# Patient Record
Sex: FEMALE | Race: WHITE | NOT HISPANIC OR LATINO | Employment: OTHER | URBAN - METROPOLITAN AREA
[De-identification: names, ages, dates, MRNs, and addresses within clinical notes are randomized per-mention and may not be internally consistent; named-entity substitution may affect disease eponyms.]

---

## 2017-05-04 ENCOUNTER — HOSPITAL ENCOUNTER (OUTPATIENT)
Dept: RADIOLOGY | Facility: MEDICAL CENTER | Age: 82
End: 2017-05-04
Attending: FAMILY MEDICINE
Payer: MEDICARE

## 2017-05-04 DIAGNOSIS — R05.9 COUGH: ICD-10-CM

## 2017-05-04 PROCEDURE — 71020 DX-CHEST-2 VIEWS: CPT

## 2017-05-31 VITALS
RESPIRATION RATE: 16 BRPM | WEIGHT: 110 LBS | SYSTOLIC BLOOD PRESSURE: 104 MMHG | TEMPERATURE: 97.9 F | HEIGHT: 65 IN | BODY MASS INDEX: 18.33 KG/M2 | DIASTOLIC BLOOD PRESSURE: 62 MMHG | HEART RATE: 69 BPM

## 2017-06-01 ENCOUNTER — OFFICE VISIT (OUTPATIENT)
Dept: PULMONOLOGY | Facility: HOSPICE | Age: 82
End: 2017-06-01
Payer: MEDICARE

## 2017-06-01 VITALS
HEIGHT: 67 IN | RESPIRATION RATE: 16 BRPM | TEMPERATURE: 97.9 F | BODY MASS INDEX: 15.26 KG/M2 | WEIGHT: 97.2 LBS | HEART RATE: 94 BPM | SYSTOLIC BLOOD PRESSURE: 112 MMHG | DIASTOLIC BLOOD PRESSURE: 72 MMHG | OXYGEN SATURATION: 96 %

## 2017-06-01 DIAGNOSIS — M31.30 WEGENER'S GRANULOMATOSIS: ICD-10-CM

## 2017-06-01 DIAGNOSIS — R05.9 COUGH: ICD-10-CM

## 2017-06-01 DIAGNOSIS — R10.84 GENERALIZED ABDOMINAL PAIN: ICD-10-CM

## 2017-06-01 PROCEDURE — 1036F TOBACCO NON-USER: CPT | Performed by: INTERNAL MEDICINE

## 2017-06-01 PROCEDURE — 1101F PT FALLS ASSESS-DOCD LE1/YR: CPT | Mod: 8P | Performed by: INTERNAL MEDICINE

## 2017-06-01 PROCEDURE — G8432 DEP SCR NOT DOC, RNG: HCPCS | Performed by: INTERNAL MEDICINE

## 2017-06-01 PROCEDURE — G8598 ASA/ANTIPLAT THER USED: HCPCS | Performed by: INTERNAL MEDICINE

## 2017-06-01 PROCEDURE — 99214 OFFICE O/P EST MOD 30 MIN: CPT | Performed by: INTERNAL MEDICINE

## 2017-06-01 PROCEDURE — G8419 CALC BMI OUT NRM PARAM NOF/U: HCPCS | Performed by: INTERNAL MEDICINE

## 2017-06-01 PROCEDURE — 4040F PNEUMOC VAC/ADMIN/RCVD: CPT | Performed by: INTERNAL MEDICINE

## 2017-06-01 RX ORDER — METHYLPREDNISOLONE 4 MG/1
TABLET ORAL
Refills: 0 | COMMUNITY
Start: 2017-03-16 | End: 2017-07-03

## 2017-06-01 RX ORDER — MEGESTROL ACETATE 20 MG/1
TABLET ORAL
Refills: 0 | COMMUNITY
Start: 2017-03-16 | End: 2017-07-03

## 2017-06-01 NOTE — MR AVS SNAPSHOT
"Az Jolly   2017 1:00 PM   Office Visit   MRN: [id number]    Department:  [facility]   Dept Phone:  [phone]    Description:  Female : 1934   Provider:  Rayna Jernigan M.D.           Reason for Visit     Follow-Up Patient is here for a Cough.      Allergies as of 2017     Allergen Noted Reactions    Hydroxyzine 2009   Swelling    Keflex 2009   Swelling    Naprelan [Naproxen] 2009   Swelling    Oxaprozin 2009   Swelling    Ampicillin 2009   Rash    Erythromycin 2009   Diarrhea    Vi-Q-Tuss [Hydrocodone-Guaifenesin] 2009   Vomiting    Voltaren [Diclofenac Sodium] 2009   Rash    Baclofen 10/18/2013       drowsiness    Citalopram 10/18/2013       Promethazine 10/18/2013       Drowsiness       You were diagnosed with     Wegener's granulomatosis (CMS-HCC)   [446.4.ICD-9-CM]         Vital Signs     Blood Pressure Pulse Temperature Respirations Height Weight    112/72 mmHg 94 36.6 °C (97.9 °F) 16 1.702 m (5' 7.01\") 44.09 kg (97 lb 3.2 oz)    Body Mass Index Oxygen Saturation Smoking Status             15.22 kg/m2 96% Former Smoker         Basic Information     Date Of Birth Sex Race Ethnicity Preferred Language    1934 Female White Non- English      Your appointments     2017 12:00 PM   Pulmonary Function Test with PFT-RM3   [facility] (--)    [address] [facility] [address]  [facility] [id number] [phone]            2017  8:40 AM   Established Patient Pul with Rayna Jernigan M.D.   [facility] (--)    [address] [facility] [address]  [facility] [id number] [phone]              Problem List              ICD-10-CM Priority Class Noted - Resolved    Arterial ischemic stroke, MCA (middle cerebral artery), left, acute (CMS-[facility]) I63.512 High  8/3/2011 - Present    Wegener's granulomatosis (CMS-HCC) M31.30   8/3/2011 - Present    SVT (SUPRAVENTRICULAR TACHYCARDIA), h/o " paroxysmal I47.1   8/3/2011 - Present    Achalasia K22.0   8/3/2011 - Present    Glomerulonephritis, chronic in other disease N08   8/3/2011 - Present    Hypothyroid E03.9   8/3/2011 - Present    First degree atrioventricular block (Chronic) I44.0   9/21/2012 - Present    Palpitations (Chronic) R00.2   9/21/2012 - Present    Stroke (CMS-HCC) (Chronic) I63.9   9/21/2012 - Present    Allergic rhinitis (Chronic) J30.9   9/21/2012 - Present    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   9/21/2012 - Present    Rheumatic fever (Chronic) I00   9/21/2012 - Present      Health Maintenance        Date Due Completion Dates    IMM DTaP/Tdap/Td Vaccine (1 - Tdap) 4/25/1953 ---    PAP SMEAR 4/25/1955 ---    COLONOSCOPY 4/25/1984 ---    IMM ZOSTER VACCINE 4/25/1994 ---    IMM PNEUMOCOCCAL 65+ (ADULT) LOW/MEDIUM RISK SERIES (2 of 2 - PCV13) 10/26/2005 10/26/2004    MAMMOGRAM 6/5/2014 6/5/2013, 5/10/2012, 4/29/2011, 4/19/2010, 4/19/2010, 4/17/2009, 4/17/2009, 3/24/2008, 3/24/2008, 3/21/2007, 3/10/2006, 3/9/2005, 3/3/2004    BONE DENSITY 6/19/2018 6/19/2013            Current Immunizations     INFLUENZA VACCINE H1N1 12/29/2010    Influenza TIV (IM) 9/14/2013    Influenza Vaccine 9/7/2010    Influenza Vaccine Adult HD 9/26/2016  9:18 AM    Pneumococcal polysaccharide vaccine (PPSV-23) 10/26/2004      Below and/or attached are the medications your provider expects you to take. Review all of your home medications and newly ordered medications with your provider and/or pharmacist. Follow medication instructions as directed by your provider and/or pharmacist. Please keep your medication list with you and share with your provider. Update the information when medications are discontinued, doses are changed, or new medications (including over-the-counter products) are added; and carry medication information at all times in the event of emergency situations     Allergies:  HYDROXYZINE - Swelling     KEFLEX - Swelling     NAPRELAN -  Swelling     OXAPROZIN - Swelling     AMPICILLIN - Rash     ERYTHROMYCIN - Diarrhea     VI-Q-TUSS - Vomiting     VOLTAREN - Rash     BACLOFEN - (reactions not documented)     CITALOPRAM - (reactions not documented)     PROMETHAZINE - (reactions not documented)               Medications  Valid as of: June 01, 2017 -  1:36 PM    Generic Name Brand Name Tablet Size Instructions for use    Aspirin (Tab)  MG Take 325 mg by mouth every 6 hours as needed.        Calcium-Vitamin D   Take 1 Tab by mouth 2 Times a Day.        Folic Acid (Tab) FOLVITE 1 MG Take 1 mg by mouth every day.        Levothyroxine Sodium (Tab) SYNTHROID 75 MCG Take 75 mcg by mouth every morning before breakfast.        Megestrol Acetate (Tab) MEGACE 20 MG TAKE 1 TABLET BY MOUTH TWICE A DAY        MethylPREDNISolone (Tablet Therapy Pack) MEDROL DOSEPAK 4 MG TAKE 6 TABLETS ON DAY 1 AS DIRECTED ON PACKAGE AND DECREASE BY 1 TAB EACH DAY FOR A TOTAL OF 6 DAYS        Multiple Vitamins-Minerals   Take 2 Tabs by mouth every day.        .                 Medicines prescribed today were sent to:     [facility]/PHARMACY #[id number] - [place] - [address]    [address] [place] [address]    Phone: [phone] Fax: [phone]    Open 24 Hours?: No      Medication refill instructions:       If your prescription bottle indicates you have medication refills left, it is not necessary to call your provider’s office. Please contact your pharmacy and they will refill your medication.    If your prescription bottle indicates you do not have any refills left, you may request refills at any time through one of the following ways: The online [employer] system (except Urgent Care), by calling your provider’s office, or by asking your pharmacy to contact your provider’s office with a refill request. Medication refills are processed only during regular business hours and may not be available until the next business day. Your provider may request additional information  or to have a follow-up visit with you prior to refilling your medication.   *Please Note: Medication refills are assigned a new Rx number when refilled electronically. Your pharmacy may indicate that no refills were authorized even though a new prescription for the same medication is available at the pharmacy. Please request the medicine by name with the pharmacy before contacting your provider for a refill.        Your To Do List     Future Labs/Procedures Complete By Expires    AMB PULMONARY FUNCTION TEST/LAB  As directed 6/1/2018    ANTI-NEUTROPHIL CYTOPLASMIC AB  As directed 6/1/2018    CT-CHEST (THORAX) W/O  As directed 6/1/2018    URINALYSIS  As directed 6/1/2018    WESTERGREN SED RATE  As directed 6/1/2018         MyChart Status: Patient Declined

## 2017-06-01 NOTE — PROGRESS NOTES
Chief Complaint   Patient presents with   • Follow-Up     Patient is here for a Cough.       HPI: This patient is an 83 y.o. Female who returns after a prolonged hiatus for pulmonary follow-up. Her last visit was in June 2015. She has history of Wegener's granulomatosis requiring Cytoxan therapy which was discontinued in 2013. She follows with Dr. Estrada, nephrologist, and has been considered in remission since then. Over the past months she has developed abdominal pain of unclear etiology and allegedly undergone GI workup which was unremarkable. She has developed a mild cough, denies sputum production, wheezing, hemoptysis, epistaxis, or chest pain. She estimates a 10 pound weight loss over the past year. She feels poorly overall.  Prior pulmonary function testing in 2015 showed normal lung function. Recent chest x-ray shows:  Peribronchial thickening and patchy linear opacities likely related to inflammation/bronchitis             Past Medical History   Diagnosis Date   • Mitral valve prolapse    • Wegener's granulomatosis (CMS-HCC)    • A fib 9/22/06   • Hypothyroid 10/2002   • DVT 6/1996     left leg, vein ligation performed on saphenous vein   • Kidney failure      stage II   • Kidney disorder 1998     left kidney biopsy--glomerulonephritis and wegners dx   • H/O echocardiogram 9/21/2012   • First degree atrioventricular block 9/21/2012   • Palpitations 9/21/2012   • Stroke (CMS-HCC) 9/21/2012   • Allergic rhinitis 9/21/2012   • Aseptic necrosis of bone, 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     Social History Main Topics   • Smoking status: Former Smoker -- 1.00 packs/day     Types: Cigarettes     Quit date: 01/01/1960   • Smokeless tobacco: Never Used      Comment: very little  years and years ago   • Alcohol Use: 0.0 oz/week     0 Standard drinks or equivalent per week      Comment: occasional   • Drug Use: No   • Sexual Activity: Not on file     Other Topics Concern   • Not on file     Social History Narrative       Family History   Problem Relation Age of Onset   • Other Father      Aortic aneurysm   • Cancer Brother      Lung   • Non-contributory Other        Current Outpatient Prescriptions on File Prior to Visit   Medication Sig Dispense Refill   • aspirin (ASA) 325 MG Tab Take 325 mg by mouth every 6 hours as needed.     • folic acid (FOLVITE) 1 MG TABS Take 1 mg by mouth every day.     • levothyroxine (SYNTHROID) 75 MCG TABS Take 75 mcg by mouth every morning before breakfast.     • Multiple Vitamins-Minerals (MULTIVITAMIN PO) Take 2 Tabs by mouth every day.     • Calcium-Vitamin D (CALTRATE 600 PLUS-VIT D PO) Take 1 Tab by mouth 2 Times a Day.       No current facility-administered medications on file prior to visit.       Allergies: Hydroxyzine; Keflex; Naprelan; Oxaprozin; Ampicillin; Erythromycin; Vi-q-tuss; Voltaren; Baclofen; Citalopram; and Promethazine    ROS:   Constitutional: Denies fevers, chills, night sweats, +fatigue, +weight loss  Eyes: Denies vision loss, pain, drainage, double vision  Ears, Nose, Throat: Denies earache, difficulty hearing, tinnitus, nasal congestion, hoarseness  Cardiovascular: Denies chest pain, tightness, palpitations, orthopnea or edema  Respiratory: As in history of present illness  Sleep: Denies daytime sleepiness, snoring, apneas, insomnia, morning headaches  GI: Denies heartburn, dysphagia, nausea, +abdominal pain, denies diarrhea or constipation  : Denies frequent urination, hematuria, discharge or painful urination  Musculoskeletal: Denies back pain, painful joints, sore muscles  Neurological: Denies weakness or headaches  Skin: No rashes    Blood pressure 112/72, pulse 94, temperature 36.6 °C (97.9 °F), resp. rate 16, height 1.702 m (5'  "7.01\"), weight 44.09 kg (97 lb 3.2 oz), SpO2 96 %.    Physical Exam:  Appearance: Well-nourished, well-developed, in no acute distress  HEENT: Normocephalic, atraumatic, white sclera, PERRLA, oropharynx clear  Neck: No adenopathy or masses  Respiratory: no intercostal retractions or accessory muscle use  Lungs auscultation: Clear to auscultation bilaterally  Cardiovascular: Regular rate rhythm. No murmurs, rubs or gallops.  No LE edema  Abdomen: soft, nondistended  Gait: In wheelchair  Digits: No clubbing, cyanosis  Motor: No focal deficits  Orientation: Oriented to time, person and place    Diagnosis:  1. Wegener's granulomatosis (CMS-HCC)  ANTI-NEUTROPHIL CYTOPLASMIC AB    WESTERGREN SED RATE    C-REACTIVE PROTEIN CARDIAC    URINALYSIS    CT-CHEST (THORAX) W/O    AMB PULMONARY FUNCTION TEST/LAB   2. Cough     3. Generalized abdominal pain         Plan:  The patient has a history of Wegener's granulomatosis requiring treatment with Cytoxan over 5 years ago. She has principally gastric complaints as well as generalized malaise, and mild cough. The etiology is unclear.   We will arrange for chest CAT scan without contrast and pulmonary function testing with DLCO.  Verify ANCA, sedimentation rate, C-reactive protein and urine analysis for evaluation of Wegener's disease. She also follows simultaneously with Dr. Machado, who she has an appointment with this month.  Return for after CT scan, after PFT.        "

## 2017-06-06 ENCOUNTER — HOSPITAL ENCOUNTER (OUTPATIENT)
Dept: RADIOLOGY | Facility: MEDICAL CENTER | Age: 82
End: 2017-06-06
Attending: INTERNAL MEDICINE
Payer: MEDICARE

## 2017-06-06 DIAGNOSIS — M31.30 WEGENER'S GRANULOMATOSIS: ICD-10-CM

## 2017-06-06 PROCEDURE — 71250 CT THORAX DX C-: CPT

## 2017-06-07 ENCOUNTER — TELEPHONE (OUTPATIENT)
Dept: PULMONOLOGY | Facility: HOSPICE | Age: 82
End: 2017-06-07

## 2017-06-07 ENCOUNTER — HOME HEALTH ADMISSION (OUTPATIENT)
Dept: HOME HEALTH SERVICES | Facility: HOME HEALTHCARE | Age: 82
End: 2017-06-07
Payer: MEDICARE

## 2017-06-07 DIAGNOSIS — N39.0 URINARY TRACT INFECTION WITH HEMATURIA, SITE UNSPECIFIED: ICD-10-CM

## 2017-06-07 DIAGNOSIS — R31.9 URINARY TRACT INFECTION WITH HEMATURIA, SITE UNSPECIFIED: ICD-10-CM

## 2017-06-07 RX ORDER — SULFAMETHOXAZOLE AND TRIMETHOPRIM 800; 160 MG/1; MG/1
1 TABLET ORAL 2 TIMES DAILY
Qty: 28 TAB | Refills: 0 | Status: SHIPPED | OUTPATIENT
Start: 2017-06-07 | End: 2017-07-03

## 2017-06-07 NOTE — TELEPHONE ENCOUNTER
Please let patient know her U/A results are back and it appears she has a urinary tract infection. I recommend starting antibiotic for this now. RX sent. The rest of her labs are still pending. Keep f/u visit with Dr. Jernigan.

## 2017-06-07 NOTE — TELEPHONE ENCOUNTER
I called the patient and provided her the message below from CARLOS ALBERTO Foster. Patient stated that she understood.

## 2017-07-03 ENCOUNTER — OUTPATIENT INFUSION SERVICES (OUTPATIENT)
Dept: ONCOLOGY | Facility: MEDICAL CENTER | Age: 82
End: 2017-07-03
Attending: INTERNAL MEDICINE
Payer: MEDICARE

## 2017-07-03 VITALS
TEMPERATURE: 97.8 F | BODY MASS INDEX: 15.33 KG/M2 | RESPIRATION RATE: 18 BRPM | HEIGHT: 67 IN | SYSTOLIC BLOOD PRESSURE: 136 MMHG | DIASTOLIC BLOOD PRESSURE: 64 MMHG | HEART RATE: 110 BPM | OXYGEN SATURATION: 99 % | WEIGHT: 97.66 LBS

## 2017-07-03 DIAGNOSIS — D64.9 ANEMIA, UNSPECIFIED TYPE: ICD-10-CM

## 2017-07-03 LAB
ERYTHROCYTE [DISTWIDTH] IN BLOOD BY AUTOMATED COUNT: 51.8 FL (ref 35.9–50)
FERRITIN SERPL-MCNC: 101.2 NG/ML (ref 10–291)
HCT VFR BLD AUTO: 29.9 % (ref 37–47)
HGB BLD-MCNC: 9.2 G/DL (ref 12–16)
IRON SATN MFR SERPL: 6 % (ref 15–55)
IRON SERPL-MCNC: 23 UG/DL (ref 40–170)
MCH RBC QN AUTO: 27.5 PG (ref 27–33)
MCHC RBC AUTO-ENTMCNC: 30.8 G/DL (ref 33.6–35)
MCV RBC AUTO: 89.5 FL (ref 81.4–97.8)
PLATELET # BLD AUTO: 251 K/UL (ref 164–446)
PMV BLD AUTO: 9.4 FL (ref 9–12.9)
RBC # BLD AUTO: 3.34 M/UL (ref 4.2–5.4)
TIBC SERPL-MCNC: 371 UG/DL (ref 250–450)
WBC # BLD AUTO: 4.9 K/UL (ref 4.8–10.8)

## 2017-07-03 PROCEDURE — 700105 HCHG RX REV CODE 258: Performed by: INTERNAL MEDICINE

## 2017-07-03 PROCEDURE — 83540 ASSAY OF IRON: CPT

## 2017-07-03 PROCEDURE — 96365 THER/PROPH/DIAG IV INF INIT: CPT

## 2017-07-03 PROCEDURE — 82728 ASSAY OF FERRITIN: CPT

## 2017-07-03 PROCEDURE — 83550 IRON BINDING TEST: CPT

## 2017-07-03 PROCEDURE — 700111 HCHG RX REV CODE 636 W/ 250 OVERRIDE (IP): Performed by: INTERNAL MEDICINE

## 2017-07-03 PROCEDURE — 306780 HCHG STAT FOR TRANSFUSION PER CASE

## 2017-07-03 PROCEDURE — 85027 COMPLETE CBC AUTOMATED: CPT

## 2017-07-03 RX ADMIN — IRON SUCROSE 200 MG: 20 INJECTION, SOLUTION INTRAVENOUS at 15:41

## 2017-07-03 ASSESSMENT — PAIN SCALES - GENERAL: PAINLEVEL: NO PAIN

## 2017-07-04 NOTE — PROGRESS NOTES
Patient arrived to Infusion for Day 1 of 5 Venofer infusion;  accompanied. Pt reports fatigue, residual weakness from a previous stroke. Ambulates with walker, but tolerates well.  Reviewed plan of care; print out of remaining appts given to patient and .  PIV started, labs drawn per protocol. Reviewed results; Venofer infused per MAR.  Pt tolerated well; 30min post observation completed without incident. PIV flushed, removed and gauze pressure dressing applied.  Pt discharged home with  in good spirits under no apparent distress.

## 2017-07-07 ENCOUNTER — OUTPATIENT INFUSION SERVICES (OUTPATIENT)
Dept: ONCOLOGY | Facility: MEDICAL CENTER | Age: 82
End: 2017-07-07
Attending: INTERNAL MEDICINE
Payer: MEDICARE

## 2017-07-07 VITALS
HEIGHT: 66 IN | OXYGEN SATURATION: 99 % | TEMPERATURE: 98.4 F | BODY MASS INDEX: 15.52 KG/M2 | DIASTOLIC BLOOD PRESSURE: 80 MMHG | HEART RATE: 102 BPM | RESPIRATION RATE: 18 BRPM | SYSTOLIC BLOOD PRESSURE: 132 MMHG | WEIGHT: 96.56 LBS

## 2017-07-07 PROCEDURE — 96365 THER/PROPH/DIAG IV INF INIT: CPT

## 2017-07-07 PROCEDURE — 306780 HCHG STAT FOR TRANSFUSION PER CASE

## 2017-07-07 PROCEDURE — 700111 HCHG RX REV CODE 636 W/ 250 OVERRIDE (IP): Performed by: INTERNAL MEDICINE

## 2017-07-07 PROCEDURE — 700105 HCHG RX REV CODE 258: Performed by: INTERNAL MEDICINE

## 2017-07-07 RX ADMIN — IRON SUCROSE 200 MG: 20 INJECTION, SOLUTION INTRAVENOUS at 13:40

## 2017-07-07 ASSESSMENT — PAIN SCALES - GENERAL: PAINLEVEL: NO PAIN

## 2017-07-07 NOTE — PROGRESS NOTES
Here for Venofer IV infusion dose # 2 of 5. In good spirits. Remains tired / fatigue. Teaching and education reinforcement provided, also emotional support. Treatment well tolerated without complications. Patient observed by RN for 30 min after infusion finished. No sign or symptoms of adverse reaction observed or expressed. Discharge home with  to self care. Appointment confirmed for next treatment.

## 2017-07-10 ENCOUNTER — OUTPATIENT INFUSION SERVICES (OUTPATIENT)
Dept: ONCOLOGY | Facility: MEDICAL CENTER | Age: 82
End: 2017-07-10
Attending: INTERNAL MEDICINE
Payer: MEDICARE

## 2017-07-10 VITALS
BODY MASS INDEX: 15.45 KG/M2 | RESPIRATION RATE: 18 BRPM | OXYGEN SATURATION: 100 % | HEIGHT: 66 IN | HEART RATE: 77 BPM | SYSTOLIC BLOOD PRESSURE: 118 MMHG | TEMPERATURE: 97.9 F | DIASTOLIC BLOOD PRESSURE: 50 MMHG | WEIGHT: 96.12 LBS

## 2017-07-10 PROCEDURE — 96365 THER/PROPH/DIAG IV INF INIT: CPT

## 2017-07-10 PROCEDURE — 306780 HCHG STAT FOR TRANSFUSION PER CASE

## 2017-07-10 PROCEDURE — 700105 HCHG RX REV CODE 258: Performed by: INTERNAL MEDICINE

## 2017-07-10 PROCEDURE — 700111 HCHG RX REV CODE 636 W/ 250 OVERRIDE (IP): Performed by: INTERNAL MEDICINE

## 2017-07-10 RX ADMIN — IRON SUCROSE 200 MG: 20 INJECTION, SOLUTION INTRAVENOUS at 14:36

## 2017-07-10 ASSESSMENT — PAIN SCALES - GENERAL: PAINLEVEL: NO PAIN

## 2017-07-10 NOTE — PROGRESS NOTES
Pt to infusion services ambulatory per self with walker and accompanied by spouse.  Pt here for scheduled Venofer infusion, #3 of 5.  Plan of care reviewed.  Pt verbalizes understanding.  Pt tells me she has tolerated previous infusions well.  PIV established to LAC,  #24G.  IV flushes easily; + blood return verified.  Venofer administered per MD orders.  Venofer infusing at this time.  Pt resting in chair.  Spouse at chairside.  Call light at hand.

## 2017-07-11 NOTE — PROGRESS NOTES
Late entry for 1545:  Venofer infusion completed without incident.  Line flushed clear with normal saline.  Pt observed for 30 minutes post infusion.  No adverse effects observed or expressed.  VSS.  PIV d/c'd, pressure dressing applied, and patient released to self care and care of spouse in no apparent distress after completion of treatment, ambulatory.  Next appointment scheduled.

## 2017-07-14 ENCOUNTER — OUTPATIENT INFUSION SERVICES (OUTPATIENT)
Dept: ONCOLOGY | Facility: MEDICAL CENTER | Age: 82
End: 2017-07-14
Attending: INTERNAL MEDICINE
Payer: MEDICARE

## 2017-07-14 VITALS
SYSTOLIC BLOOD PRESSURE: 128 MMHG | OXYGEN SATURATION: 98 % | BODY MASS INDEX: 15.29 KG/M2 | HEART RATE: 102 BPM | TEMPERATURE: 98.1 F | DIASTOLIC BLOOD PRESSURE: 74 MMHG | RESPIRATION RATE: 18 BRPM | WEIGHT: 97.44 LBS | HEIGHT: 67 IN

## 2017-07-14 PROCEDURE — 306780 HCHG STAT FOR TRANSFUSION PER CASE

## 2017-07-14 PROCEDURE — 96365 THER/PROPH/DIAG IV INF INIT: CPT

## 2017-07-14 PROCEDURE — 700105 HCHG RX REV CODE 258: Performed by: INTERNAL MEDICINE

## 2017-07-14 PROCEDURE — 700111 HCHG RX REV CODE 636 W/ 250 OVERRIDE (IP): Performed by: INTERNAL MEDICINE

## 2017-07-14 RX ADMIN — IRON SUCROSE 200 MG: 20 INJECTION, SOLUTION INTRAVENOUS at 14:34

## 2017-07-14 ASSESSMENT — PAIN SCALES - GENERAL: PAINLEVEL: NO PAIN

## 2017-07-14 NOTE — PROGRESS NOTES
Patient presents ambulatory via walker for #4/5 venofer.  Patient reports feeling well and denies any s/s of infection at this time.  IV established, flushed, and brisk blood return noted.  Venofer infused over 30 minutes per MD order with no complications or adverse reactions.  Patient to return 7/17/17, confirmed with patient and spouse.  PIV discontinued, gauze, and coban applied to site.  Patient left ambulatory via walker in no distress.

## 2017-07-17 ENCOUNTER — OUTPATIENT INFUSION SERVICES (OUTPATIENT)
Dept: ONCOLOGY | Facility: MEDICAL CENTER | Age: 82
End: 2017-07-17
Attending: INTERNAL MEDICINE
Payer: MEDICARE

## 2017-07-17 VITALS
DIASTOLIC BLOOD PRESSURE: 46 MMHG | RESPIRATION RATE: 18 BRPM | OXYGEN SATURATION: 98 % | TEMPERATURE: 98.3 F | SYSTOLIC BLOOD PRESSURE: 116 MMHG | WEIGHT: 96.12 LBS | BODY MASS INDEX: 15.09 KG/M2 | HEART RATE: 100 BPM | HEIGHT: 67 IN

## 2017-07-17 PROCEDURE — 306780 HCHG STAT FOR TRANSFUSION PER CASE

## 2017-07-17 PROCEDURE — 700111 HCHG RX REV CODE 636 W/ 250 OVERRIDE (IP): Performed by: INTERNAL MEDICINE

## 2017-07-17 PROCEDURE — 96365 THER/PROPH/DIAG IV INF INIT: CPT

## 2017-07-17 PROCEDURE — 700105 HCHG RX REV CODE 258: Performed by: INTERNAL MEDICINE

## 2017-07-17 RX ADMIN — IRON SUCROSE 200 MG: 20 INJECTION, SOLUTION INTRAVENOUS at 13:52

## 2017-07-17 ASSESSMENT — PAIN SCALES - GENERAL: PAINLEVEL: NO PAIN

## 2017-07-17 NOTE — PROGRESS NOTES
Pt here for 5/5 venofer.  Pt states no changes from last visit, tolerating infusions without issue.  PIV started, venofer infused.  Pt completed 30 mintues post obs without issue.  PIV removed.  Pt left IC with , no future apts.

## 2017-07-26 ENCOUNTER — NON-PROVIDER VISIT (OUTPATIENT)
Dept: PULMONOLOGY | Facility: HOSPICE | Age: 82
End: 2017-07-26
Payer: MEDICARE

## 2017-07-26 VITALS — BODY MASS INDEX: 15.25 KG/M2 | WEIGHT: 96 LBS

## 2017-07-26 DIAGNOSIS — M31.30 WEGENER'S GRANULOMATOSIS: ICD-10-CM

## 2017-07-26 PROCEDURE — 94060 EVALUATION OF WHEEZING: CPT | Performed by: INTERNAL MEDICINE

## 2017-07-26 PROCEDURE — 94726 PLETHYSMOGRAPHY LUNG VOLUMES: CPT | Performed by: INTERNAL MEDICINE

## 2017-07-26 PROCEDURE — 94729 DIFFUSING CAPACITY: CPT | Performed by: INTERNAL MEDICINE

## 2017-07-26 ASSESSMENT — PULMONARY FUNCTION TESTS
FVC_PERCENT_PREDICTED: 86
FVC_PERCENT_PREDICTED: 88
FEV1_PREDICTED: 1.92
FVC: 2.23
FEV1/FVC: 83
FEV1: 1.95
FEV1_PERCENT_PREDICTED: 99
FEV1/FVC: 87.44
FEV1/FVC_PERCENT_CHANGE: -67
FVC: 2.3
FEV1_PERCENT_CHANGE: 2
FEV1/FVC_PERCENT_PREDICTED: 74
FEV1_PERCENT_PREDICTED: 101
FEV1/FVC_PERCENT_PREDICTED: 112
FEV1_PERCENT_CHANGE: -3
FEV1: 1.91
FEV1/FVC_PERCENT_PREDICTED: 117
FVC_PREDICTED: 2.59

## 2017-07-26 NOTE — MR AVS SNAPSHOT
Az Huadivya   2017 12:00 PM   Non-Provider Visit   MRN: [id number]    Department:  [facility]   Dept Phone:  [phone]    Description:  Female : 1934   Provider:  PFT-3           Reason for Visit     Shortness of Breath           Allergies as of 2017     Allergen Noted Reactions    Hydroxyzine 2009   Swelling    Keflex 2009   Swelling    Naprelan [Naproxen] 2009   Swelling    Oxaprozin 2009   Swelling    Ampicillin 2009   Rash    Erythromycin 2009   Diarrhea    Vi-Q-Tuss [Hydrocodone-Guaifenesin] 2009   Vomiting    Voltaren [Diclofenac Sodium] 2009   Rash    Baclofen 10/18/2013       drowsiness    Citalopram 10/18/2013       Promethazine 10/18/2013       Drowsiness       You were diagnosed with     Wegener's granulomatosis (CMS-HCC)   [446.4.ICD-9-CM]         Vital Signs     Weight Smoking Status                43.545 kg (96 lb) Former Smoker          Basic Information     Date Of Birth Sex Race Ethnicity Preferred Language    1934 Female White Non- English      Your appointments     2017  8:40 AM   Established Patient Pul with Rayna Jernigan M.D.   [facility] (--)    [address] [place] [address] [address]  [facility] [place] [id number] [phone]              Problem List              ICD-10-CM Priority Class Noted - Resolved    Arterial ischemic stroke, MCA (middle cerebral artery), left, acute (CMS-[facility]) I63.512 High  8/3/2011 - Present    Wegener's granulomatosis (CMS-HCC) M31.30   8/3/2011 - Present    SVT (SUPRAVENTRICULAR TACHYCARDIA), h/o paroxysmal I47.1   8/3/2011 - Present    Achalasia K22.0   8/3/2011 - Present    Glomerulonephritis, chronic in other disease N08   8/3/2011 - Present    Hypothyroid E03.9   8/3/2011 - Present    First degree atrioventricular block (Chronic) I44.0   2012 - Present    Palpitations (Chronic) R00.2   2012 - Present    Stroke (CMS-HCC) (Chronic) I63.9    9/21/2012 - Present    Allergic rhinitis (Chronic) J30.9   9/21/2012 - Present    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   9/21/2012 - Present    Rheumatic fever (Chronic) I00   9/21/2012 - Present      Health Maintenance        Date Due Completion Dates    IMM DTaP/Tdap/Td Vaccine (1 - Tdap) 4/25/1953 ---    PAP SMEAR 4/25/1955 ---    COLONOSCOPY 4/25/1984 ---    IMM ZOSTER VACCINE 4/25/1994 ---    IMM PNEUMOCOCCAL 65+ (ADULT) LOW/MEDIUM RISK SERIES (2 of 2 - PCV13) 10/26/2005 10/26/2004    MAMMOGRAM 6/5/2014 6/5/2013, 5/10/2012, 4/29/2011, 4/19/2010, 4/19/2010, 4/17/2009, 4/17/2009, 3/24/2008, 3/24/2008, 3/21/2007, 3/10/2006, 3/9/2005, 3/3/2004    IMM INFLUENZA (1) 9/1/2017 9/26/2016, 9/14/2013    BONE DENSITY 6/19/2018 6/19/2013            Current Immunizations     INFLUENZA VACCINE H1N1 12/29/2010    Influenza TIV (IM) 9/14/2013    Influenza Vaccine 9/7/2010    Influenza Vaccine Adult HD 9/26/2016  9:18 AM    Pneumococcal polysaccharide vaccine (PPSV-23) 10/26/2004      Below and/or attached are the medications your provider expects you to take. Review all of your home medications and newly ordered medications with your provider and/or pharmacist. Follow medication instructions as directed by your provider and/or pharmacist. Please keep your medication list with you and share with your provider. Update the information when medications are discontinued, doses are changed, or new medications (including over-the-counter products) are added; and carry medication information at all times in the event of emergency situations     Allergies:  HYDROXYZINE - Swelling     KEFLEX - Swelling     NAPRELAN - Swelling     OXAPROZIN - Swelling     AMPICILLIN - Rash     ERYTHROMYCIN - Diarrhea     VI-Q-TUSS - Vomiting     VOLTAREN - Rash     BACLOFEN - (reactions not documented)     CITALOPRAM - (reactions not documented)     PROMETHAZINE - (reactions not documented)               Medications  Valid as of: July 26,  2017 - 12:14 PM    Generic Name Brand Name Tablet Size Instructions for use    Aspirin (Tab)  MG Take 325 mg by mouth every 6 hours as needed.        Folic Acid (Tab) FOLVITE 1 MG Take 1 mg by mouth every day.        Levothyroxine Sodium (Tab) SYNTHROID 75 MCG Take 75 mcg by mouth every morning before breakfast.        Magnesium Hydroxide   Take  by mouth.        Multiple Vitamins-Minerals   Take 2 Tabs by mouth every day.        Psyllium (Pack) METAMUCIL 58.12 % Take 1 Packet by mouth every day.        .                 Medicines prescribed today were sent to:     [facility]/PHARMACY #[id number] - [place], [place] - [address]    [address] [place] [address]    Phone: [phone] Fax: [phone]    Open 24 Hours?: No      Medication refill instructions:       If your prescription bottle indicates you have medication refills left, it is not necessary to call your provider’s office. Please contact your pharmacy and they will refill your medication.    If your prescription bottle indicates you do not have any refills left, you may request refills at any time through one of the following ways: The online [employer] system (except Urgent Care), by calling your provider’s office, or by asking your pharmacy to contact your provider’s office with a refill request. Medication refills are processed only during regular business hours and may not be available until the next business day. Your provider may request additional information or to have a follow-up visit with you prior to refilling your medication.   *Please Note: Medication refills are assigned a new Rx number when refilled electronically. Your pharmacy may indicate that no refills were authorized even though a new prescription for the same medication is available at the pharmacy. Please request the medicine by name with the pharmacy before contacting your provider for a refill.           MyChart Status: Patient Declined

## 2017-07-26 NOTE — PROCEDURES
Technician: MONIQUE Taylor    Technician Comment:  Good patient effort & cooperation.  The results of this test meet the ATS/ERS standards for acceptability & reproducibility.  Test was performed on the [employer] Plethysmograph-Elite DX system.  Predicted values were NHanes-3 for spirometry, [facility] for DLCO, ITS for Lung Volumes.  The DLCO was uncorrected for Hgb.  A bronchodilator of Ventolin HFA -2puffs via spacer administered.    Interpretation:    Lung function testing completed July 26, 2017 showed normal spirometry. No change after bronchodilators. Mild hyperinflation of uncertain significance. Normal oxygen transfer. Normal flow volume loop and good effort noted.

## 2017-07-27 ENCOUNTER — OFFICE VISIT (OUTPATIENT)
Dept: PULMONOLOGY | Facility: HOSPICE | Age: 82
End: 2017-07-27
Payer: MEDICARE

## 2017-07-27 VITALS
HEIGHT: 67 IN | OXYGEN SATURATION: 98 % | SYSTOLIC BLOOD PRESSURE: 112 MMHG | WEIGHT: 97 LBS | BODY MASS INDEX: 15.22 KG/M2 | DIASTOLIC BLOOD PRESSURE: 72 MMHG | TEMPERATURE: 97.7 F | RESPIRATION RATE: 16 BRPM | HEART RATE: 107 BPM

## 2017-07-27 DIAGNOSIS — M31.30 WEGENER'S GRANULOMATOSIS: ICD-10-CM

## 2017-07-27 DIAGNOSIS — J47.1 BRONCHIECTASIS WITH ACUTE EXACERBATION (HCC): ICD-10-CM

## 2017-07-27 DIAGNOSIS — D50.9 IRON DEFICIENCY ANEMIA, UNSPECIFIED IRON DEFICIENCY ANEMIA TYPE: ICD-10-CM

## 2017-07-27 PROCEDURE — 99214 OFFICE O/P EST MOD 30 MIN: CPT | Performed by: INTERNAL MEDICINE

## 2017-07-27 RX ORDER — SULFAMETHOXAZOLE AND TRIMETHOPRIM 800; 160 MG/1; MG/1
TABLET ORAL
Refills: 0 | COMMUNITY
Start: 2017-06-07 | End: 2018-10-15

## 2017-07-27 RX ORDER — CIPROFLOXACIN 500 MG/1
500 TABLET, FILM COATED ORAL 2 TIMES DAILY
Qty: 28 TAB | Refills: 0 | Status: SHIPPED | OUTPATIENT
Start: 2017-07-27 | End: 2018-10-15

## 2017-07-27 NOTE — PROGRESS NOTES
Chief Complaint   Patient presents with   • Results     CT,PFT results.       HPI: This patient is an 83 y.o. Female who returns for follow-up. She has history of Wegener's granulomatosis requiring Cytoxan therapy which was discontinued in 2013. She follows with Dr. Estrada, nephrologist, and has been considered in remission since then. Over the past months she has developed abdominal pain of unclear etiology and allegedly undergone GI workup which was unremarkable. She has developed a mild cough, denies sputum production, wheezing, hemoptysis, epistaxis, or chest pain. She estimates a 10 pound weight loss over the past year. She feels poorly overall. She saw Dr. Estrada, was started on infusions for iron deficiency anemia, with subjective improvement in fatigue and abdominal discomfort. Medical records will be requested from Dr. Estrada, however she states her Wegener's is considered in remission. Her pulmonary function testing show normal lung function and DLCO. Recent chest x-ray shows:  Peribronchial thickening and patchy linear opacities likely related to inflammation/bronchitis. Subsequent chest CAT scan showed bibasilar bronchiectasis with consolidation and mucous plugging.                Past Medical History   Diagnosis Date   • Mitral valve prolapse    • Wegener's granulomatosis (CMS-[facility])    • A fib 9/22/06   • Hypothyroid 10/2002   • DVT 6/1996     left leg, vein ligation performed on saphenous vein   • Kidney failure      stage II   • Kidney disorder 1998     left kidney biopsy--glomerulonephritis and wegners dx   • H/O echocardiogram 9/21/2012   • First degree atrioventricular block 9/21/2012   • Palpitations 9/21/2012   • Stroke (CMS-[facility]) 9/21/2012   • Allergic rhinitis 9/21/2012   • Aseptic necrosis of bone, 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     Social History Main Topics   • Smoking status: Former Smoker -- 1.00 packs/day     Types: Cigarettes     Quit date: 01/01/1960   • Smokeless tobacco: Never Used      Comment: very little years and years ago   • Alcohol Use: 0.0 oz/week     0 Standard drinks or equivalent per week      Comment: occasional   • Drug Use: No   • Sexual Activity: Not on file     Other Topics Concern   • Not on file     Social History Narrative       Family History   Problem Relation Age of Onset   • Other Father      Aortic aneurysm   • Cancer Brother      Lung   • Non-contributory Other        Current Outpatient Prescriptions on File Prior to Visit   Medication Sig Dispense Refill   • aspirin (ASA) 325 MG Tab Take 325 mg by mouth every 6 hours as needed.     • folic acid (FOLVITE) 1 MG TABS Take 1 mg by mouth every day.     • levothyroxine (SYNTHROID) 75 MCG TABS Take 75 mcg by mouth every morning before breakfast.     • Multiple Vitamins-Minerals (MULTIVITAMIN PO) Take 2 Tabs by mouth every day.     • psyllium (METAMUCIL) 58.12 % Pack Take 1 Packet by mouth every day.     • Magnesium Hydroxide (MILK OF MAGNESIA PO) Take  by mouth.       No current facility-administered medications on file prior to visit.       Allergies: Hydroxyzine; Keflex; Naprelan; Oxaprozin; Ampicillin; Erythromycin; Vi-q-tuss; Voltaren; Baclofen; Citalopram; and Promethazine    ROS:   Constitutional: Denies fevers, chills, night sweats, +fatigue, denies weight loss  Eyes: Denies vision loss, pain, drainage, double vision  Ears, Nose, Throat: Denies earache, difficulty hearing, tinnitus, nasal congestion, hoarseness  Cardiovascular: Denies chest pain, tightness, palpitations, orthopnea or edema  Respiratory: As in history of present illness  Sleep: Denies daytime sleepiness, snoring, apneas, insomnia, morning headaches  GI: Denies heartburn, dysphagia, nausea, abdominal pain, diarrhea or  "constipation  : Denies frequent urination, hematuria, discharge or painful urination  Musculoskeletal: Denies back pain, painful joints, sore muscles  Neurological: Denies weakness or headaches  Skin: No rashes    Blood pressure 112/72, pulse 107, temperature 36.5 °C (97.7 °F), resp. rate 16, height 1.702 m (5' 7.01\"), weight 43.999 kg (97 lb), SpO2 98 %.    Physical Exam:  Appearance: Well-nourished, well-developed, in no acute distress  HEENT: Normocephalic, atraumatic, white sclera, PERRLA, oropharynx clear  Neck: No adenopathy or masses  Respiratory: no intercostal retractions or accessory muscle use  Lungs auscultation: Clear to auscultation bilaterally  Cardiovascular: Regular rate rhythm. No murmurs, rubs or gallops.  No LE edema  Abdomen: soft, nondistended  Gait: Wheelchair-bound  Digits: No clubbing, cyanosis  Motor: No focal deficits  Orientation: Oriented to time, person and place    Diagnosis:  1. Bronchiectasis with acute exacerbation (CMS-[facility])  DX-CHEST-2 VIEWS    ciprofloxacin (CIPRO) 500 MG Tab   2. Wegener's granulomatosis (CMS-[facility])     3. Iron deficiency anemia, unspecified iron deficiency anemia type         Plan:  The patient's chest CAT scan shows bronchiectasis with infiltrates and mucous plugging. Remarkably she has no significant sputum production however given her cough, recommend empiric treatment with ciprofloxacin 500 mg twice a day for 14 days. We will repeat chest x-ray on follow-up. If there is no improvement in fiberoptic bronchoscopy would be considered for lavage and culture.  We will request Dr. Barahona's records regarding her Wegener's granulomatosis, however this appears to be in remission per her report.  Continue Venofer infusions for iron deficiency anemia.  Return in about 8 weeks (around 9/21/2017) for with CXR.        "

## 2017-07-27 NOTE — MR AVS SNAPSHOT
"        Az Jolly   2017 8:40 AM   Office Visit   MRN: [id number]    Department:  [facility]   Dept Phone:  [phone]    Description:  Female : 1934   Provider:  Rayna Jernigan M.D.           Reason for Visit     Results CT,PFT results.      Allergies as of 2017     Allergen Noted Reactions    Hydroxyzine 2009   Swelling    Keflex 2009   Swelling    Naprelan [Naproxen] 2009   Swelling    Oxaprozin 2009   Swelling    Ampicillin 2009   Rash    Erythromycin 2009   Diarrhea    Vi-Q-Tuss [Hydrocodone-Guaifenesin] 2009   Vomiting    Voltaren [Diclofenac Sodium] 2009   Rash    Baclofen 10/18/2013       drowsiness    Citalopram 10/18/2013       Promethazine 10/18/2013       Drowsiness       You were diagnosed with     Bronchiectasis with acute exacerbation (CMS-[facility])   [494.1.ICD-9-CM]       Wegener's granulomatosis (CMS-[facility])   [446.4.ICD-9-CM]       Iron deficiency anemia, unspecified iron deficiency anemia type   [4285876]         Vital Signs     Blood Pressure Pulse Temperature Respirations Height Weight    112/72 mmHg 107 36.5 °C (97.7 °F) 16 1.702 m (5' 7.01\") 43.999 kg (97 lb)    Body Mass Index Oxygen Saturation Smoking Status             15.19 kg/m2 98% Former Smoker         Basic Information     Date Of Birth Sex Race Ethnicity Preferred Language    1934 Female White Non- English      Your appointments     Sep 19, 2017  1:00 PM   Established Patient Pul with TOM Doe   [facility] (--)    [address]  [facility] [address]  [place] [address] [phone]              Problem List              ICD-10-CM Priority Class Noted - Resolved    Arterial ischemic stroke, MCA (middle cerebral artery), left, acute (CMS-[facility]) I63.512 High  8/3/2011 - Present    Wegener's granulomatosis (CMS-[facility]) M31.30   8/3/2011 - Present    SVT (SUPRAVENTRICULAR TACHYCARDIA), h/o paroxysmal I47.1   8/3/2011 - " Present    Achalasia K22.0   8/3/2011 - Present    Glomerulonephritis, chronic in other disease N08   8/3/2011 - Present    Hypothyroid E03.9   8/3/2011 - Present    First degree atrioventricular block (Chronic) I44.0   9/21/2012 - Present    Palpitations (Chronic) R00.2   9/21/2012 - Present    Stroke (CMS-HCC) (Chronic) I63.9   9/21/2012 - Present    Allergic rhinitis (Chronic) J30.9   9/21/2012 - Present    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   9/21/2012 - Present    Rheumatic fever (Chronic) I00   9/21/2012 - Present      Health Maintenance        Date Due Completion Dates    IMM DTaP/Tdap/Td Vaccine (1 - Tdap) 4/25/1953 ---    PAP SMEAR 4/25/1955 ---    COLONOSCOPY 4/25/1984 ---    IMM ZOSTER VACCINE 4/25/1994 ---    IMM PNEUMOCOCCAL 65+ (ADULT) LOW/MEDIUM RISK SERIES (2 of 2 - PCV13) 10/26/2005 10/26/2004    MAMMOGRAM 6/5/2014 6/5/2013, 5/10/2012, 4/29/2011, 4/19/2010, 4/19/2010, 4/17/2009, 4/17/2009, 3/24/2008, 3/24/2008, 3/21/2007, 3/10/2006, 3/9/2005, 3/3/2004    IMM INFLUENZA (1) 9/1/2017 9/26/2016, 9/14/2013    BONE DENSITY 6/19/2018 6/19/2013            Current Immunizations     INFLUENZA VACCINE H1N1 12/29/2010    Influenza TIV (IM) 9/14/2013    Influenza Vaccine 9/7/2010    Influenza Vaccine Adult HD 9/26/2016  9:18 AM    Pneumococcal polysaccharide vaccine (PPSV-23) 10/26/2004      Below and/or attached are the medications your provider expects you to take. Review all of your home medications and newly ordered medications with your provider and/or pharmacist. Follow medication instructions as directed by your provider and/or pharmacist. Please keep your medication list with you and share with your provider. Update the information when medications are discontinued, doses are changed, or new medications (including over-the-counter products) are added; and carry medication information at all times in the event of emergency situations     Allergies:  HYDROXYZINE - Swelling     KEFLEX -  Swelling     NAPRELAN - Swelling     OXAPROZIN - Swelling     AMPICILLIN - Rash     ERYTHROMYCIN - Diarrhea     VI-Q-TUSS - Vomiting     VOLTAREN - Rash     BACLOFEN - (reactions not documented)     CITALOPRAM - (reactions not documented)     PROMETHAZINE - (reactions not documented)               Medications  Valid as of: July 27, 2017 -  9:02 AM    Generic Name Brand Name Tablet Size Instructions for use    Aspirin (Tab)  MG Take 325 mg by mouth every 6 hours as needed.        Ciprofloxacin HCl (Tab) CIPRO 500 MG Take 1 Tab by mouth 2 times a day.        Folic Acid (Tab) FOLVITE 1 MG Take 1 mg by mouth every day.        Levothyroxine Sodium (Tab) SYNTHROID 75 MCG Take 75 mcg by mouth every morning before breakfast.        Magnesium Hydroxide   Take  by mouth.        Multiple Vitamins-Minerals   Take 2 Tabs by mouth every day.        Psyllium (Pack) METAMUCIL 58.12 % Take 1 Packet by mouth every day.        Sulfamethoxazole-Trimethoprim (Tab) BACTRIM -160 MG TAKE 1 TAB BY MOUTH 2 TIMES A DAY. TAKE UNTIL GONE.        .                 Medicines prescribed today were sent to:     [id number] - [place] - [address]    [address] [place] [address]    Phone: [phone] Fax: [phone]    Open 24 Hours?: No      Medication refill instructions:       If your prescription bottle indicates you have medication refills left, it is not necessary to call your provider’s office. Please contact your pharmacy and they will refill your medication.    If your prescription bottle indicates you do not have any refills left, you may request refills at any time through one of the following ways: The online [employer] system (except Urgent Care), by calling your provider’s office, or by asking your pharmacy to contact your provider’s office with a refill request. Medication refills are processed only during regular business hours and may not be available until the next business day. Your provider may  request additional information or to have a follow-up visit with you prior to refilling your medication.   *Please Note: Medication refills are assigned a new Rx number when refilled electronically. Your pharmacy may indicate that no refills were authorized even though a new prescription for the same medication is available at the pharmacy. Please request the medicine by name with the pharmacy before contacting your provider for a refill.        Your To Do List     Future Labs/Procedures Complete By Expires    DX-CHEST-2 VIEWS  As directed 7/27/2018    Scheduling Instructions:    When: 2 months  Where: Pulm clinic         MyChart Status: Patient Declined

## 2017-09-11 ENCOUNTER — HOSPITAL ENCOUNTER (OUTPATIENT)
Dept: RADIOLOGY | Facility: MEDICAL CENTER | Age: 82
End: 2017-09-11
Attending: INTERNAL MEDICINE
Payer: MEDICARE

## 2017-09-11 DIAGNOSIS — J47.1 BRONCHIECTASIS WITH ACUTE EXACERBATION (HCC): ICD-10-CM

## 2017-09-11 PROCEDURE — 71020 DX-CHEST-2 VIEWS: CPT

## 2017-09-19 ENCOUNTER — OFFICE VISIT (OUTPATIENT)
Dept: PULMONOLOGY | Facility: HOSPICE | Age: 82
End: 2017-09-19
Payer: MEDICARE

## 2017-09-19 VITALS
WEIGHT: 97 LBS | HEART RATE: 103 BPM | HEIGHT: 67 IN | SYSTOLIC BLOOD PRESSURE: 122 MMHG | BODY MASS INDEX: 15.22 KG/M2 | RESPIRATION RATE: 15 BRPM | OXYGEN SATURATION: 98 % | DIASTOLIC BLOOD PRESSURE: 55 MMHG

## 2017-09-19 DIAGNOSIS — M31.30 WEGENER'S GRANULOMATOSIS: ICD-10-CM

## 2017-09-19 DIAGNOSIS — J47.9 BRONCHIECTASIS WITHOUT COMPLICATION (HCC): ICD-10-CM

## 2017-09-19 DIAGNOSIS — R53.82 CHRONIC FATIGUE: ICD-10-CM

## 2017-09-19 DIAGNOSIS — R93.89 ABNORMAL CHEST X-RAY: ICD-10-CM

## 2017-09-19 PROCEDURE — 99214 OFFICE O/P EST MOD 30 MIN: CPT | Performed by: NURSE PRACTITIONER

## 2017-09-19 NOTE — PROGRESS NOTES
"Chief Complaint   Patient presents with   • Follow-Up     8 W         HPI: This patient is a 83 y.o. Female For chest x-ray results.     To reiterate, She has history of Wegener's granulomatosis requiring Cytoxan therapy which was discontinued in 2013. She follows with Dr. Estrada, nephrologist, and has been considered in remission since then. Over the past months she has developed abdominal pain of unclear etiology and allegedly undergone GI workup which was unremarkable.  She saw Dr. Estrada, was started on infusions for iron deficiency anemia, with subjective improvement in fatigue and abdominal discomfort. Her pulmonary function testing show normal lung function and DLCO.  chest x-ray July 2017 shows:  Peribronchial thickening and patchy linear opacities likely related to inflammation/bronchitis. Subsequent chest CAT scan showed bibasilar bronchiectasis with consolidation and mucous plugging.    Patient complaining of mild cough. She was treated with Cipro. Chest x-ray September 11 showed bilateral airspace opacities improved from prior exam however they do persist. Patient is feeling better. She is a very minimal cough. Denies other respiratory complaints. Denies chest pain or pressure, hemoptysis, fever, chills, night sweats. Her only complaint is fatigue. Fatigue has been ongoing for quite some time. It is minimally improved with recent iron infusions. Her  notes \"heavy breathing\" at night. Patient reports frequent nocturnal awakenings. Denies resuscitative gasps, a.m. headache. No known history of apnea.      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)        Social History   Substance Use Topics   • Smoking status: Former Smoker     Packs/day: 1.00     Types: Cigarettes     Quit date: 1/1/1960   • Smokeless tobacco: Never Used      Comment: very little years and years ago   • Alcohol use 0.0 oz/week      Comment: occasional       Family History   Problem Relation Age of Onset   • Other Father      Aortic aneurysm   • Cancer Brother      Lung   • Non-contributory Other        Current medications as of today   Current Outpatient Prescriptions   Medication Sig Dispense Refill   • sulfamethoxazole-trimethoprim (BACTRIM DS) 800-160 MG tablet TAKE 1 TAB BY MOUTH 2 TIMES A DAY. TAKE UNTIL GONE.  0   • ciprofloxacin (CIPRO) 500 MG Tab Take 1 Tab by mouth 2 times a day. 28 Tab 0   • aspirin (ASA) 325 MG Tab Take 325 mg by mouth every 6 hours as needed.     • folic acid (FOLVITE) 1 MG TABS Take 1 mg by mouth every day.     • levothyroxine (SYNTHROID) 75 MCG TABS Take 75 mcg by mouth every morning before breakfast.     • Multiple Vitamins-Minerals (MULTIVITAMIN PO) Take 2 Tabs by mouth every day.     • psyllium (METAMUCIL) 58.12 % Pack Take 1 Packet by mouth every day.     • Magnesium Hydroxide (MILK OF MAGNESIA PO) Take  by mouth.       No current facility-administered medications for this visit.        Allergies: Hydroxyzine; Keflex; Naprelan [naproxen]; Oxaprozin; Ampicillin; Erythromycin; Vi-q-tuss [hydrocodone-guaifenesin]; Voltaren [diclofenac sodium]; Baclofen; Citalopram; and Promethazine    Blood pressure 122/55, pulse (!) 103, resp. rate 15, height 1.702 m (5' 7\"), weight 44 kg (97 lb), SpO2 98 %.      ROS:   Constitutional: Denies fevers, chills, night sweats, weight loss. Positive fatigue, generalized weakness  Eyes: Denies pain, " discharge/drainage  ENT: Denies tinnitus, hearing loss, sinusitis, hoarseness, epistaxis  Allergic: Denies Allergic rhinitis or hayfever  Respiratory: See HPI  Cardiovascular: Denies chest pain, tightness, palpitations, orthopnea or edema  Sleep: See history of present illness   GI/: Denies heartburn, nausea, vomiting, urinary incontinence, hematuria  Musculoskeletal: Denies back pain, painful joints, sore muscles  Neurological: Denies vertigo or headaches  Skin: Denies rashes, lesions  Psychiatric: Denies depression or anxiety      Physical exam:   Constitutional: Well-nourished, well-developed, in no acute distress  Eyes: PERRL  Mouth/Throat: Oropharynx is moist, clear, no lesions  Neck: supple, no masses  Respiratory: no intercostal retractions or accessory muscle use   Lungs auscultation: Faint rhonchi at bilateral bases otherwise clear  Cardiovascular: Regular rate rhythm no murmurs, rubs or gallops, distant heart tones   Musculoskeletal: Slow, unsteady gait, uses walker no clubbing or cyanosis  Skin: No rashes or lesions  Neuro: No focal deficit, cranial nerves grossly intact  Psychiatric: Oriented to time, person and place.     Diagnosis:  1. Wegener's granulomatosis (CMS-HCC)     2. Bronchiectasis without complication (CMS-HCC)     3. Abnormal chest x-ray  DX-CHEST-2 VIEWS   4. Chronic fatigue  OVERNIGHT OXIMETRY       Plan:  Repeat chest x-ray in case with Dr. Jernigan, patient's cough is improved, chest x-ray shows improving opacity. We'll hold off on bronchoscopy for the time being. Repeat chest x-ray in 8 weeks. If changes persist she will require a bronchoscopy at that time. Patient and her  are amendable to this. Her chest x-ray was reviewed in detail with them. Questions answered to her satisfaction.   Overnight oximetry and chronic fatigue   Update pneumonia vaccine at that time.

## 2017-09-19 NOTE — PATIENT INSTRUCTIONS
1. Overnight oximetry  2. Follow-up in 2 months with an x-ray, Dr Jernigan or myself  3. Follow with PCP and nephrology as scheduled

## 2017-10-23 ENCOUNTER — APPOINTMENT (OUTPATIENT)
Dept: SOCIAL WORK | Facility: CLINIC | Age: 82
End: 2017-10-23
Payer: MEDICARE

## 2017-10-23 PROCEDURE — G0008 ADMIN INFLUENZA VIRUS VAC: HCPCS | Performed by: REGISTERED NURSE

## 2017-10-23 PROCEDURE — 90662 IIV NO PRSV INCREASED AG IM: CPT | Performed by: REGISTERED NURSE

## 2017-11-17 ENCOUNTER — TELEPHONE (OUTPATIENT)
Dept: PULMONOLOGY | Facility: HOSPICE | Age: 82
End: 2017-11-17

## 2017-11-17 NOTE — TELEPHONE ENCOUNTER
I called and reminded patient to have her chest x-ray completed prior to being seen on Monday. She requested to have this done in office the day of her appointment.

## 2017-11-20 ENCOUNTER — APPOINTMENT (OUTPATIENT)
Dept: RADIOLOGY | Facility: IMAGING CENTER | Age: 82
End: 2017-11-20
Attending: NURSE PRACTITIONER
Payer: MEDICARE

## 2017-11-20 ENCOUNTER — OFFICE VISIT (OUTPATIENT)
Dept: PULMONOLOGY | Facility: HOSPICE | Age: 82
End: 2017-11-20
Payer: MEDICARE

## 2017-11-20 VITALS
HEART RATE: 105 BPM | SYSTOLIC BLOOD PRESSURE: 110 MMHG | OXYGEN SATURATION: 97 % | HEIGHT: 67 IN | DIASTOLIC BLOOD PRESSURE: 78 MMHG | WEIGHT: 97 LBS | RESPIRATION RATE: 16 BRPM | BODY MASS INDEX: 15.22 KG/M2

## 2017-11-20 DIAGNOSIS — R53.82 CHRONIC FATIGUE: ICD-10-CM

## 2017-11-20 DIAGNOSIS — J47.9 BRONCHIECTASIS WITHOUT COMPLICATION (HCC): ICD-10-CM

## 2017-11-20 DIAGNOSIS — R93.89 ABNORMAL CHEST X-RAY: ICD-10-CM

## 2017-11-20 DIAGNOSIS — M31.30 WEGENER'S GRANULOMATOSIS: ICD-10-CM

## 2017-11-20 PROCEDURE — 71020 DX-CHEST-2 VIEWS: CPT | Mod: TC | Performed by: NURSE PRACTITIONER

## 2017-11-20 PROCEDURE — 99214 OFFICE O/P EST MOD 30 MIN: CPT | Performed by: NURSE PRACTITIONER

## 2017-11-20 NOTE — PATIENT INSTRUCTIONS
Plan:    1) Chest xray today in the office is clear. Reviewed images with Dr. Sheffield who feels she does have evidence of mild bronchiectasis. Her cough has essentially resolved. Normal PFT's in the past. We discussed importance of prompt treatment of acute infectious symptoms. No need for inhalers.   2) She declines overnight oximetry. She feels her energy levels have improved.   3) She is up to date on Prevnar 13, Pneumovax 23 and Influenza vaccinations.   4) Recommended an annual follow up, she would prefer PRN follow up.

## 2017-11-20 NOTE — PROGRESS NOTES
Chief Complaint   Patient presents with   • Follow-Up       HPI:  Az Jolly is a 83 y.o. year old female here today for follow-up on her abnormal chest xray. She was last seen in the office 9/19/2017 with CARLOS ALBERTO Gilbert. To reiterate, She has history of Wegener's granulomatosis requiring Cytoxan therapy which was discontinued in 2013. She follows with Dr. Estrada, nephrologist, and has been considered in remission since then. She had developed abdominal pain of unclear etiology and allegedly undergone GI workup which was unremarkable. She saw Dr. Estrada, was started on infusions for iron deficiency anemia, with subjective improvement in fatigue and abdominal discomfort. Her pulmonary function testing show normal lung function and DLCO.  chest x-ray July 2017 shows:  Peribronchial thickening and patchy linear opacities likely related to inflammation/bronchitis. Subsequent chest CAT scan showed bibasilar bronchiectasis with consolidation and mucous plugging.    She did have a mild cough. She was treated with Cipro. Chest x-ray September 11 showed bilateral airspace opacities improved from prior exam however they do persist. CARLOS ALBERTO Gilbert reviewed the results of the chest way with Dr. Jernigan who recommended a repeat chest xray in 8 weeks. If changes persist then consider Bronchoscopy.   Chest xray was repeated today in the office and indicates;  1.  No acute cardiopulmonary abnormality identified.  2.  Chronic obstructive pulmonary disease    She states her cough has essentially resolved. She denies purulent mucous production. She denies hemoptysis. She denies any wheezing. She denies dyspnea. She denies recent respiratory infections. She denies fevers or chills. She feels her energy levels have improved with the iron infusions.     Kenyatta also ordered a CNOX on RA for further work up on her fatigue. Pt cancelled this test as she does not feel this is needed.         Past Medical History:    Diagnosis Date   • A fib 9/22/06   • Abdominal bruit 9/25/2012   • Allergic rhinitis 9/21/2012   • Aseptic necrosis of bone, 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   • DVT 6/1996    left leg, vein ligation performed on saphenous vein   • First degree atrioventricular block 9/21/2012   • H/O echocardiogram 9/21/2012   • Hypothyroid 10/2002   • Kidney disorder 1998    left kidney biopsy--glomerulonephritis and wegners dx   • Kidney failure     stage II   • Mitral valve prolapse    • Palpitations 9/21/2012   • Rheumatic fever 9/21/2012   • Stroke (CMS-HCC) 9/21/2012   • Wegener's granulomatosis (CMS-HCC)        Past Surgical History:   Procedure Laterality Date   • CATARACT EXTRACTION  2006    left eye   • CHOLECYSTECTOMY  1997   • TEMPORAL ARTERY BIOPSY  1996    normal   • LUNG NEEDLE BIOPSY  1996    right lung, nodules found   • ARTHROSCOPE  1989    left knee   • ARTHROSCOPY, KNEE  1986    right   • BUNIONECTOMY  1980    bilat   • HYSTERECTOMY RADICAL  1975   • VEIN LIGATION         Family History   Problem Relation Age of Onset   • Other Father      Aortic aneurysm   • Cancer Brother      Lung   • Non-contributory Other        Social History     Social History   • Marital status:      Spouse name: N/A   • Number of children: N/A   • Years of education: N/A     Occupational History   • Not on file.     Social History Main Topics   • Smoking status: Former Smoker     Packs/day: 1.00     Types: Cigarettes     Quit date: 1/1/1960   • Smokeless tobacco: Never Used      Comment: very little years and years ago   • Alcohol use 0.0 oz/week      Comment: occasional   • Drug use: No   • Sexual activity: Not on file     Other Topics Concern   • Not on file     Social History Narrative   • No narrative on file         ROS:  Constitutional: Denies fevers, chills, sweats, fatigue, weight loss  Eyes: Denies glasses, vision loss, pain, drainage, double vision  Ears/Nose/Mouth/Throat: Denies rhinitis, nasal congestion, ear ache,  "difficulty hearing, sore throat, persistent hoarseness, decayed teeth/toothache  Cardiovascular: Denies chest pain, tightness, palpitations, swelling in feet/legs, fainting, difficulty breathing when laying down  Respiratory: See HPI   GI: Denies heartburn, difficulty swallowing, nausea, vomiting, abdominal pain, diarrhea, constipation  : Denies frequent urination, painful urination  Integumentary: Denies rashes, lumps or color changes  MSK: Denies painful joints, sore muscles, and back pain.   Neurological: Denies frequent headaches, dizziness        Current Outpatient Prescriptions   Medication Sig Dispense Refill   • Polyethylene Glycol 3350 (MIRALAX PO) Take  by mouth.     • aspirin (ASA) 325 MG Tab Take 325 mg by mouth every 6 hours as needed.     • folic acid (FOLVITE) 1 MG TABS Take 1 mg by mouth every day.     • levothyroxine (SYNTHROID) 75 MCG TABS Take 75 mcg by mouth every morning before breakfast.     • Multiple Vitamins-Minerals (MULTIVITAMIN PO) Take 2 Tabs by mouth every day.     • sulfamethoxazole-trimethoprim (BACTRIM DS) 800-160 MG tablet TAKE 1 TAB BY MOUTH 2 TIMES A DAY. TAKE UNTIL GONE.  0   • ciprofloxacin (CIPRO) 500 MG Tab Take 1 Tab by mouth 2 times a day. 28 Tab 0   • psyllium (METAMUCIL) 58.12 % Pack Take 1 Packet by mouth every day.     • Magnesium Hydroxide (MILK OF MAGNESIA PO) Take  by mouth.       No current facility-administered medications for this visit.        Allergies   Allergen Reactions   • Hydroxyzine Swelling   • Keflex Swelling   • Naprelan [Naproxen] Swelling   • Oxaprozin Swelling   • Ampicillin Rash   • Erythromycin Diarrhea   • Vi-Q-Tuss [Hydrocodone-Guaifenesin] Vomiting   • Voltaren [Diclofenac Sodium] Rash   • Baclofen      drowsiness   • Citalopram    • Promethazine      Drowsiness        Blood pressure 110/78, pulse (!) 105, resp. rate 16, height 1.702 m (5' 7\"), weight 44 kg (97 lb), SpO2 97 %.    PE:   Appearance: Thin elderly female, no acute distress  Eyes: " PERRL, EOM intact, sclera white, conjunctiva moist  Ears: no lesions or deformities  Hearing: grossly intact  Nose: no lesions or deformities  Oropharynx: tongue normal, posterior pharynx without erythema or exudate  Neck: supple, trachea midline, no masses   Respiratory effort: no intercostal retractions or use of accessory muscles  Lung auscultation: no rales, rhonchi or wheezes, somewhat diminished  Heart auscultation: no murmur rub or gallop  Extremities: no cyanosis or edema  Abdomen: soft ,non tender, no masses  Gait and Station: Ambulates with walker  Digits and nails: no clubbing, cyanosis, petechiae or nodes.  Cranial nerves: grossly intact  Skin: no rashes, lesions or ulcers noted  Orientation: Oriented to time, person and place  Mood and affect: mood and affect appropriate, normal interaction with examiner  Judgement: Intact          Assessment:  1. Wegener's granulomatosis (CMS-HCC)     2. Bronchiectasis without complication (CMS-HCC)     3. Abnormal chest x-ray     4. Chronic fatigue           Plan:    1) Chest xray today in the office is clear. Reviewed images with Dr. Sheffield who feels she does have evidence of mild bronchiectasis. Her cough has essentially resolved. Normal PFT's in the past. We discussed importance of prompt treatment of acute infectious symptoms. No need for inhalers.   2) She declines overnight oximetry. She feels her energy levels have improved.   3) She is up to date on Prevnar 13, Pneumovax 23 and Influenza vaccinations.   4) Recommended an annual follow up, she would prefer PRN follow up.

## 2017-11-20 NOTE — LETTER
JEFF Oliva  [facility]   [address],   [facility] [place] [address]  Phone: [phone] - Fax: [phone]           Encounter Date: 11/20/2017  Provider: JEFF Oliva  Location of Care: [facility]      Patient:   Az Jolly   MR Number: [id number]   YOB: 1934     PROGRESS NOTE:  Chief Complaint   Patient presents with   • Follow-Up       HPI:  Az Jolly is a 83 y.o. year old female here today for follow-up on her abnormal chest xray. She was last seen in the office 9/19/2017 with CARLOS ALBERTO Gilbert. To reiterate, She has history of Wegener's granulomatosis requiring Cytoxan therapy which was discontinued in 2013. She follows with Dr. Estrada, nephrologist, and has been considered in remission since then. She had developed abdominal pain of unclear etiology and allegedly undergone GI workup which was unremarkable. She saw Dr. Estrada, was started on infusions for iron deficiency anemia, with subjective improvement in fatigue and abdominal discomfort. Her pulmonary function testing show normal lung function and DLCO.  chest x-ray July 2017 shows:  Peribronchial thickening and patchy linear opacities likely related to inflammation/bronchitis. Subsequent chest CAT scan showed bibasilar bronchiectasis with consolidation and mucous plugging.    She did have a mild cough. She was treated with Cipro. Chest x-ray September 11 showed bilateral airspace opacities improved from prior exam however they do persist. CARLOS ALBERTO Gilbert reviewed the results of the chest way with Dr. Jernigan who recommended a repeat chest xray in 8 weeks. If changes persist then consider Bronchoscopy.   Chest xray was repeated today in the office and indicates;  1.  No acute cardiopulmonary abnormality identified.  2.  Chronic obstructive pulmonary disease    She states her cough has essentially resolved. She denies  purulent mucous production. She denies hemoptysis. She denies any wheezing. She denies dyspnea. She denies recent respiratory infections. She denies fevers or chills. She feels her energy levels have improved with the iron infusions.     Kenyatta also ordered a CNOX on RA for further work up on her fatigue. Pt cancelled this test as she does not feel this is needed.         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   • DVT 6/1996    left leg, vein ligation performed on saphenous vein   • First degree atrioventricular block 9/21/2012   • H/O echocardiogram 9/21/2012   • Hypothyroid 10/2002   • Kidney disorder 1998    left kidney biopsy--glomerulonephritis and wegners dx   • Kidney failure     stage II   • Mitral valve prolapse    • Palpitations 9/21/2012   • Rheumatic fever 9/21/2012   • Stroke (CMS-HCC) 9/21/2012   • Wegener's granulomatosis (CMS-HCC)        Past Surgical History:   Procedure Laterality Date   • CATARACT EXTRACTION  2006    left eye   • CHOLECYSTECTOMY  1997   • TEMPORAL ARTERY BIOPSY  1996    normal   • LUNG NEEDLE BIOPSY  1996    right lung, nodules found   • ARTHROSCOPE  1989    left knee   • ARTHROSCOPY, KNEE  1986    right   • BUNIONECTOMY  1980    bilat   • HYSTERECTOMY RADICAL  1975   • VEIN LIGATION         Family History   Problem Relation Age of Onset   • Other Father      Aortic aneurysm   • Cancer Brother      Lung   • Non-contributory Other        Social History     Social History   • Marital status:      Spouse name: N/A   • Number of children: N/A   • Years of education: N/A     Occupational History   • Not on file.     Social History Main Topics   • Smoking status: Former Smoker     Packs/day: 1.00     Types: Cigarettes     Quit date: 1/1/1960   • Smokeless tobacco: Never Used      Comment: very little years and years ago   • Alcohol use 0.0  oz/week      Comment: occasional   • Drug use: No   • Sexual activity: Not on file     Other Topics Concern   • Not on file     Social History Narrative   • No narrative on file         ROS:  Constitutional: Denies fevers, chills, sweats, fatigue, weight loss  Eyes: Denies glasses, vision loss, pain, drainage, double vision  Ears/Nose/Mouth/Throat: Denies rhinitis, nasal congestion, ear ache, difficulty hearing, sore throat, persistent hoarseness, decayed teeth/toothache  Cardiovascular: Denies chest pain, tightness, palpitations, swelling in feet/legs, fainting, difficulty breathing when laying down  Respiratory: See HPI   GI: Denies heartburn, difficulty swallowing, nausea, vomiting, abdominal pain, diarrhea, constipation  : Denies frequent urination, painful urination  Integumentary: Denies rashes, lumps or color changes  MSK: Denies painful joints, sore muscles, and back pain.   Neurological: Denies frequent headaches, dizziness        Current Outpatient Prescriptions   Medication Sig Dispense Refill   • Polyethylene Glycol 3350 (MIRALAX PO) Take  by mouth.     • aspirin (ASA) 325 MG Tab Take 325 mg by mouth every 6 hours as needed.     • folic acid (FOLVITE) 1 MG TABS Take 1 mg by mouth every day.     • levothyroxine (SYNTHROID) 75 MCG TABS Take 75 mcg by mouth every morning before breakfast.     • Multiple Vitamins-Minerals (MULTIVITAMIN PO) Take 2 Tabs by mouth every day.     • sulfamethoxazole-trimethoprim (BACTRIM DS) 800-160 MG tablet TAKE 1 TAB BY MOUTH 2 TIMES A DAY. TAKE UNTIL GONE.  0   • ciprofloxacin (CIPRO) 500 MG Tab Take 1 Tab by mouth 2 times a day. 28 Tab 0   • psyllium (METAMUCIL) 58.12 % Pack Take 1 Packet by mouth every day.     • Magnesium Hydroxide (MILK OF MAGNESIA PO) Take  by mouth.       No current facility-administered medications for this visit.        Allergies   Allergen Reactions   • Hydroxyzine Swelling   • Keflex Swelling   • Naprelan [Naproxen] Swelling   • Oxaprozin Swelling   "  • Ampicillin Rash   • Erythromycin Diarrhea   • Vi-Q-Tuss [Hydrocodone-Guaifenesin] Vomiting   • Voltaren [Diclofenac Sodium] Rash   • Baclofen      drowsiness   • Citalopram    • Promethazine      Drowsiness        Blood pressure 110/78, pulse (!) 105, resp. rate 16, height 1.702 m (5' 7\"), weight 44 kg (97 lb), SpO2 97 %.    PE:   Appearance: Thin elderly female, no acute distress  Eyes: PERRL, EOM intact, sclera white, conjunctiva moist  Ears: no lesions or deformities  Hearing: grossly intact  Nose: no lesions or deformities  Oropharynx: tongue normal, posterior pharynx without erythema or exudate  Neck: supple, trachea midline, no masses   Respiratory effort: no intercostal retractions or use of accessory muscles  Lung auscultation: no rales, rhonchi or wheezes, somewhat diminished  Heart auscultation: no murmur rub or gallop  Extremities: no cyanosis or edema  Abdomen: soft ,non tender, no masses  Gait and Station: Ambulates with walker  Digits and nails: no clubbing, cyanosis, petechiae or nodes.  Cranial nerves: grossly intact  Skin: no rashes, lesions or ulcers noted  Orientation: Oriented to time, person and place  Mood and affect: mood and affect appropriate, normal interaction with examiner  Judgement: Intact          Assessment:  1. Wegener's granulomatosis (CMS-HCC)     2. Bronchiectasis without complication (CMS-HCC)     3. Abnormal chest x-ray     4. Chronic fatigue           Plan:    1) Chest xray today in the office is clear. Reviewed images with Dr. Sheffield who feels she does have evidence of mild bronchiectasis. Her cough has essentially resolved. Normal PFT's in the past. We discussed importance of prompt treatment of acute infectious symptoms. No need for inhalers.   2) She declines overnight oximetry. She feels her energy levels have improved.   3) She is up to date on Prevnar 13, Pneumovax 23 and Influenza vaccinations.   4) Recommended an annual follow up, she would prefer PRN follow up. "       Electronically signed by JEFF Oliva  on 11/20/17    Toribio Schmidt M.D.  [address] [address]  VIA Facsimile: [phone]

## 2017-11-29 ENCOUNTER — HOSPITAL ENCOUNTER (OUTPATIENT)
Dept: RADIOLOGY | Facility: MEDICAL CENTER | Age: 82
End: 2017-11-29
Attending: FAMILY MEDICINE
Payer: MEDICARE

## 2017-11-29 DIAGNOSIS — Z12.31 VISIT FOR SCREENING MAMMOGRAM: ICD-10-CM

## 2017-11-29 PROCEDURE — G0202 SCR MAMMO BI INCL CAD: HCPCS

## 2018-01-20 ENCOUNTER — HOSPITAL ENCOUNTER (INPATIENT)
Dept: HOSPITAL 8 - ED | Age: 83
LOS: 3 days | Discharge: HOME | DRG: 865 | End: 2018-01-23
Attending: HOSPITALIST | Admitting: HOSPITALIST
Payer: MEDICARE

## 2018-01-20 VITALS — SYSTOLIC BLOOD PRESSURE: 132 MMHG | DIASTOLIC BLOOD PRESSURE: 75 MMHG

## 2018-01-20 VITALS — HEIGHT: 67 IN | BODY MASS INDEX: 15.67 KG/M2 | WEIGHT: 99.87 LBS

## 2018-01-20 DIAGNOSIS — Z82.49: ICD-10-CM

## 2018-01-20 DIAGNOSIS — Z88.1: ICD-10-CM

## 2018-01-20 DIAGNOSIS — I27.20: ICD-10-CM

## 2018-01-20 DIAGNOSIS — E11.51: ICD-10-CM

## 2018-01-20 DIAGNOSIS — E86.0: ICD-10-CM

## 2018-01-20 DIAGNOSIS — Z79.1: ICD-10-CM

## 2018-01-20 DIAGNOSIS — E03.9: ICD-10-CM

## 2018-01-20 DIAGNOSIS — N18.2: ICD-10-CM

## 2018-01-20 DIAGNOSIS — E11.42: ICD-10-CM

## 2018-01-20 DIAGNOSIS — J43.9: ICD-10-CM

## 2018-01-20 DIAGNOSIS — G90.8: ICD-10-CM

## 2018-01-20 DIAGNOSIS — E11.22: ICD-10-CM

## 2018-01-20 DIAGNOSIS — Z79.2: ICD-10-CM

## 2018-01-20 DIAGNOSIS — Z88.8: ICD-10-CM

## 2018-01-20 DIAGNOSIS — I69.351: ICD-10-CM

## 2018-01-20 DIAGNOSIS — Z87.01: ICD-10-CM

## 2018-01-20 DIAGNOSIS — I34.1: ICD-10-CM

## 2018-01-20 DIAGNOSIS — N17.0: ICD-10-CM

## 2018-01-20 DIAGNOSIS — Z79.899: ICD-10-CM

## 2018-01-20 DIAGNOSIS — B34.9: Primary | ICD-10-CM

## 2018-01-20 LAB
ALBUMIN SERPL-MCNC: 3.1 G/DL (ref 3.4–5)
ALP SERPL-CCNC: 86 U/L (ref 45–117)
ALT SERPL-CCNC: 18 U/L (ref 12–78)
ANION GAP SERPL CALC-SCNC: 7 MMOL/L (ref 5–15)
BASOPHILS # BLD AUTO: 0.02 X10^3/UL (ref 0–0.1)
BASOPHILS NFR BLD AUTO: 0 % (ref 0–1)
BILIRUB SERPL-MCNC: 0.3 MG/DL (ref 0.2–1)
CALCIUM SERPL-MCNC: 8.3 MG/DL (ref 8.5–10.1)
CHLORIDE SERPL-SCNC: 97 MMOL/L (ref 98–107)
CREAT SERPL-MCNC: 1.5 MG/DL (ref 0.55–1.02)
CULTURE INDICATED?: NO
EOSINOPHIL # BLD AUTO: 0 X10^3/UL (ref 0–0.4)
EOSINOPHIL NFR BLD AUTO: 0 % (ref 1–7)
ERYTHROCYTE [DISTWIDTH] IN BLOOD BY AUTOMATED COUNT: 13.1 % (ref 9.6–15.2)
LYMPHOCYTES # BLD AUTO: 0.92 X10^3/UL (ref 1–3.4)
LYMPHOCYTES NFR BLD AUTO: 16 % (ref 22–44)
MCH RBC QN AUTO: 29.8 PG (ref 27–34.8)
MCHC RBC AUTO-ENTMCNC: 33.2 G/DL (ref 32.4–35.8)
MCV RBC AUTO: 89.8 FL (ref 80–100)
MD: NO
MICROSCOPIC: (no result)
MONOCYTES # BLD AUTO: 0.42 X10^3/UL (ref 0.2–0.8)
MONOCYTES NFR BLD AUTO: 7 % (ref 2–9)
NEUTROPHILS # BLD AUTO: 4.41 X10^3/UL (ref 1.8–6.8)
NEUTROPHILS NFR BLD AUTO: 76 % (ref 42–75)
PLATELET # BLD AUTO: 241 X10^3/UL (ref 130–400)
PMV BLD AUTO: 7.5 FL (ref 7.4–10.4)
PROT SERPL-MCNC: 8.3 G/DL (ref 6.4–8.2)
RBC # BLD AUTO: 4.07 X10^6/UL (ref 3.82–5.3)
T4 FREE SERPL-MCNC: 1.29 NG/DL (ref 0.76–1.46)
TROPONIN I SERPL-MCNC: < 0.015 NG/ML (ref 0–0.04)
TSH SERPL-ACNC: 1.39 MIU/L (ref 0.36–3.74)

## 2018-01-20 PROCEDURE — 71045 X-RAY EXAM CHEST 1 VIEW: CPT

## 2018-01-20 PROCEDURE — 93306 TTE W/DOPPLER COMPLETE: CPT

## 2018-01-20 PROCEDURE — 84484 ASSAY OF TROPONIN QUANT: CPT

## 2018-01-20 PROCEDURE — 36415 COLL VENOUS BLD VENIPUNCTURE: CPT

## 2018-01-20 PROCEDURE — 84100 ASSAY OF PHOSPHORUS: CPT

## 2018-01-20 PROCEDURE — 96360 HYDRATION IV INFUSION INIT: CPT

## 2018-01-20 PROCEDURE — 82040 ASSAY OF SERUM ALBUMIN: CPT

## 2018-01-20 PROCEDURE — 93880 EXTRACRANIAL BILAT STUDY: CPT

## 2018-01-20 PROCEDURE — 81001 URINALYSIS AUTO W/SCOPE: CPT

## 2018-01-20 PROCEDURE — 80048 BASIC METABOLIC PNL TOTAL CA: CPT

## 2018-01-20 PROCEDURE — 82746 ASSAY OF FOLIC ACID SERUM: CPT

## 2018-01-20 PROCEDURE — 84439 ASSAY OF FREE THYROXINE: CPT

## 2018-01-20 PROCEDURE — 93005 ELECTROCARDIOGRAM TRACING: CPT

## 2018-01-20 PROCEDURE — 87400 INFLUENZA A/B EACH AG IA: CPT

## 2018-01-20 PROCEDURE — 83735 ASSAY OF MAGNESIUM: CPT

## 2018-01-20 PROCEDURE — 85025 COMPLETE CBC W/AUTO DIFF WBC: CPT

## 2018-01-20 PROCEDURE — 84443 ASSAY THYROID STIM HORMONE: CPT

## 2018-01-20 PROCEDURE — 95819 EEG AWAKE AND ASLEEP: CPT

## 2018-01-20 PROCEDURE — 80053 COMPREHEN METABOLIC PANEL: CPT

## 2018-01-20 PROCEDURE — 82607 VITAMIN B-12: CPT

## 2018-01-20 RX ADMIN — SODIUM CHLORIDE SCH MLS/HR: 0.9 INJECTION, SOLUTION INTRAVENOUS at 20:16

## 2018-01-20 RX ADMIN — ENOXAPARIN SODIUM SCH MG: 30 INJECTION SUBCUTANEOUS at 20:16

## 2018-01-21 VITALS — DIASTOLIC BLOOD PRESSURE: 64 MMHG | SYSTOLIC BLOOD PRESSURE: 109 MMHG

## 2018-01-21 VITALS — DIASTOLIC BLOOD PRESSURE: 65 MMHG | SYSTOLIC BLOOD PRESSURE: 107 MMHG

## 2018-01-21 VITALS — SYSTOLIC BLOOD PRESSURE: 111 MMHG | DIASTOLIC BLOOD PRESSURE: 58 MMHG

## 2018-01-21 VITALS — SYSTOLIC BLOOD PRESSURE: 131 MMHG | DIASTOLIC BLOOD PRESSURE: 70 MMHG

## 2018-01-21 LAB
ALBUMIN SERPL-MCNC: 2.5 G/DL (ref 3.4–5)
ALP SERPL-CCNC: 68 U/L (ref 45–117)
ALT SERPL-CCNC: 12 U/L (ref 12–78)
ANION GAP SERPL CALC-SCNC: 4 MMOL/L (ref 5–15)
BASOPHILS # BLD AUTO: 0.02 X10^3/UL (ref 0–0.1)
BASOPHILS NFR BLD AUTO: 0 % (ref 0–1)
BILIRUB SERPL-MCNC: 0.3 MG/DL (ref 0.2–1)
CALCIUM SERPL-MCNC: 7.6 MG/DL (ref 8.5–10.1)
CHLORIDE SERPL-SCNC: 106 MMOL/L (ref 98–107)
CREAT SERPL-MCNC: 1.26 MG/DL (ref 0.55–1.02)
EOSINOPHIL # BLD AUTO: 0.04 X10^3/UL (ref 0–0.4)
EOSINOPHIL NFR BLD AUTO: 1 % (ref 1–7)
ERYTHROCYTE [DISTWIDTH] IN BLOOD BY AUTOMATED COUNT: 13.1 % (ref 9.6–15.2)
FOLATE SERPL-MCNC: > 20 NG/ML (ref 3.1–17.5)
LYMPHOCYTES # BLD AUTO: 1.47 X10^3/UL (ref 1–3.4)
LYMPHOCYTES NFR BLD AUTO: 30 % (ref 22–44)
MCH RBC QN AUTO: 30.5 PG (ref 27–34.8)
MCHC RBC AUTO-ENTMCNC: 33.6 G/DL (ref 32.4–35.8)
MCV RBC AUTO: 90.8 FL (ref 80–100)
MD: NO
MONOCYTES # BLD AUTO: 0.47 X10^3/UL (ref 0.2–0.8)
MONOCYTES NFR BLD AUTO: 10 % (ref 2–9)
NEUTROPHILS # BLD AUTO: 2.94 X10^3/UL (ref 1.8–6.8)
NEUTROPHILS NFR BLD AUTO: 60 % (ref 42–75)
PLATELET # BLD AUTO: 195 X10^3/UL (ref 130–400)
PMV BLD AUTO: 7.4 FL (ref 7.4–10.4)
PROT SERPL-MCNC: 6.8 G/DL (ref 6.4–8.2)
RBC # BLD AUTO: 3.43 X10^6/UL (ref 3.82–5.3)

## 2018-01-21 RX ADMIN — LEVOTHYROXINE SODIUM SCH MCG: 75 TABLET ORAL at 04:32

## 2018-01-21 RX ADMIN — SODIUM CHLORIDE SCH MLS/HR: 0.9 INJECTION, SOLUTION INTRAVENOUS at 04:33

## 2018-01-21 RX ADMIN — SODIUM CHLORIDE SCH MLS/HR: 0.9 INJECTION, SOLUTION INTRAVENOUS at 23:00

## 2018-01-21 RX ADMIN — ENOXAPARIN SODIUM SCH MG: 30 INJECTION SUBCUTANEOUS at 18:38

## 2018-01-21 RX ADMIN — ASPIRIN SCH MG: 325 TABLET, DELAYED RELEASE ORAL at 12:03

## 2018-01-21 RX ADMIN — SODIUM CHLORIDE SCH MLS/HR: 0.9 INJECTION, SOLUTION INTRAVENOUS at 14:52

## 2018-01-22 VITALS — DIASTOLIC BLOOD PRESSURE: 66 MMHG | SYSTOLIC BLOOD PRESSURE: 107 MMHG

## 2018-01-22 VITALS — DIASTOLIC BLOOD PRESSURE: 66 MMHG | SYSTOLIC BLOOD PRESSURE: 112 MMHG

## 2018-01-22 VITALS — SYSTOLIC BLOOD PRESSURE: 99 MMHG | DIASTOLIC BLOOD PRESSURE: 63 MMHG

## 2018-01-22 VITALS — DIASTOLIC BLOOD PRESSURE: 64 MMHG | SYSTOLIC BLOOD PRESSURE: 104 MMHG

## 2018-01-22 LAB
ALBUMIN SERPL-MCNC: 2.3 G/DL (ref 3.4–5)
ANION GAP SERPL CALC-SCNC: 4 MMOL/L (ref 5–15)
BASOPHILS # BLD AUTO: 0.01 X10^3/UL (ref 0–0.1)
BASOPHILS NFR BLD AUTO: 0 % (ref 0–1)
CALCIUM SERPL-MCNC: 7.8 MG/DL (ref 8.5–10.1)
CHLORIDE SERPL-SCNC: 108 MMOL/L (ref 98–107)
CREAT SERPL-MCNC: 1.19 MG/DL (ref 0.55–1.02)
EOSINOPHIL # BLD AUTO: 0.11 X10^3/UL (ref 0–0.4)
EOSINOPHIL NFR BLD AUTO: 2 % (ref 1–7)
ERYTHROCYTE [DISTWIDTH] IN BLOOD BY AUTOMATED COUNT: 12.9 % (ref 9.6–15.2)
LYMPHOCYTES # BLD AUTO: 1.38 X10^3/UL (ref 1–3.4)
LYMPHOCYTES NFR BLD AUTO: 20 % (ref 22–44)
MCH RBC QN AUTO: 30.2 PG (ref 27–34.8)
MCHC RBC AUTO-ENTMCNC: 33.4 G/DL (ref 32.4–35.8)
MCV RBC AUTO: 90.3 FL (ref 80–100)
MD: NO
MONOCYTES # BLD AUTO: 0.43 X10^3/UL (ref 0.2–0.8)
MONOCYTES NFR BLD AUTO: 6 % (ref 2–9)
NEUTROPHILS # BLD AUTO: 4.83 X10^3/UL (ref 1.8–6.8)
NEUTROPHILS NFR BLD AUTO: 71 % (ref 42–75)
PLATELET # BLD AUTO: 185 X10^3/UL (ref 130–400)
PMV BLD AUTO: 7.5 FL (ref 7.4–10.4)
RBC # BLD AUTO: 3.57 X10^6/UL (ref 3.82–5.3)
TROPONIN I SERPL-MCNC: < 0.015 NG/ML (ref 0–0.04)
TROPONIN I SERPL-MCNC: < 0.015 NG/ML (ref 0–0.04)

## 2018-01-22 RX ADMIN — SODIUM CHLORIDE SCH MLS/HR: 0.9 INJECTION, SOLUTION INTRAVENOUS at 08:58

## 2018-01-22 RX ADMIN — SODIUM CHLORIDE SCH MLS/HR: 0.9 INJECTION, SOLUTION INTRAVENOUS at 19:53

## 2018-01-22 RX ADMIN — ENOXAPARIN SODIUM SCH MG: 30 INJECTION SUBCUTANEOUS at 18:30

## 2018-01-22 RX ADMIN — ASPIRIN SCH MG: 325 TABLET, DELAYED RELEASE ORAL at 08:56

## 2018-01-22 RX ADMIN — LEVOTHYROXINE SODIUM SCH MCG: 75 TABLET ORAL at 06:01

## 2018-01-22 RX ADMIN — SODIUM CHLORIDE SCH MLS/HR: 0.9 INJECTION, SOLUTION INTRAVENOUS at 15:00

## 2018-01-23 VITALS — DIASTOLIC BLOOD PRESSURE: 74 MMHG | SYSTOLIC BLOOD PRESSURE: 118 MMHG

## 2018-01-23 VITALS — DIASTOLIC BLOOD PRESSURE: 67 MMHG | SYSTOLIC BLOOD PRESSURE: 119 MMHG

## 2018-01-23 VITALS — DIASTOLIC BLOOD PRESSURE: 63 MMHG | SYSTOLIC BLOOD PRESSURE: 114 MMHG

## 2018-01-23 LAB
ALBUMIN SERPL-MCNC: 2.3 G/DL (ref 3.4–5)
ALP SERPL-CCNC: 70 U/L (ref 45–117)
ALT SERPL-CCNC: 12 U/L (ref 12–78)
ANION GAP SERPL CALC-SCNC: 8 MMOL/L (ref 5–15)
BASOPHILS # BLD AUTO: 0.02 X10^3/UL (ref 0–0.1)
BASOPHILS NFR BLD AUTO: 0 % (ref 0–1)
BILIRUB SERPL-MCNC: 0.4 MG/DL (ref 0.2–1)
CALCIUM SERPL-MCNC: 7.9 MG/DL (ref 8.5–10.1)
CHLORIDE SERPL-SCNC: 107 MMOL/L (ref 98–107)
CREAT SERPL-MCNC: 1.27 MG/DL (ref 0.55–1.02)
EOSINOPHIL # BLD AUTO: 0.11 X10^3/UL (ref 0–0.4)
EOSINOPHIL NFR BLD AUTO: 2 % (ref 1–7)
ERYTHROCYTE [DISTWIDTH] IN BLOOD BY AUTOMATED COUNT: 13 % (ref 9.6–15.2)
LYMPHOCYTES # BLD AUTO: 1.25 X10^3/UL (ref 1–3.4)
LYMPHOCYTES NFR BLD AUTO: 21 % (ref 22–44)
MCH RBC QN AUTO: 30.2 PG (ref 27–34.8)
MCHC RBC AUTO-ENTMCNC: 33.7 G/DL (ref 32.4–35.8)
MCV RBC AUTO: 89.6 FL (ref 80–100)
MD: NO
MONOCYTES # BLD AUTO: 0.39 X10^3/UL (ref 0.2–0.8)
MONOCYTES NFR BLD AUTO: 7 % (ref 2–9)
NEUTROPHILS # BLD AUTO: 4.25 X10^3/UL (ref 1.8–6.8)
NEUTROPHILS NFR BLD AUTO: 71 % (ref 42–75)
PLATELET # BLD AUTO: 193 X10^3/UL (ref 130–400)
PMV BLD AUTO: 7.7 FL (ref 7.4–10.4)
PROT SERPL-MCNC: 6.8 G/DL (ref 6.4–8.2)
RBC # BLD AUTO: 3.69 X10^6/UL (ref 3.82–5.3)

## 2018-01-23 RX ADMIN — ASPIRIN SCH MG: 325 TABLET, DELAYED RELEASE ORAL at 10:15

## 2018-01-23 RX ADMIN — SODIUM CHLORIDE SCH MLS/HR: 0.9 INJECTION, SOLUTION INTRAVENOUS at 06:24

## 2018-01-23 RX ADMIN — LEVOTHYROXINE SODIUM SCH MCG: 75 TABLET ORAL at 06:00

## 2018-01-29 ENCOUNTER — TELEPHONE (OUTPATIENT)
Dept: CARDIOLOGY | Facility: MEDICAL CENTER | Age: 83
End: 2018-01-29

## 2018-01-29 NOTE — TELEPHONE ENCOUNTER
Records request faxed to [place] for medical records as she was hospitalized there and her DC date was 1/23/18.  Appt with TW 2/2 @ 11:15 am

## 2018-02-02 ENCOUNTER — OFFICE VISIT (OUTPATIENT)
Dept: CARDIOLOGY | Facility: MEDICAL CENTER | Age: 83
End: 2018-02-02
Payer: MEDICARE

## 2018-02-02 VITALS
DIASTOLIC BLOOD PRESSURE: 62 MMHG | SYSTOLIC BLOOD PRESSURE: 120 MMHG | BODY MASS INDEX: 15.7 KG/M2 | OXYGEN SATURATION: 98 % | HEART RATE: 60 BPM | WEIGHT: 100 LBS | HEIGHT: 67 IN

## 2018-02-02 DIAGNOSIS — I63.512 ARTERIAL ISCHEMIC STROKE, MCA (MIDDLE CEREBRAL ARTERY), LEFT, ACUTE (HCC): ICD-10-CM

## 2018-02-02 DIAGNOSIS — I47.10 SVT (SUPRAVENTRICULAR TACHYCARDIA): ICD-10-CM

## 2018-02-02 DIAGNOSIS — R00.2 PALPITATIONS: Chronic | ICD-10-CM

## 2018-02-02 DIAGNOSIS — M31.30 WEGENER'S GRANULOMATOSIS: ICD-10-CM

## 2018-02-02 PROCEDURE — 99214 OFFICE O/P EST MOD 30 MIN: CPT | Performed by: INTERNAL MEDICINE

## 2018-02-02 NOTE — PROGRESS NOTES
Subjective:   Az Jolly is a 83 y.o. female who presents today for initial visit and follow-up of stroke and PSVT. She was admitted to the hospital for flulike symptoms at [place]. Records are unavailable despite multiple attempts to retrieve them including today, which unfortunately is not unusual in the least and continues to hamper medical care in our area. She was initiated on a beta blocker however there are no records to indicate why. She said she had multiple cardiac studies done which are available. She cannot recall which they are. She feels fine today. She has a history of PSVT on aspirin. Atrial fibrillation is noted in her past medical history although she is not currently on anticoagulation. She is a former patient of Dr. Donaldson.    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   • DVT 6/1996    left leg, vein ligation performed on saphenous vein   • First degree atrioventricular block 9/21/2012   • H/O echocardiogram 9/21/2012   • Hypothyroid 10/2002   • Kidney disorder 1998    left kidney biopsy--glomerulonephritis and wegners dx   • Kidney failure     stage II   • Mitral valve prolapse    • Palpitations 9/21/2012   • Rheumatic fever 9/21/2012   • Stroke (CMS-HCC) 9/21/2012   • Wegener's granulomatosis (CMS-HCC)      Past Surgical History:   Procedure Laterality Date   • CATARACT EXTRACTION  2006    left eye   • CHOLECYSTECTOMY  1997   • TEMPORAL ARTERY BIOPSY  1996    normal   • LUNG NEEDLE BIOPSY  1996    right lung, nodules found   • ARTHROSCOPE  1989    left knee   • ARTHROSCOPY, KNEE  1986    right   • BUNIONECTOMY  1980    bilat   • HYSTERECTOMY RADICAL  1975   • VEIN LIGATION       Family History   Problem Relation Age of Onset   • Other Father      Aortic aneurysm   • Cancer Brother      Lung   • Non-contributory Other      History   Smoking Status   •  "Former Smoker   • Packs/day: 1.00   • Types: Cigarettes   • Quit date: 1/1/1960   Smokeless Tobacco   • Never Used     Comment: very little years and years ago     Allergies   Allergen Reactions   • Hydroxyzine Swelling   • Keflex Swelling   • Naprelan [Naproxen] Swelling   • Oxaprozin Swelling   • Ampicillin Rash   • Erythromycin Diarrhea   • Vi-Q-Tuss [Hydrocodone-Guaifenesin] Vomiting   • Voltaren [Diclofenac Sodium] Rash   • Baclofen      drowsiness   • Citalopram    • Promethazine      Drowsiness      Outpatient Encounter Prescriptions as of 2/2/2018   Medication Sig Dispense Refill   • metoprolol (LOPRESSOR) 25 MG Tab Take 12.5 mg by mouth 2 times a day.     • Polyethylene Glycol 3350 (MIRALAX PO) Take  by mouth.     • Magnesium Hydroxide (MILK OF MAGNESIA PO) Take  by mouth.     • aspirin (ASA) 325 MG Tab Take 325 mg by mouth every 6 hours as needed.     • folic acid (FOLVITE) 1 MG TABS Take 1 mg by mouth every day.     • levothyroxine (SYNTHROID) 75 MCG TABS Take 75 mcg by mouth every morning before breakfast.     • Multiple Vitamins-Minerals (MULTIVITAMIN PO) Take 2 Tabs by mouth every day.     • sulfamethoxazole-trimethoprim (BACTRIM DS) 800-160 MG tablet TAKE 1 TAB BY MOUTH 2 TIMES A DAY. TAKE UNTIL GONE.  0   • ciprofloxacin (CIPRO) 500 MG Tab Take 1 Tab by mouth 2 times a day. 28 Tab 0   • psyllium (METAMUCIL) 58.12 % Pack Take 1 Packet by mouth every day.       No facility-administered encounter medications on file as of 2/2/2018.      Review of Systems   All other systems reviewed and are negative.       Objective:   /62   Pulse 60   Ht 1.702 m (5' 7\")   Wt 45.4 kg (100 lb)   SpO2 98%   BMI 15.66 kg/m²     Physical Exam   Constitutional: She is oriented to person, place, and time. She appears well-developed and well-nourished. No distress.   HENT:   Head: Normocephalic and atraumatic.   Mouth/Throat: Oropharynx is clear and moist. No oropharyngeal exudate.   Eyes: Conjunctivae are normal. " Pupils are equal, round, and reactive to light. No scleral icterus.   Neck: Normal range of motion. Neck supple. No JVD present. No thyromegaly present.   Cardiovascular: Normal rate, regular rhythm, normal heart sounds and intact distal pulses.  Exam reveals no gallop and no friction rub.    No murmur heard.  Pulses:       Carotid pulses are 2+ on the right side, and 2+ on the left side.       Radial pulses are 2+ on the right side, and 2+ on the left side.        Popliteal pulses are 2+ on the right side, and 2+ on the left side.        Dorsalis pedis pulses are 2+ on the right side, and 2+ on the left side.        Posterior tibial pulses are 2+ on the right side, and 2+ on the left side.   Pulmonary/Chest: Effort normal and breath sounds normal. She has no wheezes. She has no rales.   Abdominal: Soft. Bowel sounds are normal. She exhibits no distension. There is no tenderness.   Musculoskeletal: She exhibits no edema or tenderness.   Neurological: She is alert and oriented to person, place, and time. No cranial nerve deficit (Cranial nerves II through XII grossly intact).   Right-sided weakness   Skin: Skin is warm and dry. No rash noted. She is not diaphoretic. No erythema.   Psychiatric: She has a normal mood and affect. Her behavior is normal.   Vitals reviewed.      Assessment:     1. Palpitations     2. Arterial ischemic stroke, MCA (middle cerebral artery), left, acute (CMS-HCC)     3. Wegener's granulomatosis (CMS-[facility])     4. SVT (SUPRAVENTRICULAR TACHYCARDIA), h/o paroxysmal         Medical Decision Making:  Today's Assessment / Status / Plan:     Doing well no complaints today. Records are unavailable. We'll continue our attempts to retrieve them. If these are unsuccessful then I have recommended that they present in person despite her advanced age and frailty, to [facility] lod a formal complaint and retrieve the records by hand to bring to me. Otherwise no medication changes and she should  continue on beta blocker therapy until such time as her records are available for review. She seems to be tolerating it well.

## 2018-02-07 ENCOUNTER — TELEPHONE (OUTPATIENT)
Dept: CARDIOLOGY | Facility: MEDICAL CENTER | Age: 83
End: 2018-02-07

## 2018-02-07 NOTE — TELEPHONE ENCOUNTER
"S/w pt. She reports that since starting Metoprolol 12.5mg BID she has been having a headache everyday and feels \"lousy\" every morning. Pt was placed on Metoprolol during hospital stay at [facility] on 1/21. She notes that she does not monitor her BP or HR. Pt would like to know if it would be OK to stop medication. Educated her that I will FU with Dr. Nation. Pt appreciative of call.     To Dr. Nation, please advise. Thank you.    ----- Message from Thelma Greene sent at 2/7/2018  1:01 PM PST -----  Regarding: thinks Metoprolol is making her sick  CLARA/Lucille    Patient thinks Metoprolol is making her sick. She wants to stop the medication. She can be reached at [phone].    "

## 2018-02-09 NOTE — TELEPHONE ENCOUNTER
Pt notified of Dr. Norwood's recommendation. She is very appreciative of call and will stop taking her Metoprolol.   Med/rec updated.

## 2018-03-07 ENCOUNTER — OFFICE VISIT (OUTPATIENT)
Dept: CARDIOLOGY | Facility: MEDICAL CENTER | Age: 83
End: 2018-03-07
Payer: MEDICARE

## 2018-03-07 VITALS
WEIGHT: 96 LBS | OXYGEN SATURATION: 98 % | HEART RATE: 90 BPM | HEIGHT: 67 IN | SYSTOLIC BLOOD PRESSURE: 124 MMHG | BODY MASS INDEX: 15.07 KG/M2 | DIASTOLIC BLOOD PRESSURE: 64 MMHG

## 2018-03-07 DIAGNOSIS — I47.10 SVT (SUPRAVENTRICULAR TACHYCARDIA): ICD-10-CM

## 2018-03-07 DIAGNOSIS — R00.2 PALPITATIONS: Chronic | ICD-10-CM

## 2018-03-07 DIAGNOSIS — I63.89 CEREBROVASCULAR ACCIDENT (CVA) DUE TO OTHER MECHANISM (HCC): Chronic | ICD-10-CM

## 2018-03-07 DIAGNOSIS — M31.30 WEGENER'S GRANULOMATOSIS: ICD-10-CM

## 2018-03-07 PROCEDURE — 99213 OFFICE O/P EST LOW 20 MIN: CPT | Performed by: INTERNAL MEDICINE

## 2018-03-07 NOTE — PROGRESS NOTES
"Subjective:   Az Jolly is a 83 y.o. female who presents today for follow-up of stroke and PSVT. She was admitted to the hospital for flulike symptoms at [facility]. Finally able to receive records from [facility]. She looks like she was admitted for viral illness and orthostatic intolerance in the context of dehydration. She is feeling well and has had no recurrent symptoms. They recommended a Holter monitor follow-up. No arrhythmias were noted during her hospital stay and her echocardiogram was unremarkable. She is feeling well aside from a head \"buzzing\" and abdominal pain at night.    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   • DVT 6/1996    left leg, vein ligation performed on saphenous vein   • First degree atrioventricular block 9/21/2012   • H/O echocardiogram 9/21/2012   • Hypothyroid 10/2002   • Kidney disorder 1998    left kidney biopsy--glomerulonephritis and wegners dx   • Kidney failure     stage II   • Mitral valve prolapse    • Palpitations 9/21/2012   • Rheumatic fever 9/21/2012   • Stroke (CMS-HCC) 9/21/2012   • Wegener's granulomatosis (CMS-[facility])      Past Surgical History:   Procedure Laterality Date   • CATARACT EXTRACTION  2006    left eye   • CHOLECYSTECTOMY  1997   • TEMPORAL ARTERY BIOPSY  1996    normal   • LUNG NEEDLE BIOPSY  1996    right lung, nodules found   • ARTHROSCOPE  1989    left knee   • ARTHROSCOPY, KNEE  1986    right   • BUNIONECTOMY  1980    bilat   • HYSTERECTOMY RADICAL  1975   • VEIN LIGATION       Family History   Problem Relation Age of Onset   • Other Father      Aortic aneurysm   • Cancer Brother      Lung   • Non-contributory Other      History   Smoking Status   • Former Smoker   • Packs/day: 1.00   • Types: Cigarettes   • Quit date: 1/1/1960   Smokeless Tobacco   • Never Used     Comment: very little years and years ago " "    Allergies   Allergen Reactions   • Hydroxyzine Swelling   • Keflex Swelling   • Naprelan [Naproxen] Swelling   • Oxaprozin Swelling   • Ampicillin Rash   • Erythromycin Diarrhea   • Vi-Q-Tuss [Hydrocodone-Guaifenesin] Vomiting   • Voltaren [Diclofenac Sodium] Rash   • Baclofen      drowsiness   • Citalopram    • Promethazine      Drowsiness      Outpatient Encounter Prescriptions as of 3/7/2018   Medication Sig Dispense Refill   • aspirin EC (ECOTRIN) 325 MG Tablet Delayed Response Take 325 mg by mouth every day.     • Polyethylene Glycol 3350 (MIRALAX PO) Take  by mouth.     • folic acid (FOLVITE) 1 MG TABS Take 1 mg by mouth every day.     • levothyroxine (SYNTHROID) 75 MCG TABS Take 75 mcg by mouth every morning before breakfast.     • Multiple Vitamins-Minerals (MULTIVITAMIN PO) Take 2 Tabs by mouth every day.     • sulfamethoxazole-trimethoprim (BACTRIM DS) 800-160 MG tablet TAKE 1 TAB BY MOUTH 2 TIMES A DAY. TAKE UNTIL GONE.  0   • ciprofloxacin (CIPRO) 500 MG Tab Take 1 Tab by mouth 2 times a day. 28 Tab 0   • psyllium (METAMUCIL) 58.12 % Pack Take 1 Packet by mouth every day.     • Magnesium Hydroxide (MILK OF MAGNESIA PO) Take  by mouth.     • [DISCONTINUED] aspirin (ASA) 325 MG Tab Take 325 mg by mouth every 6 hours as needed.       No facility-administered encounter medications on file as of 3/7/2018.      Review of Systems   All other systems reviewed and are negative.       Objective:   /64   Pulse 90   Ht 1.702 m (5' 7\")   Wt 43.5 kg (96 lb)   SpO2 98%   BMI 15.04 kg/m²     Physical Exam   Constitutional: She is oriented to person, place, and time. She appears well-developed and well-nourished. No distress.   HENT:   Head: Normocephalic and atraumatic.   Mouth/Throat: Oropharynx is clear and moist. No oropharyngeal exudate.   Eyes: Conjunctivae are normal. Pupils are equal, round, and reactive to light. No scleral icterus.   Neck: Normal range of motion. Neck supple. No JVD present. No " thyromegaly present.   Cardiovascular: Normal rate, regular rhythm, normal heart sounds and intact distal pulses.  Exam reveals no gallop and no friction rub.    No murmur heard.  Pulses:       Carotid pulses are 2+ on the right side, and 2+ on the left side.       Radial pulses are 2+ on the right side, and 2+ on the left side.        Popliteal pulses are 2+ on the right side, and 2+ on the left side.        Dorsalis pedis pulses are 2+ on the right side, and 2+ on the left side.        Posterior tibial pulses are 2+ on the right side, and 2+ on the left side.   Pulmonary/Chest: Effort normal and breath sounds normal. She has no wheezes. She has no rales.   Abdominal: Soft. Bowel sounds are normal. She exhibits no distension. There is no tenderness.   Musculoskeletal: She exhibits no edema or tenderness.   Neurological: She is alert and oriented to person, place, and time. No cranial nerve deficit (Cranial nerves II through XII grossly intact).   Right-sided weakness   Skin: Skin is warm and dry. No rash noted. She is not diaphoretic. No erythema.   Psychiatric: She has a normal mood and affect. Her behavior is normal.   Vitals reviewed.      Assessment:     1. Palpitations  HOLTER MONITOR STUDY   2. Cerebrovascular accident (CVA) due to other mechanism (CMS-HCC)     3. Wegener's granulomatosis (CMS-HCC)     4. SVT (SUPRAVENTRICULAR TACHYCARDIA), h/o paroxysmal  HOLTER MONITOR STUDY       Medical Decision Making:  Today's Assessment / Status / Plan:     Doing well no complaints today. Complains mostly of abdominal pain that she is following up with her primary physician about. No further palpitations. Holter monitor was recommended and we will arrange this. Continue current medical therapy. Recommended continuing aspirin instead of anticoagulation at this time. She would like to stay off the Toprol as it made her feel lightheaded. I do not disagree as her blood pressure is normal to low. We discussed her weight  loss and frailty and that she needs to supplement her nutritional intake which she plans to do.

## 2018-04-11 ENCOUNTER — NON-PROVIDER VISIT (OUTPATIENT)
Dept: CARDIOLOGY | Facility: MEDICAL CENTER | Age: 83
End: 2018-04-11
Payer: MEDICARE

## 2018-04-11 DIAGNOSIS — R00.2 PALPITATIONS: Chronic | ICD-10-CM

## 2018-04-11 DIAGNOSIS — I44.1 WENCKEBACH SECOND DEGREE AV BLOCK: ICD-10-CM

## 2018-04-16 DIAGNOSIS — R00.2 PALPITATIONS: Chronic | ICD-10-CM

## 2018-04-19 LAB — EKG IMPRESSION: NORMAL

## 2018-04-19 PROCEDURE — 93224 XTRNL ECG REC UP TO 48 HRS: CPT | Performed by: INTERNAL MEDICINE

## 2018-04-23 ENCOUNTER — TELEPHONE (OUTPATIENT)
Dept: CARDIOLOGY | Facility: MEDICAL CENTER | Age: 83
End: 2018-04-23

## 2018-04-23 NOTE — TELEPHONE ENCOUNTER
Called and notified pt that results have been forwarded to Dr. Nation to advise. Pt appreciative of information and will wait for return call.     To TW, please advise on holter results. Thank you.    ----- Message from Karan Jones Ass't sent at 4/23/2018  1:16 PM PDT -----  Regarding: Holter monitor results  Contact: [phone]  TW    Hello there Lucille,    Lavon called and wanted to know what her Holter results are. She would like a call back at: [phone].    Thank you so much and have a great day,    Kevin

## 2018-04-25 NOTE — TELEPHONE ENCOUNTER
From: Karen Hilliard   Sent: 4/25/2018   9:16 AM   To: Lucille Perez R.N.   Subject: Patient returning call                           CLARA/Lucille     Patient is returning your call and wants a call back after 10:30am today. She can be reached at [phone].

## 2018-04-25 NOTE — TELEPHONE ENCOUNTER
Attempted to call pt to discuss monitor results per Dr. Nation. No answer, l/m for her to please call back.

## 2018-09-26 ENCOUNTER — OFFICE VISIT (OUTPATIENT)
Dept: CARDIOLOGY | Facility: MEDICAL CENTER | Age: 83
End: 2018-09-26
Payer: MEDICARE

## 2018-09-26 VITALS
BODY MASS INDEX: 15.85 KG/M2 | HEART RATE: 106 BPM | SYSTOLIC BLOOD PRESSURE: 112 MMHG | HEIGHT: 67 IN | RESPIRATION RATE: 14 BRPM | OXYGEN SATURATION: 96 % | DIASTOLIC BLOOD PRESSURE: 74 MMHG | WEIGHT: 101 LBS

## 2018-09-26 DIAGNOSIS — I00 RHEUMATIC FEVER: Chronic | ICD-10-CM

## 2018-09-26 DIAGNOSIS — I63.512 ARTERIAL ISCHEMIC STROKE, MCA (MIDDLE CEREBRAL ARTERY), LEFT, ACUTE (HCC): ICD-10-CM

## 2018-09-26 DIAGNOSIS — M31.30 WEGENER'S GRANULOMATOSIS: ICD-10-CM

## 2018-09-26 DIAGNOSIS — I48.3 TYPICAL ATRIAL FLUTTER (HCC): ICD-10-CM

## 2018-09-26 PROCEDURE — 99214 OFFICE O/P EST MOD 30 MIN: CPT | Mod: 25 | Performed by: INTERNAL MEDICINE

## 2018-09-26 PROCEDURE — 93000 ELECTROCARDIOGRAM COMPLETE: CPT | Performed by: INTERNAL MEDICINE

## 2018-09-26 NOTE — PROGRESS NOTES
Subjective:   Az Jolly is a 84 y.o. female who presents today for follow-up of stroke and PSVT.  She presents without any recurrent symptoms.  She was taken off of her long-term Coumadin from her stroke which occurred many years ago by other physicians and placed on aspirin.  Today she has an irregular heart rhythm.  She is asymptomatic.  She walks with walker.  She does not have falls.    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   • DVT 6/1996    left leg, vein ligation performed on saphenous vein   • First degree atrioventricular block 9/21/2012   • H/O echocardiogram 9/21/2012   • Hypothyroid 10/2002   • Kidney disorder 1998    left kidney biopsy--glomerulonephritis and wegners dx   • Kidney failure     stage II   • Mitral valve prolapse    • Palpitations 9/21/2012   • Rheumatic fever 9/21/2012   • Stroke (HCC) 9/21/2012   • Wegener's granulomatosis (HCC)      Past Surgical History:   Procedure Laterality Date   • CATARACT EXTRACTION  2006    left eye   • CHOLECYSTECTOMY  1997   • TEMPORAL ARTERY BIOPSY  1996    normal   • LUNG NEEDLE BIOPSY  1996    right lung, nodules found   • ARTHROSCOPE  1989    left knee   • ARTHROSCOPY, KNEE  1986    right   • BUNIONECTOMY  1980    bilat   • HYSTERECTOMY RADICAL  1975   • VEIN LIGATION       Family History   Problem Relation Age of Onset   • Other Father         Aortic aneurysm   • Cancer Brother         Lung   • Non-contributory Other      History   Smoking Status   • Former Smoker   • Packs/day: 1.00   • Types: Cigarettes   • Quit date: 1/1/1960   Smokeless Tobacco   • Never Used     Comment: very little years and years ago     Allergies   Allergen Reactions   • Hydroxyzine Swelling   • Keflex Swelling   • Naprelan [Naproxen] Swelling   • Oxaprozin Swelling   • Ampicillin Rash   • Erythromycin Diarrhea   • Vi-Q-Tuss  "[Hydrocodone-Guaifenesin] Vomiting   • Voltaren [Diclofenac Sodium] Rash   • Baclofen      drowsiness   • Citalopram    • Promethazine      Drowsiness      Outpatient Encounter Prescriptions as of 9/26/2018   Medication Sig Dispense Refill   • Calcium Carbonate-Vitamin D (CALTRATE 600+D PO) Take 1 Each by mouth every day.     • Glycerin-Polysorbate 80 (REFRESH DRY EYE THERAPY OP) 1 Each by Ophthalmic route every day.     • BIOTIN PO Take 1,000 mg by mouth every day.     • Polyethyl Glycol-Propyl Glycol (SYSTANE OP) 1 Each by Ophthalmic route as needed.     • aspirin EC (ECOTRIN) 325 MG Tablet Delayed Response Take 325 mg by mouth every day.     • Magnesium Hydroxide (MILK OF MAGNESIA PO) Take 1 Each by mouth as needed.     • folic acid (FOLVITE) 1 MG TABS Take 1 mg by mouth every day.     • levothyroxine (SYNTHROID) 75 MCG TABS Take 75 mcg by mouth every morning before breakfast.     • Multiple Vitamins-Minerals (MULTIVITAMIN PO) Take 2 Tabs by mouth every day.     • Polyethylene Glycol 3350 (MIRALAX PO) Take  by mouth.     • sulfamethoxazole-trimethoprim (BACTRIM DS) 800-160 MG tablet TAKE 1 TAB BY MOUTH 2 TIMES A DAY. TAKE UNTIL GONE.  0   • ciprofloxacin (CIPRO) 500 MG Tab Take 1 Tab by mouth 2 times a day. (Patient not taking: Reported on 9/26/2018) 28 Tab 0   • psyllium (METAMUCIL) 58.12 % Pack Take 1 Packet by mouth every day.       No facility-administered encounter medications on file as of 9/26/2018.      Review of Systems   All other systems reviewed and are negative.       Objective:   /74 (BP Location: Left arm, Patient Position: Sitting, BP Cuff Size: Adult)   Pulse (!) 106   Resp 14   Ht 1.702 m (5' 7\")   Wt 45.8 kg (101 lb)   SpO2 96%   BMI 15.82 kg/m²     Physical Exam   Constitutional: She is oriented to person, place, and time. She appears well-developed and well-nourished. No distress.   HENT:   Head: Normocephalic and atraumatic.   Mouth/Throat: Oropharynx is clear and moist. No " oropharyngeal exudate.   Eyes: Pupils are equal, round, and reactive to light. Conjunctivae are normal. No scleral icterus.   Neck: Normal range of motion. Neck supple. No JVD present. No thyromegaly present.   Cardiovascular: Intact distal pulses.  An irregular rhythm present. Tachycardia present.  Exam reveals no gallop and no friction rub.    Murmur ( 1/6 systolic murmur left sternal border) heard.  Pulses:       Carotid pulses are 2+ on the right side, and 2+ on the left side.       Radial pulses are 2+ on the right side, and 2+ on the left side.        Popliteal pulses are 2+ on the right side, and 2+ on the left side.        Dorsalis pedis pulses are 2+ on the right side, and 2+ on the left side.        Posterior tibial pulses are 2+ on the right side, and 2+ on the left side.   Pulmonary/Chest: Effort normal and breath sounds normal. She has no wheezes. She has no rales.   Abdominal: Soft. Bowel sounds are normal. She exhibits no distension. There is no tenderness.   Musculoskeletal: She exhibits no edema or tenderness.   Neurological: She is alert and oriented to person, place, and time. No cranial nerve deficit (Cranial nerves II through XII grossly intact).   Right-sided weakness   Skin: Skin is warm and dry. No rash noted. She is not diaphoretic. No erythema.   Psychiatric: She has a normal mood and affect. Her behavior is normal.   Vitals reviewed.    \  LABS:  Lab Results   Component Value Date/Time    CHOLSTRLTOT 95 (L) 08/02/2011 03:10 AM    LDL 44 08/02/2011 03:10 AM    HDL 39 (L) 08/02/2011 03:10 AM    TRIGLYCERIDE 60 08/02/2011 03:10 AM       Lab Results   Component Value Date/Time    WBC 4.9 07/03/2017 02:53 PM    RBC 3.34 (L) 07/03/2017 02:53 PM    HEMOGLOBIN 9.2 (L) 07/03/2017 02:53 PM    HEMATOCRIT 29.9 (L) 07/03/2017 02:53 PM    MCV 89.5 07/03/2017 02:53 PM    NEUTSPOLYS 71.30 09/12/2016 11:45 AM    LYMPHOCYTES 21.70 (L) 09/12/2016 11:45 AM    MONOCYTES 6.00 09/12/2016 11:45 AM    EOSINOPHILS  0.30 09/12/2016 11:45 AM    BASOPHILS 0.50 09/12/2016 11:45 AM     Lab Results   Component Value Date/Time    SODIUM 137 09/12/2016 11:45 AM    POTASSIUM 3.9 09/12/2016 11:45 AM    CHLORIDE 103 09/12/2016 11:45 AM    CO2 28 09/12/2016 11:45 AM    GLUCOSE 102 (H) 09/12/2016 11:45 AM    BUN 10 09/12/2016 11:45 AM    CREATININE 0.62 09/12/2016 11:45 AM    CREATININE 1.5 (H) 02/19/2009 09:22 AM         Lab Results   Component Value Date/Time    ALKPHOSPHAT 60 09/12/2016 11:45 AM    ASTSGOT 13 09/12/2016 11:45 AM    ALTSGPT 7 09/12/2016 11:45 AM    TBILIRUBIN 0.3 09/12/2016 11:45 AM      Lab Results   Component Value Date/Time    BNPBTYPENAT 135 (H) 08/10/2011 03:40 AM      No results found for: TSH  Lab Results   Component Value Date/Time    PROTHROMBTM 24.8 (H) 07/25/2014 06:30 PM    INR 2.32 (H) 07/25/2014 06:30 PM      Labs 1/1/2018: TSH 4.0, creatinine 1.32, potassium 4.0, normal LFTs, , TG 69, HDL 64, LDL 57    EKG (9/26/2018):  I have personally reviewed the EKG this visit and discussed with the patient.  Atrial flutter with variable block.    Echocardiogram 1/22/2018 located in [facility] [facility] under [facility] records: Preserved left ventricular systolic function, grade 1 diastolic dysfunction, mild left atrial enlargement normal RVSP and mild to moderate mitral regurgitation.    Assessment:     1. Typical atrial flutter (HCC)     2. Arterial ischemic stroke, MCA (middle cerebral artery), left, acute (HCC)     3. Rheumatic fever     4. Wegener's granulomatosis (HCC)         Medical Decision Making:  Today's Assessment / Status / Plan:     She has moderately rate controlled atrial flutter which is a new diagnosis for her.  It fits with her history of stroke.  She was previously anticoagulated and changed aspirin for unclear reasons.  We discussed that she does not have a high bleeding risk but she does have a very high stroke risk.  Also she needs some rate control.  She is fully asymptomatic however does note that  she is checked her pulse and had it in the 130s at home.  This was recently.  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    1.  Recommend oral anticoagulation with warfarin or a direct oral anticoagulant.  She will consider her options.  This would be in lieu of aspirin.  She would need renally dosed direct anticoagulants due to age and renal function.  2.  Lopressor 25 mg twice daily and titrated for rate control.  3.  Continue other medical therapy        Physical Exam   Constitutional: She is oriented to person, place, and time. She appears well-developed and well-nourished. No distress.   HENT:   Head: Normocephalic and atraumatic.   Mouth/Throat: Oropharynx is clear and moist. No oropharyngeal exudate.   Eyes: Pupils are equal, round, and reactive to light. Conjunctivae are normal. No scleral icterus.   Neck: Normal range of motion. Neck supple. No JVD present. No thyromegaly present.   Cardiovascular: Intact distal pulses.  An irregular rhythm present. Tachycardia present.  Exam reveals no gallop and no friction rub.    Murmur ( 1/6 systolic murmur left sternal border) heard.  Pulses:       Carotid pulses are 2+ on the right side, and 2+ on the left side.       Radial pulses are 2+ on the right side, and 2+ on the left side.        Popliteal pulses are 2+ on the right side, and 2+ on the left side.        Dorsalis pedis pulses are 2+ on the right side, and 2+ on the left side.        Posterior tibial pulses are 2+ on the right side, and 2+ on the left side.   Pulmonary/Chest: Effort normal and breath sounds normal. She has no wheezes. She has no rales.   Abdominal: Soft. Bowel sounds are normal. She exhibits no distension. There is no tenderness.   Musculoskeletal: She exhibits no edema or tenderness.   Neurological: She is alert and oriented to person, place, and time. No cranial nerve deficit (Cranial nerves II through XII grossly intact).   Right-sided weakness   Skin: Skin is warm and dry. No rash  noted. She is not diaphoretic. No erythema.   Psychiatric: She has a normal mood and affect. Her behavior is normal.   Vitals reviewed.

## 2018-09-28 ENCOUNTER — TELEPHONE (OUTPATIENT)
Dept: CARDIOLOGY | Facility: MEDICAL CENTER | Age: 83
End: 2018-09-28

## 2018-09-28 DIAGNOSIS — I48.91 ATRIAL FIBRILLATION, UNSPECIFIED TYPE (HCC): ICD-10-CM

## 2018-09-28 DIAGNOSIS — I47.10 SVT (SUPRAVENTRICULAR TACHYCARDIA): ICD-10-CM

## 2018-09-28 DIAGNOSIS — R00.2 PALPITATIONS: Chronic | ICD-10-CM

## 2018-09-28 LAB — EKG IMPRESSION: NORMAL

## 2018-09-28 NOTE — TELEPHONE ENCOUNTER
----- Message from Lisset Cerrato sent at 9/28/2018  8:14 AM PDT -----  Regarding: pt has decided on warfarin  Contact: [phone]  CLARA/reece    Pt calling to report she has made a decision per TW's request as discussed during 9/26 appt. Pt has decided on warfarin.     Please call pt , this afternoon after 2pm.

## 2018-09-28 NOTE — TELEPHONE ENCOUNTER
Pt called to check on coumadin recommendation. Explained that we would send an Rx and call to let her know once it is decided what dose she should be on. Pt states understanding. Pt also said she was supposed to start a new medication after her last visit, but never received the Rx. Per TW note from 9/26 pt supposed to start lopressor 25mg BID. Rx sent to [facility]

## 2018-10-01 ENCOUNTER — TELEPHONE (OUTPATIENT)
Dept: VASCULAR LAB | Facility: MEDICAL CENTER | Age: 83
End: 2018-10-01

## 2018-10-01 RX ORDER — WARFARIN SODIUM 5 MG/1
5 TABLET ORAL DAILY
Qty: 30 TAB | Refills: 11 | Status: SHIPPED | OUTPATIENT
Start: 2018-10-01 | End: 2018-10-15

## 2018-10-01 NOTE — TELEPHONE ENCOUNTER
Called patient regarding anticoagulation referral. Patient has not started Warfarin yet due to not having the prescription. DAVID with  office to have them call in the prescription as soon as possible. Will call patient back once prescription is filled.

## 2018-10-01 NOTE — TELEPHONE ENCOUNTER
[facility] needs warfarin prescription   Received: Today   Message Contents   MATY Renee/Callum Qiu at coumadin [facility] called about referral for pt. She said since they haven't seen the patient yet, they need Dr. Nation to call in prescription for warfarin. They need patient on warfarin before they see her. She can be reached at [phone].      ===========================  Order for 5mg coumadin placed per ADD Dr. Macias. Uyen at coumadin [facility] notified.

## 2018-10-05 ENCOUNTER — TELEPHONE (OUTPATIENT)
Dept: VASCULAR LAB | Facility: MEDICAL CENTER | Age: 83
End: 2018-10-05

## 2018-10-05 ENCOUNTER — ANTICOAGULATION VISIT (OUTPATIENT)
Dept: VASCULAR LAB | Facility: MEDICAL CENTER | Age: 83
End: 2018-10-05
Attending: INTERNAL MEDICINE
Payer: MEDICARE

## 2018-10-05 DIAGNOSIS — Z79.01 CHRONIC ANTICOAGULATION: ICD-10-CM

## 2018-10-05 LAB — INR PPP: 4 (ref 2–3.5)

## 2018-10-05 PROCEDURE — 99212 OFFICE O/P EST SF 10 MIN: CPT

## 2018-10-05 PROCEDURE — 85610 PROTHROMBIN TIME: CPT

## 2018-10-05 NOTE — PROGRESS NOTES
Anticoagulation Summary  As of 10/5/2018    INR goal:   2.0-3.0   TTR:   --   Today's INR:   4.0!   Warfarin maintenance plan:   2.5 mg (5 mg x 0.5) on Mon, Wed, Fri; 5 mg (5 mg x 1) all other days   Weekly warfarin total:   27.5 mg   Plan last modified:   Kirit Carrera, PharmD (10/5/2018)   Next INR check:   10/8/2018   Target end date:   Indefinite         Anticoagulation Episode Summary     INR check location:       Preferred lab:       Send INR reminders to:       Comments:         Anticoagulation Care Providers     Provider Role Specialty Phone number    Hussain Nation M.D. Referring Interventional Cardiology [phone]    [facility] Responsible  [phone]        Anticoagulation Patient Findings    Pt is not new to warfarin and new to [facility].  Discussed indication for warfarin therapy and INR goal range. Explained our services, hours of operation, warfarin therapy, potential SE, potential DI. Discussed diet at length, with an emphasis on foods rich in vitamin K.  Discussed monitoring parameters, such as blood in urine, blood in stool, discussed what to do if a dose is missed, or suspected as missed.  Emphasized importance of compliance including follow up. Discussed lifestyle choices of ETOH & smoking and its impact on therapy.      Pt is NOT on ASA 81 mg.      Pt denies any unusual s/s of bleeding, bruising, clotting or any changes to diet or medications.    CHADSVASC = at least 6 (HTN, 83y/o, Hx of stroke, female)    HASBLED= at least 3 (HTN, Stroke, 83y/o)    Pt with a recent diagnosis if Aflutter 09/26/2018, and a Hx of stroke, she was previously on warfarin in the past, this was stopped by previous PCP and placed on Asa 81. She was restarted on warfarin by cardiologist Hussain Nation M.D who also stopped her Asa 81 mg.    Patients INR was supratherapeutic today at 4.0 , she was recently started on warfarin 5 mg daily 10/02/2018 Patient denied any signs/symptoms of  bleeding or bruising.     Zulema was instructed to Hold warfarin tomorrow since she already took today's dose, then take 2.5 mg the following day.     Follow up in 3 days    Kirit Carrera, Juan.D  C Dr Bloch             Added [facility] to care team

## 2018-10-06 NOTE — TELEPHONE ENCOUNTER
Initial anticoag note and most recent cards note reviewed.  Patient with afib and chads vasc = approx 6 with h/o cva    Pending further recommendations, we will continue with indefinite anticoagulation with warfarin as directed by cards  Will defer all management of rhythm, rate, and other cv issues, aside from anticoagulation, to cards    Michael Bloch, MD  Anticoagulation Clinic    Cc:  STEAFNIE Schmidt

## 2018-10-08 ENCOUNTER — ANTICOAGULATION VISIT (OUTPATIENT)
Dept: VASCULAR LAB | Facility: MEDICAL CENTER | Age: 83
End: 2018-10-08
Attending: INTERNAL MEDICINE
Payer: MEDICARE

## 2018-10-08 VITALS — DIASTOLIC BLOOD PRESSURE: 71 MMHG | HEART RATE: 83 BPM | SYSTOLIC BLOOD PRESSURE: 115 MMHG

## 2018-10-08 DIAGNOSIS — I63.89 CEREBROVASCULAR ACCIDENT (CVA) DUE TO OTHER MECHANISM (HCC): ICD-10-CM

## 2018-10-08 DIAGNOSIS — I48.92 ATRIAL FLUTTER, UNSPECIFIED TYPE (HCC): ICD-10-CM

## 2018-10-08 LAB — INR PPP: 3.1 (ref 2–3.5)

## 2018-10-08 PROCEDURE — 99212 OFFICE O/P EST SF 10 MIN: CPT | Performed by: NURSE PRACTITIONER

## 2018-10-08 PROCEDURE — 85610 PROTHROMBIN TIME: CPT

## 2018-10-08 NOTE — PROGRESS NOTES
Anticoagulation Summary  As of 10/8/2018    INR goal:   2.0-3.0   TTR:   --   Today's INR:   3.1!   Warfarin maintenance plan:   2.5 mg (5 mg x 0.5) on Mon, Wed, Fri; 5 mg (5 mg x 1) all other days   Weekly warfarin total:   27.5 mg   Plan last modified:   Kirit Carrera, PharmD (10/5/2018)   Next INR check:   10/12/2018   Target end date:   Indefinite    Indications    Stroke (HCC) [I63.9]  Atrial flutter (HCC) [I48.92]             Anticoagulation Episode Summary     INR check location:       Preferred lab:       Send INR reminders to:       Comments:         Anticoagulation Care Providers     Provider Role Specialty Phone number    Hussain Nation M.D. Referring Interventional Cardiology [phone]    [facility] Responsible  [phone]        Anticoagulation Patient Findings      HPI:  Az Jolly seen in clinic today for follow up on anticoagulation therapy in the presence of a flutter, CVA hx. Denies any changes to current medical/health status since last appointment. Denies any medication or diet changes. No current symptoms of bleeding or thrombosis reported.    A/P:   INR supratherapeutic. INR down from 4.0 on Friday. Will pt take warfarin as outlined on calendar. BP recorded in vitals.    Follow up appointment on Friday.    Next Appointment: Friday , October 12, 2018 at 11:45 am.     Callie CARCAMO

## 2018-10-09 LAB
INR BLD: 3.1 (ref 0.9–1.2)
INR BLD: 4 (ref 0.9–1.2)

## 2018-10-11 ENCOUNTER — APPOINTMENT (RX ONLY)
Dept: URBAN - METROPOLITAN AREA CLINIC 4 | Facility: CLINIC | Age: 83
Setting detail: DERMATOLOGY
End: 2018-10-11

## 2018-10-11 DIAGNOSIS — L82.0 INFLAMED SEBORRHEIC KERATOSIS: ICD-10-CM

## 2018-10-11 DIAGNOSIS — L82.1 OTHER SEBORRHEIC KERATOSIS: ICD-10-CM

## 2018-10-11 DIAGNOSIS — L81.4 OTHER MELANIN HYPERPIGMENTATION: ICD-10-CM

## 2018-10-11 DIAGNOSIS — D18.0 HEMANGIOMA: ICD-10-CM

## 2018-10-11 DIAGNOSIS — D22 MELANOCYTIC NEVI: ICD-10-CM

## 2018-10-11 PROBLEM — E03.9 HYPOTHYROIDISM, UNSPECIFIED: Status: ACTIVE | Noted: 2018-10-11

## 2018-10-11 PROBLEM — D22.39 MELANOCYTIC NEVI OF OTHER PARTS OF FACE: Status: ACTIVE | Noted: 2018-10-11

## 2018-10-11 PROBLEM — D22.61 MELANOCYTIC NEVI OF RIGHT UPPER LIMB, INCLUDING SHOULDER: Status: ACTIVE | Noted: 2018-10-11

## 2018-10-11 PROBLEM — D18.01 HEMANGIOMA OF SKIN AND SUBCUTANEOUS TISSUE: Status: ACTIVE | Noted: 2018-10-11

## 2018-10-11 PROBLEM — L57.0 ACTINIC KERATOSIS: Status: ACTIVE | Noted: 2018-10-11

## 2018-10-11 PROBLEM — D22.5 MELANOCYTIC NEVI OF TRUNK: Status: ACTIVE | Noted: 2018-10-11

## 2018-10-11 PROBLEM — D22.62 MELANOCYTIC NEVI OF LEFT UPPER LIMB, INCLUDING SHOULDER: Status: ACTIVE | Noted: 2018-10-11

## 2018-10-11 PROCEDURE — ? COUNSELING

## 2018-10-11 PROCEDURE — ? SUNSCREEN RECOMMENDATIONS

## 2018-10-11 PROCEDURE — 99213 OFFICE O/P EST LOW 20 MIN: CPT | Mod: 25

## 2018-10-11 PROCEDURE — ? LIQUID NITROGEN

## 2018-10-11 PROCEDURE — 17110 DESTRUCTION B9 LES UP TO 14: CPT

## 2018-10-11 ASSESSMENT — LOCATION SIMPLE DESCRIPTION DERM
LOCATION SIMPLE: LEFT CHEEK
LOCATION SIMPLE: LEFT ELBOW
LOCATION SIMPLE: LEFT FOREARM
LOCATION SIMPLE: RIGHT FOREARM
LOCATION SIMPLE: TRAPEZIAL NECK
LOCATION SIMPLE: LEFT UPPER BACK
LOCATION SIMPLE: CHEST

## 2018-10-11 ASSESSMENT — LOCATION DETAILED DESCRIPTION DERM
LOCATION DETAILED: LEFT INFERIOR CENTRAL MALAR CHEEK
LOCATION DETAILED: RIGHT LATERAL SUPERIOR CHEST
LOCATION DETAILED: LEFT SUPERIOR MEDIAL UPPER BACK
LOCATION DETAILED: RIGHT PROXIMAL DORSAL FOREARM
LOCATION DETAILED: MID TRAPEZIAL NECK
LOCATION DETAILED: LEFT ELBOW
LOCATION DETAILED: STERNAL NOTCH
LOCATION DETAILED: LEFT MID-UPPER BACK
LOCATION DETAILED: LEFT MEDIAL UPPER BACK
LOCATION DETAILED: LEFT PROXIMAL DORSAL FOREARM

## 2018-10-11 ASSESSMENT — LOCATION ZONE DERM
LOCATION ZONE: NECK
LOCATION ZONE: TRUNK
LOCATION ZONE: ARM
LOCATION ZONE: FACE

## 2018-10-11 NOTE — PROCEDURE: LIQUID NITROGEN
Detail Level: Detailed
Post-Care Instructions: I reviewed with the patient in detail post-care instructions. Patient is to wear sunprotection, and avoid picking at any of the treated lesions. Pt may apply Vaseline to crusted or scabbing areas.
Add 52 Modifier (Optional): no
Medical Necessity Information: It is in your best interest to select a reason for this procedure from the list below. All of these items fulfill various CMS LCD requirements except the new and changing color options.
Medical Necessity Clause: This procedure was medically necessary because the lesions that were treated were:
Consent: The patient's consent was obtained including but not limited to risks of crusting, scabbing, blistering, scarring, darker or lighter pigmentary change, recurrence, incomplete removal and infection.

## 2018-10-11 NOTE — HPI: UPPER BODY SKIN CHECK
How Severe Are Your Spot(S)?: mild
What Is The Reason For Today's Visit?: Upper Body Skin Exam
Additional History: No concerns. UBE.

## 2018-10-12 ENCOUNTER — ANTICOAGULATION VISIT (OUTPATIENT)
Dept: VASCULAR LAB | Facility: MEDICAL CENTER | Age: 83
End: 2018-10-12
Attending: INTERNAL MEDICINE
Payer: MEDICARE

## 2018-10-12 DIAGNOSIS — I63.89 CEREBROVASCULAR ACCIDENT (CVA) DUE TO OTHER MECHANISM (HCC): ICD-10-CM

## 2018-10-12 LAB
INR BLD: 7.3 (ref 0.9–1.2)
INR PPP: 7.3 (ref 2–3.5)

## 2018-10-12 PROCEDURE — 85610 PROTHROMBIN TIME: CPT

## 2018-10-12 PROCEDURE — 99212 OFFICE O/P EST SF 10 MIN: CPT

## 2018-10-12 NOTE — PROGRESS NOTES
Anticoagulation Summary  As of 10/12/2018    INR goal:   2.0-3.0   TTR:   --   Today's INR:   7.3!   Warfarin maintenance plan:   2.5 mg (5 mg x 0.5) every day   Weekly warfarin total:   17.5 mg   Plan last modified:   Kirit Carrera PharmD (10/12/2018)   Next INR check:   10/15/2018   Target end date:   Indefinite    Indications    Stroke (HCC) [I63.9]  Atrial flutter (HCC) [I48.92]             Anticoagulation Episode Summary     INR check location:       Preferred lab:       Send INR reminders to:       Comments:         Anticoagulation Care Providers     Provider Role Specialty Phone number    Hussain Nation M.D. Referring Interventional Cardiology [phone]    [facility] Anticoagulation Services Responsible  [phone]        Anticoagulation Patient Findings      HPI:  Az Jolly seen in clinic today for follow up on anticoagulation therapy in the presence of Afib and Hx of Stroke. Denies any changes to current medical/health status since last appointment. Denies any medication or diet changes. No current symptoms of bleeding or thrombosis reported.    A/P:   INR is supra-therapeutic at 7.3, gave pt option to go to lab for venous blood draw but patient declined at this time. Pt to HOLD warfarin for 2 days then begin 36% reduced regimen.    Follow up appointment in 3 day(s). If INR remains labile please consider switching to DOAC    Next Appointment: Monday, 10/15/2018     Juan Harvey.D

## 2018-10-15 ENCOUNTER — ANTICOAGULATION VISIT (OUTPATIENT)
Dept: VASCULAR LAB | Facility: MEDICAL CENTER | Age: 83
End: 2018-10-15
Attending: INTERNAL MEDICINE
Payer: MEDICARE

## 2018-10-15 DIAGNOSIS — I48.92 ATRIAL FLUTTER, UNSPECIFIED TYPE (HCC): ICD-10-CM

## 2018-10-15 DIAGNOSIS — I63.9 CEREBROVASCULAR ACCIDENT (CVA), UNSPECIFIED MECHANISM (HCC): ICD-10-CM

## 2018-10-15 LAB — INR PPP: 4.2 (ref 2–3.5)

## 2018-10-15 PROCEDURE — 85610 PROTHROMBIN TIME: CPT

## 2018-10-15 PROCEDURE — 99212 OFFICE O/P EST SF 10 MIN: CPT | Performed by: NURSE PRACTITIONER

## 2018-10-15 RX ORDER — WARFARIN SODIUM 2 MG/1
TABLET ORAL
Qty: 30 TAB | Refills: 3 | Status: SHIPPED | OUTPATIENT
Start: 2018-10-15 | End: 2018-11-02 | Stop reason: SDUPTHER

## 2018-10-15 NOTE — PROGRESS NOTES
Anticoagulation Summary  As of 10/15/2018    INR goal:   2.0-3.0   TTR:   --   Today's INR:   4.2!   Warfarin maintenance plan:   2.5 mg (5 mg x 0.5) every day   Weekly warfarin total:   17.5 mg   Plan last modified:   Kirit Carrera, PharmD (10/12/2018)   Next INR check:   10/17/2018   Target end date:   Indefinite    Indications    Stroke (HCC) [I63.9]  Atrial flutter (HCC) [I48.92]             Anticoagulation Episode Summary     INR check location:       Preferred lab:       Send INR reminders to:       Comments:   Please consider switching to DOAC      Anticoagulation Care Providers     Provider Role Specialty Phone number    Hussain Nation M.D. Referring Interventional Cardiology [phone]    [facility] Anticoagulation Services Responsible  [phone]        Anticoagulation Patient Findings      HPI:  Az Jolly seen in clinic today for follow up on anticoagulation therapy in the presence of a flutter, CVA. Denies any changes to current medical/health status since last appointment. Denies any medication or diet changes. No current symptoms of bleeding or thrombosis reported.    A/P:   INR supratherapeutic. Discussed DOACs with pt. At this point, she prefers warfarin. Will send rx for lower dose. HOLD two doses and follow up on Wednesday.    Next Appointment: Wednesday, October 17, 2018 at 1:00 pm.     Callie CARCAMO

## 2018-10-17 ENCOUNTER — ANTICOAGULATION VISIT (OUTPATIENT)
Dept: VASCULAR LAB | Facility: MEDICAL CENTER | Age: 83
End: 2018-10-17
Attending: INTERNAL MEDICINE
Payer: MEDICARE

## 2018-10-17 VITALS — HEART RATE: 70 BPM | DIASTOLIC BLOOD PRESSURE: 74 MMHG | SYSTOLIC BLOOD PRESSURE: 133 MMHG

## 2018-10-17 DIAGNOSIS — Z79.01 CHRONIC ANTICOAGULATION: ICD-10-CM

## 2018-10-17 LAB
INR BLD: 4.2 (ref 0.9–1.2)
INR PPP: 2 (ref 2–3.5)

## 2018-10-17 PROCEDURE — 85610 PROTHROMBIN TIME: CPT

## 2018-10-17 PROCEDURE — 99211 OFF/OP EST MAY X REQ PHY/QHP: CPT

## 2018-10-18 LAB — INR BLD: 2 (ref 0.9–1.2)

## 2018-10-19 ENCOUNTER — IMMUNIZATION (OUTPATIENT)
Dept: SOCIAL WORK | Facility: CLINIC | Age: 83
End: 2018-10-19
Payer: MEDICARE

## 2018-10-19 ENCOUNTER — ANTICOAGULATION VISIT (OUTPATIENT)
Dept: VASCULAR LAB | Facility: MEDICAL CENTER | Age: 83
End: 2018-10-19
Attending: INTERNAL MEDICINE
Payer: MEDICARE

## 2018-10-19 DIAGNOSIS — Z79.01 CHRONIC ANTICOAGULATION: ICD-10-CM

## 2018-10-19 DIAGNOSIS — Z23 NEED FOR VACCINATION: ICD-10-CM

## 2018-10-19 LAB
INR BLD: 1.7 (ref 0.9–1.2)
INR PPP: 1.7 (ref 2–3.5)

## 2018-10-19 PROCEDURE — 85610 PROTHROMBIN TIME: CPT

## 2018-10-19 PROCEDURE — 99212 OFFICE O/P EST SF 10 MIN: CPT | Performed by: PHARMACIST

## 2018-10-19 PROCEDURE — 90662 IIV NO PRSV INCREASED AG IM: CPT | Performed by: REGISTERED NURSE

## 2018-10-19 PROCEDURE — G0008 ADMIN INFLUENZA VIRUS VAC: HCPCS | Performed by: REGISTERED NURSE

## 2018-10-19 NOTE — PROGRESS NOTES
Anticoagulation Summary  As of 10/19/2018    INR goal:   2.0-3.0   TTR:   22.7 % (4 d)   Today's INR:   1.7!   Warfarin maintenance plan:   3 mg (2 mg x 1.5) on Mon, Fri; 2 mg (2 mg x 1) all other days   Weekly warfarin total:   16 mg   Plan last modified:   Cindy Sanchez, Earle (10/19/2018)   Next INR check:   10/24/2018   Target end date:   Indefinite    Indications    Stroke (HCC) [I63.9]  Atrial flutter (HCC) [I48.92]             Anticoagulation Episode Summary     INR check location:       Preferred lab:       Send INR reminders to:       Comments:   Please consider switching to DOAC      Anticoagulation Care Providers     Provider Role Specialty Phone number    Hussain Nation M.D. Referring Interventional Cardiology [phone]    [facility] Anticoagulation Services Responsible  [phone]        Anticoagulation Patient Findings      HPI:  Az Jolly seen in clinic today, on anticoagulation therapy with warfarin for Stroke  Changes to current medical/health status since last appt: denies  Denies signs/symptoms of bleeding and/or thrombosis since the last appt.    Denies any interval changes to diet  Denies any interval changes to medications since last appt.   Denies any complications or cost restrictions with current therapy.   BP declined      A/P   INR  sub-therapeutic.   Will have pt start an increase weekly warfarin dose.     Follow up appointment in 5 days.    Cindy Sanchez, JuanD

## 2018-10-24 ENCOUNTER — ANTICOAGULATION VISIT (OUTPATIENT)
Dept: VASCULAR LAB | Facility: MEDICAL CENTER | Age: 83
End: 2018-10-24
Attending: INTERNAL MEDICINE
Payer: MEDICARE

## 2018-10-24 DIAGNOSIS — I48.92 ATRIAL FLUTTER, UNSPECIFIED TYPE (HCC): ICD-10-CM

## 2018-10-24 LAB — INR PPP: 3 (ref 2–3.5)

## 2018-10-24 PROCEDURE — 99211 OFF/OP EST MAY X REQ PHY/QHP: CPT | Performed by: PHARMACIST

## 2018-10-24 PROCEDURE — 85610 PROTHROMBIN TIME: CPT

## 2018-10-24 NOTE — PROGRESS NOTES
Anticoagulation Summary  As of 10/24/2018    INR goal:   2.0-3.0   TTR:   52.8 % (1.3 wk)   Today's INR:   3.0   Warfarin maintenance plan:   3 mg (2 mg x 1.5) on Mon; 2 mg (2 mg x 1) all other days   Weekly warfarin total:   15 mg   Plan last modified:   Shaun Ruiz, PharmD (10/24/2018)   Next INR check:   11/2/2018   Target end date:   Indefinite    Indications    Stroke (HCC) [I63.9]  Atrial flutter (HCC) [I48.92]             Anticoagulation Episode Summary     INR check location:       Preferred lab:       Send INR reminders to:       Comments:   Please consider switching to DOAC      Anticoagulation Care Providers     Provider Role Specialty Phone number    Hussain Nation M.D. Referring Interventional Cardiology [phone]    [facility] Anticoagulation Services Responsible  [phone]        Anticoagulation Patient Findings  Patient Findings     Negatives:   Signs/symptoms of thrombosis, Signs/symptoms of bleeding, Laboratory test error suspected, Change in health, Change in alcohol use, Change in activity, Upcoming invasive procedure, Emergency department visit, Upcoming dental procedure, Missed doses, Extra doses, Change in medications, Change in diet/appetite, Hospital admission, Bruising, Other complaints        HPI:   Az Jolly seen in clinic today, on anticoagulation therapy with warfarin for stroke prevention due to history of atrial flutter and stroke.    Patient's previous INR was subtherapeutic at 1.7 on 10-19-18, at which time patient was instructed to increase weekly warfarin regimen.  She returns to clinic today to recheck INR to ensure it is therapeutic and thus preventing possible clotting and/or bleeding/bruising complications.    CHADS-VASc = at least 6 with stroke  (unadjusted ischemic stroke risk/year:  9.7%, which is high risk)    Does patient have any changes to current medical/health status since last appt (Y/N):  NO  Does patient have any signs/symptoms of bleeding and/or  thrombosis since the last appt (Y/N):  NO  Does patient have any interval changes to diet or medications since last appt (Y/N):  NO  Are there any complications or cost restrictions with current therapy (Y/N):  NO       Asssessment:      INR therapeutic at 3.0, therefore decreasing patient's risk of stroke and/or bleeding complications.   Reason(s) for out of range INR today:  n/a      Plan:  Instructed patient to decrease weekly warfarin regimen slightly has INR has trended to high end of range over last several days.     Follow up:  Because warfarin is a high risk medication and current CHEST guidelines recommend regular monitoring intervals (few days up to 12 weeks), will have patient return to clinic in 1 weeks to recheck INR.    Shaun Ruiz, PharmD

## 2018-10-25 LAB — INR BLD: 3 (ref 0.9–1.2)

## 2018-11-02 ENCOUNTER — ANTICOAGULATION VISIT (OUTPATIENT)
Dept: VASCULAR LAB | Facility: MEDICAL CENTER | Age: 83
End: 2018-11-02
Attending: INTERNAL MEDICINE
Payer: MEDICARE

## 2018-11-02 DIAGNOSIS — I48.92 ATRIAL FLUTTER, UNSPECIFIED TYPE (HCC): ICD-10-CM

## 2018-11-02 LAB — INR PPP: 2.7 (ref 2–3.5)

## 2018-11-02 PROCEDURE — 85610 PROTHROMBIN TIME: CPT

## 2018-11-02 PROCEDURE — 99211 OFF/OP EST MAY X REQ PHY/QHP: CPT

## 2018-11-02 RX ORDER — WARFARIN SODIUM 2 MG/1
TABLET ORAL
Qty: 90 TAB | Refills: 3 | Status: SHIPPED | OUTPATIENT
Start: 2018-11-02 | End: 2018-12-17

## 2018-11-02 NOTE — PROGRESS NOTES
Anticoagulation Summary  As of 11/2/2018    INR goal:   2.0-3.0   TTR:   76.4 % (2.6 wk)   Today's INR:   2.7   Warfarin maintenance plan:   3 mg (2 mg x 1.5) on Mon; 2 mg (2 mg x 1) all other days   Weekly warfarin total:   15 mg   Plan last modified:   Shaun Ruiz PharmD (10/24/2018)   Next INR check:   11/16/2018   Target end date:   Indefinite    Indications    Stroke (HCC) [I63.9]  Atrial flutter (HCC) [I48.92]             Anticoagulation Episode Summary     INR check location:       Preferred lab:       Send INR reminders to:       Comments:   Please consider switching to DOAC      Anticoagulation Care Providers     Provider Role Specialty Phone number    Hussain Nation M.D. Referring Interventional Cardiology [phone]    [facility] Anticoagulation Services Responsible  [phone]        Anticoagulation Patient Findings      HPI:  Az Jolly seen in clinic today for follow up on anticoagulation therapy in the presence of Afib and Hx of stroke . Denies any changes to current medical/health status since last appointment. Denies any medication or diet changes. No current symptoms of bleeding or thrombosis reported.    A/P:   INR is therapeutic. Continue current regimen.    Follow up appointment in 2 week(s).    Next Appointment: Friday , 11/16/2018 at 1.00pm     Garett HarveyD

## 2018-11-19 ENCOUNTER — ANTICOAGULATION VISIT (OUTPATIENT)
Dept: VASCULAR LAB | Facility: MEDICAL CENTER | Age: 83
End: 2018-11-19
Attending: INTERNAL MEDICINE
Payer: MEDICARE

## 2018-11-19 VITALS — SYSTOLIC BLOOD PRESSURE: 139 MMHG | HEART RATE: 102 BPM | DIASTOLIC BLOOD PRESSURE: 76 MMHG

## 2018-11-19 DIAGNOSIS — I63.9 CEREBROVASCULAR ACCIDENT (CVA), UNSPECIFIED MECHANISM (HCC): ICD-10-CM

## 2018-11-19 DIAGNOSIS — I48.92 ATRIAL FLUTTER, UNSPECIFIED TYPE (HCC): ICD-10-CM

## 2018-11-19 LAB
INR BLD: 4 (ref 0.9–1.2)
INR PPP: 4 (ref 2–3.5)

## 2018-11-19 PROCEDURE — 99212 OFFICE O/P EST SF 10 MIN: CPT | Performed by: NURSE PRACTITIONER

## 2018-11-19 PROCEDURE — 85610 PROTHROMBIN TIME: CPT

## 2018-11-19 NOTE — PROGRESS NOTES
Anticoagulation Summary  As of 11/19/2018    INR goal:   2.0-3.0   TTR:   50.5 % (1.2 mo)   Today's INR:   4.0!   Warfarin maintenance plan:   3 mg (2 mg x 1.5) on Mon; 2 mg (2 mg x 1) all other days   Weekly warfarin total:   15 mg   Plan last modified:   Kirit Carrera, PharmD (11/2/2018)   Next INR check:   12/10/2018   Target end date:   Indefinite    Indications    Stroke (HCC) [I63.9]  Atrial flutter (HCC) [I48.92]             Anticoagulation Episode Summary     INR check location:       Preferred lab:       Send INR reminders to:       Comments:   Please consider switching to DOAC      Anticoagulation Care Providers     Provider Role Specialty Phone number    Hussain Nation M.D. Referring Interventional Cardiology [phone]    [facility] Anticoagulation Services Responsible  [phone]        Anticoagulation Patient Findings      HPI:  Az Jolly seen in clinic today for follow up on anticoagulation therapy in the presence of A flutter, CVA hx. Denies any changes to current medical/health status since last appointment. Denies any medication or diet changes. No current symptoms of bleeding or thrombosis reported. Not feeling well today. Reports upset stomach.    A/P:   INR supratherapeutic. HOLD tonight then continue current regimen. BP recorded in vitals.    Follow up appointment in 3 week(s) per pt's preference.    Next Appointment: Wednesday, December 12, 2018 at 1:00 pm.    Callie CARCAMO

## 2018-12-12 ENCOUNTER — ANTICOAGULATION VISIT (OUTPATIENT)
Dept: VASCULAR LAB | Facility: MEDICAL CENTER | Age: 83
End: 2018-12-12
Attending: INTERNAL MEDICINE
Payer: MEDICARE

## 2018-12-12 DIAGNOSIS — I63.9 CEREBROVASCULAR ACCIDENT (CVA), UNSPECIFIED MECHANISM (HCC): ICD-10-CM

## 2018-12-12 DIAGNOSIS — I48.92 ATRIAL FLUTTER, UNSPECIFIED TYPE (HCC): ICD-10-CM

## 2018-12-12 LAB — INR PPP: 4.6 (ref 2–3.5)

## 2018-12-12 PROCEDURE — 85610 PROTHROMBIN TIME: CPT

## 2018-12-12 PROCEDURE — 99212 OFFICE O/P EST SF 10 MIN: CPT | Performed by: NURSE PRACTITIONER

## 2018-12-12 NOTE — PROGRESS NOTES
Anticoagulation Summary  As of 12/12/2018    INR goal:   2.0-3.0   TTR:   30.5 % (1.9 mo)   Today's INR:   4.6!   Warfarin maintenance plan:   2 mg (2 mg x 1) every day   Weekly warfarin total:   14 mg   Plan last modified:   JEFF Zepeda (12/12/2018)   Next INR check:   12/26/2018   Target end date:   Indefinite    Indications    Stroke (HCC) [I63.9]  Atrial flutter (HCC) [I48.92]             Anticoagulation Episode Summary     INR check location:       Preferred lab:       Send INR reminders to:       Comments:   Please consider switching to DOAC      Anticoagulation Care Providers     Provider Role Specialty Phone number    Hussain Nation M.D. Referring Interventional Cardiology [phone]    [facility] Anticoagulation Services Responsible  [phone]        Anticoagulation Patient Findings      HPI:  Az Jolly seen in clinic today for follow up on anticoagulation therapy in the presence of CVA, a flutter. Denies any changes to current medical/health status since last appointment. Denies any medication or diet changes. No current symptoms of bleeding or thrombosis reported.    A/P:   INR supratherapeutic. HOLD tonight and decrease regimen.     Follow up appointment in 1 week(s).    Next Appointment: Wednesday, December 19, 2018 at 11:45 am.    Callie CARCAMO

## 2018-12-17 ENCOUNTER — HOSPITAL ENCOUNTER (EMERGENCY)
Facility: MEDICAL CENTER | Age: 83
DRG: 871 | End: 2018-12-17
Attending: EMERGENCY MEDICINE
Payer: MEDICARE

## 2018-12-17 ENCOUNTER — ANTICOAGULATION MONITORING (OUTPATIENT)
Dept: VASCULAR LAB | Facility: MEDICAL CENTER | Age: 83
End: 2018-12-17

## 2018-12-17 VITALS
RESPIRATION RATE: 16 BRPM | HEIGHT: 67 IN | WEIGHT: 98.11 LBS | OXYGEN SATURATION: 97 % | TEMPERATURE: 98.7 F | SYSTOLIC BLOOD PRESSURE: 116 MMHG | BODY MASS INDEX: 15.4 KG/M2 | HEART RATE: 98 BPM | DIASTOLIC BLOOD PRESSURE: 70 MMHG

## 2018-12-17 DIAGNOSIS — I63.02 CEREBROVASCULAR ACCIDENT (CVA) DUE TO THROMBOSIS OF BASILAR ARTERY (HCC): ICD-10-CM

## 2018-12-17 DIAGNOSIS — I48.92 ATRIAL FLUTTER, UNSPECIFIED TYPE (HCC): ICD-10-CM

## 2018-12-17 DIAGNOSIS — N30.01 ACUTE CYSTITIS WITH HEMATURIA: ICD-10-CM

## 2018-12-17 DIAGNOSIS — N39.0 ACUTE UTI: ICD-10-CM

## 2018-12-17 LAB
APPEARANCE UR: ABNORMAL
BACTERIA #/AREA URNS HPF: ABNORMAL /HPF
BILIRUB UR QL STRIP.AUTO: ABNORMAL
COLOR UR: ABNORMAL
EPI CELLS #/AREA URNS HPF: ABNORMAL /HPF
GLUCOSE UR STRIP.AUTO-MCNC: NEGATIVE MG/DL
KETONES UR STRIP.AUTO-MCNC: NEGATIVE MG/DL
LEUKOCYTE ESTERASE UR QL STRIP.AUTO: ABNORMAL
MICRO URNS: ABNORMAL
NITRITE UR QL STRIP.AUTO: POSITIVE
PH UR STRIP.AUTO: 5.5 [PH]
PROT UR QL STRIP: 100 MG/DL
RBC # URNS HPF: >150 /HPF
RBC UR QL AUTO: ABNORMAL
SP GR UR STRIP.AUTO: 1.01
WBC #/AREA URNS HPF: ABNORMAL /HPF

## 2018-12-17 PROCEDURE — 81001 URINALYSIS AUTO W/SCOPE: CPT

## 2018-12-17 PROCEDURE — 87086 URINE CULTURE/COLONY COUNT: CPT

## 2018-12-17 PROCEDURE — 87186 SC STD MICRODIL/AGAR DIL: CPT

## 2018-12-17 PROCEDURE — 87077 CULTURE AEROBIC IDENTIFY: CPT

## 2018-12-17 PROCEDURE — 700102 HCHG RX REV CODE 250 W/ 637 OVERRIDE(OP)

## 2018-12-17 PROCEDURE — A9270 NON-COVERED ITEM OR SERVICE: HCPCS

## 2018-12-17 PROCEDURE — 99284 EMERGENCY DEPT VISIT MOD MDM: CPT

## 2018-12-17 RX ORDER — SULFAMETHOXAZOLE AND TRIMETHOPRIM 800; 160 MG/1; MG/1
1 TABLET ORAL 2 TIMES DAILY
Qty: 20 TAB | Refills: 0 | Status: ON HOLD | OUTPATIENT
Start: 2018-12-17 | End: 2018-12-22

## 2018-12-17 RX ORDER — POLYVINYL ALCOHOL 14 MG/ML
1 SOLUTION/ DROPS OPHTHALMIC PRN
Status: SHIPPED | COMMUNITY
End: 2019-03-20 | Stop reason: CLARIF

## 2018-12-17 RX ORDER — POLYETHYLENE GLYCOL 3350 17 G/17G
17 POWDER, FOR SOLUTION ORAL
Status: SHIPPED | COMMUNITY
End: 2019-03-20 | Stop reason: CLARIF

## 2018-12-17 RX ORDER — SULFAMETHOXAZOLE AND TRIMETHOPRIM 800; 160 MG/1; MG/1
TABLET ORAL
Status: COMPLETED
Start: 2018-12-17 | End: 2018-12-17

## 2018-12-17 RX ORDER — SULFAMETHOXAZOLE AND TRIMETHOPRIM 800; 160 MG/1; MG/1
1 TABLET ORAL ONCE
Status: COMPLETED | OUTPATIENT
Start: 2018-12-17 | End: 2018-12-17

## 2018-12-17 RX ORDER — WARFARIN SODIUM 2 MG/1
1 TABLET ORAL DAILY
Status: ON HOLD | COMMUNITY
End: 2018-12-22

## 2018-12-17 RX ADMIN — SULFAMETHOXAZOLE AND TRIMETHOPRIM 1 TABLET: 800; 160 TABLET ORAL at 13:57

## 2018-12-17 ASSESSMENT — PAIN SCALES - GENERAL: PAINLEVEL_OUTOF10: ASSUMED PAIN PRESENT

## 2018-12-17 NOTE — ED NOTES
Medicated pt per ERP order. Per pharmacy pt needs to call coumadin clinic today and let them know that she is on bactrim and what they want her to do. Pt and family aware. Pt D/C to home. D/C instructions and prescriptions given. Pt v/u. Pt leaves ED with no acute changes, complaints or concerns.

## 2018-12-17 NOTE — DISCHARGE INSTRUCTIONS
Follow-up with primary care 1-2 days for reevaluation, medication management.    Bactrim twice daily for 7 days for urinary tract infection.    Continue other home medications as previously indicated.    Return to the emergency department for persistent or worsening abdominal pain, urinary discomfort, or for fever, vomiting or other new concerns per

## 2018-12-17 NOTE — ED PROVIDER NOTES
ED Provider Note    CHIEF COMPLAINT  Chief Complaint   Patient presents with   • Blood in Urine     Pt c/o abd pain and bloody urine, onset x several days.    • Abdominal Pain       HPI  Az Jolly is a 84 y.o. female who presents to the emergency department through triage with family members for dysuria and lower abdominal discomfort.  Patient describes dysuria and frequency for 2 days now with blood-tinged urine.  No flank pain.  No fever or chills.  No nausea or vomiting.    Patient is on Coumadin, no other bleeding diatheses.    REVIEW OF SYSTEMS  See HPI for further details.     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    SOCIAL HISTORY  Social History     Social History Main Topics   • Smoking status: Former Smoker     Packs/day: 1.00     Types: Cigarettes     Quit date: 1/1/1960   • Smokeless tobacco: Never Used      Comment: very little years and years ago   • Alcohol use 0.0 oz/week      Comment: occasional   • Drug use: No   • Sexual activity: Not on file       SURGICAL HISTORY   has a past surgical history that includes bunionectomy (1980); arthroscopy, knee (1986); arthroscope (1989); temporal artery biopsy (1996); lung needle biopsy (1996); cataract extraction (2006); cholecystectomy (1997); hysterectomy radical (1975); and vein ligation.    CURRENT MEDICATIONS  Home Medications     Reviewed by Hitesh Shin (Pharmacy Tech) on 12/17/18 at 1226  Med List Status: Complete   Medication Last Dose Status   artificial tears 1.4 % Solution 12/16/2018 Active  "  BIOTIN PO 12/16/2018 Active   Calcium Carbonate-Vitamin D (CALTRATE 600+D PO) 12/16/2018 Active   folic acid (FOLVITE) 1 MG TABS 12/16/2018 Active   levothyroxine (SYNTHROID) 75 MCG TABS 12/17/2018 Active   metoprolol (LOPRESSOR) 25 MG Tab 12/16/2018 Active   Multiple Vitamins-Minerals (ADULT ONE DAILY GUMMIES PO) 12/16/2018 Active   polyethylene glycol/lytes (MIRALAX) Pack > 4 days Active   warfarin (COUMADIN) 2 MG Tab 12/16/2018 Active                ALLERGIES  Allergies   Allergen Reactions   • Hydroxyzine Swelling     Eyes get itchy and swollen    • Keflex Swelling   • Naprelan [Naproxen] Swelling     Eyes get itchy become red and swollen   • Oxaprozin Swelling     Swelling eyes, and itchy   • Ampicillin Rash     Red Rash   • Erythromycin Diarrhea     .   • Vi-Q-Tuss [Hydrocodone-Guaifenesin] Vomiting and Nausea   • Voltaren [Diclofenac Sodium] Rash and Swelling     Red rash and swelling   • Baclofen Unspecified     drowsiness   • Citalopram Vomiting and Nausea   • Clarithromycin Unspecified     Pt reports that this medication plugs her ears.    • Promethazine Unspecified     Drowsiness and sleeps       PHYSICAL EXAM  VITAL SIGNS: /70   Pulse 98   Temp 37.1 °C (98.7 °F) (Temporal)   Resp 16   Ht 1.702 m (5' 7\")   Wt 44.5 kg (98 lb 1.7 oz)   SpO2 97%   BMI 15.37 kg/m²   Pulse ox interpretation: I interpret this pulse ox as normal.  Constitutional: Alert in no apparent distress.  HENT: Normocephalic, atraumatic. Bilateral external ears normal, Nose normal. Moist mucous membranes.    Eyes: Pupils are equal and reactive, Conjunctiva normal.   Neck: Normal range of motion, Supple   Lymphatic: No lymphadenopathy noted.  No inguinal mass or hernia.  Cardiovascular: Normal peripheral perfusion.  Thorax & Lungs: Nonlabored respirations.  Abdomen: Soft, non-distended.  Mild discomfort in the suprapubic region without rebound, guarding or peritonitis.  No CVA tenderness to percussion.  Skin: Warm, Dry, No " erythema, No rash.   Musculoskeletal: Good range of motion in all major joints.   Neurologic: Alert and oriented x4.  Moves 4 extremities and ambulates independently.  Psychiatric: Affect normal, Judgment normal, Mood normal.       DIAGNOSTIC STUDIES / PROCEDURES    LABS  Results for orders placed or performed during the hospital encounter of 12/17/18   URINALYSIS,CULTURE IF INDICATED   Result Value Ref Range    Micro Urine Req Microscopic     Color Red     Character Cloudy (A)     Specific Gravity 1.015 <1.035    Ph 5.5 5.0 - 8.0    Glucose Negative Negative mg/dL    Ketones Negative Negative mg/dL    Protein 100 (A) Negative mg/dL    Bilirubin Small (A) Negative    Nitrite Positive (A) Negative    Leukocyte Esterase Moderate (A) Negative    Occult Blood Large (A) Negative   URINE MICROSCOPIC (W/UA)   Result Value Ref Range    WBC Packed (A) /hpf    RBC >150 (A) /hpf    Bacteria Many (A) None /hpf    Epithelial Cells Moderate (A) Few /hpf       COURSE & MEDICAL DECISION MAKING  Nursing notes and vital signs were reviewed. (See chart for details)  The patients records were reviewed, history was obtained from the patient and her  as well as son;     ED evaluation for dysuria and suprapubic discomfort demonstrates UTI.  Urinalysis positive for nitrite moderate leukocyte esterase, packed WBC, many bacteria.  Culture is pending.  Abdomen with mild suprapubic discomfort, no peritonitis or acute abdomen.  Vital signs stable without fever or tachycardia, patient was never hypotensive.  No clinical evidence for pyelonephritis or sepsis.  Patient is otherwise well-appearing, interactive and nontoxic.  First dose of Bactrim provided in the emergency department.  (Patient with multiple medication allergies including penicillin and cephalosporin.  Macrobid excluded due to severity of symptoms, concern that pyelonephritis could develop.  Ciprofloxacin and Bactrim previously tolerated, both with hypo prothrombotic effect  with the use of Coumadin.  Patient has an appointment for INR check at the coagulation clinic on Wednesday.)    Patient is stable for discharge at this time, anticipatory guidance provided, ciprofloxacin for 7 days, close follow-up is encouraged, and strict ED return instructions have been detailed. Patient is agreeable to the disposition and plan.    Patient's blood pressure was elevated in the emergency department, and has been referred to primary care for close monitoring.    FINAL IMPRESSION  (N30.01) Acute cystitis with hematuria  (N39.0) Acute UTI      Electronically signed by: Kenyatta Boyd, 12/17/2018 2:29 PM      This dictation was created using voice recognition software. The accuracy of the dictation is limited to the abilities of the software. I expect there may be some errors of grammar and possibly content. The nursing notes were reviewed and certain aspects of this information were incorporated into this note.

## 2018-12-17 NOTE — ED NOTES
Med rec updated and complete  Allergies reviewed  Interviewed pt with family at bedside with permission from pt.  Pt had a list of prescription medications, went over list of medications and returned list of medications back to pts .  Pt reports no antibiotics in the last 30 days.  Pt reports that she is only taking 2MG every day, she is NOT taking 3MG on any other days

## 2018-12-17 NOTE — ED TRIAGE NOTES
"Chief Complaint   Patient presents with   • Blood in Urine     Pt c/o abd pain and bloody urine, onset x several days.    • Abdominal Pain     /73   Pulse (!) 118   Temp 37 °C (98.6 °F) (Temporal)   Resp 16   Ht 1.702 m (5' 7\")   Wt 44.5 kg (98 lb 1.7 oz)   SpO2 96%   BMI 15.37 kg/m²     "

## 2018-12-17 NOTE — PROGRESS NOTES
Pt presents to regional to report that she will start a short course of Bactrim DS.  Will reduce dose for the next two days to 1mg then recheck INR  Simi Molina, Clinical Pharmacist

## 2018-12-18 LAB — INR BLD: 4.6 (ref 0.9–1.2)

## 2018-12-19 ENCOUNTER — ANTICOAGULATION VISIT (OUTPATIENT)
Dept: VASCULAR LAB | Facility: MEDICAL CENTER | Age: 83
End: 2018-12-19
Attending: INTERNAL MEDICINE
Payer: MEDICARE

## 2018-12-19 DIAGNOSIS — I48.92 ATRIAL FLUTTER, UNSPECIFIED TYPE (HCC): ICD-10-CM

## 2018-12-19 DIAGNOSIS — I63.02 CEREBROVASCULAR ACCIDENT (CVA) DUE TO THROMBOSIS OF BASILAR ARTERY (HCC): ICD-10-CM

## 2018-12-19 LAB
BACTERIA UR CULT: ABNORMAL
BACTERIA UR CULT: ABNORMAL
INR BLD: >8 (ref 0.9–1.2)
INR PPP: >8 (ref 2–3.5)
SIGNIFICANT IND 70042: ABNORMAL
SITE SITE: ABNORMAL
SOURCE SOURCE: ABNORMAL

## 2018-12-19 PROCEDURE — 99212 OFFICE O/P EST SF 10 MIN: CPT | Performed by: NURSE PRACTITIONER

## 2018-12-19 PROCEDURE — 85610 PROTHROMBIN TIME: CPT

## 2018-12-19 NOTE — ED NOTES
Patient called and left follow up message to let me know that she does not want to change antibiotics. The coumadin clinic is adjusting her Bactrim and she will stay on the Bactrim per their recommendations and adjustments. No changes required at this time.    Latasha Tyler, JuanD

## 2018-12-19 NOTE — ED NOTES
"ED Positive Culture Follow-up/Notification Note:   Date: 12/19/18    Patient seen in the ED on 12/17/2018 for dysuria and lower abdominal discomfort with no CVAT tenderness noted on exam notes.   1. Acute cystitis with hematuria    2. Acute UTI      Patient received Bactrim DS 1 tablet orally once before discharge.  Discharge Medication List as of 12/17/2018  1:53 PM      START taking these medications    Details   sulfamethoxazole-trimethoprim (BACTRIM DS) 800-160 MG tablet Take 1 Tab by mouth 2 times a day for 10 days., Disp-20 Tab, R-0, Normal           Allergies: Hydroxyzine; Keflex; Naprelan [naproxen]; Oxaprozin; Ampicillin; Erythromycin; Vi-q-tuss [hydrocodone-guaifenesin]; Voltaren [diclofenac sodium]; Baclofen; Citalopram; Clarithromycin; and Promethazine    Vitals:    12/17/18 1046 12/17/18 1049 12/17/18 1344   BP:  131/73 116/70   Pulse:  (!) 118 98   Resp:  16 16   Temp:  37 °C (98.6 °F) 37.1 °C (98.7 °F)   TempSrc:  Temporal Temporal   SpO2:  96% 97%   Weight: 44.5 kg (98 lb 1.7 oz)     Height: 1.702 m (5' 7\")         Final cultures:   Results     Procedure Component Value Units Date/Time    URINE CULTURE(NEW) [[id number]]  (Abnormal)  (Susceptibility) Collected:  12/17/18 1245    Order Status:  Completed Specimen:  Urine Updated:  12/19/18 0857     Significant Indicator POS (POS)     Source UR     Site --     Urine Culture -- (A)      Escherichia coli  ,000 cfu/mL   (A)    Narrative:       TEST Urine Culture WAS CANCELLED, 12/17/18 16:10 HIS# [id number]    Culture & Susceptibility     ESCHERICHIA COLI     Antibiotic Sensitivity Microscan Unit Status    Ampicillin Sensitive <=8 mcg/mL Final    Method: SENSITIVITY, JOSELINE    Cefepime Sensitive <=8 mcg/mL Final    Method: SENSITIVITY, JOSELINE    Cefotaxime Sensitive <=2 mcg/mL Final    Method: SENSITIVITY, JOSELINE    Cefotetan Sensitive <=16 mcg/mL Final    Method: SENSITIVITY, JOSELINE    Ceftazidime Sensitive <=1 mcg/mL Final    Method: SENSITIVITY, JOSELINE    " Ceftriaxone Sensitive <=8 mcg/mL Final    Method: SENSITIVITY, JOSELINE    Cefuroxime Sensitive <=4 mcg/mL Final    Method: SENSITIVITY, JOSELINE    Cephalothin Sensitive <=8 mcg/mL Final    Method: SENSITIVITY, JOSELINE    Ciprofloxacin Sensitive <=1 mcg/mL Final    Method: SENSITIVITY, JOSELINE    Gentamicin Sensitive <=4 mcg/mL Final    Method: SENSITIVITY, JOSELINE    Levofloxacin Sensitive <=2 mcg/mL Final    Method: SENSITIVITY, JOSELINE    Nitrofurantoin Sensitive <=32 mcg/mL Final    Method: SENSITIVITY, JOSELINE    Pip/Tazobactam Sensitive <=16 mcg/mL Final    Method: SENSITIVITY, JOSELINE    Piperacillin Sensitive <=16 mcg/mL Final    Method: SENSITIVITY, JOSELINE    Tigecycline Sensitive <=2 mcg/mL Final    Method: SENSITIVITY, JOSELINE    Tobramycin Sensitive <=4 mcg/mL Final    Method: SENSITIVITY, JOSELINE    Trimeth/Sulfa Sensitive <=2/38 mcg/mL Final    Method: SENSITIVITY, JOSELINE                       URINALYSIS,CULTURE IF INDICATED [[id number]]  (Abnormal) Collected:  12/17/18 1245    Order Status:  Completed Specimen:  Urine Updated:  12/17/18 1315     Micro Urine Req Microscopic     Color Red     Character Cloudy (A)     Specific Gravity 1.015     Ph 5.5     Glucose Negative mg/dL      Ketones Negative mg/dL      Protein 100 (A) mg/dL      Bilirubin Small (A)     Nitrite Positive (A)     Leukocyte Esterase Moderate (A)     Occult Blood Large (A)    URINE CULTURE(NEW) [[id number]]     Order Status:  Canceled           Plan:   Isolated organism is susceptible to prescribed therapy, however, recommended duration per IDSA guidelines is 3 days.     I called to discuss culture result with patient and she stated she is still having burning with urination and it is not better yet. She also stated that she is on warfarin, but it's being monitored. She also complains of difficulty with urination.     She will follow up with the coumadin clinic later this afternoon and may call back for a different antibiotic; if necessary. If a change is needed, then I would  recommend the following:    New Rx:  Nitrofurantoin 100mg orally twice daily x 5 days #10, no refills - MD: Yuliet per protocol    Latasha Tyler, JuanD

## 2018-12-19 NOTE — PROGRESS NOTES
Anticoagulation Summary  As of 12/19/2018    INR goal:   2.0-3.0   TTR:   30.5 % (1.9 mo)   Today's INR:   >8.0!   Warfarin maintenance plan:   2 mg (2 mg x 1) every day   Weekly warfarin total:   14 mg   Plan last modified:   JEFF Zepeda (12/12/2018)   Next INR check:      Target end date:   Indefinite    Indications    Stroke (HCC) [I63.9]  Atrial flutter (HCC) [I48.92]             Anticoagulation Episode Summary     INR check location:       Preferred lab:       Send INR reminders to:       Comments:   Please consider switching to [facility]      Anticoagulation Care Providers     Provider Role Specialty Phone number    Hussain Nation M.D. Referring Interventional Cardiology [phone]    [facility] Responsible  [phone]        Anticoagulation Patient Findings      HPI:  Az Jolly seen in clinic today for follow up on anticoagulation therapy in the presence of A flutter, CVA. Started bactrim on Monday through 12/27. She took a reduced dose as instructed the past 2 days. Denies any  diet changes. No current symptoms of bleeding or thrombosis reported.    A/P:   INR supratherapeutic. Pt declined 2nd POC. INR trending high previously. Will HOLD three doses then began 1 mg daily.    Follow up appointment in 1.5 weeks - she is not able to return sooner. Knows to go to the ER for any s/sx of CVA or prolonged bleeding or any falls in which she hits her head.     Next Appointment: Friday, December 28, 2018 at 11:15 am.     Callie CARCAMO

## 2018-12-20 ENCOUNTER — APPOINTMENT (OUTPATIENT)
Dept: RADIOLOGY | Facility: MEDICAL CENTER | Age: 83
DRG: 871 | End: 2018-12-20
Attending: EMERGENCY MEDICINE
Payer: MEDICARE

## 2018-12-20 ENCOUNTER — APPOINTMENT (OUTPATIENT)
Dept: RADIOLOGY | Facility: MEDICAL CENTER | Age: 83
DRG: 871 | End: 2018-12-20
Attending: INTERNAL MEDICINE
Payer: MEDICARE

## 2018-12-20 ENCOUNTER — HOSPITAL ENCOUNTER (INPATIENT)
Facility: MEDICAL CENTER | Age: 83
LOS: 2 days | DRG: 871 | End: 2018-12-22
Attending: EMERGENCY MEDICINE | Admitting: INTERNAL MEDICINE
Payer: MEDICARE

## 2018-12-20 DIAGNOSIS — N17.9 AKI (ACUTE KIDNEY INJURY) (HCC): ICD-10-CM

## 2018-12-20 DIAGNOSIS — N39.0 ACUTE UTI: ICD-10-CM

## 2018-12-20 DIAGNOSIS — R79.1 SUPRATHERAPEUTIC INR: ICD-10-CM

## 2018-12-20 DIAGNOSIS — D72.819 LEUKOPENIA, UNSPECIFIED TYPE: ICD-10-CM

## 2018-12-20 PROBLEM — E87.1 HYPONATREMIA: Status: ACTIVE | Noted: 2018-12-20

## 2018-12-20 PROBLEM — E43 SEVERE PROTEIN-CALORIE MALNUTRITION (GOMEZ: LESS THAN 60% OF STANDARD WEIGHT) (HCC): Status: ACTIVE | Noted: 2018-12-20

## 2018-12-20 PROBLEM — A41.9 SEPSIS (HCC): Status: ACTIVE | Noted: 2018-12-20

## 2018-12-20 LAB
ALBUMIN SERPL BCP-MCNC: 3.3 G/DL (ref 3.2–4.9)
ALBUMIN/GLOB SERPL: 0.8 G/DL
ALP SERPL-CCNC: 82 U/L (ref 30–99)
ALT SERPL-CCNC: 28 U/L (ref 2–50)
ANION GAP SERPL CALC-SCNC: 8 MMOL/L (ref 0–11.9)
APPEARANCE UR: CLEAR
APTT PPP: 76.1 SEC (ref 24.7–36)
AST SERPL-CCNC: 28 U/L (ref 12–45)
BACTERIA #/AREA URNS HPF: ABNORMAL /HPF
BASOPHILS # BLD AUTO: 0.7 % (ref 0–1.8)
BASOPHILS # BLD: 0.03 K/UL (ref 0–0.12)
BILIRUB SERPL-MCNC: 0.3 MG/DL (ref 0.1–1.5)
BILIRUB UR QL STRIP.AUTO: NEGATIVE
BUN SERPL-MCNC: 37 MG/DL (ref 8–22)
CALCIUM SERPL-MCNC: 8.9 MG/DL (ref 8.4–10.2)
CHLORIDE SERPL-SCNC: 99 MMOL/L (ref 96–112)
CO2 SERPL-SCNC: 26 MMOL/L (ref 20–33)
COLOR UR: YELLOW
CREAT SERPL-MCNC: 2.25 MG/DL (ref 0.5–1.4)
EOSINOPHIL # BLD AUTO: 0.05 K/UL (ref 0–0.51)
EOSINOPHIL NFR BLD: 1.1 % (ref 0–6.9)
EPI CELLS #/AREA URNS HPF: ABNORMAL /HPF
ERYTHROCYTE [DISTWIDTH] IN BLOOD BY AUTOMATED COUNT: 44.2 FL (ref 35.9–50)
GLOBULIN SER CALC-MCNC: 4.3 G/DL (ref 1.9–3.5)
GLUCOSE SERPL-MCNC: 97 MG/DL (ref 65–99)
GLUCOSE UR STRIP.AUTO-MCNC: NEGATIVE MG/DL
HCT VFR BLD AUTO: 42.2 % (ref 37–47)
HGB BLD-MCNC: 13.7 G/DL (ref 12–16)
IMM GRANULOCYTES # BLD AUTO: 0.03 K/UL (ref 0–0.11)
IMM GRANULOCYTES NFR BLD AUTO: 0.7 % (ref 0–0.9)
INR PPP: 6.8 (ref 0.87–1.13)
KETONES UR STRIP.AUTO-MCNC: NEGATIVE MG/DL
LACTATE BLD-SCNC: 1.5 MMOL/L (ref 0.5–2)
LACTATE BLD-SCNC: 2.1 MMOL/L (ref 0.5–2)
LEUKOCYTE ESTERASE UR QL STRIP.AUTO: ABNORMAL
LYMPHOCYTES # BLD AUTO: 0.83 K/UL (ref 1–4.8)
LYMPHOCYTES NFR BLD: 18.3 % (ref 22–41)
MCH RBC QN AUTO: 29.3 PG (ref 27–33)
MCHC RBC AUTO-ENTMCNC: 32.5 G/DL (ref 33.6–35)
MCV RBC AUTO: 90.2 FL (ref 81.4–97.8)
MICRO URNS: ABNORMAL
MONOCYTES # BLD AUTO: 0.28 K/UL (ref 0–0.85)
MONOCYTES NFR BLD AUTO: 6.2 % (ref 0–13.4)
NEUTROPHILS # BLD AUTO: 3.32 K/UL (ref 2–7.15)
NEUTROPHILS NFR BLD: 73 % (ref 44–72)
NITRITE UR QL STRIP.AUTO: NEGATIVE
NRBC # BLD AUTO: 0 K/UL
NRBC BLD-RTO: 0 /100 WBC
PH UR STRIP.AUTO: 6.5 [PH]
PLATELET # BLD AUTO: 268 K/UL (ref 164–446)
PMV BLD AUTO: 9.5 FL (ref 9–12.9)
POTASSIUM SERPL-SCNC: 3.8 MMOL/L (ref 3.6–5.5)
PROT SERPL-MCNC: 7.6 G/DL (ref 6–8.2)
PROT UR QL STRIP: NEGATIVE MG/DL
PROTHROMBIN TIME: 57.9 SEC (ref 12–14.6)
RBC # BLD AUTO: 4.68 M/UL (ref 4.2–5.4)
RBC # URNS HPF: ABNORMAL /HPF
RBC UR QL AUTO: ABNORMAL
SODIUM SERPL-SCNC: 133 MMOL/L (ref 135–145)
SP GR UR STRIP.AUTO: <=1.005
TSH SERPL DL<=0.005 MIU/L-ACNC: 6.38 UIU/ML (ref 0.38–5.33)
WBC # BLD AUTO: 4.5 K/UL (ref 4.8–10.8)
WBC #/AREA URNS HPF: ABNORMAL /HPF

## 2018-12-20 PROCEDURE — G8989 SELF CARE D/C STATUS: HCPCS | Mod: CJ

## 2018-12-20 PROCEDURE — 99223 1ST HOSP IP/OBS HIGH 75: CPT | Mod: AI | Performed by: INTERNAL MEDICINE

## 2018-12-20 PROCEDURE — 81001 URINALYSIS AUTO W/SCOPE: CPT

## 2018-12-20 PROCEDURE — 87086 URINE CULTURE/COLONY COUNT: CPT

## 2018-12-20 PROCEDURE — 84443 ASSAY THYROID STIM HORMONE: CPT

## 2018-12-20 PROCEDURE — 99285 EMERGENCY DEPT VISIT HI MDM: CPT

## 2018-12-20 PROCEDURE — 700105 HCHG RX REV CODE 258: Performed by: INTERNAL MEDICINE

## 2018-12-20 PROCEDURE — 76775 US EXAM ABDO BACK WALL LIM: CPT

## 2018-12-20 PROCEDURE — 36415 COLL VENOUS BLD VENIPUNCTURE: CPT

## 2018-12-20 PROCEDURE — 700102 HCHG RX REV CODE 250 W/ 637 OVERRIDE(OP): Performed by: INTERNAL MEDICINE

## 2018-12-20 PROCEDURE — 85730 THROMBOPLASTIN TIME PARTIAL: CPT

## 2018-12-20 PROCEDURE — 85610 PROTHROMBIN TIME: CPT

## 2018-12-20 PROCEDURE — 97165 OT EVAL LOW COMPLEX 30 MIN: CPT

## 2018-12-20 PROCEDURE — G8987 SELF CARE CURRENT STATUS: HCPCS | Mod: CJ

## 2018-12-20 PROCEDURE — 700111 HCHG RX REV CODE 636 W/ 250 OVERRIDE (IP): Performed by: INTERNAL MEDICINE

## 2018-12-20 PROCEDURE — 87040 BLOOD CULTURE FOR BACTERIA: CPT

## 2018-12-20 PROCEDURE — A9270 NON-COVERED ITEM OR SERVICE: HCPCS | Performed by: INTERNAL MEDICINE

## 2018-12-20 PROCEDURE — 71045 X-RAY EXAM CHEST 1 VIEW: CPT

## 2018-12-20 PROCEDURE — 85025 COMPLETE CBC W/AUTO DIFF WBC: CPT

## 2018-12-20 PROCEDURE — G8988 SELF CARE GOAL STATUS: HCPCS | Mod: CJ

## 2018-12-20 PROCEDURE — 83605 ASSAY OF LACTIC ACID: CPT | Mod: 91

## 2018-12-20 PROCEDURE — 80053 COMPREHEN METABOLIC PANEL: CPT

## 2018-12-20 PROCEDURE — 97535 SELF CARE MNGMENT TRAINING: CPT

## 2018-12-20 PROCEDURE — 94760 N-INVAS EAR/PLS OXIMETRY 1: CPT

## 2018-12-20 PROCEDURE — 770020 HCHG ROOM/CARE - TELE (206)

## 2018-12-20 RX ORDER — LEVOTHYROXINE SODIUM 0.05 MG/1
75 TABLET ORAL
Status: DISCONTINUED | OUTPATIENT
Start: 2018-12-21 | End: 2018-12-22 | Stop reason: HOSPADM

## 2018-12-20 RX ORDER — BISACODYL 10 MG
10 SUPPOSITORY, RECTAL RECTAL
Status: DISCONTINUED | OUTPATIENT
Start: 2018-12-20 | End: 2018-12-22 | Stop reason: HOSPADM

## 2018-12-20 RX ORDER — POLYETHYLENE GLYCOL 3350 17 G/17G
1 POWDER, FOR SOLUTION ORAL
Status: DISCONTINUED | OUTPATIENT
Start: 2018-12-20 | End: 2018-12-22 | Stop reason: HOSPADM

## 2018-12-20 RX ORDER — SODIUM CHLORIDE 9 MG/ML
500 INJECTION, SOLUTION INTRAVENOUS
Status: DISCONTINUED | OUTPATIENT
Start: 2018-12-20 | End: 2018-12-22 | Stop reason: HOSPADM

## 2018-12-20 RX ORDER — AMOXICILLIN 250 MG
2 CAPSULE ORAL 2 TIMES DAILY
Status: DISCONTINUED | OUTPATIENT
Start: 2018-12-20 | End: 2018-12-22 | Stop reason: HOSPADM

## 2018-12-20 RX ORDER — ACETAMINOPHEN 325 MG/1
650 TABLET ORAL EVERY 6 HOURS PRN
Status: DISCONTINUED | OUTPATIENT
Start: 2018-12-20 | End: 2018-12-22 | Stop reason: HOSPADM

## 2018-12-20 RX ORDER — SODIUM CHLORIDE 9 MG/ML
INJECTION, SOLUTION INTRAVENOUS CONTINUOUS
Status: DISCONTINUED | OUTPATIENT
Start: 2018-12-20 | End: 2018-12-22 | Stop reason: HOSPADM

## 2018-12-20 RX ORDER — ONDANSETRON 2 MG/ML
4 INJECTION INTRAMUSCULAR; INTRAVENOUS EVERY 4 HOURS PRN
Status: DISCONTINUED | OUTPATIENT
Start: 2018-12-20 | End: 2018-12-22 | Stop reason: HOSPADM

## 2018-12-20 RX ORDER — POLYETHYLENE GLYCOL 3350 17 G/17G
1 POWDER, FOR SOLUTION ORAL DAILY
Status: DISCONTINUED | OUTPATIENT
Start: 2018-12-20 | End: 2018-12-22 | Stop reason: HOSPADM

## 2018-12-20 RX ORDER — CIPROFLOXACIN 2 MG/ML
400 INJECTION, SOLUTION INTRAVENOUS ONCE
Status: DISCONTINUED | OUTPATIENT
Start: 2018-12-20 | End: 2018-12-20

## 2018-12-20 RX ORDER — CIPROFLOXACIN 2 MG/ML
200 INJECTION, SOLUTION INTRAVENOUS EVERY 24 HOURS
Status: DISCONTINUED | OUTPATIENT
Start: 2018-12-20 | End: 2018-12-22 | Stop reason: HOSPADM

## 2018-12-20 RX ORDER — ONDANSETRON 4 MG/1
4 TABLET, ORALLY DISINTEGRATING ORAL EVERY 4 HOURS PRN
Status: DISCONTINUED | OUTPATIENT
Start: 2018-12-20 | End: 2018-12-22 | Stop reason: HOSPADM

## 2018-12-20 RX ADMIN — CIPROFLOXACIN 200 MG: 2 INJECTION, SOLUTION INTRAVENOUS at 11:35

## 2018-12-20 RX ADMIN — METOPROLOL TARTRATE 25 MG: 25 TABLET ORAL at 17:22

## 2018-12-20 RX ADMIN — SODIUM CHLORIDE: 9 INJECTION, SOLUTION INTRAVENOUS at 11:18

## 2018-12-20 RX ADMIN — STANDARDIZED SENNA CONCENTRATE AND DOCUSATE SODIUM 2 TABLET: 8.6; 5 TABLET, FILM COATED ORAL at 17:22

## 2018-12-20 RX ADMIN — METOPROLOL TARTRATE 25 MG: 25 TABLET ORAL at 11:35

## 2018-12-20 ASSESSMENT — COGNITIVE AND FUNCTIONAL STATUS - GENERAL
WALKING IN HOSPITAL ROOM: A LITTLE
STANDING UP FROM CHAIR USING ARMS: A LITTLE
CLIMB 3 TO 5 STEPS WITH RAILING: A LOT
DAILY ACTIVITIY SCORE: 24
MOBILITY SCORE: 19
MOVING FROM LYING ON BACK TO SITTING ON SIDE OF FLAT BED: A LITTLE
SUGGESTED CMS G CODE MODIFIER MOBILITY: CK
SUGGESTED CMS G CODE MODIFIER DAILY ACTIVITY: CH

## 2018-12-20 ASSESSMENT — PATIENT HEALTH QUESTIONNAIRE - PHQ9
SUM OF ALL RESPONSES TO PHQ9 QUESTIONS 1 AND 2: 0
1. LITTLE INTEREST OR PLEASURE IN DOING THINGS: NOT AT ALL
2. FEELING DOWN, DEPRESSED, IRRITABLE, OR HOPELESS: NOT AT ALL

## 2018-12-20 ASSESSMENT — ACTIVITIES OF DAILY LIVING (ADL): TOILETING: INDEPENDENT

## 2018-12-20 ASSESSMENT — ENCOUNTER SYMPTOMS
MYALGIAS: 0
HEARTBURN: 0
BACK PAIN: 0
CHILLS: 0
WEAKNESS: 1
DIARRHEA: 0
HEADACHES: 0
FEVER: 0
VOMITING: 0
HALLUCINATIONS: 0
SORE THROAT: 0
COUGH: 0
ABDOMINAL PAIN: 0
DIZZINESS: 0
DEPRESSION: 0
FOCAL WEAKNESS: 0
BLOOD IN STOOL: 0
NAUSEA: 0
PALPITATIONS: 0
SHORTNESS OF BREATH: 0

## 2018-12-20 ASSESSMENT — LIFESTYLE VARIABLES
ALCOHOL_USE: NO
EVER_SMOKED: NEVER

## 2018-12-20 ASSESSMENT — COPD QUESTIONNAIRES
COPD SCREENING SCORE: 3
IN THE PAST 12 MONTHS DO YOU DO LESS THAN YOU USED TO BECAUSE OF YOUR BREATHING PROBLEMS: DISAGREE/UNSURE
DO YOU EVER COUGH UP ANY MUCUS OR PHLEGM?: YES, A FEW DAYS A WEEK OR MONTH
HAVE YOU SMOKED AT LEAST 100 CIGARETTES IN YOUR ENTIRE LIFE: NO/DON'T KNOW
DURING THE PAST 4 WEEKS HOW MUCH DID YOU FEEL SHORT OF BREATH: NONE/LITTLE OF THE TIME

## 2018-12-20 ASSESSMENT — PAIN SCALES - GENERAL
PAINLEVEL_OUTOF10: 5
PAINLEVEL_OUTOF10: 0
PAINLEVEL_OUTOF10: 0

## 2018-12-20 NOTE — ASSESSMENT & PLAN NOTE
Mild, likely due to volume depletion in the setting of UTI.  May be due to low p.o. intake, she is very thin  -Gentle normal saline  -Levels have improved  -Repeat in the morning

## 2018-12-20 NOTE — ED NOTES
Med rec completed per pt   Allergies reviewed    Pt started a 10 day course of Bactrim DS 12/17/18    Pt had an anticoagulation appointment 12/19/18 where they told her to HOLD Warfarin for three doses and then start taking 1 mg a day.  Pt has a follow up appointment on 12/28/18

## 2018-12-20 NOTE — ASSESSMENT & PLAN NOTE
She has residual right-sided deficits with right arm weakness, right leg weakness.  Uses walker for ambulation  -Resume coumadin once INR returns to therapeutic range

## 2018-12-20 NOTE — ASSESSMENT & PLAN NOTE
She has a history of UTI, has been having urinary frequency and hesitancy.  -She failed outpatient antibiotics with Bactrim  -IV ciprofloxacin  -Follow urine cultures

## 2018-12-20 NOTE — THERAPY
"Occupational Therapy Evaluation completed.   Functional Status:  83 yo female admit with pain and discomfort related to UTI.  Was recently in ER and d/c'd home.  Pt normally mobilizes with 4ww and SHOES ON all the time at home.  Has assist from spouse and caregivers at [facility] ([facility]) for IADL's and spouse assists with bathing and dressing.  Pt has RUE weakness from previous CVA.  On this admit, pt able to get OOB Jose, remove socks and don shoes with setup, stand with SBA, and walk to BR SBA with FWW.  She required CGA to sit onto and stand from toilet but only because grab bar was on her weaker R side.  Amb out into [facility]onzales to nurses station and back without any issues, SBA with FWW.  Pt reports she feels at or near baseline as far as mobility and ADL's.  OK to mobilize with nursing, no further OT needs apparent in this setting at this time.  Pt denied need for further home based therapy, but may benefit from Home Health to monitor her and prevent re-admit.  Plan of Care: Patient with no further skilled OT needs in the acute care setting at this time  Discharge Recommendations:  Equipment: No Equipment Needed. Post-acute therapy Discharge to home with outpatient or home health for additional skilled therapy services.    See \"Rehab Therapy-Acute\" Patient Summary Report for complete documentation.    "

## 2018-12-20 NOTE — PROGRESS NOTES
Patient received from ER stable, alert times 4, unsteady gait with front wheel walker; pt/ot to see, full code, allergies noted, room air, vitals wnl, on iv atb for uti; urgency noted, rate controlled a.fib on tele, iv access patent with fluids infusing, coumadin on hold for elevated inr, labs reviewed

## 2018-12-20 NOTE — ED NOTES
Mini cath procedure explained and performed. Privacy maintained for pt. Pt tolerated procedure well. Warm blanket provided. Urine to lab. Will continue to monitor.

## 2018-12-20 NOTE — ASSESSMENT & PLAN NOTE
This is severe sepsis with the following associated acute organ dysfunction(s): acute kidney failure.   Source is UTI  IV fluids, trend lactate, follow cultures  Cipro

## 2018-12-20 NOTE — ED NOTES
Rogelio from Lab called with critical result of INR- 6.8 and PT 57.9 at 0818. Critical lab result read back to Rogelio.   Dr. Vaughn notified of critical lab result at 0819.  Critical lab result read back by Dr. Vaughn.

## 2018-12-20 NOTE — ASSESSMENT & PLAN NOTE
Previous creatinine from years ago was normal.  She does have a history of Wegener's however no known history of chronic kidney disease.  I suspect due to volume depletion and UTI, in addition to Bactrim therapy  -She does report decreased urination, renal ultrasound is normal  -Creatinine has improved but not yet normalized  -Normal saline  -Monitor Cr Daily  -Watch UOP  -Hold nephrotoxins

## 2018-12-20 NOTE — DISCHARGE PLANNING
Anticipated Discharge Disposition: [facility] ([facility]) with spouse    Action: SW met with this patient and her spouse bedside to complete assessment.  Patient stated that they live at [facility] and she plans to return home when medically able.  Patient stated that she has completed living will/POA paperwork but it is currently at home.  She stated that it names her spouse to make decisions if needed. Patient shared that she uses a walker at home and has [facility] in the past.  Patient stated that she nor her  drives anymore they will have their son, a cab or the facility drive them when needed. Patient PCP is Dr. Schmidt and patient reported that it has been about 9 months since she has seen him.     Plan: SW believes that this patient is being admitted.

## 2018-12-20 NOTE — ASSESSMENT & PLAN NOTE
Rate is controlled on metoprolol.  She is followed by cardiology as an outpatient.  -INR levels are supratherapeutic, hold Coumadin for now  -Trend INR  -No evidence of active bleeding therefore will not reverse INR  -Continue metoprolol

## 2018-12-20 NOTE — H&P
Hospital Medicine History & Physical Note    Date of Service  12/20/2018    Primary Care Physician  Toribio Schmidt M.D.    Consultants  none    Code Status  Full    Chief Complaint  Weakness, urinary frequency/hesitency    History of Presenting Illness  84 y.o. female with a history of CVA with subsequent right arm and leg weakness She has residual right-sided deficits with right arm weakness, right leg weakness, aflutter on coumadin who presented 12/20/2018 with generalized weakness and urinary hesitancy.    Patient says she has had multiple UTIs in the past.  About a week ago she noticed that she was having increased urinary frequency however was only going a small amount at a time.  This felt similar to her other episodes of UTI.  She also she has low energy and low appetite.  She saw her primary care who prescribed her Bactrim.  She has been taking this however is not improvement of her symptoms.  She denies any fevers at home.  No nausea or vomiting.  She is also noticed up a slight blood tinge to her urine and usually this has been indicative of a UTI for her in the past.  She has been taking her Coumadin as prescribed.  She has not noticed any other blood loss from any other area for example no blood in her stool, no black stool and no large areas bruising.    She said that for a few days she did not think she urinated very much, however this morning she thinks her urine has picked back up again.  She has not had any abdominal distention.  She has baseline right upper and lower extremity weakness due to history of stroke and has not noticed any changes in these deficits.  Denies any chest pain or shortness of breath.  No palpitations and test.    ER course: Noted to have hyponatremia and leukopenia.  INR was noted to be supratherapeutic at 6.8.  Creatinine elevated at 2.5.  Her blood pressure is stable, however due to ongoing symptoms despite outpatient antibiotic she will be admitted for IV antibiotic  therapy    Review of Systems  Review of Systems   Constitutional: Negative for chills, fever and malaise/fatigue.   HENT: Negative for sore throat.    Respiratory: Negative for cough and shortness of breath.    Cardiovascular: Negative for chest pain and palpitations.   Gastrointestinal: Negative for abdominal pain, blood in stool, diarrhea, heartburn, nausea and vomiting.   Genitourinary: Positive for dysuria, frequency, hematuria and urgency.   Musculoskeletal: Negative for back pain and myalgias.   Neurological: Positive for weakness. Negative for dizziness, focal weakness and headaches.   Psychiatric/Behavioral: Negative for depression and hallucinations.   All other systems reviewed and are negative.      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 She also has no past medical history of Breast cancer ([facility]).    Surgical History   has a past surgical history that includes bunionectomy (1980); arthroscopy, knee (1986); arthroscope (1989); temporal artery biopsy (1996); lung needle biopsy (1996); cataract extraction (2006); cholecystectomy (1997); hysterectomy radical (1975); and vein ligation.     Family History  family history includes Cancer in her brother; Non-contributory in her other; Other in her father.     Social History   reports that she quit smoking about 59 years ago. Her smoking use included Cigarettes. She smoked 1.00 pack per day. She has never used smokeless tobacco. She reports that she drinks alcohol. She reports that she does not use  drugs.    Allergies  Allergies   Allergen Reactions   • Hydroxyzine Swelling     Eyes get itchy and swollen    • Keflex Swelling   • Naprelan [Naproxen] Swelling     Eyes get itchy become red and swollen   • Oxaprozin Swelling     Swelling eyes, and itchy   • Ampicillin Rash     Red Rash   • Erythromycin Diarrhea     .   • Vi-Q-Tuss [Hydrocodone-Guaifenesin] Vomiting and Nausea   • Voltaren [Diclofenac Sodium] Rash and Swelling     Red rash and swelling   • Baclofen Unspecified     drowsiness   • Citalopram Vomiting and Nausea   • Clarithromycin Unspecified     Pt reports that this medication plugs her ears.    • Promethazine Unspecified     Drowsiness and sleeps       Medications  Prior to Admission Medications   Prescriptions Last Dose Informant Patient Reported? Taking?   BIOTIN PO 12/19/2018 at AM Patient Yes No   Sig: Take 1,000 mg by mouth every day.   Multiple Vitamins-Minerals (ADULT ONE DAILY GUMMIES PO) 12/19/2018 at AM Patient Yes No   Sig: Take 2 Tabs by mouth every day.   artificial tears 1.4 % Solution 12/19/2018 at PM Patient Yes No   Sig: Place 1 Drop in both eyes as needed (For Dry eye).   folic acid (FOLVITE) 1 MG TABS 12/19/2018 at AM Patient Yes No   Sig: Take 1 mg by mouth every day.   levothyroxine (SYNTHROID) 75 MCG TABS 12/20/2018 at 0500 Patient Yes No   Sig: Take 75 mcg by mouth every morning before breakfast.   metoprolol (LOPRESSOR) 25 MG Tab 12/19/2018 at PM Patient No No   Sig: Take 1 Tab by mouth 2 times a day.   polyethylene glycol/lytes (MIRALAX) Pack PRN at PRN Patient Yes No   Sig: Take 17 g by mouth as needed.   sulfamethoxazole-trimethoprim (BACTRIM DS) 800-160 MG tablet 12/19/2018 at PM  No No   Sig: Take 1 Tab by mouth 2 times a day for 10 days.   warfarin (COUMADIN) 2 MG Tab 12/18/2018 at PM Patient Yes No   Sig: Take 1 mg by mouth every day.      Facility-Administered Medications: None       Physical Exam  Temp:  [36.3 °C (97.4 °F)-36.7 °C (98 °F)] 36.7 °C (98 °F)  Pulse:   [79-95] 86  Resp:  [18] 18  BP: (121)/(70) 121/70    Physical Exam   Constitutional: She is oriented to person, place, and time. She appears cachectic. No distress.   HENT:   Head: Normocephalic.   Mouth/Throat: No oropharyngeal exudate.   Eyes: Pupils are equal, round, and reactive to light. No scleral icterus.   Neck: Normal range of motion. No JVD present. No thyromegaly present.   Cardiovascular: Normal rate and regular rhythm.    No murmur heard.  Pulmonary/Chest: Effort normal and breath sounds normal. No respiratory distress. She has no wheezes. She has no rales.   Abdominal: Soft. Bowel sounds are normal. She exhibits no distension. There is no tenderness. There is no rebound.   Musculoskeletal: Normal range of motion. She exhibits no edema or tenderness.   Very thin extremities  Winging of right scapula   Neurological: She is alert and oriented to person, place, and time. No cranial nerve deficit. She exhibits abnormal muscle tone (Right upper and lower extremity). Coordination (Right) abnormal.   Skin: Skin is warm and dry. No erythema.   Psychiatric: She has a normal mood and affect. Her behavior is normal.       Laboratory:  Recent Labs      12/20/18   0747   WBC  4.5*   RBC  4.68   HEMOGLOBIN  13.7   HEMATOCRIT  42.2   MCV  90.2   MCH  29.3   MCHC  32.5*   RDW  44.2   PLATELETCT  268   MPV  9.5     Recent Labs      12/20/18   0747   SODIUM  133*   POTASSIUM  3.8   CHLORIDE  99   CO2  26   GLUCOSE  97   BUN  37*   CREATININE  2.25*   CALCIUM  8.9     Recent Labs      12/20/18   0747   ALTSGPT  28   ASTSGOT  28   ALKPHOSPHAT  82   TBILIRUBIN  0.3   GLUCOSE  97     Recent Labs     12/19/18 12/20/18   0747   APTT   --   76.1*   INR  >8.0  6.80*             No results for input(s): TROPONINI in the last 72 hours.    Urinalysis:    Recent Labs      12/20/18   0902   SPECGRAVITY  <=1.005   GLUCOSEUR  Negative   KETONES  Negative   NITRITE  Negative   LEUKESTERAS  Moderate*   WBCURINE  *   RBCURINE   *   BACTERIA  Many*   EPITHELCELL  Few        Imaging:  DX-CHEST-PORTABLE (1 VIEW)   Final Result      Borderline cardiomegaly.      US-RENAL    (Results Pending)         Assessment/Plan:  I anticipate this patient will require at least two midnights for appropriate medical management, necessitating inpatient admission.    * UTI (urinary tract infection)   Assessment & Plan    She has a history of UTI, has been having urinary frequency and hesitancy.  -She failed outpatient antibiotics with Bactrim  -IV ciprofloxacin  -Follow urine cultures     Arterial ischemic stroke, MCA (middle cerebral artery), left, acute ([facility])- (present on admission)   Assessment & Plan    She has residual right-sided deficits with right arm weakness, right leg weakness.  Uses walker for ambulation  -Resume coumadin once INR returns to therapeutic range     Supratherapeutic INR   Assessment & Plan    Likely from low p.o. Intake plus interaction with Bactrim.  No evidence of active bleeding aside from mild hematuria, heme globin is stable  -Hold Coumadin  -Trend INR  -Vitamin K if any evidence of bleed     Sepsis ([facility])   Assessment & Plan    This is severe sepsis with the following associated acute organ dysfunction(s): acute kidney failure.   Source is UTI  IV fluids, trend lactate, follow cultures  Cipro     Severe protein-calorie malnutrition (Mahmood: less than 60% of standard weight) ([facility])   Assessment & Plan    BMI is 16.  -Nutrition consult for supplements     DIAZ (acute kidney injury) ([facility])   Assessment & Plan    Previous creatinine from years ago was normal.  She does have a history of Wegener's however no known history of chronic kidney disease.  I suspect due to volume depletion and UTI, in addition to Bactrim therapy  -She does report decreased urination over the last few days therefore I will do a renal ultrasound to rule out obstruction or hydro.  -Normal saline  -Monitor Cr Daily  -Watch UOP  -Hold nephrotoxins        Hyponatremia   Assessment & Plan    Mild, likely due to volume depletion in the setting of UTI.  May be due to low p.o. intake, she is very thin  -Gentle normal saline  -Repeat in the morning     Atrial flutter (HCC)- (present on admission)   Assessment & Plan    Rate is controlled on metoprolol.  She is followed by cardiology as an outpatient.  -INR levels are supratherapeutic, hold Coumadin for now  -Trend INR  -No evidence of active bleeding therefore will not reverse INR  -Continue metoprolol     Chronic fatigue- (present on admission)   Assessment & Plan    Patient states energy levels are at baseline  Check TSH     Hypothyroid- (present on admission)   Assessment & Plan    Check TSH  Continue synthroid     Wegener's granulomatosis (HCC)- (present on admission)   Assessment & Plan    She is not on any medications for this, reduced renal function may be related to this         VTE prophylaxis: Coumadin when INR down

## 2018-12-20 NOTE — ED TRIAGE NOTES
Chief Complaint   Patient presents with   • UTI     Pt seen here on monday for same complaint, pt was prescribed bactrim. Pt back in ER today, she stated she feels she has gotten no relief. Pt reports abdominal pain 5/10.     /70   Pulse 90   Temp 36.3 °C (97.4 °F) (Temporal)   Resp 18   Wt 46.7 kg (103 lb)   SpO2 96%   BMI 16.13 kg/m²     Pt BIB EMS for c/o unresolved UTI. No interventions done by EMS.

## 2018-12-20 NOTE — ED PROVIDER NOTES
ED Provider Note    CHIEF COMPLAINT  Chief Complaint   Patient presents with   • UTI     Pt seen here on monday for same complaint, pt was prescribed bactrim. Pt back in ER today, she stated she feels she has gotten no relief. Pt reports abdominal pain 5/10.       HPI  Az Jolly is a 84 y.o. female who presents to the emergency department with a chief complaint of not feeling well.  The patient was seen here 3 days ago and diagnosed with a urinary tract infection.  She was placed on Bactrim.  She has been taking this medication as prescribed although she continues to feel poorly.  She says that her stomach feels somewhat unsettled.  She points to her lower abdomen as the location of discomfort.  She says that it seems to radiate up the left side toward the left flank.  She has not had any vomiting.  No fevers.  She has been able to have a bowel movement for the last 2 days but she says that she does suffer from occasional constipation.  She denies chest pain.  Her main complaint is general fatigue and malaise.    REVIEW OF SYSTEMS  See HPI for further details. All other systems are negative.     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   • DVT 6/1996    left leg, vein ligation performed on saphenous vein   • First degree atrioventricular block 9/21/2012   • H/O echocardiogram 9/21/2012   • Hypothyroid 10/2002   • Kidney disorder 1998    left kidney biopsy--glomerulonephritis and wegners dx   • Kidney failure     stage II   • Mitral valve prolapse    • Palpitations 9/21/2012   • Rheumatic fever 9/21/2012   • Stroke (HCC) 9/21/2012   • Wegener's granulomatosis (HCC)        FAMILY HISTORY  Family History   Problem Relation Age of Onset   • Other Father         Aortic aneurysm   • Cancer Brother         Lung   • Non-contributory Other        SOCIAL  HISTORY  Social History     Social History   • Marital status:      Spouse name: N/A   • Number of children: N/A   • Years of education: N/A     Social History Main Topics   • Smoking status: Former Smoker     Packs/day: 1.00     Types: Cigarettes     Quit date: 1/1/1960   • Smokeless tobacco: Never Used      Comment: very little years and years ago   • Alcohol use 0.0 oz/week      Comment: occasional   • Drug use: No   • Sexual activity: Not on file     Other Topics Concern   • Not on file     Social History Narrative   • No narrative on file       SURGICAL HISTORY  Past Surgical History:   Procedure Laterality Date   • CATARACT EXTRACTION  2006    left eye   • CHOLECYSTECTOMY  1997   • TEMPORAL ARTERY BIOPSY  1996    normal   • LUNG NEEDLE BIOPSY  1996    right lung, nodules found   • ARTHROSCOPE  1989    left knee   • ARTHROSCOPY, KNEE  1986    right   • BUNIONECTOMY  1980    bilat   • HYSTERECTOMY RADICAL  1975   • VEIN LIGATION         CURRENT MEDICATIONS  Home Medications     Reviewed by Hitesh Odell (Pharmacy Tech) on 12/20/18 at 0803  Med List Status: Complete   Medication Last Dose Status   artificial tears 1.4 % Solution 12/19/2018 Active   BIOTIN PO 12/19/2018 Active   folic acid (FOLVITE) 1 MG TABS 12/19/2018 Active   levothyroxine (SYNTHROID) 75 MCG TABS 12/20/2018 Active   metoprolol (LOPRESSOR) 25 MG Tab 12/19/2018 Active   Multiple Vitamins-Minerals (ADULT ONE DAILY GUMMIES PO) 12/19/2018 Active   polyethylene glycol/lytes (MIRALAX) Pack PRN Active   sulfamethoxazole-trimethoprim (BACTRIM DS) 800-160 MG tablet 12/19/2018 Active   warfarin (COUMADIN) 2 MG Tab 12/18/2018 Active                ALLERGIES  Allergies   Allergen Reactions   • Hydroxyzine Swelling     Eyes get itchy and swollen    • Keflex Swelling   • Naprelan [Naproxen] Swelling     Eyes get itchy become red and swollen   • Oxaprozin Swelling     Swelling eyes, and itchy   • Ampicillin Rash     Red Rash   • Erythromycin  Diarrhea     .   • Vi-Q-Tuss [Hydrocodone-Guaifenesin] Vomiting and Nausea   • Voltaren [Diclofenac Sodium] Rash and Swelling     Red rash and swelling   • Baclofen Unspecified     drowsiness   • Citalopram Vomiting and Nausea   • Clarithromycin Unspecified     Pt reports that this medication plugs her ears.    • Promethazine Unspecified     Drowsiness and sleeps       PHYSICAL EXAM  VITAL SIGNS: /70   Pulse 90   Temp 36.3 °C (97.4 °F) (Temporal)   Resp 18   Wt 46.7 kg (103 lb)   SpO2 96%   BMI 16.13 kg/m²   Constitutional: Well developed, Well nourished, mild distress, Non-toxic appearance.  Elderly somewhat frail female.  HENT: Normocephalic, Atraumatic, Bilateral external ears normal, Oropharynx moist, No oral exudates, Nose normal.   Eyes: PERRL, EOMI, Conjunctiva normal, No discharge.   Neck: Normal range of motion, No tenderness, Supple, No stridor.   Lymphatic: No lymphadenopathy noted.   Cardiovascular: Normal heart rate, Normal rhythm, No murmurs, No rubs, No gallops.   Thorax & Lungs: Normal breath sounds, No respiratory distress, No wheezing, No chest tenderness.   Abdomen: Soft, nondistended, mild suprapubic tenderness to palpation without any peritoneal signs, active bowel sounds, no palpable masses.  Skin: Warm, Dry, No erythema, No rash.   Back: No tenderness, No CVA tenderness.   Extremities: Intact distal pulses, No edema, No tenderness, No cyanosis, No clubbing.   Neurologic: Alert & oriented x 3, Normal motor function, Normal sensory function, No focal deficits noted.       RADIOLOGY/PROCEDURES  DX-CHEST-PORTABLE (1 VIEW)   Final Result      Borderline cardiomegaly.            COURSE & MEDICAL DECISION MAKING  Pertinent Labs & Imaging studies reviewed. (See chart for details)  Results for orders placed or performed during the hospital encounter of 12/20/18   LACTIC ACID   Result Value Ref Range    Lactic Acid 1.5 0.5 - 2.0 mmol/L   CBC WITH DIFFERENTIAL   Result Value Ref Range    WBC  4.5 (L) 4.8 - 10.8 K/uL    RBC 4.68 4.20 - 5.40 M/uL    Hemoglobin 13.7 12.0 - 16.0 g/dL    Hematocrit 42.2 37.0 - 47.0 %    MCV 90.2 81.4 - 97.8 fL    MCH 29.3 27.0 - 33.0 pg    MCHC 32.5 (L) 33.6 - 35.0 g/dL    RDW 44.2 35.9 - 50.0 fL    Platelet Count 268 164 - 446 K/uL    MPV 9.5 9.0 - 12.9 fL    Neutrophils-Polys 73.00 (H) 44.00 - 72.00 %    Lymphocytes 18.30 (L) 22.00 - 41.00 %    Monocytes 6.20 0.00 - 13.40 %    Eosinophils 1.10 0.00 - 6.90 %    Basophils 0.70 0.00 - 1.80 %    Immature Granulocytes 0.70 0.00 - 0.90 %    Nucleated RBC 0.00 /100 WBC    Neutrophils (Absolute) 3.32 2.00 - 7.15 K/uL    Lymphs (Absolute) 0.83 (L) 1.00 - 4.80 K/uL    Monos (Absolute) 0.28 0.00 - 0.85 K/uL    Eos (Absolute) 0.05 0.00 - 0.51 K/uL    Baso (Absolute) 0.03 0.00 - 0.12 K/uL    Immature Granulocytes (abs) 0.03 0.00 - 0.11 K/uL    NRBC (Absolute) 0.00 K/uL   COMP METABOLIC PANEL   Result Value Ref Range    Sodium 133 (L) 135 - 145 mmol/L    Potassium 3.8 3.6 - 5.5 mmol/L    Chloride 99 96 - 112 mmol/L    Co2 26 20 - 33 mmol/L    Anion Gap 8.0 0.0 - 11.9    Glucose 97 65 - 99 mg/dL    Bun 37 (H) 8 - 22 mg/dL    Creatinine 2.25 (H) 0.50 - 1.40 mg/dL    Calcium 8.9 8.4 - 10.2 mg/dL    AST(SGOT) 28 12 - 45 U/L    ALT(SGPT) 28 2 - 50 U/L    Alkaline Phosphatase 82 30 - 99 U/L    Total Bilirubin 0.3 0.1 - 1.5 mg/dL    Albumin 3.3 3.2 - 4.9 g/dL    Total Protein 7.6 6.0 - 8.2 g/dL    Globulin 4.3 (H) 1.9 - 3.5 g/dL    A-G Ratio 0.8 g/dL   URINALYSIS   Result Value Ref Range    Color Yellow     Character Clear     Specific Gravity <=1.005 <1.035    Ph 6.5 5.0 - 8.0    Glucose Negative Negative mg/dL    Ketones Negative Negative mg/dL    Protein Negative Negative mg/dL    Bilirubin Negative Negative    Nitrite Negative Negative    Leukocyte Esterase Moderate (A) Negative    Occult Blood Large (A) Negative    Micro Urine Req Microscopic    Prothrombin Time   Result Value Ref Range    PT 57.9 (HH) 12.0 - 14.6 sec    INR 6.80 (HH) 0.87  - 1.13   PTT   Result Value Ref Range    APTT 76.1 (H) 24.7 - 36.0 sec   ESTIMATED GFR   Result Value Ref Range    GFR If African American 25 (A) >60 mL/min/1.73 m 2    GFR If Non African American 21 (A) >60 mL/min/1.73 m 2   URINE MICROSCOPIC (W/UA)   Result Value Ref Range    WBC  (A) /hpf    RBC  (A) /hpf    Bacteria Many (A) None /hpf    Epithelial Cells Few Few /hpf     The patient presents today with general malaise.  She was recently treated for urinary tract infection.  I suspect failure of outpatient therapy.    An IV is established.  Laboratory studies are obtained.  Urinalysis is obtained.  Laboratory studies remarkable for elevation in the creatinine consistent with acute renal failure/acute kidney injury.  INR is elevated at 6.8 although improved from prior value at the anticoagulation clinic.  Likely related to medication interaction between Bactrim and warfarin.  Urinalysis is remarkable for findings consistent with ongoing and section.   WBCs.    Antibiotic selection is somewhat challenging in this patient due to her aunt profile including allergies to cephalosporins and penicillin.  Therefore I have elected to treat the patient with ciprofloxacin.  An INR will be need to be monitored and warfarin will likely need to be held.    The patient will be admitted to the hospital for further evaluation and treatment.  I spoke with the on-call hospitalist Dr. Laguna for admission.      FINAL IMPRESSION  1. Acute UTI    2. DIAZ (acute kidney injury) (HCC)    3. Supratherapeutic INR    4. Leukopenia, unspecified type            Electronically signed by: Pacheco Vaughn, 12/20/2018 9:28 AM

## 2018-12-20 NOTE — ASSESSMENT & PLAN NOTE
Likely from low p.o. Intake plus interaction with Bactrim.  No evidence of active bleeding aside from mild hematuria, heme globin is stable  -Hold Coumadin  -Trend INR  -Vitamin K if any evidence of bleed

## 2018-12-21 LAB
ANION GAP SERPL CALC-SCNC: 7 MMOL/L (ref 0–11.9)
BASOPHILS # BLD AUTO: 0.7 % (ref 0–1.8)
BASOPHILS # BLD: 0.04 K/UL (ref 0–0.12)
BUN SERPL-MCNC: 33 MG/DL (ref 8–22)
CALCIUM SERPL-MCNC: 8.6 MG/DL (ref 8.4–10.2)
CHLORIDE SERPL-SCNC: 107 MMOL/L (ref 96–112)
CO2 SERPL-SCNC: 24 MMOL/L (ref 20–33)
CREAT SERPL-MCNC: 1.83 MG/DL (ref 0.5–1.4)
EOSINOPHIL # BLD AUTO: 0.12 K/UL (ref 0–0.51)
EOSINOPHIL NFR BLD: 2.1 % (ref 0–6.9)
ERYTHROCYTE [DISTWIDTH] IN BLOOD BY AUTOMATED COUNT: 45.1 FL (ref 35.9–50)
GLUCOSE SERPL-MCNC: 81 MG/DL (ref 65–99)
HCT VFR BLD AUTO: 36.9 % (ref 37–47)
HGB BLD-MCNC: 11.8 G/DL (ref 12–16)
IMM GRANULOCYTES # BLD AUTO: 0.03 K/UL (ref 0–0.11)
IMM GRANULOCYTES NFR BLD AUTO: 0.5 % (ref 0–0.9)
INR PPP: 6.5 (ref 0.87–1.13)
LYMPHOCYTES # BLD AUTO: 1.34 K/UL (ref 1–4.8)
LYMPHOCYTES NFR BLD: 23.4 % (ref 22–41)
MCH RBC QN AUTO: 29.2 PG (ref 27–33)
MCHC RBC AUTO-ENTMCNC: 32 G/DL (ref 33.6–35)
MCV RBC AUTO: 91.3 FL (ref 81.4–97.8)
MONOCYTES # BLD AUTO: 0.49 K/UL (ref 0–0.85)
MONOCYTES NFR BLD AUTO: 8.6 % (ref 0–13.4)
NEUTROPHILS # BLD AUTO: 3.71 K/UL (ref 2–7.15)
NEUTROPHILS NFR BLD: 64.7 % (ref 44–72)
NRBC # BLD AUTO: 0 K/UL
NRBC BLD-RTO: 0 /100 WBC
PLATELET # BLD AUTO: 233 K/UL (ref 164–446)
PMV BLD AUTO: 9.7 FL (ref 9–12.9)
POTASSIUM SERPL-SCNC: 3.9 MMOL/L (ref 3.6–5.5)
PROTHROMBIN TIME: 55.9 SEC (ref 12–14.6)
RBC # BLD AUTO: 4.04 M/UL (ref 4.2–5.4)
SODIUM SERPL-SCNC: 138 MMOL/L (ref 135–145)
WBC # BLD AUTO: 5.7 K/UL (ref 4.8–10.8)

## 2018-12-21 PROCEDURE — 80048 BASIC METABOLIC PNL TOTAL CA: CPT

## 2018-12-21 PROCEDURE — A9270 NON-COVERED ITEM OR SERVICE: HCPCS | Performed by: INTERNAL MEDICINE

## 2018-12-21 PROCEDURE — 700105 HCHG RX REV CODE 258: Performed by: INTERNAL MEDICINE

## 2018-12-21 PROCEDURE — 770006 HCHG ROOM/CARE - MED/SURG/GYN SEMI*

## 2018-12-21 PROCEDURE — 85025 COMPLETE CBC W/AUTO DIFF WBC: CPT

## 2018-12-21 PROCEDURE — 99232 SBSQ HOSP IP/OBS MODERATE 35: CPT | Performed by: INTERNAL MEDICINE

## 2018-12-21 PROCEDURE — 85610 PROTHROMBIN TIME: CPT

## 2018-12-21 PROCEDURE — 700102 HCHG RX REV CODE 250 W/ 637 OVERRIDE(OP): Performed by: INTERNAL MEDICINE

## 2018-12-21 PROCEDURE — 700111 HCHG RX REV CODE 636 W/ 250 OVERRIDE (IP): Performed by: INTERNAL MEDICINE

## 2018-12-21 RX ADMIN — STANDARDIZED SENNA CONCENTRATE AND DOCUSATE SODIUM 2 TABLET: 8.6; 5 TABLET, FILM COATED ORAL at 06:37

## 2018-12-21 RX ADMIN — SODIUM CHLORIDE: 9 INJECTION, SOLUTION INTRAVENOUS at 01:33

## 2018-12-21 RX ADMIN — LEVOTHYROXINE SODIUM 75 MCG: 50 TABLET ORAL at 06:37

## 2018-12-21 RX ADMIN — SODIUM CHLORIDE: 9 INJECTION, SOLUTION INTRAVENOUS at 15:37

## 2018-12-21 RX ADMIN — METOPROLOL TARTRATE 25 MG: 25 TABLET ORAL at 16:39

## 2018-12-21 RX ADMIN — CIPROFLOXACIN 200 MG: 2 INJECTION, SOLUTION INTRAVENOUS at 06:34

## 2018-12-21 ASSESSMENT — ENCOUNTER SYMPTOMS
DIZZINESS: 0
BACK PAIN: 0
NAUSEA: 0
DEPRESSION: 0
HALLUCINATIONS: 0
HEARTBURN: 0
BLOOD IN STOOL: 0
SORE THROAT: 0
HEADACHES: 0
WEAKNESS: 1
MYALGIAS: 0
CHILLS: 0
PALPITATIONS: 0
SHORTNESS OF BREATH: 0
COUGH: 0
FEVER: 0
FOCAL WEAKNESS: 0
DIARRHEA: 0
VOMITING: 0
ABDOMINAL PAIN: 0

## 2018-12-21 ASSESSMENT — PAIN SCALES - GENERAL
PAINLEVEL_OUTOF10: 0
PAINLEVEL_OUTOF10: 0

## 2018-12-21 NOTE — CARE PLAN
Problem: Safety  Goal: Will remain free from falls  Outcome: PROGRESSING AS EXPECTED  Reinforced fall risk precautions. Non-skid socks on feet, call light in reach. 1 person assist to the bathroom or BSC plus walker. Patient uses call light appropriately for help.     Problem: Infection  Goal: Will remain free from infection  Outcome: PROGRESSING AS EXPECTED  Good hand and oral hygiene promoted. Afebrile, WBCs 5.7, standard precaution in place. No c/o dysuria. Perform hand washing. Administer IV ABX as prescribed (q24hrs).    Problem: Bowel/Gastric:  Goal: Normal bowel function is maintained or improved  Outcome: PROGRESSING AS EXPECTED  BM x3 today.

## 2018-12-21 NOTE — PROGRESS NOTES
Patient up to bathroom with walker and 1 assist. Patient calls for assistance. Tolerated well.   Up to BSC x2, soft/loose stool after Senna given overnight and this AM.

## 2018-12-21 NOTE — PROGRESS NOTES
Telemetry:    Rate: 60-80s  Rhythm: Aflutter  Ectopy: Occasional to rare PVCs    Measurement:  --/0.6/0.34

## 2018-12-21 NOTE — PROGRESS NOTES
Report given to EVELIO Llanos at 1335. Patient resting comfortably in bed. Declines any needs at this time. Call light in reach. Family at bedside.

## 2018-12-21 NOTE — CARE PLAN
Problem: Nutritional:  Goal: Achieve adequate nutritional intake  Patient will consume >/= 50% of meals/supplements consistently  Outcome: PROGRESSING AS EXPECTED

## 2018-12-21 NOTE — PROGRESS NOTES
Hospital Medicine Daily Progress Note    Date of Service  12/21/2018    Chief Complaint  84 y.o. female admitted 12/20/2018 with weakness    Hospital Course    84-year-old female with a history of atrial fibrillation admitted for generalized weakness found to have a UTI and acute kidney injury.  She has been started on IV antibiotic therapy and IV fluids and her creatinine is improving.  Urine cultures are pending      Interval Problem Update  12/21: Urine cultures are pending, urinary hesitancy has improved.  Creatinine is down trending, tolerating antibiotics    Consultants/Specialty  None    Code Status  Full    Disposition  We will go home, patient is declining home health at this time    Review of Systems  Review of Systems   Constitutional: Positive for malaise/fatigue. Negative for chills and fever.   HENT: Negative for sore throat.    Respiratory: Negative for cough and shortness of breath.    Cardiovascular: Negative for chest pain and palpitations.   Gastrointestinal: Negative for abdominal pain, blood in stool, diarrhea, heartburn, nausea and vomiting.   Genitourinary: Negative for dysuria and frequency (Improved).   Musculoskeletal: Negative for back pain and myalgias.   Neurological: Positive for weakness. Negative for dizziness, focal weakness and headaches.   Psychiatric/Behavioral: Negative for depression and hallucinations.   All other systems reviewed and are negative.       Physical Exam  Temp:  [36.3 °C (97.4 °F)-36.7 °C (98.1 °F)] 36.6 °C (97.9 °F)  Pulse:  [79-96] 90  Resp:  [18-20] 18  BP: ()/(56-77) 109/70    Physical Exam   Constitutional: She is oriented to person, place, and time.   Thin, elderly   HENT:   Mouth/Throat: No oropharyngeal exudate.   Eyes: No scleral icterus.   Neck: No JVD present. No thyromegaly present.   Cardiovascular: Normal rate.    No murmur heard.  Pulmonary/Chest: Effort normal. No respiratory distress. She has no wheezes.   Abdominal: Soft. Bowel sounds are  normal. She exhibits no distension. There is no tenderness.   Musculoskeletal: Normal range of motion. She exhibits no edema or tenderness.   Neurological: She is alert and oriented to person, place, and time. No cranial nerve deficit. Coordination normal.   Skin: Skin is warm and dry. No rash noted. No erythema.   Psychiatric: She has a normal mood and affect. Her behavior is normal.       Fluids    Intake/Output Summary (Last 24 hours) at 12/21/18 1429  Last data filed at 12/21/18 1400   Gross per 24 hour   Intake             2305 ml   Output             1425 ml   Net              880 ml       Laboratory  Recent Labs      12/20/18   0747  12/21/18   0431   WBC  4.5*  5.7   RBC  4.68  4.04*   HEMOGLOBIN  13.7  11.8*   HEMATOCRIT  42.2  36.9*   MCV  90.2  91.3   MCH  29.3  29.2   MCHC  32.5*  32.0*   RDW  44.2  45.1   PLATELETCT  268  233   MPV  9.5  9.7     Recent Labs      12/20/18   0747  12/21/18   0431   SODIUM  133*  138   POTASSIUM  3.8  3.9   CHLORIDE  99  107   CO2  26  24   GLUCOSE  97  81   BUN  37*  33*   CREATININE  2.25*  1.83*   CALCIUM  8.9  8.6     Recent Labs     12/19/18 12/20/18   0747  12/21/18   0431   APTT   --   76.1*   --    INR  >8.0  6.80*  6.50*               Imaging  US-RENAL   Final Result      No evidence of hydronephrosis or solid renal mass.      DX-CHEST-PORTABLE (1 VIEW)   Final Result      Borderline cardiomegaly.           Assessment/Plan  * UTI (urinary tract infection)   Assessment & Plan    She has a history of UTI, has been having urinary frequency and hesitancy.  -She failed outpatient antibiotics with Bactrim  -IV ciprofloxacin  -Follow urine cultures     Arterial ischemic stroke, MCA (middle cerebral artery), left, acute (HCC)- (present on admission)   Assessment & Plan    She has residual right-sided deficits with right arm weakness, right leg weakness.  Uses walker for ambulation  -Resume coumadin once INR returns to therapeutic range     Supratherapeutic INR    Assessment & Plan    Likely from low p.o. Intake plus interaction with Bactrim.  No evidence of active bleeding aside from mild hematuria, heme globin is stable  -Hold Coumadin  -Trend INR  -Vitamin K if any evidence of bleed     Sepsis ([facility])   Assessment & Plan    This is severe sepsis with the following associated acute organ dysfunction(s): acute kidney failure.   Source is UTI  IV fluids, trend lactate, follow cultures  Cipro     Severe protein-calorie malnutrition (Mahmood: less than 60% of standard weight) ([facility])   Assessment & Plan    BMI is 16.  -Nutrition consult for supplements     DIAZ (acute kidney injury) ([facility])   Assessment & Plan    Previous creatinine from years ago was normal.  She does have a history of Wegener's however no known history of chronic kidney disease.  I suspect due to volume depletion and UTI, in addition to Bactrim therapy  -She does report decreased urination, renal ultrasound is normal  -Creatinine has improved but not yet normalized  -Normal saline  -Monitor Cr Daily  -Watch UOP  -Hold nephrotoxins       Hyponatremia   Assessment & Plan    Mild, likely due to volume depletion in the setting of UTI.  May be due to low p.o. intake, she is very thin  -Gentle normal saline  -Levels have improved  -Repeat in the morning     Atrial flutter ([facility])- (present on admission)   Assessment & Plan    Rate is controlled on metoprolol.  She is followed by cardiology as an outpatient.  -INR levels are supratherapeutic, hold Coumadin for now  -Trend INR  -No evidence of active bleeding therefore will not reverse INR  -Continue metoprolol     Chronic fatigue- (present on admission)   Assessment & Plan    Patient states energy levels are at baseline  Check TSH  She declines home health at this time     Hypothyroid- (present on admission)   Assessment & Plan    Check TSH  Continue synthroid     Wegener's granulomatosis (HCC)- (present on admission)   Assessment & Plan    She is not on any medications for  this, reduced renal function may be related to this          VTE prophylaxis: Coumadin, INR is supra therapeutic

## 2018-12-21 NOTE — CARE PLAN
Problem: Infection  Goal: Will remain free from infection  Outcome: PROGRESSING AS EXPECTED  No new signs or symptoms of infection at this time.     Problem: Pain Management  Goal: Pain level will decrease to patient's comfort goal  Outcome: PROGRESSING AS EXPECTED  Patient denies pain.

## 2018-12-21 NOTE — DIETARY
"Nutrition services: Day 1 of admit. Az Jolly is a 84 y.o. female with admitting DX of UTI.    Consult received for supplements.  Pt visited in room to discuss adding supplements. She drinks Ensure ('high protein version') at [facility] where she lives. PO intake 50-75% x2 so far and her appetite is good. She ordered lunch today from the room service line and is familiar with how to change her meal preferences. Pt reports being stable around 98# for a awhile now. She reports not liking the food at the facility where she lives, which has made it difficult to maintain her weight. Discussed smaller, more frequent meals with supplements to encourage pt to consume more kcal/protein throughout the day. Supplements to be added BID between meals to be more like home routine. She declines to change portions sizes at this time. RD following.    Assessment:  Height: 170.2 cm (5' 7\")  Weight: 44.5 kg (98 lb 1.7 oz)  Body mass index is 15.37 kg/m² - underweight  Diet/Intake: Regular diet, PO intake 50-75% x2 meals    Evaluation:   1. Labs: BUN 33, Cr 1.83  2. Meds: Cipro, Miralax, Pericolace, (PRN: Zofran)  3. Fluids: NS @ 75mL/hr  4. Skin: Intact - no skin breakdown noted  5. GI: BM x4 today with bowel meds (last BM was 12/17 prior to this)  6. I/O's: +140mL x24 hours    Malnutrition Risk: At risk, however not enough criteria to diagnose at this time.    Recommendations/Plan:  1. Continue current diet. Boost Plus BID between meals (chocolate flavor) to be more like home routine/encourage wt gain.  2. Encourage intake of meals/supplements  3. Document intake of all meals/supplements  as % taken in ADL's to provide interdisciplinary communication across all shifts.   4. Monitor weight.  5. Nutrition rep will continue to see patient for ongoing meal and snack preferences.     RD following.        "

## 2018-12-21 NOTE — PROGRESS NOTES
Report received from EVELIO Abdalla. Patient resting comfortably in bed. Declines any needs at this time. Call light in reach.

## 2018-12-22 ENCOUNTER — PATIENT OUTREACH (OUTPATIENT)
Dept: HEALTH INFORMATION MANAGEMENT | Facility: OTHER | Age: 83
End: 2018-12-22

## 2018-12-22 VITALS
DIASTOLIC BLOOD PRESSURE: 52 MMHG | OXYGEN SATURATION: 98 % | BODY MASS INDEX: 15.4 KG/M2 | SYSTOLIC BLOOD PRESSURE: 110 MMHG | HEART RATE: 84 BPM | HEIGHT: 67 IN | WEIGHT: 98.11 LBS | TEMPERATURE: 98.4 F | RESPIRATION RATE: 18 BRPM

## 2018-12-22 LAB
ALBUMIN SERPL BCP-MCNC: 3 G/DL (ref 3.2–4.9)
BACTERIA UR CULT: NORMAL
BASOPHILS # BLD AUTO: 0.7 % (ref 0–1.8)
BASOPHILS # BLD: 0.04 K/UL (ref 0–0.12)
BUN SERPL-MCNC: 25 MG/DL (ref 8–22)
CALCIUM SERPL-MCNC: 8.5 MG/DL (ref 8.4–10.2)
CHLORIDE SERPL-SCNC: 107 MMOL/L (ref 96–112)
CO2 SERPL-SCNC: 23 MMOL/L (ref 20–33)
CREAT SERPL-MCNC: 1.69 MG/DL (ref 0.5–1.4)
EOSINOPHIL # BLD AUTO: 0.16 K/UL (ref 0–0.51)
EOSINOPHIL NFR BLD: 2.6 % (ref 0–6.9)
ERYTHROCYTE [DISTWIDTH] IN BLOOD BY AUTOMATED COUNT: 46.1 FL (ref 35.9–50)
GLUCOSE SERPL-MCNC: 88 MG/DL (ref 65–99)
HCT VFR BLD AUTO: 38 % (ref 37–47)
HGB BLD-MCNC: 12.2 G/DL (ref 12–16)
IMM GRANULOCYTES # BLD AUTO: 0.03 K/UL (ref 0–0.11)
IMM GRANULOCYTES NFR BLD AUTO: 0.5 % (ref 0–0.9)
LYMPHOCYTES # BLD AUTO: 1.54 K/UL (ref 1–4.8)
LYMPHOCYTES NFR BLD: 25 % (ref 22–41)
MCH RBC QN AUTO: 29.7 PG (ref 27–33)
MCHC RBC AUTO-ENTMCNC: 32.1 G/DL (ref 33.6–35)
MCV RBC AUTO: 92.5 FL (ref 81.4–97.8)
MONOCYTES # BLD AUTO: 0.64 K/UL (ref 0–0.85)
MONOCYTES NFR BLD AUTO: 10.4 % (ref 0–13.4)
NEUTROPHILS # BLD AUTO: 3.74 K/UL (ref 2–7.15)
NEUTROPHILS NFR BLD: 60.8 % (ref 44–72)
NRBC # BLD AUTO: 0 K/UL
NRBC BLD-RTO: 0 /100 WBC
PHOSPHATE SERPL-MCNC: 2.7 MG/DL (ref 2.5–4.5)
PLATELET # BLD AUTO: 224 K/UL (ref 164–446)
PMV BLD AUTO: 9.6 FL (ref 9–12.9)
POTASSIUM SERPL-SCNC: 4.2 MMOL/L (ref 3.6–5.5)
RBC # BLD AUTO: 4.11 M/UL (ref 4.2–5.4)
SIGNIFICANT IND 70042: NORMAL
SITE SITE: NORMAL
SODIUM SERPL-SCNC: 137 MMOL/L (ref 135–145)
SOURCE SOURCE: NORMAL
WBC # BLD AUTO: 6.2 K/UL (ref 4.8–10.8)

## 2018-12-22 PROCEDURE — 99239 HOSP IP/OBS DSCHRG MGMT >30: CPT | Performed by: INTERNAL MEDICINE

## 2018-12-22 PROCEDURE — 97161 PT EVAL LOW COMPLEX 20 MIN: CPT

## 2018-12-22 PROCEDURE — 80069 RENAL FUNCTION PANEL: CPT

## 2018-12-22 PROCEDURE — G8978 MOBILITY CURRENT STATUS: HCPCS | Mod: CJ

## 2018-12-22 PROCEDURE — 700102 HCHG RX REV CODE 250 W/ 637 OVERRIDE(OP): Performed by: INTERNAL MEDICINE

## 2018-12-22 PROCEDURE — G8979 MOBILITY GOAL STATUS: HCPCS | Mod: CJ

## 2018-12-22 PROCEDURE — 85025 COMPLETE CBC W/AUTO DIFF WBC: CPT

## 2018-12-22 PROCEDURE — 700111 HCHG RX REV CODE 636 W/ 250 OVERRIDE (IP): Performed by: INTERNAL MEDICINE

## 2018-12-22 PROCEDURE — A9270 NON-COVERED ITEM OR SERVICE: HCPCS | Performed by: INTERNAL MEDICINE

## 2018-12-22 PROCEDURE — G8980 MOBILITY D/C STATUS: HCPCS | Mod: CJ

## 2018-12-22 RX ORDER — WARFARIN SODIUM 2 MG/1
1 TABLET ORAL DAILY
Qty: 30 TAB | Refills: 3 | Status: ON HOLD | OUTPATIENT
Start: 2018-12-24 | End: 2018-12-28

## 2018-12-22 RX ORDER — CIPROFLOXACIN 500 MG/1
500 TABLET, FILM COATED ORAL DAILY
Qty: 4 TAB | Refills: 0 | Status: ON HOLD | OUTPATIENT
Start: 2018-12-22 | End: 2018-12-28

## 2018-12-22 RX ADMIN — ACETAMINOPHEN 650 MG: 325 TABLET, FILM COATED ORAL at 08:57

## 2018-12-22 RX ADMIN — LEVOTHYROXINE SODIUM 75 MCG: 50 TABLET ORAL at 05:32

## 2018-12-22 RX ADMIN — CIPROFLOXACIN 200 MG: 2 INJECTION, SOLUTION INTRAVENOUS at 05:33

## 2018-12-22 ASSESSMENT — GAIT ASSESSMENTS
ASSISTIVE DEVICE: FRONT WHEEL WALKER
DEVIATION: DECREASED HEEL STRIKE;DECREASED TOE OFF
DISTANCE (FEET): 225
GAIT LEVEL OF ASSIST: STAND BY ASSIST

## 2018-12-22 ASSESSMENT — PAIN SCALES - GENERAL: PAINLEVEL_OUTOF10: 1

## 2018-12-22 NOTE — THERAPY
"Physical Therapy Evaluation completed.   Bed Mobility:  Supine to Sit: Modified Independent  Transfers: Sit to Stand: Stand by Assist  Gait: Level Of Assist: Stand by Assist with front wheeled walker x 225 ft      Plan of Care: Patient with no further skilled PT needs in the acute care setting at this time  Discharge Recommendations: Equipment: No Equipment Needed. Post-acute therapy Currently anticipate no further skilled therapy needs once patient is discharged from the inpatient setting.    See \"Rehab Therapy-Acute\" Patient Summary Report for complete documentation.   Pt is an 84 y.o.f. who presented to hospital with impaired gait with dx UTI.  Pt has a hx of old CVA with weakness on right.  Pt lives in an indep living facility with her .  She ambulates with 4ww throughout the facility.  Pt is alert and oriented.  She presents with good LE strength with neutral DF bilat secondary to HC tightness.  The pt  demonstrated good sitting balance donning her shoes and socks.  Pt demonstrated safe techique ambulating with a fww on level surfaces.  The pt had no LOB with gait.  Pt appears to be at her PLOF. Pt indicated she feels she is at her PLOF, but she walks faster with her 4ww because the wheels work better.  No further skilled PT needs in acute care setting.  "

## 2018-12-22 NOTE — PROGRESS NOTES
Report received from EVELIO Lomax. Patient resting comfortably in bed. Declines any needs at this time. Call light in reach. Bed alarm on.

## 2018-12-22 NOTE — PROGRESS NOTES
Received report from day shift RN Romi. Plan of care discussed at bedside. Patient resting comfortably in bed at this time with no complaints. Safety precautions and hourly rounding in place.

## 2018-12-22 NOTE — PROGRESS NOTES
Telemetry Shift Summary    Rhythm afib/aflutter  HR Range 80s  Ectopy Occasional PVC  Measurements -/0.08/-        Normal Values  Rhythm SR  HR Range    Measurements 0.12-0.20 / 0.06-0.10  / 0.30-0.52

## 2018-12-22 NOTE — CARE PLAN
Problem: Safety  Goal: Will remain free from falls  Outcome: PROGRESSING AS EXPECTED  Reinforced fall risk precautions. Bed alarm on, Non-skid socks on feet, call light in reach. 1 person SBA to the bathroom/chair.      Problem: Infection  Goal: Will remain free from infection  Outcome: PROGRESSING AS EXPECTED  Good hand and oral hygiene promoted. Afebrile, WBCs: WNL, no dysuria. Standard precaution in place. Perform hand washing. Administer ABX as prescribed.    Problem: Pain Management  Goal: Pain level will decrease to patient's comfort goal  Outcome: PROGRESSING AS EXPECTED  Mild pain in groin in AM, resolved with tylenol.     Problem: Fluid Volume:  Goal: Will maintain balanced intake and output  Outcome: PROGRESSING AS EXPECTED  Monitoring intake and output. Good urine out put this AM>

## 2018-12-22 NOTE — PROGRESS NOTES
Discharge order written. IV removed, patient tolerated well. Family at bedside. Belongings gathered. Pt states that all personal belongings are in possession. AVS printed, reviewed and copy signed and placed on the chart. Patient has no further questions. Prescriptions sent to pharmacy. Discharged in satisfactory condition home with  and family. Pt off unit via wheelchair, escorted by TERRY Smith.

## 2018-12-22 NOTE — CARE PLAN
Problem: Communication  Goal: The ability to communicate needs accurately and effectively will improve  Outcome: PROGRESSING AS EXPECTED    Intervention: [place] patient and significant other/support system to call light to alert staff of needs  Patient oriented to call light system and all relevant hospital policies. Call light within reach and used appropriately when in need of assistance.        Problem: Infection  Goal: Will remain free from infection  Outcome: PROGRESSING AS EXPECTED    Intervention: Implement standard precautions and perform hand washing before and after patient contact  Standard precautions and hand hygiene practices implemented before and after patient room entry. Gloves worn during times of patient contact.  IV abx in place.

## 2018-12-22 NOTE — DISCHARGE SUMMARY
Discharge Summary    CHIEF COMPLAINT ON ADMISSION  Chief Complaint   Patient presents with   • UTI     Pt seen here on monday for same complaint, pt was prescribed bactrim. Pt back in ER today, she stated she feels she has gotten no relief. Pt reports abdominal pain 5/10.       Reason for Admission  UTI     Admission Date  12/20/2018    CODE STATUS  Full Code    HPI & HOSPITAL COURSE    84-year-old female with a history of atrial fibrillation admitted for generalized weakness found to have a UTI and acute kidney injury.  She was started on IV antibiotic therapy and IV fluids and her creatinine slowly trended down.  She tolerated IV ceftriaxone therapy however urine cultures were unrevealing.  She dramatically improved with IV hydration and I believe a large part of her problem was dehydration.  Her INR was noted to be supratherapeutic as there is a known interaction with Bactrim which she had been taking as an outpatient prior to being admitted.  Her Coumadin was held and she had no evidence of bleeding.  She was told to hold her Coumadin 2 more days to be resumed on 12/24.  She will follow-up with the INR clinic on 12/28    She was discharged to complete 4 additional days of diuretic therapy.  She did work with physical therapy recommend home health however the patient declined this.    Therefore, she is discharged in fair and stable condition to home with close outpatient follow-up.    The patient met 2-midnight criteria for an inpatient stay at the time of discharge.    Discharge Date  12/22/2018    FOLLOW UP ITEMS POST DISCHARGE  Follow-up with primary care physician within 1 week  Follow-up with INR clinic on 12/28    DISCHARGE DIAGNOSES  Principal Problem:    UTI (urinary tract infection) POA: Unknown  Active Problems:    Arterial ischemic stroke, MCA (middle cerebral artery), left, acute (HCC) POA: Yes    Wegener's granulomatosis (HCC) POA: Yes    Hypothyroid POA: Yes    Stroke (HCC) (Chronic) POA: Yes     Chronic fatigue POA: Yes    Atrial flutter (HCC) POA: Yes    Hyponatremia POA: Unknown    DIAZ (acute kidney injury) (HCC) POA: Unknown    Severe protein-calorie malnutrition (Mahmood: less than 60% of standard weight) (HCC) POA: Unknown    Sepsis (HCC) POA: Unknown    Supratherapeutic INR POA: Unknown  Resolved Problems:    * No resolved hospital problems. *      FOLLOW UP  Future Appointments  Date Time Provider Department Center   12/28/2018 11:15 AM [facility] EXAM 4 [facility] None   3/13/2019 1:00 PM Hussain Nation M.D. [facility] None     Toribio Schmidt M.D.  [address] [place] [id number] [phone]      PCP OFFICE CLOSED FOR THE WEEKEND. PLEASE CALL PCP OFFICE TO ARRANGE YOUR FOLLOW UP APPOINTMENT. THANK YOU      MEDICATIONS ON DISCHARGE     Medication List      START taking these medications      Instructions   ciprofloxacin 500 MG Tabs  Commonly known as:  CIPRO   Take 1 Tab by mouth every day for 4 days.  Dose:  500 mg        CHANGE how you take these medications      Instructions   warfarin 2 MG Tabs  Start taking on:  12/24/2018  What changed:  These instructions start on 12/24/2018. If you are unsure what to do until then, ask your doctor or other care provider.  Commonly known as:  COUMADIN   Take 0.5 Tabs by mouth every day.  Dose:  1 mg        CONTINUE taking these medications      Instructions   ADULT ONE DAILY GUMMIES PO   Take 2 Tabs by mouth every day.  Dose:  2 Tab     artificial tears 1.4 % Soln   Place 1 Drop in both eyes as needed (For Dry eye).  Dose:  1 Drop     folic acid 1 MG Tabs  Commonly known as:  FOLVITE   Take 1 mg by mouth every day.  Dose:  1 mg     levothyroxine 75 MCG Tabs  Commonly known as:  SYNTHROID   Take 75 mcg by mouth every morning before breakfast.  Dose:  75 mcg     metoprolol 25 MG Tabs  Commonly known as:  LOPRESSOR   Take 1 Tab by mouth 2 times a day.  Dose:  25 mg     polyethylene glycol/lytes Pack  Commonly known as:  MIRALAX   Take 17 g by mouth as  needed.  Dose:  17 g        STOP taking these medications    BIOTIN PO     sulfamethoxazole-trimethoprim 800-160 MG tablet  Commonly known as:  BACTRIM DS            Allergies  Allergies   Allergen Reactions   • Hydroxyzine Swelling     Eyes get itchy and swollen    • Keflex Swelling   • Naprelan [Naproxen] Swelling     Eyes get itchy become red and swollen   • Oxaprozin Swelling     Swelling eyes, and itchy   • Ampicillin Rash     Red Rash   • Erythromycin Diarrhea     .   • Vi-Q-Tuss [Hydrocodone-Guaifenesin] Vomiting and Nausea   • Voltaren [Diclofenac Sodium] Rash and Swelling     Red rash and swelling   • Baclofen Unspecified     drowsiness   • Citalopram Vomiting and Nausea   • Clarithromycin Unspecified     Pt reports that this medication plugs her ears.    • Promethazine Unspecified     Drowsiness and sleeps       DIET  Orders Placed This Encounter   Procedures   • Diet Order Regular     Standing Status:   Standing     Number of Occurrences:   1     Order Specific Question:   Diet:     Answer:   Regular [1]       ACTIVITY  As tolerated.  Weight bearing as tolerated    CONSULTATIONS  None    PROCEDURES  None    LABORATORY  Lab Results   Component Value Date    SODIUM 137 12/22/2018    POTASSIUM 4.2 12/22/2018    CHLORIDE 107 12/22/2018    CO2 23 12/22/2018    GLUCOSE 88 12/22/2018    BUN 25 (H) 12/22/2018    CREATININE 1.69 (H) 12/22/2018    CREATININE 1.5 (H) 02/19/2009        Lab Results   Component Value Date    WBC 6.2 12/22/2018    HEMOGLOBIN 12.2 12/22/2018    HEMATOCRIT 38.0 12/22/2018    PLATELETCT 224 12/22/2018        Total time of the discharge process exceeds 37 minutes.

## 2018-12-22 NOTE — PROGRESS NOTES
Assessment completed. Patient fatigued, wishing to sleep. No complaints of pain. Tucked into bed after using commode, call light placed within reach.

## 2018-12-23 NOTE — DISCHARGE INSTRUCTIONS
Urinary Tract Infection, Adult  A urinary tract infection (UTI) is an infection of any part of the urinary tract, which includes the kidneys, ureters, bladder, and urethra. These organs make, store, and get rid of urine in the body. UTI can be a bladder infection (cystitis) or kidney infection (pyelonephritis).  What are the causes?  This infection may be caused by fungi, viruses, or bacteria. Bacteria are the most common cause of UTIs. This condition can also be caused by repeated incomplete emptying of the bladder during urination.  What increases the risk?  This condition is more likely to develop if:  · You ignore your need to urinate or hold urine for long periods of time.  · You do not empty your bladder completely during urination.  · You wipe back to front after urinating or having a bowel movement, if you are female.  · You are uncircumcised, if you are male.  · You are constipated.  · You have a urinary catheter that stays in place (indwelling).  · You have a weak defense (immune) system.  · You have a medical condition that affects your bowels, kidneys, or bladder.  · You have diabetes.  · You take antibiotic medicines frequently or for long periods of time, and the antibiotics no longer work well against certain types of infections (antibiotic resistance).  · You take medicines that irritate your urinary tract.  · You are exposed to chemicals that irritate your urinary tract.  · You are female.  What are the signs or symptoms?  Symptoms of this condition include:  · Fever.  · Frequent urination or passing small amounts of urine frequently.  · Needing to urinate urgently.  · Pain or burning with urination.  · Urine that smells bad or unusual.  · Cloudy urine.  · Pain in the lower abdomen or back.  · Trouble urinating.  · Blood in the urine.  · Vomiting or being less hungry than normal.  · Diarrhea or abdominal pain.  · Vaginal discharge, if you are female.  How is this diagnosed?  This condition is  diagnosed with a medical history and physical exam. You will also need to provide a urine sample to test your urine. Other tests may be done, including:  · Blood tests.  · Sexually transmitted disease (STD) testing.  If you have had more than one UTI, a cystoscopy or imaging studies may be done to determine the cause of the infections.  How is this treated?  Treatment for this condition often includes a combination of two or more of the following:  · Antibiotic medicine.  · Other medicines to treat less common causes of UTI.  · Over-the-counter medicines to treat pain.  · Drinking enough water to stay hydrated.  Follow these instructions at home:  · Take over-the-counter and prescription medicines only as told by your health care provider.  · If you were prescribed an antibiotic, take it as told by your health care provider. Do not stop taking the antibiotic even if you start to feel better.  · Avoid alcohol, caffeine, tea, and carbonated beverages. They can irritate your bladder.  · Drink enough fluid to keep your urine clear or pale yellow.  · Keep all follow-up visits as told by your health care provider. This is important.  · Make sure to:  ¨ Empty your bladder often and completely. Do not hold urine for long periods of time.  ¨ Empty your bladder before and after sex.  ¨ Wipe from front to back after a bowel movement if you are female. Use each tissue one time when you wipe.  Contact a health care provider if:  · You have back pain.  · You have a fever.  · You feel nauseous or vomit.  · Your symptoms do not get better after 3 days.  · Your symptoms go away and then return.  Get help right away if:  · You have severe back pain or lower abdominal pain.  · You are vomiting and cannot keep down any medicines or water.  This information is not intended to replace advice given to you by your health care provider. Make sure you discuss any questions you have with your health care provider.  Document Released:  09/27/2006 Document Revised: 05/31/2017 Document Reviewed: 11/07/2016  [employer] Interactive Patient Education © 2017 [employer]    Discharge Instructions    Discharged to home by car with relative. Discharged via wheelchair, hospital escort: Yes.  Special equipment needed: Not Applicable    Be sure to schedule a follow-up appointment with your primary care doctor or any specialists as instructed.     Discharge Plan:   Diet Plan: Discussed  Activity Level: Discussed  Confirmed Follow up Appointment: Appointment Scheduled  Confirmed Symptoms Management: Discussed  Medication Reconciliation Updated: Yes  Influenza Vaccine Indication: Not indicated: Previously immunized this influenza season and > 8 years of age    I understand that a diet low in cholesterol, fat, and sodium is recommended for good health. Unless I have been given specific instructions below for another diet, I accept this instruction as my diet prescription.   Other diet: regular    Special Instructions: None    · Is patient discharged on Warfarin / Coumadin?   Yes    You are receiving the drug warfarin. Please understand the importance of monitoring warfarin with scheduled PT/INR blood draws.  Follow-up with the [facility] in one week for INR lab..    IMPORTANT: HOW TO USE THIS INFORMATION:  This is a summary and does NOT have all possible information about this product. This information does not assure that this product is safe, effective, or appropriate for you. This information is not individual medical advice and does not substitute for the advice of your health care professional. Always ask your health care professional for complete information about this product and your specific health needs.      WARFARIN - ORAL (WARF-uh-rin)      COMMON BRAND NAME(S): Coumadin      WARNING:  Warfarin can cause very serious (possibly fatal) bleeding. This is more likely to occur when you first start taking this medication or if you take too much  "warfarin. To decrease your risk for bleeding, your doctor or other health care provider will monitor you closely and check your lab results (INR test) to make sure you are not taking too much warfarin. Keep all medical and laboratory appointments. Tell your doctor right away if you notice any signs of serious bleeding. See also Side Effects section.      USES:  This medication is used to treat blood clots (such as in deep vein thrombosis-DVT or pulmonary embolus-PE) and/or to prevent new clots from forming in your body. Preventing harmful blood clots helps to reduce the risk of a stroke or heart attack. Conditions that increase your risk of developing blood clots include a certain type of irregular heart rhythm (atrial fibrillation), heart valve replacement, recent heart attack, and certain surgeries (such as hip/knee replacement). Warfarin is commonly called a \"blood thinner,\" but the more correct term is \"anticoagulant.\" It helps to keep blood flowing smoothly in your body by decreasing the amount of certain substances (clotting proteins) in your blood.      HOW TO USE:  Read the Medication Guide provided by your pharmacist before you start taking warfarin and each time you get a refill. If you have any questions, ask your doctor or pharmacist. Take this medication by mouth with or without food as directed by your doctor or other health care professional, usually once a day. It is very important to take it exactly as directed. Do not increase the dose, take it more frequently, or stop using it unless directed by your doctor. Dosage is based on your medical condition, laboratory tests (such as INR), and response to treatment. Your doctor or other health care provider will monitor you closely while you are taking this medication to determine the right dose for you. Use this medication regularly to get the most benefit from it. To help you remember, take it at the same time each day. It is important to eat a " balanced, consistent diet while taking warfarin. Some foods can affect how warfarin works in your body and may affect your treatment and dose. Avoid sudden large increases or decreases in your intake of foods high in vitamin K (such as broccoli, cauliflower, cabbage, brussels sprouts, kale, spinach, and other green leafy vegetables, liver, green tea, certain vitamin supplements). If you are trying to lose weight, check with your doctor before you try to go on a diet. Cranberry products may also affect how your warfarin works. Limit the amount of cranberry juice (16 ounces/480 milliliters a day) or other cranberry products you may drink or eat.      SIDE EFFECTS:  Nausea, loss of appetite, or stomach/abdominal pain may occur. If any of these effects persist or worsen, tell your doctor or pharmacist promptly. Remember that your doctor has prescribed this medication because he or she has judged that the benefit to you is greater than the risk of side effects. Many people using this medication do not have serious side effects. This medication can cause serious bleeding if it affects your blood clotting proteins too much (shown by unusually high INR lab results). Even if your doctor stops your medication, this risk of bleeding can continue for up to a week. Tell your doctor right away if you have any signs of serious bleeding, including: unusual pain/swelling/discomfort, unusual/easy bruising, prolonged bleeding from cuts or gums, persistent/frequent nosebleeds, unusually heavy/prolonged menstrual flow, pink/dark urine, coughing up blood, vomit that is bloody or looks like coffee grounds, severe headache, dizziness/fainting, unusual or persistent tiredness/weakness, bloody/black/tarry stools, chest pain, shortness of breath, difficulty swallowing. Tell your doctor right away if any of these unlikely but serious side effects occur: persistent nausea/vomiting, severe stomach/abdominal pain, yellowing eyes/skin. This drug  rarely has caused very serious (possibly fatal) problems if its effects lead to small blood clots (usually at the beginning of treatment). This can lead to severe skin/tissue damage that may require surgery or amputation if left untreated. Patients with certain blood conditions (protein C or S deficiency) may be at greater risk. Get medical help right away if any of these rare but serious side effects occur: painful/red/purplish patches on the skin (such as on the toe, breast, abdomen), change in the amount of urine, vision changes, confusion, slurred speech, weakness on one side of the body. A very serious allergic reaction to this drug is rare. However, get medical help right away if you notice any symptoms of a serious allergic reaction, including: rash, itching/swelling (especially of the face/tongue/throat), severe dizziness, trouble breathing. This is not a complete list of possible side effects. If you notice other effects not listed above, contact your doctor or pharmacist. In the [place] - Call your doctor for medical advice about side effects. You may report side effects to FDA at 3-477-EJA-2907. In [place] - Call your doctor for medical advice about side effects. You may report side effects to [facility] [place] at 1-141.670.8831.      PRECAUTIONS:  Before taking warfarin, tell your doctor or pharmacist if you are allergic to it; or if you have any other allergies. This product may contain inactive ingredients, which can cause allergic reactions or other problems. Talk to your pharmacist for more details. Before using this medication, tell your doctor or pharmacist your medical history, especially of: blood disorders (such as anemia, hemophilia), bleeding problems (such as bleeding of the stomach/intestines, bleeding in the brain), blood vessel disorders (such as aneurysms), recent major injury/surgery, liver disease, alcohol use, mental/mood disorders (including memory problems), frequent falls/injuries. It is  important that all your doctors and dentists know that you take warfarin. Before having surgery or any medical/dental procedures, tell your doctor or dentist that you are taking this medication and about all the products you use (including prescription drugs, nonprescription drugs, and herbal products). Avoid getting injections into the muscles. If you must have an injection into a muscle (for example, a flu shot), it should be given in the arm. This way, it will be easier to check for bleeding and/or apply pressure bandages. This medication may cause stomach bleeding. Daily use of alcohol while using this medicine will increase your risk for stomach bleeding and may also affect how this medication works. Limit or avoid alcoholic beverages. If you have not been eating well, if you have an illness or infection that causes fever, vomiting, or diarrhea for more than 2 days, or if you start using any antibiotic medications, contact your doctor or pharmacist immediately because these conditions can affect how warfarin works. This medication can cause heavy bleeding. To lower the chance of getting cut, bruised, or injured, use great caution with sharp objects like safety razors and nail cutters. Use an electric razor when shaving and a soft toothbrush when brushing your teeth. Avoid activities such as contact sports. If you fall or injure yourself, especially if you hit your head, call your doctor immediately. Your doctor may need to check you. The Food & Drug Administration has stated that generic warfarin products are interchangeable. However, consult your doctor or pharmacist before switching warfarin products. Be careful not to take more than one medication that contains warfarin unless specifically directed by the doctor or health care provider who is monitoring your warfarin treatment. Older adults may be at greater risk for bleeding while using this drug. This medication is not recommended for use during pregnancy  "because of serious (possibly fatal) harm to an unborn baby. Discuss the use of reliable forms of birth control with your doctor. If you become pregnant or think you may be pregnant, tell your doctor immediately. If you are planning pregnancy, discuss a plan for managing your condition with your doctor before you become pregnant. Your doctor may switch the type of medication you use during pregnancy. Very small amounts of this medication may pass into breast milk but is unlikely to harm a nursing infant. Consult your doctor before breast-feeding.      DRUG INTERACTIONS:  Drug interactions may change how your medications work or increase your risk for serious side effects. This document does not contain all possible drug interactions. Keep a list of all the products you use (including prescription/nonprescription drugs and herbal products) and share it with your doctor and pharmacist. Do not start, stop, or change the dosage of any medicines without your doctor's approval. Warfarin interacts with many prescription, nonprescription, vitamin, and herbal products. This includes medications that are applied to the skin or inside the vagina or rectum. The interactions with warfarin usually result in an increase or decrease in the \"blood-thinning\" (anticoagulant) effect. Your doctor or other health care professional should closely monitor you to prevent serious bleeding or clotting problems. While taking warfarin, it is very important to tell your doctor or pharmacist of any changes in medications, vitamins, or herbal products that you are taking. Some products that may interact with this drug include: capecitabine, imatinib, mifepristone. Aspirin, aspirin-like drugs (salicylates), and nonsteroidal anti-inflammatory drugs (NSAIDs such as ibuprofen, naproxen, celecoxib) may have effects similar to warfarin. These drugs may increase the risk of bleeding problems if taken during treatment with warfarin. Carefully check all " prescription/nonprescription product labels (including drugs applied to the skin such as pain-relieving creams) since the products may contain NSAIDs or salicylates. Talk to your doctor about using a different medication (such as acetaminophen) to treat pain/fever. Low-dose aspirin and related drugs (such as clopidogrel, ticlopidine) should be continued if prescribed by your doctor for specific medical reasons such as heart attack or stroke prevention. Consult your doctor or pharmacist for more details. Many herbal products interact with warfarin. Tell your doctor before taking any herbal products, especially bromelains, coenzyme Q10, cranberry, danshen, dong quai, fenugreek, garlic, ginkgo biloba, ginseng, and Soso's wort, among others. This medication may interfere with a certain laboratory test to measure theophylline levels, possibly causing false test results. Make sure laboratory personnel and all your doctors know you use this drug.      OVERDOSE:  If overdose is suspected, contact a poison control center or emergency room immediately. [place] residents can call the [place] National Poison Hotline at 1-169.805.3621. [place] residents can call a provincial poison control center. Symptoms of overdose may include: bloody/black/tarry stools, pink/dark urine, unusual/prolonged bleeding.      NOTES:  Do not share this medication with others. Laboratory and/or medical tests (such as INR, complete blood count) must be performed periodically to monitor your progress or check for side effects. Consult your doctor for more details.      MISSED DOSE:  For the best possible benefit, do not miss any doses. If you do miss a dose and remember on the same day, take it as soon as you remember. If you remember on the next day, skip the missed dose and resume your usual dosing schedule. Do not double the dose to catch up because this could increase your risk for bleeding. Keep a record of missed doses to give to your doctor or  pharmacist. Contact your doctor or pharmacist if you miss 2 or more doses in a row.      STORAGE:  Store at room temperature away from light and moisture. Do not store in the bathroom. Keep all medications away from children and pets. Do not flush medications down the toilet or pour them into a drain unless instructed to do so. Properly discard this product when it is  or no longer needed. Consult your pharmacist or local waste disposal company for more details about how to safely discard your product.      MEDICAL ALERT:  Your condition and medication can cause complications in a medical emergency. For information about enrolling in MedicAlert, call 1-135.687.1879 ([place]) or 1-559.983.8106 ([place]).      Information last revised 2010 Copyright(c) 2010 [employer]             Depression / Suicide Risk    As you are discharged from this [facility] facility, it is important to learn how to keep safe from harming yourself.    Recognize the warning signs:  · Abrupt changes in personality, positive or negative- including increase in energy   · Giving away possessions  · Change in eating patterns- significant weight changes-  positive or negative  · Change in sleeping patterns- unable to sleep or sleeping all the time   · Unwillingness or inability to communicate  · Depression  · Unusual sadness, discouragement and loneliness  · Talk of wanting to die  · Neglect of personal appearance   · Rebelliousness- reckless behavior  · Withdrawal from people/activities they love  · Confusion- inability to concentrate     If you or a loved one observes any of these behaviors or has concerns about self-harm, here's what you can do:  · Talk about it- your feelings and reasons for harming yourself  · Remove any means that you might use to hurt yourself (examples: pills, rope, extension cords, firearm)  · Get professional help from the community (Mental Health, Substance Abuse, psychological counseling)  · Do not  be alone:Call your Safe Contact- someone whom you trust who will be there for you.  · Call your local CRISIS HOTLINE 104-1588 or 508-212-6902  · Call your local Children's Mobile Crisis Response Team [place] (240) 724-3315 or www.[employer]  · Call the toll free National Suicide Prevention Hotlines   · National Suicide Prevention Lifeline 069-241-KEER (3840)  · National [employer] Line Network 800-SUICIDE (234-1022)

## 2018-12-25 LAB
BACTERIA BLD CULT: NORMAL
BACTERIA BLD CULT: NORMAL
SIGNIFICANT IND 70042: NORMAL
SIGNIFICANT IND 70042: NORMAL
SITE SITE: NORMAL
SITE SITE: NORMAL
SOURCE SOURCE: NORMAL
SOURCE SOURCE: NORMAL

## 2018-12-26 ENCOUNTER — APPOINTMENT (OUTPATIENT)
Dept: RADIOLOGY | Facility: MEDICAL CENTER | Age: 83
DRG: 690 | End: 2018-12-26
Attending: EMERGENCY MEDICINE
Payer: MEDICARE

## 2018-12-26 ENCOUNTER — HOSPITAL ENCOUNTER (INPATIENT)
Facility: MEDICAL CENTER | Age: 83
LOS: 2 days | DRG: 690 | End: 2018-12-28
Attending: EMERGENCY MEDICINE | Admitting: INTERNAL MEDICINE
Payer: MEDICARE

## 2018-12-26 DIAGNOSIS — N30.01 ACUTE CYSTITIS WITH HEMATURIA: ICD-10-CM

## 2018-12-26 DIAGNOSIS — I48.92 ATRIAL FLUTTER, UNSPECIFIED TYPE (HCC): ICD-10-CM

## 2018-12-26 PROBLEM — N18.30 CHRONIC RENAL FAILURE IN PEDIATRIC PATIENT, STAGE 3 (MODERATE): Status: ACTIVE | Noted: 2018-12-26

## 2018-12-26 LAB
ALBUMIN SERPL BCP-MCNC: 3.1 G/DL (ref 3.2–4.9)
ALBUMIN/GLOB SERPL: 0.8 G/DL
ALP SERPL-CCNC: 75 U/L (ref 30–99)
ALT SERPL-CCNC: 21 U/L (ref 2–50)
ANION GAP SERPL CALC-SCNC: 6 MMOL/L (ref 0–11.9)
APPEARANCE UR: CLEAR
APTT PPP: 31.4 SEC (ref 24.7–36)
AST SERPL-CCNC: 27 U/L (ref 12–45)
BACTERIA #/AREA URNS HPF: ABNORMAL /HPF
BASOPHILS # BLD AUTO: 0.4 % (ref 0–1.8)
BASOPHILS # BLD: 0.03 K/UL (ref 0–0.12)
BILIRUB SERPL-MCNC: 0.5 MG/DL (ref 0.1–1.5)
BILIRUB UR QL STRIP.AUTO: NEGATIVE
BUN SERPL-MCNC: 23 MG/DL (ref 8–22)
CALCIUM SERPL-MCNC: 8.8 MG/DL (ref 8.4–10.2)
CHLORIDE SERPL-SCNC: 99 MMOL/L (ref 96–112)
CO2 SERPL-SCNC: 29 MMOL/L (ref 20–33)
COLOR UR: YELLOW
CREAT SERPL-MCNC: 1.57 MG/DL (ref 0.5–1.4)
EOSINOPHIL # BLD AUTO: 0.03 K/UL (ref 0–0.51)
EOSINOPHIL NFR BLD: 0.4 % (ref 0–6.9)
EPI CELLS #/AREA URNS HPF: ABNORMAL /HPF
ERYTHROCYTE [DISTWIDTH] IN BLOOD BY AUTOMATED COUNT: 45.9 FL (ref 35.9–50)
GLOBULIN SER CALC-MCNC: 4.1 G/DL (ref 1.9–3.5)
GLUCOSE SERPL-MCNC: 106 MG/DL (ref 65–99)
GLUCOSE UR STRIP.AUTO-MCNC: NEGATIVE MG/DL
HCT VFR BLD AUTO: 39 % (ref 37–47)
HGB BLD-MCNC: 12.3 G/DL (ref 12–16)
IMM GRANULOCYTES # BLD AUTO: 0.02 K/UL (ref 0–0.11)
IMM GRANULOCYTES NFR BLD AUTO: 0.3 % (ref 0–0.9)
INR PPP: 1.33 (ref 0.87–1.13)
KETONES UR STRIP.AUTO-MCNC: NEGATIVE MG/DL
LEUKOCYTE ESTERASE UR QL STRIP.AUTO: ABNORMAL
LYMPHOCYTES # BLD AUTO: 1.19 K/UL (ref 1–4.8)
LYMPHOCYTES NFR BLD: 15.4 % (ref 22–41)
MCH RBC QN AUTO: 29.1 PG (ref 27–33)
MCHC RBC AUTO-ENTMCNC: 31.5 G/DL (ref 33.6–35)
MCV RBC AUTO: 92.2 FL (ref 81.4–97.8)
MICRO URNS: ABNORMAL
MONOCYTES # BLD AUTO: 0.37 K/UL (ref 0–0.85)
MONOCYTES NFR BLD AUTO: 4.8 % (ref 0–13.4)
NEUTROPHILS # BLD AUTO: 6.07 K/UL (ref 2–7.15)
NEUTROPHILS NFR BLD: 78.7 % (ref 44–72)
NITRITE UR QL STRIP.AUTO: NEGATIVE
NRBC # BLD AUTO: 0 K/UL
NRBC BLD-RTO: 0 /100 WBC
PH UR STRIP.AUTO: 5 [PH]
PLATELET # BLD AUTO: 249 K/UL (ref 164–446)
PMV BLD AUTO: 9 FL (ref 9–12.9)
POTASSIUM SERPL-SCNC: 4.1 MMOL/L (ref 3.6–5.5)
PROT SERPL-MCNC: 7.2 G/DL (ref 6–8.2)
PROT UR QL STRIP: NEGATIVE MG/DL
PROTHROMBIN TIME: 16.3 SEC (ref 12–14.6)
RBC # BLD AUTO: 4.23 M/UL (ref 4.2–5.4)
RBC # URNS HPF: ABNORMAL /HPF
RBC UR QL AUTO: ABNORMAL
SODIUM SERPL-SCNC: 134 MMOL/L (ref 135–145)
SP GR UR STRIP.AUTO: <=1.005
WBC # BLD AUTO: 7.7 K/UL (ref 4.8–10.8)
WBC #/AREA URNS HPF: ABNORMAL /HPF

## 2018-12-26 PROCEDURE — 87086 URINE CULTURE/COLONY COUNT: CPT

## 2018-12-26 PROCEDURE — 81001 URINALYSIS AUTO W/SCOPE: CPT

## 2018-12-26 PROCEDURE — 85025 COMPLETE CBC W/AUTO DIFF WBC: CPT

## 2018-12-26 PROCEDURE — A9270 NON-COVERED ITEM OR SERVICE: HCPCS | Performed by: INTERNAL MEDICINE

## 2018-12-26 PROCEDURE — 36415 COLL VENOUS BLD VENIPUNCTURE: CPT

## 2018-12-26 PROCEDURE — 770006 HCHG ROOM/CARE - MED/SURG/GYN SEMI*

## 2018-12-26 PROCEDURE — 74176 CT ABD & PELVIS W/O CONTRAST: CPT

## 2018-12-26 PROCEDURE — 700102 HCHG RX REV CODE 250 W/ 637 OVERRIDE(OP): Performed by: INTERNAL MEDICINE

## 2018-12-26 PROCEDURE — 700111 HCHG RX REV CODE 636 W/ 250 OVERRIDE (IP): Performed by: INTERNAL MEDICINE

## 2018-12-26 PROCEDURE — 85610 PROTHROMBIN TIME: CPT

## 2018-12-26 PROCEDURE — 700111 HCHG RX REV CODE 636 W/ 250 OVERRIDE (IP): Performed by: EMERGENCY MEDICINE

## 2018-12-26 PROCEDURE — 99285 EMERGENCY DEPT VISIT HI MDM: CPT

## 2018-12-26 PROCEDURE — 99222 1ST HOSP IP/OBS MODERATE 55: CPT | Mod: AI | Performed by: INTERNAL MEDICINE

## 2018-12-26 PROCEDURE — 85730 THROMBOPLASTIN TIME PARTIAL: CPT

## 2018-12-26 PROCEDURE — 80053 COMPREHEN METABOLIC PANEL: CPT

## 2018-12-26 PROCEDURE — 96365 THER/PROPH/DIAG IV INF INIT: CPT

## 2018-12-26 PROCEDURE — 700105 HCHG RX REV CODE 258: Performed by: EMERGENCY MEDICINE

## 2018-12-26 PROCEDURE — 700105 HCHG RX REV CODE 258: Performed by: INTERNAL MEDICINE

## 2018-12-26 RX ORDER — SODIUM CHLORIDE 9 MG/ML
INJECTION, SOLUTION INTRAVENOUS CONTINUOUS
Status: DISCONTINUED | OUTPATIENT
Start: 2018-12-26 | End: 2018-12-28 | Stop reason: HOSPADM

## 2018-12-26 RX ORDER — WARFARIN SODIUM 1 MG/1
1 TABLET ORAL
Status: COMPLETED | OUTPATIENT
Start: 2018-12-26 | End: 2018-12-26

## 2018-12-26 RX ORDER — ACETAMINOPHEN 325 MG/1
650 TABLET ORAL EVERY 6 HOURS PRN
Status: DISCONTINUED | OUTPATIENT
Start: 2018-12-26 | End: 2018-12-28 | Stop reason: HOSPADM

## 2018-12-26 RX ORDER — BISACODYL 10 MG
10 SUPPOSITORY, RECTAL RECTAL
Status: DISCONTINUED | OUTPATIENT
Start: 2018-12-26 | End: 2018-12-28 | Stop reason: HOSPADM

## 2018-12-26 RX ORDER — BIOTIN 1 MG
1000 TABLET ORAL DAILY
COMMUNITY
End: 2019-06-19

## 2018-12-26 RX ORDER — ENALAPRILAT 1.25 MG/ML
1.25 INJECTION INTRAVENOUS EVERY 6 HOURS PRN
Status: DISCONTINUED | OUTPATIENT
Start: 2018-12-26 | End: 2018-12-28 | Stop reason: HOSPADM

## 2018-12-26 RX ORDER — AMOXICILLIN 250 MG
2 CAPSULE ORAL 2 TIMES DAILY
Status: DISCONTINUED | OUTPATIENT
Start: 2018-12-26 | End: 2018-12-28 | Stop reason: HOSPADM

## 2018-12-26 RX ORDER — FOLIC ACID 1 MG/1
1 TABLET ORAL DAILY
Status: DISCONTINUED | OUTPATIENT
Start: 2018-12-26 | End: 2018-12-28 | Stop reason: HOSPADM

## 2018-12-26 RX ORDER — POLYETHYLENE GLYCOL 3350 17 G/17G
1 POWDER, FOR SOLUTION ORAL
Status: DISCONTINUED | OUTPATIENT
Start: 2018-12-26 | End: 2018-12-28 | Stop reason: HOSPADM

## 2018-12-26 RX ORDER — ONDANSETRON 2 MG/ML
4 INJECTION INTRAMUSCULAR; INTRAVENOUS EVERY 4 HOURS PRN
Status: DISCONTINUED | OUTPATIENT
Start: 2018-12-26 | End: 2018-12-28 | Stop reason: HOSPADM

## 2018-12-26 RX ORDER — ONDANSETRON 4 MG/1
4 TABLET, ORALLY DISINTEGRATING ORAL EVERY 4 HOURS PRN
Status: DISCONTINUED | OUTPATIENT
Start: 2018-12-26 | End: 2018-12-28 | Stop reason: HOSPADM

## 2018-12-26 RX ORDER — LEVOTHYROXINE SODIUM 0.05 MG/1
75 TABLET ORAL
Status: DISCONTINUED | OUTPATIENT
Start: 2018-12-26 | End: 2018-12-28 | Stop reason: HOSPADM

## 2018-12-26 RX ADMIN — STANDARDIZED SENNA CONCENTRATE AND DOCUSATE SODIUM 2 TABLET: 8.6; 5 TABLET, FILM COATED ORAL at 19:21

## 2018-12-26 RX ADMIN — MEROPENEM 500 MG: 500 INJECTION, POWDER, FOR SOLUTION INTRAVENOUS at 19:23

## 2018-12-26 RX ADMIN — WARFARIN SODIUM 1 MG: 1 TABLET ORAL at 19:21

## 2018-12-26 RX ADMIN — MEROPENEM 500 MG: 500 INJECTION, POWDER, FOR SOLUTION INTRAVENOUS at 15:04

## 2018-12-26 RX ADMIN — METOPROLOL TARTRATE 25 MG: 25 TABLET ORAL at 19:20

## 2018-12-26 RX ADMIN — MEROPENEM 500 MG: 500 INJECTION, POWDER, FOR SOLUTION INTRAVENOUS at 23:55

## 2018-12-26 RX ADMIN — SODIUM CHLORIDE: 9 INJECTION, SOLUTION INTRAVENOUS at 19:19

## 2018-12-26 ASSESSMENT — CHA2DS2 SCORE
AGE 75 OR GREATER: YES
VASCULAR DISEASE: YES
SEX: FEMALE
DIABETES: NO
AGE 65 TO 74: NO
CHF OR LEFT VENTRICULAR DYSFUNCTION: NO
HYPERTENSION: NO
CHA2DS2 VASC SCORE: 6
PRIOR STROKE OR TIA OR THROMBOEMBOLISM: YES

## 2018-12-26 ASSESSMENT — ENCOUNTER SYMPTOMS
LOSS OF CONSCIOUSNESS: 0
DIZZINESS: 0
CHILLS: 0
STRIDOR: 0
TINGLING: 0
MYALGIAS: 0
FEVER: 0
CONSTIPATION: 1
ABDOMINAL PAIN: 0
NAUSEA: 0
FALLS: 0
WEAKNESS: 0
HEADACHES: 0
VOMITING: 0
PALPITATIONS: 0
COUGH: 0
DIARRHEA: 0
SPUTUM PRODUCTION: 0
SHORTNESS OF BREATH: 0
DEPRESSION: 0

## 2018-12-26 ASSESSMENT — PAIN SCALES - WONG BAKER: WONGBAKER_NUMERICALRESPONSE: DOESN'T HURT AT ALL

## 2018-12-26 ASSESSMENT — PAIN SCALES - GENERAL: PAINLEVEL_OUTOF10: 0

## 2018-12-26 NOTE — ED NOTES
Pt bib remsa; c/o uti s/s x 1wk. Pt seen in er x3d ago for same s/s. Pt started on cipro po. Pt states s/s worsening.

## 2018-12-26 NOTE — ASSESSMENT & PLAN NOTE
Recurrent, failed outpatient ciprofloxacin, full course  ERP had discussed with pharmacy who recommended meropenem; awaiting cultures  Symptoms improving  Patient was having hesitancy and hematuria, resolving

## 2018-12-26 NOTE — ED NOTES
Med rec complete per pt's med list- reviewed with (lopressor missing from med list). Pt did confirm taking lopressor. Pt held coumadin dose on Sat and Sun, pt has been taking 1 mg Mon and Tues.

## 2018-12-26 NOTE — ASSESSMENT & PLAN NOTE
First noted 12/20/18 when she was diagnosed with urinary tract infection, had been on bactrim  Has slowly improved but still not back to her previous baseline  About the same with IV fluids  Repeat BMP in the morning

## 2018-12-26 NOTE — H&P
Hospital Medicine History & Physical Note    Date of Service  12/26/2018    Primary Care Physician  Toribio Schmidt M.D.    Consultants  None    Code Status  Full    Chief Complaint  Urinary hesitancy and hematuria    History of Presenting Illness  84 y.o. female who presented 12/26/2018 with urinary hesitancy and hematuria.  Patient symptoms started last week so she saw a provider, was noted to have a urinary tract infection and was placed on Bactrim.  Pansensitive E. coli grew.  Patient is on Coumadin, her INR increased due to the Bactrim so she was transitioned to ciprofloxacin.  Patient finished her course of ciprofloxacin today.  She states her hesitancy has continued and yesterday she began having hematuria which persisted so she presented to the emergency department.  Upon arrival she was noted to still have a positive urinalysis.  ERP did discuss the case with pharmacy who recommended meropenem.  Patient has had multiple urinary tract infections, the first was 12/20, that one affected her kidney function which still has not returned to what was her previous baseline.    Review of Systems  Review of Systems   Constitutional: Negative for chills, fever and malaise/fatigue.   HENT: Negative for congestion.    Respiratory: Negative for cough, sputum production, shortness of breath and stridor.    Cardiovascular: Negative for chest pain, palpitations and leg swelling.   Gastrointestinal: Positive for constipation. Negative for abdominal pain, diarrhea, nausea and vomiting.   Genitourinary: Positive for hematuria. Negative for dysuria and urgency.        Hesitancy    Musculoskeletal: Negative for falls and myalgias.   Neurological: Negative for dizziness, tingling, loss of consciousness, weakness and headaches.   Psychiatric/Behavioral: Negative for depression and suicidal ideas.   All other systems reviewed and are negative.      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 She also has no past medical history of Breast cancer ([facility]).    Surgical History   has a past surgical history that includes bunionectomy (1980); arthroscopy, knee (1986); arthroscope (1989); temporal artery biopsy (1996); lung needle biopsy (1996); cataract extraction (2006); cholecystectomy (1997); hysterectomy radical (1975); and vein ligation.     Family History  family history includes Cancer in her brother; Non-contributory in her other; Other in her father.     Social History   reports that she quit smoking about 59 years ago. Her smoking use included Cigarettes. She smoked 1.00 pack per day. She has never used smokeless tobacco. She reports that she drinks alcohol. She reports that she does not use drugs.    Allergies  Allergies   Allergen Reactions   • Cephalexin [Keflex] Swelling   • Hydroxyzine Swelling     Eyes get itchy and swollen    • Naprelan [Naproxen] Swelling     Eyes get itchy become red and swollen   • Oxaprozin Swelling     Swelling eyes, and itchy   • Ampicillin Rash     Red Rash   • Erythromycin Diarrhea     .   • Vi-Q-Tuss [Hydrocodone-Guaifenesin] Vomiting and Nausea   • Voltaren [Diclofenac Sodium] Rash and Swelling     Red rash and swelling   • Baclofen Unspecified     drowsiness   • Citalopram Vomiting and Nausea   • Clarithromycin Unspecified     Pt reports that this medication plugs her ears.    • Promethazine Unspecified     Drowsiness and sleeps       Medications  Prior to Admission Medications   Prescriptions Last Dose Informant Patient Reported? Taking?   BIOTIN PO 12/25/2018 at  Unknown time  Yes Yes   Sig: Take 1 Tab by mouth every day.   CALCIUM PO 12/25/2018 at Unknown time  Yes Yes   Sig: Take 1 Tab by mouth every day.   Multiple Vitamins-Minerals (ADULT ONE DAILY GUMMIES PO) 12/25/2018 at Unknown time Patient Yes No   Sig: Take 2 Tabs by mouth every day.   artificial tears 1.4 % Solution unknown at Unknown time Patient Yes No   Sig: Place 1 Drop in both eyes as needed (For Dry eye).   ciprofloxacin (CIPRO) 500 MG Tab Complete at 12/26/18  No No   Sig: Take 1 Tab by mouth every day for 4 days.   folic acid (FOLVITE) 1 MG TABS 12/26/2018 at Unknown time Patient Yes No   Sig: Take 1 mg by mouth every day.   levothyroxine (SYNTHROID) 75 MCG TABS 12/26/2018 at Unknown time Patient Yes No   Sig: Take 75 mcg by mouth every morning before breakfast.   metoprolol (LOPRESSOR) 25 MG Tab 12/26/2018 at Unknown time Patient No No   Sig: Take 1 Tab by mouth 2 times a day.   polyethyl glycol-propyl glycol (SYSTANE) 0.4-0.3 % Solution unknown at Unknown time  Yes Yes   Sig: Place 1 Drop in both eyes as needed.   polyethylene glycol/lytes (MIRALAX) Pack unknown at Unknown time Patient Yes No   Sig: Take 17 g by mouth 1 time daily as needed.   warfarin (COUMADIN) 2 MG Tab 12/25/2018 at Unknown time  No No   Sig: Take 0.5 Tabs by mouth every day.      Facility-Administered Medications: None       Physical Exam  Temp:  [36.3 °C (97.3 °F)] 36.3 °C (97.3 °F)  Pulse:  [73-90] 86  Resp:  [17] 17  BP: (117)/(70) 117/70    Physical Exam   Constitutional: She is oriented to person, place, and time. She is cooperative. No distress.   Thin and frail appearing    HENT:   Head: Normocephalic and atraumatic. Not macrocephalic and not microcephalic. Head is without raccoon's eyes and without Hubbard's sign.   Right Ear: External ear normal.   Left Ear: External ear normal.   Mouth/Throat: Oropharynx is clear and moist. No oropharyngeal exudate.   Eyes: Conjunctivae are normal. Right eye exhibits no discharge. Left eye  exhibits no discharge. No scleral icterus.   Neck: Neck supple. No tracheal deviation present.   Cardiovascular: Normal rate, regular rhythm and intact distal pulses.  Exam reveals no gallop, no distant heart sounds and no friction rub.    No murmur heard.  Pulmonary/Chest: Effort normal. No accessory muscle usage or stridor. No tachypnea and no bradypnea. No respiratory distress. She has no decreased breath sounds. She has no wheezes. She has no rhonchi. She has no rales. She exhibits no tenderness.   Abdominal: Soft. Bowel sounds are normal. She exhibits no distension. There is no hepatosplenomegaly, splenomegaly or hepatomegaly. There is no tenderness. There is no rebound and no guarding.   Musculoskeletal: Normal range of motion. She exhibits no edema or tenderness.   Lymphadenopathy:     She has no cervical adenopathy.   Neurological: She is alert and oriented to person, place, and time. No cranial nerve deficit. She displays no seizure activity.   Skin: Skin is warm, dry and intact. No rash noted. She is not diaphoretic. No erythema. No pallor.   Psychiatric: She has a normal mood and affect. Her speech is normal and behavior is normal. Judgment and thought content normal. Cognition and memory are normal.   Nursing note and vitals reviewed.      Laboratory:  Recent Labs      12/26/18   1059   WBC  7.7   RBC  4.23   HEMOGLOBIN  12.3   HEMATOCRIT  39.0   MCV  92.2   MCH  29.1   MCHC  31.5*   RDW  45.9   PLATELETCT  249   MPV  9.0     Recent Labs      12/26/18   1059   SODIUM  134*   POTASSIUM  4.1   CHLORIDE  99   CO2  29   GLUCOSE  106*   BUN  23*   CREATININE  1.57*   CALCIUM  8.8     Recent Labs      12/26/18   1059   ALTSGPT  21   ASTSGOT  27   ALKPHOSPHAT  75   TBILIRUBIN  0.5   GLUCOSE  106*     Recent Labs      12/26/18   1059   APTT  31.4   INR  1.33*             No results for input(s): TROPONINI in the last 72 hours.    Urinalysis:    Recent Labs      12/26/18   1029   SPECGRAVITY  <=1.005   GLUCOSEUR   Negative   KETONES  Negative   NITRITE  Negative   LEUKESTERAS  Small*   WBCURINE  *   RBCURINE  *   BACTERIA  Few*   EPITHELCELL  Few        Imaging:  CT-RENAL COLIC EVALUATION(A/P W/O)   Final Result      1.  No abnormality identified within the abdomen or pelvis      2.  No hydronephrosis      3.  Mild dilation the bladder, nonspecific      4.  Prior cholecystectomy, hysterectomy, and possibly appendectomy      5.  Bilateral lower lobe scarring            Assessment/Plan:  I anticipate this patient will require at least two midnights for appropriate medical management, necessitating inpatient admission.    UTI (urinary tract infection)- (present on admission)   Assessment & Plan    -Recurrent, failed outpatient ciprofloxacin for which the patient took a full course  -ERP has discussed the case with pharmacy who recommended meropenem which I agree with  -Patient would likely benefit from 3 days of IV meropenem prior to discharge  -No significant pain currently  -Patient was having hesitancy and hematuria, monitor for worsening hematuria  -Pansensitive E. coli grew however it is concerning that this could have been an ESBL E. coli     Chronic renal failure in pediatric patient, stage 3 (moderate) (HCC)   Assessment & Plan    -First noted 12/20/18 when she was diagnosed with urinary tract infection  -Has slowly improved but still not back to her previous baseline  -Start IV fluids  -Repeat BMP in the morning     Hyponatremia- (present on admission)   Assessment & Plan    -Mild, likely due to dehydration  -Start IV fluids  -Repeat BMP in the morning     Atrial flutter (HCC)- (present on admission)   Assessment & Plan    -Chronic, currently sinus on metoprolol  -Continue Coumadin     Hypothyroid- (present on admission)   Assessment & Plan    -Continue Synthroid at 75 mcg  -Recent TSH 6 days ago was elevated at 6.38, unsure if adjustment was made to this dose     HTN (hypertension)- (present on admission)    Assessment & Plan    -Controlled on metoprolol   -Place as needed enalapril and adjust as needed         VTE prophylaxis: Coumadin

## 2018-12-26 NOTE — ED PROVIDER NOTES
ED Provider Note  CHIEF COMPLAINT  UTI    HPI  Az Jolly is a 84 y.o. female who presents to the emergency department for difficulty urinating. She states that she has been having difficulty urinating. She was recently hospitalized for a UTI and was discharged 4 days ago with ciprofloxacin. During her hospitalization, she was noted to have acute kidney injury.  Her cre had been trending down at the time of discharge and her DIAZ was attributed to dehydration. She states that since this morning she has been having difficulty emptying her bladder completely.  She states that she has been drinking water until this morning. She has not had any fevers, back pain, chest pain, shortness of breath, or congestion.     She does admit to lower abdominal pain. She states that she had a harder than normal bowel movement yesterday.     REVIEW OF SYSTEMS  See HPI for further details. All other systems are negative.     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    SOCIAL HISTORY  Social History     Social History Main Topics   • Smoking status: Former Smoker     Packs/day: 1.00     Types: Cigarettes     Quit date: 1/1/1960   • Smokeless tobacco: Never Used      Comment: very little years and years ago   • Alcohol use 0.0 oz/week      Comment: occasional   • Drug use: No   • Sexual activity: Not on file       SURGICAL HISTORY   has a past surgical history that includes bunionectomy (1980); arthroscopy, knee (1986); arthroscope (1989); temporal artery biopsy (1996); lung needle biopsy (1996);  cataract extraction (2006); cholecystectomy (1997); hysterectomy radical (1975); and vein ligation.    CURRENT MEDICATIONS  Home Medications    **Home medications have not yet been reviewed for this encounter**         ALLERGIES  Allergies   Allergen Reactions   • Hydroxyzine Swelling     Eyes get itchy and swollen    • Keflex Swelling   • Naprelan [Naproxen] Swelling     Eyes get itchy become red and swollen   • Oxaprozin Swelling     Swelling eyes, and itchy   • Ampicillin Rash     Red Rash   • Erythromycin Diarrhea     .   • Vi-Q-Tuss [Hydrocodone-Guaifenesin] Vomiting and Nausea   • Voltaren [Diclofenac Sodium] Rash and Swelling     Red rash and swelling   • Baclofen Unspecified     drowsiness   • Citalopram Vomiting and Nausea   • Clarithromycin Unspecified     Pt reports that this medication plugs her ears.    • Promethazine Unspecified     Drowsiness and sleeps       PHYSICAL EXAM  VITAL SIGNS: /70   Pulse 73   Temp 36.3 °C (97.3 °F) (Temporal)   Resp 17   Wt 45.5 kg (100 lb 5 oz)   BMI 15.71 kg/m²    Constitutional: Alert and in no apparent distress.  HENT: Normocephalic atraumatic. Bilateral external ears normal. Nose normal. Mucous membranes are moist.  Eyes: Pupils are equal and reactive. Conjunctiva normal. Non-icteric sclera.   Neck: Normal range of motion without tenderness. Supple. No meningeal signs.  Cardiovascular: Regular rate and rhythm. No murmurs, gallops or rubs.  Thorax & Lungs: Breath sounds are clear to auscultation bilaterally. No wheezing, rhonchi or rales.  Abdomen: Soft and non-distended. There is mild tenderness to palpation in the suprapubic area with no rebound, guarding or rigidity.   Skin: Warm and dry. No rashes are noted.  Back: No bony tenderness, No CVA tenderness.   Extremities: 2+ peripheral pulses. Cap refill is less than 2 seconds. No edema, cyanosis, or clubbing.  Musculoskeletal: Good range of motion in all major joints. No tenderness to palpation or major  deformities noted.   Neurologic: Alert and oriented ×3. The patient moves all 4 extremities and follows commands.  Psychiatric: Affect is normal. Judgment appears to be intact.      DIAGNOSTIC STUDIES / PROCEDURES    LABS  Results for orders placed or performed during the hospital encounter of 12/26/18   CBC WITH DIFFERENTIAL   Result Value Ref Range    WBC 7.7 4.8 - 10.8 K/uL    RBC 4.23 4.20 - 5.40 M/uL    Hemoglobin 12.3 12.0 - 16.0 g/dL    Hematocrit 39.0 37.0 - 47.0 %    MCV 92.2 81.4 - 97.8 fL    MCH 29.1 27.0 - 33.0 pg    MCHC 31.5 (L) 33.6 - 35.0 g/dL    RDW 45.9 35.9 - 50.0 fL    Platelet Count 249 164 - 446 K/uL    MPV 9.0 9.0 - 12.9 fL    Neutrophils-Polys 78.70 (H) 44.00 - 72.00 %    Lymphocytes 15.40 (L) 22.00 - 41.00 %    Monocytes 4.80 0.00 - 13.40 %    Eosinophils 0.40 0.00 - 6.90 %    Basophils 0.40 0.00 - 1.80 %    Immature Granulocytes 0.30 0.00 - 0.90 %    Nucleated RBC 0.00 /100 WBC    Neutrophils (Absolute) 6.07 2.00 - 7.15 K/uL    Lymphs (Absolute) 1.19 1.00 - 4.80 K/uL    Monos (Absolute) 0.37 0.00 - 0.85 K/uL    Eos (Absolute) 0.03 0.00 - 0.51 K/uL    Baso (Absolute) 0.03 0.00 - 0.12 K/uL    Immature Granulocytes (abs) 0.02 0.00 - 0.11 K/uL    NRBC (Absolute) 0.00 K/uL   COMP METABOLIC PANEL   Result Value Ref Range    Sodium 134 (L) 135 - 145 mmol/L    Potassium 4.1 3.6 - 5.5 mmol/L    Chloride 99 96 - 112 mmol/L    Co2 29 20 - 33 mmol/L    Anion Gap 6.0 0.0 - 11.9    Glucose 106 (H) 65 - 99 mg/dL    Bun 23 (H) 8 - 22 mg/dL    Creatinine 1.57 (H) 0.50 - 1.40 mg/dL    Calcium 8.8 8.4 - 10.2 mg/dL    AST(SGOT) 27 12 - 45 U/L    ALT(SGPT) 21 2 - 50 U/L    Alkaline Phosphatase 75 30 - 99 U/L    Total Bilirubin 0.5 0.1 - 1.5 mg/dL    Albumin 3.1 (L) 3.2 - 4.9 g/dL    Total Protein 7.2 6.0 - 8.2 g/dL    Globulin 4.1 (H) 1.9 - 3.5 g/dL    A-G Ratio 0.8 g/dL   URINALYSIS CULTURE, IF INDICATED   Result Value Ref Range    Color Yellow     Character Clear     Specific Gravity <=1.005 <1.035    Ph 5.0  5.0 - 8.0    Glucose Negative Negative mg/dL    Ketones Negative Negative mg/dL    Protein Negative Negative mg/dL    Bilirubin Negative Negative    Nitrite Negative Negative    Leukocyte Esterase Small (A) Negative    Occult Blood Large (A) Negative    Micro Urine Req Microscopic    PROTHROMBIN TIME (INR)   Result Value Ref Range    PT 16.3 (H) 12.0 - 14.6 sec    INR 1.33 (H) 0.87 - 1.13   APTT   Result Value Ref Range    APTT 31.4 24.7 - 36.0 sec   URINE MICROSCOPIC (W/UA)   Result Value Ref Range    WBC  (A) /hpf    RBC  (A) /hpf    Bacteria Few (A) None /hpf    Epithelial Cells Few Few /hpf   ESTIMATED GFR   Result Value Ref Range    GFR If  38 (A) >60 mL/min/1.73 m 2    GFR If Non  31 (A) >60 mL/min/1.73 m 2     RADIOLOGY  CT-RENAL COLIC EVALUATION(A/P W/O)   Final Result      1.  No abnormality identified within the abdomen or pelvis      2.  No hydronephrosis      3.  Mild dilation the bladder, nonspecific      4.  Prior cholecystectomy, hysterectomy, and possibly appendectomy      5.  Bilateral lower lobe scarring          COURSE & MEDICAL DECISION MAKING  Pertinent Labs & Imaging studies reviewed. (See chart for details)    This is an 84-year-old female presenting to the emergency department with difficulty urinating.  On initial evaluation, the patient appeared well and in no acute distress.  Her vital signs were normal, specifically she was afebrile.  Her abdominal exam was benign with only a mild amount of tenderness to palpation in suprapubic area.  She did not have any CVA tenderness.  Urinalysis here was notable for  WBCs with  RBCs and few bacteria.  I did review the patient's previous urine cultures which grew out E. coli which was pan-susceptible. A post void bladder scan did reveal 500 cc in the bladder, but she was able to urinate after this.  I do not think that she requires a Estes catheter at this time.  A CT renal protocol was ordered  given the hematuria.  No hydronephrosis or renal stone was noted. Her kidney function did appear slightly improved from her recent creatinine and BUN performed on 12/22.  Creatinine has gone from 1.69 to 1.57.  Her INR was slightly subtherapeutic at 1.33.  She is currently following with the anticoagulation clinic.  Given that she is still symptomatic despite finishing IV and oral ciprofloxacin, the plan was made to admit for IV antibiotics having failed outpatient management.  Given her extensive medication allergies, I called and discussed the case with pharmacy who did agree that the patient would need meropenem.    1:43 PM - I discussed the case with Dr Santos, hospitalist, who agreed with the plan and accepted the patient.     FINAL IMPRESSION  1. Acute cystitis with hematuria          -ADMIT-           Electronically signed by: Alejandra Carreno, 12/26/2018 10:28 AM

## 2018-12-27 ENCOUNTER — PATIENT OUTREACH (OUTPATIENT)
Dept: HEALTH INFORMATION MANAGEMENT | Facility: OTHER | Age: 83
End: 2018-12-27

## 2018-12-27 LAB
ANION GAP SERPL CALC-SCNC: 5 MMOL/L (ref 0–11.9)
BUN SERPL-MCNC: 24 MG/DL (ref 8–22)
CALCIUM SERPL-MCNC: 8.5 MG/DL (ref 8.4–10.2)
CHLORIDE SERPL-SCNC: 105 MMOL/L (ref 96–112)
CO2 SERPL-SCNC: 26 MMOL/L (ref 20–33)
CREAT SERPL-MCNC: 1.56 MG/DL (ref 0.5–1.4)
ERYTHROCYTE [DISTWIDTH] IN BLOOD BY AUTOMATED COUNT: 46.3 FL (ref 35.9–50)
GLUCOSE SERPL-MCNC: 106 MG/DL (ref 65–99)
HCT VFR BLD AUTO: 38.2 % (ref 37–47)
HGB BLD-MCNC: 12 G/DL (ref 12–16)
INR PPP: 1.49 (ref 0.87–1.13)
MCH RBC QN AUTO: 29.1 PG (ref 27–33)
MCHC RBC AUTO-ENTMCNC: 31.4 G/DL (ref 33.6–35)
MCV RBC AUTO: 92.7 FL (ref 81.4–97.8)
PLATELET # BLD AUTO: 239 K/UL (ref 164–446)
PMV BLD AUTO: 9.3 FL (ref 9–12.9)
POTASSIUM SERPL-SCNC: 4.1 MMOL/L (ref 3.6–5.5)
PROTHROMBIN TIME: 17.8 SEC (ref 12–14.6)
RBC # BLD AUTO: 4.12 M/UL (ref 4.2–5.4)
SODIUM SERPL-SCNC: 136 MMOL/L (ref 135–145)
WBC # BLD AUTO: 5.9 K/UL (ref 4.8–10.8)

## 2018-12-27 PROCEDURE — 85027 COMPLETE CBC AUTOMATED: CPT

## 2018-12-27 PROCEDURE — 85610 PROTHROMBIN TIME: CPT

## 2018-12-27 PROCEDURE — 700105 HCHG RX REV CODE 258: Performed by: INTERNAL MEDICINE

## 2018-12-27 PROCEDURE — 770006 HCHG ROOM/CARE - MED/SURG/GYN SEMI*

## 2018-12-27 PROCEDURE — 80048 BASIC METABOLIC PNL TOTAL CA: CPT

## 2018-12-27 PROCEDURE — 700102 HCHG RX REV CODE 250 W/ 637 OVERRIDE(OP): Performed by: HOSPITALIST

## 2018-12-27 PROCEDURE — 700102 HCHG RX REV CODE 250 W/ 637 OVERRIDE(OP): Performed by: INTERNAL MEDICINE

## 2018-12-27 PROCEDURE — 700111 HCHG RX REV CODE 636 W/ 250 OVERRIDE (IP): Performed by: INTERNAL MEDICINE

## 2018-12-27 PROCEDURE — A9270 NON-COVERED ITEM OR SERVICE: HCPCS | Performed by: HOSPITALIST

## 2018-12-27 PROCEDURE — 99232 SBSQ HOSP IP/OBS MODERATE 35: CPT | Performed by: HOSPITALIST

## 2018-12-27 PROCEDURE — A9270 NON-COVERED ITEM OR SERVICE: HCPCS | Performed by: INTERNAL MEDICINE

## 2018-12-27 RX ORDER — WARFARIN SODIUM 2 MG/1
2 TABLET ORAL
Status: COMPLETED | OUTPATIENT
Start: 2018-12-27 | End: 2018-12-27

## 2018-12-27 RX ADMIN — STANDARDIZED SENNA CONCENTRATE AND DOCUSATE SODIUM 2 TABLET: 8.6; 5 TABLET, FILM COATED ORAL at 18:13

## 2018-12-27 RX ADMIN — SODIUM CHLORIDE: 9 INJECTION, SOLUTION INTRAVENOUS at 06:16

## 2018-12-27 RX ADMIN — SODIUM CHLORIDE: 9 INJECTION, SOLUTION INTRAVENOUS at 18:17

## 2018-12-27 RX ADMIN — METOPROLOL TARTRATE 25 MG: 25 TABLET ORAL at 06:16

## 2018-12-27 RX ADMIN — MAGNESIUM HYDROXIDE 30 ML: 400 SUSPENSION ORAL at 08:39

## 2018-12-27 RX ADMIN — WARFARIN SODIUM 2 MG: 2 TABLET ORAL at 18:13

## 2018-12-27 RX ADMIN — MEROPENEM 500 MG: 500 INJECTION, POWDER, FOR SOLUTION INTRAVENOUS at 23:36

## 2018-12-27 RX ADMIN — MEROPENEM 500 MG: 500 INJECTION, POWDER, FOR SOLUTION INTRAVENOUS at 12:01

## 2018-12-27 RX ADMIN — LEVOTHYROXINE SODIUM 75 MCG: 50 TABLET ORAL at 06:13

## 2018-12-27 RX ADMIN — FOLIC ACID 1 MG: 1 TABLET ORAL at 06:13

## 2018-12-27 RX ADMIN — METOPROLOL TARTRATE 25 MG: 25 TABLET ORAL at 18:13

## 2018-12-27 RX ADMIN — MEROPENEM 500 MG: 500 INJECTION, POWDER, FOR SOLUTION INTRAVENOUS at 18:16

## 2018-12-27 RX ADMIN — MEROPENEM 500 MG: 500 INJECTION, POWDER, FOR SOLUTION INTRAVENOUS at 06:13

## 2018-12-27 ASSESSMENT — ENCOUNTER SYMPTOMS
VOMITING: 0
SHORTNESS OF BREATH: 0
MYALGIAS: 0
ABDOMINAL PAIN: 0
COUGH: 0
CHILLS: 0
PALPITATIONS: 0
DIZZINESS: 0
BLURRED VISION: 0
HEADACHES: 0
NAUSEA: 0
FEVER: 0

## 2018-12-27 ASSESSMENT — PAIN SCALES - GENERAL
PAINLEVEL_OUTOF10: 0
PAINLEVEL_OUTOF10: 0

## 2018-12-27 ASSESSMENT — LIFESTYLE VARIABLES: EVER_SMOKED: NEVER

## 2018-12-27 NOTE — PROGRESS NOTES
2 RN skin assessment done with Monie; skin not WDL. See Wound flowsheet.  Pt has non-blanching redness to her coccyx.

## 2018-12-27 NOTE — DIETARY
"Nutrition services: Day 1 of admit.  Az Jolly is a 84 y.o. female with admitting DX of UTI.     Consult received for Poor PO intake PTA and pt underweight with BMI of 15.78 with wt of 100# and 67\".     Pt with PMH including Wegener's Granulomatosis, Stroke, Mitral Valve Prolapse, Kidney Failure, Hypothyroidism, DVT, Aseptic necrosis of bone, Abdominal bruit and A Fib. Per H&P, pt noted to have a UTI at her recent visit to provider and was placed on A Fib. Pt was transitioned to cipro 2/2 to complication with bactrim. Pt completed ATB though still having symptoms and presented to the ED. Pt with history of many UTIs. No PO recorded at this time though pt with a history of variable PO from 25-75%.     This RD visited pt and family in room. Pt stated she has not been eating well. Pt's son noted she and her  live at an independent living facility. Notes the facility provides three meals a day but the pt states they are typically the same meals over and over. Pt and  have a little kitchenette but they do not cook much (smoothies/oatmeal every now and then). Pt somewhat pre-occupied with macronutrients in foods due to her 's diabetes. Pt notes she drinks ensure sometimes at home and amenable to boost as snacks. Pt requested chocolate. Pt declined smaller portions. RD to continue to monitor to ensure adequate PO intake.     Assessment:  Height: 170.2 cm (5' 7\")  Weight: 45.7 kg (100 lb 12 oz)  Body mass index is 15.78 kg/m².  Diet/Intake: Regular 50-75% x1 (breakfast) updated after note)    Evaluation:   Meds: Folic Acid, Levothyroxine, Meropenem, Senna, Warfarin  Fluids: NS 83mL/hr   Skin: per RN note, pt with non-blanching redness to her coccyx  GI/: Last BM 12/24   Labs: BUN 24 Cr 1.56 GFR 32    Malnutrition Risk: Underweight, Recurrent UTIs, Age     Recommendations/Plan:  1. Diet as ordered.   2. Supplements. Boost Plus TID. Pt prefers chocolate.   3. Encourage intake of 50% or greater. "   4. Document intake of all meals/supplements  as % taken in ADL's to provide interdisciplinary communication across all shifts.   5. Monitor weight.  6. Nutrition rep will continue to see patient for ongoing meal and snack preferences.

## 2018-12-27 NOTE — CARE PLAN
Problem: Communication  Goal: The ability to communicate needs accurately and effectively will improve  Outcome: PROGRESSING AS EXPECTED    Intervention: [place] patient and significant other/support system to call light to alert staff of needs  Patient oriented to call light system and all relevant hospital policies. Call light within reach and used appropriately when in need of assistance.        Problem: Infection  Goal: Will remain free from infection  Outcome: PROGRESSING AS EXPECTED    Intervention: Implement standard precautions and perform hand washing before and after patient contact  Standard precautions and hand hygiene practices implemented before and after patient room entry. Gloves worn during times of patient contact.

## 2018-12-27 NOTE — PROGRESS NOTES
[facility] Medicine Daily Progress Note    Date of Service  12/27/2018    Chief Complaint  84 y.o. female admitted 12/26/2018 with urinary hesitancy and hematuria    Hospital Course    84 y.o. female who presented 12/26/2018 with urinary hesitancy and hematuria.  Patient symptoms started last week so she saw a provider, was noted to have a urinary tract infection and was placed on Bactrim.  Pansensitive E. coli grew.  Patient is on Coumadin, her INR increased due to the Bactrim so she was transitioned to ciprofloxacin.  Patient finished her course of ciprofloxacin today.  She states her hesitancy has continued and yesterday she began having hematuria which persisted so she presented to the emergency department.  Upon arrival she was noted to still have a positive urinalysis.      Interval Problem Update  Feeling better, urinary symptoms improving; was having hesitancy, now able to urinate better  UCx pending    Consultants/Specialty  None    Code Status  FULL    Disposition  ?home tomorrow if we get her cultures and know sensitivities    D/W tx team on rounds, family at bedside    Review of Systems  Review of Systems   Constitutional: Negative for chills and fever.   Eyes: Negative for blurred vision.   Respiratory: Negative for cough and shortness of breath.    Cardiovascular: Negative for chest pain and palpitations.   Gastrointestinal: Negative for abdominal pain, nausea and vomiting.   Genitourinary: Positive for frequency (improved). Negative for dysuria (improved).   Musculoskeletal: Negative for myalgias.   Neurological: Negative for dizziness and headaches.   All other systems reviewed and are negative.       Physical Exam  Temp:  [36.3 °C (97.4 °F)-36.6 °C (97.8 °F)] 36.4 °C (97.6 °F)  Pulse:  [74-94] 76  Resp:  [18] 18  BP: (112-121)/(50-70) 121/64    Physical Exam   Constitutional: She is oriented to person, place, and time. She appears well-developed and well-nourished.   HENT:   Head: Normocephalic and  atraumatic.   Cardiovascular: Normal rate, regular rhythm and normal heart sounds.    No murmur heard.  Pulmonary/Chest: Effort normal and breath sounds normal. No respiratory distress. She has no wheezes. She has no rales.   Abdominal: Soft. Bowel sounds are normal. She exhibits no distension. There is no tenderness.   Musculoskeletal: Normal range of motion. She exhibits no edema.   Neurological: She is alert and oriented to person, place, and time.   Skin: Skin is warm and dry. No erythema.   Nursing note and vitals reviewed.      Fluids    Intake/Output Summary (Last 24 hours) at 12/27/18 1057  Last data filed at 12/27/18 0900   Gross per 24 hour   Intake          1786.72 ml   Output              300 ml   Net          1486.72 ml       Laboratory  Recent Labs      12/26/18   1059  12/27/18   0133   WBC  7.7  5.9   RBC  4.23  4.12*   HEMOGLOBIN  12.3  12.0   HEMATOCRIT  39.0  38.2   MCV  92.2  92.7   MCH  29.1  29.1   MCHC  31.5*  31.4*   RDW  45.9  46.3   PLATELETCT  249  239   MPV  9.0  9.3     Recent Labs      12/26/18   1059  12/27/18   0133   SODIUM  134*  136   POTASSIUM  4.1  4.1   CHLORIDE  99  105   CO2  29  26   GLUCOSE  106*  106*   BUN  23*  24*   CREATININE  1.57*  1.56*   CALCIUM  8.8  8.5     Recent Labs      12/26/18   1059  12/27/18   0133   APTT  31.4   --    INR  1.33*  1.49*               Imaging  CT-RENAL COLIC EVALUATION(A/P W/O)   Final Result      1.  No abnormality identified within the abdomen or pelvis      2.  No hydronephrosis      3.  Mild dilation the bladder, nonspecific      4.  Prior cholecystectomy, hysterectomy, and possibly appendectomy      5.  Bilateral lower lobe scarring           Assessment/Plan  UTI (urinary tract infection)- (present on admission)   Assessment & Plan    Recurrent, failed outpatient ciprofloxacin, full course  ERP had discussed with pharmacy who recommended meropenem; awaiting cultures  Symptoms improving  Patient was having hesitancy and hematuria,  resolving       CKD (chronic kidney disease) stage 3, GFR 30-59 ml/min (HCC)- (present on admission)   Assessment & Plan    First noted 12/20/18 when she was diagnosed with urinary tract infection, had been on bactrim  Has slowly improved but still not back to her previous baseline  About the same with IV fluids  Repeat BMP in the morning     Hyponatremia- (present on admission)   Assessment & Plan    Mild, likely due to dehydration  Resolved with IV fluids; check in am     Atrial flutter (HCC)- (present on admission)   Assessment & Plan    Chronic, still sinus on metoprolol  Continue Coumadin; follow INR     Hypothyroid- (present on admission)   Assessment & Plan    Continue Synthroid at 75 mcg  Recent TSH 7 days ago was elevated at 6.38, will check free t4     HTN (hypertension)- (present on admission)   Assessment & Plan    Controlled on metoprolol             VTE prophylaxis: coumadin

## 2018-12-27 NOTE — PROGRESS NOTES
Assessment completed, patient denies any pain, only issues with urination that include initiating urination and urgency. IV fluids infusing, patient fatigued.

## 2018-12-27 NOTE — PROGRESS NOTES
Received report from day shift RN Mendel. Plan of care discussed at bedside. Patient resting comfortably in bed at this time with no complaints. Safety precautions and hourly rounding in place.

## 2018-12-27 NOTE — CARE PLAN
Problem: Infection  Goal: Will remain free from infection    Intervention: Assess signs and symptoms of infection  Currently awaiting for urine culture, but as of now focusing on meropenum.       Problem: Mobility  Goal: Risk for activity intolerance will decrease    Intervention: Assess and monitor signs of activity intolerance  Pt ambulated to the nursing station using a front-wheel walker; would benefit from her 4 wheel walker r/t right side weakness.  Have asked the family.

## 2018-12-27 NOTE — PROGRESS NOTES
Report received from emma Lomax awake in bed.  Morning assessment done after report.  Pt complaining that she has the urge to go the bathroom but nothing is coming.  She wants to wait until after breakfast before taking anything extra and she does not like prune juice.  When asking about voiding she reports she is still having hesitancy when she voids otherwise no symptoms.  When talking about poc will to walk this morning and sit up in a chair.  Pt ambulated about 1010 with a front wheel walker about 1020, she was having difficulty getting it to go straight as she has right-sided weakness and normally uses a 4 wheel walker.  Pt sitting up in a chair now with her  at bedside.

## 2018-12-27 NOTE — PROGRESS NOTES
Inpatient Anticoagulation Service Note    Date: 2018  Reason for Anticoagulation: Atrial Fibrillation   [id number] VASc Score: 6    Hemoglobin Value: 12  Hematocrit Value: 38.2  Lab Platelet Value: 239  Target INR: 2.0 to 3.0    INR from last 7 days     Date/Time INR Value    18 0133 (!)  1.49    18 1059 (!)  1.33        Dose from last 7 days     Date/Time Dose (mg)    18 0700  2    18 1059  1        Significant Interactions: Antibiotics, Thyroid Medications  Bridge Therapy: No    Comments:  (Anticoag Clinic Patient: 1-2 mg daily)    Plan:  Coumadin 2 mg PO tonight for INR 1.49  Education Material Provided?: No  Pharmacist suggested discharge dosin mg daily and follow up with [facility]     Meredith Brito PharmD

## 2018-12-28 ENCOUNTER — PATIENT OUTREACH (OUTPATIENT)
Dept: HEALTH INFORMATION MANAGEMENT | Facility: OTHER | Age: 83
End: 2018-12-28

## 2018-12-28 ENCOUNTER — APPOINTMENT (OUTPATIENT)
Dept: VASCULAR LAB | Facility: MEDICAL CENTER | Age: 83
End: 2018-12-28
Attending: INTERNAL MEDICINE
Payer: MEDICARE

## 2018-12-28 VITALS
HEIGHT: 67 IN | TEMPERATURE: 97.9 F | SYSTOLIC BLOOD PRESSURE: 104 MMHG | HEART RATE: 76 BPM | WEIGHT: 100.75 LBS | OXYGEN SATURATION: 99 % | DIASTOLIC BLOOD PRESSURE: 82 MMHG | RESPIRATION RATE: 18 BRPM | BODY MASS INDEX: 15.81 KG/M2

## 2018-12-28 PROBLEM — E87.1 HYPONATREMIA: Status: RESOLVED | Noted: 2018-12-20 | Resolved: 2018-12-28

## 2018-12-28 PROBLEM — N39.0 UTI (URINARY TRACT INFECTION): Status: RESOLVED | Noted: 2018-12-20 | Resolved: 2018-12-28

## 2018-12-28 LAB
ANION GAP SERPL CALC-SCNC: 7 MMOL/L (ref 0–11.9)
BACTERIA UR CULT: NORMAL
BUN SERPL-MCNC: 30 MG/DL (ref 8–22)
CALCIUM SERPL-MCNC: 7.9 MG/DL (ref 8.4–10.2)
CHLORIDE SERPL-SCNC: 107 MMOL/L (ref 96–112)
CO2 SERPL-SCNC: 22 MMOL/L (ref 20–33)
CREAT SERPL-MCNC: 1.24 MG/DL (ref 0.5–1.4)
GLUCOSE SERPL-MCNC: 84 MG/DL (ref 65–99)
INR PPP: 1.36 (ref 0.87–1.13)
POTASSIUM SERPL-SCNC: 4.5 MMOL/L (ref 3.6–5.5)
PROTHROMBIN TIME: 16.6 SEC (ref 12–14.6)
SIGNIFICANT IND 70042: NORMAL
SITE SITE: NORMAL
SODIUM SERPL-SCNC: 136 MMOL/L (ref 135–145)
SOURCE SOURCE: NORMAL
T4 FREE SERPL-MCNC: 1.02 NG/DL (ref 0.58–1.64)

## 2018-12-28 PROCEDURE — 700102 HCHG RX REV CODE 250 W/ 637 OVERRIDE(OP): Performed by: HOSPITALIST

## 2018-12-28 PROCEDURE — A9270 NON-COVERED ITEM OR SERVICE: HCPCS | Performed by: INTERNAL MEDICINE

## 2018-12-28 PROCEDURE — 84439 ASSAY OF FREE THYROXINE: CPT

## 2018-12-28 PROCEDURE — A9270 NON-COVERED ITEM OR SERVICE: HCPCS | Performed by: HOSPITALIST

## 2018-12-28 PROCEDURE — 85610 PROTHROMBIN TIME: CPT

## 2018-12-28 PROCEDURE — 700111 HCHG RX REV CODE 636 W/ 250 OVERRIDE (IP): Performed by: INTERNAL MEDICINE

## 2018-12-28 PROCEDURE — 99239 HOSP IP/OBS DSCHRG MGMT >30: CPT | Performed by: HOSPITALIST

## 2018-12-28 PROCEDURE — 700105 HCHG RX REV CODE 258: Performed by: INTERNAL MEDICINE

## 2018-12-28 PROCEDURE — 80048 BASIC METABOLIC PNL TOTAL CA: CPT

## 2018-12-28 PROCEDURE — 700102 HCHG RX REV CODE 250 W/ 637 OVERRIDE(OP): Performed by: INTERNAL MEDICINE

## 2018-12-28 RX ORDER — WARFARIN SODIUM 2.5 MG/1
2.5 TABLET ORAL
Status: DISCONTINUED | OUTPATIENT
Start: 2018-12-28 | End: 2018-12-28 | Stop reason: HOSPADM

## 2018-12-28 RX ORDER — WARFARIN SODIUM 2 MG/1
TABLET ORAL
Qty: 30 TAB | Refills: 0
Start: 2018-12-28 | End: 2019-03-13 | Stop reason: SDUPTHER

## 2018-12-28 RX ADMIN — FOLIC ACID 1 MG: 1 TABLET ORAL at 06:24

## 2018-12-28 RX ADMIN — SODIUM CHLORIDE: 9 INJECTION, SOLUTION INTRAVENOUS at 06:25

## 2018-12-28 RX ADMIN — METOPROLOL TARTRATE 25 MG: 25 TABLET ORAL at 06:25

## 2018-12-28 RX ADMIN — MEROPENEM 500 MG: 500 INJECTION, POWDER, FOR SOLUTION INTRAVENOUS at 06:25

## 2018-12-28 RX ADMIN — LEVOTHYROXINE SODIUM 75 MCG: 50 TABLET ORAL at 06:25

## 2018-12-28 ASSESSMENT — PAIN SCALES - GENERAL
PAINLEVEL_OUTOF10: 0
PAINLEVEL_OUTOF10: 0

## 2018-12-28 NOTE — CARE PLAN
Problem: Knowledge Deficit  Goal: Knowledge of disease process/condition, treatment plan, diagnostic tests, and medications will improve  Outcome: PROGRESSING AS EXPECTED    Intervention: Assess knowledge level of disease process/condition, treatment plan, diagnostic tests, and medications  Patient's knowledge of plan of care (IV fluids, monitoring wbc's), diagnostics, and medications (IVPB Merrem) regularly assessed. RN answers patient questions appropriately, education provided. Patient verbalizes better understanding of their condition.

## 2018-12-28 NOTE — PROGRESS NOTES
Patient a+o x 4, denies sob/lightheadedness/dizziness/chest pain/n/v/d. BM today, denies hesitancy/pain/burning with urination, vss, afebrile.  Tolerating diet. Fall precautions/hourly rounding maintained, call light within reach and functioning, all items within reach.  Patient encouraged to call for assistance, poc reviewed with patient, ?'s/concerns answered.     Patient states she is ready and safe to be discharged home. Order and paperwork pending MD.     Discharge order in. D/c instructions reviewed with patient and spouse.     Coumadin teaching provided. Patient to follow up 12/31 at coumadin clinic. Dosage instructions reviewed with patient (via d/c paperwork).

## 2018-12-28 NOTE — CARE PLAN
Problem: Bowel/Gastric:  Goal: Normal bowel function is maintained or improved  Outcome: PROGRESSING AS EXPECTED    Intervention: Educate patient and significant other/support system about diet, fluid intake, medications and activity to promote bowel function  IV fluids in place, PO fluid intake encouraged. Miralax given to patient, patient had BM today.

## 2018-12-28 NOTE — PROGRESS NOTES
Pt sat up in the chair for lunch until about 1300.  IV infiltrated about 1500 and new one was placed in the left ac.  Pt had a small pebble like stool after dinner, but she states she stills feels full.  Report given to Monie.

## 2018-12-28 NOTE — PROGRESS NOTES
Inpatient Anticoagulation Service Note    Date: 2018  Reason for Anticoagulation: Atrial Fibrillation   [id number] VASc Score: 6    Hemoglobin Value: 12  Hematocrit Value: 38.2  Lab Platelet Value: 239  Target INR: 2.0 to 3.0    INR from last 7 days     Date/Time INR Value    18 0420 (!)  1.36    18 0133 (!)  1.49    18 1059 (!)  1.33        Dose from last 7 days     Date/Time Dose (mg)    18 0800  2.5    18 0700  2    18 1059  1        Significant Interactions: Antibiotics, Thyroid Medications  Bridge Therapy: No (If less than 5 days and overlap therapy discontinued -- document reason (i.e. Bleed Risk))    (If still on overlap therapy, if No -- document reason (i.e. Bleed Risk))    Comments:  (Anticoag Clinic Patient  1-2mg daily)    Plan:  Coumadin 2.5 mg PO tonight for INR 1.36  Education Material Provided?: No  Pharmacist suggested discharge dosin mg daily once INR > 2     Meredith MartinezD

## 2018-12-28 NOTE — PROGRESS NOTES
Assessment completed, patient is fatigued again this evening but pleasant. Aware of POC. IV fluids infusing. Tucked into bed, extra blanket provided.

## 2018-12-28 NOTE — PROGRESS NOTES
0630 Morning meds given, patient slightly irritable this morning, reassured by RN. Provided blankets for warmth and comfort. IV merrem and new fluids hung.    0700 Report to Norman FRASER, POC discussed.

## 2018-12-28 NOTE — DISCHARGE INSTRUCTIONS
Discharge Nursing Communication Start: 12/28/18 1058, CONTINUOUS, ROUTINE     Comments: DC IV   Dx:  Recurrent uti, negative ucx, resolved   Condition:  Stable   Diet:  Regular   Activity: as tolerated   Meds:  INCREASE coumadin to 3mg tonight then 2mg nightly until Monday anticoag clinic; o/w continue home meds (STOP cipro)   F/U with Dr. Ramsey, urology in 1-2 weeks, PCP as scheduled; coumadin clinic Monday   Return to ER if any fever, blood in urine, difficulty urinating          Discharge Instructions    Discharged to home by car with relative. Discharged via wheelchair, hospital escort: Yes.  Special equipment needed: Not Applicable    Be sure to schedule a follow-up appointment with your primary care doctor or any specialists as instructed.     Discharge Plan:        I understand that a diet low in cholesterol, fat, and sodium is recommended for good health. Unless I have been given specific instructions below for another diet, I accept this instruction as my diet prescription.   Other diet: regular    Special Instructions: None    · Is patient discharged on Warfarin / Coumadin?   Yes    You are receiving the drug warfarin. Please understand the importance of monitoring warfarin with scheduled PT/INR blood draws.  Follow-up with the [facility] in one week for INR lab..    IMPORTANT: HOW TO USE THIS INFORMATION:  This is a summary and does NOT have all possible information about this product. This information does not assure that this product is safe, effective, or appropriate for you. This information is not individual medical advice and does not substitute for the advice of your health care professional. Always ask your health care professional for complete information about this product and your specific health needs.      WARFARIN - ORAL (WARF-uh-rin)      COMMON BRAND NAME(S): Coumadin      WARNING:  Warfarin can cause very serious (possibly fatal) bleeding. This is more likely to occur when you first  "start taking this medication or if you take too much warfarin. To decrease your risk for bleeding, your doctor or other health care provider will monitor you closely and check your lab results (INR test) to make sure you are not taking too much warfarin. Keep all medical and laboratory appointments. Tell your doctor right away if you notice any signs of serious bleeding. See also Side Effects section.      USES:  This medication is used to treat blood clots (such as in deep vein thrombosis-DVT or pulmonary embolus-PE) and/or to prevent new clots from forming in your body. Preventing harmful blood clots helps to reduce the risk of a stroke or heart attack. Conditions that increase your risk of developing blood clots include a certain type of irregular heart rhythm (atrial fibrillation), heart valve replacement, recent heart attack, and certain surgeries (such as hip/knee replacement). Warfarin is commonly called a \"blood thinner,\" but the more correct term is \"anticoagulant.\" It helps to keep blood flowing smoothly in your body by decreasing the amount of certain substances (clotting proteins) in your blood.      HOW TO USE:  Read the Medication Guide provided by your pharmacist before you start taking warfarin and each time you get a refill. If you have any questions, ask your doctor or pharmacist. Take this medication by mouth with or without food as directed by your doctor or other health care professional, usually once a day. It is very important to take it exactly as directed. Do not increase the dose, take it more frequently, or stop using it unless directed by your doctor. Dosage is based on your medical condition, laboratory tests (such as INR), and response to treatment. Your doctor or other health care provider will monitor you closely while you are taking this medication to determine the right dose for you. Use this medication regularly to get the most benefit from it. To help you remember, take it at the " same time each day. It is important to eat a balanced, consistent diet while taking warfarin. Some foods can affect how warfarin works in your body and may affect your treatment and dose. Avoid sudden large increases or decreases in your intake of foods high in vitamin K (such as broccoli, cauliflower, cabbage, brussels sprouts, kale, spinach, and other green leafy vegetables, liver, green tea, certain vitamin supplements). If you are trying to lose weight, check with your doctor before you try to go on a diet. Cranberry products may also affect how your warfarin works. Limit the amount of cranberry juice (16 ounces/480 milliliters a day) or other cranberry products you may drink or eat.      SIDE EFFECTS:  Nausea, loss of appetite, or stomach/abdominal pain may occur. If any of these effects persist or worsen, tell your doctor or pharmacist promptly. Remember that your doctor has prescribed this medication because he or she has judged that the benefit to you is greater than the risk of side effects. Many people using this medication do not have serious side effects. This medication can cause serious bleeding if it affects your blood clotting proteins too much (shown by unusually high INR lab results). Even if your doctor stops your medication, this risk of bleeding can continue for up to a week. Tell your doctor right away if you have any signs of serious bleeding, including: unusual pain/swelling/discomfort, unusual/easy bruising, prolonged bleeding from cuts or gums, persistent/frequent nosebleeds, unusually heavy/prolonged menstrual flow, pink/dark urine, coughing up blood, vomit that is bloody or looks like coffee grounds, severe headache, dizziness/fainting, unusual or persistent tiredness/weakness, bloody/black/tarry stools, chest pain, shortness of breath, difficulty swallowing. Tell your doctor right away if any of these unlikely but serious side effects occur: persistent nausea/vomiting, severe  stomach/abdominal pain, yellowing eyes/skin. This drug rarely has caused very serious (possibly fatal) problems if its effects lead to small blood clots (usually at the beginning of treatment). This can lead to severe skin/tissue damage that may require surgery or amputation if left untreated. Patients with certain blood conditions (protein C or S deficiency) may be at greater risk. Get medical help right away if any of these rare but serious side effects occur: painful/red/purplish patches on the skin (such as on the toe, breast, abdomen), change in the amount of urine, vision changes, confusion, slurred speech, weakness on one side of the body. A very serious allergic reaction to this drug is rare. However, get medical help right away if you notice any symptoms of a serious allergic reaction, including: rash, itching/swelling (especially of the face/tongue/throat), severe dizziness, trouble breathing. This is not a complete list of possible side effects. If you notice other effects not listed above, contact your doctor or pharmacist. In the [place] - Call your doctor for medical advice about side effects. You may report side effects to FDA at 5-746-VJS-2658. In [place] - Call your doctor for medical advice about side effects. You may report side effects to [facility] [place] at [phone].      PRECAUTIONS:  Before taking warfarin, tell your doctor or pharmacist if you are allergic to it; or if you have any other allergies. This product may contain inactive ingredients, which can cause allergic reactions or other problems. Talk to your pharmacist for more details. Before using this medication, tell your doctor or pharmacist your medical history, especially of: blood disorders (such as anemia, hemophilia), bleeding problems (such as bleeding of the stomach/intestines, bleeding in the brain), blood vessel disorders (such as aneurysms), recent major injury/surgery, liver disease, alcohol use, mental/mood disorders  (including memory problems), frequent falls/injuries. It is important that all your doctors and dentists know that you take warfarin. Before having surgery or any medical/dental procedures, tell your doctor or dentist that you are taking this medication and about all the products you use (including prescription drugs, nonprescription drugs, and herbal products). Avoid getting injections into the muscles. If you must have an injection into a muscle (for example, a flu shot), it should be given in the arm. This way, it will be easier to check for bleeding and/or apply pressure bandages. This medication may cause stomach bleeding. Daily use of alcohol while using this medicine will increase your risk for stomach bleeding and may also affect how this medication works. Limit or avoid alcoholic beverages. If you have not been eating well, if you have an illness or infection that causes fever, vomiting, or diarrhea for more than 2 days, or if you start using any antibiotic medications, contact your doctor or pharmacist immediately because these conditions can affect how warfarin works. This medication can cause heavy bleeding. To lower the chance of getting cut, bruised, or injured, use great caution with sharp objects like safety razors and nail cutters. Use an electric razor when shaving and a soft toothbrush when brushing your teeth. Avoid activities such as contact sports. If you fall or injure yourself, especially if you hit your head, call your doctor immediately. Your doctor may need to check you. The Food & Drug Administration has stated that generic warfarin products are interchangeable. However, consult your doctor or pharmacist before switching warfarin products. Be careful not to take more than one medication that contains warfarin unless specifically directed by the doctor or health care provider who is monitoring your warfarin treatment. Older adults may be at greater risk for bleeding while using this drug.  "This medication is not recommended for use during pregnancy because of serious (possibly fatal) harm to an unborn baby. Discuss the use of reliable forms of birth control with your doctor. If you become pregnant or think you may be pregnant, tell your doctor immediately. If you are planning pregnancy, discuss a plan for managing your condition with your doctor before you become pregnant. Your doctor may switch the type of medication you use during pregnancy. Very small amounts of this medication may pass into breast milk but is unlikely to harm a nursing infant. Consult your doctor before breast-feeding.      DRUG INTERACTIONS:  Drug interactions may change how your medications work or increase your risk for serious side effects. This document does not contain all possible drug interactions. Keep a list of all the products you use (including prescription/nonprescription drugs and herbal products) and share it with your doctor and pharmacist. Do not start, stop, or change the dosage of any medicines without your doctor's approval. Warfarin interacts with many prescription, nonprescription, vitamin, and herbal products. This includes medications that are applied to the skin or inside the vagina or rectum. The interactions with warfarin usually result in an increase or decrease in the \"blood-thinning\" (anticoagulant) effect. Your doctor or other health care professional should closely monitor you to prevent serious bleeding or clotting problems. While taking warfarin, it is very important to tell your doctor or pharmacist of any changes in medications, vitamins, or herbal products that you are taking. Some products that may interact with this drug include: capecitabine, imatinib, mifepristone. Aspirin, aspirin-like drugs (salicylates), and nonsteroidal anti-inflammatory drugs (NSAIDs such as ibuprofen, naproxen, celecoxib) may have effects similar to warfarin. These drugs may increase the risk of bleeding problems if " taken during treatment with warfarin. Carefully check all prescription/nonprescription product labels (including drugs applied to the skin such as pain-relieving creams) since the products may contain NSAIDs or salicylates. Talk to your doctor about using a different medication (such as acetaminophen) to treat pain/fever. Low-dose aspirin and related drugs (such as clopidogrel, ticlopidine) should be continued if prescribed by your doctor for specific medical reasons such as heart attack or stroke prevention. Consult your doctor or pharmacist for more details. Many herbal products interact with warfarin. Tell your doctor before taking any herbal products, especially bromelains, coenzyme Q10, cranberry, danshen, dong quai, fenugreek, garlic, ginkgo biloba, ginseng, and Dorothy's wort, among others. This medication may interfere with a certain laboratory test to measure theophylline levels, possibly causing false test results. Make sure laboratory personnel and all your doctors know you use this drug.      OVERDOSE:  If overdose is suspected, contact a poison control center or emergency room immediately. [place] residents can call the [place] National Poison Hotline at 1-908.216.5797. [place] residents can call a provincial poison control center. Symptoms of overdose may include: bloody/black/tarry stools, pink/dark urine, unusual/prolonged bleeding.      NOTES:  Do not share this medication with others. Laboratory and/or medical tests (such as INR, complete blood count) must be performed periodically to monitor your progress or check for side effects. Consult your doctor for more details.      MISSED DOSE:  For the best possible benefit, do not miss any doses. If you do miss a dose and remember on the same day, take it as soon as you remember. If you remember on the next day, skip the missed dose and resume your usual dosing schedule. Do not double the dose to catch up because this could increase your risk for bleeding. Keep a  record of missed doses to give to your doctor or pharmacist. Contact your doctor or pharmacist if you miss 2 or more doses in a row.      STORAGE:  Store at room temperature away from light and moisture. Do not store in the bathroom. Keep all medications away from children and pets. Do not flush medications down the toilet or pour them into a drain unless instructed to do so. Properly discard this product when it is  or no longer needed. Consult your pharmacist or local waste disposal company for more details about how to safely discard your product.      MEDICAL ALERT:  Your condition and medication can cause complications in a medical emergency. For information about enrolling in MedicAlert, call 1-737.476.4747 ([place]) or 1-460.989.5781 ([place]).      Information last revised 2010 Copyright(c)  [employer]             Depression / Suicide Risk    As you are discharged from this [facility] facility, it is important to learn how to keep safe from harming yourself.    Recognize the warning signs:  · Abrupt changes in personality, positive or negative- including increase in energy   · Giving away possessions  · Change in eating patterns- significant weight changes-  positive or negative  · Change in sleeping patterns- unable to sleep or sleeping all the time   · Unwillingness or inability to communicate  · Depression  · Unusual sadness, discouragement and loneliness  · Talk of wanting to die  · Neglect of personal appearance   · Rebelliousness- reckless behavior  · Withdrawal from people/activities they love  · Confusion- inability to concentrate     If you or a loved one observes any of these behaviors or has concerns about self-harm, here's what you can do:  · Talk about it- your feelings and reasons for harming yourself  · Remove any means that you might use to hurt yourself (examples: pills, rope, extension cords, firearm)  · Get professional help from the community (Mental Health,  Substance Abuse, psychological counseling)  · Do not be alone:Call your Safe Contact- someone whom you trust who will be there for you.  · Call your local CRISIS HOTLINE 575-5972 or 677-702-1897  · Call your local Children's Mobile Crisis Response Team [place] (596) 663-6329 or www.[place]  · Call the toll free National Suicide Prevention Hotlines   · National Suicide Prevention Lifeline 899-233-LZGW (8166)  · National Hope Line Network 800-SUICIDE (220-6579)

## 2018-12-28 NOTE — DISCHARGE SUMMARY
Discharge Summary    CHIEF COMPLAINT ON ADMISSION  Chief Complaint   Patient presents with   • UTI       Reason for Admission  EMS      Admission Date  12/26/2018    CODE STATUS  Full Code    HPI & HOSPITAL COURSE  This is an  84 y.o. female who presented 12/26/2018 with urinary hesitancy and hematuria.  Patient symptoms started last week so she saw a provider, was noted to have a urinary tract infection and was placed on Bactrim.  Pansensitive E. coli grew.  Patient is on Coumadin, her INR increased due to the Bactrim so she was transitioned to ciprofloxacin.  Patient finished her course of ciprofloxacin today.  She states her hesitancy has continued and yesterday she began having hematuria which persisted so she presented to the emergency department.  Upon arrival she was noted to still have a positive urinalysis.   She was brought in and placed on meropenem empirically, but her urine culture came back negative.  Her urinary symptoms improved with less retention and no blood.    I also made her an appointment with urology to f/u on her recurrent UTIs.  She will see them in January.    Therefore, she is discharged in good and stable condition to home with close outpatient follow-up.    The patient met 2-midnight criteria for an inpatient stay at the time of discharge.    Discharge Date  12/28/18    FOLLOW UP ITEMS POST DISCHARGE  Return to ER if any fever, blood in urine, difficulty urinating    DISCHARGE DIAGNOSES  Active Problems:    HTN (hypertension) POA: Yes    Hypothyroid POA: Yes    Atrial flutter (HCC) POA: Yes    CKD (chronic kidney disease) stage 3, GFR 30-59 ml/min (HCC) POA: Yes  Resolved Problems:    UTI (urinary tract infection) POA: Yes    Hyponatremia POA: Yes      FOLLOW UP  Future Appointments  Date Time Provider Department Center   1/3/2019 4:45 PM [facility] EXAM 5 VMED None   3/13/2019 1:00 PM Hussain Nation M.D. CB None     Toribio Schmidt M.D.  [address] [place]  [id number]  [phone]    Go on 1/14/2019  Please arrive at 1:30 pm for your appointment with primary care.    Nidia Escalera, A.P.N.  [address]  [place] [address]  [phone]    Go on 1/17/2019  Please arrive at 2:00 pm for a 2:30 pm appointment with [facility] [place].      MEDICATIONS ON DISCHARGE     Medication List      CHANGE how you take these medications      Instructions   warfarin 2 MG Tabs  What changed:  · how much to take  · how to take this  · when to take this  · additional instructions  Commonly known as:  COUMADIN   Take 3 mg tonight, then 2mg a night until you see coumadin clinic Monday        CONTINUE taking these medications      Instructions   ADULT ONE DAILY GUMMIES PO   Take 2 Tabs by mouth every day.  Dose:  2 Tab     artificial tears 1.4 % Soln   Place 1 Drop in both eyes as needed (For Dry eye).  Dose:  1 Drop     BIOTIN PO   Take 1 Tab by mouth every day.  Dose:  1 Tab     CALCIUM PO   Take 1 Tab by mouth every day.  Dose:  1 Tab     folic acid 1 MG Tabs  Commonly known as:  FOLVITE   Take 1 mg by mouth every day.  Dose:  1 mg     levothyroxine 75 MCG Tabs  Commonly known as:  SYNTHROID   Take 75 mcg by mouth every morning before breakfast.  Dose:  75 mcg     metoprolol 25 MG Tabs  Commonly known as:  LOPRESSOR   Take 1 Tab by mouth 2 times a day.  Dose:  25 mg     polyethylene glycol/lytes Pack  Commonly known as:  MIRALAX   Take 17 g by mouth 1 time daily as needed.  Dose:  17 g     SYSTANE 0.4-0.3 % Soln  Generic drug:  polyethyl glycol-propyl glycol   Place 1 Drop in both eyes as needed.  Dose:  1 Drop        STOP taking these medications    ciprofloxacin 500 MG Tabs  Commonly known as:  CIPRO            Allergies  Allergies   Allergen Reactions   • Cephalexin [Keflex] Swelling   • Hydroxyzine Swelling     Eyes get itchy and swollen    • Naprelan [Naproxen] Swelling     Eyes get itchy become red and swollen   • Oxaprozin Swelling     Swelling eyes, and itchy   • Ampicillin Rash      Red Rash   • Erythromycin Diarrhea     .   • Vi-Q-Tuss [Hydrocodone-Guaifenesin] Vomiting and Nausea   • Voltaren [Diclofenac Sodium] Rash and Swelling     Red rash and swelling   • Baclofen Unspecified     drowsiness   • Citalopram Vomiting and Nausea   • Clarithromycin Unspecified     Pt reports that this medication plugs her ears.    • Promethazine Unspecified     Drowsiness and sleeps       DIET  Orders Placed This Encounter   Procedures   • Diet Order Regular (No Milk per pt request)     Standing Status:   Standing     Number of Occurrences:   1     Order Specific Question:   Diet:     Answer:   Regular [1]     Comments:   No Milk per pt request     Order Specific Question:   Texture/Fiber modifications:     Answer:   Chopped Meat [5]       ACTIVITY  As tolerated.  Weight bearing as tolerated    CONSULTATIONS  None    PROCEDURES  None    LABORATORY  Lab Results   Component Value Date    SODIUM 136 12/28/2018    POTASSIUM 4.5 12/28/2018    CHLORIDE 107 12/28/2018    CO2 22 12/28/2018    GLUCOSE 84 12/28/2018    BUN 30 (H) 12/28/2018    CREATININE 1.24 12/28/2018    CREATININE 1.5 (H) 02/19/2009        Lab Results   Component Value Date    WBC 5.9 12/27/2018    HEMOGLOBIN 12.0 12/27/2018    HEMATOCRIT 38.2 12/27/2018    PLATELETCT 239 12/27/2018        Total time of the discharge process exceeds 38 minutes.

## 2018-12-29 ENCOUNTER — PATIENT OUTREACH (OUTPATIENT)
Dept: HEALTH INFORMATION MANAGEMENT | Facility: OTHER | Age: 83
End: 2018-12-29

## 2019-01-02 ENCOUNTER — ANTICOAGULATION VISIT (OUTPATIENT)
Dept: VASCULAR LAB | Facility: MEDICAL CENTER | Age: 84
End: 2019-01-02
Attending: INTERNAL MEDICINE
Payer: MEDICARE

## 2019-01-02 DIAGNOSIS — I63.02 CEREBROVASCULAR ACCIDENT (CVA) DUE TO THROMBOSIS OF BASILAR ARTERY (HCC): ICD-10-CM

## 2019-01-02 DIAGNOSIS — I48.92 ATRIAL FLUTTER, UNSPECIFIED TYPE (HCC): ICD-10-CM

## 2019-01-02 LAB
INR BLD: 2 (ref 0.9–1.2)
INR PPP: 2 (ref 2–3.5)

## 2019-01-02 PROCEDURE — 99211 OFF/OP EST MAY X REQ PHY/QHP: CPT | Performed by: PHARMACIST

## 2019-01-02 PROCEDURE — 85610 PROTHROMBIN TIME: CPT

## 2019-01-02 NOTE — PROGRESS NOTES
Anticoagulation Summary  As of 1/2/2019    INR goal:   2.0-3.0   TTR:   26.7 % (2.2 mo)   Today's INR:   2.0   Warfarin maintenance plan:   2 mg (2 mg x 1) every day   Weekly warfarin total:   14 mg   Plan last modified:   JEFF Zepeda (12/12/2018)   Next INR check:   1/9/2019   Target end date:   Indefinite    Indications    Stroke (HCC) [I63.9]  Atrial flutter (HCC) [I48.92]             Anticoagulation Episode Summary     INR check location:       Preferred lab:       Send INR reminders to:       Comments:   Please consider switching to DOAC      Anticoagulation Care Providers     Provider Role Specialty Phone number    Hussain Nation M.D. Referring Interventional Cardiology [phone]    [facility] Responsible  [phone]        Anticoagulation Patient Findings  Patient Findings     Positives:   Signs/symptoms of bleeding, Change in health, Change in medications, Hospital admission    Negatives:   Signs/symptoms of thrombosis, Laboratory test error suspected, Change in alcohol use, Change in activity, Upcoming invasive procedure, Emergency department visit, Upcoming dental procedure, Missed doses, Extra doses, Change in diet/appetite, Bruising, Other complaints          HPI:  Az Jolly seen in clinic today, on anticoagulation therapy with warfarin for stroke prevention due to hx of atrial flutter.  Changes to current medical/health status since last appt: two hospitalizations since last visit due to UTI and UTI associated hematuria  Denies signs/symptoms of bleeding and/or thrombosis since the last appt.    Denies any interval changes to diet  Denies any interval changes to medications since last appt.   Denies any complications or cost restrictions with current therapy.   BP recorded in vitals.      A/P   INR  now -therapeutic at 2.0.   Based on dosing from last seven days, will have patient return to 2mg daily.    Follow up appointment in 1 week(s).    Shaun uRiz,  PharmD

## 2019-01-09 ENCOUNTER — ANTICOAGULATION VISIT (OUTPATIENT)
Dept: VASCULAR LAB | Facility: MEDICAL CENTER | Age: 84
End: 2019-01-09
Attending: INTERNAL MEDICINE
Payer: MEDICARE

## 2019-01-09 DIAGNOSIS — I48.92 ATRIAL FLUTTER, UNSPECIFIED TYPE (HCC): ICD-10-CM

## 2019-01-09 DIAGNOSIS — I63.02 CEREBROVASCULAR ACCIDENT (CVA) DUE TO THROMBOSIS OF BASILAR ARTERY (HCC): ICD-10-CM

## 2019-01-09 LAB — INR PPP: 2.8 (ref 2–3.5)

## 2019-01-09 PROCEDURE — 85610 PROTHROMBIN TIME: CPT

## 2019-01-09 PROCEDURE — 99211 OFF/OP EST MAY X REQ PHY/QHP: CPT | Performed by: PHARMACIST

## 2019-01-09 NOTE — PROGRESS NOTES
Anticoagulation Summary  As of 1/9/2019    INR goal:   2.0-3.0   TTR:   34.1 % (2.4 mo)   Today's INR:   2.8   Warfarin maintenance plan:   2 mg (2 mg x 1) every day   Weekly warfarin total:   14 mg   Plan last modified:   JEFF Zepeda (12/12/2018)   Next INR check:   1/23/2019   Target end date:   Indefinite    Indications    Stroke (HCC) [I63.9]  Atrial flutter (HCC) [I48.92]             Anticoagulation Episode Summary     INR check location:       Preferred lab:       Send INR reminders to:       Comments:   Please consider switching to DOAC      Anticoagulation Care Providers     Provider Role Specialty Phone number    Hussain Nation M.D. Referring Interventional Cardiology [phone]    [facility] Anticoagulation Services Responsible  [phone]        Anticoagulation Patient Findings  Patient Findings     Negatives:   Signs/symptoms of thrombosis, Signs/symptoms of bleeding, Laboratory test error suspected, Change in health, Change in alcohol use, Change in activity, Upcoming invasive procedure, Emergency department visit, Upcoming dental procedure, Missed doses, Extra doses, Change in medications, Change in diet/appetite, Hospital admission, Bruising, Other complaints          HPI:  Az Jolly seen in clinic today, on anticoagulation therapy with warfarin for stroke prevention due to hx of atrial flutter and stroke.  Changes to current medical/health status since last appt: NONE  Denies signs/symptoms of bleeding and/or thrombosis since the last appt.    Denies any interval changes to diet  Denies any interval changes to medications since last appt.   Denies any complications or cost restrictions with current therapy.   BP recorded in vitals.      A/P   INR  remains -therapeutic 2.8.   Pt is to continue with current warfarin dosing regimen.    Follow up appointment in 2 week(s).    Shaun Ruiz, JuanD

## 2019-01-17 NOTE — PROGRESS NOTES
Anticoagulation Summary  As of 10/17/2018    INR goal:   2.0-3.0   TTR:   45.5 % (2 d)   Today's INR:   2   Warfarin maintenance plan:   2 mg (2 mg x 1) every day   Weekly warfarin total:   14 mg   Plan last modified:   Graham Jackson, PharmD (10/17/2018)   Next INR check:   10/19/2018   Target end date:   Indefinite    Indications    Stroke (HCC) [I63.9]  Atrial flutter (HCC) [I48.92]             Anticoagulation Episode Summary     INR check location:       Preferred lab:       Send INR reminders to:       Comments:   Please consider switching to DOAC      Anticoagulation Care Providers     Provider Role Specialty Phone number    Hussain Nation M.D. Referring Interventional Cardiology [phone]    [facility] Anticoagulation Services Responsible  [phone]        Anticoagulation Patient Findings      HPI:  Az Jolly seen in clinic today, on anticoagulation therapy with warfarin for AF.   Changes to current medical/health status since last appt: none  Denies signs/symptoms of bleeding and/or thrombosis since the last appt.    Denies any interval changes to diet  Denies any interval changes to medications since last appt.   Denies any complications or cost restrictions with current therapy.   BP recorded in vitals.  Confirmed dosing regimen.     A/P   INR  therapeutic.   Begin reduced regimen.     Follow up appointment in 2 days.     Graham Jackson, JuanD   
37.1

## 2019-01-23 ENCOUNTER — ANTICOAGULATION VISIT (OUTPATIENT)
Dept: VASCULAR LAB | Facility: MEDICAL CENTER | Age: 84
End: 2019-01-23
Attending: INTERNAL MEDICINE
Payer: MEDICARE

## 2019-01-23 DIAGNOSIS — I48.92 ATRIAL FLUTTER, UNSPECIFIED TYPE (HCC): ICD-10-CM

## 2019-01-23 DIAGNOSIS — I63.02 CEREBROVASCULAR ACCIDENT (CVA) DUE TO THROMBOSIS OF BASILAR ARTERY (HCC): ICD-10-CM

## 2019-01-23 LAB — INR PPP: 3.2 (ref 2–3.5)

## 2019-01-23 PROCEDURE — 85610 PROTHROMBIN TIME: CPT

## 2019-01-23 PROCEDURE — 99212 OFFICE O/P EST SF 10 MIN: CPT | Performed by: NURSE PRACTITIONER

## 2019-01-23 NOTE — PROGRESS NOTES
Anticoagulation Summary  As of 1/23/2019    INR goal:   2.0-3.0   TTR:   36.6 % (2.9 mo)   Today's INR:   3.2!   Warfarin maintenance plan:   1 mg (2 mg x 0.5) on Wed; 2 mg (2 mg x 1) all other days   Weekly warfarin total:   13 mg   Plan last modified:   JEFF Zepeda (1/23/2019)   Next INR check:   2/6/2019   Target end date:   Indefinite    Indications    Stroke (HCC) [I63.9]  Atrial flutter (HCC) [I48.92]             Anticoagulation Episode Summary     INR check location:       Preferred lab:       Send INR reminders to:       Comments:   Please consider switching to DOAC      Anticoagulation Care Providers     Provider Role Specialty Phone number    Hussain Nation M.D. Referring Interventional Cardiology [phone]    [facility] Anticoagulation Services Responsible  [phone]        Anticoagulation Patient Findings      HPI:  Az Jolly seen in clinic today for follow up on anticoagulation therapy in the presence of A flutter, CVA hx. Denies any changes to current medical/health status since last appointment. Denies any medication or diet changes. No current symptoms of bleeding or thrombosis reported.    A/P:   INR supratherapeutic. Will decrease regimen.     Follow up appointment in 2 week(s).    Next Appointment: Wednesday, February 6, 2019 at 10:30 am.      Callie CARCAMO

## 2019-01-24 LAB — INR BLD: 3.2 (ref 0.9–1.2)

## 2019-02-06 ENCOUNTER — ANTICOAGULATION VISIT (OUTPATIENT)
Dept: VASCULAR LAB | Facility: MEDICAL CENTER | Age: 84
End: 2019-02-06
Attending: INTERNAL MEDICINE
Payer: MEDICARE

## 2019-02-06 DIAGNOSIS — I48.92 ATRIAL FLUTTER, UNSPECIFIED TYPE (HCC): ICD-10-CM

## 2019-02-06 DIAGNOSIS — I63.02 CEREBROVASCULAR ACCIDENT (CVA) DUE TO THROMBOSIS OF BASILAR ARTERY (HCC): ICD-10-CM

## 2019-02-06 LAB — INR PPP: 3.8 (ref 2–3.5)

## 2019-02-06 PROCEDURE — 85610 PROTHROMBIN TIME: CPT

## 2019-02-06 PROCEDURE — 99212 OFFICE O/P EST SF 10 MIN: CPT | Performed by: NURSE PRACTITIONER

## 2019-02-06 NOTE — PROGRESS NOTES
Anticoagulation Summary  As of 2/6/2019    INR goal:   2.0-3.0   TTR:   31.5 % (3.4 mo)   INR used for dosing:   3.8! (2/6/2019)   Warfarin maintenance plan:   1 mg (2 mg x 0.5) every Mon, Wed, Fri; 2 mg (2 mg x 1) all other days   Weekly warfarin total:   11 mg   Plan last modified:   Callie Murillo A.P.NDayanara (2/6/2019)   Next INR check:   2/20/2019   Target end date:   Indefinite    Indications    Stroke (HCC) [I63.9]  Atrial flutter (HCC) [I48.92]             Anticoagulation Episode Summary     INR check location:       Preferred lab:       Send INR reminders to:       Comments:   Please consider switching to DOAC      Anticoagulation Care Providers     Provider Role Specialty Phone number    Hussain Nation M.D. Referring Interventional Cardiology [phone]    [facility] Anticoagulation Services Responsible  [phone]        Anticoagulation Patient Findings      HPI:  Az Jolly seen in clinic today for follow up on anticoagulation therapy in the presence of A flutter, CVA. Denies any changes to current medical/health status since last appointment. Denies any medication or diet changes. No current symptoms of bleeding or thrombosis reported.    A/P:   INR supratherapeutic despite a dose decrease. Will decrease regimen further.     Follow up appointment in 2 week(s).    Next Appointment: Monday, February 25, 2019 at 11:00 am.     Callie CARCAMO

## 2019-02-07 LAB — INR BLD: 3.8 (ref 0.9–1.2)

## 2019-02-25 ENCOUNTER — ANTICOAGULATION VISIT (OUTPATIENT)
Dept: VASCULAR LAB | Facility: MEDICAL CENTER | Age: 84
End: 2019-02-25
Attending: INTERNAL MEDICINE
Payer: MEDICARE

## 2019-02-25 DIAGNOSIS — I63.02 CEREBROVASCULAR ACCIDENT (CVA) DUE TO THROMBOSIS OF BASILAR ARTERY (HCC): ICD-10-CM

## 2019-02-25 DIAGNOSIS — I48.92 ATRIAL FLUTTER, UNSPECIFIED TYPE (HCC): ICD-10-CM

## 2019-02-25 LAB
INR BLD: 2.5 (ref 0.9–1.2)
INR PPP: 2.5 (ref 2–3.5)

## 2019-02-25 PROCEDURE — 85610 PROTHROMBIN TIME: CPT

## 2019-02-25 PROCEDURE — 99211 OFF/OP EST MAY X REQ PHY/QHP: CPT | Performed by: NURSE PRACTITIONER

## 2019-02-25 NOTE — PROGRESS NOTES
Anticoagulation Summary  As of 2019    INR goal:   2.0-3.0   TTR:   32.6 % (4 mo)   INR used for dosin.5 (2019)   Warfarin maintenance plan:   1 mg (2 mg x 0.5) every Mon, Wed, Fri; 2 mg (2 mg x 1) all other days   Weekly warfarin total:   11 mg   No change documented:   LUZ MARIA ZepedaPRALPH   Plan last modified:   JEFF Zepeda (2019)   Next INR check:   3/25/2019   Target end date:   Indefinite    Indications    Stroke (HCC) [I63.9]  Atrial flutter (HCC) [I48.92]             Anticoagulation Episode Summary     INR check location:       Preferred lab:       Send INR reminders to:       Comments:   Please consider switching to DOAC      Anticoagulation Care Providers     Provider Role Specialty Phone number    Hussain Nation M.D. Referring Interventional Cardiology [phone]    [facility] Anticoagulation Services Responsible  [phone]        Anticoagulation Patient Findings      HPI:  Az Jolly seen in clinic today for follow up on anticoagulation therapy in the presence of A flutter, CVA. Denies any changes to current medical/health status since last appointment. Denies any medication or diet changes. No current symptoms of bleeding or thrombosis reported.    A/P:   INR therapeutic. Continue current regimen.    Follow up appointment in 2 week(s).    Next Appointment: 2019 at 11:45 am.     Callie CARCAMO

## 2019-03-13 ENCOUNTER — OFFICE VISIT (OUTPATIENT)
Dept: CARDIOLOGY | Facility: MEDICAL CENTER | Age: 84
End: 2019-03-13
Payer: MEDICARE

## 2019-03-13 ENCOUNTER — ANTICOAGULATION VISIT (OUTPATIENT)
Dept: VASCULAR LAB | Facility: MEDICAL CENTER | Age: 84
End: 2019-03-13
Attending: INTERNAL MEDICINE
Payer: MEDICARE

## 2019-03-13 VITALS
HEIGHT: 67 IN | WEIGHT: 95.2 LBS | OXYGEN SATURATION: 95 % | HEART RATE: 84 BPM | BODY MASS INDEX: 14.94 KG/M2 | SYSTOLIC BLOOD PRESSURE: 110 MMHG | DIASTOLIC BLOOD PRESSURE: 62 MMHG

## 2019-03-13 DIAGNOSIS — I63.02 CEREBROVASCULAR ACCIDENT (CVA) DUE TO THROMBOSIS OF BASILAR ARTERY (HCC): ICD-10-CM

## 2019-03-13 DIAGNOSIS — I48.92 ATRIAL FLUTTER, UNSPECIFIED TYPE (HCC): ICD-10-CM

## 2019-03-13 DIAGNOSIS — Z79.01 ON CONTINUOUS ORAL ANTICOAGULATION: ICD-10-CM

## 2019-03-13 DIAGNOSIS — I63.00 CEREBROVASCULAR ACCIDENT (CVA) DUE TO THROMBOSIS OF PRECEREBRAL ARTERY (HCC): Chronic | ICD-10-CM

## 2019-03-13 DIAGNOSIS — I10 ESSENTIAL HYPERTENSION: ICD-10-CM

## 2019-03-13 DIAGNOSIS — N18.30 CKD (CHRONIC KIDNEY DISEASE) STAGE 3, GFR 30-59 ML/MIN (HCC): ICD-10-CM

## 2019-03-13 DIAGNOSIS — I48.0 PAF (PAROXYSMAL ATRIAL FIBRILLATION) (HCC): ICD-10-CM

## 2019-03-13 DIAGNOSIS — M31.30 WEGENER'S GRANULOMATOSIS: ICD-10-CM

## 2019-03-13 LAB — INR PPP: 3.4 (ref 2–3.5)

## 2019-03-13 PROCEDURE — 99212 OFFICE O/P EST SF 10 MIN: CPT | Performed by: NURSE PRACTITIONER

## 2019-03-13 PROCEDURE — 99214 OFFICE O/P EST MOD 30 MIN: CPT | Performed by: INTERNAL MEDICINE

## 2019-03-13 PROCEDURE — 85610 PROTHROMBIN TIME: CPT

## 2019-03-13 RX ORDER — CEFUROXIME AXETIL 250 MG/1
250 TABLET ORAL 2 TIMES DAILY
Refills: 0 | COMMUNITY
Start: 2019-02-19 | End: 2019-06-19

## 2019-03-13 RX ORDER — IPRATROPIUM BROMIDE 42 UG/1
SPRAY, METERED NASAL
Refills: 3 | COMMUNITY
Start: 2019-01-15 | End: 2019-03-20

## 2019-03-13 RX ORDER — WARFARIN SODIUM 2 MG/1
TABLET ORAL
Qty: 90 TAB | Refills: 1 | Status: SHIPPED | OUTPATIENT
Start: 2019-03-13 | End: 2019-03-20

## 2019-03-13 RX ORDER — WARFARIN SODIUM 2 MG/1
TABLET ORAL
Qty: 30 TAB | Refills: 3 | Status: SHIPPED | OUTPATIENT
Start: 2019-03-13 | End: 2019-03-13 | Stop reason: SDUPTHER

## 2019-03-13 NOTE — PROGRESS NOTES
Chief Complaint   Patient presents with   • Paroxysmal Supraventricular Tachycardia (PSVT)       Subjective:   Az Jolly is a 84 y.o. female who presents today for follow-up of atrial flutter/fibrillation PSVT stroke and anticoagulation.  She is asymptomatic and walks with a walker and does not fall.  In the interim she has been tolerating her Coumadin well with variable INR levels.  We discussed direct oral anticoagulants and she declines.  No bleeding issues, CKD is stable.    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   • DVT 6/1996    left leg, vein ligation performed on saphenous vein   • First degree atrioventricular block 9/21/2012   • H/O echocardiogram 9/21/2012   • Hypothyroid 10/2002   • Kidney disorder 1998    left kidney biopsy--glomerulonephritis and wegners dx   • Kidney failure     stage II   • Mitral valve prolapse    • Palpitations 9/21/2012   • Rheumatic fever 9/21/2012   • Stroke (HCC) 9/21/2012   • Wegener's granulomatosis (HCC)      Past Surgical History:   Procedure Laterality Date   • CATARACT EXTRACTION  2006    left eye   • CHOLECYSTECTOMY  1997   • TEMPORAL ARTERY BIOPSY  1996    normal   • LUNG NEEDLE BIOPSY  1996    right lung, nodules found   • ARTHROSCOPE  1989    left knee   • ARTHROSCOPY, KNEE  1986    right   • BUNIONECTOMY  1980    bilat   • HYSTERECTOMY RADICAL  1975   • VEIN LIGATION       Family History   Problem Relation Age of Onset   • Other Father         Aortic aneurysm   • Cancer Brother         Lung   • Non-contributory Other      Social History     Social History   • Marital status:      Spouse name: N/A   • Number of children: N/A   • Years of education: N/A     Occupational History   • Not on file.     Social History Main Topics   • Smoking status: Former Smoker     Packs/day: 1.00     Types: Cigarettes     Quit date:  1/1/1960   • Smokeless tobacco: Never Used      Comment: very little years and years ago   • Alcohol use 0.0 oz/week      Comment: occasional   • Drug use: No   • Sexual activity: Not on file     Other Topics Concern   • Not on file     Social History Narrative   • No narrative on file     Allergies   Allergen Reactions   • Cephalexin [Keflex] Swelling   • Hydroxyzine Swelling     Eyes get itchy and swollen    • Naprelan [Naproxen] Swelling     Eyes get itchy become red and swollen   • Oxaprozin Swelling     Swelling eyes, and itchy   • Ampicillin Rash     Red Rash   • Erythromycin Diarrhea     .   • Vi-Q-Tuss [Hydrocodone-Guaifenesin] Vomiting and Nausea   • Voltaren [Diclofenac Sodium] Rash and Swelling     Red rash and swelling   • Baclofen Unspecified     drowsiness   • Citalopram Vomiting and Nausea   • Clarithromycin Unspecified     Pt reports that this medication plugs her ears.    • Promethazine Unspecified     Drowsiness and sleeps     Outpatient Encounter Prescriptions as of 3/13/2019   Medication Sig Dispense Refill   • warfarin (COUMADIN) 2 MG Tab Take 0.5-1 tab by mouth daily or as directed by the anticoagulation clinic 90 Tab 1   • metoprolol (LOPRESSOR) 25 MG Tab Take 1 Tab by mouth 2 times a day. 180 Tab 3   • CALCIUM PO Take 1 Tab by mouth every day.     • BIOTIN PO Take 1 Tab by mouth every day.     • artificial tears 1.4 % Solution Place 1 Drop in both eyes as needed (For Dry eye).     • Multiple Vitamins-Minerals (ADULT ONE DAILY GUMMIES PO) Take 2 Tabs by mouth every day.     • folic acid (FOLVITE) 1 MG TABS Take 1 mg by mouth every day.     • levothyroxine (SYNTHROID) 75 MCG TABS Take 75 mcg by mouth every morning before breakfast.     • cefUROXime (CEFTIN) 250 MG Tab TAKE 1 TABLET BY MOUTH TWICE A DAY  0   • ipratropium (ATROVENT) 0.06 % Solution USE 2 SPRAYS IN EACH NOSTRIL AT BEDTIME  3   • [DISCONTINUED] warfarin (COUMADIN) 2 MG Tab Take 0.5-1 tab by mouth daily or as directed by the  "anticoagulation clinic 30 Tab 3   • polyethyl glycol-propyl glycol (SYSTANE) 0.4-0.3 % Solution Place 1 Drop in both eyes as needed.     • polyethylene glycol/lytes (MIRALAX) Pack Take 17 g by mouth 1 time daily as needed.     • [DISCONTINUED] metoprolol (LOPRESSOR) 25 MG Tab Take 1 Tab by mouth 2 times a day. 60 Tab 11     No facility-administered encounter medications on file as of 3/13/2019.      Review of Systems   All other systems reviewed and are negative.       Objective:   /62 (BP Location: Left arm, Patient Position: Sitting, BP Cuff Size: Adult)   Pulse 84   Ht 1.702 m (5' 7\")   Wt 43.2 kg (95 lb 3.2 oz)   SpO2 95%   BMI 14.91 kg/m²     Physical Exam   Constitutional: She is oriented to person, place, and time. She appears well-developed and well-nourished. No distress.   Elderly and thin  Walker   HENT:   Head: Normocephalic and atraumatic.   Right Ear: External ear normal.   Left Ear: External ear normal.   Mouth/Throat: No oropharyngeal exudate.   Eyes: Pupils are equal, round, and reactive to light. Conjunctivae and EOM are normal. Right eye exhibits no discharge. Left eye exhibits no discharge. No scleral icterus.   Neck: Normal range of motion. Neck supple. No JVD present. No tracheal deviation present. No thyromegaly present.   Cardiovascular: Normal rate, S1 normal, S2 normal, normal heart sounds and intact distal pulses.  An irregularly irregular rhythm present. PMI is not displaced.  Exam reveals no gallop, no S3, no S4 and no friction rub.    No murmur heard.  Pulses:       Carotid pulses are 2+ on the right side, and 2+ on the left side.       Radial pulses are 2+ on the right side, and 2+ on the left side.        Popliteal pulses are 2+ on the right side, and 2+ on the left side.        Dorsalis pedis pulses are 2+ on the right side, and 2+ on the left side.        Posterior tibial pulses are 2+ on the right side, and 2+ on the left side.   Pulmonary/Chest: Effort normal and breath " sounds normal. No respiratory distress. She has no wheezes. She has no rales. She exhibits no tenderness.   Abdominal: Soft. Bowel sounds are normal. She exhibits no distension. There is no tenderness.   Musculoskeletal: Normal range of motion. She exhibits no edema or tenderness.   Neurological: She is alert and oriented to person, place, and time. No cranial nerve deficit (Cranial nerves II through XII grossly intact).   Skin: Skin is warm and dry. No rash noted. She is not diaphoretic. No erythema.   Psychiatric: She has a normal mood and affect. Her behavior is normal. Judgment and thought content normal.   Vitals reviewed.    LABS:  Lab Results   Component Value Date/Time    CHOLSTRLTOT 95 (L) 08/02/2011 03:10 AM    LDL 44 08/02/2011 03:10 AM    HDL 39 (L) 08/02/2011 03:10 AM    TRIGLYCERIDE 60 08/02/2011 03:10 AM       Lab Results   Component Value Date/Time    WBC 5.9 12/27/2018 01:33 AM    RBC 4.12 (L) 12/27/2018 01:33 AM    HEMOGLOBIN 12.0 12/27/2018 01:33 AM    HEMATOCRIT 38.2 12/27/2018 01:33 AM    MCV 92.7 12/27/2018 01:33 AM    NEUTSPOLYS 78.70 (H) 12/26/2018 10:59 AM    LYMPHOCYTES 15.40 (L) 12/26/2018 10:59 AM    MONOCYTES 4.80 12/26/2018 10:59 AM    EOSINOPHILS 0.40 12/26/2018 10:59 AM    BASOPHILS 0.40 12/26/2018 10:59 AM     Lab Results   Component Value Date/Time    SODIUM 136 12/28/2018 04:20 AM    POTASSIUM 4.5 12/28/2018 04:20 AM    CHLORIDE 107 12/28/2018 04:20 AM    CO2 22 12/28/2018 04:20 AM    GLUCOSE 84 12/28/2018 04:20 AM    BUN 30 (H) 12/28/2018 04:20 AM    CREATININE 1.24 12/28/2018 04:20 AM    CREATININE 1.5 (H) 02/19/2009 09:22 AM         Lab Results   Component Value Date/Time    ALKPHOSPHAT 75 12/26/2018 10:59 AM    ASTSGOT 27 12/26/2018 10:59 AM    ALTSGPT 21 12/26/2018 10:59 AM    TBILIRUBIN 0.5 12/26/2018 10:59 AM      Lab Results   Component Value Date/Time    BNPBTYPENAT 135 (H) 08/10/2011 03:40 AM      No results found for: TSH  Lab Results   Component Value Date/Time     PROTHROMBTM 16.6 (H) 12/28/2018 04:20 AM    INR 3.4 03/13/2019        EKG (9/26/2018):  I have personally reviewed the EKG this visit and discussed with the patient.  Atrial flutter with variable block.     Echocardiogram 1/22/2018 located in [facility] under [facility] records: Preserved left ventricular systolic function, grade 1 diastolic dysfunction, mild left atrial enlargement normal RVSP and mild to moderate mitral regurgitation.    Assessment:     1. PAF (paroxysmal atrial fibrillation) ([facility])     2. On continuous oral anticoagulation     3. CKD (chronic kidney disease) stage 3, GFR 30-59 ml/min ([facility])     4. Wegener's granulomatosis ([facility])     5. Cerebrovascular accident (CVA) due to thrombosis of precerebral artery ([facility])     6. Essential hypertension         Medical Decision Making:  Today's Assessment / Status / Plan:     Doing well.  No bleeding issues.  Tolerating her anticoagulation well.  No recurrent thrombi embolisms.  Blood pressures well controlled.  LV function is preserved.  No significant valve disease.  Heart rate is well.  Discussed direct oral anticoagulants rather than warfarin.  She declined despite discussions.    Follow-up in 6 months

## 2019-03-13 NOTE — PROGRESS NOTES
Anticoagulation Summary  As of 3/13/2019    INR goal:   2.0-3.0   TTR:   35.3 % (4.5 mo)   INR used for dosing:   3.4! (3/13/2019)   Warfarin maintenance plan:   2 mg (2 mg x 1) every Sun, Tue, Thu; 1 mg (2 mg x 0.5) all other days   Weekly warfarin total:   10 mg   Plan last modified:   Callie Murillo A.P.NDayanara (3/13/2019)   Next INR check:   3/28/2019   Target end date:   Indefinite    Indications    Stroke (HCC) [I63.9]  Atrial flutter (HCC) [I48.92]             Anticoagulation Episode Summary     INR check location:       Preferred lab:       Send INR reminders to:       Comments:   Please consider switching to DOAC      Anticoagulation Care Providers     Provider Role Specialty Phone number    Hussain Nation M.D. Referring Interventional Cardiology [phone]    [facility] Anticoagulation Services Responsible  [phone]        Anticoagulation Patient Findings      HPI:  Az Jolly seen in clinic today for follow up on anticoagulation therapy in the presence of A flutter, CVA hx. Denies any changes to current medical/health status since last appointment. Denies any medication or diet changes. No current symptoms of bleeding or thrombosis reported.    A/P:   INR supratherapeutic. Will decrease regimen.     Follow up appointment in 2 week(s).    Next Appointment: Thursday, March 28, 2019 at 11:45 am.     Callie CARCAMO

## 2019-03-14 LAB — INR BLD: 3.4 (ref 0.9–1.2)

## 2019-03-20 ENCOUNTER — APPOINTMENT (OUTPATIENT)
Dept: RADIOLOGY | Facility: MEDICAL CENTER | Age: 84
End: 2019-03-20
Attending: EMERGENCY MEDICINE
Payer: MEDICARE

## 2019-03-20 ENCOUNTER — HOSPITAL ENCOUNTER (OUTPATIENT)
Facility: MEDICAL CENTER | Age: 84
End: 2019-03-21
Attending: EMERGENCY MEDICINE | Admitting: INTERNAL MEDICINE
Payer: MEDICARE

## 2019-03-20 DIAGNOSIS — N28.9 RENAL INSUFFICIENCY: ICD-10-CM

## 2019-03-20 DIAGNOSIS — R10.32 LLQ PAIN: ICD-10-CM

## 2019-03-20 DIAGNOSIS — R31.29 MICROSCOPIC HEMATURIA: ICD-10-CM

## 2019-03-20 PROBLEM — R10.9 ABDOMINAL PAIN: Status: ACTIVE | Noted: 2019-03-20

## 2019-03-20 PROBLEM — R31.9 HEMATURIA: Status: ACTIVE | Noted: 2019-03-20

## 2019-03-20 LAB
ALBUMIN SERPL BCP-MCNC: 3.6 G/DL (ref 3.2–4.9)
ALBUMIN/GLOB SERPL: 0.7 G/DL
ALP SERPL-CCNC: 71 U/L (ref 30–99)
ALT SERPL-CCNC: 11 U/L (ref 2–50)
ANION GAP SERPL CALC-SCNC: 9 MMOL/L (ref 0–11.9)
APPEARANCE UR: CLEAR
AST SERPL-CCNC: 28 U/L (ref 12–45)
BACTERIA #/AREA URNS HPF: NEGATIVE /HPF
BASOPHILS # BLD AUTO: 0.5 % (ref 0–1.8)
BASOPHILS # BLD: 0.03 K/UL (ref 0–0.12)
BILIRUB SERPL-MCNC: 0.8 MG/DL (ref 0.1–1.5)
BILIRUB UR QL STRIP.AUTO: NEGATIVE
BUN SERPL-MCNC: 27 MG/DL (ref 8–22)
CALCIUM SERPL-MCNC: 8.7 MG/DL (ref 8.4–10.2)
CHLORIDE SERPL-SCNC: 97 MMOL/L (ref 96–112)
CO2 SERPL-SCNC: 27 MMOL/L (ref 20–33)
COLOR UR: YELLOW
CREAT SERPL-MCNC: 1.47 MG/DL (ref 0.5–1.4)
EOSINOPHIL # BLD AUTO: 0.02 K/UL (ref 0–0.51)
EOSINOPHIL NFR BLD: 0.3 % (ref 0–6.9)
EPI CELLS #/AREA URNS HPF: ABNORMAL /HPF
ERYTHROCYTE [DISTWIDTH] IN BLOOD BY AUTOMATED COUNT: 45.5 FL (ref 35.9–50)
GLOBULIN SER CALC-MCNC: 4.9 G/DL (ref 1.9–3.5)
GLUCOSE SERPL-MCNC: 91 MG/DL (ref 65–99)
GLUCOSE UR STRIP.AUTO-MCNC: NEGATIVE MG/DL
HCT VFR BLD AUTO: 42 % (ref 37–47)
HGB BLD-MCNC: 12.9 G/DL (ref 12–16)
IMM GRANULOCYTES # BLD AUTO: 0.02 K/UL (ref 0–0.11)
IMM GRANULOCYTES NFR BLD AUTO: 0.3 % (ref 0–0.9)
INR PPP: 2.23 (ref 0.87–1.13)
KETONES UR STRIP.AUTO-MCNC: NEGATIVE MG/DL
LEUKOCYTE ESTERASE UR QL STRIP.AUTO: ABNORMAL
LYMPHOCYTES # BLD AUTO: 1.47 K/UL (ref 1–4.8)
LYMPHOCYTES NFR BLD: 23.5 % (ref 22–41)
MCH RBC QN AUTO: 28.3 PG (ref 27–33)
MCHC RBC AUTO-ENTMCNC: 30.7 G/DL (ref 33.6–35)
MCV RBC AUTO: 92.1 FL (ref 81.4–97.8)
MICRO URNS: ABNORMAL
MONOCYTES # BLD AUTO: 0.34 K/UL (ref 0–0.85)
MONOCYTES NFR BLD AUTO: 5.4 % (ref 0–13.4)
NEUTROPHILS # BLD AUTO: 4.38 K/UL (ref 2–7.15)
NEUTROPHILS NFR BLD: 70 % (ref 44–72)
NITRITE UR QL STRIP.AUTO: NEGATIVE
NRBC # BLD AUTO: 0 K/UL
NRBC BLD-RTO: 0 /100 WBC
PH UR STRIP.AUTO: 6 [PH]
PLATELET # BLD AUTO: 245 K/UL (ref 164–446)
PMV BLD AUTO: 9.9 FL (ref 9–12.9)
POTASSIUM SERPL-SCNC: 4.3 MMOL/L (ref 3.6–5.5)
PROT SERPL-MCNC: 8.5 G/DL (ref 6–8.2)
PROT UR QL STRIP: 100 MG/DL
PROTHROMBIN TIME: 24.4 SEC (ref 12–14.6)
RBC # BLD AUTO: 4.56 M/UL (ref 4.2–5.4)
RBC # URNS HPF: ABNORMAL /HPF
RBC UR QL AUTO: ABNORMAL
SODIUM SERPL-SCNC: 133 MMOL/L (ref 135–145)
SP GR UR STRIP.AUTO: 1.01
WBC # BLD AUTO: 6.3 K/UL (ref 4.8–10.8)
WBC #/AREA URNS HPF: ABNORMAL /HPF

## 2019-03-20 PROCEDURE — 99285 EMERGENCY DEPT VISIT HI MDM: CPT

## 2019-03-20 PROCEDURE — 700105 HCHG RX REV CODE 258: Performed by: EMERGENCY MEDICINE

## 2019-03-20 PROCEDURE — 85610 PROTHROMBIN TIME: CPT

## 2019-03-20 PROCEDURE — 700105 HCHG RX REV CODE 258: Performed by: INTERNAL MEDICINE

## 2019-03-20 PROCEDURE — 81001 URINALYSIS AUTO W/SCOPE: CPT

## 2019-03-20 PROCEDURE — 99219 PR INITIAL OBSERVATION CARE,LEVL II: CPT | Performed by: INTERNAL MEDICINE

## 2019-03-20 PROCEDURE — 700102 HCHG RX REV CODE 250 W/ 637 OVERRIDE(OP): Performed by: INTERNAL MEDICINE

## 2019-03-20 PROCEDURE — G0378 HOSPITAL OBSERVATION PER HR: HCPCS

## 2019-03-20 PROCEDURE — 51701 INSERT BLADDER CATHETER: CPT

## 2019-03-20 PROCEDURE — 36415 COLL VENOUS BLD VENIPUNCTURE: CPT

## 2019-03-20 PROCEDURE — 74177 CT ABD & PELVIS W/CONTRAST: CPT

## 2019-03-20 PROCEDURE — A9270 NON-COVERED ITEM OR SERVICE: HCPCS | Performed by: INTERNAL MEDICINE

## 2019-03-20 PROCEDURE — 700117 HCHG RX CONTRAST REV CODE 255: Performed by: EMERGENCY MEDICINE

## 2019-03-20 PROCEDURE — 85025 COMPLETE CBC W/AUTO DIFF WBC: CPT

## 2019-03-20 PROCEDURE — 80053 COMPREHEN METABOLIC PANEL: CPT

## 2019-03-20 RX ORDER — LEVOTHYROXINE SODIUM 0.05 MG/1
75 TABLET ORAL
Status: DISCONTINUED | OUTPATIENT
Start: 2019-03-21 | End: 2019-03-21 | Stop reason: HOSPADM

## 2019-03-20 RX ORDER — BISACODYL 10 MG
10 SUPPOSITORY, RECTAL RECTAL
Status: DISCONTINUED | OUTPATIENT
Start: 2019-03-20 | End: 2019-03-21 | Stop reason: HOSPADM

## 2019-03-20 RX ORDER — SODIUM CHLORIDE 9 MG/ML
INJECTION, SOLUTION INTRAVENOUS CONTINUOUS
Status: DISCONTINUED | OUTPATIENT
Start: 2019-03-20 | End: 2019-03-21 | Stop reason: HOSPADM

## 2019-03-20 RX ORDER — AMOXICILLIN 250 MG
2 CAPSULE ORAL 2 TIMES DAILY
Status: DISCONTINUED | OUTPATIENT
Start: 2019-03-20 | End: 2019-03-21 | Stop reason: HOSPADM

## 2019-03-20 RX ORDER — NITROFURANTOIN 25; 75 MG/1; MG/1
100 CAPSULE ORAL 2 TIMES DAILY WITH MEALS
Status: DISCONTINUED | OUTPATIENT
Start: 2019-03-21 | End: 2019-03-21 | Stop reason: HOSPADM

## 2019-03-20 RX ORDER — ONDANSETRON 4 MG/1
4 TABLET, ORALLY DISINTEGRATING ORAL EVERY 4 HOURS PRN
Status: DISCONTINUED | OUTPATIENT
Start: 2019-03-20 | End: 2019-03-21 | Stop reason: HOSPADM

## 2019-03-20 RX ORDER — WARFARIN SODIUM 2 MG/1
1-2 TABLET ORAL EVERY EVENING
COMMUNITY
End: 2020-05-18

## 2019-03-20 RX ORDER — ACETAMINOPHEN 325 MG/1
650 TABLET ORAL EVERY 6 HOURS PRN
Status: DISCONTINUED | OUTPATIENT
Start: 2019-03-20 | End: 2019-03-21 | Stop reason: HOSPADM

## 2019-03-20 RX ORDER — ONDANSETRON 2 MG/ML
4 INJECTION INTRAMUSCULAR; INTRAVENOUS EVERY 4 HOURS PRN
Status: DISCONTINUED | OUTPATIENT
Start: 2019-03-20 | End: 2019-03-21 | Stop reason: HOSPADM

## 2019-03-20 RX ORDER — IPRATROPIUM BROMIDE 42 UG/1
2 SPRAY, METERED NASAL
COMMUNITY
End: 2019-06-19

## 2019-03-20 RX ORDER — OXYCODONE HYDROCHLORIDE 5 MG/1
10 TABLET ORAL
Status: DISCONTINUED | OUTPATIENT
Start: 2019-03-20 | End: 2019-03-21 | Stop reason: HOSPADM

## 2019-03-20 RX ORDER — HYDROMORPHONE HYDROCHLORIDE 1 MG/ML
0.5 INJECTION, SOLUTION INTRAMUSCULAR; INTRAVENOUS; SUBCUTANEOUS
Status: DISCONTINUED | OUTPATIENT
Start: 2019-03-20 | End: 2019-03-21 | Stop reason: HOSPADM

## 2019-03-20 RX ORDER — SODIUM CHLORIDE 9 MG/ML
250 INJECTION, SOLUTION INTRAVENOUS ONCE
Status: COMPLETED | OUTPATIENT
Start: 2019-03-20 | End: 2019-03-20

## 2019-03-20 RX ORDER — POLYETHYLENE GLYCOL 3350 17 G/17G
1 POWDER, FOR SOLUTION ORAL
Status: DISCONTINUED | OUTPATIENT
Start: 2019-03-20 | End: 2019-03-21 | Stop reason: HOSPADM

## 2019-03-20 RX ORDER — FOLIC ACID 1 MG/1
1 TABLET ORAL DAILY
Status: DISCONTINUED | OUTPATIENT
Start: 2019-03-21 | End: 2019-03-21 | Stop reason: HOSPADM

## 2019-03-20 RX ORDER — OXYCODONE HYDROCHLORIDE 5 MG/1
5 TABLET ORAL
Status: DISCONTINUED | OUTPATIENT
Start: 2019-03-20 | End: 2019-03-21 | Stop reason: HOSPADM

## 2019-03-20 RX ORDER — WARFARIN SODIUM 1 MG/1
1 TABLET ORAL
Status: COMPLETED | OUTPATIENT
Start: 2019-03-20 | End: 2019-03-20

## 2019-03-20 RX ORDER — HYDRALAZINE HYDROCHLORIDE 20 MG/ML
10 INJECTION INTRAMUSCULAR; INTRAVENOUS EVERY 4 HOURS PRN
Status: DISCONTINUED | OUTPATIENT
Start: 2019-03-20 | End: 2019-03-21 | Stop reason: HOSPADM

## 2019-03-20 RX ADMIN — IOHEXOL 70 ML: 350 INJECTION, SOLUTION INTRAVENOUS at 15:37

## 2019-03-20 RX ADMIN — WARFARIN SODIUM 1 MG: 1 TABLET ORAL at 18:32

## 2019-03-20 RX ADMIN — METOPROLOL TARTRATE 25 MG: 25 TABLET ORAL at 18:32

## 2019-03-20 RX ADMIN — SODIUM CHLORIDE 250 ML: 9 INJECTION, SOLUTION INTRAVENOUS at 14:52

## 2019-03-20 RX ADMIN — SODIUM CHLORIDE: 9 INJECTION, SOLUTION INTRAVENOUS at 18:32

## 2019-03-20 ASSESSMENT — ENCOUNTER SYMPTOMS
PALPITATIONS: 0
SPUTUM PRODUCTION: 0
COUGH: 0
NAUSEA: 1
DIZZINESS: 0
FALLS: 0
CONSTIPATION: 0
MYALGIAS: 0
SHORTNESS OF BREATH: 0
TINGLING: 0
CHILLS: 0
STRIDOR: 0
HEADACHES: 0
DIARRHEA: 0
FEVER: 0
DEPRESSION: 0
ABDOMINAL PAIN: 1
WEAKNESS: 0
VOMITING: 0
LOSS OF CONSCIOUSNESS: 0

## 2019-03-20 ASSESSMENT — COPD QUESTIONNAIRES
DURING THE PAST 4 WEEKS HOW MUCH DID YOU FEEL SHORT OF BREATH: NONE/LITTLE OF THE TIME
HAVE YOU SMOKED AT LEAST 100 CIGARETTES IN YOUR ENTIRE LIFE: YES
DO YOU EVER COUGH UP ANY MUCUS OR PHLEGM?: NO/ONLY WITH OCCASIONAL COLDS OR INFECTIONS
IN THE PAST 12 MONTHS DO YOU DO LESS THAN YOU USED TO BECAUSE OF YOUR BREATHING PROBLEMS: DISAGREE/UNSURE
COPD SCREENING SCORE: 4

## 2019-03-20 ASSESSMENT — COGNITIVE AND FUNCTIONAL STATUS - GENERAL
SUGGESTED CMS G CODE MODIFIER DAILY ACTIVITY: CK
CLIMB 3 TO 5 STEPS WITH RAILING: A LITTLE
TOILETING: A LITTLE
DAILY ACTIVITIY SCORE: 18
DRESSING REGULAR LOWER BODY CLOTHING: A LITTLE
SUGGESTED CMS G CODE MODIFIER MOBILITY: CJ
HELP NEEDED FOR BATHING: A LITTLE
PERSONAL GROOMING: A LITTLE
DRESSING REGULAR UPPER BODY CLOTHING: A LITTLE
MOBILITY SCORE: 22
WALKING IN HOSPITAL ROOM: A LITTLE
EATING MEALS: A LITTLE

## 2019-03-20 ASSESSMENT — LIFESTYLE VARIABLES
EVER_SMOKED: YES
ALCOHOL_USE: NO
EVER_SMOKED: YES

## 2019-03-20 ASSESSMENT — PATIENT HEALTH QUESTIONNAIRE - PHQ9
1. LITTLE INTEREST OR PLEASURE IN DOING THINGS: NOT AT ALL
SUM OF ALL RESPONSES TO PHQ9 QUESTIONS 1 AND 2: 0
2. FEELING DOWN, DEPRESSED, IRRITABLE, OR HOPELESS: NOT AT ALL

## 2019-03-20 NOTE — ED TRIAGE NOTES
"Chief Complaint   Patient presents with   • LLQ Pain     since 0930 this AM   • Diarrhea     since this AM. Pt taking macrobid for recurrent UTI.     /80   Pulse 86   Temp 37.1 °C (98.8 °F) (Temporal)   Resp 17   Ht 1.702 m (5' 7\")   Wt 46.6 kg (102 lb 11.8 oz)   SpO2 97%   BMI 16.09 kg/m²     "

## 2019-03-20 NOTE — ED NOTES
IV established with blood draw. In and out cath inserted with return of yellow clear return  Specimens sent to lab

## 2019-03-20 NOTE — ED NOTES
Med rec complete per pt at bedside with list  Reviewed list with pt and returned to pt spouse at bedside  Ok per Pt to discuss medications with visitor/s present  Allergies have been verified and updated  Pt Home Pharmacy:[facility]

## 2019-03-21 ENCOUNTER — PATIENT OUTREACH (OUTPATIENT)
Dept: HEALTH INFORMATION MANAGEMENT | Facility: OTHER | Age: 84
End: 2019-03-21

## 2019-03-21 VITALS
WEIGHT: 102.73 LBS | HEART RATE: 73 BPM | DIASTOLIC BLOOD PRESSURE: 64 MMHG | TEMPERATURE: 98.1 F | OXYGEN SATURATION: 96 % | HEIGHT: 67 IN | SYSTOLIC BLOOD PRESSURE: 117 MMHG | RESPIRATION RATE: 18 BRPM | BODY MASS INDEX: 16.12 KG/M2

## 2019-03-21 LAB
ANION GAP SERPL CALC-SCNC: 8 MMOL/L (ref 0–11.9)
BUN SERPL-MCNC: 18 MG/DL (ref 8–22)
CALCIUM SERPL-MCNC: 8.5 MG/DL (ref 8.4–10.2)
CHLORIDE SERPL-SCNC: 103 MMOL/L (ref 96–112)
CO2 SERPL-SCNC: 25 MMOL/L (ref 20–33)
CREAT SERPL-MCNC: 1.32 MG/DL (ref 0.5–1.4)
ERYTHROCYTE [DISTWIDTH] IN BLOOD BY AUTOMATED COUNT: 46.2 FL (ref 35.9–50)
GLUCOSE SERPL-MCNC: 86 MG/DL (ref 65–99)
HCT VFR BLD AUTO: 37.8 % (ref 37–47)
HGB BLD-MCNC: 11.8 G/DL (ref 12–16)
INR PPP: 2.75 (ref 0.87–1.13)
MCH RBC QN AUTO: 28.6 PG (ref 27–33)
MCHC RBC AUTO-ENTMCNC: 31.2 G/DL (ref 33.6–35)
MCV RBC AUTO: 91.5 FL (ref 81.4–97.8)
PLATELET # BLD AUTO: 229 K/UL (ref 164–446)
PMV BLD AUTO: 9.6 FL (ref 9–12.9)
POTASSIUM SERPL-SCNC: 4 MMOL/L (ref 3.6–5.5)
PROTHROMBIN TIME: 28.7 SEC (ref 12–14.6)
RBC # BLD AUTO: 4.13 M/UL (ref 4.2–5.4)
SODIUM SERPL-SCNC: 136 MMOL/L (ref 135–145)
TSH SERPL DL<=0.005 MIU/L-ACNC: 4.62 UIU/ML (ref 0.38–5.33)
WBC # BLD AUTO: 5.1 K/UL (ref 4.8–10.8)
WBC STL QL MICRO: NORMAL

## 2019-03-21 PROCEDURE — 700111 HCHG RX REV CODE 636 W/ 250 OVERRIDE (IP): Performed by: INTERNAL MEDICINE

## 2019-03-21 PROCEDURE — A9270 NON-COVERED ITEM OR SERVICE: HCPCS | Performed by: INTERNAL MEDICINE

## 2019-03-21 PROCEDURE — 36415 COLL VENOUS BLD VENIPUNCTURE: CPT

## 2019-03-21 PROCEDURE — 80048 BASIC METABOLIC PNL TOTAL CA: CPT

## 2019-03-21 PROCEDURE — 84443 ASSAY THYROID STIM HORMONE: CPT

## 2019-03-21 PROCEDURE — 89055 LEUKOCYTE ASSESSMENT FECAL: CPT

## 2019-03-21 PROCEDURE — 700102 HCHG RX REV CODE 250 W/ 637 OVERRIDE(OP): Performed by: INTERNAL MEDICINE

## 2019-03-21 PROCEDURE — 85610 PROTHROMBIN TIME: CPT

## 2019-03-21 PROCEDURE — 99217 PR OBSERVATION CARE DISCHARGE: CPT | Performed by: HOSPITALIST

## 2019-03-21 PROCEDURE — G0378 HOSPITAL OBSERVATION PER HR: HCPCS

## 2019-03-21 PROCEDURE — 85027 COMPLETE CBC AUTOMATED: CPT

## 2019-03-21 RX ORDER — WARFARIN SODIUM 1 MG/1
1 TABLET ORAL
Status: DISCONTINUED | OUTPATIENT
Start: 2019-03-21 | End: 2019-03-21 | Stop reason: HOSPADM

## 2019-03-21 RX ADMIN — STANDARDIZED SENNA CONCENTRATE AND DOCUSATE SODIUM 2 TABLET: 8.6; 5 TABLET, FILM COATED ORAL at 05:53

## 2019-03-21 RX ADMIN — METOPROLOL TARTRATE 25 MG: 25 TABLET ORAL at 05:54

## 2019-03-21 RX ADMIN — ONDANSETRON 4 MG: 4 TABLET, ORALLY DISINTEGRATING ORAL at 12:24

## 2019-03-21 RX ADMIN — LEVOTHYROXINE SODIUM 75 MCG: 50 TABLET ORAL at 05:53

## 2019-03-21 RX ADMIN — FOLIC ACID 1 MG: 1 TABLET ORAL at 06:06

## 2019-03-21 ASSESSMENT — CHA2DS2 SCORE
SEX: FEMALE
CHA2DS2 VASC SCORE: 5
HYPERTENSION: NO
AGE 65 TO 74: NO
VASCULAR DISEASE: NO
AGE 75 OR GREATER: YES
DIABETES: NO
PRIOR STROKE OR TIA OR THROMBOEMBOLISM: YES
CHF OR LEFT VENTRICULAR DYSFUNCTION: NO

## 2019-03-21 NOTE — H&P
Hospital Medicine History & Physical Note    Date of Service  3/20/2019    Primary Care Physician  Toribio Schmidt M.D.    Consultants  None    Code Status  Full    Chief Complaint  Abdominal pain    History of Presenting Illness  84 y.o. female who presented 3/20/2019 with abdominal pain.  Patient states she had left lower quadrant pain, sharp in nature that started abruptly at 930 this morning, ended around 1130.  She had a large bowel movement at some point in time during that timeframe and then her pain slowly started getting better.  She states it felt like it was left lower quadrant with radiation to her bladder, 10/10 at its worst.  She states it was associated with nausea.  Patient is not had diarrhea or constipation in the days leading up.  States she was in her normal state of health upon waking.  Upon arrival, CT scan was obtained which was negative for cause.  She did have hematuria in her urinalysis.  She recently was diagnosed with urinary tract infection and placed on Macrobid.  She does have recurrent urinary tract infections.  Patient has had this similar pain in the past, the has never been given a diagnosis as to why.    Review of Systems  Review of Systems   Constitutional: Negative for chills, fever and malaise/fatigue.   HENT: Negative for congestion.    Respiratory: Negative for cough, sputum production, shortness of breath and stridor.    Cardiovascular: Negative for chest pain, palpitations and leg swelling.   Gastrointestinal: Positive for abdominal pain and nausea. Negative for constipation, diarrhea and vomiting.   Genitourinary: Negative for dysuria and urgency.   Musculoskeletal: Negative for falls and myalgias.   Neurological: Negative for dizziness, tingling, loss of consciousness, weakness and headaches.   Psychiatric/Behavioral: Negative for depression and suicidal ideas.   All other systems reviewed and are negative.      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 She also has no past medical history of Breast cancer ([facility]).    Surgical History   has a past surgical history that includes bunionectomy (1980); arthroscopy, knee (1986); arthroscope (1989); temporal artery biopsy (1996); lung needle biopsy (1996); cataract extraction (2006); cholecystectomy (1997); hysterectomy radical (1975); and vein ligation.     Family History  family history includes Cancer in her brother; Non-contributory in her other; Other in her father.     Social History   reports that she quit smoking about 59 years ago. Her smoking use included Cigarettes. She smoked 1.00 pack per day. She has never used smokeless tobacco. She reports that she drinks alcohol. She reports that she does not use drugs.    Allergies  Allergies   Allergen Reactions   • Cephalexin [Keflex] Swelling   • Hydroxyzine Swelling     Eyes get itchy and swollen    • Naprelan [Naproxen] Swelling     Eyes get itchy become red and swollen   • Oxaprozin Swelling     Swelling eyes, and itchy   • Ampicillin Rash     Red Rash   • Baclofen Unspecified     drowsiness   • Citalopram Vomiting and Nausea   • Clarithromycin Unspecified     Pt reports that this medication plugs her ears.    • Erythromycin Diarrhea     GI upset   • Promethazine Unspecified     Drowsiness and sleeps   • Vi-Q-Tuss [Hydrocodone-Guaifenesin] Vomiting and Nausea   • Voltaren [Diclofenac Sodium] Rash and Swelling     Red rash and swelling       Medications  Prior to Admission Medications   Prescriptions Last Dose Informant Patient Reported? Taking?    Biotin 1000 MCG Tab 3/20/2019 at 0900 Patient Yes No   Sig: Take 1,000 mg by mouth every day.   Calcium Carb-Cholecalciferol (CALCIUM 600 + D) 600-200 MG-UNIT Tab 3/20/2019 at 0900 Patient Yes Yes   Sig: Take 1 Tab by mouth every morning.   Carboxymethylcellulose Sodium (THERATEARS) 0.25 % Solution 3/19/2019 at hs Patient Yes Yes   Sig: Place 1 Drop in both eyes 4 times a day as needed (dry eyes).   Multiple Vitamins-Minerals (ADULT ONE DAILY GUMMIES PO) 3/20/2019 at 0900 Patient Yes No   Sig: Take 2 Tabs by mouth every day.   cefUROXime (CEFTIN) 250 MG Tab 2/28/2019 at finished Patient Yes No   Sig: Take 250 mg by mouth 2 times a day. 10 day course   folic acid (FOLVITE) 1 MG TABS 3/20/2019 at 0900 Patient Yes No   Sig: Take 1 mg by mouth every day.   ipratropium (ATROVENT) 0.06 % Solution >2weeks at prn Patient Yes Yes   Sig: Spray 2 Sprays in nose at bedtime as needed.   levothyroxine (SYNTHROID) 75 MCG TABS 3/20/2019 at 0600 Patient Yes No   Sig: Take 75 mcg by mouth every morning before breakfast.   metoprolol (LOPRESSOR) 25 MG Tab 3/20/2019 at 0900 Patient No No   Sig: Take 1 Tab by mouth 2 times a day.   warfarin (COUMADIN) 2 MG Tab 3/19/2019 at pm Patient Yes Yes   Sig: Take 1-2 mg by mouth every evening. 2 mg (1tablet) on Sunday, Tuesday, & Thursday  1 mg (1/2 tablet) all other days      Facility-Administered Medications: None       Physical Exam  Temp:  [37.1 °C (98.8 °F)] 37.1 °C (98.8 °F)  Pulse:  [86-89] 89  Resp:  [17] 17  BP: (130)/(80) 130/80  SpO2:  [97 %-99 %] 99 %    Physical Exam   Constitutional: She is oriented to person, place, and time. She appears well-developed. She is cooperative. No distress.   Thin and frail appearing    HENT:   Head: Normocephalic and atraumatic. Not macrocephalic and not microcephalic. Head is without raccoon's eyes and without Hubbard's sign.   Right Ear: External ear normal.   Left Ear: External ear normal.   Mouth/Throat: Mucous membranes are dry. No oropharyngeal  exudate.   Eyes: Conjunctivae are normal. Right eye exhibits no discharge. Left eye exhibits no discharge. No scleral icterus.   Neck: Neck supple. No tracheal deviation present.   Cardiovascular: Normal rate, regular rhythm and intact distal pulses.  Exam reveals no gallop, no distant heart sounds and no friction rub.    No murmur heard.  Pulmonary/Chest: Effort normal. No accessory muscle usage or stridor. No tachypnea and no bradypnea. No respiratory distress. She has no decreased breath sounds. She has no wheezes. She has no rhonchi. She has no rales. She exhibits no tenderness.   Abdominal: Soft. Bowel sounds are normal. She exhibits no distension. There is no hepatosplenomegaly, splenomegaly or hepatomegaly. There is tenderness in the periumbilical area and left lower quadrant. There is no rebound and no guarding.   Musculoskeletal: Normal range of motion. She exhibits no edema or tenderness.   Lymphadenopathy:     She has no cervical adenopathy.   Neurological: She is alert and oriented to person, place, and time. No cranial nerve deficit. She displays no seizure activity.   Skin: Skin is warm, dry and intact. No rash noted. She is not diaphoretic. No erythema. No pallor.   Psychiatric: She has a normal mood and affect. Her speech is normal and behavior is normal. Judgment and thought content normal. Cognition and memory are normal.   Nursing note and vitals reviewed.      Laboratory:  Recent Labs      03/20/19   1402   WBC  6.3   RBC  4.56   HEMOGLOBIN  12.9   HEMATOCRIT  42.0   MCV  92.1   MCH  28.3   MCHC  30.7*   RDW  45.5   PLATELETCT  245   MPV  9.9     Recent Labs      03/20/19   1402   SODIUM  133*   POTASSIUM  4.3   CHLORIDE  97   CO2  27   GLUCOSE  91   BUN  27*   CREATININE  1.47*   CALCIUM  8.7     Recent Labs      03/20/19   1402   ALTSGPT  11   ASTSGOT  28   ALKPHOSPHAT  71   TBILIRUBIN  0.8   GLUCOSE  91                 No results for input(s): TROPONINI in the last 72 hours.    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    * Abdominal pain- (present on admission)   Assessment & Plan    -Nothing acute noted on CT scan  -Improved with large bowel movement, possibly bloating or slight inflammatory change on seen on CT scan  -Also associated with hematuria, possibly urologic in nature, would recommend an outpatient cystoscopy especially considering she has been having recurrent urinary tract infections  -Recently was started on nitrofurantoin for urinary tract infection, urine looks good now but will resume nitrofurantoin     Hematuria- (present on admission)   Assessment & Plan    -Would recommend outpatient cystoscopy  -No sign of clotting causing obstruction     CKD (chronic kidney disease) stage 3, GFR 30-59 ml/min (HCC)- (present on admission)   Assessment & Plan    -Near baseline, start IV fluids  -Repeat BMP in the morning     Hyponatremia- (present on admission)   Assessment & Plan    -Due to dehydration  -Start IV fluids     Atrial flutter (HCC)- (present on admission)   Assessment & Plan    -Currently sinus on metoprolol  -Resume Coumadin     Hypothyroidism- (present on admission)   Assessment & Plan    -Continue home Synthroid at 75 mcg daily  -Most recent TSH was high at 6.38, approximately 3 months ago, unknown what adjustments were made  -Obtain TSH     HTN (hypertension)- (present on admission)   Assessment &  Plan    -Continue home metoprolol  -Place PRN hydralazine  -Adjust as needed         VTE prophylaxis: SCDs

## 2019-03-21 NOTE — PROGRESS NOTES
0900- Stool sample sent to Lab    1000- Pt up to the bathroom with SBA. Pt tolerating well.   1200-Discussed discharge instructions.IV D/C. Belongings gathered and given to pt upon leaving the unit.

## 2019-03-21 NOTE — PROGRESS NOTES
Received report from day RN. Assumed care of patient.  VSS, Patient on RA. She is A&O.  Patient voices no pain at this time.  Patient up to void.  Patient noted to have rt arm and hand weakness and rt leg weakness.  She uses a walker for mobility.  Skin is intact.  Lt AC IV infusing NS at 50.  Admission completed with patient providing the information.  Through the night she has denied pain.

## 2019-03-21 NOTE — PROGRESS NOTES
Beside report received from Tito FRASER. Safety precautions in place. Call light within reach. Pt resting in bed.

## 2019-03-21 NOTE — ASSESSMENT & PLAN NOTE
-Continue home Synthroid at 75 mcg daily  -Most recent TSH was high at 6.38, approximately 3 months ago, unknown what adjustments were made  -Obtain TSH

## 2019-03-21 NOTE — DISCHARGE SUMMARY
Discharge Summary    CHIEF COMPLAINT ON ADMISSION  Chief Complaint   Patient presents with   • LLQ Pain     since 0930 this AM   • Diarrhea     since this AM. Pt taking macrobid for recurrent UTI.       Reason for Admission  Abdominal Pain     Admission Date  3/20/2019    CODE STATUS  Full Code    HPI & HOSPITAL COURSE  This is a 84 y.o. female here with left lower quadrant pain. This is a chronic issue and has been present for over 3 years. She has recurrent UTIs, has been evaluated by urology and GI and is currently on treatment for UTI. Had two bowel movements overnight and feels better though still complaining of her chronic pain. I offered pyridium to try for bladder anesthesia and she refused this electing to follow up with her primary care provider. CT scan of the abdomen did not reveal any pathology that could cause the pain.  I recommend that she go back to urology for consideration of cystoscopy.       Therefore, she is discharged in guarded and stable condition to home with close outpatient follow-up.        Discharge Date  3/21/19    FOLLOW UP ITEMS POST DISCHARGE  URology  Primary care    DISCHARGE DIAGNOSES  Principal Problem:    Abdominal pain POA: Yes  Active Problems:    HTN (hypertension) POA: Yes    Hypothyroidism POA: Yes    Atrial flutter (HCC) POA: Yes    Hyponatremia POA: Yes    CKD (chronic kidney disease) stage 3, GFR 30-59 ml/min (HCC) POA: Yes    Hematuria POA: Yes  Resolved Problems:    * No resolved hospital problems. *      FOLLOW UP  Future Appointments  Date Time Provider Department Center   3/27/2019 9:15 AM [facility] EXAM [facility] None   9/18/2019 1:15 PM Hussain Nation M.D. [facility] None     No follow-up provider specified.    MEDICATIONS ON DISCHARGE     Medication List      CONTINUE taking these medications      Instructions   ADULT ONE DAILY GUMMIES PO   Take 2 Tabs by mouth every day.  Dose:  2 Tab     Biotin 1000 MCG Tabs   Take 1,000 mg by mouth every day.  Dose:  1000 mg      CALCIUM 600 + D 600-200 MG-UNIT Tabs  Generic drug:  Calcium Carb-Cholecalciferol   Take 1 Tab by mouth every morning.  Dose:  1 Tab     cefUROXime 250 MG Tabs  Commonly known as:  CEFTIN   Take 250 mg by mouth 2 times a day. 10 day course  Dose:  250 mg     folic acid 1 MG Tabs  Commonly known as:  FOLVITE   Take 1 mg by mouth every day.  Dose:  1 mg     ipratropium 0.06 % Soln  Commonly known as:  ATROVENT   Spray 2 Sprays in nose at bedtime as needed.  Dose:  2 Spray     levothyroxine 75 MCG Tabs  Commonly known as:  SYNTHROID   Take 75 mcg by mouth every morning before breakfast.  Dose:  75 mcg     metoprolol 25 MG Tabs  Commonly known as:  LOPRESSOR   Take 1 Tab by mouth 2 times a day.  Dose:  25 mg     THERATEARS 0.25 % Soln  Generic drug:  Carboxymethylcellulose Sodium   Place 1 Drop in both eyes 4 times a day as needed (dry eyes).  Dose:  1 Drop     warfarin 2 MG Tabs  Commonly known as:  COUMADIN   Take 1-2 mg by mouth every evening. 2 mg (1tablet) on Sunday, Tuesday, & Thursday 1 mg (1/2 tablet) all other days  Dose:  1-2 mg            Allergies  Allergies   Allergen Reactions   • Cephalexin [Keflex] Swelling   • Hydroxyzine Swelling     Eyes get itchy and swollen    • Naprelan [Naproxen] Swelling     Eyes get itchy become red and swollen   • Oxaprozin Swelling     Swelling eyes, and itchy   • Ampicillin Rash     Red Rash   • Baclofen Unspecified     drowsiness   • Citalopram Vomiting and Nausea   • Clarithromycin Unspecified     Pt reports that this medication plugs her ears.    • Erythromycin Diarrhea     GI upset   • Promethazine Unspecified     Drowsiness and sleeps   • Vi-Q-Tuss [Hydrocodone-Guaifenesin] Vomiting and Nausea   • Voltaren [Diclofenac Sodium] Rash and Swelling     Red rash and swelling       DIET  Orders Placed This Encounter   Procedures   • Diet Order Regular     Standing Status:   Standing     Number of Occurrences:   1     Order Specific Question:   Diet:     Answer:   Regular [1]        ACTIVITY  As tolerated.  Weight bearing as tolerated    CONSULTATIONS  none    PROCEDURES  none    LABORATORY  Lab Results   Component Value Date    SODIUM 136 03/21/2019    POTASSIUM 4.0 03/21/2019    CHLORIDE 103 03/21/2019    CO2 25 03/21/2019    GLUCOSE 86 03/21/2019    BUN 18 03/21/2019    CREATININE 1.32 03/21/2019    CREATININE 1.5 (H) 02/19/2009        Lab Results   Component Value Date    WBC 5.1 03/21/2019    HEMOGLOBIN 11.8 (L) 03/21/2019    HEMATOCRIT 37.8 03/21/2019    PLATELETCT 229 03/21/2019

## 2019-03-21 NOTE — PROGRESS NOTES
Inpatient Anticoagulation Service Note    Date: 3/20/2019  Reason for Anticoagulation: Atrial Fibrillation, Stroke     Hemoglobin Value: 12.9  Hematocrit Value: 42  Lab Platelet Value: 245  Target INR: 2.0 to 3.0    INR from last 7 days     Date/Time INR Value    03/20/19 1402 (!)  2.23        Dose from last 7 days     Date/Time Dose (mg)    03/20/19 1800  1        Average Dose (mg):  (HOME DOSE: 2 mg every Sun, Tue, Thu; 1 mg all other days)  Pt is seen at the outpatient anticoag clinic    Significant Interactions: Thyroid Medications  Bridge Therapy: No     Plan: give warfarin 1mg po x 1 dose today, inr am     Pharmacist suggested discharge dosing: resume home dose of 2 mg every Sun, Tue, Thu; 1 mg all other days     Kenyatta Byrd, JuanD, BCPS

## 2019-03-21 NOTE — CARE PLAN
Problem: Communication  Goal: The ability to communicate needs accurately and effectively will improve    Intervention: [place] patient and significant other/support system to call light to alert staff of needs  Patient oriented to to room.      Problem: Safety  Goal: Will remain free from injury  Outcome: PROGRESSING AS EXPECTED  Patient encouraged to call before getting out of bed.

## 2019-03-21 NOTE — DISCHARGE INSTRUCTIONS
Discharge Instructions    Discharged to home by car with relative. Discharged via wheelchair, hospital escort: Yes.  Special equipment needed: Not Applicable    Be sure to schedule a follow-up appointment with your primary care doctor or any specialists as instructed.     Discharge Plan:   Diet Plan: Discussed  Activity Level: Discussed  Confirmed Follow up Appointment: Appointment Scheduled  Confirmed Symptoms Management: Discussed  Medication Reconciliation Updated: Yes  Influenza Vaccine Indication: Not indicated: Previously immunized this influenza season and > 8 years of age    I understand that a diet low in cholesterol, fat, and sodium is recommended for good health. Unless I have been given specific instructions below for another diet, I accept this instruction as my diet prescription.   Other diet: Regular      Special Instructions: None    Abdominal Pain, Adult  Many things can cause belly (abdominal) pain. Most times, belly pain is not dangerous. Many cases of belly pain can be watched and treated at home. Sometimes belly pain is serious, though. Your doctor will try to find the cause of your belly pain.  Follow these instructions at home:  · Take over-the-counter and prescription medicines only as told by your doctor. Do not take medicines that help you poop (laxatives) unless told to by your doctor.  · Drink enough fluid to keep your pee (urine) clear or pale yellow.  · Watch your belly pain for any changes.  · Keep all follow-up visits as told by your doctor. This is important.  Contact a doctor if:  · Your belly pain changes or gets worse.  · You are not hungry, or you lose weight without trying.  · You are having trouble pooping (constipated) or have watery poop (diarrhea) for more than 2-3 days.  · You have pain when you pee or poop.  · Your belly pain wakes you up at night.  · Your pain gets worse with meals, after eating, or with certain foods.  · You are throwing up and cannot keep anything  down.  · You have a fever.  Get help right away if:  · Your pain does not go away as soon as your doctor says it should.  · You cannot stop throwing up.  · Your pain is only in areas of your belly, such as the right side or the left lower part of the belly.  · You have bloody or black poop, or poop that looks like tar.  · You have very bad pain, cramping, or bloating in your belly.  · You have signs of not having enough fluid or water in your body (dehydration), such as:  ¨ Dark pee, very little pee, or no pee.  ¨ Cracked lips.  ¨ Dry mouth.  ¨ Sunken eyes.  ¨ Sleepiness.  ¨ Weakness.  This information is not intended to replace advice given to you by your health care provider. Make sure you discuss any questions you have with your health care provider.  Document Released: 06/05/2009 Document Revised: 07/07/2017 Document Reviewed: 05/31/2017  [employer] Interactive Patient Education © 2017 [employer]      Depression / Suicide Risk    As you are discharged from this [facility] facility, it is important to learn how to keep safe from harming yourself.    Recognize the warning signs:  · Abrupt changes in personality, positive or negative- including increase in energy   · Giving away possessions  · Change in eating patterns- significant weight changes-  positive or negative  · Change in sleeping patterns- unable to sleep or sleeping all the time   · Unwillingness or inability to communicate  · Depression  · Unusual sadness, discouragement and loneliness  · Talk of wanting to die  · Neglect of personal appearance   · Rebelliousness- reckless behavior  · Withdrawal from people/activities they love  · Confusion- inability to concentrate     If you or a loved one observes any of these behaviors or has concerns about self-harm, here's what you can do:  · Talk about it- your feelings and reasons for harming yourself  · Remove any means that you might use to hurt yourself (examples: pills, rope, extension cords,  firearm)  · Get professional help from the community (Mental Health, Substance Abuse, psychological counseling)  · Do not be alone:Call your Safe Contact- someone whom you trust who will be there for you.  · Call your local CRISIS HOTLINE 684-9247 or 201-624-7691  · Call your local Children's Mobile Crisis Response Team [place] (792) 276-3207 or www.Netstory  · Call the toll free National Suicide Prevention Hotlines   · National Suicide Prevention Lifeline 093-153-XJIB (1165)  · [employer] Hope Line Network 800-SUICIDE (901-5664)    Infection Control in the Home  If you have an infection or you are taking care of someone who has an infection, it is important to know how to keep the infection from spreading. Follow these guidelines to help stop the spread of infection, and talk to your health care provider.  HOW ARE INFECTIONS SPREAD?  In order for an infection to spread, the following must be present:  · A germ. This may be a virus, bacteria, fungus, or parasite.  · A place for the germ to live. This may be:  ¨ On or in a person, animal, plant, or food.  ¨ In soil or water.  ¨ On surfaces, such as a door handle.  · A susceptible host. This is a person or animal who does not have resistance (immunity) to the germ.  · A way for the germ to enter the host. This may occur by:  ¨ Direct contact. This may happen by making contact--such as shaking hands or hugging--with an infected person or animal. Some germs can also travel through the air and spread to you if an infected person coughs or sneezes on you or near you.  ¨ Indirect contact. This is when the germ enters the host through contact with an infected object. Examples include eating contaminated food, drinking contaminated water, or touching a contaminated surface with your hands and then touching your face, nose, or mouth soon after that.  HOW CAN I HELP TO PREVENT INFECTION FROM SPREADING?  There are several things that you can do to help prevent  infection from spreading.  Hand Washing  It is very important to wash your hands correctly, following these steps:  2. Wet your hands with clean, running water.  3. Apply soap to your hands. Liquid soap is better than bar soap.  4. Rub your hands together quickly to create lather.  5. Keep rubbing your hands together for at least 20 seconds. Thoroughly scrub all parts of your hands, including under your fingernails and between your fingers.  6. Rinse your hands with clean, running water until all of the soap is gone.  7. Dry your hands with an air dryer or a clean paper or cloth towel, or let your hands air-dry. Do not use your clothing or a soiled towel to dry your hands.  8. If you are in a public restroom, use your towel to turn off the water faucet and to open the bathroom door.  Make sure to wash your hands:  · Before:  ¨ Visiting a baby or anyone with a weakened or lowered defense (immune) system.  ¨ Putting in and taking out any contact lenses.  · After:  ¨ Working or playing outside.  ¨ Touching an animal or its toys or leash.  ¨ Handling livestock.  ¨ Using the bathroom or helping a child or adult to use the bathroom.  ¨ Using household  or toxic chemicals.  ¨ Touching or taking out the garbage.  ¨ Touching anything dirty around your home.  ¨ Handling soiled clothes or rags.  ¨ Taking care of a sick child. This includes touching used tissues, toys, and clothes.  ¨ Sneezing, coughing, or blowing your nose.  ¨ Using public transportation.  ¨ Shaking hands.  ¨ Using a phone, including your mobile phone.  ¨ Touching money.  · Before and after:  ¨ Preparing food.  ¨ Preparing a bottle for a baby.  ¨ Feeding a baby or a young child.  ¨ Eating.  ¨ Visiting or taking care of someone who is sick.  ¨ Changing a diaper.  ¨ Changing a bandage (dressing) or taking care of an injury or wound.  ¨ Giving or taking medicine.  Taking Care of Your Home  · Make sure that you have enough cleaning supplies at all times.  These include:  ¨ Disinfectants.  ¨ Reusable cleaning cloths. Wash these after each use.  ¨ Paper towels.  ¨ Utility gloves. Replace your gloves if they are cracked or torn or if they start to peel.  · Use bleach safely. Never mix it with other cleaning products, especially those that contain ammonia. This mixture can create a dangerous gas that may be deadly.  · Take care of your cleaning supplies. Toilet brushes, mops, and sponges can breed germs. Soak them in bleach and water for 5 minutes after each use.  · Do not pour used mop water down the sink. Pour it down the toilet instead.  · Maintain proper ventilation in your home.  · If you have a pet, ensure that your pet stays clean. Do not let people with weak immune systems touch bird droppings, fish tank water, or a litter box.  ¨ If you have a cat, be sure to change the litter every day.  · In the bathroom, make sure you:  ¨ Provide liquid soap.  ¨ Change towels and washcloths frequently. Avoid sharing towels and washcloths.  ¨ Change toothbrushes often and store them separately in a clean, dry place.  ¨ Disinfect the toilet.  ¨ Clean the tub, shower, and sink with standard cleaning products.    ¨ Mop the floor with a standard .  ¨ Do not share personal items, such as razors, toothbrushes, drinking glasses, deodorant, elder, brushes, towels, and washcloths.    · In the kitchen, make sure you:  ¨ Store food carefully.  ¨ Refrigerate leftovers promptly in covered containers.  ¨ Throw out stale or spoiled food.  ¨ Clean the inside of your refrigerator each week.  ¨ Keep your refrigerator set at 40°F (4°C) or less, and set your freezer at 0°F (-18°C) or less.  ¨ Thaw foods in the refrigerator or microwave, not at room temperature.  ¨ Serve foods at the proper temperature. Do not eat raw meat. Make sure it is cooked to the appropriate temperature. Cook eggs until they are firm.  ¨ Wash fruits and vegetables under running water.  ¨ Use separate cutting boards,  plates, and utensils for raw foods and cooked foods.  ¨ Keep work surfaces clean.  ¨ Use a clean spoon each time you sample food while cooking.  ¨ Wash your dishes in hot, soapy water. Air-dry your dishes or use a .  ¨ Do not share forks, cups, or spoons during meals.  · Wear gloves if laundry is visibly soiled.  · Change linens each week or whenever they are soiled.  · Do not shake soiled linens. Doing that may send germs into the air. Put dressings, sanitary or incontinence pads, diapers, and gloves in plastic garbage bags for disposal.     This information is not intended to replace advice given to you by your health care provider. Make sure you discuss any questions you have with your health care provider.     Document Released: 09/26/2009 Document Revised: 01/08/2016 Document Reviewed: 08/20/2015  [employer] Interactive Patient Education ©2016 [employer]

## 2019-03-21 NOTE — PROGRESS NOTES
Inpatient Anticoagulation Service Note    Date: 3/21/2019  Reason for Anticoagulation: Atrial Fibrillation, Stroke   [id number] VASc Score: 5    Hemoglobin Value: (!) 11.8  Hematocrit Value: 37.8  Lab Platelet Value: 229  Target INR: 2.0 to 3.0    INR from last 7 days     Date/Time INR Value    03/21/19 0519 (!)  2.75    03/20/19 1402 (!)  2.23        Dose from last 7 days     Date/Time Dose (mg)    03/21/19 0800  1    03/20/19 1800  1        Average Dose (mg):  (HOME DOSE: 2 mg every Sun, Tue, Thu; 1 mg all other days)  Significant Interactions: Thyroid Medications  Bridge Therapy: No (If less than 5 days and overlap therapy discontinued -- document reason (i.e. Bleed Risk))    (If still on overlap therapy, if No -- document reason (i.e. Bleed Risk))    Comments: Home dose 2 mg on Sun,Tues,Thurs, 1 mg on all other days.    Plan:  Will give warfarin 1 mg po tonight for INR of 2.75  Education Material Provided?: No (anticoagulation clinic patient.)  Pharmacist suggested discharge dosing: Resume home regimen     Manav Rae [facility]

## 2019-03-21 NOTE — PROGRESS NOTES
Patient arrived to unit via gurney. Ambulated from gurney to bed,one assist with walker. itals taken. Pt assessed. med rec completed. Discussed POC with patient. Welcome folder provided and discussed. Communication board filled out. Questions and concerns addressed, verbalized understanding. Fall precautions in place. Pt demonstrates ability to use call light appropriately. Bed in locked and lowest position.

## 2019-03-21 NOTE — ASSESSMENT & PLAN NOTE
-Nothing acute noted on CT scan  -Improved with large bowel movement, possibly bloating or slight inflammatory change on seen on CT scan  -Also associated with hematuria, possibly urologic in nature, would recommend an outpatient cystoscopy especially considering she has been having recurrent urinary tract infections  -Recently was started on nitrofurantoin for urinary tract infection, urine looks good now but will resume nitrofurantoin

## 2019-03-27 ENCOUNTER — ANTICOAGULATION VISIT (OUTPATIENT)
Dept: VASCULAR LAB | Facility: MEDICAL CENTER | Age: 84
End: 2019-03-27
Attending: INTERNAL MEDICINE
Payer: MEDICARE

## 2019-03-27 DIAGNOSIS — Z79.01 CHRONIC ANTICOAGULATION: ICD-10-CM

## 2019-03-27 LAB
INR BLD: 2.2 (ref 0.9–1.2)
INR PPP: 2.2 (ref 2–3.5)

## 2019-03-27 PROCEDURE — 85610 PROTHROMBIN TIME: CPT

## 2019-03-27 PROCEDURE — 99211 OFF/OP EST MAY X REQ PHY/QHP: CPT | Performed by: PHARMACIST

## 2019-03-27 NOTE — PROGRESS NOTES
Anticoagulation Summary  As of 3/27/2019    INR goal:   2.0-3.0   TTR:   39.6 % (5 mo)   INR used for dosin.2 (3/27/2019)   Warfarin maintenance plan:   2 mg (2 mg x 1) every Sun, Tue, Thu; 1 mg (2 mg x 0.5) all other days   Weekly warfarin total:   10 mg   Plan last modified:   JEFF Zepeda (3/13/2019)   Next INR check:   2019   Target end date:   Indefinite    Indications    Stroke (HCC) [I63.9]  Atrial flutter (HCC) [I48.92]             Anticoagulation Episode Summary     INR check location:       Preferred lab:       Send INR reminders to:       Comments:         Anticoagulation Care Providers     Provider Role Specialty Phone number    Hussain Nation M.D. Referring Interventional Cardiology [phone]    [facility] Anticoagulation Services Responsible  [phone]        Anticoagulation Patient Findings      HPI:  Az Jolly seen in clinic today, on anticoagulation therapy with warfarin for Aflutter  Changes to current medical/health status since last appt: pt was inpt last week, has finished Macrobid Rx.   Denies signs/symptoms of bleeding and/or thrombosis since the last appt.    Denies any interval changes to diet  Denies any interval changes to medications since last appt.   Denies any complications or cost restrictions with current therapy.   BP declined      A/P   INR  is-therapeutic.   Will continue with the same warfarin dosing.     Follow up appointment in 4 week(s) per pt preference.    Cindy Sanchez, PharmD , CDE

## 2019-04-24 ENCOUNTER — ANTICOAGULATION VISIT (OUTPATIENT)
Dept: VASCULAR LAB | Facility: MEDICAL CENTER | Age: 84
End: 2019-04-24
Attending: INTERNAL MEDICINE
Payer: MEDICARE

## 2019-04-24 VITALS — HEART RATE: 97 BPM | DIASTOLIC BLOOD PRESSURE: 72 MMHG | SYSTOLIC BLOOD PRESSURE: 113 MMHG

## 2019-04-24 DIAGNOSIS — I48.0 PAROXYSMAL ATRIAL FIBRILLATION (HCC): ICD-10-CM

## 2019-04-24 LAB
INR BLD: 2.2 (ref 0.9–1.2)
INR PPP: 2.2 (ref 2–3.5)

## 2019-04-24 PROCEDURE — 99211 OFF/OP EST MAY X REQ PHY/QHP: CPT

## 2019-04-24 PROCEDURE — 85610 PROTHROMBIN TIME: CPT

## 2019-04-24 NOTE — PROGRESS NOTES
Anticoagulation Summary  As of 2019    INR goal:   2.0-3.0   TTR:   49.1 % (5.9 mo)   INR used for dosin.20 (2019)   Warfarin maintenance plan:   2 mg (2 mg x 1) every Sun, Tue, u; 1 mg (2 mg x 0.5) all other days   Weekly warfarin total:   10 mg   Plan last modified:   JEFF Zepeda (3/13/2019)   Next INR check:   2019   Target end date:   Indefinite    Indications    Stroke (HCC) [I63.9]  Atrial flutter (HCC) [I48.92]             Anticoagulation Episode Summary     INR check location:       Preferred lab:       Send INR reminders to:       Comments:         Anticoagulation Care Providers     Provider Role Specialty Phone number    Hussain Nation M.D. Referring Interventional Cardiology [phone]    [facility] Anticoagulation Services Responsible  [phone]        Anticoagulation Patient Findings      HPI:  Az Jolly seen in clinic today, on anticoagulation therapy with warfarin for Stroke.  Changes to current medical/health status since last appt: none  Denies signs/symptoms of bleeding and/or thrombosis since the last appt.    Denies any interval changes to diet  Denies any interval changes to medications since last appt.   Denies any complications or cost restrictions with current therapy.   BP recorded in vitals.  Confirmed dosing regimen.     A/P   INR  therapeutic.   Pt is to continue with current warfarin dosing regimen.     Follow up appointment in 4 week(s).    Graham Jackson, JuanD

## 2019-05-22 ENCOUNTER — ANTICOAGULATION VISIT (OUTPATIENT)
Dept: VASCULAR LAB | Facility: MEDICAL CENTER | Age: 84
End: 2019-05-22
Attending: INTERNAL MEDICINE
Payer: MEDICARE

## 2019-05-22 DIAGNOSIS — Z79.01 CHRONIC ANTICOAGULATION: ICD-10-CM

## 2019-05-22 LAB
INR BLD: 2 (ref 0.9–1.2)
INR PPP: 2 (ref 2–3.5)

## 2019-05-22 PROCEDURE — 85610 PROTHROMBIN TIME: CPT

## 2019-05-22 PROCEDURE — 99211 OFF/OP EST MAY X REQ PHY/QHP: CPT | Performed by: PHARMACIST

## 2019-05-22 NOTE — PROGRESS NOTES
Anticoagulation Summary  As of 2019    INR goal:   2.0-3.0   TTR:   56.0 % (6.9 mo)   INR used for dosin.00 (2019)   Warfarin maintenance plan:   2 mg (2 mg x 1) every Sun, Tue, u; 1 mg (2 mg x 0.5) all other days   Weekly warfarin total:   10 mg   Plan last modified:   JEFF Zepeda (3/13/2019)   Next INR check:   7/3/2019   Target end date:   Indefinite    Indications    Stroke (HCC) [I63.9]  Atrial flutter (HCC) [I48.92]             Anticoagulation Episode Summary     INR check location:       Preferred lab:       Send INR reminders to:       Comments:         Anticoagulation Care Providers     Provider Role Specialty Phone number    Hussain Nation M.D. Referring Interventional Cardiology [phone]    [facility] Anticoagulation Services Responsible  [phone]        Anticoagulation Patient Findings      HPI:  Az Jolly seen in clinic today, on anticoagulation therapy with warfarin for Afib and stroke  Changes to current medical/health status since last appt: denies  Denies signs/symptoms of bleeding and/or thrombosis since the last appt.    Denies any interval changes to diet  Denies any interval changes to medications since last appt.   Denies any complications or cost restrictions with current therapy.   BP declined      A/P   INR  is-therapeutic.   Will continue with the same warfarin dosing.     Follow up appointment in 6 week(s).    Cindy Sanchez, PharmD

## 2019-06-18 ENCOUNTER — APPOINTMENT (OUTPATIENT)
Dept: RADIOLOGY | Facility: MEDICAL CENTER | Age: 84
DRG: 469 | End: 2019-06-18
Attending: EMERGENCY MEDICINE
Payer: MEDICARE

## 2019-06-18 ENCOUNTER — HOSPITAL ENCOUNTER (INPATIENT)
Facility: MEDICAL CENTER | Age: 84
LOS: 6 days | DRG: 469 | End: 2019-06-25
Attending: EMERGENCY MEDICINE | Admitting: HOSPITALIST
Payer: MEDICARE

## 2019-06-18 DIAGNOSIS — S72.001A CLOSED FRACTURE OF NECK OF RIGHT FEMUR, INITIAL ENCOUNTER (HCC): ICD-10-CM

## 2019-06-18 DIAGNOSIS — S72.001A CLOSED FRACTURE OF RIGHT HIP, INITIAL ENCOUNTER (HCC): ICD-10-CM

## 2019-06-18 DIAGNOSIS — I48.20 CHRONIC ATRIAL FIBRILLATION (HCC): ICD-10-CM

## 2019-06-18 LAB
ALBUMIN SERPL BCP-MCNC: 3.4 G/DL (ref 3.2–4.9)
ALBUMIN/GLOB SERPL: 0.9 G/DL
ALP SERPL-CCNC: 83 U/L (ref 30–99)
ALT SERPL-CCNC: 12 U/L (ref 2–50)
ANION GAP SERPL CALC-SCNC: 11 MMOL/L (ref 0–11.9)
APTT PPP: 34.7 SEC (ref 24.7–36)
AST SERPL-CCNC: 15 U/L (ref 12–45)
BASOPHILS # BLD AUTO: 0.4 % (ref 0–1.8)
BASOPHILS # BLD: 0.03 K/UL (ref 0–0.12)
BILIRUB SERPL-MCNC: 0.4 MG/DL (ref 0.1–1.5)
BUN SERPL-MCNC: 33 MG/DL (ref 8–22)
CALCIUM SERPL-MCNC: 9.2 MG/DL (ref 8.5–10.5)
CHLORIDE SERPL-SCNC: 98 MMOL/L (ref 96–112)
CO2 SERPL-SCNC: 27 MMOL/L (ref 20–33)
CREAT SERPL-MCNC: 1.36 MG/DL (ref 0.5–1.4)
EKG IMPRESSION: NORMAL
EOSINOPHIL # BLD AUTO: 0.09 K/UL (ref 0–0.51)
EOSINOPHIL NFR BLD: 1.2 % (ref 0–6.9)
ERYTHROCYTE [DISTWIDTH] IN BLOOD BY AUTOMATED COUNT: 45.1 FL (ref 35.9–50)
GLOBULIN SER CALC-MCNC: 4 G/DL (ref 1.9–3.5)
GLUCOSE SERPL-MCNC: 117 MG/DL (ref 65–99)
HCT VFR BLD AUTO: 39.3 % (ref 37–47)
HGB BLD-MCNC: 12.6 G/DL (ref 12–16)
IMM GRANULOCYTES # BLD AUTO: 0.05 K/UL (ref 0–0.11)
IMM GRANULOCYTES NFR BLD AUTO: 0.7 % (ref 0–0.9)
INR PPP: 1.73 (ref 0.87–1.13)
LYMPHOCYTES # BLD AUTO: 1.54 K/UL (ref 1–4.8)
LYMPHOCYTES NFR BLD: 20.1 % (ref 22–41)
MCH RBC QN AUTO: 29.7 PG (ref 27–33)
MCHC RBC AUTO-ENTMCNC: 32.1 G/DL (ref 33.6–35)
MCV RBC AUTO: 92.7 FL (ref 81.4–97.8)
MONOCYTES # BLD AUTO: 0.36 K/UL (ref 0–0.85)
MONOCYTES NFR BLD AUTO: 4.7 % (ref 0–13.4)
NEUTROPHILS # BLD AUTO: 5.6 K/UL (ref 2–7.15)
NEUTROPHILS NFR BLD: 72.9 % (ref 44–72)
NRBC # BLD AUTO: 0 K/UL
NRBC BLD-RTO: 0 /100 WBC
PLATELET # BLD AUTO: 236 K/UL (ref 164–446)
PMV BLD AUTO: 9.6 FL (ref 9–12.9)
POTASSIUM SERPL-SCNC: 4.3 MMOL/L (ref 3.6–5.5)
PROT SERPL-MCNC: 7.4 G/DL (ref 6–8.2)
PROTHROMBIN TIME: 20.8 SEC (ref 12–14.6)
RBC # BLD AUTO: 4.24 M/UL (ref 4.2–5.4)
SODIUM SERPL-SCNC: 136 MMOL/L (ref 135–145)
WBC # BLD AUTO: 7.7 K/UL (ref 4.8–10.8)

## 2019-06-18 PROCEDURE — 80053 COMPREHEN METABOLIC PANEL: CPT

## 2019-06-18 PROCEDURE — 81001 URINALYSIS AUTO W/SCOPE: CPT

## 2019-06-18 PROCEDURE — 85610 PROTHROMBIN TIME: CPT

## 2019-06-18 PROCEDURE — 73501 X-RAY EXAM HIP UNI 1 VIEW: CPT | Mod: RT

## 2019-06-18 PROCEDURE — 93005 ELECTROCARDIOGRAM TRACING: CPT | Performed by: EMERGENCY MEDICINE

## 2019-06-18 PROCEDURE — 71045 X-RAY EXAM CHEST 1 VIEW: CPT

## 2019-06-18 PROCEDURE — 85730 THROMBOPLASTIN TIME PARTIAL: CPT

## 2019-06-18 PROCEDURE — 73551 X-RAY EXAM OF FEMUR 1: CPT | Mod: RT

## 2019-06-18 PROCEDURE — 36415 COLL VENOUS BLD VENIPUNCTURE: CPT

## 2019-06-18 PROCEDURE — 99285 EMERGENCY DEPT VISIT HI MDM: CPT

## 2019-06-18 PROCEDURE — 85025 COMPLETE CBC W/AUTO DIFF WBC: CPT

## 2019-06-18 RX ORDER — MORPHINE SULFATE 4 MG/ML
2 INJECTION, SOLUTION INTRAMUSCULAR; INTRAVENOUS ONCE
Status: COMPLETED | OUTPATIENT
Start: 2019-06-18 | End: 2019-06-19

## 2019-06-18 RX ORDER — ONDANSETRON 2 MG/ML
4 INJECTION INTRAMUSCULAR; INTRAVENOUS ONCE
Status: COMPLETED | OUTPATIENT
Start: 2019-06-18 | End: 2019-06-19

## 2019-06-18 ASSESSMENT — PAIN DESCRIPTION - DESCRIPTORS: DESCRIPTORS: ACHING

## 2019-06-19 ENCOUNTER — ANESTHESIA (OUTPATIENT)
Dept: SURGERY | Facility: MEDICAL CENTER | Age: 84
DRG: 469 | End: 2019-06-19
Payer: MEDICARE

## 2019-06-19 ENCOUNTER — ANESTHESIA EVENT (OUTPATIENT)
Dept: SURGERY | Facility: MEDICAL CENTER | Age: 84
DRG: 469 | End: 2019-06-19
Payer: MEDICARE

## 2019-06-19 ENCOUNTER — APPOINTMENT (OUTPATIENT)
Dept: RADIOLOGY | Facility: MEDICAL CENTER | Age: 84
DRG: 469 | End: 2019-06-19
Attending: ORTHOPAEDIC SURGERY
Payer: MEDICARE

## 2019-06-19 PROBLEM — S72.001A CLOSED RIGHT HIP FRACTURE (HCC): Status: ACTIVE | Noted: 2019-06-19

## 2019-06-19 LAB
APPEARANCE UR: CLEAR
APTT PPP: 167.1 SEC (ref 24.7–36)
APTT PPP: 26.9 SEC (ref 24.7–36)
BACTERIA #/AREA URNS HPF: NEGATIVE /HPF
BILIRUB UR QL STRIP.AUTO: NEGATIVE
COLOR UR: YELLOW
EPI CELLS #/AREA URNS HPF: NEGATIVE /HPF
GLUCOSE UR STRIP.AUTO-MCNC: NEGATIVE MG/DL
HYALINE CASTS #/AREA URNS LPF: ABNORMAL /LPF
INR PPP: 1.9 (ref 0.87–1.13)
KETONES UR STRIP.AUTO-MCNC: NEGATIVE MG/DL
LEUKOCYTE ESTERASE UR QL STRIP.AUTO: NEGATIVE
MICRO URNS: ABNORMAL
NITRITE UR QL STRIP.AUTO: NEGATIVE
PH UR STRIP.AUTO: 6.5 [PH]
PROT UR QL STRIP: 100 MG/DL
PROTHROMBIN TIME: 22.4 SEC (ref 12–14.6)
RBC # URNS HPF: ABNORMAL /HPF
RBC UR QL AUTO: ABNORMAL
SP GR UR STRIP.AUTO: 1.01
UROBILINOGEN UR STRIP.AUTO-MCNC: 0.2 MG/DL
WBC #/AREA URNS HPF: ABNORMAL /HPF

## 2019-06-19 PROCEDURE — 700111 HCHG RX REV CODE 636 W/ 250 OVERRIDE (IP): Performed by: HOSPITALIST

## 2019-06-19 PROCEDURE — 85610 PROTHROMBIN TIME: CPT

## 2019-06-19 PROCEDURE — 700111 HCHG RX REV CODE 636 W/ 250 OVERRIDE (IP): Performed by: ANESTHESIOLOGY

## 2019-06-19 PROCEDURE — 700101 HCHG RX REV CODE 250: Performed by: ANESTHESIOLOGY

## 2019-06-19 PROCEDURE — 700105 HCHG RX REV CODE 258: Performed by: HOSPITALIST

## 2019-06-19 PROCEDURE — 160002 HCHG RECOVERY MINUTES (STAT): Performed by: ORTHOPAEDIC SURGERY

## 2019-06-19 PROCEDURE — 160031 HCHG SURGERY MINUTES - 1ST 30 MINS LEVEL 5: Performed by: ORTHOPAEDIC SURGERY

## 2019-06-19 PROCEDURE — 502000 HCHG MISC OR IMPLANTS RC 0278: Performed by: ORTHOPAEDIC SURGERY

## 2019-06-19 PROCEDURE — 700105 HCHG RX REV CODE 258: Performed by: ANESTHESIOLOGY

## 2019-06-19 PROCEDURE — 96376 TX/PRO/DX INJ SAME DRUG ADON: CPT

## 2019-06-19 PROCEDURE — 94760 N-INVAS EAR/PLS OXIMETRY 1: CPT

## 2019-06-19 PROCEDURE — 770020 HCHG ROOM/CARE - TELE (206)

## 2019-06-19 PROCEDURE — 700101 HCHG RX REV CODE 250

## 2019-06-19 PROCEDURE — A6402 STERILE GAUZE <= 16 SQ IN: HCPCS | Performed by: ORTHOPAEDIC SURGERY

## 2019-06-19 PROCEDURE — 160042 HCHG SURGERY MINUTES - EA ADDL 1 MIN LEVEL 5: Performed by: ORTHOPAEDIC SURGERY

## 2019-06-19 PROCEDURE — 96375 TX/PRO/DX INJ NEW DRUG ADDON: CPT

## 2019-06-19 PROCEDURE — 160035 HCHG PACU - 1ST 60 MINS PHASE I: Performed by: ORTHOPAEDIC SURGERY

## 2019-06-19 PROCEDURE — 160009 HCHG ANES TIME/MIN: Performed by: ORTHOPAEDIC SURGERY

## 2019-06-19 PROCEDURE — 96365 THER/PROPH/DIAG IV INF INIT: CPT

## 2019-06-19 PROCEDURE — 0SRR019 REPLACEMENT OF RIGHT HIP JOINT, FEMORAL SURFACE WITH METAL SYNTHETIC SUBSTITUTE, CEMENTED, OPEN APPROACH: ICD-10-PCS | Performed by: ORTHOPAEDIC SURGERY

## 2019-06-19 PROCEDURE — 99223 1ST HOSP IP/OBS HIGH 75: CPT | Mod: AI | Performed by: HOSPITALIST

## 2019-06-19 PROCEDURE — 36415 COLL VENOUS BLD VENIPUNCTURE: CPT

## 2019-06-19 PROCEDURE — 500367 HCHG DRAIN KIT, HEMOVAC: Performed by: ORTHOPAEDIC SURGERY

## 2019-06-19 PROCEDURE — 85730 THROMBOPLASTIN TIME PARTIAL: CPT | Mod: 91

## 2019-06-19 PROCEDURE — 160048 HCHG OR STATISTICAL LEVEL 1-5: Performed by: ORTHOPAEDIC SURGERY

## 2019-06-19 PROCEDURE — 3E0T3BZ INTRODUCTION OF ANESTHETIC AGENT INTO PERIPHERAL NERVES AND PLEXI, PERCUTANEOUS APPROACH: ICD-10-PCS | Performed by: ANESTHESIOLOGY

## 2019-06-19 PROCEDURE — 501838 HCHG SUTURE GENERAL: Performed by: ORTHOPAEDIC SURGERY

## 2019-06-19 PROCEDURE — 160036 HCHG PACU - EA ADDL 30 MINS PHASE I: Performed by: ORTHOPAEDIC SURGERY

## 2019-06-19 PROCEDURE — 700102 HCHG RX REV CODE 250 W/ 637 OVERRIDE(OP): Performed by: HOSPITALIST

## 2019-06-19 PROCEDURE — 72170 X-RAY EXAM OF PELVIS: CPT

## 2019-06-19 PROCEDURE — 160022 HCHG BLOCK: Performed by: ORTHOPAEDIC SURGERY

## 2019-06-19 PROCEDURE — 700111 HCHG RX REV CODE 636 W/ 250 OVERRIDE (IP): Performed by: EMERGENCY MEDICINE

## 2019-06-19 PROCEDURE — A9270 NON-COVERED ITEM OR SERVICE: HCPCS | Performed by: HOSPITALIST

## 2019-06-19 PROCEDURE — 700111 HCHG RX REV CODE 636 W/ 250 OVERRIDE (IP): Performed by: ORTHOPAEDIC SURGERY

## 2019-06-19 PROCEDURE — A6222 GAUZE <=16 IN NO W/SAL W/O B: HCPCS | Performed by: ORTHOPAEDIC SURGERY

## 2019-06-19 DEVICE — IMPLANT COCR 12/14 FEM HEAD 28 +0: Type: IMPLANTABLE DEVICE | Site: HIP | Status: FUNCTIONAL

## 2019-06-19 DEVICE — IMPLANTABLE DEVICE: Type: IMPLANTABLE DEVICE | Site: HIP | Status: FUNCTIONAL

## 2019-06-19 DEVICE — IMPLANT TANDEM BIPOLAR COCR 48OD 28ID: Type: IMPLANTABLE DEVICE | Site: HIP | Status: FUNCTIONAL

## 2019-06-19 DEVICE — DISTAL INVIS CENT SZ 10MM: Type: IMPLANTABLE DEVICE | Site: HIP | Status: FUNCTIONAL

## 2019-06-19 RX ORDER — PHYTONADIONE 10 MG/ML
10 INJECTION, EMULSION INTRAMUSCULAR; INTRAVENOUS; SUBCUTANEOUS ONCE
Status: COMPLETED | OUTPATIENT
Start: 2019-06-19 | End: 2019-06-19

## 2019-06-19 RX ORDER — TRANEXAMIC ACID 100 MG/ML
INJECTION, SOLUTION INTRAVENOUS PRN
Status: DISCONTINUED | OUTPATIENT
Start: 2019-06-19 | End: 2019-06-19 | Stop reason: SURG

## 2019-06-19 RX ORDER — SODIUM CHLORIDE, SODIUM LACTATE, POTASSIUM CHLORIDE, CALCIUM CHLORIDE 600; 310; 30; 20 MG/100ML; MG/100ML; MG/100ML; MG/100ML
2000 INJECTION, SOLUTION INTRAVENOUS ONCE
Status: COMPLETED | OUTPATIENT
Start: 2019-06-19 | End: 2019-06-19

## 2019-06-19 RX ORDER — OXYCODONE HYDROCHLORIDE 5 MG/1
5 TABLET ORAL
Status: DISCONTINUED | OUTPATIENT
Start: 2019-06-19 | End: 2019-06-22

## 2019-06-19 RX ORDER — DIPHENHYDRAMINE HYDROCHLORIDE 50 MG/ML
12.5 INJECTION INTRAMUSCULAR; INTRAVENOUS
Status: DISCONTINUED | OUTPATIENT
Start: 2019-06-19 | End: 2019-06-19 | Stop reason: HOSPADM

## 2019-06-19 RX ORDER — LEVOTHYROXINE SODIUM 0.07 MG/1
75 TABLET ORAL
Status: DISCONTINUED | OUTPATIENT
Start: 2019-06-19 | End: 2019-06-25 | Stop reason: HOSPADM

## 2019-06-19 RX ORDER — POLYVINYL ALCOHOL 14 MG/ML
1 SOLUTION/ DROPS OPHTHALMIC 4 TIMES DAILY PRN
Status: DISCONTINUED | OUTPATIENT
Start: 2019-06-19 | End: 2019-06-25 | Stop reason: HOSPADM

## 2019-06-19 RX ORDER — DILTIAZEM HYDROCHLORIDE 5 MG/ML
5 INJECTION INTRAVENOUS ONCE
Status: COMPLETED | OUTPATIENT
Start: 2019-06-19 | End: 2019-06-19

## 2019-06-19 RX ORDER — HYDROMORPHONE HYDROCHLORIDE 1 MG/ML
0.4 INJECTION, SOLUTION INTRAMUSCULAR; INTRAVENOUS; SUBCUTANEOUS
Status: DISCONTINUED | OUTPATIENT
Start: 2019-06-19 | End: 2019-06-19 | Stop reason: HOSPADM

## 2019-06-19 RX ORDER — ONDANSETRON 2 MG/ML
4 INJECTION INTRAMUSCULAR; INTRAVENOUS EVERY 4 HOURS PRN
Status: DISCONTINUED | OUTPATIENT
Start: 2019-06-19 | End: 2019-06-25 | Stop reason: HOSPADM

## 2019-06-19 RX ORDER — MORPHINE SULFATE 4 MG/ML
2 INJECTION, SOLUTION INTRAMUSCULAR; INTRAVENOUS
Status: DISCONTINUED | OUTPATIENT
Start: 2019-06-19 | End: 2019-06-22

## 2019-06-19 RX ORDER — ONDANSETRON 4 MG/1
4 TABLET, ORALLY DISINTEGRATING ORAL EVERY 4 HOURS PRN
Status: DISCONTINUED | OUTPATIENT
Start: 2019-06-19 | End: 2019-06-25 | Stop reason: HOSPADM

## 2019-06-19 RX ORDER — HEPARIN SODIUM 1000 [USP'U]/ML
3500 INJECTION, SOLUTION INTRAVENOUS; SUBCUTANEOUS ONCE
Status: COMPLETED | OUTPATIENT
Start: 2019-06-19 | End: 2019-06-19

## 2019-06-19 RX ORDER — HYDROMORPHONE HYDROCHLORIDE 1 MG/ML
0.2 INJECTION, SOLUTION INTRAMUSCULAR; INTRAVENOUS; SUBCUTANEOUS
Status: DISCONTINUED | OUTPATIENT
Start: 2019-06-19 | End: 2019-06-19 | Stop reason: HOSPADM

## 2019-06-19 RX ORDER — OXYCODONE HCL 5 MG/5 ML
5 SOLUTION, ORAL ORAL
Status: DISCONTINUED | OUTPATIENT
Start: 2019-06-19 | End: 2019-06-19 | Stop reason: HOSPADM

## 2019-06-19 RX ORDER — POLYETHYLENE GLYCOL 3350 17 G/17G
1 POWDER, FOR SOLUTION ORAL
Status: DISCONTINUED | OUTPATIENT
Start: 2019-06-19 | End: 2019-06-25 | Stop reason: HOSPADM

## 2019-06-19 RX ORDER — MEPERIDINE HYDROCHLORIDE 25 MG/ML
6.25 INJECTION INTRAMUSCULAR; INTRAVENOUS; SUBCUTANEOUS
Status: DISCONTINUED | OUTPATIENT
Start: 2019-06-19 | End: 2019-06-19 | Stop reason: HOSPADM

## 2019-06-19 RX ORDER — SODIUM CHLORIDE, SODIUM LACTATE, POTASSIUM CHLORIDE, CALCIUM CHLORIDE 600; 310; 30; 20 MG/100ML; MG/100ML; MG/100ML; MG/100ML
INJECTION, SOLUTION INTRAVENOUS CONTINUOUS
Status: DISCONTINUED | OUTPATIENT
Start: 2019-06-19 | End: 2019-06-19 | Stop reason: HOSPADM

## 2019-06-19 RX ORDER — CALCIUM CARBONATE 500(1250)
500 TABLET ORAL 2 TIMES DAILY WITH MEALS
COMMUNITY
End: 2020-07-21

## 2019-06-19 RX ORDER — ONDANSETRON 2 MG/ML
INJECTION INTRAMUSCULAR; INTRAVENOUS PRN
Status: DISCONTINUED | OUTPATIENT
Start: 2019-06-19 | End: 2019-06-19 | Stop reason: SURG

## 2019-06-19 RX ORDER — METOPROLOL TARTRATE 1 MG/ML
INJECTION, SOLUTION INTRAVENOUS
Status: COMPLETED
Start: 2019-06-19 | End: 2019-06-19

## 2019-06-19 RX ORDER — M-VIT,TX,IRON,MINS/CALC/FOLIC 27MG-0.4MG
1 TABLET ORAL DAILY
COMMUNITY
End: 2020-07-21

## 2019-06-19 RX ORDER — ACETAMINOPHEN 325 MG/1
650 TABLET ORAL EVERY 6 HOURS PRN
Status: DISCONTINUED | OUTPATIENT
Start: 2019-06-19 | End: 2019-06-25 | Stop reason: HOSPADM

## 2019-06-19 RX ORDER — CLINDAMYCIN PHOSPHATE 300 MG/50ML
300 INJECTION, SOLUTION INTRAVENOUS EVERY 8 HOURS
Status: COMPLETED | OUTPATIENT
Start: 2019-06-19 | End: 2019-06-20

## 2019-06-19 RX ORDER — ROPIVACAINE HYDROCHLORIDE 5 MG/ML
INJECTION, SOLUTION EPIDURAL; INFILTRATION; PERINEURAL PRN
Status: DISCONTINUED | OUTPATIENT
Start: 2019-06-19 | End: 2019-06-19 | Stop reason: SURG

## 2019-06-19 RX ORDER — DEXAMETHASONE SODIUM PHOSPHATE 4 MG/ML
INJECTION, SOLUTION INTRA-ARTICULAR; INTRALESIONAL; INTRAMUSCULAR; INTRAVENOUS; SOFT TISSUE PRN
Status: DISCONTINUED | OUTPATIENT
Start: 2019-06-19 | End: 2019-06-19 | Stop reason: SURG

## 2019-06-19 RX ORDER — AMOXICILLIN 250 MG
2 CAPSULE ORAL 2 TIMES DAILY
Status: DISCONTINUED | OUTPATIENT
Start: 2019-06-19 | End: 2019-06-25 | Stop reason: HOSPADM

## 2019-06-19 RX ORDER — LABETALOL HYDROCHLORIDE 5 MG/ML
5 INJECTION, SOLUTION INTRAVENOUS
Status: DISCONTINUED | OUTPATIENT
Start: 2019-06-19 | End: 2019-06-19 | Stop reason: HOSPADM

## 2019-06-19 RX ORDER — SODIUM CHLORIDE, SODIUM LACTATE, POTASSIUM CHLORIDE, CALCIUM CHLORIDE 600; 310; 30; 20 MG/100ML; MG/100ML; MG/100ML; MG/100ML
INJECTION, SOLUTION INTRAVENOUS
Status: DISCONTINUED | OUTPATIENT
Start: 2019-06-19 | End: 2019-06-19 | Stop reason: SURG

## 2019-06-19 RX ORDER — ONDANSETRON 2 MG/ML
4 INJECTION INTRAMUSCULAR; INTRAVENOUS
Status: DISCONTINUED | OUTPATIENT
Start: 2019-06-19 | End: 2019-06-19 | Stop reason: HOSPADM

## 2019-06-19 RX ORDER — HEPARIN SODIUM 1000 [USP'U]/ML
1800 INJECTION, SOLUTION INTRAVENOUS; SUBCUTANEOUS PRN
Status: DISCONTINUED | OUTPATIENT
Start: 2019-06-19 | End: 2019-06-19

## 2019-06-19 RX ORDER — OXYCODONE HCL 5 MG/5 ML
10 SOLUTION, ORAL ORAL
Status: DISCONTINUED | OUTPATIENT
Start: 2019-06-19 | End: 2019-06-19 | Stop reason: HOSPADM

## 2019-06-19 RX ORDER — METOPROLOL TARTRATE 1 MG/ML
2.5 INJECTION, SOLUTION INTRAVENOUS
Status: DISCONTINUED | OUTPATIENT
Start: 2019-06-19 | End: 2019-06-19 | Stop reason: HOSPADM

## 2019-06-19 RX ORDER — BISACODYL 10 MG
10 SUPPOSITORY, RECTAL RECTAL
Status: DISCONTINUED | OUTPATIENT
Start: 2019-06-19 | End: 2019-06-25 | Stop reason: HOSPADM

## 2019-06-19 RX ORDER — HYDROMORPHONE HYDROCHLORIDE 1 MG/ML
0.1 INJECTION, SOLUTION INTRAMUSCULAR; INTRAVENOUS; SUBCUTANEOUS
Status: DISCONTINUED | OUTPATIENT
Start: 2019-06-19 | End: 2019-06-19 | Stop reason: HOSPADM

## 2019-06-19 RX ORDER — OXYCODONE HYDROCHLORIDE 5 MG/1
2.5 TABLET ORAL
Status: DISCONTINUED | OUTPATIENT
Start: 2019-06-19 | End: 2019-06-22

## 2019-06-19 RX ADMIN — MORPHINE SULFATE 2 MG: 4 INJECTION INTRAVENOUS at 02:22

## 2019-06-19 RX ADMIN — PHYTONADIONE 10 MG: 10 INJECTION, EMULSION INTRAMUSCULAR; INTRAVENOUS; SUBCUTANEOUS at 16:19

## 2019-06-19 RX ADMIN — FENTANYL CITRATE 50 MCG: 50 INJECTION, SOLUTION INTRAMUSCULAR; INTRAVENOUS at 18:04

## 2019-06-19 RX ADMIN — SODIUM CHLORIDE, POTASSIUM CHLORIDE, SODIUM LACTATE AND CALCIUM CHLORIDE 2000 ML: 600; 310; 30; 20 INJECTION, SOLUTION INTRAVENOUS at 01:32

## 2019-06-19 RX ADMIN — SODIUM CHLORIDE, POTASSIUM CHLORIDE, SODIUM LACTATE AND CALCIUM CHLORIDE: 600; 310; 30; 20 INJECTION, SOLUTION INTRAVENOUS at 18:15

## 2019-06-19 RX ADMIN — METOPROLOL TARTRATE 2.5 MG: 1 INJECTION, SOLUTION INTRAVENOUS at 19:59

## 2019-06-19 RX ADMIN — MORPHINE SULFATE 2 MG: 4 INJECTION INTRAVENOUS at 05:24

## 2019-06-19 RX ADMIN — FENTANYL CITRATE 25 MCG: 50 INJECTION, SOLUTION INTRAMUSCULAR; INTRAVENOUS at 17:43

## 2019-06-19 RX ADMIN — PROPOFOL 70 MG: 10 INJECTION, EMULSION INTRAVENOUS at 17:15

## 2019-06-19 RX ADMIN — ROPIVACAINE HYDROCHLORIDE 25 ML: 5 INJECTION, SOLUTION EPIDURAL; INFILTRATION; PERINEURAL at 17:22

## 2019-06-19 RX ADMIN — MORPHINE SULFATE 2 MG: 4 INJECTION INTRAVENOUS at 12:15

## 2019-06-19 RX ADMIN — LEVOTHYROXINE SODIUM 75 MCG: 75 TABLET ORAL at 05:09

## 2019-06-19 RX ADMIN — FENTANYL CITRATE 25 MCG: 50 INJECTION, SOLUTION INTRAMUSCULAR; INTRAVENOUS at 17:15

## 2019-06-19 RX ADMIN — DEXAMETHASONE SODIUM PHOSPHATE 4 MG: 4 INJECTION, SOLUTION INTRA-ARTICULAR; INTRALESIONAL; INTRAMUSCULAR; INTRAVENOUS; SOFT TISSUE at 17:25

## 2019-06-19 RX ADMIN — METOPROLOL TARTRATE 2.5 MG: 1 INJECTION, SOLUTION INTRAVENOUS at 19:35

## 2019-06-19 RX ADMIN — SODIUM CHLORIDE, POTASSIUM CHLORIDE, SODIUM LACTATE AND CALCIUM CHLORIDE: 600; 310; 30; 20 INJECTION, SOLUTION INTRAVENOUS at 17:11

## 2019-06-19 RX ADMIN — ONDANSETRON 4 MG: 2 INJECTION INTRAMUSCULAR; INTRAVENOUS at 00:10

## 2019-06-19 RX ADMIN — ONDANSETRON 4 MG: 2 INJECTION INTRAMUSCULAR; INTRAVENOUS at 22:42

## 2019-06-19 RX ADMIN — LIDOCAINE HYDROCHLORIDE 30 MG: 20 INJECTION, SOLUTION INTRAVENOUS at 17:15

## 2019-06-19 RX ADMIN — HEPARIN SODIUM 750 UNITS/HR: 5000 INJECTION, SOLUTION INTRAVENOUS at 01:42

## 2019-06-19 RX ADMIN — ACETAMINOPHEN 650 MG: 325 TABLET, FILM COATED ORAL at 05:10

## 2019-06-19 RX ADMIN — FENTANYL CITRATE 50 MCG: 50 INJECTION INTRAMUSCULAR; INTRAVENOUS at 20:10

## 2019-06-19 RX ADMIN — METOPROLOL TARTRATE 25 MG: 25 TABLET, FILM COATED ORAL at 05:14

## 2019-06-19 RX ADMIN — TRANEXAMIC ACID 1000 MG: 100 INJECTION, SOLUTION INTRAVENOUS at 17:25

## 2019-06-19 RX ADMIN — ONDANSETRON 4 MG: 2 INJECTION INTRAMUSCULAR; INTRAVENOUS at 05:24

## 2019-06-19 RX ADMIN — MORPHINE SULFATE 2 MG: 4 INJECTION INTRAVENOUS at 00:10

## 2019-06-19 RX ADMIN — ACETAMINOPHEN 650 MG: 325 TABLET, FILM COATED ORAL at 11:13

## 2019-06-19 RX ADMIN — DILTIAZEM HYDROCHLORIDE 5 MG: 5 INJECTION INTRAVENOUS at 20:38

## 2019-06-19 RX ADMIN — HEPARIN SODIUM 3500 UNITS: 1000 INJECTION, SOLUTION INTRAVENOUS; SUBCUTANEOUS at 01:38

## 2019-06-19 RX ADMIN — ONDANSETRON 4 MG: 2 INJECTION INTRAMUSCULAR; INTRAVENOUS at 18:53

## 2019-06-19 RX ADMIN — VANCOMYCIN HYDROCHLORIDE 700 MG: 100 INJECTION, POWDER, LYOPHILIZED, FOR SOLUTION INTRAVENOUS at 15:58

## 2019-06-19 RX ADMIN — CLINDAMYCIN PHOSPHATE 300 MG: 150 INJECTION, SOLUTION INTRAMUSCULAR; INTRAVENOUS at 17:25

## 2019-06-19 ASSESSMENT — ENCOUNTER SYMPTOMS
HEADACHES: 0
FOCAL WEAKNESS: 0
NAUSEA: 0
DEPRESSION: 0
FEVER: 0
WHEEZING: 0
SHORTNESS OF BREATH: 0
MYALGIAS: 0
PALPITATIONS: 0
VOMITING: 0
PHOTOPHOBIA: 0
DIARRHEA: 0
ABDOMINAL PAIN: 0
COUGH: 0
CHILLS: 0
SORE THROAT: 0
TINGLING: 0
DIZZINESS: 0

## 2019-06-19 ASSESSMENT — PAIN SCALES - GENERAL: PAIN_LEVEL: 4

## 2019-06-19 ASSESSMENT — LIFESTYLE VARIABLES
ALCOHOL_USE: NO
EVER_SMOKED: NEVER

## 2019-06-19 ASSESSMENT — COGNITIVE AND FUNCTIONAL STATUS - GENERAL
MOBILITY SCORE: 7
DRESSING REGULAR LOWER BODY CLOTHING: A LOT
WALKING IN HOSPITAL ROOM: TOTAL
MOVING TO AND FROM BED TO CHAIR: UNABLE
SUGGESTED CMS G CODE MODIFIER DAILY ACTIVITY: CL
EATING MEALS: A LITTLE
SUGGESTED CMS G CODE MODIFIER MOBILITY: CM
STANDING UP FROM CHAIR USING ARMS: TOTAL
TURNING FROM BACK TO SIDE WHILE IN FLAT BAD: A LOT
DRESSING REGULAR UPPER BODY CLOTHING: A LOT
DAILY ACTIVITIY SCORE: 13
MOVING FROM LYING ON BACK TO SITTING ON SIDE OF FLAT BED: UNABLE
CLIMB 3 TO 5 STEPS WITH RAILING: TOTAL
HELP NEEDED FOR BATHING: A LOT
TOILETING: A LOT
PERSONAL GROOMING: A LOT

## 2019-06-19 NOTE — CARE PLAN
Problem: Safety  Goal: Will remain free from falls    Intervention: Assess risk factors for falls  Patient is High Risk for fall; Bed alarm utilized. Educ to use call button when in need of help.       Problem: Venous Thromboembolism (VTW)/Deep Vein Thrombosis (DVT) Prevention:  Goal: Patient will participate in Venous Thrombosis (VTE)/Deep Vein Thrombosis (DVT)Prevention Measures    Intervention: Assess and monitor for anticoagulation complications  Continues on heparin drip for DVT prophylaxis; No visible s/s of active bleeding noted.

## 2019-06-19 NOTE — H&P
Hospital Medicine History & Physical Note    Date of Service  6/19/2019    Primary Care Physician  Toribio Schmidt M.D.    Consultants  Dr. Saldivar, orthopedic surgery    Code Status  Full    Chief Complaint  Chief Complaint   Patient presents with   • GLF     Pt had a mechanical GLF on to carpet. Denies head/neck/back pain. -LOC.   • Leg Pain     R. posterior thigh pain. No obvious deformity to leg. CMS intact. HX r. sided weakness r/t CVA. Given 500mg tylneol in route.        History of Presenting Illness  85 y.o. female who presented on 6/18/2019 with right hip pain.  This is a pleasant elderly female with a history of atrial flutter on chronic anticoagulation and hypothyroidism.  She also reports that she has had a history of stroke with persistent right-sided weakness.  The patient states that she was in her usual state of health this evening, she had walked into her closet to get a short to wear for tomorrow.  As she was backing out of her closet, she states that she missed stepped or lost her balance and fell toward the right.  She landed on a carpeted surface and had immediate right-sided hip pain.  She was unable to stand on her own, has not been able to bear weight, or ambulate since.  She denies any preceding headache, chest pain, shortness of breath, or lightheadedness.  She denies striking her head or losing consciousness.  Prior to this, she states that she was in her usual state of health, no recent fevers, chills, URIs, headache, chest pain, abdominal pain, diarrhea or dysuria.    Review of Systems  Review of Systems   Constitutional: Negative for chills and fever.   HENT: Negative for congestion and sore throat.    Eyes: Negative for photophobia.   Respiratory: Negative for cough, shortness of breath and wheezing.    Cardiovascular: Negative for chest pain and palpitations.   Gastrointestinal: Negative for abdominal pain, diarrhea, nausea and vomiting.   Genitourinary: Negative for dysuria.    Musculoskeletal: Positive for joint pain (Right hip). Negative for myalgias.   Skin: Negative.    Neurological: Negative for dizziness, tingling, focal weakness and headaches.   Psychiatric/Behavioral: Negative for depression and suicidal ideas.       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   • Rheumatic fever 9/21/2012   • A fib 9/22/06   • Hypothyroid 10/2002   • Kidney disorder 1998    left kidney biopsy--glomerulonephritis and wegners dx   • DVT 6/1996    left leg, vein ligation performed on saphenous vein   • Kidney failure     stage II   • Mitral valve prolapse    • Wegener's granulomatosis (HCC)        Surgical History  Past Surgical History:   Procedure Laterality Date   • CATARACT EXTRACTION  2006    left eye   • CHOLECYSTECTOMY  1997   • TEMPORAL ARTERY BIOPSY  1996    normal   • LUNG NEEDLE BIOPSY  1996    right lung, nodules found   • ARTHROSCOPE  1989    left knee   • ARTHROSCOPY, KNEE  1986    right   • BUNIONECTOMY  1980    bilat   • HYSTERECTOMY RADICAL  1975   • VEIN LIGATION         Family History  Family History   Problem Relation Age of Onset   • Other Father         Aortic aneurysm   • Cancer Brother         Lung   • Non-contributory Other        Social History  Social History   Substance Use Topics   • Smoking status: Former Smoker     Packs/day: 1.00     Types: Cigarettes     Quit date: 1/1/1960   • Smokeless tobacco: Never Used      Comment: very little years and years ago   • Alcohol use 0.0 oz/week      Comment: occasional       Allergies  Allergies   Allergen Reactions   • Cephalexin [Keflex] Swelling   • Hydroxyzine Swelling     Eyes get itchy and swollen    • Naprelan [Naproxen] Swelling     Eyes get itchy become  red and swollen   • Oxaprozin Swelling     Swelling eyes, and itchy   • Ampicillin Rash     Red Rash   • Baclofen Unspecified     drowsiness   • Citalopram Vomiting and Nausea   • Clarithromycin Unspecified     Pt reports that this medication plugs her ears.    • Erythromycin Diarrhea     GI upset   • Promethazine Unspecified     Drowsiness and sleeps   • Vi-Q-Tuss [Hydrocodone-Guaifenesin] Vomiting and Nausea   • Voltaren [Diclofenac Sodium] Rash and Swelling     Red rash and swelling       Medications  No current facility-administered medications on file prior to encounter.      Current Outpatient Prescriptions on File Prior to Encounter   Medication Sig Dispense Refill   • Calcium Carb-Cholecalciferol (CALCIUM 600 + D) 600-200 MG-UNIT Tab Take 1 Tab by mouth every morning.     • warfarin (COUMADIN) 2 MG Tab Take 1-2 mg by mouth every evening. 2 mg (1tablet) on , Tuesday, & Thursday  1 mg (1/2 tablet) all other days     • ipratropium (ATROVENT) 0.06 % Solution Spray 2 Sprays in nose at bedtime as needed.     • Carboxymethylcellulose Sodium (THERATEARS) 0.25 % Solution Place 1 Drop in both eyes 4 times a day as needed (dry eyes).     • cefUROXime (CEFTIN) 250 MG Tab Take 250 mg by mouth 2 times a day. 10 day course  0   • metoprolol (LOPRESSOR) 25 MG Tab Take 1 Tab by mouth 2 times a day. 180 Tab 3   • Biotin 1000 MCG Tab Take 1,000 mg by mouth every day.     • Multiple Vitamins-Minerals (ADULT ONE DAILY GUMMIES PO) Take 2 Tabs by mouth every day.     • folic acid (FOLVITE) 1 MG TABS Take 1 mg by mouth every day.     • levothyroxine (SYNTHROID) 75 MCG TABS Take 75 mcg by mouth every morning before breakfast.         Physical Exam  Hemodynamics  Temp (24hrs), Av.3 °C (99.2 °F), Min:37.3 °C (99.2 °F), Max:37.3 °C (99.2 °F)   Temperature: 37.3 °C (99.2 °F)  Pulse  Av  Min: 96  Max: 113 Heart Rate (Monitored): 97  Blood Pressure : 142/91, NIBP: 127/75      Respiratory      Respiration: 16, Pulse  Oximetry: 96 %             Physical Exam   Constitutional: She is oriented to person, place, and time. No distress.   Thin, frail, elderly   HENT:   Head: Normocephalic and atraumatic.   Right Ear: External ear normal.   Left Ear: External ear normal.   Eyes: EOM are normal. Right eye exhibits no discharge. Left eye exhibits no discharge.   Neck: Neck supple. No JVD present.   Cardiovascular: Normal rate, regular rhythm and normal heart sounds.    Pulmonary/Chest: Effort normal and breath sounds normal. No respiratory distress. She exhibits no tenderness.   Abdominal: Soft. Bowel sounds are normal. She exhibits no distension. There is no tenderness.   Musculoskeletal: Normal range of motion. She exhibits no edema.   Tenderness over the right greater trochanter   Neurological: She is alert and oriented to person, place, and time. No cranial nerve deficit.   Skin: Skin is warm and dry. She is not diaphoretic. No erythema.   Psychiatric: She has a normal mood and affect. Her behavior is normal.   Nursing note and vitals reviewed.    Capillary refill less than 3 seconds, distal pulses intact    Laboratory:  Recent Labs      06/18/19   2244   WBC  7.7   RBC  4.24   HEMOGLOBIN  12.6   HEMATOCRIT  39.3   MCV  92.7   MCH  29.7   MCHC  32.1*   RDW  45.1   PLATELETCT  236   MPV  9.6     Recent Labs      06/18/19   2244   SODIUM  136   POTASSIUM  4.3   CHLORIDE  98   CO2  27   GLUCOSE  117*   BUN  33*   CREATININE  1.36   CALCIUM  9.2     Recent Labs      06/18/19   2244   ALTSGPT  12   ASTSGOT  15   ALKPHOSPHAT  83   TBILIRUBIN  0.4   GLUCOSE  117*     Recent Labs      06/18/19   2244   APTT  34.7   INR  1.73*             Lab Results   Component Value Date    TROPONINI <0.01 09/26/2013       Imaging  Dx-chest-limited (1 View)    Result Date: 6/18/2019 6/18/2019 11:39 PM HISTORY/REASON FOR EXAM: Obvious hip fracture TECHNIQUE/EXAM DESCRIPTION:  Single AP view of the chest. COMPARISON: December 20, 2018 FINDINGS: Overlying  cardiac leads are present. The cardiac silhouette appears within normal limits. The mediastinal contour appears within normal limits.  Bilateral perihilar interstitial prominence and bronchial wall cuffing is noted. The lungs appear well expanded bilaterally.  Bilateral lungs are clear. No significant pleural effusions are identified. The bony structures appear age-appropriate.     1.  No acute cardiopulmonary disease. 2.  Perihilar interstitial prominence and bronchial wall cuffing, appearance suggests changes of underlying bronchial inflammation, consider bronchitis.    Dx-hip-unilateral-with Pelvis-1 View Right    Result Date: 6/18/2019 6/18/2019 11:40 PM HISTORY/REASON FOR EXAM:  Pelvic/Hip Pain Following Trauma. TECHNIQUE/EXAM DESCRIPTION AND NUMBER OF VIEWS:  3 views of the RIGHT hip. COMPARISON: None FINDINGS: Impacted right femoral neck fracture is seen. Atherosclerotic changes are observed.     1.  Impacted right femoral neck fracture. 2.  Atherosclerosis    Dx-femur-1 View Right    Result Date: 6/18/2019 6/18/2019 11:40 PM HISTORY/REASON FOR EXAM:  obvious hip fracture. TECHNIQUE/EXAM DESCRIPTION AND NUMBER OF VIEWS:  1 views of the RIGHT femur. COMPARISON: None FINDINGS: Impacted right femoral neck fracture is seen. Atherosclerotic calcifications are observed.     1.  Impacted right femoral neck fracture. 2.  Atherosclerosis        Assessment/Plan:  Anticipate that patient will need greater than 2 midnights for management of the discussed medical issues.    * Closed right hip fracture (HCC)   Assessment & Plan    Secondary to ground-level mechanical fall.  The patient will be admitted to the orthopedic floor and she will be started on symptomatic management for her pain.  She does carry a history of atrial flutter and is on chronic anticoagulation however this is subtherapeutic.  For now I will cover her with weight-based heparin for her chronic issues as well as for DVT prophylaxis.  I will keep the  patient n.p.o. while we await further recommendations from Dr. Perkins of orthopedic surgery who was consulted by the emergency room physician.  The patient will require postoperative PT and OT both of which have been ordered.     Atrial flutter (HCC)- (present on admission)   Assessment & Plan    This is chronic, heart rate is currently well controlled, continue home metoprolol, holding home Coumadin for likely surgical intervention in the morning, cover with weight-based heparin.     Hypothyroidism- (present on admission)   Assessment & Plan    This is chronic and stable, continue home Synthroid.         Prophylaxis: Heparin for DVT prophylaxis, no PPI indicated, bowel protocol as needed

## 2019-06-19 NOTE — ED PROVIDER NOTES
ED Provider Note    CHIEF COMPLAINT  Chief Complaint   Patient presents with   • GLF     Pt had a mechanical GLF on to carpet. Denies head/neck/back pain. -LOC.   • Leg Pain     R. posterior thigh pain. No obvious deformity to leg. CMS intact. HX r. sided weakness r/t CVA. Given 500mg tylneol in route.        HPI  Az Jolly is a 85 y.o. female who presents by ambulance after having a mechanical ground-level fall onto her carpet.  The patient uses a walker and was in her closet when she lost her balance and fell backwards landing on her right side.  Ever since the fall she is not been able to ambulate.  She has pain in her right hip area.  She denies any numbness or tingling distally.  Patient denies hitting her head or losing consciousness.  She does have a history of atrial fibrillation and takes Coumadin for this.  She has not missed any doses.  She denies any headache or neck pain nausea vomiting chest pain abdominal pain or shortness of breath.    REVIEW OF SYSTEMS  HEENT:  No ear pain, congestion or sore throat consciousness dizziness nausea or vomiting  EYES: no discharge redness or vision changes  CARDIAC: no chest pain, palpitations    PULMONARY: no dyspnea, cough or congestion   GI: no vomiting diarrhea or abdominal pain   : no dysuria, back pain or hematuria   Neuro: No unilateral weakness or numbness no slurred speech  Musculoskeletal: Ositive right hip pain with inability to ambulate or move her right leg  Endocrine: no fevers, sweating, weight loss   SKIN: no rash, erythema or contusions     See history of present illness all other systems are negative      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   • DVT 6/1996    left leg, vein ligation performed on saphenous vein   • First degree atrioventricular block 9/21/2012   • H/O  echocardiogram 9/21/2012   • Hypothyroid 10/2002   • Kidney disorder 1998    left kidney biopsy--glomerulonephritis and wegners dx   • Kidney failure     stage II   • Mitral valve prolapse    • Palpitations 9/21/2012   • Rheumatic fever 9/21/2012   • Stroke (HCC) 9/21/2012   • Wegener's granulomatosis (HCC)        SOCIAL HISTORY  Social History     Social History   • Marital status:      Spouse name: N/A   • Number of children: N/A   • Years of education: N/A     Social History Main Topics   • Smoking status: Former Smoker     Packs/day: 1.00     Types: Cigarettes     Quit date: 1/1/1960   • Smokeless tobacco: Never Used      Comment: very little years and years ago   • Alcohol use 0.0 oz/week      Comment: occasional   • Drug use: No   • Sexual activity: Not on file     Other Topics Concern   • Not on file     Social History Narrative   • No narrative on file       CURRENT MEDICATIONS  Home Medications     Reviewed by Hitesh Garcia (Pharmacy Tech) on 06/19/19 at 0022  Med List Status: Complete   Medication Last Dose Status   calcium carbonate (OS-RICCO 500) 500 MG Tab 6/19/2019 Active   folic acid (FOLVITE) 1 MG TABS 6/19/2019 Active   levothyroxine (SYNTHROID) 75 MCG TABS 6/19/2019 Active   metoprolol (LOPRESSOR) 25 MG Tab 6/19/2019 Active   therapeutic multivitamin-minerals (THERAGRAN-M) Tab 6/19/2019 Active   warfarin (COUMADIN) 2 MG Tab 6/18/2019 Active                ALLERGIES  Allergies   Allergen Reactions   • Cephalexin [Keflex] Swelling   • Hydroxyzine Swelling     Eyes get itchy and swollen    • Naprelan [Naproxen] Swelling     Eyes get itchy become red and swollen   • Oxaprozin Swelling     Swelling eyes, and itchy   • Ampicillin Rash     Red Rash   • Baclofen Unspecified     drowsiness   • Citalopram Vomiting and Nausea   • Clarithromycin Unspecified     Pt reports that this medication plugs her ears.    • Erythromycin Diarrhea     GI upset   • Promethazine Unspecified     Drowsiness and sleeps  "  • Vi-Q-Tuss [Hydrocodone-Guaifenesin] Vomiting and Nausea   • Voltaren [Diclofenac Sodium] Rash and Swelling     Red rash and swelling       PHYSICAL EXAM  VITAL SIGNS: /91   Pulse (!) 109   Temp 37.3 °C (99.2 °F) (Temporal)   Resp 14   Ht 1.702 m (5' 7\")   Wt 44 kg (97 lb)   SpO2 94%   BMI 15.19 kg/m²    Constitutional: Uncomfortable with movement of her leg  HEENT: Normocephalic atraumatic drinks pink mucous membranes are moist  Neck: Trachea is midline no C-spine tenderness to palpation  Cardiovascular: Irregularly irregular tachycardic no murmurs rubs or gallops  Pulmonary: Lungs are clear to auscultation bilaterally without wheezes rales or rhonchi no chest wall tenderness  Abdomen: Soft no rebound masses or peritoneal signs  Skin: Pink warm and dry without any rashes contusions or abrasions  Musculoskeletal: Positive right hip tenderness to palpation without the ability to raise her leg off of the bed.  Distally she is neurovascularly intact with normal strength sensation and tendon function in her ankle and toes.  Left lower extremity and bilateral upper extremities have full range of motion without any bony step-offs crepitance or evidence of trauma  Vascular: warm to touch good capillary refill   Neurologic: distally neurovascularly intact  Psychiatric: Affect normal    Radiology  DX-HIP-UNILATERAL-WITH PELVIS-1 VIEW RIGHT   Final Result         1.  Impacted right femoral neck fracture.   2.  Atherosclerosis      DX-FEMUR-1 VIEW RIGHT   Final Result         1.  Impacted right femoral neck fracture.   2.  Atherosclerosis      DX-CHEST-LIMITED (1 VIEW)   Final Result         1.  No acute cardiopulmonary disease.   2.  Perihilar interstitial prominence and bronchial wall cuffing, appearance suggests changes of underlying bronchial inflammation, consider bronchitis.            Medical Decision Making  Differential diagnosis: Hip fracture versus contusion    Results for orders placed or performed " during the hospital encounter of 06/18/19   CBC w/ Differential   Result Value Ref Range    WBC 7.7 4.8 - 10.8 K/uL    RBC 4.24 4.20 - 5.40 M/uL    Hemoglobin 12.6 12.0 - 16.0 g/dL    Hematocrit 39.3 37.0 - 47.0 %    MCV 92.7 81.4 - 97.8 fL    MCH 29.7 27.0 - 33.0 pg    MCHC 32.1 (L) 33.6 - 35.0 g/dL    RDW 45.1 35.9 - 50.0 fL    Platelet Count 236 164 - 446 K/uL    MPV 9.6 9.0 - 12.9 fL    Neutrophils-Polys 72.90 (H) 44.00 - 72.00 %    Lymphocytes 20.10 (L) 22.00 - 41.00 %    Monocytes 4.70 0.00 - 13.40 %    Eosinophils 1.20 0.00 - 6.90 %    Basophils 0.40 0.00 - 1.80 %    Immature Granulocytes 0.70 0.00 - 0.90 %    Nucleated RBC 0.00 /100 WBC    Neutrophils (Absolute) 5.60 2.00 - 7.15 K/uL    Lymphs (Absolute) 1.54 1.00 - 4.80 K/uL    Monos (Absolute) 0.36 0.00 - 0.85 K/uL    Eos (Absolute) 0.09 0.00 - 0.51 K/uL    Baso (Absolute) 0.03 0.00 - 0.12 K/uL    Immature Granulocytes (abs) 0.05 0.00 - 0.11 K/uL    NRBC (Absolute) 0.00 K/uL   Complete Metabolic Panel (CMP)   Result Value Ref Range    Sodium 136 135 - 145 mmol/L    Potassium 4.3 3.6 - 5.5 mmol/L    Chloride 98 96 - 112 mmol/L    Co2 27 20 - 33 mmol/L    Anion Gap 11.0 0.0 - 11.9    Glucose 117 (H) 65 - 99 mg/dL    Bun 33 (H) 8 - 22 mg/dL    Creatinine 1.36 0.50 - 1.40 mg/dL    Calcium 9.2 8.5 - 10.5 mg/dL    AST(SGOT) 15 12 - 45 U/L    ALT(SGPT) 12 2 - 50 U/L    Alkaline Phosphatase 83 30 - 99 U/L    Total Bilirubin 0.4 0.1 - 1.5 mg/dL    Albumin 3.4 3.2 - 4.9 g/dL    Total Protein 7.4 6.0 - 8.2 g/dL    Globulin 4.0 (H) 1.9 - 3.5 g/dL    A-G Ratio 0.9 g/dL   Prothrombin (PT/INR)   Result Value Ref Range    PT 20.8 (H) 12.0 - 14.6 sec    INR 1.73 (H) 0.87 - 1.13   APTT   Result Value Ref Range    APTT 34.7 24.7 - 36.0 sec   Urinalysis, culture if indicated   Result Value Ref Range    Color Yellow     Character Clear     Specific Gravity 1.010 <1.035    Ph 6.5 5.0 - 8.0    Glucose Negative Negative mg/dL    Ketones Negative Negative mg/dL    Protein 100 (A)  Negative mg/dL    Bilirubin Negative Negative    Urobilinogen, Urine 0.2 Negative    Nitrite Negative Negative    Leukocyte Esterase Negative Negative    Occult Blood Large (A) Negative    Micro Urine Req Microscopic    ESTIMATED GFR   Result Value Ref Range    GFR If African American 45 (A) >60 mL/min/1.73 m 2    GFR If Non  37 (A) >60 mL/min/1.73 m 2   URINE MICROSCOPIC (W/UA)   Result Value Ref Range    WBC 0-2 /hpf    -150 (A) /hpf    Bacteria Negative None /hpf    Epithelial Cells Negative /hpf    Hyaline Cast 0-2 /lpf   EKG (NOW)   Result Value Ref Range    Report       [facility] Emergency Dept.    Test Date:  2019  Pt Name:    ZAINAB RYDER                Department: ER  MRN:        [id number]                      Room:       [facility]  Gender:     Female                       Technician: [id number]  :        [id number]                   Requested By:JAIME MARTINEZ  Order #:    [id number]                    Reading MD: JAIME MARTINEZ MD    Measurements  Intervals                                Axis  Rate:       96                           P:  IA:                                      QRS:        26  QRSD:       72                           T:          54  QT:         360  QTc:        455    Interpretive Statements  ATRIAL FLUTTER, A-RATE 258  BORDERLINE LOW VOLTAGE IN FRONTAL LEADS  MINIMAL ST DEPRESSION, INFERIOR LEADS  BASELINE WANDER IN LEAD(S) V1  Compared to ECG 2018 13:09:08  ST (T wave) deviation now present    Electronically Signed On 2019 23:47:12 PDT by JAIME MARTINEZ MD          An IV was started and I ordered pain medication for the patient.  A Estes was placed because she is not able to ambulate and had to go to the bathroom.  Her hip has a right femoral neck fracture.  I spoke with Dr. Saldivar orthopedics who will operate on her in the morning.  I spoke with Dr. Jarrett who will admit her.  She will be placed on heparin instead of the Coumadin until  her surgery has been completed.  Patient and her family understand her diagnosis and need for surgery in the morning.  The patient will be admitted in guarded condition.        Diagnosis  1. Closed fracture of neck of right femur, initial encounter ([facility])    2. Chronic atrial fibrillation (HCC)        Disposition is admitted in guarded condition

## 2019-06-19 NOTE — ANESTHESIA PREPROCEDURE EVALUATION
Relevant Problems   (+) Arterial ischemic stroke, MCA (middle cerebral artery), left, acute (HCC)   (+) Atrial flutter (HCC)   (+) HTN (hypertension)   (+) Hypothyroidism   (+) Stroke (HCC)       Physical Exam    Airway   Mallampati: II  TM distance: >3 FB  Neck ROM: full       Cardiovascular - normal exam  Rhythm: irregular  Rate: normal  (-) murmur     Dental - normal exam           Pulmonary - normal exam  Breath sounds clear to auscultation     Abdominal    Neurological - normal exam                 Anesthesia Plan    ASA 3 (Arrythmia)   ASA physical status 3 criteria: CVA or TIA - history (> 3 months)    Plan - general and peripheral nerve block     Peripheral nerve block will be post-op pain control  Airway plan will be ETT        Induction: intravenous    Postoperative Plan: Postoperative administration of opioids is intended.    Pertinent diagnostic labs and testing reviewed    Informed Consent:    Anesthetic plan and risks discussed with patient.    Use of blood products discussed with: patient whom consented to blood products.

## 2019-06-19 NOTE — ED NOTES
UA sent to lab. Pt rounded on, resting in bed, respirations even and unlabored, repositioning self as needed.

## 2019-06-19 NOTE — ED NOTES
Attempted to call report to floor nurse but she was not available, will call back. Heparin drip started, per admission orders. Pt resting in bed with even and unlabored breaths, no distress noted. Will continue to monitor.

## 2019-06-19 NOTE — ED NOTES
Pt resting in bed with eyes closed, in no apparent distress. Breaths are even and unlabored at this time. Pt's  and son are leaving now and say they will be back to see her in the morning. Will continue to monitor pt while awaiting bed assignment in hospital.

## 2019-06-19 NOTE — CARE PLAN
Problem: Nutritional:  Goal: Achieve adequate nutritional intake  Patient will start diet and consume ~50% of meals  Outcome: NOT MET

## 2019-06-19 NOTE — PROGRESS NOTES
Patient received at approximately 0320AM via [employer]. Report received from EVELIO Llanos (ER Nurse). Patient A & O x 4. Received with heparin infusion.     2 RN skin check completed with EVELIO Ward  Devices in place: SCDs, silicone nasal cannula  Skin assessed under devices: Yes, skin intact  Confirmed pressure ulcers found on: No pressure injury noted  New potential pressure ulcers noted on: N/A   Generalized skin condition intact; Bilateral toes pink and blanching  The following interventions in place: Waffle overlay, moisturizer. Patient refused to be repositioned at this time.

## 2019-06-19 NOTE — ED NOTES
Attempted to call report to floor nurse again but she was with another pt. Will call back for report.

## 2019-06-19 NOTE — THERAPY
Occupational Therapy Contact Note  OT consult rec'd. Hold OT eval for surgical intervention, likely today. Will re-attempt as appropriate/able.  Kia LARA, OTR/L    Pager #[phone]

## 2019-06-19 NOTE — ASSESSMENT & PLAN NOTE
-Status post right hip hemiarthroplasty postop day #2  -no issues with bleeding, progressing slowly with surgery  -Hemoglobin hematocrit remained stable, surgical site appears dry  -Multimodal pain therapy  -PT OT

## 2019-06-19 NOTE — CONSULTS
DATE OF SERVICE:  06/19/2019    REQUESTING PHYSICIAN:  Althea Mtz MD    CHIEF COMPLAINT:  Right hip pain.    HISTORY OF PRESENT ILLNESS:  The patient is 85 years old.  She had a ground   level fall when she was coming out of her closet at home yesterday, sustaining   a displaced femoral neck fracture, was admitted to the hospitalist service.    Orthopedic consultation was requested.    ALLERGIES:  KEFLEX, HYDROXYZINE, NAPROXEN, OXAPROZIN, AMPICILLIN, BACLOFEN,   CITALOPRAM, CLARITHROMYCIN, ERYTHROMYCIN, PROMETHAZINE, HYDROCODONE, AND   VOLTAREN.    MEDICATIONS:  1.  Calcium carbonate/cholecalciferol.  2.  Coumadin.  3.  Atrovent.  4.  TheraTears.  5.  Cefuroxime.  6.  Metoprolol.  7.  Biotin.  8.  Multivitamin.  9.  Folic acid.  10.  Levothyroxine.    PAST MEDICAL HISTORY:  Atrial fibrillation, hypothyroidism, history of left   leg DVT, mitral valve prolapse, Wegener's granulomatosis.    PAST SURGICAL HISTORY:  Cataract surgery, cholecystectomy, bilateral knee   arthroscopies, bilateral bunionectomies, hysterectomy, and left leg vein   ligation.    SOCIAL HISTORY:  The patient does not smoke.  Drinks alcohol occasionally.    She walks with a walker.  She lives with her  in a group home.    FAMILY HISTORY:  Positive for aortic aneurysm in her father, lung cancer in   brother.    REVIEW OF SYSTEMS:  No loss of conscious, nausea, vomiting, diarrhea,   constipation, polyuria, dysuria, fevers, chills, weight loss, weight gain,   abdominal pain, chest pain or shortness of breath.    PHYSICAL EXAMINATION:  GENERAL:  The patient is in no acute distress.  VITAL SIGNS:  Blood pressure is 101/61, heart rate 96, respirations 17,   temperature 99.  HEENT:  Normocephalic, atraumatic.  NECK:  Supple, nontender.  CHEST AND ABDOMEN:  Nontender.  No labored breathing.  EXTREMITIES:  Left lower and bilateral upper extremities without tenderness or   deformity.  Right lower extremity, shortened and externally rotated.  Her    skin over right hip is intact.    LABORATORY DATA:  Include white blood cell count 7700, hematocrit 39.3%,   platelet count 236,000.  Sodium 136, potassium 4.3, creatinine 1.36.  AST and   ALT are normal.  Albumin is 3.4.    DIAGNOSTIC DATA:  Radiographs of the right hip show displaced femoral neck   fracture.    ASSESSMENT:  1.  Right femoral neck fracture.  2.  Coumadin-induced coagulopathy.    PLAN:  The patient was started on a heparin drip, which apparently was stopped   at 1015 hours.  We are waiting for PT and PTT to be rechecked.  Plan for   hemiarthroplasty this evening if coagulation is adequate.  Risks were   discussed with the patient.  These include bleeding, infection, neurovascular   injury, pain, stiffness, fracture, dislocation, leg length discrepancy,   implant failure, thromboembolic phenomena, anesthetic and medical   complications, etc.       ____________________________________     MD MICHAEL AWAD / RADHA    DD:  06/19/2019 15:14:18  DT:  06/19/2019 15:41:11    D#:  [id number]  Job#:  [id number]

## 2019-06-19 NOTE — CARE PLAN
Problem: Safety  Goal: Will remain free from injury  Outcome: PROGRESSING AS EXPECTED  Pt. Is calling appropriately, bed in lowest position, locked, upper side rails up, treaded socks in place, educated about injury risk    Problem: Skin Integrity  Goal: Risk for impaired skin integrity will decrease  Outcome: PROGRESSING AS EXPECTED  Pt. Is being repositioned in bed, using barrier paste and wipes, educated about skin care

## 2019-06-19 NOTE — ED NOTES
Med rec updated and complete. Allergies reviewed. Per interview with  Pt at bedside. No oral antibiotic use in last 14 days at home.

## 2019-06-19 NOTE — DIETARY
Nutrition Services: Day 0 of admit.  Az Jolly is a 85 y.o. female with admitting DX of Closed right hip fracture   Consult received for Low BMI    Pt states she is starving and hasn't had anything since last night. NPO for procedure today. Pt reports prior to admit her appetite was not very good. Pt states the food at the place she was at was not great. Pt reports 20 lbs wt loss and her UBW is 120-125 lbs (54.5-56.8 kg). Pt states she last remembers weighing this in August 2011. Pt states she usually drinks Boost Plus once per day. Pt agreeable to receive Boost Plus and Magic Cups when diet starts.     Assessment:  Ht: 170.2 cm, Wt 6/18: 44 kg via bed scale, BMI: 15.19 - Underweight  Diet: NPO since midnight for surgery today (hemiarthroplasy)     Evaluation:   1. Pt appears thin. Pt noted with severe muscle loss as evidenced by prominent clavicles.   2. Per chart review pt's wt in the last 6 months has been around 44-46 kg. Wt appears relatively stable.   3. Labs 6/18: Glucose 117, BUN 33  4. Meds: synthroid, pericoalce, zofran (prn), morphine (prn)    Malnutrition Risk: Not enough criteria at this time to diagnosis.     Recommendations/Plan:  1. Start PO diet when medically feasible.    2. Monitor weight.  3. Obtain supplement order per RD when diet starts.     RD following

## 2019-06-19 NOTE — PROGRESS NOTES
Preop called about verifying surgery time today scheduled in the evening at 3 pm. Notified PreOp that pt. Has no family at bedside, is on a continuous heparin drip with no stop orders, on 2 liters nasal cannula, has been NPO since midnight.

## 2019-06-19 NOTE — ED TRIAGE NOTES
Az Jolly  85 y.o. female  Chief Complaint   Patient presents with   • GLF     Pt had a mechanical GLF on to carpet. Denies head/neck/back pain. -LOC.   • Leg Pain     R. posterior thigh pain. No obvious deformity to leg. CMS intact. HX r. sided weakness r/t CVA. Given 500mg tylneol in route.      BIB EMS for above CC. Pt is alert and oriented, speaking in full sentences, follows commands and responds appropriately to questions. Resp are even and unlabored. Pt in apparent pain with flexion and extension. Pt refusing PIV access at this time.

## 2019-06-19 NOTE — ASSESSMENT & PLAN NOTE
Restart coumadin  Metoprolol was increased to 50 mg BID, monitor blood pressures closely  Telemetry

## 2019-06-19 NOTE — PROGRESS NOTES
Called to Pre[facility] to verify and update them that the heparin drip is still continuous. Will page MD buchanan if any new orders will be needed

## 2019-06-20 ENCOUNTER — APPOINTMENT (OUTPATIENT)
Dept: RADIOLOGY | Facility: MEDICAL CENTER | Age: 84
DRG: 469 | End: 2019-06-20
Attending: INTERNAL MEDICINE
Payer: MEDICARE

## 2019-06-20 PROBLEM — J96.01 ACUTE RESPIRATORY FAILURE WITH HYPOXIA (HCC): Status: ACTIVE | Noted: 2019-06-20

## 2019-06-20 LAB
ANION GAP SERPL CALC-SCNC: 6 MMOL/L (ref 0–11.9)
BUN SERPL-MCNC: 33 MG/DL (ref 8–22)
CALCIUM SERPL-MCNC: 8.5 MG/DL (ref 8.5–10.5)
CHLORIDE SERPL-SCNC: 104 MMOL/L (ref 96–112)
CO2 SERPL-SCNC: 24 MMOL/L (ref 20–33)
CREAT SERPL-MCNC: 1.38 MG/DL (ref 0.5–1.4)
EKG IMPRESSION: NORMAL
ERYTHROCYTE [DISTWIDTH] IN BLOOD BY AUTOMATED COUNT: 47.4 FL (ref 35.9–50)
GLUCOSE SERPL-MCNC: 158 MG/DL (ref 65–99)
HCT VFR BLD AUTO: 39.3 % (ref 37–47)
HGB BLD-MCNC: 12.2 G/DL (ref 12–16)
MCH RBC QN AUTO: 29.7 PG (ref 27–33)
MCHC RBC AUTO-ENTMCNC: 31 G/DL (ref 33.6–35)
MCV RBC AUTO: 95.6 FL (ref 81.4–97.8)
PLATELET # BLD AUTO: 177 K/UL (ref 164–446)
PMV BLD AUTO: 10.3 FL (ref 9–12.9)
POTASSIUM SERPL-SCNC: 5.2 MMOL/L (ref 3.6–5.5)
RBC # BLD AUTO: 4.11 M/UL (ref 4.2–5.4)
SODIUM SERPL-SCNC: 134 MMOL/L (ref 135–145)
WBC # BLD AUTO: 11 K/UL (ref 4.8–10.8)

## 2019-06-20 PROCEDURE — 93005 ELECTROCARDIOGRAM TRACING: CPT | Performed by: HOSPITALIST

## 2019-06-20 PROCEDURE — 700105 HCHG RX REV CODE 258: Performed by: HOSPITALIST

## 2019-06-20 PROCEDURE — 99233 SBSQ HOSP IP/OBS HIGH 50: CPT | Performed by: INTERNAL MEDICINE

## 2019-06-20 PROCEDURE — 97162 PT EVAL MOD COMPLEX 30 MIN: CPT

## 2019-06-20 PROCEDURE — 700102 HCHG RX REV CODE 250 W/ 637 OVERRIDE(OP): Performed by: HOSPITALIST

## 2019-06-20 PROCEDURE — 85027 COMPLETE CBC AUTOMATED: CPT

## 2019-06-20 PROCEDURE — A9270 NON-COVERED ITEM OR SERVICE: HCPCS | Performed by: INTERNAL MEDICINE

## 2019-06-20 PROCEDURE — 700101 HCHG RX REV CODE 250: Performed by: ORTHOPAEDIC SURGERY

## 2019-06-20 PROCEDURE — 700102 HCHG RX REV CODE 250 W/ 637 OVERRIDE(OP): Performed by: INTERNAL MEDICINE

## 2019-06-20 PROCEDURE — 80048 BASIC METABOLIC PNL TOTAL CA: CPT

## 2019-06-20 PROCEDURE — 700111 HCHG RX REV CODE 636 W/ 250 OVERRIDE (IP): Performed by: INTERNAL MEDICINE

## 2019-06-20 PROCEDURE — 97165 OT EVAL LOW COMPLEX 30 MIN: CPT

## 2019-06-20 PROCEDURE — 700105 HCHG RX REV CODE 258: Performed by: INTERNAL MEDICINE

## 2019-06-20 PROCEDURE — 71045 X-RAY EXAM CHEST 1 VIEW: CPT

## 2019-06-20 PROCEDURE — 93010 ELECTROCARDIOGRAM REPORT: CPT | Performed by: INTERNAL MEDICINE

## 2019-06-20 PROCEDURE — 770020 HCHG ROOM/CARE - TELE (206)

## 2019-06-20 PROCEDURE — 36415 COLL VENOUS BLD VENIPUNCTURE: CPT

## 2019-06-20 PROCEDURE — A9270 NON-COVERED ITEM OR SERVICE: HCPCS | Performed by: HOSPITALIST

## 2019-06-20 RX ORDER — HEPARIN SODIUM 5000 [USP'U]/ML
5000 INJECTION, SOLUTION INTRAVENOUS; SUBCUTANEOUS EVERY 8 HOURS
Status: DISCONTINUED | OUTPATIENT
Start: 2019-06-20 | End: 2019-06-25 | Stop reason: HOSPADM

## 2019-06-20 RX ORDER — SODIUM CHLORIDE 9 MG/ML
500 INJECTION, SOLUTION INTRAVENOUS ONCE
Status: COMPLETED | OUTPATIENT
Start: 2019-06-20 | End: 2019-06-20

## 2019-06-20 RX ORDER — SODIUM CHLORIDE 9 MG/ML
INJECTION, SOLUTION INTRAVENOUS CONTINUOUS
Status: DISCONTINUED | OUTPATIENT
Start: 2019-06-20 | End: 2019-06-22

## 2019-06-20 RX ADMIN — SODIUM CHLORIDE: 9 INJECTION, SOLUTION INTRAVENOUS at 08:48

## 2019-06-20 RX ADMIN — SODIUM CHLORIDE 500 ML: 9 INJECTION, SOLUTION INTRAVENOUS at 02:30

## 2019-06-20 RX ADMIN — METOPROLOL TARTRATE 25 MG: 25 TABLET, FILM COATED ORAL at 05:20

## 2019-06-20 RX ADMIN — METOPROLOL TARTRATE 25 MG: 25 TABLET, FILM COATED ORAL at 17:15

## 2019-06-20 RX ADMIN — HEPARIN SODIUM 5000 UNITS: 5000 INJECTION, SOLUTION INTRAVENOUS; SUBCUTANEOUS at 13:58

## 2019-06-20 RX ADMIN — HEPARIN SODIUM 5000 UNITS: 5000 INJECTION, SOLUTION INTRAVENOUS; SUBCUTANEOUS at 23:15

## 2019-06-20 RX ADMIN — SENNOSIDES AND DOCUSATE SODIUM 2 TABLET: 8.6; 5 TABLET ORAL at 17:16

## 2019-06-20 RX ADMIN — SENNOSIDES AND DOCUSATE SODIUM 2 TABLET: 8.6; 5 TABLET ORAL at 05:21

## 2019-06-20 RX ADMIN — LEVOTHYROXINE SODIUM 75 MCG: 75 TABLET ORAL at 05:19

## 2019-06-20 RX ADMIN — ACETAMINOPHEN 650 MG: 325 TABLET, FILM COATED ORAL at 05:30

## 2019-06-20 RX ADMIN — CLINDAMYCIN IN 5 PERCENT DEXTROSE 300 MG: 6 INJECTION, SOLUTION INTRAVENOUS at 08:51

## 2019-06-20 RX ADMIN — SODIUM CHLORIDE 500 ML: 9 INJECTION, SOLUTION INTRAVENOUS at 05:00

## 2019-06-20 RX ADMIN — CLINDAMYCIN IN 5 PERCENT DEXTROSE 300 MG: 6 INJECTION, SOLUTION INTRAVENOUS at 01:43

## 2019-06-20 RX ADMIN — ACETAMINOPHEN 650 MG: 325 TABLET, FILM COATED ORAL at 17:16

## 2019-06-20 ASSESSMENT — ENCOUNTER SYMPTOMS
PALPITATIONS: 0
CHILLS: 0
VOMITING: 0
NAUSEA: 0
MYALGIAS: 1
FEVER: 0
WEAKNESS: 1
ABDOMINAL PAIN: 0
SHORTNESS OF BREATH: 0

## 2019-06-20 ASSESSMENT — COGNITIVE AND FUNCTIONAL STATUS - GENERAL
DRESSING REGULAR UPPER BODY CLOTHING: A LITTLE
SUGGESTED CMS G CODE MODIFIER MOBILITY: CM
DRESSING REGULAR LOWER BODY CLOTHING: A LOT
MOBILITY SCORE: 8
STANDING UP FROM CHAIR USING ARMS: A LOT
MOVING TO AND FROM BED TO CHAIR: UNABLE
HELP NEEDED FOR BATHING: A LOT
CLIMB 3 TO 5 STEPS WITH RAILING: TOTAL
TOILETING: A LOT
WALKING IN HOSPITAL ROOM: A LOT
TURNING FROM BACK TO SIDE WHILE IN FLAT BAD: UNABLE
SUGGESTED CMS G CODE MODIFIER DAILY ACTIVITY: CK
MOVING FROM LYING ON BACK TO SITTING ON SIDE OF FLAT BED: UNABLE
DAILY ACTIVITIY SCORE: 17

## 2019-06-20 ASSESSMENT — ACTIVITIES OF DAILY LIVING (ADL): TOILETING: INDEPENDENT

## 2019-06-20 ASSESSMENT — GAIT ASSESSMENTS: GAIT LEVEL OF ASSIST: UNABLE TO PARTICIPATE

## 2019-06-20 NOTE — PROGRESS NOTES
· 2 RN skin check complete with EVELIO Valle.  · Devices in place: SCD, PIV, Tele leads, glasses  · Skin assessed under devices: intact and blanching  · Confirmed pressure ulcers found on: NONE  · New potential pressure ulcers noted on: NONE  ·  Wound consult placed and wound reported.  · The following interventions in place: q2 turns, waffle, sacral mepilex, mepilex on elbows, heels floated on pillows, foam on bridge of nose   · Bruise/DTI to R buttock  · R hip incisional site with island dressing CDI

## 2019-06-20 NOTE — OR NURSING
Assumed care of PT, PT given Fentanyl 50 mcg x1, PT w/ c/o pain after off loading PT from bottom.

## 2019-06-20 NOTE — THERAPY
"Physical Therapy Evaluation completed.   Bed Mobility:  Supine to Sit: Maximal Assist  Transfers: Sit to Stand: Moderate Assist  Gait: Level Of Assist: Unable to Participate (low BP in standing, 2-3 side steps only at EOB) with Front-Wheel Walker       Plan of Care: Will benefit from Physical Therapy 4 times per week  Discharge Recommendations: Equipment: Will Continue to Assess for Equipment Needs. Post-acute therapy Recommend inpatient transitional care services for continued physical therapy services.      Recommend physiatry consult.    See \"Rehab Therapy-Acute\" Patient Summary Report for complete documentation.     "

## 2019-06-20 NOTE — PROGRESS NOTES
Hospital Medicine Daily Progress Note    Date of Service  6/20/2019    Chief Complaint  85 y.o. female admitted 6/18/2019 with right femoral neck fracture    Hospital Course    patient presented with right femoral neck fracture and underwent right hip hemiarthroplasty.  Postoperatively she developed atrial fibrillation with rapid ventricular response and was transferred to the telemetry unit with increased oxygen requirements and low urine output.  There is now question of possible bedbugs as well.      Interval Problem Update  Atrial fibrillation-initially heart rate was in the 140s and is come down to the 1 teens with metoprolol and blood pressure is somewhat soft.  Patient denies any palpitations or chest pain.    Right hip-she says the pain is tolerable and she denies any new numbness or weakness of the affected limb.    Of note the nurse found a bedbug possibly on the patient's cup and is being sent down to our micro lab for further analysis.    Consultants/Specialty  ORTHO    Code Status  fcfc    Disposition  TELE    Review of Systems  Review of Systems   Constitutional: Negative for chills and fever.   Respiratory: Negative for shortness of breath.    Cardiovascular: Negative for chest pain and palpitations.   Gastrointestinal: Negative for abdominal pain, nausea and vomiting.   Genitourinary: Negative for dysuria and urgency.   Musculoskeletal: Positive for joint pain and myalgias.   Neurological: Positive for weakness.   All other systems reviewed and are negative.       Physical Exam  Temp:  [36.1 °C (97 °F)-37 °C (98.6 °F)] 36.7 °C (98 °F)  Pulse:  [] 112  Resp:  [11-22] 18  BP: ()/(58-65) 96/65  SpO2:  [94 %-100 %] 95 %    Physical Exam   Constitutional: She is oriented to person, place, and time. She appears well-developed and well-nourished. No distress.   HENT:   Head: Normocephalic and atraumatic.   Mouth/Throat: No oropharyngeal exudate.   NC in place   Eyes: Pupils are equal, round, and  reactive to light. No scleral icterus.   Neck: Normal range of motion. Neck supple. No thyromegaly present.   Cardiovascular: Normal heart sounds and intact distal pulses.    No murmur heard.  Irregular irregular, tachycardic   Pulmonary/Chest: Effort normal and breath sounds normal. No respiratory distress. She has no wheezes.   Abdominal: Soft. Bowel sounds are normal. There is no tenderness.   Scaphoid, thin   Musculoskeletal: Normal range of motion. She exhibits tenderness. She exhibits no edema.   Surgical incision on the lateral aspect of the proximal RLE appears C/D/I   Neurological: She is alert and oriented to person, place, and time. No cranial nerve deficit.   Skin: Skin is warm and dry. No rash noted. There is pallor.   Psychiatric: She has a normal mood and affect.   Nursing note and vitals reviewed.      Fluids    Intake/Output Summary (Last 24 hours) at 06/20/19 1705  Last data filed at 06/20/19 0653   Gross per 24 hour   Intake             1730 ml   Output              207 ml   Net             1523 ml       Laboratory  Recent Labs      06/18/19 2244 06/20/19   0307   WBC  7.7  11.0*   RBC  4.24  4.11*   HEMOGLOBIN  12.6  12.2   HEMATOCRIT  39.3  39.3   MCV  92.7  95.6   MCH  29.7  29.7   MCHC  32.1*  31.0*   RDW  45.1  47.4   PLATELETCT  236  177   MPV  9.6  10.3     Recent Labs      06/18/19 2244  06/20/19   0307   SODIUM  136  134*   POTASSIUM  4.3  5.2   CHLORIDE  98  104   CO2  27  24   GLUCOSE  117*  158*   BUN  33*  33*   CREATININE  1.36  1.38   CALCIUM  9.2  8.5     Recent Labs      06/18/19 2244  06/19/19   0812  06/19/19   1450   APTT  34.7  167.1*  26.9   INR  1.73*   --   1.90*               Imaging  DX-CHEST-LIMITED (1 VIEW)   Final Result      Left lung base atelectasis/possible pneumonitis since previous examination.      DX-PELVIS-1 OR 2 VIEWS   Final Result      1.  Interval right hip hemiarthroplasty with grossly anatomic alignment and no acute fracture.       DX-HIP-UNILATERAL-WITH PELVIS-1 VIEW RIGHT   Final Result         1.  Impacted right femoral neck fracture.   2.  Atherosclerosis      DX-FEMUR-1 VIEW RIGHT   Final Result         1.  Impacted right femoral neck fracture.   2.  Atherosclerosis      DX-CHEST-LIMITED (1 VIEW)   Final Result         1.  No acute cardiopulmonary disease.   2.  Perihilar interstitial prominence and bronchial wall cuffing, appearance suggests changes of underlying bronchial inflammation, consider bronchitis.      EC-ECHOCARDIOGRAM COMPLETE W/ CONT    (Results Pending)        Assessment/Plan  * Closed right hip fracture (HCC)- (present on admission)   Assessment & Plan    -Status post right hip hemiarthroplasty postop day #1  -She was transferred to telemetry for postoperative atrial fibrillation and flutter with rapid ventricular response  -Hemoglobin hematocrit remained stable, surgical site appears dry  -Multimodal pain therapy  -PT OT  -Lovenox 40 mg subcutaneous for DVT prophylaxis at this time.     Acute respiratory failure with hypoxia (HCC)   Assessment & Plan    -CXR, shows possible pneumonitis  -gentle IVFs  -SLP  -improve resp mechanics, IS Q1 hour while awake     Atrial flutter (HCC)- (present on admission)   Assessment & Plan    -Patient went into rapid ventricular response last night and continues to remain tachycardic today, mostly asymptomatic  -Repeat echocardiogram as her last echocardiogram in our computer is 2013  -Hold outpatient Coumadin given recent hip fracture and surgery  -Continue metoprolol 25 mg twice daily and monitor magnesium potassium closely, appropriate hold parameters in place  -Patient has underlying CKD stage II-III with mild hyperkalemia and therefore would like to avoid digoxin if we can  -If worsens consider cardiology consultation  -gentle IVF's with NS at 50 mL/hour, given that patient appears some hypovolemic on exam     Hypothyroidism- (present on admission)   Assessment & Plan    This is  chronic and stable, continue home Synthroid.          VTE prophylaxis: lovenox, prophylaxis dosing

## 2019-06-20 NOTE — CARE PLAN
Problem: Knowledge Deficit  Goal: Knowledge of disease process/condition, treatment plan, diagnostic tests, and medications will improve  Outcome: PROGRESSING AS EXPECTED  Discussed daily POC. Discussed daily medication information. Whiteboard updated.     Problem: Pain Management  Goal: Pain level will decrease to patient's comfort goal  Outcome: PROGRESSING AS EXPECTED  Per report pt declined phramacologic pain intervention this morning. Pt currently reports no discomfort, only discomfort with movement. Will continue to monitor

## 2019-06-20 NOTE — DIETARY
NUTRITION SERVICES - Brief Nutrition Update    Pt was advanced to full liquids diet this morning. No PO records available yet. Pt was seen this morning for supplement preferences - she would like to add Boost Plus Chocolate (260kcal,14g protein each) with meals and pudding between meals for snacks. Will make changes effective starting at dinner tonight.     Plan/Recommend:  1) Continue current diet. ADAT.  2) Boost Plus Chocolate with meals, pudding between meals.  3) Please document intake in ADL's to provide communication across shifts. Thank you.    RD following.

## 2019-06-20 NOTE — PROGRESS NOTES
Pt to room from PACU. Pt drowsy, awakens to voice. Pt denies pain. Oriented to room, call light within reach

## 2019-06-20 NOTE — PROGRESS NOTES
Patient is oriented to self and event waking up from surgery in PACU.  Patient denies pain/nausea at this time.  Surgical site CDI.  VSS.  spO2 96% on 4L NC.   Yadiel updated on patient condition over the phone.  Discussed Afib HR >140 with Dr. Hawk.  IV metoprolol ordered by MD for rate control.  POC reviewed, PIVs verified, and safety protocols in place.  Bedside report given to Corinne in PACU.

## 2019-06-20 NOTE — ANESTHESIA PROCEDURE NOTES
Airway  Date/Time: 6/19/2019 5:16 PM  Performed by: WOJCIECH PICKERING  Authorized by: WOJCIECH PICKERING     Location:  OR  Urgency:  Elective  Difficult Airway: No    Indications for Airway Management:  Anesthesia  Spontaneous Ventilation: absent    Sedation Level:  Deep  Preoxygenated: Yes    Mask Difficulty Assessment:  0 - not attempted  Final Airway Type:  Supraglottic airway  Final Supraglottic Airway:  Standard LMA  SGA Size:  4  Number of Attempts at Approach:  1

## 2019-06-20 NOTE — PROGRESS NOTES
2 RN skin check completed with Maykel RN  Devices in place scd, iv, oxymask, drg over R hip surgical site.  Skin assessed under devices all except surgical dressing.  Confirmed pressure ulcers found on R buttock. Red non blanching area. .  New potential pressure ulcers noted on R buttock. Wound consult placed .  The following interventions in place turn q2, moisturizer, mepilex to bony prominences  Pt has red slow to amarilys elbows, mepilex placed for precaution.   Pt has red blanching heels. Elevated.   Pt has redness to bridge of nose from oxymask. mepilex lite cut and placed to protect.   Ears red blanching from glasses/oxygen. Offloaded.   Surgical dressing left in place until f/u by MD. *

## 2019-06-20 NOTE — PROGRESS NOTES
Assumed care of patient at bedside report from NOC RN. Updated on POC. Patient currently A & O x 3, disoriented to time; on 2 L O2 simple mask; up x2 assist, weight bearing as tolerated; without complaints of acute pain. Call light within reach. Whiteboard updated. Fall precautions in place. Bed locked and in lowest position. All questions answered. No other needs indicated at this time.

## 2019-06-20 NOTE — ANESTHESIA QCDR
2019 [facility] Clinical Data Registry (for Quality Improvement)     Postoperative nausea/vomiting risk protocol (Adult = 18 yrs and Pediatric 3-17 yrs)- (430 and 463)  General inhalation anesthetic (NOT TIVA) with PONV risk factors: Yes  Provision of anti-emetic therapy with at least 2 different classes of agents: Yes   Patient DID NOT receive anti-emetic therapy and reason is documented in Medical Record:  N/A    Multimodal Pain Management- (AQI59)  Patient undergoing Elective Surgery (i.e. Outpatient, or ASC, or Prescheduled Surgery prior to Hospital Admission): No  Use of Multimodal Pain Management, two or more drugs and/or interventions, NOT including systemic opioids: N/A  Exception: Documented allergy to multiple classes of analgesics: N/A    PACU assessment of acute postoperative pain prior to Anesthesia Care End- Applies to Patients Age = 18- (ABG7)  Initial PACU pain score is which of the following: < 7/10  Patient unable to report pain score: N/A    Post-anesthetic transfer of care checklist/protocol to PACU/ICU- (426 and 427)  Upon conclusion of case, patient transferred to which of the following locations: PACU/Non-ICU  Use of transfer checklist/protocol: Yes  Exclusion: Service Performed in Patient Hospital Room (and thus did not require transfer): N/A    PACU Reintubation- (AQI31)  General anesthesia requiring endotracheal intubation (ETT) along with subsequent extubation in OR or PACU: No  Required reintubation in the PACU: N/A  Extubation was a planned trial documented in the medical record prior to removal of the original airway device: N/A    Unplanned admission to ICU related to anesthesia service up through end of PACU care- (MD51)  Unplanned admission to ICU (not initially anticipated at anesthesia start time): No

## 2019-06-20 NOTE — PROGRESS NOTES
Dr. Harmon returned page about HR in 120's and low UOP. Orders received. Pt resting without complaint of pain. Remains on 4L oxymask, mouth breathing with sat in low 80's when on nasal canula.

## 2019-06-20 NOTE — THERAPY
"Occupational Therapy Evaluation completed.   Functional Status: Supine > EOB max A, sit to stand mod A, LB dressing total A, self-feeding with set-up  Plan of Care: Will benefit from Occupational Therapy 4 times per week  Discharge Recommendations:  Equipment: Will Continue to Assess for Equipment Needs. Post-acute therapy: Discharge to a transitional care facility for continued skilled therapy services. Recommend physiatry consult.     See \"Rehab Therapy-Acute\" Patient Summary Report for complete documentation.    85 y.o. female with h/o CVA (R-sided deficits), a-fib, LLE DVT, hypothyroid, s/p GLF with resultant R femoral neck fx. Pt underwent posterior hip hemiarthroplasty. Pt seen for OT eval. Pt educated on posterior hip precautions and WBAT. Assisted pt to EOB and standing with FWW.  Returned to supine. Pt hypotensive with activity: 99/59 EOB and 82/50 in standing; c/o nausea. Pt limited by pain, hip precautions, generalized weakness, balance impairment, hypotension. Pt would benefit from acute OT to train on compensatory ADL, progress safe transfers and standing activity, assist with DC planning and DME recs. Will follow.   "

## 2019-06-20 NOTE — OP REPORT
DATE OF SERVICE:  06/19/2019    PREOPERATIVE DIAGNOSES:  1.  Right femoral neck fracture, closed, displaced.  2.  Coumadin-induced coagulopathy.    POSTOPERATIVE DIAGNOSES:  1.  Right femoral neck fracture, closed, displaced.  2.  Coumadin-induced coagulopathy.    SURGICAL PROCEDURE:  Right hip hemiarthroplasty.    SURGEON:  Raul Saldivar MD    ASSISTANT:  None.    ANESTHESIOLOGIST:  Daniel Hawk MD    ANESTHETIC:  General.    ESTIMATED BLOOD LOSS:  50 mL    IMPLANTS:  1.  [employer] cemented size 13 femoral stem.  2.  Tandem bipolar shell, 48 mm, with 28 mm +0 femoral head.    INDICATIONS:  The patient is 85 years old female.  She lives with her    in assisted living and just ambulates around her facility with a walker.  She   got stuck getting out of the closet yesterday, not sure exactly what happened,   but fall to the ground, injuring her right hip.  She was seen in the emergency   room, found to have displaced femoral neck fracture, was admitted by the   hospitalist service, started on a heparin drip.  This was stopped at 10:15   this morning.  Her PTT has normalized, but her INR is still elevated for which   she was given vitamin K.  We have recommended right hip hemiarthroplasty.    Risks were discussed with patient and these include bleeding, infection,   neurovascular injury, pain, stiffness, fracture, dislocation, leg length   discrepancy, implant failure, thromboembolic phenomenon, anesthetic and   medical complications, etc.    DESCRIPTION OF PROCEDURE:  The patient was identified in the preoperative   holding area.  Her right hip was marked.  She was taken to the operating room   where general anesthetic was administered.  A fascia iliacus block was   administered by Dr. Hawk for intraoperative and postoperative pain control.    The patient was then placed in the lateral decubitus position with the right   side up.  The right hindquarter was prepped and draped in sterile  fashion.    Time-out was held to confirm the patient identity and correct surgical site.      A longitudinal incision was made over the greater trochanter.  This was   carried down to the IT band with electrocautery.  The IT band was then split.    The split was carried into the gluteus elly in line with its fibers.  The   short external rotators were then elevated off the proximal femur.    Capsulotomy was performed.  The femoral head was removed.  The bone was   extremely soft.    Since the fracture was in subcapital, I made a new femoral neck cut with a   saw.  I then opened up the medullary canal with box osteotome and reamers up   to size 13 and then serial broaching up to size 13.  There was still some   rotational instability with size 13 and the bone quality was poor, so I   elected to use cement.    The distal cement plug was inserted.  The canal was washed, brushed, and   dried.  Cement was injected in retrograde fashion and lightly pressurized.    The femoral stem was inserted.  We potted this 3 mm above the calcar based   on our trialing. Excess cement was removed.  Once the cement had dried, we   again trialed with a standard head and found good stability, adequate hip extension   and approximately equal leg lengths.    The trial was removed.  The Lainez taper was washed and dried.  The bipolar   head shell were then assembled and placed on the Lainez taper in standard   fashion.  The hip was reduced.  A short dilute Betadine soak was performed,   which was irrigated out of the wound.  The capsule was repaired with #1   Vicryl.  External rotators were repaired to the proximal femur with #1 Vicryl.    The IT band was closed with #1 Vicryl.  Skin was closed with 2-0 Vicryl and   3-0 Monocryl.  Steri-Strips were applied followed by gauze and Tegaderm.  The   patient was transferred to her bed in the supine position.  We noted   approximately equal leg lengths.  She was then extubated and taken to  recovery   room in stable condition.    POSTOPERATIVE PLAN:  1.  Intravenous antibiotics for 24 hours.  2.  Weightbearing as tolerated with posterior hip precautions.  3.  AP pelvis radiograph in recovery room.  4.  Deep venous thrombosis prophylaxis with SCDs, early mobilization and   Lovenox with resumption of Coumadin in approximately 3-5 days, once the   bleeding stable.  5.  Ongoing preoperative medical management per hospitalist.       ____________________________________     MD MICHAEL AWAD / RADHA    DD:  06/19/2019 19:14:40  DT:  06/19/2019 20:24:05    D#:  [id number]  Job#:  [id number]

## 2019-06-20 NOTE — ANESTHESIA POSTPROCEDURE EVALUATION
Patient: Az Jolly    Procedure Summary     Date:  06/19/19 Room / Location:  [place] [address] / SURGERY [facility]    Anesthesia Start:  1711 Anesthesia Stop:  1901    Procedure:  HEMIARTHROPLASTY, HIP (Right Hip) Diagnosis:  (displaced femoral neck fracture)    Surgeon:  aRul Saldivar M.D. Responsible Provider:  Daniel Hawk M.D.    Anesthesia Type:  general, peripheral nerve block ASA Status:  3          Final Anesthesia Type: general, peripheral nerve block  Last vitals  BP   Blood Pressure : 101/61, NIBP: (!) 95/63    Temp   36.9 °C (98.4 °F)    Pulse   Pulse: (!) 107, Heart Rate (Monitored): (!) 123   Resp   (!) 11    SpO2   98 %      Anesthesia Post Evaluation    Patient location during evaluation: PACU  Patient participation: complete - patient participated  Level of consciousness: lethargic  Pain score: 4    Airway patency: patent  Anesthetic complications: no  Cardiovascular status: hemodynamically stable  Respiratory status: acceptable  Hydration status: euvolemic    PONV: none           Nurse Pain Score: 4 (NPRS)

## 2019-06-20 NOTE — OR NURSING
Called Hospitalist regarding PT HR and need for Cardizem, ORTHO not able to take PT back due to need for cardiac monitoring.  Dr. Yip advised of situation and need to give Cardizem 5mg, advised will place order for TELE bed, received call back from Dr. Hawk, advised of situation.  Dr. Ricks paged regarding update, called back and informed of same, PT going to Telemetry for closer monitoring.

## 2019-06-20 NOTE — PROGRESS NOTES
Dr. Harmon returned page concerning no urine out put. Orders noted. Pt also remains on 4L oxymask, will try to wean when pt awake. Pt mouth breaths. Pressure soft at 93 syst. Dr. Harmon aware.

## 2019-06-21 PROBLEM — N17.9 ACUTE KIDNEY INJURY SUPERIMPOSED ON CHRONIC KIDNEY DISEASE (HCC): Status: ACTIVE | Noted: 2019-06-21

## 2019-06-21 PROBLEM — E87.5 HYPERKALEMIA: Status: ACTIVE | Noted: 2019-06-21

## 2019-06-21 PROBLEM — N18.9 ACUTE KIDNEY INJURY SUPERIMPOSED ON CHRONIC KIDNEY DISEASE (HCC): Status: ACTIVE | Noted: 2019-06-21

## 2019-06-21 LAB
ANION GAP SERPL CALC-SCNC: 9 MMOL/L (ref 0–11.9)
BASOPHILS # BLD AUTO: 0.1 % (ref 0–1.8)
BASOPHILS # BLD: 0.01 K/UL (ref 0–0.12)
BUN SERPL-MCNC: 43 MG/DL (ref 8–22)
CALCIUM SERPL-MCNC: 8.5 MG/DL (ref 8.5–10.5)
CHLORIDE SERPL-SCNC: 103 MMOL/L (ref 96–112)
CO2 SERPL-SCNC: 20 MMOL/L (ref 20–33)
CREAT SERPL-MCNC: 1.8 MG/DL (ref 0.5–1.4)
EOSINOPHIL # BLD AUTO: 0.01 K/UL (ref 0–0.51)
EOSINOPHIL NFR BLD: 0.1 % (ref 0–6.9)
ERYTHROCYTE [DISTWIDTH] IN BLOOD BY AUTOMATED COUNT: 46.2 FL (ref 35.9–50)
GLUCOSE SERPL-MCNC: 133 MG/DL (ref 65–99)
HCT VFR BLD AUTO: 37.5 % (ref 37–47)
HGB BLD-MCNC: 11.8 G/DL (ref 12–16)
IMM GRANULOCYTES # BLD AUTO: 0.11 K/UL (ref 0–0.11)
IMM GRANULOCYTES NFR BLD AUTO: 0.7 % (ref 0–0.9)
LYMPHOCYTES # BLD AUTO: 0.98 K/UL (ref 1–4.8)
LYMPHOCYTES NFR BLD: 6.3 % (ref 22–41)
MAGNESIUM SERPL-MCNC: 1.7 MG/DL (ref 1.5–2.5)
MCH RBC QN AUTO: 29.5 PG (ref 27–33)
MCHC RBC AUTO-ENTMCNC: 31.5 G/DL (ref 33.6–35)
MCV RBC AUTO: 93.8 FL (ref 81.4–97.8)
MONOCYTES # BLD AUTO: 0.51 K/UL (ref 0–0.85)
MONOCYTES NFR BLD AUTO: 3.3 % (ref 0–13.4)
NEUTROPHILS # BLD AUTO: 14 K/UL (ref 2–7.15)
NEUTROPHILS NFR BLD: 89.5 % (ref 44–72)
NRBC # BLD AUTO: 0 K/UL
NRBC BLD-RTO: 0 /100 WBC
PLATELET # BLD AUTO: 216 K/UL (ref 164–446)
PMV BLD AUTO: 10 FL (ref 9–12.9)
POTASSIUM SERPL-SCNC: 5.6 MMOL/L (ref 3.6–5.5)
RBC # BLD AUTO: 4 M/UL (ref 4.2–5.4)
SODIUM SERPL-SCNC: 132 MMOL/L (ref 135–145)
WBC # BLD AUTO: 15.6 K/UL (ref 4.8–10.8)

## 2019-06-21 PROCEDURE — 700111 HCHG RX REV CODE 636 W/ 250 OVERRIDE (IP): Performed by: INTERNAL MEDICINE

## 2019-06-21 PROCEDURE — 36415 COLL VENOUS BLD VENIPUNCTURE: CPT

## 2019-06-21 PROCEDURE — 700102 HCHG RX REV CODE 250 W/ 637 OVERRIDE(OP): Performed by: HOSPITALIST

## 2019-06-21 PROCEDURE — 770020 HCHG ROOM/CARE - TELE (206)

## 2019-06-21 PROCEDURE — 700102 HCHG RX REV CODE 250 W/ 637 OVERRIDE(OP): Performed by: INTERNAL MEDICINE

## 2019-06-21 PROCEDURE — 83735 ASSAY OF MAGNESIUM: CPT

## 2019-06-21 PROCEDURE — 85025 COMPLETE CBC W/AUTO DIFF WBC: CPT

## 2019-06-21 PROCEDURE — 700111 HCHG RX REV CODE 636 W/ 250 OVERRIDE (IP): Performed by: HOSPITALIST

## 2019-06-21 PROCEDURE — 302255 BARRIER CREAM MOISTURE BAZA PROTECT (ZINC) 5OZ: Performed by: INTERNAL MEDICINE

## 2019-06-21 PROCEDURE — 80048 BASIC METABOLIC PNL TOTAL CA: CPT

## 2019-06-21 PROCEDURE — 92610 EVALUATE SWALLOWING FUNCTION: CPT

## 2019-06-21 PROCEDURE — A9270 NON-COVERED ITEM OR SERVICE: HCPCS | Performed by: INTERNAL MEDICINE

## 2019-06-21 PROCEDURE — 99233 SBSQ HOSP IP/OBS HIGH 50: CPT | Performed by: INTERNAL MEDICINE

## 2019-06-21 PROCEDURE — 700105 HCHG RX REV CODE 258: Performed by: INTERNAL MEDICINE

## 2019-06-21 PROCEDURE — A9270 NON-COVERED ITEM OR SERVICE: HCPCS | Performed by: HOSPITALIST

## 2019-06-21 RX ADMIN — SODIUM CHLORIDE: 9 INJECTION, SOLUTION INTRAVENOUS at 18:13

## 2019-06-21 RX ADMIN — ACETAMINOPHEN 325 MG: 325 TABLET, FILM COATED ORAL at 06:42

## 2019-06-21 RX ADMIN — MAGNESIUM HYDROXIDE 30 ML: 400 SUSPENSION ORAL at 09:02

## 2019-06-21 RX ADMIN — HEPARIN SODIUM 5000 UNITS: 5000 INJECTION, SOLUTION INTRAVENOUS; SUBCUTANEOUS at 21:11

## 2019-06-21 RX ADMIN — SENNOSIDES AND DOCUSATE SODIUM 2 TABLET: 8.6; 5 TABLET ORAL at 07:46

## 2019-06-21 RX ADMIN — ACETAMINOPHEN 650 MG: 325 TABLET, FILM COATED ORAL at 21:11

## 2019-06-21 RX ADMIN — METOPROLOL TARTRATE 37.5 MG: 25 TABLET, FILM COATED ORAL at 18:08

## 2019-06-21 RX ADMIN — HEPARIN SODIUM 5000 UNITS: 5000 INJECTION, SOLUTION INTRAVENOUS; SUBCUTANEOUS at 06:40

## 2019-06-21 RX ADMIN — METOPROLOL TARTRATE 25 MG: 25 TABLET, FILM COATED ORAL at 06:41

## 2019-06-21 RX ADMIN — METOPROLOL TARTRATE 12.5 MG: 25 TABLET ORAL at 09:12

## 2019-06-21 RX ADMIN — ONDANSETRON 4 MG: 2 INJECTION INTRAMUSCULAR; INTRAVENOUS at 08:13

## 2019-06-21 RX ADMIN — LEVOTHYROXINE SODIUM 75 MCG: 75 TABLET ORAL at 06:29

## 2019-06-21 RX ADMIN — HEPARIN SODIUM 5000 UNITS: 5000 INJECTION, SOLUTION INTRAVENOUS; SUBCUTANEOUS at 14:48

## 2019-06-21 RX ADMIN — ACETAMINOPHEN 650 MG: 325 TABLET, FILM COATED ORAL at 12:34

## 2019-06-21 ASSESSMENT — ENCOUNTER SYMPTOMS
WEAKNESS: 1
MYALGIAS: 1
COUGH: 0
FEVER: 0
DIARRHEA: 0

## 2019-06-21 NOTE — DISCHARGE PLANNING
Care Transition Team Assessment     RN EDUARD met with patient at bedside. Patient lives at [facility] with her spouse. Patient requiring intermittent assistance with ADL's.  Patient will most likely require some sort of rehab prior to returning to her residence.      Information Source  Orientation : Disoriented to Time  Information Given By: Patient  Informant's Name: Az Jolly    Readmission Evaluation  Is this a readmission?: No    Elopement Risk  Legal Hold: No  Ambulatory or Self Mobile in Wheelchair: No-Not an Elopement Risk  Elopement Risk: Not at Risk for Elopement    Interdisciplinary Discharge Planning  Does Admitting Nurse Feel This Could be a Complex Discharge?: No  Primary Care Physician: Toribio Schmidt  Lives with - Patient's Self Care Capacity: Spouse  Patient or legal guardian wants to designate a caregiver (see row info): No  Caregiver name: Yadiel Jolly  Caregiver relationship to patient:   Caregiver contact info: [phone]  Support Systems: Spouse / Significant Other  Housing / Facility: Assisted Living Residence  Name of Care Facility: [facility]   Do You Take your Prescribed Medications Regularly: Yes  Able to Return to Previous ADL's: Future Time w/Therapy  Mobility Issues: Yes  Prior Services: Intermittent Physical Support for ADL Per Family, Housekeeping / Homemaker Services  Patient Expects to be Discharged to:: SNF or rehab  Assistance Needed: Unknown at this Time  Durable Medical Equipment: Walker    Discharge Preparedness  What is your plan after discharge?: Skilled nursing facility, Other (comment) (rehab)  What are your discharge supports?: Spouse     Finances  Financial Barriers to Discharge: No  Prescription Coverage: Yes    Vision / Hearing Impairment  Vision Impairment : No  Right Eye Vision: Impaired, Wears Glasses  Left Eye Vision: Impaired, Wears Glasses  Hearing Impairment : No  Hearing Impairment: Both Ears, Hearing Device Not  Available    Values / Beliefs / Concerns  Values / Beliefs Concerns : No     Domestic Abuse  Have you ever been the victim of abuse or violence?: No  Physical Abuse or Sexual Abuse: No  Verbal Abuse or Emotional Abuse: No  Possible Abuse Reported to:: Not Applicable    Psychological Assessment  History of Substance Abuse: None  History of Psychiatric Problems: No    Discharge Risks or Barriers  Discharge risks or barriers?: No    Anticipated Discharge Information  Anticipated discharge disposition: SNF, Acute rehab

## 2019-06-21 NOTE — PROGRESS NOTES
No c/o  Dressing dry  + DF/PF  SCDs on  XR OK  Hct 39.3    Plan:    WBAT  Posterior hip precautions  Resume coumadin carefully on Saturday  SCDs  D/c planning

## 2019-06-21 NOTE — RESPIRATORY CARE
COPD EDUCATION by COPD CLINICAL EDUCATOR  6/21/2019 at 7:33 AM by Laura Castellanos     Patient's chart reviewed by COPD education team. Patient does not have an order for Respiratory Care Protocol, therefore the COPD education team cannot treat.

## 2019-06-21 NOTE — CARE PLAN
Problem: Nutritional:  Goal: Achieve adequate nutritional intake  Patient will start diet and consume ~50% of meals   Outcome: PROGRESSING SLOWER THAN EXPECTED  Pt is POD #2 for R hip hemiarthroplasty. PO intake 25-50% x1 meal recorded since admission. Per discussion with pt yesterday, pt is now receiving Boost Plus TID with meals. Please continue to document PO intake in ADL's to provide communication across shifts. Thank you. RD following.

## 2019-06-21 NOTE — ASSESSMENT & PLAN NOTE
Rise in creat post-op  Creatinine is decreasing. Stopped IVF given dilated IVC and severe TR on TTE  Avoid nephrotoxins  Now near baseline

## 2019-06-21 NOTE — PROGRESS NOTES
Dr. Bryant notified that patient has pratt and POD #2; Dr. Bryant ordered for pratt to be kept to monitor urine output with patient's history of CKD. Dr. Bryant notified that patient c/o R sided chest pain, under breast that radiates to back. Pt notified this RN that she has had this pain intermittently over the past several months. Pt is afib 103 on this monitor; will continue to monitor.

## 2019-06-21 NOTE — THERAPY
"Speech Language Therapy Clinical Swallow Evaluation completed.    Functional Status: Patient was seen on this date for a clinical swallow evaluation. Patient was transferred to chair by SLP and RN. Currently on room air. Per RN, possible sundowning. Otherwise, reported no difficulty tolerating diet. Pt last seen by SLP in 2011 during rehab admissions where pt was d/c on a regular/thin liquid diet. Oral motor examination revealed no gross asymmetry or weakness. Vocal quality slightly hypophonic. PO trials were administered and consisted of ice chips, NTL, puree, and items from regular/thin liquid meal tray. Swallow trigger was timely and laryngeal elevation was complete to palpation. Patient presented with no overt s/sx of aspiration across all consistencies and textures tested. However, intermittent belching may be indicative of esophageal component. Mastication was prolonged with regular textures and stated the Sami toast was \"too hard.\" Education provided to pt regarding current status and SLP recs.      Recommendations - Diet: At this time, recommend slight downgrade to D3/thin liquid diet given assistance with tray set up and close monitoring. Please STOP PO with any concerns for aspiration and page speech therapy. Dr. Bryant updated regarding results of session.                            Strategies: Monitor during meals, Assistance needed for meal tray set-up and Head of Bed at 90 Degrees                            Medication Administration: Whole with Liquid Wash or as tolerated     Plan of Care: Will benefit from Speech Therapy 3 times per week    Post-Acute Therapy: Recommend inpatient transitional care services for continued speech therapy services.      See \"Rehab Therapy-Acute\" Patient Summary Report for complete documentation. Thank you for the consult.           "

## 2019-06-21 NOTE — DOCUMENTATION QUERY
[facility]                                                                       Query Response Note      PATIENT:               ZAIANB RYDER  ACCT #:                  [id number]  MRN:                     [id number]  :                      1934  ADMIT DATE:       2019 10:08 PM  DISCH DATE:          RESPONDING  PROVIDER #:        [id number]           QUERY TEXT:    BMI (<19) = 15.19, 20 lbs wt loss, cachectic,  and severe muscle loss are noted in the medical record. If clinically significant, can a diagnosis be provided to support these findings?    NOTE:  If an appropriate response is not listed below, please respond with a new note.    The patient's Clinical Indicators include:  BMI = 15.19  Cachectic  Noted with severe muscle loss as evidenced   by prominent clavicles  reports 20 lbs wt loss   Dietary evaluation/monitor/treatment  Added Protein supplements all meals  Recorded meal intake  Advanced age    Query created by: Rukhsana Gupta on 2019 2:34 PM    RESPONSE TEXT:    Moderate protein calorie malnutrition          Electronically signed by:  SAL MARRERO MD 2019 4:59 PM

## 2019-06-21 NOTE — DISCHARGE PLANNING
Received Choice form at [phone]  Agency/Facility Name: [facility] (1) [facility] (2) [facility] (3)  Referral sent per Choice form @ [phone]

## 2019-06-21 NOTE — DISCHARGE PLANNING
PMR order received from Dr. Bryant.  MCR is shown for her medical provider.  GLF resulting in a Right femoral neck fracture, closed, displaced.  Requiring a Right hip hemiarthroplasty.  YESENIA patient-likely part of their bundle package.  Anticipate SNF once medically cleared.  This referral will not be forwarded to Physiatry.  Please re-consult for further review.  I do appreciate the referral.

## 2019-06-21 NOTE — PROGRESS NOTES
Dr. Bryant notified that patient has clear yellow urine output over the past 9 hours has been 250 ml. Pt has been receiving NS 83 ml/hr and encouraged to drink water frequently. Eden with case management at bedside and obtaining patient choices for rehab and SNIF; Dr. Bryant will place orders for SNIF and rehab.

## 2019-06-21 NOTE — PROGRESS NOTES
No c/o  Dressing dry  SCDs on  + DF/PF  Mobilize  WBAT RLE  Posterior hip precautions  Ok to resume coumadin tomorrow if no signs of bleeding  D/c planning

## 2019-06-21 NOTE — PROGRESS NOTES
Veronica FRASER spoke with micro; bug sent down yesterday was analyzed last night and determined not to be a bed bug.

## 2019-06-21 NOTE — PROGRESS NOTES
Hospital Medicine Daily Progress Note    Date of Service  6/21/2019    Chief Complaint  85 y.o. female admitted 6/18/2019 with right femoral neck fracture    Hospital Course    patient presented with right femoral neck fracture and underwent right hip hemiarthroplasty.  Postoperatively she developed atrial fibrillation with rapid ventricular response and was transferred to the telemetry unit with increased oxygen requirements and low urine output.  There is now question of possible bedbugs as well.      Interval Problem Update  Atrial fibrillation-HR is down to 90's and low 100's remains in atrial fibrillation. Denies any chest pain.    Right hip-pain is tolerable, transferring is an issue.     Lab confirmed, NO bed bug.     DIAZ-Cr is worse today, but urine output has picked up a bit.     Consultants/Specialty  [facility]TH[facility]    Code Status  fcfc    Disposition  TELE    Review of Systems  Review of Systems   Constitutional: Negative for fever.        No appreciable change   Respiratory: Negative for cough.    Cardiovascular: Negative for chest pain.   Gastrointestinal: Negative for diarrhea.   Genitourinary: Negative for dysuria.   Musculoskeletal: Positive for joint pain and myalgias.        Remains with similar levels of pain   Neurological: Positive for weakness.   All other systems reviewed and are negative.       Physical Exam  Temp:  [36.3 °C (97.3 °F)-37.3 °C (99.1 °F)] 36.3 °C (97.3 °F)  Pulse:  [102-110] 110  Resp:  [16-18] 18  BP: ()/(54-79) 105/72  SpO2:  [91 %-94 %] 91 %    Physical Exam   Constitutional: She is oriented to person, place, and time. She appears well-developed and well-nourished. No distress.   Sitting upright   HENT:   Head: Normocephalic and atraumatic.   NC in place   Eyes: Pupils are equal, round, and reactive to light. Right eye exhibits no discharge. Left eye exhibits no discharge.   Neck: Normal range of motion. Neck supple.   Cardiovascular: Normal heart sounds and intact distal  pulses.    No murmur heard.  Irregular irregular, tachycardic   Pulmonary/Chest: Effort normal and breath sounds normal. No stridor. No respiratory distress.   Abdominal: Soft. Bowel sounds are normal. She exhibits no distension.   Scaphoid, thin   Musculoskeletal: Normal range of motion. She exhibits tenderness. She exhibits no edema.   Surgical incision on the lateral aspect of the proximal RLE appears C/D/I-no clinical changes noted today   Neurological: She is alert and oriented to person, place, and time. No cranial nerve deficit.   Skin: Skin is warm and dry. No rash noted. There is pallor.   Psychiatric: She has a normal mood and affect.   Nursing note and vitals reviewed.      Fluids    Intake/Output Summary (Last 24 hours) at 06/21/19 1307  Last data filed at 06/21/19 1239   Gross per 24 hour   Intake              120 ml   Output              799 ml   Net             -679 ml       Laboratory  Recent Labs      06/18/19 2244 06/20/19   0307  06/21/19   0048   WBC  7.7  11.0*  15.6*   RBC  4.24  4.11*  4.00*   HEMOGLOBIN  12.6  12.2  11.8*   HEMATOCRIT  39.3  39.3  37.5   MCV  92.7  95.6  93.8   MCH  29.7  29.7  29.5   MCHC  32.1*  31.0*  31.5*   RDW  45.1  47.4  46.2   PLATELETCT  236  177  216   MPV  9.6  10.3  10.0     Recent Labs      06/18/19 2244  06/20/19   0307  06/21/19   0048   SODIUM  136  134*  132*   POTASSIUM  4.3  5.2  5.6*   CHLORIDE  98  104  103   CO2  27  24  20   GLUCOSE  117*  158*  133*   BUN  33*  33*  43*   CREATININE  1.36  1.38  1.80*   CALCIUM  9.2  8.5  8.5     Recent Labs      06/18/19   2244  06/19/19   0812  06/19/19   1450   APTT  34.7  167.1*  26.9   INR  1.73*   --   1.90*               Imaging  DX-CHEST-LIMITED (1 VIEW)   Final Result      Left lung base atelectasis/possible pneumonitis since previous examination.      DX-PELVIS-1 OR 2 VIEWS   Final Result      1.  Interval right hip hemiarthroplasty with grossly anatomic alignment and no acute fracture.       DX-HIP-UNILATERAL-WITH PELVIS-1 VIEW RIGHT   Final Result         1.  Impacted right femoral neck fracture.   2.  Atherosclerosis      DX-FEMUR-1 VIEW RIGHT   Final Result         1.  Impacted right femoral neck fracture.   2.  Atherosclerosis      DX-CHEST-LIMITED (1 VIEW)   Final Result         1.  No acute cardiopulmonary disease.   2.  Perihilar interstitial prominence and bronchial wall cuffing, appearance suggests changes of underlying bronchial inflammation, consider bronchitis.      EC-ECHOCARDIOGRAM COMPLETE W/ CONT    (Results Pending)        Assessment/Plan  * Closed right hip fracture (HCC)- (present on admission)   Assessment & Plan    -Status post right hip hemiarthroplasty postop day #2  -no issues with bleeding, progressing slowly with surgery  -Hemoglobin hematocrit remained stable, surgical site appears dry  -Multimodal pain therapy  -PT OT  -Lovenox 40 mg subcutaneous for DVT prophylaxis at this time.     Hyperkalemia- (present on admission)   Assessment & Plan    -slightly worsened today with worsening kidney function  -monitor on tele  -improve kidney function  -no other intervention at this point     Acute kidney injury superimposed on chronic kidney disease (HCC)   Assessment & Plan    -Cr is now up to 1.84  -urine output is picking up  -Avoid nephrotoxins  -try to improve atrial fibrillation  -monitor uop, trend Cr closely, daily labs     Acute respiratory failure with hypoxia (HCC)   Assessment & Plan    -CXR, shows possible pneumonitis  -gentle IVFs  -SLP  -improve resp mechanics, IS Q1 hour while awake     Atrial flutter (HCC)- (present on admission)   Assessment & Plan    -remains in RVR, but rate has come down slightly.  -Repeat echocardiogram as her last echocardiogram in our computer is 2013-still pending.  -Hold outpatient Coumadin given recent hip fracture and surgery  -Increase metoprolol to 37.5 mg BID, monitor blood pressures closely  -Patient has underlying CKD stage II-III  with mild hyperkalemia and therefore would like to avoid digoxin if we can  -If worsens consider cardiology consultation  -Increase IVF's, monitor K and Mg++ levels closely     Hypothyroidism- (present on admission)   Assessment & Plan    This is chronic and stable, continue home Synthroid.          VTE prophylaxis: lovenox, prophylaxis dosing

## 2019-06-21 NOTE — ASSESSMENT & PLAN NOTE
-CXR, shows possible pneumonitis  Modified diet per speech  Has been stable on room air  Avoid fluid overload with severe TR

## 2019-06-22 ENCOUNTER — APPOINTMENT (OUTPATIENT)
Dept: CARDIOLOGY | Facility: MEDICAL CENTER | Age: 84
DRG: 469 | End: 2019-06-22
Attending: INTERNAL MEDICINE
Payer: MEDICARE

## 2019-06-22 PROBLEM — I07.1 SEVERE TRICUSPID REGURGITATION: Status: ACTIVE | Noted: 2019-06-22

## 2019-06-22 LAB
ANION GAP SERPL CALC-SCNC: 7 MMOL/L (ref 0–11.9)
BASOPHILS # BLD AUTO: 0.2 % (ref 0–1.8)
BASOPHILS # BLD: 0.02 K/UL (ref 0–0.12)
BUN SERPL-MCNC: 52 MG/DL (ref 8–22)
BURR CELLS BLD QL SMEAR: NORMAL
CALCIUM SERPL-MCNC: 8.2 MG/DL (ref 8.5–10.5)
CHLORIDE SERPL-SCNC: 106 MMOL/L (ref 96–112)
CO2 SERPL-SCNC: 21 MMOL/L (ref 20–33)
COMMENT 1642: NORMAL
CREAT SERPL-MCNC: 1.69 MG/DL (ref 0.5–1.4)
EOSINOPHIL # BLD AUTO: 0.17 K/UL (ref 0–0.51)
EOSINOPHIL NFR BLD: 1.5 % (ref 0–6.9)
ERYTHROCYTE [DISTWIDTH] IN BLOOD BY AUTOMATED COUNT: 47.4 FL (ref 35.9–50)
GLUCOSE SERPL-MCNC: 120 MG/DL (ref 65–99)
HCT VFR BLD AUTO: 39.5 % (ref 37–47)
HGB BLD-MCNC: 11.8 G/DL (ref 12–16)
HYPOCHROMIA BLD QL SMEAR: ABNORMAL
IMM GRANULOCYTES # BLD AUTO: 0.04 K/UL (ref 0–0.11)
IMM GRANULOCYTES NFR BLD AUTO: 0.4 % (ref 0–0.9)
LG PLATELETS BLD QL SMEAR: NORMAL
LV EJECT FRACT  99904: 65
LV EJECT FRACT MOD 2C 99903: 60.81
LV EJECT FRACT MOD 4C 99902: 62.81
LV EJECT FRACT MOD BP 99901: 60.59
LYMPHOCYTES # BLD AUTO: 1.54 K/UL (ref 1–4.8)
LYMPHOCYTES NFR BLD: 13.7 % (ref 22–41)
MCH RBC QN AUTO: 28.3 PG (ref 27–33)
MCHC RBC AUTO-ENTMCNC: 29.9 G/DL (ref 33.6–35)
MCV RBC AUTO: 94.7 FL (ref 81.4–97.8)
MONOCYTES # BLD AUTO: 0.59 K/UL (ref 0–0.85)
MONOCYTES NFR BLD AUTO: 5.2 % (ref 0–13.4)
MORPHOLOGY BLD-IMP: NORMAL
NEUTROPHILS # BLD AUTO: 8.88 K/UL (ref 2–7.15)
NEUTROPHILS NFR BLD: 79 % (ref 44–72)
NRBC # BLD AUTO: 0 K/UL
NRBC BLD-RTO: 0 /100 WBC
PLATELET # BLD AUTO: 225 K/UL (ref 164–446)
PLATELET BLD QL SMEAR: NORMAL
PMV BLD AUTO: 10.6 FL (ref 9–12.9)
POIKILOCYTOSIS BLD QL SMEAR: NORMAL
POTASSIUM SERPL-SCNC: 5 MMOL/L (ref 3.6–5.5)
RBC # BLD AUTO: 4.17 M/UL (ref 4.2–5.4)
RBC BLD AUTO: PRESENT
SMUDGE CELLS BLD QL SMEAR: NORMAL
SODIUM SERPL-SCNC: 134 MMOL/L (ref 135–145)
WBC # BLD AUTO: 11.2 K/UL (ref 4.8–10.8)

## 2019-06-22 PROCEDURE — 85025 COMPLETE CBC W/AUTO DIFF WBC: CPT

## 2019-06-22 PROCEDURE — 700111 HCHG RX REV CODE 636 W/ 250 OVERRIDE (IP): Performed by: INTERNAL MEDICINE

## 2019-06-22 PROCEDURE — 93306 TTE W/DOPPLER COMPLETE: CPT

## 2019-06-22 PROCEDURE — 770020 HCHG ROOM/CARE - TELE (206)

## 2019-06-22 PROCEDURE — A9270 NON-COVERED ITEM OR SERVICE: HCPCS | Performed by: INTERNAL MEDICINE

## 2019-06-22 PROCEDURE — 700102 HCHG RX REV CODE 250 W/ 637 OVERRIDE(OP): Performed by: INTERNAL MEDICINE

## 2019-06-22 PROCEDURE — 700102 HCHG RX REV CODE 250 W/ 637 OVERRIDE(OP): Performed by: HOSPITALIST

## 2019-06-22 PROCEDURE — A9270 NON-COVERED ITEM OR SERVICE: HCPCS | Performed by: HOSPITALIST

## 2019-06-22 PROCEDURE — 36415 COLL VENOUS BLD VENIPUNCTURE: CPT

## 2019-06-22 PROCEDURE — 700105 HCHG RX REV CODE 258: Performed by: INTERNAL MEDICINE

## 2019-06-22 PROCEDURE — 93306 TTE W/DOPPLER COMPLETE: CPT | Mod: 26 | Performed by: INTERNAL MEDICINE

## 2019-06-22 PROCEDURE — 99232 SBSQ HOSP IP/OBS MODERATE 35: CPT | Performed by: INTERNAL MEDICINE

## 2019-06-22 PROCEDURE — 80048 BASIC METABOLIC PNL TOTAL CA: CPT

## 2019-06-22 RX ORDER — TRAMADOL HYDROCHLORIDE 50 MG/1
50-100 TABLET ORAL EVERY 6 HOURS PRN
Status: DISCONTINUED | OUTPATIENT
Start: 2019-06-22 | End: 2019-06-25 | Stop reason: HOSPADM

## 2019-06-22 RX ORDER — FOLIC ACID 1 MG/1
1 TABLET ORAL DAILY
Status: DISCONTINUED | OUTPATIENT
Start: 2019-06-22 | End: 2019-06-25 | Stop reason: HOSPADM

## 2019-06-22 RX ADMIN — METOPROLOL TARTRATE 37.5 MG: 25 TABLET, FILM COATED ORAL at 04:56

## 2019-06-22 RX ADMIN — SODIUM CHLORIDE: 9 INJECTION, SOLUTION INTRAVENOUS at 04:57

## 2019-06-22 RX ADMIN — HEPARIN SODIUM 5000 UNITS: 5000 INJECTION, SOLUTION INTRAVENOUS; SUBCUTANEOUS at 21:01

## 2019-06-22 RX ADMIN — ACETAMINOPHEN 650 MG: 325 TABLET, FILM COATED ORAL at 21:05

## 2019-06-22 RX ADMIN — FOLIC ACID 1 MG: 1 TABLET ORAL at 10:42

## 2019-06-22 RX ADMIN — ACETAMINOPHEN 650 MG: 325 TABLET, FILM COATED ORAL at 03:48

## 2019-06-22 RX ADMIN — LEVOTHYROXINE SODIUM 75 MCG: 75 TABLET ORAL at 04:56

## 2019-06-22 RX ADMIN — METOPROLOL TARTRATE 37.5 MG: 25 TABLET, FILM COATED ORAL at 18:27

## 2019-06-22 RX ADMIN — HEPARIN SODIUM 5000 UNITS: 5000 INJECTION, SOLUTION INTRAVENOUS; SUBCUTANEOUS at 04:55

## 2019-06-22 RX ADMIN — HEPARIN SODIUM 5000 UNITS: 5000 INJECTION, SOLUTION INTRAVENOUS; SUBCUTANEOUS at 18:27

## 2019-06-22 ASSESSMENT — ENCOUNTER SYMPTOMS
COUGH: 0
PALPITATIONS: 0
NAUSEA: 0
DIZZINESS: 0
SHORTNESS OF BREATH: 0
ABDOMINAL PAIN: 0

## 2019-06-22 ASSESSMENT — PATIENT HEALTH QUESTIONNAIRE - PHQ9
2. FEELING DOWN, DEPRESSED, IRRITABLE, OR HOPELESS: NOT AT ALL
1. LITTLE INTEREST OR PLEASURE IN DOING THINGS: NOT AT ALL
SUM OF ALL RESPONSES TO PHQ9 QUESTIONS 1 AND 2: 0

## 2019-06-22 NOTE — PROGRESS NOTES
POD#3  S/P Hemiarthroplasty  WBAT  Posterior hip precautions  Pain Control  PT/OT  Coumadin for anticoagulation, SCDs  Case coordination

## 2019-06-22 NOTE — PROGRESS NOTES
[facility] Medicine Daily Progress Note    Date of Service  6/22/2019    Chief Complaint  85 y.o. female admitted 6/18/2019 with GLF.    Hospital Course    PMH CKD III, afib on AC, CVA with right sided weakness who presneted with GLF after tripping and presented with right hip pain. She was found with right femoral neck fracture and underwent right hip hemiarthroplasty with Dr. Saldivar 6/19/19. She developed mild DIAZ post-op.       Interval Problem Update  Afib 80-120s  On room air  Leukocytosis decreased  Creat decreased. HyperK resolved  Low BP improved, taking metop  She says her hip pain is minimal, controlled on tylenol. She denies CP or SOB.   TTE showed severe TR, dilated IVC. I will stop her IVF.    Consultants/Specialty  Ortho    Code Status  Full    Disposition  SNF    Review of Systems  Review of Systems   Constitutional: Positive for malaise/fatigue.   HENT: Negative for congestion.    Respiratory: Negative for cough and shortness of breath.    Cardiovascular: Negative for chest pain and palpitations.   Gastrointestinal: Negative for abdominal pain and nausea.   Musculoskeletal: Positive for joint pain (mild right hip).   Skin: Negative for itching.   Neurological: Negative for dizziness.        Physical Exam  Temp:  [36.2 °C (97.2 °F)-37.2 °C (99 °F)] 36.8 °C (98.2 °F)  Pulse:  [] 71  Resp:  [16-18] 16  BP: (102-127)/(67-88) 124/82  SpO2:  [91 %-98 %] 98 %    Physical Exam   Constitutional: She is oriented to person, place, and time.   Thin, frail, pleasant   HENT:   Head: Normocephalic.   Mouth/Throat: Oropharynx is clear and moist.   Eyes: Conjunctivae are normal.   Cardiovascular: Normal rate.    irregular   Pulmonary/Chest: Effort normal. She has no wheezes. She has no rales.   Abdominal: Soft. There is no tenderness. There is no rebound and no guarding.   Musculoskeletal: She exhibits no edema.   Diffuse muscle atrophy   Neurological: She is alert and oriented to person, place, and time.   5/5  strength UE  4/5 strength LE   Skin: Skin is warm and dry.   Nursing note and vitals reviewed.      Fluids    Intake/Output Summary (Last 24 hours) at 06/22/19 1324  Last data filed at 06/22/19 0528   Gross per 24 hour   Intake             1026 ml   Output              850 ml   Net              176 ml       Laboratory  Recent Labs      06/20/19   0307  06/21/19   0048  06/22/19   0344   WBC  11.0*  15.6*  11.2*   RBC  4.11*  4.00*  4.17*   HEMOGLOBIN  12.2  11.8*  11.8*   HEMATOCRIT  39.3  37.5  39.5   MCV  95.6  93.8  94.7   MCH  29.7  29.5  28.3   MCHC  31.0*  31.5*  29.9*   RDW  47.4  46.2  47.4   PLATELETCT  177  216  225   MPV  10.3  10.0  10.6     Recent Labs      06/20/19   0307  06/21/19   0048  06/22/19   0344   SODIUM  134*  132*  134*   POTASSIUM  5.2  5.6*  5.0   CHLORIDE  104  103  106   CO2  24  20  21   GLUCOSE  158*  133*  120*   BUN  33*  43*  52*   CREATININE  1.38  1.80*  1.69*   CALCIUM  8.5  8.5  8.2*     Recent Labs      06/19/19   1450   APTT  26.9   INR  1.90*               Imaging  EC-ECHOCARDIOGRAM COMPLETE W/O CONT   Final Result      DX-CHEST-LIMITED (1 VIEW)   Final Result      Left lung base atelectasis/possible pneumonitis since previous examination.      DX-PELVIS-1 OR 2 VIEWS   Final Result      1.  Interval right hip hemiarthroplasty with grossly anatomic alignment and no acute fracture.      DX-HIP-UNILATERAL-WITH PELVIS-1 VIEW RIGHT   Final Result         1.  Impacted right femoral neck fracture.   2.  Atherosclerosis      DX-FEMUR-1 VIEW RIGHT   Final Result         1.  Impacted right femoral neck fracture.   2.  Atherosclerosis      DX-CHEST-LIMITED (1 VIEW)   Final Result         1.  No acute cardiopulmonary disease.   2.  Perihilar interstitial prominence and bronchial wall cuffing, appearance suggests changes of underlying bronchial inflammation, consider bronchitis.           Assessment/Plan  * Closed right hip fracture (HCC)- (present on admission)   Assessment & Plan     -Status post right hip hemiarthroplasty postop day #2  -no issues with bleeding, progressing slowly with surgery  -Hemoglobin hematocrit remained stable, surgical site appears dry  -Multimodal pain therapy  -PT OT     Severe tricuspid regurgitation   Assessment & Plan    Was not noted in 2018 TTE in recent cardiology note. May be related to fluid overload  Currently asymptomatic  Avoid fluid overload, monitor volume status  Will need to f/u with cardiology outpatient     Hyperkalemia- (present on admission)   Assessment & Plan    Resolved with improving renal function     Acute kidney injury superimposed on chronic kidney disease (HCC)   Assessment & Plan    Rise in creat post-op  Creatinine is decreasing. Stop IVF given dilated IVC and severe TR on TTE  Avoid nephrotoxins     Acute respiratory failure with hypoxia (HCC)   Assessment & Plan    -CXR, shows possible pneumonitis  Modified diet per speech  Has been stable on room air  Avoid fluid overload with severe TR     Atrial flutter (HCC)- (present on admission)   Assessment & Plan    Hold outpatient Coumadin given recent hip fracture and surgery  Metoprolol was increased to 37.5 mg BID, monitor blood pressures closely  HR currently controlled     Hypothyroidism- (present on admission)   Assessment & Plan    This is chronic and stable, continue home Synthroid.          VTE prophylaxis: heparin

## 2019-06-22 NOTE — CARE PLAN
Problem: Safety  Goal: Will remain free from falls    Intervention: Assess risk factors for falls   06/22/19 0116   OTHER   Fall Risk High Risk to Fall - 2 or more points    Risk for Injury-Any positive answers results in the pt being at high risk for fall related injury Not Applicable   Mobility Status Assessment 2-2 Healthcare Providers Required for Assistance with Ambulation & Transfer   History of fall 2   Pt Calls for Assistance Yes     Intervention: Implement fall precautions   06/22/19 0116   OTHER   Environmental Precautions Treaded Slipper Socks on Patient;Report Given to Other Health Care Providers Regarding Fall Risk;Personal Belongings, Wastebasket, Call Bell etc. in Easy Reach;Bed in Low Position;Communication Sign for Patients & Families;Transferred to Stronger Side;Mobility Assessed & Appropriate Sign Placed   IV Pole on Same Side of Bed as Bathroom Yes   Bedrails Bedrails Closest to Bathroom Down   Bed Alarm Yes - Alarm On         Problem: Skin Integrity  Goal: Risk for impaired skin integrity will decrease  Outcome: PROGRESSING AS EXPECTED   Jass score completed every shift, pt turned every 2 hours, skin assessment complete, adequate nutrition encouraged.

## 2019-06-22 NOTE — ASSESSMENT & PLAN NOTE
Was not noted in 2018 TTE in recent cardiology note. May be related to fluid overload  Currently asymptomatic  Avoid fluid overload, monitor volume status  Will need to f/u with her cardiologist outpatient

## 2019-06-22 NOTE — PROGRESS NOTES
Two RN skin assessment  -Non blanching redness to sacrum- patient had sacral mepilex at beginning of shift but with frequent loose stools mepilex removed and emilio cream applied.  -Turn Q2 and waffle mattress in place,  -Blanching redness to heels- float heels. SCDS on and no skin breakdown from device.  -R hip sx dressing in place. Dr. Calderon ordered for sx dressing to remain for 5 days, unless saturated.

## 2019-06-22 NOTE — CARE PLAN
Problem: Safety  Goal: Will remain free from injury  Outcome: PROGRESSING AS EXPECTED  Fall precautions maintained. Bedside table and call light within reach. Pt calls for assistance. Bed alarm on.    Problem: Mobility  Goal: Risk for activity intolerance will decrease    Intervention: Assess and monitor signs of activity intolerance  Pt was not able to take a couple of steps to pivot to chair at beginning of day but was able to take a couple steps to chair by end of shift. 2 person assist when OOB.

## 2019-06-22 NOTE — PROGRESS NOTES
Bedside report received from previous nurse regarding prior 12 hours.  POC reviewed with pt.  White board updated.  Pt verbalizes understanding.  Call light within reach.

## 2019-06-22 NOTE — PROGRESS NOTES
Report received. Care assumed. Patient A&Ox4.No  SOB at this time.  Pt denies any needs.Discussed POC for the night with Pt. Call light within reach, encouraged pt to call for assistance.

## 2019-06-23 PROBLEM — E87.5 HYPERKALEMIA: Status: RESOLVED | Noted: 2019-06-21 | Resolved: 2019-06-23

## 2019-06-23 LAB
ANION GAP SERPL CALC-SCNC: 6 MMOL/L (ref 0–11.9)
BASOPHILS # BLD AUTO: 0.3 % (ref 0–1.8)
BASOPHILS # BLD: 0.02 K/UL (ref 0–0.12)
BUN SERPL-MCNC: 45 MG/DL (ref 8–22)
CALCIUM SERPL-MCNC: 8.4 MG/DL (ref 8.5–10.5)
CHLORIDE SERPL-SCNC: 106 MMOL/L (ref 96–112)
CO2 SERPL-SCNC: 25 MMOL/L (ref 20–33)
CREAT SERPL-MCNC: 1.59 MG/DL (ref 0.5–1.4)
EOSINOPHIL # BLD AUTO: 0.29 K/UL (ref 0–0.51)
EOSINOPHIL NFR BLD: 3.6 % (ref 0–6.9)
ERYTHROCYTE [DISTWIDTH] IN BLOOD BY AUTOMATED COUNT: 46.9 FL (ref 35.9–50)
GLUCOSE SERPL-MCNC: 101 MG/DL (ref 65–99)
HCT VFR BLD AUTO: 37.3 % (ref 37–47)
HGB BLD-MCNC: 11.9 G/DL (ref 12–16)
IMM GRANULOCYTES # BLD AUTO: 0.02 K/UL (ref 0–0.11)
IMM GRANULOCYTES NFR BLD AUTO: 0.3 % (ref 0–0.9)
INR PPP: 1.15 (ref 0.87–1.13)
LYMPHOCYTES # BLD AUTO: 1.26 K/UL (ref 1–4.8)
LYMPHOCYTES NFR BLD: 15.8 % (ref 22–41)
MCH RBC QN AUTO: 29.8 PG (ref 27–33)
MCHC RBC AUTO-ENTMCNC: 31.9 G/DL (ref 33.6–35)
MCV RBC AUTO: 93.3 FL (ref 81.4–97.8)
MONOCYTES # BLD AUTO: 0.54 K/UL (ref 0–0.85)
MONOCYTES NFR BLD AUTO: 6.8 % (ref 0–13.4)
NEUTROPHILS # BLD AUTO: 5.87 K/UL (ref 2–7.15)
NEUTROPHILS NFR BLD: 73.2 % (ref 44–72)
NRBC # BLD AUTO: 0 K/UL
NRBC BLD-RTO: 0 /100 WBC
PLATELET # BLD AUTO: 208 K/UL (ref 164–446)
PMV BLD AUTO: 9.9 FL (ref 9–12.9)
POTASSIUM SERPL-SCNC: 5 MMOL/L (ref 3.6–5.5)
PROTHROMBIN TIME: 15 SEC (ref 12–14.6)
RBC # BLD AUTO: 4 M/UL (ref 4.2–5.4)
SODIUM SERPL-SCNC: 137 MMOL/L (ref 135–145)
WBC # BLD AUTO: 8 K/UL (ref 4.8–10.8)

## 2019-06-23 PROCEDURE — 770020 HCHG ROOM/CARE - TELE (206)

## 2019-06-23 PROCEDURE — 700111 HCHG RX REV CODE 636 W/ 250 OVERRIDE (IP): Performed by: INTERNAL MEDICINE

## 2019-06-23 PROCEDURE — 700102 HCHG RX REV CODE 250 W/ 637 OVERRIDE(OP): Performed by: INTERNAL MEDICINE

## 2019-06-23 PROCEDURE — 99232 SBSQ HOSP IP/OBS MODERATE 35: CPT | Performed by: INTERNAL MEDICINE

## 2019-06-23 PROCEDURE — A9270 NON-COVERED ITEM OR SERVICE: HCPCS | Performed by: HOSPITALIST

## 2019-06-23 PROCEDURE — 85025 COMPLETE CBC W/AUTO DIFF WBC: CPT

## 2019-06-23 PROCEDURE — A9270 NON-COVERED ITEM OR SERVICE: HCPCS | Performed by: INTERNAL MEDICINE

## 2019-06-23 PROCEDURE — 36415 COLL VENOUS BLD VENIPUNCTURE: CPT

## 2019-06-23 PROCEDURE — 700102 HCHG RX REV CODE 250 W/ 637 OVERRIDE(OP): Performed by: HOSPITALIST

## 2019-06-23 PROCEDURE — 80048 BASIC METABOLIC PNL TOTAL CA: CPT

## 2019-06-23 PROCEDURE — 85610 PROTHROMBIN TIME: CPT

## 2019-06-23 RX ORDER — WARFARIN SODIUM 3 MG/1
3 TABLET ORAL
Status: COMPLETED | OUTPATIENT
Start: 2019-06-23 | End: 2019-06-23

## 2019-06-23 RX ORDER — WARFARIN SODIUM 5 MG/1
5 TABLET ORAL
Status: DISCONTINUED | OUTPATIENT
Start: 2019-06-23 | End: 2019-06-23

## 2019-06-23 RX ADMIN — METOPROLOL TARTRATE 37.5 MG: 25 TABLET, FILM COATED ORAL at 17:46

## 2019-06-23 RX ADMIN — SENNOSIDES AND DOCUSATE SODIUM 2 TABLET: 8.6; 5 TABLET ORAL at 17:46

## 2019-06-23 RX ADMIN — WARFARIN SODIUM 3 MG: 3 TABLET ORAL at 17:46

## 2019-06-23 RX ADMIN — FOLIC ACID 1 MG: 1 TABLET ORAL at 04:49

## 2019-06-23 RX ADMIN — METOPROLOL TARTRATE 37.5 MG: 25 TABLET, FILM COATED ORAL at 04:50

## 2019-06-23 RX ADMIN — SENNOSIDES AND DOCUSATE SODIUM 2 TABLET: 8.6; 5 TABLET ORAL at 04:49

## 2019-06-23 RX ADMIN — HEPARIN SODIUM 5000 UNITS: 5000 INJECTION, SOLUTION INTRAVENOUS; SUBCUTANEOUS at 04:49

## 2019-06-23 RX ADMIN — HEPARIN SODIUM 5000 UNITS: 5000 INJECTION, SOLUTION INTRAVENOUS; SUBCUTANEOUS at 14:05

## 2019-06-23 RX ADMIN — ACETAMINOPHEN 650 MG: 325 TABLET, FILM COATED ORAL at 04:13

## 2019-06-23 RX ADMIN — LEVOTHYROXINE SODIUM 75 MCG: 75 TABLET ORAL at 04:49

## 2019-06-23 RX ADMIN — HEPARIN SODIUM 5000 UNITS: 5000 INJECTION, SOLUTION INTRAVENOUS; SUBCUTANEOUS at 22:54

## 2019-06-23 ASSESSMENT — ENCOUNTER SYMPTOMS
DIZZINESS: 0
VOMITING: 0
CONSTIPATION: 0
PALPITATIONS: 0
SHORTNESS OF BREATH: 0
COUGH: 0
ABDOMINAL PAIN: 0
NAUSEA: 1

## 2019-06-23 ASSESSMENT — CHA2DS2 SCORE
AGE 75 OR GREATER: YES
VASCULAR DISEASE: NO
AGE 65 TO 74: NO
HYPERTENSION: NO
SEX: FEMALE
DIABETES: NO
CHF OR LEFT VENTRICULAR DYSFUNCTION: NO
PRIOR STROKE OR TIA OR THROMBOEMBOLISM: YES
CHA2DS2 VASC SCORE: 5

## 2019-06-23 NOTE — PROGRESS NOTES
2 RN skin check complete with EVELIO Orozco  Devices in place NA.  Skin assessed under devices NA.  Confirmed pressure ulcers found on NA.  New potential pressure ulcers noted on sacrum/coccyx. Wound consult placed on 6/22/19.  The following interventions in place: q2 turning, waffle mattress in place, emilio cream applied and mepilex applied. Floating bilateral heels.      Generalized bruising to BUE. Right hip with CDI dressing in place. Bilateral heels red and blanching.

## 2019-06-23 NOTE — PROGRESS NOTES
[facility] Medicine Daily Progress Note    Date of Service  6/23/2019    Chief Complaint  85 y.o. female admitted 6/18/2019 with GLF.    Hospital Course    PMH CKD III, afib on AC, CVA with right sided weakness who presneted with GLF after tripping and presented with right hip pain. She was found with right femoral neck fracture and underwent right hip hemiarthroplasty with Dr. Saldivar 6/19/19. She developed mild DIAZ post-op that resolved with monitoring. Her afib was mildly tachycardic, her metop was increased with improved control.  TTE was done and showed severe TR, I discussed with her and her  to f/u with their cardiologist outpatient.         Interval Problem Update  Afib rate controlled  Leukocytosis resolved  Creatinine improved, near baseline  He pain is manageable.   D/c pratt    Consultants/Specialty  Ortho    Code Status  Full    Disposition  SNF    Review of Systems  Review of Systems   Constitutional: Positive for malaise/fatigue.   HENT: Negative for congestion.    Respiratory: Negative for cough and shortness of breath.    Cardiovascular: Negative for chest pain and palpitations.   Gastrointestinal: Positive for nausea. Negative for abdominal pain, constipation and vomiting.   Musculoskeletal: Positive for joint pain (mild right hip).   Neurological: Negative for dizziness.        Physical Exam  Temp:  [36.4 °C (97.6 °F)-37.3 °C (99.2 °F)] 36.4 °C (97.6 °F)  Pulse:  [] 112  Resp:  [16-18] 18  BP: (118-126)/(68-72) 118/72  SpO2:  [95 %-97 %] 97 %    Physical Exam   Constitutional: She is oriented to person, place, and time.   Thin, frail, pleasant   HENT:   Head: Normocephalic.   Mouth/Throat: Oropharynx is clear and moist.   Eyes: Conjunctivae are normal.   Cardiovascular: Normal rate.    irregular   Pulmonary/Chest: Effort normal. She has no wheezes. She has no rales.   Abdominal: Soft. She exhibits no distension. There is no tenderness.   Musculoskeletal: She exhibits no edema.   Diffuse  muscle atrophy   Neurological: She is alert and oriented to person, place, and time.   5/5 strength UE  4/5 strength LE   Skin: Skin is warm and dry.   Nursing note and vitals reviewed.      Fluids    Intake/Output Summary (Last 24 hours) at 06/23/19 1515  Last data filed at 06/23/19 0515   Gross per 24 hour   Intake               50 ml   Output             1800 ml   Net            -1750 ml       Laboratory  Recent Labs      06/21/19   0048  06/22/19   0344  06/23/19   0232   WBC  15.6*  11.2*  8.0   RBC  4.00*  4.17*  4.00*   HEMOGLOBIN  11.8*  11.8*  11.9*   HEMATOCRIT  37.5  39.5  37.3   MCV  93.8  94.7  93.3   MCH  29.5  28.3  29.8   MCHC  31.5*  29.9*  31.9*   RDW  46.2  47.4  46.9   PLATELETCT  216  225  208   MPV  10.0  10.6  9.9     Recent Labs      06/21/19 0048  06/22/19   0344  06/23/19   0232   SODIUM  132*  134*  137   POTASSIUM  5.6*  5.0  5.0   CHLORIDE  103  106  106   CO2  20  21  25   GLUCOSE  133*  120*  101*   BUN  43*  52*  45*   CREATININE  1.80*  1.69*  1.59*   CALCIUM  8.5  8.2*  8.4*     Recent Labs      06/23/19   0905   INR  1.15*               Imaging  EC-ECHOCARDIOGRAM COMPLETE W/O CONT   Final Result      DX-CHEST-LIMITED (1 VIEW)   Final Result      Left lung base atelectasis/possible pneumonitis since previous examination.      DX-PELVIS-1 OR 2 VIEWS   Final Result      1.  Interval right hip hemiarthroplasty with grossly anatomic alignment and no acute fracture.      DX-HIP-UNILATERAL-WITH PELVIS-1 VIEW RIGHT   Final Result         1.  Impacted right femoral neck fracture.   2.  Atherosclerosis      DX-FEMUR-1 VIEW RIGHT   Final Result         1.  Impacted right femoral neck fracture.   2.  Atherosclerosis      DX-CHEST-LIMITED (1 VIEW)   Final Result         1.  No acute cardiopulmonary disease.   2.  Perihilar interstitial prominence and bronchial wall cuffing, appearance suggests changes of underlying bronchial inflammation, consider bronchitis.           Assessment/Plan  *  Closed right hip fracture (HCC)- (present on admission)   Assessment & Plan    -Status post right hip hemiarthroplasty postop day #2  -no issues with bleeding, progressing slowly with surgery  -Hemoglobin hematocrit remained stable, surgical site appears dry  -Multimodal pain therapy  -PT OT     Severe tricuspid regurgitation   Assessment & Plan    Was not noted in 2018 TTE in recent cardiology note. May be related to fluid overload  Currently asymptomatic  Avoid fluid overload, monitor volume status  Will need to f/u with cardiology outpatient     Acute kidney injury superimposed on chronic kidney disease (HCC)   Assessment & Plan    Rise in creat post-op  Creatinine is decreasing. Stopped IVF given dilated IVC and severe TR on TTE  Avoid nephrotoxins  Now near baseline     Acute respiratory failure with hypoxia ([facility])   Assessment & Plan    -CXR, shows possible pneumonitis  Modified diet per speech  Has been stable on room air  Avoid fluid overload with severe TR     Atrial flutter (HCC)- (present on admission)   Assessment & Plan    Restart coumadin  Metoprolol was increased to 37.5 mg BID, monitor blood pressures closely  HR currently controlled     Hypothyroidism- (present on admission)   Assessment & Plan    This is chronic and stable, continue home Synthroid.          VTE prophylaxis: heparin

## 2019-06-23 NOTE — PROGRESS NOTES
Inpatient Anticoagulation Service Note    Date: 6/23/2019    Reason for Anticoagulation: Atrial Fibrillation   Target INR: 2.0 to 3.0         Hemoglobin Value: (!) 11.9  Hematocrit Value: 37.3    INR from last 7 days     Date/Time INR Value    06/23/19 0905 (!)  1.15    06/19/19 1450 (!)  1.9    06/18/19 2244 (!)  1.73        Dose from last 7 days     Date/Time Dose (mg)    06/23/19 1000  3        Average Dose (mg):  (Reported warfarin home dose:1mg MWFSa,2mg AOD)  Significant Interactions: Thyroid Medications  Bridge Therapy: No (On UFH 5000u sq q8h)     Reversal Agent Administered: Not Applicable    Comments:  · PMH CKD III, afib on AC, CVA with right sided weakness who presneted with GLF after tripping and presented with right hip pain. She was found with right femoral neck fracture and underwent right hip hemiarthroplasty with Dr. Saldivar 6/19/19.  · H/H low , no documented signs of bleeding.  · INR below goal.    Plan:  Warfarin 3mg po tonight , INR in AM.  Education Material Provided?: No (On chronic warfarin therapy)  Pharmacist suggested discharge dosing: Resume warfarin home regimen.      Fabricio Randolph PharmD BCPS

## 2019-06-23 NOTE — CARE PLAN
Problem: Safety  Goal: Will remain free from falls    Intervention: Assess risk factors for falls   06/22/19 2258   OTHER   Fall Risk High Risk to Fall - 2 or more points    Risk for Injury-Any positive answers results in the pt being at high risk for fall related injury Not Applicable   Mobility Status Assessment 1-1 Healthcare Provider Required for Assistance with Ambulation & Transfer   History of fall 0   Pt Calls for Assistance Yes     Intervention: Implement fall precautions   06/22/19 2258   OTHER   Environmental Precautions Treaded Slipper Socks on Patient;Report Given to Other Health Care Providers Regarding Fall Risk;Personal Belongings, Wastebasket, Call Bell etc. in Easy Reach;Bed in Low Position;Communication Sign for Patients & Families;Transferred to Stronger Side;Mobility Assessed & Appropriate Sign Placed   Bedrails Bedrails Closest to Bathroom Down   Bed Alarm Yes - Alarm On         Problem: Knowledge Deficit  Goal: Knowledge of disease process/condition, treatment plan, diagnostic tests, and medications will improve  Outcome: PROGRESSING AS EXPECTED  Pt educated regarding activity, renal diet, meds and plan of care. Verbalized understanding.

## 2019-06-23 NOTE — PROGRESS NOTES
Report received. Patient A&Ox4. Pt reports feeling 2/10 right leg/ hip pain-doesn't want medications at this time.  Pt denies any needs.Discussed POC for the night with Pt. Call light within reach, encouraged pt to call for assistance. IV access pending.

## 2019-06-23 NOTE — PROGRESS NOTES
Two RN skin assessment with EVELIO Kahn  -Non blanching coccyx, mepliex applied.  -Turn Q2 and waffle mattress in place,  -Blanching redness to heels- float heels. SCDS on and no skin breakdown from device.  -R hip sx dressing in place. Dr. Calderon ordered for sx dressing to remain for 5 days, unless saturated.

## 2019-06-23 NOTE — PROGRESS NOTES
Bedside report received from previous nurse regarding prior 12 hours.  Pt sitting up in bed.  White board updated. Call light within reach.

## 2019-06-23 NOTE — PROGRESS NOTES
2 RN skin check complete with Criselda  Devices in place NA.  Skin assessed under devices NA.  Confirmed pressure ulcers found on NA.  New potential pressure ulcers noted on sacrum/coccyx. Wound consult placed on 6/22/19.  The following interventions in place: q2 turning, waffle mattress in place, emilio cream applied. Floating bilateral heels.     Generalized bruising to BUE. Right hip with CDI dressing in place. Bilateral heels red and blanching.

## 2019-06-24 PROBLEM — R33.9 URINARY RETENTION: Status: ACTIVE | Noted: 2019-06-24

## 2019-06-24 PROBLEM — J96.01 ACUTE RESPIRATORY FAILURE WITH HYPOXIA (HCC): Status: RESOLVED | Noted: 2019-06-20 | Resolved: 2019-06-24

## 2019-06-24 LAB
ANION GAP SERPL CALC-SCNC: 5 MMOL/L (ref 0–11.9)
BASOPHILS # BLD AUTO: 0.4 % (ref 0–1.8)
BASOPHILS # BLD: 0.04 K/UL (ref 0–0.12)
BUN SERPL-MCNC: 36 MG/DL (ref 8–22)
CALCIUM SERPL-MCNC: 8.5 MG/DL (ref 8.5–10.5)
CHLORIDE SERPL-SCNC: 103 MMOL/L (ref 96–112)
CO2 SERPL-SCNC: 28 MMOL/L (ref 20–33)
CREAT SERPL-MCNC: 1.42 MG/DL (ref 0.5–1.4)
EOSINOPHIL # BLD AUTO: 0.32 K/UL (ref 0–0.51)
EOSINOPHIL NFR BLD: 3.5 % (ref 0–6.9)
ERYTHROCYTE [DISTWIDTH] IN BLOOD BY AUTOMATED COUNT: 47 FL (ref 35.9–50)
GLUCOSE SERPL-MCNC: 104 MG/DL (ref 65–99)
HCT VFR BLD AUTO: 38.2 % (ref 37–47)
HGB BLD-MCNC: 11.7 G/DL (ref 12–16)
IMM GRANULOCYTES # BLD AUTO: 0.05 K/UL (ref 0–0.11)
IMM GRANULOCYTES NFR BLD AUTO: 0.5 % (ref 0–0.9)
INR PPP: 1.1 (ref 0.87–1.13)
LYMPHOCYTES # BLD AUTO: 1.72 K/UL (ref 1–4.8)
LYMPHOCYTES NFR BLD: 18.7 % (ref 22–41)
MCH RBC QN AUTO: 29 PG (ref 27–33)
MCHC RBC AUTO-ENTMCNC: 30.6 G/DL (ref 33.6–35)
MCV RBC AUTO: 94.6 FL (ref 81.4–97.8)
MONOCYTES # BLD AUTO: 0.56 K/UL (ref 0–0.85)
MONOCYTES NFR BLD AUTO: 6.1 % (ref 0–13.4)
NEUTROPHILS # BLD AUTO: 6.5 K/UL (ref 2–7.15)
NEUTROPHILS NFR BLD: 70.8 % (ref 44–72)
NRBC # BLD AUTO: 0.02 K/UL
NRBC BLD-RTO: 0.2 /100 WBC
PLATELET # BLD AUTO: 237 K/UL (ref 164–446)
PMV BLD AUTO: 10.2 FL (ref 9–12.9)
POTASSIUM SERPL-SCNC: 4.6 MMOL/L (ref 3.6–5.5)
PROTHROMBIN TIME: 14.4 SEC (ref 12–14.6)
RBC # BLD AUTO: 4.04 M/UL (ref 4.2–5.4)
SODIUM SERPL-SCNC: 136 MMOL/L (ref 135–145)
WBC # BLD AUTO: 9.2 K/UL (ref 4.8–10.8)

## 2019-06-24 PROCEDURE — 36415 COLL VENOUS BLD VENIPUNCTURE: CPT

## 2019-06-24 PROCEDURE — 700111 HCHG RX REV CODE 636 W/ 250 OVERRIDE (IP): Performed by: INTERNAL MEDICINE

## 2019-06-24 PROCEDURE — 85025 COMPLETE CBC W/AUTO DIFF WBC: CPT

## 2019-06-24 PROCEDURE — 770020 HCHG ROOM/CARE - TELE (206)

## 2019-06-24 PROCEDURE — A9270 NON-COVERED ITEM OR SERVICE: HCPCS | Performed by: INTERNAL MEDICINE

## 2019-06-24 PROCEDURE — 85610 PROTHROMBIN TIME: CPT

## 2019-06-24 PROCEDURE — 97116 GAIT TRAINING THERAPY: CPT

## 2019-06-24 PROCEDURE — A9270 NON-COVERED ITEM OR SERVICE: HCPCS | Performed by: HOSPITALIST

## 2019-06-24 PROCEDURE — 99232 SBSQ HOSP IP/OBS MODERATE 35: CPT | Performed by: INTERNAL MEDICINE

## 2019-06-24 PROCEDURE — 700102 HCHG RX REV CODE 250 W/ 637 OVERRIDE(OP): Performed by: HOSPITALIST

## 2019-06-24 PROCEDURE — 700102 HCHG RX REV CODE 250 W/ 637 OVERRIDE(OP): Performed by: INTERNAL MEDICINE

## 2019-06-24 PROCEDURE — 97535 SELF CARE MNGMENT TRAINING: CPT

## 2019-06-24 PROCEDURE — 97110 THERAPEUTIC EXERCISES: CPT

## 2019-06-24 PROCEDURE — 97530 THERAPEUTIC ACTIVITIES: CPT

## 2019-06-24 PROCEDURE — 51798 US URINE CAPACITY MEASURE: CPT

## 2019-06-24 PROCEDURE — 80048 BASIC METABOLIC PNL TOTAL CA: CPT

## 2019-06-24 RX ORDER — METOPROLOL TARTRATE 50 MG/1
50 TABLET, FILM COATED ORAL 2 TIMES DAILY
Status: DISCONTINUED | OUTPATIENT
Start: 2019-06-24 | End: 2019-06-25 | Stop reason: HOSPADM

## 2019-06-24 RX ORDER — WARFARIN SODIUM 4 MG/1
4 TABLET ORAL
Status: COMPLETED | OUTPATIENT
Start: 2019-06-24 | End: 2019-06-24

## 2019-06-24 RX ADMIN — METOPROLOL TARTRATE 37.5 MG: 25 TABLET, FILM COATED ORAL at 06:13

## 2019-06-24 RX ADMIN — METOPROLOL TARTRATE 50 MG: 50 TABLET ORAL at 16:59

## 2019-06-24 RX ADMIN — SENNOSIDES AND DOCUSATE SODIUM 2 TABLET: 8.6; 5 TABLET ORAL at 06:16

## 2019-06-24 RX ADMIN — TRAMADOL HYDROCHLORIDE 50 MG: 50 TABLET, FILM COATED ORAL at 21:10

## 2019-06-24 RX ADMIN — WARFARIN SODIUM 4 MG: 4 TABLET ORAL at 16:59

## 2019-06-24 RX ADMIN — FOLIC ACID 1 MG: 1 TABLET ORAL at 06:16

## 2019-06-24 RX ADMIN — HEPARIN SODIUM 5000 UNITS: 5000 INJECTION, SOLUTION INTRAVENOUS; SUBCUTANEOUS at 21:10

## 2019-06-24 RX ADMIN — ACETAMINOPHEN 650 MG: 325 TABLET, FILM COATED ORAL at 12:14

## 2019-06-24 RX ADMIN — HEPARIN SODIUM 5000 UNITS: 5000 INJECTION, SOLUTION INTRAVENOUS; SUBCUTANEOUS at 06:13

## 2019-06-24 RX ADMIN — HEPARIN SODIUM 5000 UNITS: 5000 INJECTION, SOLUTION INTRAVENOUS; SUBCUTANEOUS at 15:00

## 2019-06-24 RX ADMIN — LEVOTHYROXINE SODIUM 75 MCG: 75 TABLET ORAL at 06:16

## 2019-06-24 ASSESSMENT — ENCOUNTER SYMPTOMS
CONSTIPATION: 0
VOMITING: 0
DIZZINESS: 0
ABDOMINAL PAIN: 0
COUGH: 0
NAUSEA: 0
PALPITATIONS: 0
SHORTNESS OF BREATH: 0

## 2019-06-24 ASSESSMENT — COGNITIVE AND FUNCTIONAL STATUS - GENERAL
DRESSING REGULAR LOWER BODY CLOTHING: A LOT
STANDING UP FROM CHAIR USING ARMS: A LITTLE
MOBILITY SCORE: 11
TOILETING: A LOT
CLIMB 3 TO 5 STEPS WITH RAILING: A LOT
HELP NEEDED FOR BATHING: A LOT
SUGGESTED CMS G CODE MODIFIER DAILY ACTIVITY: CK
MOVING TO AND FROM BED TO CHAIR: UNABLE
MOVING FROM LYING ON BACK TO SITTING ON SIDE OF FLAT BED: UNABLE
DRESSING REGULAR UPPER BODY CLOTHING: A LITTLE
WALKING IN HOSPITAL ROOM: A LITTLE
SUGGESTED CMS G CODE MODIFIER MOBILITY: CL
DAILY ACTIVITIY SCORE: 17
TURNING FROM BACK TO SIDE WHILE IN FLAT BAD: UNABLE

## 2019-06-24 ASSESSMENT — GAIT ASSESSMENTS
DISTANCE (FEET): 10
DEVIATION: ANTALGIC
ASSISTIVE DEVICE: FRONT WHEEL WALKER
GAIT LEVEL OF ASSIST: MINIMAL ASSIST

## 2019-06-24 NOTE — DISCHARGE PLANNING
Agency/Facility Name: [facility]  Spoke To: Olga  Outcome: Patient accepted pending medical clearance, transport tentatively set for 6/25 at 1200.    RN EDUARD Harmon notified.

## 2019-06-24 NOTE — THERAPY
"Physical Therapy Treatment completed.   Bed Mobility:  Supine to Sit: Minimal Assist  Transfers: Sit to Stand: Minimal Assist  Gait: Level Of Assist: Minimal Assist with Front-Wheel Walker       Plan of Care: Will benefit from Physical Therapy 4 times per week  Discharge Recommendations: Equipment: Will Continue to Assess for Equipment Needs. Post-acute therapy Discharge to a transitional care facility for continued skilled therapy services.     See \"Rehab Therapy-Acute\" Patient Summary Report for complete documentation.     Pt demonstrating greatly improved activity tolerance and strength with mobility but does have a tendency to be self limiting initially. Pt able to increase her WB through the RLE with encouragement and demonstrated increased balance and weight shifting with a FWW. PT to continue working with Pt in acute setting to increase functional mobility and continue to recommend post acute placement at [facility] to continue with therapy prior to returning to independent living situation.   "

## 2019-06-24 NOTE — PROGRESS NOTES
Inpatient Anticoagulation Service Note    Date: 6/24/2019    Reason for Anticoagulation: Atrial Fibrillation   Target INR: 2.0 to 3.0  [id number] VASc Score: 5  HAS-BLED Score: 3   Hemoglobin Value: (!) 11.7  Hematocrit Value: 38.2    INR from last 7 days     Date/Time INR Value    06/24/19 0308  1.1    06/23/19 0905 (!)  1.15    06/19/19 1450 (!)  1.9    06/18/19 2244 (!)  1.73        Dose from last 7 days     Date/Time Dose (mg)    06/24/19 1440  4    06/23/19 1000  3        Average Dose (mg):  Confirmed with patient: warfarin 2 mg Tu/Thurs/Sun and 1 mg AOD  Significant Interactions: Thyroid Medications  Bridge Therapy: No (subQ heparin 5000 units TID)  Reversal Agent Administered: Vitamin K  Intravenous (10 mg (6/19))    Comments: Warfarin continued from home for afib. Patient received vitamin K IV 10 mg on 6/19/19 prior to surgery. Confirmed with patient home regimen of warfarin 2 mg PO on Tu/Thurs/Sun and 1 mg AOD. Will BOLUS today with a one time dose of warfarin 4 mg due to IV vitamin K and sub-therapeutic INR then will resume regular schedule tomorrow. DDI noted above, warfarin 3 mg given last night, H/H stable, and no s/s of overt bleeding. Pharmacy will continue to monitor.    Plan:  Warfarin 4 mg PO once on 6/24/19  Education Material Provided?: No (Chronic warfarin patient)  Pharmacist suggested discharge dosing: Warfarin 2 mg PO Tu/Thur/Sun and 1 mg AOD with close follow-up post-discharge with anticoagulation clinic.    Nikky Dale, PharmD

## 2019-06-24 NOTE — DISCHARGE PLANNING
Agency/Facility Name: [facility]  Spoke To: Olga  Outcome: Attempted to follow up, however no answer. Voicemail was left.

## 2019-06-24 NOTE — PROGRESS NOTES
[facility] Medicine Daily Progress Note    Date of Service  6/24/2019    Chief Complaint  85 y.o. female admitted 6/18/2019 with GLF.    Hospital Course    PMH CKD III, afib on AC, CVA with right sided weakness who presneted with GLF after tripping and presented with right hip pain. She was found with right femoral neck fracture and underwent right hip hemiarthroplasty with Dr. Saldivar 6/19/19. She developed mild DIAZ post-op that resolved with monitoring. Her afib was mildly tachycardic, she was asymptomatic. Her metop was increased with improved control.  TTE was done and showed severe TR, I discussed with her and her  to f/u with their cardiologist outpatient. Her coumadin was restarted after surgery and will need ongoing titration to reach therapeutic level.        Interval Problem Update  Renal function stable  HR up to 120s, BP normal to high. I will increased metop from 37.5 to 50mg BID  Has some ongoing urinary difficulty, monitor for retention  She denies abd pain, hip pain, SOB, chest pain    Consultants/Specialty  Ortho    Code Status  Full    Disposition  SNF tomorrow if afib rate controlled and no urinary retention    Review of Systems  Review of Systems   Constitutional: Positive for malaise/fatigue.   HENT: Negative for congestion.    Respiratory: Negative for cough and shortness of breath.    Cardiovascular: Negative for chest pain and palpitations.   Gastrointestinal: Negative for abdominal pain, constipation, nausea and vomiting.   Genitourinary: Negative for dysuria and urgency.   Musculoskeletal: Negative for joint pain.   Neurological: Negative for dizziness.        Physical Exam  Temp:  [36.2 °C (97.2 °F)-37.2 °C (99 °F)] 36.7 °C (98 °F)  Pulse:  [] 89  Resp:  [16-18] 18  BP: (111-149)/(61-83) 111/61  SpO2:  [93 %-98 %] 98 %    Physical Exam   Constitutional: She is oriented to person, place, and time.   Thin, frail, pleasant   HENT:   Head: Normocephalic.   Mouth/Throat: Oropharynx is  clear and moist.   Eyes: Conjunctivae are normal.   Cardiovascular: Normal rate.    irregular   Pulmonary/Chest: Effort normal. She has no wheezes. She has no rales.   Abdominal: Soft. She exhibits no distension. There is no tenderness. There is no rebound and no guarding.   Musculoskeletal: She exhibits no edema.   Diffuse muscle atrophy   Neurological: She is alert and oriented to person, place, and time.   5/5 strength UE  4/5 strength LE   Skin: Skin is warm and dry.   Nursing note and vitals reviewed.      Fluids    Intake/Output Summary (Last 24 hours) at 06/24/19 1351  Last data filed at 06/24/19 0600   Gross per 24 hour   Intake                0 ml   Output              525 ml   Net             -525 ml       Laboratory  Recent Labs      06/22/19   0344  06/23/19   0232  06/24/19   0308   WBC  11.2*  8.0  9.2   RBC  4.17*  4.00*  4.04*   HEMOGLOBIN  11.8*  11.9*  11.7*   HEMATOCRIT  39.5  37.3  38.2   MCV  94.7  93.3  94.6   MCH  28.3  29.8  29.0   MCHC  29.9*  31.9*  30.6*   RDW  47.4  46.9  47.0   PLATELETCT  225  208  237   MPV  10.6  9.9  10.2     Recent Labs      06/22/19   0344  06/23/19   0232  06/24/19   0308   SODIUM  134*  137  136   POTASSIUM  5.0  5.0  4.6   CHLORIDE  106  106  103   CO2  21  25  28   GLUCOSE  120*  101*  104*   BUN  52*  45*  36*   CREATININE  1.69*  1.59*  1.42*   CALCIUM  8.2*  8.4*  8.5     Recent Labs      06/23/19   0905  06/24/19   0308   INR  1.15*  1.10               Imaging  EC-ECHOCARDIOGRAM COMPLETE W/O CONT   Final Result      DX-CHEST-LIMITED (1 VIEW)   Final Result      Left lung base atelectasis/possible pneumonitis since previous examination.      DX-PELVIS-1 OR 2 VIEWS   Final Result      1.  Interval right hip hemiarthroplasty with grossly anatomic alignment and no acute fracture.      DX-HIP-UNILATERAL-WITH PELVIS-1 VIEW RIGHT   Final Result         1.  Impacted right femoral neck fracture.   2.  Atherosclerosis      DX-FEMUR-1 VIEW RIGHT   Final Result          1.  Impacted right femoral neck fracture.   2.  Atherosclerosis      DX-CHEST-LIMITED (1 VIEW)   Final Result         1.  No acute cardiopulmonary disease.   2.  Perihilar interstitial prominence and bronchial wall cuffing, appearance suggests changes of underlying bronchial inflammation, consider bronchitis.           Assessment/Plan  * Closed right hip fracture (HCC)- (present on admission)   Assessment & Plan    -Status post right hip hemiarthroplasty postop day #2  -no issues with bleeding, progressing slowly with surgery  -Hemoglobin hematocrit remained stable, surgical site appears dry  -Multimodal pain therapy  -PT OT     Urinary retention   Assessment & Plan    Bladder scans     Severe tricuspid regurgitation   Assessment & Plan    Was not noted in 2018 TTE in recent cardiology note. May be related to fluid overload  Currently asymptomatic  Avoid fluid overload, monitor volume status  Will need to f/u with her cardiologist outpatient     Acute kidney injury superimposed on chronic kidney disease (HCC)   Assessment & Plan    Rise in creat post-op  Creatinine is decreasing. Stopped IVF given dilated IVC and severe TR on TTE  Avoid nephrotoxins  Now near baseline     Atrial flutter (HCC)- (present on admission)   Assessment & Plan    Restart coumadin  Metoprolol was increased to 50 mg BID, monitor blood pressures closely  Telemetry     Hypothyroidism- (present on admission)   Assessment & Plan    This is chronic and stable, continue home Synthroid.          VTE prophylaxis: heparin

## 2019-06-24 NOTE — PROGRESS NOTES
2 RN skin check complete with EVELIO coleman  Devices in place NA.  Skin assessed under devices NA.  Confirmed pressure ulcers found on NA.  New potential pressure ulcers noted on sacrum/coccyx. Wound consult placed on 6/22/19.  The following interventions in place: q2 turning, waffle mattress in place, emilio cream applied and mepilex applied. Floating bilateral heels.      Generalized bruising to BUE. Right hip with CDI dressing in place. Bilateral heels red and blanching.

## 2019-06-24 NOTE — CARE PLAN
Problem: Pain Management  Goal: Pain level will decrease to patient's comfort goal  Outcome: PROGRESSING AS EXPECTED      Problem: Psychosocial Needs:  Goal: Level of anxiety will decrease  Outcome: PROGRESSING AS EXPECTED      Problem: Urinary Elimination:  Goal: Ability to reestablish a normal urinary elimination pattern will improve  Outcome: PROGRESSING SLOWER THAN EXPECTED

## 2019-06-24 NOTE — PROGRESS NOTES
Bedside report received. Pt A&Ox4. Complains of right leg pain with ambulation. Will medicate per MAR. RA. VSS. POC discussed with pt. Pt verbalizes understanding. Call light and belongings within reach. Bed locked and in lowest position. Alarm and fall precautions in place.

## 2019-06-24 NOTE — DISCHARGE PLANNING
Agency/Facility Name: [facility]  Spoke To: Olga  Outcome: Referral not received, refaxed to facility.

## 2019-06-24 NOTE — THERAPY
"Occupational Therapy Treatment completed with focus on ADLs, ADL transfers and patient education.  Functional Status: Supine > EOB min A, transfers with FWW min A, toileting max A (incontinent of urine in bed)  Plan of Care: Will benefit from Occupational Therapy 4 times per week  Discharge Recommendations:  Equipment Will Continue to Assess for Equipment Needs. Post-acute therapy: Discharge to a transitional care facility for continued skilled therapy services.    See \"Rehab Therapy-Acute\" Patient Summary Report for complete documentation.     Pt seen for OT tx to include: bed mobility, transfers with FWW (BSC and chair), toileting, up to chair for breakfast. Ongoing education on posterior hip precautions and compensatory techniques. Pt is limited by: balance impairment, generalized weakness, residual R-sided weakness from prior CVA, posterior hip precautions. Pt would benefit from continued acute OT to train on compensatory ADL, progress safe transfers and standing activity, assist with DC planning and DME needs.   "

## 2019-06-25 VITALS
TEMPERATURE: 98 F | BODY MASS INDEX: 16.78 KG/M2 | HEIGHT: 67 IN | HEART RATE: 81 BPM | SYSTOLIC BLOOD PRESSURE: 139 MMHG | RESPIRATION RATE: 16 BRPM | OXYGEN SATURATION: 95 % | DIASTOLIC BLOOD PRESSURE: 79 MMHG | WEIGHT: 106.92 LBS

## 2019-06-25 PROBLEM — N17.9 ACUTE KIDNEY INJURY SUPERIMPOSED ON CHRONIC KIDNEY DISEASE (HCC): Status: RESOLVED | Noted: 2019-06-21 | Resolved: 2019-06-25

## 2019-06-25 PROBLEM — R33.9 URINARY RETENTION: Status: RESOLVED | Noted: 2019-06-24 | Resolved: 2019-06-25

## 2019-06-25 PROBLEM — N18.9 ACUTE KIDNEY INJURY SUPERIMPOSED ON CHRONIC KIDNEY DISEASE (HCC): Status: RESOLVED | Noted: 2019-06-21 | Resolved: 2019-06-25

## 2019-06-25 LAB
ANION GAP SERPL CALC-SCNC: 7 MMOL/L (ref 0–11.9)
BUN SERPL-MCNC: 35 MG/DL (ref 8–22)
CALCIUM SERPL-MCNC: 8.9 MG/DL (ref 8.5–10.5)
CHLORIDE SERPL-SCNC: 101 MMOL/L (ref 96–112)
CO2 SERPL-SCNC: 28 MMOL/L (ref 20–33)
CREAT SERPL-MCNC: 1.41 MG/DL (ref 0.5–1.4)
GLUCOSE SERPL-MCNC: 119 MG/DL (ref 65–99)
INR PPP: 1.2 (ref 0.87–1.13)
MAGNESIUM SERPL-MCNC: 1.8 MG/DL (ref 1.5–2.5)
POTASSIUM SERPL-SCNC: 4.9 MMOL/L (ref 3.6–5.5)
PROTHROMBIN TIME: 15.5 SEC (ref 12–14.6)
SODIUM SERPL-SCNC: 136 MMOL/L (ref 135–145)

## 2019-06-25 PROCEDURE — 85610 PROTHROMBIN TIME: CPT

## 2019-06-25 PROCEDURE — 700111 HCHG RX REV CODE 636 W/ 250 OVERRIDE (IP): Performed by: INTERNAL MEDICINE

## 2019-06-25 PROCEDURE — 83735 ASSAY OF MAGNESIUM: CPT

## 2019-06-25 PROCEDURE — 700102 HCHG RX REV CODE 250 W/ 637 OVERRIDE(OP): Performed by: HOSPITALIST

## 2019-06-25 PROCEDURE — A9270 NON-COVERED ITEM OR SERVICE: HCPCS | Performed by: HOSPITALIST

## 2019-06-25 PROCEDURE — 700102 HCHG RX REV CODE 250 W/ 637 OVERRIDE(OP): Performed by: INTERNAL MEDICINE

## 2019-06-25 PROCEDURE — A9270 NON-COVERED ITEM OR SERVICE: HCPCS | Performed by: INTERNAL MEDICINE

## 2019-06-25 PROCEDURE — 99239 HOSP IP/OBS DSCHRG MGMT >30: CPT | Performed by: HOSPITALIST

## 2019-06-25 PROCEDURE — 80048 BASIC METABOLIC PNL TOTAL CA: CPT

## 2019-06-25 PROCEDURE — 36415 COLL VENOUS BLD VENIPUNCTURE: CPT

## 2019-06-25 RX ORDER — METOPROLOL TARTRATE 50 MG/1
50 TABLET, FILM COATED ORAL 2 TIMES DAILY
Qty: 60 TAB
Start: 2019-06-25 | End: 2019-09-19

## 2019-06-25 RX ORDER — POLYVINYL ALCOHOL 14 MG/ML
1 SOLUTION/ DROPS OPHTHALMIC 4 TIMES DAILY PRN
Qty: 1 BOTTLE | Refills: 3
Start: 2019-06-25 | End: 2020-07-21

## 2019-06-25 RX ORDER — TRAMADOL HYDROCHLORIDE 50 MG/1
50 TABLET ORAL EVERY 8 HOURS PRN
Qty: 9 TAB | Refills: 0 | Status: SHIPPED | OUTPATIENT
Start: 2019-06-25 | End: 2019-06-28

## 2019-06-25 RX ORDER — HEPARIN SODIUM 5000 [USP'U]/ML
5000 INJECTION, SOLUTION INTRAVENOUS; SUBCUTANEOUS EVERY 8 HOURS
Refills: 0
Start: 2019-06-25 | End: 2019-06-25

## 2019-06-25 RX ORDER — POLYETHYLENE GLYCOL 3350 17 G/17G
17 POWDER, FOR SOLUTION ORAL
Refills: 3
Start: 2019-06-25 | End: 2020-07-21

## 2019-06-25 RX ORDER — ACETAMINOPHEN 325 MG/1
650 TABLET ORAL EVERY 6 HOURS PRN
Qty: 30 TAB | Refills: 0
Start: 2019-06-25 | End: 2020-07-21

## 2019-06-25 RX ADMIN — LEVOTHYROXINE SODIUM 75 MCG: 75 TABLET ORAL at 05:53

## 2019-06-25 RX ADMIN — METOPROLOL TARTRATE 50 MG: 50 TABLET ORAL at 05:53

## 2019-06-25 RX ADMIN — HEPARIN SODIUM 5000 UNITS: 5000 INJECTION, SOLUTION INTRAVENOUS; SUBCUTANEOUS at 05:53

## 2019-06-25 RX ADMIN — FOLIC ACID 1 MG: 1 TABLET ORAL at 05:53

## 2019-06-25 RX ADMIN — TRAMADOL HYDROCHLORIDE 50 MG: 50 TABLET, FILM COATED ORAL at 05:53

## 2019-06-25 NOTE — DISCHARGE SUMMARY
Discharge Summary    CHIEF COMPLAINT ON ADMISSION  Chief Complaint   Patient presents with   • GLF     Pt had a mechanical GLF on to carpet. Denies head/neck/back pain. -LOC.   • Leg Pain     R. posterior thigh pain. No obvious deformity to leg. CMS intact. HX r. sided weakness r/t CVA. Given 500mg tylneol in route.        Reason for Admission  Ground-level fall, right hip pain    Admission Date  6/18/2019    CODE STATUS  Full Code    HPI & HOSPITAL COURSE  This is a 85 y.o. female with past medical history of chronic kidney disease stage III, atrial fibrillation, history of cerebrovascular accident with residual right-sided weakness admitted June 18 after a ground-level fall and was found to have a right hip fracture.  The patient underwent right hemiarthroplasty on June 19.  Patient developed mild acute kidney injury postoperatively that resolved with intravenous fluids. Had mild retention that improved, encourage ambulation.  She developed rapid ventricular response and her metoprolol was increased.  Patient was counseled to follow-up with her cardiologist and primary care as outpatient.  Her warfarin was restarted after surgery. Will need INR check in 48 hours.   Physical and occupational therapy recommended rehab at skilled nursing facility.     Therefore, she is discharged in fair and stable condition to skilled nursing facility.    The patient met 2-midnight criteria for an inpatient stay at the time of discharge.    Discharge Date  6/25/2019     FOLLOW UP ITEMS POST DISCHARGE  Follow up with primary care provider and cardiology within a week of discharge.    DISCHARGE DIAGNOSES  Principal Problem:    Closed right hip fracture (HCC) POA: Yes  Active Problems:    Hypothyroidism POA: Yes    Atrial flutter (HCC) POA: Yes    Severe tricuspid regurgitation POA: Unknown  Resolved Problems:    Acute respiratory failure with hypoxia (HCC) POA: No    Acute kidney injury superimposed on chronic kidney disease (HCC)  POA: No    Hyperkalemia POA: Yes    Urinary retention POA: Unknown      FOLLOW UP  Future Appointments  Date Time Provider Department Center   9/19/2019 1:15 PM Hussain Nation M.D. [facility] None     Raul Saldivar M.D.  [address] [place] [address]  [phone]    In 2 weeks  7/8      MEDICATIONS ON DISCHARGE     Medication List      START taking these medications      Instructions   acetaminophen 325 MG Tabs  Commonly known as:  TYLENOL   Take 2 Tabs by mouth every 6 hours as needed (Mild Pain; (Pain scale 1-3); Temp greater than 100.5 F).  Dose:  650 mg     artificial tears 1.4 % Soln  Replaces:  THERATEARS 0.25 % Soln   Place 1 Drop in both eyes 4 times a day as needed (dry eyes).  Dose:  1 Drop     polyethylene glycol/lytes Pack  Commonly known as:  MIRALAX   Take 1 Packet by mouth 1 time daily as needed (if sennosides and docusate ineffective after 24 hours).  Dose:  17 g     tramadol 50 MG Tabs  Commonly known as:  ULTRAM   Take 1 Tab by mouth every 8 hours as needed for up to 3 days.  Dose:  50 mg        CHANGE how you take these medications      Instructions   metoprolol 50 MG Tabs  What changed:  · medication strength  · how much to take  · when to take this  Commonly known as:  LOPRESSOR   Take 1 Tab by mouth 2 Times a Day.  Dose:  50 mg        CONTINUE taking these medications      Instructions   calcium carbonate 500 MG Tabs  Commonly known as:  OS-RICCO 500   Take 500 mg by mouth 2 times a day, with meals.  Dose:  500 mg     folic acid 1 MG Tabs  Commonly known as:  FOLVITE   Take 1 mg by mouth every day.  Dose:  1 mg     levothyroxine 75 MCG Tabs  Commonly known as:  SYNTHROID   Take 75 mcg by mouth every morning before breakfast.  Dose:  75 mcg     therapeutic multivitamin-minerals Tabs   Take 1 Tab by mouth every day.  Dose:  1 Tab     warfarin 2 MG Tabs  Commonly known as:  COUMADIN   Take 1-2 mg by mouth every evening. 2 mg on Sunday, Tuesday, Thursday 1 mg  Monday, Wednesday, Friday,  Saturday  Dose:  1-2 mg        STOP taking these medications    THERATEARS 0.25 % Soln  Generic drug:  Carboxymethylcellulose Sodium  Replaced by:  artificial tears 1.4 % Soln          In prescribing controlled substances to this patient, I certify that I have obtained and reviewed the medical history of Az Jolly. I have also made a good jordon effort to obtain applicable records from other providers who have treated the patient and records did not demonstrate any increased risk of substance abuse that would prevent me from prescribing controlled substances.     I have conducted a physical exam and documented it. I have reviewed Ms. Jolly’s prescription history as maintained by the [place] Prescription Monitoring Program.     I have assessed the patient’s risk for abuse, dependency, and addiction using the validated Opioid Risk Tool available at https://www.[employer].com/yhwjwk-sovt-kkqd-ort-narcotic-abuse.     Given the above, I believe the benefits of controlled substance therapy outweigh the risks. The reasons for prescribing controlled substances include non-narcotic, oral analgesic alternatives have been inadequate for pain control. Accordingly, I have discussed the risk and benefits, treatment plan, and alternative therapies with the patient.        Allergies  Allergies   Allergen Reactions   • Cephalexin [Keflex] Swelling   • Hydroxyzine Swelling     Eyes get itchy and swollen    • Naprelan [Naproxen] Swelling     Eyes get itchy become red and swollen   • Oxaprozin Swelling     Swelling eyes, and itchy   • Ampicillin Rash     Red Rash   • Baclofen Unspecified     drowsiness   • Citalopram Vomiting and Nausea   • Clarithromycin Unspecified     Pt reports that this medication plugs her ears.    • Erythromycin Diarrhea     GI upset   • Promethazine Unspecified     Drowsiness and sleeps   • Vi-Q-Tuss [Hydrocodone-Guaifenesin] Vomiting and Nausea   • Voltaren [Diclofenac Sodium] Rash and Swelling     Red  "rash and swelling       DIET  Orders Placed This Encounter   Procedures   • Diet Order Renal     Standing Status:   Standing     Number of Occurrences:   1     Order Specific Question:   Diet:     Answer:   Renal [8]     Order Specific Question:   Calorie modifications:     Answer:   High Calorie [1]     Order Specific Question:   Texture/Fiber modifications:     Answer:   Dysphagia 3(Mechanical Soft)specify fluid consistency(question 6) [3]     Order Specific Question:   Consistency/Fluid modifications:     Answer:   Thin Liquids [3]     Order Specific Question:   Miscellaneous modifications:     Answer:   SLP - Deliver to Nursing Station [22]       ACTIVITY  As instructed by orthopedics.  Weightbearing status as instructed by orthopedic \"WBAT with post hip precautions\"    CONSULTATIONS  Orthopedics    PROCEDURES  Right hip hemiarthroplasty     LABORATORY  Lab Results   Component Value Date    SODIUM 136 06/25/2019    POTASSIUM 4.9 06/25/2019    CHLORIDE 101 06/25/2019    CO2 28 06/25/2019    GLUCOSE 119 (H) 06/25/2019    BUN 35 (H) 06/25/2019    CREATININE 1.41 (H) 06/25/2019    CREATININE 1.5 (H) 02/19/2009        Lab Results   Component Value Date    WBC 9.2 06/24/2019    HEMOGLOBIN 11.7 (L) 06/24/2019    HEMATOCRIT 38.2 06/24/2019    PLATELETCT 237 06/24/2019        Total time of the discharge process exceeds 36 minutes.  "

## 2019-06-25 NOTE — PROGRESS NOTES
2 RN skin check complete.   Devices in place none.  Skin assessed under devices yes.  Confirmed pressure ulcers found on none.  New potential pressure ulcers noted on none. Wound consult placed n/a.  The following interventions in place Q2hr turns, waffle mattress, pillows in place to float heels/elbows.     Bottom red, slow to amarilys. Barrier cream applied. Bruising to left back hip. Dressing in place to right outer hip, CDI.   Heels boggy, pink, blanching. Bruising to top right foot. Swelling to right lower extremity right greater than left.   Bruises to bilateral forearms.

## 2019-06-25 NOTE — DISCHARGE PLANNING
RN EDUARD spoke to Yadiel, patient's  and informed him of patient's transfer today at 12 to [facility].

## 2019-06-25 NOTE — CARE PLAN
Problem: Safety  Goal: Will remain free from injury  Outcome: PROGRESSING AS EXPECTED      Problem: Infection  Goal: Will remain free from infection  Outcome: PROGRESSING AS EXPECTED      Problem: Bowel/Gastric:  Goal: Will not experience complications related to bowel motility  Outcome: PROGRESSING AS EXPECTED

## 2019-06-25 NOTE — WOUND TEAM
Patient seen for wound consult of coccyx.  Patient  Transferring to SNF today.  Turned to assess area and noted to be red intact and blanching.   Sacral mepilex placed, patient assisted to bedside commode.  Advised RN patient can take waffle overlay to SNF.  No advanced wound care needs at this time.

## 2019-06-25 NOTE — CARE PLAN
Problem: Communication  Goal: The ability to communicate needs accurately and effectively will improve  Outcome: MET Date Met: 06/25/19      Problem: Safety  Goal: Will remain free from injury  Outcome: MET Date Met: 06/25/19    Goal: Will remain free from falls  Outcome: MET Date Met: 06/25/19      Problem: Infection  Goal: Will remain free from infection  Outcome: MET Date Met: 06/25/19      Problem: Venous Thromboembolism (VTW)/Deep Vein Thrombosis (DVT) Prevention:  Goal: Patient will participate in Venous Thrombosis (VTE)/Deep Vein Thrombosis (DVT)Prevention Measures  Outcome: MET Date Met: 06/25/19      Problem: Bowel/Gastric:  Goal: Normal bowel function is maintained or improved  Outcome: MET Date Met: 06/25/19    Goal: Will not experience complications related to bowel motility  Outcome: MET Date Met: 06/25/19      Problem: Knowledge Deficit  Goal: Knowledge of disease process/condition, treatment plan, diagnostic tests, and medications will improve  Outcome: MET Date Met: 06/25/19    Goal: Knowledge of the prescribed therapeutic regimen will improve  Outcome: MET Date Met: 06/25/19      Problem: Discharge Barriers/Planning  Goal: Patient's continuum of care needs will be met  Outcome: MET Date Met: 06/25/19  D/c to rehab    Problem: Mobility  Goal: Risk for activity intolerance will decrease  Outcome: MET Date Met: 06/25/19  Patient ambulating around room to chair and bedside commode.     Problem: Skin Integrity  Goal: Risk for impaired skin integrity will decrease  Outcome: MET Date Met: 06/25/19      Problem: Pain Management  Goal: Pain level will decrease to patient's comfort goal  Outcome: MET Date Met: 06/25/19      Problem: Respiratory:  Goal: Respiratory status will improve  Outcome: MET Date Met: 06/25/19      Problem: Psychosocial Needs:  Goal: Level of anxiety will decrease  Outcome: MET Date Met: 06/25/19      Problem: Urinary Elimination:  Goal: Ability to reestablish a normal urinary  elimination pattern will improve  Outcome: MET Date Met: 06/25/19

## 2019-06-25 NOTE — DISCHARGE INSTRUCTIONS
Open Reduction, Internal Fixation (ORIF), Generic  Usually, if bones are broken (fractured) and are out of place, unstable, or may become out of place, surgery is needed. This surgery is called an open reduction and internal fixation (ORIF). Open reduction means that the area of the fracture is opened up so the surgeon can see it. Internal fixation means that screws, pins, or fixation devices are used to hold the bone pieces in place.  LET YOUR CAREGIVER KNOW ABOUT:   · Allergies.  · Medicines taken, including herbs, eyedrops, over-the-counter medicines, and creams.  · Use of steroids (by mouth or creams).  · Previous problems with anesthetics or numbing medicines.  · History of bleeding or blood problems.  · History of blood clots.  · Possibility of pregnancy, if this applies.  · Previous surgery.  · Other health problems.  RISKS AND COMPLICATIONS   All surgery is associated with risks. Some of these risks are:  · Excessive bleeding.  · Infection.  · Imperfect results with loss of joint function.  BEFORE THE PROCEDURE   Usually, surgery is performed shortly after the injury. It is important to provide information to your caregiver after your injury.  AFTER THE PROCEDURE   After surgery, you will be taken to a recovery area where a nurse will monitor your progress. You may have a long, narrow tube(catheter) in the bladder following surgery that helps you pass your water. When awake, stable, taking fluids well, and without complications, you will be returned to your room. You will receive physical therapy and other care. Physical therapy is done until you are doing well and your caregiver feels it is safe for you to go home or to an extended care facility.  Following surgery, the bones may be protected with a cast. The type of casting depends on where the fracture was. Casts are generally left in place for about 5 to 6 weeks. During this time, your caregiver will follow your progress. X-rays may be taken during  healing to make sure the bones stay in place.  HOME CARE INSTRUCTIONS   · You or your child may resume normal diet and activities as directed or allowed.  · Put ice on the injured area.  · Put ice in a plastic bag.  · Place a towel between the skin and the bag.  · Leave the ice on for 15-20 minutes at a time, 3-4 times a day, for the first 2 days following surgery.  · Change bandages (dressings) if necessary or as directed.  · If given a plaster or fiberglass cast:  · Do not try to scratch the skin under the cast using sharp or pointed objects.  · Check the skin around the cast every day. You may put lotion on any red or sore areas.  · Keep the cast dry and clean.  · Do not put pressure on any part of the cast or splint until it is fully hardened.  · The cast or splint can be protected during bathing with a plastic bag. Do not lower the cast or splint into water.  · Only take over-the-counter or prescription medicines for pain, discomfort, or fever as directed by your caregiver.  · Use crutches as directed and do not exercise the leg unless instructed.  · If the bones get out of position (displaced), it may eventually lead to arthritis and lasting disability. Problems can follow even the best of care. Follow the directions of your caregiver.  · Follow all instructions given by your caregiver, make and keep follow-up appointments, and use crutches as directed.  SEEK IMMEDIATE MEDICAL CARE IF:   · There is redness, swelling, numbness, or increasing pain in the wound.  · There is pus coming from the wound.  · You or your child has an oral temperature above 102° F (38.9° C), not controlled by medicine.  · A bad smell is coming from the wound or dressing.  · The wound breaks open (edges not staying together) after stitches (sutures) or staples have been removed.  · The skin or nails below the injury turn blue or gray, or feel cold or numb.  · There is severe pain under the cast or in the foot.  If there is not a window  in the cast for observing the wound, a discharge or minor bleeding may show up as a stain on the outside of the cast. Report these findings to your caregiver.  MAKE SURE YOU:   · Understand these instructions.  · Will watch your condition.  · Will get help right away if you are not doing well or gets worse.  Document Released: 12/29/2007 Document Revised: 03/11/2013 Document Reviewed: 12/05/2008  ExitCare® Patient Information ©2014 [employer].    Discharge Instructions    Discharged to other by medical transportation with escort. Discharged via wheelchair, hospital escort: Refused.  Special equipment needed: Not Applicable    Be sure to schedule a follow-up appointment with your primary care doctor or any specialists as instructed.     Discharge Plan:   Pneumococcal Vaccine Administered/Refused: Not given - Patient refused pneumococcal vaccine  Influenza Vaccine Indication: Patient Refuses (Flu shot out of season; Immunization received)    I understand that a diet low in cholesterol, fat, and sodium is recommended for good health. Unless I have been given specific instructions below for another diet, I accept this instruction as my diet prescription.   Other diet: Renal, soft diet, thin liquids, ok to take pills whole.     Special Instructions: None    · Is patient discharged on Warfarin / Coumadin?   No     Depression / Suicide Risk    As you are discharged from this [facility] facility, it is important to learn how to keep safe from harming yourself.    Recognize the warning signs:  · Abrupt changes in personality, positive or negative- including increase in energy   · Giving away possessions  · Change in eating patterns- significant weight changes-  positive or negative  · Change in sleeping patterns- unable to sleep or sleeping all the time   · Unwillingness or inability to communicate  · Depression  · Unusual sadness, discouragement and loneliness  · Talk of wanting to die  · Neglect of personal  appearance   · Rebelliousness- reckless behavior  · Withdrawal from people/activities they love  · Confusion- inability to concentrate     If you or a loved one observes any of these behaviors or has concerns about self-harm, here's what you can do:  · Talk about it- your feelings and reasons for harming yourself  · Remove any means that you might use to hurt yourself (examples: pills, rope, extension cords, firearm)  · Get professional help from the community (Mental Health, Substance Abuse, psychological counseling)  · Do not be alone:Call your Safe Contact- someone whom you trust who will be there for you.  · Call your local CRISIS HOTLINE 380-0897 or 407-997-8916  · Call your local Children's Mobile Crisis Response Team [place] (367) 615-4273 or www.[employer]  · Call the toll free National Suicide Prevention Hotlines   · National Suicide Prevention Lifeline 849-065-BWDV (8897)  · National Hope Line Network 800-SUICIDE (076-0600)

## 2019-06-25 NOTE — PROGRESS NOTES
"   Orthopaedic Progress Note    Interval changes:  Patient doing well  DC to SNF tomorrow per CC notes  Dressing CDI    ROS - Patient denies any new issues.  Pain well controlled.    /76   Pulse 93   Temp 36.5 °C (97.7 °F) (Temporal)   Resp 18   Ht 1.702 m (5' 7\")   Wt 50 kg (110 lb 3.7 oz)   SpO2 97%       Patient seen and examined  No acute distress  Breathing non labored  RRR  Right hip dressing is clean, dry, and intact. Patient clearly fires tibialis anterior, EHL, and gastrocnemius/soleus. Sensation is intact to light touch throughout superficial peroneal, deep peroneal, tibial, saphenous, and sural nerve distributions. Strong and palpable 2+ dorsalis pedis and posterior tibial pulses with capillary refill less than 2 seconds. No lower leg tenderness or discomfort.        Recent Labs      06/22/19   0344  06/23/19   0232  06/24/19   0308   WBC  11.2*  8.0  9.2   RBC  4.17*  4.00*  4.04*   HEMOGLOBIN  11.8*  11.9*  11.7*   HEMATOCRIT  39.5  37.3  38.2   MCV  94.7  93.3  94.6   MCH  28.3  29.8  29.0   MCHC  29.9*  31.9*  30.6*   RDW  47.4  46.9  47.0   PLATELETCT  225  208  237   MPV  10.6  9.9  10.2       Active Hospital Problems    Diagnosis   • Closed right hip fracture (HCC) [S72.001A]     Priority: High   • Urinary retention [R33.9]   • Severe tricuspid regurgitation [I07.1]   • Acute kidney injury superimposed on chronic kidney disease (HCC) [N17.9, N18.9]   • Atrial flutter (HCC) [I48.92]   • Hypothyroidism [E03.9]       Assessment/Plan:  Cleared for DC to SNF by ortho pending medicine clearance  POD#5 S/P Right hip hemiarthroplasty  Wt bearing status - WBAT with post hip precautions  Wound care/Drains - dressing left in place  Future Procedures - none planned   Sutures/Staples out- 10-14 days post operatively  PT/OT-initiated  Antibiotics: completed  DVT Prophylaxis- TEDS/SCDs/Foot pumps  Estes-none  Case Coordination for Discharge Planning - Disposition SNF   "

## 2019-07-10 LAB — INR PPP: 2 (ref 2–3.5)

## 2019-07-11 ENCOUNTER — ANTICOAGULATION MONITORING (OUTPATIENT)
Dept: VASCULAR LAB | Facility: MEDICAL CENTER | Age: 84
End: 2019-07-11

## 2019-07-11 DIAGNOSIS — I48.3 TYPICAL ATRIAL FLUTTER (HCC): ICD-10-CM

## 2019-07-11 NOTE — PROGRESS NOTES
Anticoagulation Summary  As of 2019    INR goal:   2.0-3.0   TTR:   49.3 % (7.8 mo)   INR used for dosin.00 (7/10/2019)   Warfarin maintenance plan:   2 mg (2 mg x 1) every Sun, Tue, u; 1 mg (2 mg x 0.5) all other days   Weekly warfarin total:   10 mg   Plan last modified:   JEFF Zepeda (3/13/2019)   Next INR check:   7/15/2019   Target end date:   Indefinite    Indications    Stroke (HCC) [I63.9]  Atrial flutter (HCC) [I48.92]             Anticoagulation Episode Summary     INR check location:       Preferred lab:       Send INR reminders to:       Comments:   [facility] fax [phone]      Anticoagulation Care Providers     Provider Role Specialty Phone number    Hussain Nation M.D. Referring Interventional Cardiology [phone]    [facility] Anticoagulation Services Responsible  [phone]        Anticoagulation Patient Findings          HPI:  Az Jolly, on anticoagulation therapy with warfarin for stroke.   Changes to current medical/health status since last appt: Pt recently discharged from SNF.  Recently  hospitalized for a fall, but was cleared to continue warfarin.   Denies any interval changes to diet  Denies any interval changes to medications since last appt.   Denies any complications or cost restrictions with current therapy.     A/P   INR  therapeutic.   Pt is to continue with current warfarin dosing regimen.     Next INR in 4 days with Sammy Jackson, PharmD   Faxed to [facility]

## 2019-07-17 LAB — INR PPP: 2.6 (ref 2–3.5)

## 2019-07-18 ENCOUNTER — ANTICOAGULATION MONITORING (OUTPATIENT)
Dept: VASCULAR LAB | Facility: MEDICAL CENTER | Age: 84
End: 2019-07-18

## 2019-07-18 NOTE — PROGRESS NOTES
Anticoagulation Summary  As of 2019    INR goal:   2.0-3.0   TTR:   50.8 % (8 mo)   INR used for dosin.60 (2019)   Warfarin maintenance plan:   2 mg (2 mg x 1) every Sun, Tue, Thu; 1 mg (2 mg x 0.5) all other days   Weekly warfarin total:   10 mg   Plan last modified:   JEFF Zepeda (3/13/2019)   Next INR check:   2019   Target end date:   Indefinite    Indications    Stroke (HCC) [I63.9]  Atrial flutter (HCC) [I48.92]             Anticoagulation Episode Summary     INR check location:       Preferred lab:       Send INR reminders to:       Comments:   [facility]  fax [phone]      Anticoagulation Care Providers     Provider Role Specialty Phone number    Hussain Nation M.D. Referring Interventional Cardiology [phone]    [facility] Anticoagulation Services Responsible  [phone]        Anticoagulation Patient Findings          HPI:  Az Jolly, on anticoagulation therapy with warfarin for stroke.  Changes to current medical/health status since last appt: none  Denies signs/symptoms of bleeding and/or thrombosis since the last appt.    Denies any interval changes to diet  Denies any interval changes to medications since last appt.   Denies any complications or cost restrictions with current therapy.     A/P   INR  therapeutic.   Pt is to continue with current warfarin dosing regimen.     Next INR in 1 week(s).    Graham Jackson, PharmD   Faxed to

## 2019-07-24 ENCOUNTER — ANTICOAGULATION MONITORING (OUTPATIENT)
Dept: VASCULAR LAB | Facility: MEDICAL CENTER | Age: 84
End: 2019-07-24

## 2019-07-24 DIAGNOSIS — I48.92 ATRIAL FLUTTER, UNSPECIFIED TYPE (HCC): ICD-10-CM

## 2019-07-24 LAB — INR PPP: 2.2 (ref 2–3.5)

## 2019-07-24 NOTE — PROGRESS NOTES
OP Telephone Anticoagulation Service Note    Date: 2019      Anticoagulation Summary  As of 2019    INR goal:   2.0-3.0   TTR:   52.2 % (8.3 mo)   INR used for dosin.20 (2019)   Warfarin maintenance plan:   2 mg (2 mg x 1) every Sun, Tue, u; 1 mg (2 mg x 0.5) all other days   Weekly warfarin total:   10 mg   Plan last modified:   JEFF Zepeda (3/13/2019)   Next INR check:   2019   Target end date:   Indefinite    Indications    Stroke (HCC) [I63.9]  Atrial flutter (HCC) [I48.92]             Anticoagulation Episode Summary     INR check location:       Preferred lab:       Send INR reminders to:       Comments:   [facility] fax [phone]      Anticoagulation Care Providers     Provider Role Specialty Phone number    Hussain Nation M.D. Referring Interventional Cardiology [phone]    [facility] Anticoagulation Services Responsible  [phone]        Anticoagulation Patient Findings        Plan: Spoke with patient on the phone. Patient is therapeutic today. Confirmed dosing. No missed tablets in the last week. Patient denies any changes in medications or diet. Patient denies any signs or symptoms of bleeding or clotting. Instructed patient to call clinic if any unusual bleeding or bruising occurs. Will continue current regimen as outlined. Will follow-up with patient in 1 week(s).      Kelton Brown, Pharmacy Intern

## 2019-07-31 ENCOUNTER — ANTICOAGULATION MONITORING (OUTPATIENT)
Dept: VASCULAR LAB | Facility: MEDICAL CENTER | Age: 84
End: 2019-07-31

## 2019-07-31 DIAGNOSIS — Z79.01 CHRONIC ANTICOAGULATION: ICD-10-CM

## 2019-07-31 LAB — INR PPP: 2.2 (ref 2–3.5)

## 2019-07-31 NOTE — PROGRESS NOTES
Anticoagulation Summary  As of 2019    INR goal:   2.0-3.0   TTR:   53.5 % (8.5 mo)   INR used for dosin.20 (2019)   Warfarin maintenance plan:   2 mg (2 mg x 1) every Sun, Tue, u; 1 mg (2 mg x 0.5) all other days   Weekly warfarin total:   10 mg   Plan last modified:   JEFF Zepeda (3/13/2019)   Next INR check:   2019   Target end date:   Indefinite    Indications    Stroke (HCC) [I63.9]  Atrial flutter (HCC) [I48.92]             Anticoagulation Episode Summary     INR check location:       Preferred lab:       Send INR reminders to:       Comments:         Anticoagulation Care Providers     Provider Role Specialty Phone number    Hussain Nation M.D. Referring Interventional Cardiology [phone]    [facility] Anticoagulation Services Responsible  [phone]        Anticoagulation Patient Findings      Spoke with patient to report a therapeutic INR.    Pt instructed to continue with current warfarin dosing regimen, confirms dosing.   Pt denies any s/s of bleeding, bruising, clotting or any changes to diet or medication.    Will follow up in 4 week(s) in clinic.     Cindy Sanchez, PharmD

## 2019-07-31 NOTE — PROGRESS NOTES
F[facility]ed [facility] order to [facility]  to draw today.     [facility]  fax [phone]    Cindy Sanchez, PharmD

## 2019-08-16 ENCOUNTER — TELEPHONE (OUTPATIENT)
Dept: CARDIOLOGY | Facility: MEDICAL CENTER | Age: 84
End: 2019-08-16

## 2019-08-16 NOTE — TELEPHONE ENCOUNTER
CLARA/Callum      Patient is calling to discuss her Metoprolol dosage. She can be reached at [phone].

## 2019-08-19 NOTE — TELEPHONE ENCOUNTER
Called pt who states her metoprolol dose was increased from 25mg bid to 50mg bid when she was recently in the hospital. She is asking if she should stay on the increased dose? Pt states her BP has been 120s/70s and her HR has been in the 60s. She has had no issues since in creasing the medication. Advised pt that she should be ok to continue the increased dose until he follow up. Pt states understanding and asks for a sooner appointment. Pt scheduled for today and will call back to confirm if she has transportation.

## 2019-08-28 ENCOUNTER — ANTICOAGULATION VISIT (OUTPATIENT)
Dept: VASCULAR LAB | Facility: MEDICAL CENTER | Age: 84
End: 2019-08-28
Attending: INTERNAL MEDICINE
Payer: MEDICARE

## 2019-08-28 VITALS — DIASTOLIC BLOOD PRESSURE: 68 MMHG | SYSTOLIC BLOOD PRESSURE: 132 MMHG | HEART RATE: 62 BPM

## 2019-08-28 DIAGNOSIS — Z79.01 CHRONIC ANTICOAGULATION: ICD-10-CM

## 2019-08-28 LAB
INR BLD: 1.9 (ref 0.9–1.2)
INR PPP: 1.9 (ref 2–3.5)

## 2019-08-28 PROCEDURE — 99212 OFFICE O/P EST SF 10 MIN: CPT | Performed by: PHARMACIST

## 2019-08-28 PROCEDURE — 85610 PROTHROMBIN TIME: CPT

## 2019-08-28 NOTE — PROGRESS NOTES
Anticoagulation Summary  As of 2019    INR goal:   2.0-3.0   TTR:   54.8 % (9.4 mo)   INR used for dosin.90! (2019)   Warfarin maintenance plan:   2 mg (2 mg x 1) every Sun, Tue, u; 1 mg (2 mg x 0.5) all other days   Weekly warfarin total:   10 mg   Plan last modified:   JEFF Zepeda (3/13/2019)   Next INR check:   2019   Target end date:   Indefinite    Indications    Stroke (HCC) [I63.9]  Atrial flutter (HCC) [I48.92]             Anticoagulation Episode Summary     INR check location:       Preferred lab:       Send INR reminders to:       Comments:         Anticoagulation Care Providers     Provider Role Specialty Phone number    Hussain Nation M.D. Referring Interventional Cardiology [phone]    [facility] Anticoagulation Services Responsible  [phone]                Anticoagulation Patient Findings      HPI:  Az Jolly seen in clinic today, on anticoagulation therapy with warfarin for stroke  Changes to current medical/health status since last appt: denies  Denies signs/symptoms of bleeding and/or thrombosis since the last appt.    Denies any interval changes to diet  Denies any interval changes to medications since last appt.   Denies any complications or cost restrictions with current therapy.   BP recorded in vitals.       A/P   INR  is slightly sub-therapeutic.   Will have pt take a boost dose of 2mg x1 today and then resume back to her normal warfarin dosing schedule.    Follow up appointment in 3 week(s) after her cardiology appt.    Cindy Sanchez, PharmD

## 2019-09-19 ENCOUNTER — ANTICOAGULATION VISIT (OUTPATIENT)
Dept: VASCULAR LAB | Facility: MEDICAL CENTER | Age: 84
End: 2019-09-19
Attending: INTERNAL MEDICINE
Payer: MEDICARE

## 2019-09-19 ENCOUNTER — OFFICE VISIT (OUTPATIENT)
Dept: CARDIOLOGY | Facility: MEDICAL CENTER | Age: 84
End: 2019-09-19
Payer: MEDICARE

## 2019-09-19 VITALS
HEART RATE: 66 BPM | HEIGHT: 67 IN | OXYGEN SATURATION: 95 % | BODY MASS INDEX: 15.38 KG/M2 | WEIGHT: 98 LBS | SYSTOLIC BLOOD PRESSURE: 118 MMHG | DIASTOLIC BLOOD PRESSURE: 52 MMHG

## 2019-09-19 DIAGNOSIS — I48.92 ATRIAL FLUTTER, UNSPECIFIED TYPE (HCC): ICD-10-CM

## 2019-09-19 DIAGNOSIS — I07.1 SEVERE TRICUSPID REGURGITATION: ICD-10-CM

## 2019-09-19 DIAGNOSIS — I10 HYPERTENSION, UNSPECIFIED TYPE: ICD-10-CM

## 2019-09-19 DIAGNOSIS — I63.512 ARTERIAL ISCHEMIC STROKE, MCA (MIDDLE CEREBRAL ARTERY), LEFT, ACUTE (HCC): ICD-10-CM

## 2019-09-19 DIAGNOSIS — Z79.01 ON CONTINUOUS ORAL ANTICOAGULATION: ICD-10-CM

## 2019-09-19 DIAGNOSIS — I48.0 PAF (PAROXYSMAL ATRIAL FIBRILLATION) (HCC): ICD-10-CM

## 2019-09-19 DIAGNOSIS — I63.89 CEREBROVASCULAR ACCIDENT (CVA) DUE TO OTHER MECHANISM (HCC): ICD-10-CM

## 2019-09-19 LAB — INR PPP: 1.7 (ref 2–3.5)

## 2019-09-19 PROCEDURE — 85610 PROTHROMBIN TIME: CPT

## 2019-09-19 PROCEDURE — 99214 OFFICE O/P EST MOD 30 MIN: CPT | Performed by: INTERNAL MEDICINE

## 2019-09-19 PROCEDURE — 99212 OFFICE O/P EST SF 10 MIN: CPT

## 2019-09-19 RX ORDER — METOPROLOL TARTRATE 50 MG/1
50 TABLET, FILM COATED ORAL 2 TIMES DAILY
Qty: 180 TAB | Refills: 3 | Status: SHIPPED | OUTPATIENT
Start: 2019-09-19 | End: 2020-09-08

## 2019-09-19 NOTE — PROGRESS NOTES
Chief Complaint   Patient presents with   • Paroxysmal Supraventricular Tachycardia (PSVT)       Subjective:   Az Jolly is a 85 y.o. female who presents today for follow-up of atrial flutter/fibrillation PSVT stroke and anticoagulation.  She is asymptomatic and walks with a walker and does not fall.  In the interim she has been tolerating her Coumadin well with variable INR levels.     In the interim she fell and had a hip fracture and underwent right hip repair.  She was discharged several months ago on increased beta-blockers due to less well-controlled atrial fibrillation during her hospital stay.  She is been tolerating the increased dose for several months now with good blood pressure and heart rate.  Echocardiogram did reveal severe tricuspid regurgitation but this is asymptomatic.        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   • DVT 6/1996    left leg, vein ligation performed on saphenous vein   • First degree atrioventricular block 9/21/2012   • H/O echocardiogram 9/21/2012   • Hypothyroid 10/2002   • Kidney disorder 1998    left kidney biopsy--glomerulonephritis and wegners dx   • Kidney failure     stage II   • Mitral valve prolapse    • Palpitations 9/21/2012   • Rheumatic fever 9/21/2012   • Stroke (HCC) 9/21/2012   • Wegener's granulomatosis (HCC)      Past Surgical History:   Procedure Laterality Date   • PB PARTIAL HIP REPLACEMENT Right 6/19/2019    Procedure: HEMIARTHROPLASTY, HIP;  Surgeon: Raul Saldivar M.D.;  Location: SURGERY [facility];  Service: Orthopedics   • CATARACT EXTRACTION  2006    left eye   • CHOLECYSTECTOMY  1997   • TEMPORAL ARTERY BIOPSY  1996    normal   • LUNG NEEDLE BIOPSY  1996    right lung, nodules found   • ARTHROSCOPE  1989    left knee   • ARTHROSCOPY, KNEE  1986    right   • BUNIONECTOMY  1980    bilat   •  HYSTERECTOMY RADICAL     • VEIN LIGATION       Family History   Problem Relation Age of Onset   • Other Father         Aortic aneurysm   • Cancer Brother         Lung   • Non-contributory Other      Social History     Socioeconomic History   • Marital status:      Spouse name: Not on file   • Number of children: Not on file   • Years of education: Not on file   • Highest education level: Not on file   Occupational History   • Not on file   Social Needs   • Financial resource strain: Not on file   • Food insecurity:     Worry: Not on file     Inability: Not on file   • Transportation needs:     Medical: Not on file     Non-medical: Not on file   Tobacco Use   • Smoking status: Former Smoker     Packs/day: 1.00     Types: Cigarettes     Last attempt to quit: 1960     Years since quittin.7   • Smokeless tobacco: Never Used   • Tobacco comment: very little years and years ago   Substance and Sexual Activity   • Alcohol use: Yes     Alcohol/week: 0.0 oz     Comment: occasional   • Drug use: No   • Sexual activity: Not on file   Lifestyle   • Physical activity:     Days per week: Not on file     Minutes per session: Not on file   • Stress: Not on file   Relationships   • Social connections:     Talks on phone: Not on file     Gets together: Not on file     Attends Che[employer] service: Not on file     Active member of club or organization: Not on file     Attends meetings of clubs or organizations: Not on file     Relationship status: Not on file   • Intimate partner violence:     Fear of current or ex partner: Not on file     Emotionally abused: Not on file     Physically abused: Not on file     Forced sexual activity: Not on file   Other Topics Concern   • Not on file   Social History Narrative   • Not on file     Allergies   Allergen Reactions   • Cephalexin [Keflex] Swelling   • Hydroxyzine Swelling     Eyes get itchy and swollen    • Naprelan [Naproxen] Swelling     Eyes get itchy become red and  swollen   • Oxaprozin Swelling     Swelling eyes, and itchy   • Ampicillin Rash     Red Rash   • Baclofen Unspecified     drowsiness   • Citalopram Vomiting and Nausea   • Clarithromycin Unspecified     Pt reports that this medication plugs her ears.    • Erythromycin Diarrhea     GI upset   • Promethazine Unspecified     Drowsiness and sleeps   • Vi-Q-Tuss [Hydrocodone-Guaifenesin] Vomiting and Nausea   • Voltaren [Diclofenac Sodium] Rash and Swelling     Red rash and swelling     Outpatient Encounter Medications as of 9/19/2019   Medication Sig Dispense Refill   • metoprolol (LOPRESSOR) 50 MG Tab Take 1 Tab by mouth 2 times a day. 180 Tab 3   • acetaminophen (TYLENOL) 325 MG Tab Take 2 Tabs by mouth every 6 hours as needed (Mild Pain; (Pain scale 1-3); Temp greater than 100.5 F). 30 Tab 0   • polyethylene glycol/lytes (MIRALAX) Pack Take 1 Packet by mouth 1 time daily as needed (if sennosides and docusate ineffective after 24 hours).  3   • artificial tears 1.4 % Solution Place 1 Drop in both eyes 4 times a day as needed (dry eyes). 1 Bottle 3   • calcium carbonate (OS-RCICO 500) 500 MG Tab Take 500 mg by mouth 2 times a day, with meals.     • therapeutic multivitamin-minerals (THERAGRAN-M) Tab Take 1 Tab by mouth every day.     • warfarin (COUMADIN) 2 MG Tab Take 1-2 mg by mouth every evening. 2 mg on Sunday, Tuesday, Thursday  1 mg  Monday, Wednesday, Friday, Saturday     • folic acid (FOLVITE) 1 MG TABS Take 1 mg by mouth every day.     • levothyroxine (SYNTHROID) 75 MCG TABS Take 75 mcg by mouth every morning before breakfast.     • [DISCONTINUED] metoprolol (LOPRESSOR) 25 MG Tab TAKE 1 TABLET BY MOUTH TWICE A DAY  3   • [DISCONTINUED] metoprolol (LOPRESSOR) 50 MG Tab Take 1 Tab by mouth 2 Times a Day. 60 Tab      No facility-administered encounter medications on file as of 9/19/2019.      Review of Systems   All other systems reviewed and are negative.       Objective:   /52 (BP Location: Left arm,  "Patient Position: Sitting, BP Cuff Size: Adult)   Pulse 66   Ht 1.702 m (5' 7\")   Wt 44.5 kg (98 lb)   SpO2 95%   BMI 15.35 kg/m²     Physical Exam   Constitutional: She is oriented to person, place, and time. She appears well-developed and well-nourished. No distress.   Elderly and thin  Walker  Recent right hip replacement   HENT:   Head: Normocephalic and atraumatic.   Right Ear: External ear normal.   Left Ear: External ear normal.   Mouth/Throat: No oropharyngeal exudate.   Eyes: Pupils are equal, round, and reactive to light. Conjunctivae and EOM are normal. Right eye exhibits no discharge. Left eye exhibits no discharge. No scleral icterus.   Neck: Normal range of motion. Neck supple. No JVD present. No tracheal deviation present. No thyromegaly present.   Cardiovascular: Normal rate, S1 normal, S2 normal and intact distal pulses. An irregularly irregular rhythm present. PMI is not displaced. Exam reveals no gallop, no S3, no S4 and no friction rub.   Murmur ( 2/6 systolic apical murmur) heard.  Pulses:       Carotid pulses are 2+ on the right side, and 2+ on the left side.       Radial pulses are 2+ on the right side, and 2+ on the left side.        Popliteal pulses are 2+ on the right side, and 2+ on the left side.        Dorsalis pedis pulses are 2+ on the right side, and 2+ on the left side.        Posterior tibial pulses are 2+ on the right side, and 2+ on the left side.   Pulmonary/Chest: Effort normal and breath sounds normal. No respiratory distress. She has no wheezes. She has no rales. She exhibits no tenderness.   Abdominal: Soft. Bowel sounds are normal. She exhibits no distension. There is no tenderness.   Musculoskeletal: Normal range of motion. She exhibits no edema or tenderness.   Neurological: She is alert and oriented to person, place, and time. No cranial nerve deficit (Cranial nerves II through XII grossly intact).   Skin: Skin is warm and dry. No rash noted. She is not diaphoretic. " No erythema.   Psychiatric: She has a normal mood and affect. Her behavior is normal. Judgment and thought content normal.   Vitals reviewed.    LABS:  Lab Results   Component Value Date/Time    CHOLSTRLTOT 95 (L) 08/02/2011 03:10 AM    LDL 44 08/02/2011 03:10 AM    HDL 39 (L) 08/02/2011 03:10 AM    TRIGLYCERIDE 60 08/02/2011 03:10 AM       Lab Results   Component Value Date/Time    WBC 9.2 06/24/2019 03:08 AM    RBC 4.04 (L) 06/24/2019 03:08 AM    HEMOGLOBIN 11.7 (L) 06/24/2019 03:08 AM    HEMATOCRIT 38.2 06/24/2019 03:08 AM    MCV 94.6 06/24/2019 03:08 AM    NEUTSPOLYS 70.80 06/24/2019 03:08 AM    LYMPHOCYTES 18.70 (L) 06/24/2019 03:08 AM    MONOCYTES 6.10 06/24/2019 03:08 AM    EOSINOPHILS 3.50 06/24/2019 03:08 AM    BASOPHILS 0.40 06/24/2019 03:08 AM    HYPOCHROMIA 1+ 06/22/2019 03:44 AM     Lab Results   Component Value Date/Time    SODIUM 136 06/25/2019 12:27 AM    POTASSIUM 4.9 06/25/2019 12:27 AM    CHLORIDE 101 06/25/2019 12:27 AM    CO2 28 06/25/2019 12:27 AM    GLUCOSE 119 (H) 06/25/2019 12:27 AM    BUN 35 (H) 06/25/2019 12:27 AM    CREATININE 1.41 (H) 06/25/2019 12:27 AM    CREATININE 1.5 (H) 02/19/2009 09:22 AM         Lab Results   Component Value Date/Time    ALKPHOSPHAT 83 06/18/2019 10:44 PM    ASTSGOT 15 06/18/2019 10:44 PM    ALTSGPT 12 06/18/2019 10:44 PM    TBILIRUBIN 0.4 06/18/2019 10:44 PM      Lab Results   Component Value Date/Time    BNPBTYPENAT 135 (H) 08/10/2011 03:40 AM      No results found for: TSH  Lab Results   Component Value Date/Time    PROTHROMBTM 15.5 (H) 06/25/2019 12:27 AM    INR 1.90 08/28/2019      ECHO CONCLUSIONS (6/22/2019):  Left ventricular ejection fraction is visually estimated to be 65%.  Dilated inferior vena cava without inspiratory collapse.  Biatrial dilation.  Severe tricuspid regurgitation.  Estimated right ventricular systolic   pressure is 50 mmHg.  Right ventricle not well visualized.  Mild-moderate aortic insufficiency.  No prior study is available for  comparison.     EKG (9/26/2018):  I have personally reviewed the EKG this visit and discussed with the patient.  Atrial flutter with variable block.     Echocardiogram 1/22/2018 located in [place] under [place] records: Preserved left ventricular systolic function, grade 1 diastolic dysfunction, mild left atrial enlargement normal RVSP and mild to moderate mitral regurgitation.    Assessment:     1. PAF (paroxysmal atrial fibrillation) ([facility])  metoprolol (LOPRESSOR) 50 MG Tab   2. Atrial flutter, unspecified type ([facility])     3. On continuous oral anticoagulation     4. Severe tricuspid regurgitation     5. Hypertension, unspecified type     6. Arterial ischemic stroke, MCA (middle cerebral artery), left, acute ([facility])         Medical Decision Making:  Today's Assessment / Status / Plan:     Doing well.  Recovering from her hip fracture.  Tolerating anticoagulation well.  Tricuspid regurgitation and pulmonary hypertension are asymptomatic.  She is not interested in invasive cardiac procedures.  Her  presents with her today who is 89 years of age.  I recommend continuing the increased Lopressor dosage of 50 mg twice daily and I have represcribed this is a single pill rather than to 25 mg pills.    Follow-up in 6 months

## 2019-09-20 LAB — INR BLD: 1.7 (ref 0.9–1.2)

## 2019-09-30 ENCOUNTER — ANTICOAGULATION VISIT (OUTPATIENT)
Dept: VASCULAR LAB | Facility: MEDICAL CENTER | Age: 84
End: 2019-09-30
Attending: INTERNAL MEDICINE
Payer: MEDICARE

## 2019-09-30 DIAGNOSIS — I63.89 CEREBROVASCULAR ACCIDENT (CVA) DUE TO OTHER MECHANISM (HCC): ICD-10-CM

## 2019-09-30 DIAGNOSIS — I48.92 ATRIAL FLUTTER, UNSPECIFIED TYPE (HCC): ICD-10-CM

## 2019-09-30 LAB
INR BLD: 1.8 (ref 0.9–1.2)
INR PPP: 1.8 (ref 2–3.5)

## 2019-09-30 PROCEDURE — 85610 PROTHROMBIN TIME: CPT

## 2019-09-30 PROCEDURE — 99212 OFFICE O/P EST SF 10 MIN: CPT

## 2019-09-30 NOTE — PROGRESS NOTES
Anticoagulation Summary  As of 9/30/2019    INR goal:   2.0-3.0   TTR:   50.8 % (10.2 mo)   INR used for dosing:      Warfarin maintenance plan:   1 mg (2 mg x 0.5) every Mon, Fri; 2 mg (2 mg x 1) all other days   Weekly warfarin total:   12 mg   Plan last modified:   Graham Jackson, PharmD (9/19/2019)   Next INR check:      Target end date:   Indefinite    Indications    Stroke (HCC) [I63.9]  Atrial flutter (HCC) [I48.92]             Anticoagulation Episode Summary     INR check location:       Preferred lab:       Send INR reminders to:       Comments:         Anticoagulation Care Providers     Provider Role Specialty Phone number    Hussain Nation M.D. Referring Interventional Cardiology [phone]    [facility] Anticoagulation Services Responsible  [phone]                Anticoagulation Patient Findings  Patient Findings     Negatives:   Signs/symptoms of thrombosis, Signs/symptoms of bleeding, Laboratory test error suspected, Change in health, Change in alcohol use, Change in activity, Upcoming invasive procedure, Emergency department visit, Upcoming dental procedure, Missed doses, Extra doses, Change in medications, Change in diet/appetite, Hospital admission, Bruising, Other complaints          HPI:  Az Huadivya seen in clinic today, on anticoagulation therapy with warfarin for stroke  Changes to current medical/health status since last appt: none  Denies signs/symptoms of bleeding and/or thrombosis since the last appt.    Denies any interval changes to diet  Denies any interval changes to medications since last appt. Patient reports taking tylenol for hip pain  Denies any complications or cost restrictions with current therapy.   BP denied by patient    A/P   INR is SUB-therapeutic at 1.8.   Patient reports continuing to consume 1 V8 juice can per day since last appt, otherwise no changes. Instructed patient we will adjust her regimen around her enjoying her V8 juice.  Instructed patient to  bolus x 1 (2 mg) and continue current regimen. Patient confirmed plan     Follow up appointment in 1 week.    Cesar Heredia, JuanD

## 2019-10-07 ENCOUNTER — ANTICOAGULATION VISIT (OUTPATIENT)
Dept: VASCULAR LAB | Facility: MEDICAL CENTER | Age: 84
End: 2019-10-07
Attending: INTERNAL MEDICINE
Payer: MEDICARE

## 2019-10-07 VITALS — HEART RATE: 58 BPM | DIASTOLIC BLOOD PRESSURE: 64 MMHG | SYSTOLIC BLOOD PRESSURE: 128 MMHG

## 2019-10-07 DIAGNOSIS — I48.92 ATRIAL FLUTTER, UNSPECIFIED TYPE (HCC): ICD-10-CM

## 2019-10-07 DIAGNOSIS — I63.89 CEREBROVASCULAR ACCIDENT (CVA) DUE TO OTHER MECHANISM (HCC): ICD-10-CM

## 2019-10-07 LAB
INR BLD: 2.2 (ref 0.9–1.2)
INR PPP: 2.2 (ref 2–3.5)

## 2019-10-07 PROCEDURE — 99212 OFFICE O/P EST SF 10 MIN: CPT | Performed by: NURSE PRACTITIONER

## 2019-10-07 PROCEDURE — 85610 PROTHROMBIN TIME: CPT

## 2019-10-07 NOTE — PROGRESS NOTES
Anticoagulation Summary  As of 10/7/2019    INR goal:   2.0-3.0   TTR:   49.1 % (10.8 mo)   INR used for dosin.20 (10/7/2019)   Warfarin maintenance plan:   1 mg (2 mg x 0.5) every Mon, Fri; 2 mg (2 mg x 1) all other days   Weekly warfarin total:   12 mg   Plan last modified:   Graham Jackson, PharmD (2019)   Next INR check:   10/21/2019   Target end date:   Indefinite    Indications    Stroke (HCC) [I63.9]  Atrial flutter (HCC) [I48.92]             Anticoagulation Episode Summary     INR check location:       Preferred lab:       Send INR reminders to:       Comments:         Anticoagulation Care Providers     Provider Role Specialty Phone number    Hussain Nation M.D. Referring Interventional Cardiology [phone]    [facility] Anticoagulation Services Responsible  [phone]        Anticoagulation Patient Findings      HPI:  Az Jolly seen in clinic today for follow up on anticoagulation therapy in the presence of a flutter, CVA.   Denies any changes to current medical/health status since last appointment.   Denies any medication or diet changes.   No current symptoms of bleeding or thrombosis reported.    A/P:   INR therapeutic.   Will increase regimen to account for loading dose last week.   BP recorded in vitals.    Follow up appointment in 2 week(s).    Next Appointment: Monday, Oct 21, 2019 at 1:00 pm.    Callie CARCAMO

## 2019-10-14 ENCOUNTER — IMMUNIZATION (OUTPATIENT)
Dept: SOCIAL WORK | Facility: CLINIC | Age: 84
End: 2019-10-14
Payer: MEDICARE

## 2019-10-14 DIAGNOSIS — Z23 NEED FOR VACCINATION: ICD-10-CM

## 2019-10-14 PROCEDURE — G0008 ADMIN INFLUENZA VIRUS VAC: HCPCS | Performed by: REGISTERED NURSE

## 2019-10-14 PROCEDURE — 90662 IIV NO PRSV INCREASED AG IM: CPT | Performed by: REGISTERED NURSE

## 2019-10-21 ENCOUNTER — ANTICOAGULATION VISIT (OUTPATIENT)
Dept: VASCULAR LAB | Facility: MEDICAL CENTER | Age: 84
End: 2019-10-21
Attending: INTERNAL MEDICINE
Payer: MEDICARE

## 2019-10-21 DIAGNOSIS — I48.92 ATRIAL FLUTTER, UNSPECIFIED TYPE (HCC): ICD-10-CM

## 2019-10-21 DIAGNOSIS — I63.89 CEREBROVASCULAR ACCIDENT (CVA) DUE TO OTHER MECHANISM (HCC): ICD-10-CM

## 2019-10-21 LAB
INR BLD: 2.9 (ref 0.9–1.2)
INR PPP: 2.9 (ref 2–3.5)

## 2019-10-21 PROCEDURE — 99211 OFF/OP EST MAY X REQ PHY/QHP: CPT

## 2019-10-21 PROCEDURE — 85610 PROTHROMBIN TIME: CPT

## 2019-10-21 NOTE — PROGRESS NOTES
Anticoagulation Summary  As of 10/21/2019    INR goal:   2.0-3.0   TTR:   49.1 % (10.8 mo)   INR used for dosing:      Plan last modified:   JEFF Zepeda (10/7/2019)   Next INR check:      Target end date:   Indefinite    Indications    Stroke (HCC) [I63.9]  Atrial flutter (HCC) [I48.92]             Anticoagulation Episode Summary     INR check location:       Preferred lab:       Send INR reminders to:       Comments:         Anticoagulation Care Providers     Provider Role Specialty Phone number    Hussain Nation M.D. Referring Interventional Cardiology [phone]    [facility] Anticoagulation Services Responsible  [phone]                Anticoagulation Patient Findings      HPI:  Az Jolly seen in clinic today, on anticoagulation therapy with warfarin for atrial flutter and hx of strone  Changes to current medical/health status since last appt: none  Denies signs/symptoms of bleeding and/or thrombosis since the last appt.    Denies any interval changes to diet  Denies any interval changes to medications since last appt.   Denies any complications or cost restrictions with current therapy.   BP recorded in vitals.   Patient confirmed current dosing regimen      A/P   INR is therapeutic at 2.9.   No new changes since last visit.   Instructed patient to continue with current regimen.    Follow up appointment in 3 weeks.    Cesar Heredia, JuanD

## 2019-10-24 ENCOUNTER — APPOINTMENT (RX ONLY)
Dept: URBAN - METROPOLITAN AREA CLINIC 4 | Facility: CLINIC | Age: 84
Setting detail: DERMATOLOGY
End: 2019-10-24

## 2019-10-24 DIAGNOSIS — L81.4 OTHER MELANIN HYPERPIGMENTATION: ICD-10-CM

## 2019-10-24 DIAGNOSIS — L73.8 OTHER SPECIFIED FOLLICULAR DISORDERS: ICD-10-CM

## 2019-10-24 DIAGNOSIS — D22 MELANOCYTIC NEVI: ICD-10-CM

## 2019-10-24 DIAGNOSIS — L82.1 OTHER SEBORRHEIC KERATOSIS: ICD-10-CM

## 2019-10-24 DIAGNOSIS — D18.0 HEMANGIOMA: ICD-10-CM

## 2019-10-24 PROBLEM — D18.01 HEMANGIOMA OF SKIN AND SUBCUTANEOUS TISSUE: Status: ACTIVE | Noted: 2019-10-24

## 2019-10-24 PROBLEM — D22.62 MELANOCYTIC NEVI OF LEFT UPPER LIMB, INCLUDING SHOULDER: Status: ACTIVE | Noted: 2019-10-24

## 2019-10-24 PROBLEM — D22.61 MELANOCYTIC NEVI OF RIGHT UPPER LIMB, INCLUDING SHOULDER: Status: ACTIVE | Noted: 2019-10-24

## 2019-10-24 PROBLEM — D22.5 MELANOCYTIC NEVI OF TRUNK: Status: ACTIVE | Noted: 2019-10-24

## 2019-10-24 PROCEDURE — ? COUNSELING

## 2019-10-24 PROCEDURE — 99213 OFFICE O/P EST LOW 20 MIN: CPT

## 2019-10-24 PROCEDURE — ? LIQUID NITROGEN

## 2019-10-24 ASSESSMENT — LOCATION SIMPLE DESCRIPTION DERM
LOCATION SIMPLE: ABDOMEN
LOCATION SIMPLE: RIGHT UPPER ARM
LOCATION SIMPLE: RIGHT FOREARM
LOCATION SIMPLE: LEFT ELBOW
LOCATION SIMPLE: LEFT UPPER ARM
LOCATION SIMPLE: LEFT UPPER BACK
LOCATION SIMPLE: CHEST
LOCATION SIMPLE: RIGHT UPPER BACK
LOCATION SIMPLE: LOWER BACK
LOCATION SIMPLE: LEFT CHEEK
LOCATION SIMPLE: LEFT FOREHEAD
LOCATION SIMPLE: LEFT FOREARM

## 2019-10-24 ASSESSMENT — LOCATION DETAILED DESCRIPTION DERM
LOCATION DETAILED: LEFT MEDIAL MALAR CHEEK
LOCATION DETAILED: LEFT VENTRAL PROXIMAL FOREARM
LOCATION DETAILED: LEFT INFERIOR UPPER BACK
LOCATION DETAILED: LEFT PROXIMAL DORSAL FOREARM
LOCATION DETAILED: RIGHT ANTECUBITAL SKIN
LOCATION DETAILED: LEFT INFERIOR MEDIAL FOREHEAD
LOCATION DETAILED: RIGHT INFERIOR UPPER BACK
LOCATION DETAILED: PERIUMBILICAL SKIN
LOCATION DETAILED: RIGHT MEDIAL UPPER BACK
LOCATION DETAILED: RIGHT INFERIOR MEDIAL UPPER BACK
LOCATION DETAILED: SUPERIOR LUMBAR SPINE
LOCATION DETAILED: LEFT ANTECUBITAL SKIN
LOCATION DETAILED: RIGHT ANTERIOR DISTAL UPPER ARM
LOCATION DETAILED: RIGHT VENTRAL PROXIMAL FOREARM
LOCATION DETAILED: LEFT ELBOW
LOCATION DETAILED: SUBXIPHOID
LOCATION DETAILED: LOWER STERNUM

## 2019-10-24 ASSESSMENT — LOCATION ZONE DERM
LOCATION ZONE: TRUNK
LOCATION ZONE: FACE
LOCATION ZONE: ARM

## 2019-10-24 NOTE — PROCEDURE: LIQUID NITROGEN
Bill Insurance (You Assume Risk Of Denial Or Audit By Selecting Yes): No
Medical Necessity Information: It is in your best interest to select a reason for this procedure from the list below. All of these items fulfill various CMS LCD requirements except the new and changing color options.
Duration Of Freeze Thaw-Cycle (Seconds): 0
Detail Level: Detailed
Post-Care Instructions: I reviewed with the patient in detail post-care instructions. Patient is to wear sunprotection, and avoid picking at any of the treated lesions. Pt may apply Vaseline to crusted or scabbing areas.
Consent: The patient's consent was obtained including but not limited to risks of crusting, scabbing, blistering, scarring, darker or lighter pigmentary change, recurrence, incomplete removal and infection.
Medical Necessity Clause: This procedure was medically necessary because the lesions that were treated were:  If lesion does not resolve, bx is needed.

## 2019-11-13 ENCOUNTER — ANTICOAGULATION VISIT (OUTPATIENT)
Dept: VASCULAR LAB | Facility: MEDICAL CENTER | Age: 84
End: 2019-11-13
Attending: INTERNAL MEDICINE
Payer: MEDICARE

## 2019-11-13 VITALS — SYSTOLIC BLOOD PRESSURE: 127 MMHG | HEART RATE: 58 BPM | DIASTOLIC BLOOD PRESSURE: 58 MMHG

## 2019-11-13 DIAGNOSIS — I63.89 CEREBROVASCULAR ACCIDENT (CVA) DUE TO OTHER MECHANISM (HCC): ICD-10-CM

## 2019-11-13 DIAGNOSIS — I48.92 ATRIAL FLUTTER, UNSPECIFIED TYPE (HCC): ICD-10-CM

## 2019-11-13 LAB
INR BLD: 2.4 (ref 0.9–1.2)
INR PPP: 2.4 (ref 2–3.5)

## 2019-11-13 PROCEDURE — 85610 PROTHROMBIN TIME: CPT

## 2019-11-13 PROCEDURE — 99211 OFF/OP EST MAY X REQ PHY/QHP: CPT | Performed by: PHARMACIST

## 2019-11-13 NOTE — PROGRESS NOTES
Anticoagulation Summary  As of 2019    INR goal:   2.0-3.0   TTR:   54.3 % (1 y)   INR used for dosin.40 (2019)   Warfarin maintenance plan:   1 mg (2 mg x 0.5) every Fri; 2 mg (2 mg x 1) all other days   Weekly warfarin total:   13 mg   Plan last modified:   JEFF Zepeda (10/7/2019)   Next INR check:   2019   Target end date:   Indefinite    Indications    Stroke (HCC) [I63.9]  Atrial flutter (HCC) [I48.92]             Anticoagulation Episode Summary     INR check location:       Preferred lab:       Send INR reminders to:       Comments:         Anticoagulation Care Providers     Provider Role Specialty Phone number    Hussain Nation M.D. Referring Interventional Cardiology [phone]    [facility] Anticoagulation Services Responsible  [phone]                Anticoagulation Patient Findings  Patient Findings     Negatives:   Signs/symptoms of thrombosis, Signs/symptoms of bleeding, Laboratory test error suspected, Change in health, Change in alcohol use, Change in activity, Upcoming invasive procedure, Emergency department visit, Upcoming dental procedure, Missed doses, Extra doses, Change in medications, Change in diet/appetite, Hospital admission, Bruising, Other complaints          HPI:  Az Jolly seen in clinic today, on anticoagulation therapy with warfarin for Stroke  Changes to current medical/health status since last appt: None  Denies signs/symptoms of bleeding and/or thrombosis since the last appt.    Denies any interval changes to diet  Denies any interval changes to medications since last appt.   Denies any complications or cost restrictions with current therapy.   BP recorded in vitals.   Confirmed dosing regimen      A/P   INR is 2.4-therapeutic.     Pt is to continue with current warfarin dosing regimen.    Follow up appointment in 4 week(s).    Shaun Ruiz, JuanD

## 2019-12-11 ENCOUNTER — ANTICOAGULATION VISIT (OUTPATIENT)
Dept: VASCULAR LAB | Facility: MEDICAL CENTER | Age: 84
End: 2019-12-11
Attending: INTERNAL MEDICINE
Payer: MEDICARE

## 2019-12-11 DIAGNOSIS — Z79.01 CHRONIC ANTICOAGULATION: ICD-10-CM

## 2019-12-11 DIAGNOSIS — I48.92 ATRIAL FLUTTER, UNSPECIFIED TYPE (HCC): ICD-10-CM

## 2019-12-11 DIAGNOSIS — I63.89 CEREBROVASCULAR ACCIDENT (CVA) DUE TO OTHER MECHANISM (HCC): ICD-10-CM

## 2019-12-11 LAB
INR BLD: 3 (ref 0.9–1.2)
INR PPP: 3 (ref 2–3.5)

## 2019-12-11 PROCEDURE — 99211 OFF/OP EST MAY X REQ PHY/QHP: CPT | Performed by: PHARMACIST

## 2019-12-11 PROCEDURE — 85610 PROTHROMBIN TIME: CPT

## 2019-12-11 NOTE — PROGRESS NOTES
Anticoagulation Summary  As of 12/11/2019    INR goal:   2.0-3.0   TTR:   57.6 % (1.1 y)   INR used for dosing:   3.00 (12/11/2019)   Warfarin maintenance plan:   1 mg (2 mg x 0.5) every Fri; 2 mg (2 mg x 1) all other days   Weekly warfarin total:   13 mg   Plan last modified:   JEFF Zepeda (10/7/2019)   Next INR check:   1/8/2020   Target end date:   Indefinite    Indications    Stroke (HCC) [I63.9]  Atrial flutter (HCC) [I48.92]             Anticoagulation Episode Summary     INR check location:       Preferred lab:       Send INR reminders to:       Comments:         Anticoagulation Care Providers     Provider Role Specialty Phone number    Hussain Nation M.D. Referring Interventional Cardiology [phone]    [facility] Anticoagulation Services Responsible  [phone]                Anticoagulation Patient Findings      HPI:  Az Jolly seen in clinic today, on anticoagulation therapy with warfarin for Aflutter  Changes to current medical/health status since last appt: denies  Denies signs/symptoms of bleeding and/or thrombosis since the last appt.    Denies any interval changes to diet  Denies any interval changes to medications since last appt.   Denies any complications or cost restrictions with current therapy.   BP declined      A/P   INR  is-therapeutic.   Will continue with the same warfarin dosing.     Follow up appointment in 4 week(s).    Cindy Sanchez, PharmD

## 2020-01-08 ENCOUNTER — ANTICOAGULATION VISIT (OUTPATIENT)
Dept: VASCULAR LAB | Facility: MEDICAL CENTER | Age: 85
End: 2020-01-08
Attending: INTERNAL MEDICINE
Payer: MEDICARE

## 2020-01-08 DIAGNOSIS — I48.92 ATRIAL FLUTTER, UNSPECIFIED TYPE (HCC): ICD-10-CM

## 2020-01-08 DIAGNOSIS — I63.89 CEREBROVASCULAR ACCIDENT (CVA) DUE TO OTHER MECHANISM (HCC): ICD-10-CM

## 2020-01-08 LAB
INR BLD: 1.8 (ref 0.9–1.2)
INR PPP: 1.8 (ref 2–3.5)

## 2020-01-08 PROCEDURE — 99212 OFFICE O/P EST SF 10 MIN: CPT

## 2020-01-08 PROCEDURE — 85610 PROTHROMBIN TIME: CPT

## 2020-01-08 NOTE — PROGRESS NOTES
Anticoagulation Summary  As of 2020    INR goal:   2.0-3.0   TTR:   59.3 % (1.1 y)   INR used for dosin.80! (2020)   Warfarin maintenance plan:   1 mg (2 mg x 0.5) every Fri; 2 mg (2 mg x 1) all other days   Weekly warfarin total:   13 mg   Plan last modified:   JEFF Zepeda (10/7/2019)   Next INR check:   2020   Target end date:   Indefinite    Indications    Stroke (HCC) [I63.9]  Atrial flutter (HCC) [I48.92]             Anticoagulation Episode Summary     INR check location:       Preferred lab:       Send INR reminders to:       Comments:         Anticoagulation Care Providers     Provider Role Specialty Phone number    Hussain Nation M.D. Referring Interventional Cardiology [phone]    [facility] Anticoagulation Services Responsible  [phone]        Anticoagulation Patient Findings      HPI:  Az Shantelldivya Jolly seen in clinic today, on anticoagulation therapy with warfarin for stroke  Changes to current medical/health status since last appt: none  Denies signs/symptoms of bleeding and/or thrombosis since the last appt.    Denies any interval changes to diet  Denies any interval changes to medications since last appt.   Denies any complications or cost restrictions with current therapy.   Unable to remove coat for vitals.   Confirmed dosing regimen.     A/P   INR  SUB-therapeutic.   Bolus today then Pt is to continue with current warfarin dosing regimen.     Follow up appointment in 4 week(s).    Graham Jackson, PharmD

## 2020-02-05 ENCOUNTER — APPOINTMENT (OUTPATIENT)
Dept: VASCULAR LAB | Facility: MEDICAL CENTER | Age: 85
End: 2020-02-05
Payer: MEDICARE

## 2020-02-10 ENCOUNTER — ANTICOAGULATION VISIT (OUTPATIENT)
Dept: VASCULAR LAB | Facility: MEDICAL CENTER | Age: 85
End: 2020-02-10
Attending: INTERNAL MEDICINE
Payer: MEDICARE

## 2020-02-10 DIAGNOSIS — I48.92 ATRIAL FLUTTER, UNSPECIFIED TYPE (HCC): ICD-10-CM

## 2020-02-10 DIAGNOSIS — I63.89 CEREBROVASCULAR ACCIDENT (CVA) DUE TO OTHER MECHANISM (HCC): ICD-10-CM

## 2020-02-10 LAB
INR BLD: 2.6 (ref 0.9–1.2)
INR PPP: 2.6 (ref 2–3.5)

## 2020-02-10 PROCEDURE — 85610 PROTHROMBIN TIME: CPT

## 2020-02-10 PROCEDURE — 99211 OFF/OP EST MAY X REQ PHY/QHP: CPT | Performed by: PHARMACIST

## 2020-02-10 NOTE — PROGRESS NOTES
Anticoagulation Summary  As of 2/10/2020    INR goal:   2.0-3.0   TTR:   60.5 % (1.2 y)   INR used for dosin.60 (2/10/2020)   Warfarin maintenance plan:   1 mg (2 mg x 0.5) every Fri; 2 mg (2 mg x 1) all other days   Weekly warfarin total:   13 mg   Plan last modified:   JEFF Zepeda (10/7/2019)   Next INR check:   3/9/2020   Target end date:   Indefinite    Indications    Stroke (HCC) [I63.9]  Atrial flutter (HCC) [I48.92]             Anticoagulation Episode Summary     INR check location:       Preferred lab:       Send INR reminders to:       Comments:         Anticoagulation Care Providers     Provider Role Specialty Phone number    Hussain Nation M.D. Referring Interventional Cardiology [phone]    [facility] Anticoagulation Services Responsible  [phone]          Anticoagulation Patient Findings  Patient Findings     Positives:   Change in health    Negatives:   Signs/symptoms of thrombosis, Signs/symptoms of bleeding, Laboratory test error suspected, Change in alcohol use, Change in activity, Upcoming invasive procedure, Emergency department visit, Upcoming dental procedure, Missed doses, Extra doses, Change in medications, Change in diet/appetite, Hospital admission, Bruising, Other complaints    Comments:   Not feeling well with a cold for ~one week          HPI:  Az Shantelldivya Jolly seen in clinic today, on anticoagulation therapy with warfarin due to hx of atrial flutter and stroke.  Changes to current medical/health status since last appt: NONE  Denies signs/symptoms of bleeding and/or thrombosis since the last appt.    Denies any interval changes to diet  Denies any interval changes to medications since last appt.   Denies any complications or cost restrictions with current therapy.   Verified current warfarin dosing schedule.       A/P   INR  now -therapeutic at 2.6.   Pt is to continue with current warfarin dosing regimen.    Follow up appointment in 4 week(s).    Shaun RUDOLPH  Ruiz, PharmD

## 2020-02-20 ENCOUNTER — APPOINTMENT (OUTPATIENT)
Dept: RADIOLOGY | Facility: MEDICAL CENTER | Age: 85
End: 2020-02-20
Attending: EMERGENCY MEDICINE
Payer: MEDICARE

## 2020-02-20 ENCOUNTER — HOSPITAL ENCOUNTER (EMERGENCY)
Facility: MEDICAL CENTER | Age: 85
End: 2020-02-20
Attending: EMERGENCY MEDICINE
Payer: MEDICARE

## 2020-02-20 VITALS
OXYGEN SATURATION: 94 % | DIASTOLIC BLOOD PRESSURE: 54 MMHG | SYSTOLIC BLOOD PRESSURE: 113 MMHG | RESPIRATION RATE: 18 BRPM | TEMPERATURE: 98.7 F | HEART RATE: 82 BPM | WEIGHT: 103.62 LBS | BODY MASS INDEX: 16.23 KG/M2
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
## 2020-02-20 DIAGNOSIS — J22 LOWER RESPIRATORY INFECTION: ICD-10-CM

## 2020-02-20 DIAGNOSIS — D75.89: Primary | ICD-10-CM

## 2020-02-20 DIAGNOSIS — D75.89: ICD-10-CM

## 2020-02-20 LAB
ALBUMIN SERPL BCP-MCNC: 3.8 G/DL (ref 3.2–4.9)
ALBUMIN/GLOB SERPL: 0.9 G/DL
ALP SERPL-CCNC: 94 U/L (ref 30–99)
ALT SERPL-CCNC: 14 U/L (ref 2–50)
ANION GAP SERPL CALC-SCNC: 13 MMOL/L (ref 7–16)
APTT PPP: 29 SEC (ref 24.7–36)
AST SERPL-CCNC: 19 U/L (ref 12–45)
BASOPHILS # BLD AUTO: 0.4 % (ref 0–1.8)
BASOPHILS # BLD: 0.05 K/UL (ref 0–0.12)
BILIRUB SERPL-MCNC: 0.6 MG/DL (ref 0.1–1.5)
BUN SERPL-MCNC: 25 MG/DL (ref 8–22)
CALCIUM SERPL-MCNC: 9.5 MG/DL (ref 8.4–10.2)
CHLORIDE SERPL-SCNC: 98 MMOL/L (ref 96–112)
CO2 SERPL-SCNC: 26 MMOL/L (ref 20–33)
CREAT SERPL-MCNC: 1.34 MG/DL (ref 0.5–1.4)
EKG IMPRESSION: NORMAL
EOSINOPHIL # BLD AUTO: 0.05 K/UL (ref 0–0.51)
EOSINOPHIL NFR BLD: 0.4 % (ref 0–6.9)
ERYTHROCYTE [DISTWIDTH] IN BLOOD BY AUTOMATED COUNT: 41.9 FL (ref 35.9–50)
GLOBULIN SER CALC-MCNC: 4.2 G/DL (ref 1.9–3.5)
GLUCOSE SERPL-MCNC: 119 MG/DL (ref 65–99)
HCT VFR BLD AUTO: 40.9 % (ref 37–47)
HGB BLD-MCNC: 13.1 G/DL (ref 12–16)
IMM GRANULOCYTES # BLD AUTO: 0.07 K/UL (ref 0–0.11)
IMM GRANULOCYTES NFR BLD AUTO: 0.5 % (ref 0–0.9)
INR PPP: 3.2 (ref 0.87–1.13)
LYMPHOCYTES # BLD AUTO: 1.28 K/UL (ref 1–4.8)
LYMPHOCYTES NFR BLD: 9.1 % (ref 22–41)
MCH RBC QN AUTO: 30.8 PG (ref 27–33)
MCHC RBC AUTO-ENTMCNC: 32 G/DL (ref 33.6–35)
MCV RBC AUTO: 96.2 FL (ref 81.4–97.8)
MONOCYTES # BLD AUTO: 0.75 K/UL (ref 0–0.85)
MONOCYTES NFR BLD AUTO: 5.3 % (ref 0–13.4)
NEUTROPHILS # BLD AUTO: 11.92 K/UL (ref 2–7.15)
NEUTROPHILS NFR BLD: 84.3 % (ref 44–72)
NRBC # BLD AUTO: 0 K/UL
NRBC BLD-RTO: 0 /100 WBC
PLATELET # BLD AUTO: 281 K/UL (ref 164–446)
PMV BLD AUTO: 9.7 FL (ref 9–12.9)
POTASSIUM SERPL-SCNC: 4.6 MMOL/L (ref 3.6–5.5)
PROT SERPL-MCNC: 8 G/DL (ref 6–8.2)
PROTHROMBIN TIME: 33.7 SEC (ref 12–14.6)
RBC # BLD AUTO: 4.25 M/UL (ref 4.2–5.4)
SODIUM SERPL-SCNC: 137 MMOL/L (ref 135–145)
WBC # BLD AUTO: 14.1 K/UL (ref 4.8–10.8)

## 2020-02-20 PROCEDURE — 700102 HCHG RX REV CODE 250 W/ 637 OVERRIDE(OP): Performed by: EMERGENCY MEDICINE

## 2020-02-20 PROCEDURE — 73501 X-RAY EXAM HIP UNI 1 VIEW: CPT | Mod: LT

## 2020-02-20 PROCEDURE — A9270 NON-COVERED ITEM OR SERVICE: HCPCS | Performed by: EMERGENCY MEDICINE

## 2020-02-20 PROCEDURE — 80053 COMPREHEN METABOLIC PANEL: CPT

## 2020-02-20 PROCEDURE — 71045 X-RAY EXAM CHEST 1 VIEW: CPT

## 2020-02-20 PROCEDURE — 93005 ELECTROCARDIOGRAM TRACING: CPT | Performed by: EMERGENCY MEDICINE

## 2020-02-20 PROCEDURE — 85025 COMPLETE CBC W/AUTO DIFF WBC: CPT

## 2020-02-20 PROCEDURE — 36415 COLL VENOUS BLD VENIPUNCTURE: CPT

## 2020-02-20 PROCEDURE — 72131 CT LUMBAR SPINE W/O DYE: CPT

## 2020-02-20 PROCEDURE — 99285 EMERGENCY DEPT VISIT HI MDM: CPT

## 2020-02-20 PROCEDURE — 85730 THROMBOPLASTIN TIME PARTIAL: CPT

## 2020-02-20 PROCEDURE — 85610 PROTHROMBIN TIME: CPT

## 2020-02-20 RX ORDER — ACETAMINOPHEN 325 MG/1
650 TABLET ORAL ONCE
Status: COMPLETED | OUTPATIENT
Start: 2020-02-20 | End: 2020-02-20

## 2020-02-20 RX ORDER — ACETAMINOPHEN 160 MG/5ML
10 SUSPENSION ORAL ONCE
Status: DISCONTINUED | OUTPATIENT
Start: 2020-02-20 | End: 2020-02-20 | Stop reason: CLARIF

## 2020-02-20 RX ORDER — CEFUROXIME AXETIL 250 MG/1
250 TABLET ORAL 2 TIMES DAILY
Qty: 20 TAB | Refills: 0 | Status: SHIPPED | OUTPATIENT
Start: 2020-02-20 | End: 2020-03-01

## 2020-02-20 RX ADMIN — ACETAMINOPHEN 650 MG: 325 TABLET, FILM COATED ORAL at 11:33

## 2020-02-20 NOTE — ED NOTES
"Assumed care of pt upon return from x-ray. Med for 8/10 pain \"to buttocks\". Aware of pending lab draw, pt refusing iv at this time. Family at bedside, ekg in progress   "

## 2020-02-20 NOTE — ED NOTES
Dc instructions reviewed with pt and family including picking up prescription at [facility]  On [place] and scheduling mri with order provided. To f/u with pcp, return for worsening s/s

## 2020-02-20 NOTE — ED PROVIDER NOTES
ED Provider Note    CHIEF COMPLAINT  Chief Complaint   Patient presents with   • Low Back Pain       HPI  Az Jolly is a 85 y.o. female who presents complaining of buttock and low back pain after falling yesterday.  The patient states that she is been unsteady lately because of a stroke she had a few years ago and a cough that she has had for the last week.  She states that her cough is productive of yellow sputum.  She has not had any fevers.  She has no diarrhea.  She has had a right hip replacement in the past.  Because of her stroke she is on Coumadin.  She has not missed any doses of her Coumadin.  She states that when she fell she fell on her bottom and that is what is hurting her.  She did not hit her head or lose consciousness.  She feels generally weak but not complaining of unilateral weakness numbness voice changes severe headache or vision changes.  She is a non-smoker.  Patient states she has been able to ambulate after the fall.  She has not taken anything for the pain yet.    REVIEW OF SYSTEMS  HEENT:  No ear pain, congestion or sore throat   EYES: no discharge redness or vision changes  CARDIAC: no chest pain, palpitations    PULMONARY: no dyspnea, positive cough and congestion   GI: no vomiting diarrhea or abdominal pain   : no dysuria, back pain or hematuria   Neuro: Generalized weakness, no numbness aphasia or headache  Musculoskeletal: no swelling deformity or pain no joint swelling positive low back and buttocks pain from fall  Endocrine: no fevers, sweating, weight loss   SKIN: no rash, erythema or contusions     See history of present illness all other systems are negative    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   • DVT 6/1996    left leg, vein ligation performed on saphenous vein   • First degree atrioventricular block  2012   • H/O echocardiogram 2012   • Hypothyroid 10/2002   • Kidney disorder     left kidney biopsy--glomerulonephritis and wegners dx   • Kidney failure     stage II   • Mitral valve prolapse    • Palpitations 2012   • Rheumatic fever 2012   • Stroke (HCC) 2012   • Wegener's granulomatosis (HCC)        FAMILY HISTORY  Family History   Problem Relation Age of Onset   • Other Father         Aortic aneurysm   • Cancer Brother         Lung   • Non-contributory Other        SOCIAL HISTORY  Social History     Socioeconomic History   • Marital status:      Spouse name: Not on file   • Number of children: Not on file   • Years of education: Not on file   • Highest education level: Not on file   Occupational History   • Not on file   Social Needs   • Financial resource strain: Not on file   • Food insecurity     Worry: Not on file     Inability: Not on file   • Transportation needs     Medical: Not on file     Non-medical: Not on file   Tobacco Use   • Smoking status: Former Smoker     Packs/day: 1.00     Types: Cigarettes     Last attempt to quit: 1960     Years since quittin.1   • Smokeless tobacco: Never Used   • Tobacco comment: very little years and years ago   Substance and Sexual Activity   • Alcohol use: Yes     Alcohol/week: 0.0 oz     Comment: occasional   • Drug use: No   • Sexual activity: Not on file   Lifestyle   • Physical activity     Days per week: Not on file     Minutes per session: Not on file   • Stress: Not on file   Relationships   • Social connections     Talks on phone: Not on file     Gets together: Not on file     Attends Mandaen service: Not on file     Active member of club or organization: Not on file     Attends meetings of clubs or organizations: Not on file     Relationship status: Not on file   • Intimate partner violence     Fear of current or ex partner: Not on file     Emotionally abused: Not on file     Physically abused: Not on file      Forced sexual activity: Not on file   Other Topics Concern   • Not on file   Social History Narrative   • Not on file       SURGICAL HISTORY  Past Surgical History:   Procedure Laterality Date   • PB PARTIAL HIP REPLACEMENT Right 6/19/2019    Procedure: HEMIARTHROPLASTY, HIP;  Surgeon: Raul Saldivar M.D.;  Location: SURGERY [facility];  Service: Orthopedics   • CATARACT EXTRACTION  2006    left eye   • CHOLECYSTECTOMY  1997   • TEMPORAL ARTERY BIOPSY  1996    normal   • LUNG NEEDLE BIOPSY  1996    right lung, nodules found   • ARTHROSCOPE  1989    left knee   • ARTHROSCOPY, KNEE  1986    right   • BUNIONECTOMY  1980    bilat   • HYSTERECTOMY RADICAL  1975   • VEIN LIGATION         CURRENT MEDICATIONS  Home Medications    **Home medications have not yet been reviewed for this encounter**         ALLERGIES  Allergies   Allergen Reactions   • Cephalexin [Keflex] Swelling   • Hydroxyzine Swelling     Eyes get itchy and swollen    • Naprelan [Naproxen] Swelling     Eyes get itchy become red and swollen   • Oxaprozin Swelling     Swelling eyes, and itchy   • Ampicillin Rash     Red Rash   • Baclofen Unspecified     drowsiness   • Citalopram Vomiting and Nausea   • Clarithromycin Unspecified     Pt reports that this medication plugs her ears.    • Erythromycin Diarrhea     GI upset   • Promethazine Unspecified     Drowsiness and sleeps   • Vi-Q-Tuss [Hydrocodone-Guaifenesin] Vomiting and Nausea   • Voltaren [Diclofenac Sodium] Rash and Swelling     Red rash and swelling       PHYSICAL EXAM  VITAL SIGNS: /54   Pulse 82   Temp 37.1 °C (98.7 °F) (Temporal)   Resp 18   Wt 47 kg (103 lb 9.9 oz)   SpO2 94%   BMI 16.23 kg/m²   Constitutional: Well developed, Well nourished, No acute distress, Non-toxic appearance.   HEENT: Normocephalic, Atraumatic,  external ears normal, pharynx pink,  Mucous  Membranes moist, No rhinorrhea or mucosal edema  Eyes: PERRL, EOMI, Conjunctiva normal, No discharge.   Neck: Normal  range of motion, No tenderness, Supple, No stridor.   Lymphatic: No lymphadenopathy    Cardiovascular: Regular Rate and Rhythm, No murmurs,  rubs, or gallops.   Thorax & Lungs: Lungs clear to auscultation bilaterally, No respiratory distress, No wheezes, rhales or rhonchi, No chest wall tenderness.   Abdomen: Bowel sounds normal, Soft, non tender, non distended,  No pulsatile masses., no rebound guarding or peritoneal signs.   Skin: Warm, Dry, No erythema, No rash,   Back:  No CVA tenderness,  No spinal tenderness, bony crepitance step offs or instability.  Positive tenderness to the buttocks area with no bruising crepitance or step-offs  Extremities: Equal, intact distal pulses, No cyanosis, clubbing or edema,  No tenderness.   Musculoskeletal: Good range of motion in all major joints. No tenderness to palpation or major deformities noted.   Neurologic: Alert & oriented x 3, Cranial nerves II-XII intact, Equal strength and sensation upper and lower extremities bilaterally,  No focal deficits noted.   Psychiatric: Affect normal, Judgment normal, Mood normal.        RADIOLOGY/PROCEDURES  DX-HIP-UNILATERAL-WITH PELVIS-1 VIEW LEFT   Final Result      No evidence of pelvic or left proximal femoral fracture.      DX-CHEST-PORTABLE (1 VIEW)   Final Result      1.  No evidence of focal pneumonia.      2.  Interstitial prominence and chronic bronchiectasis.      CT-LSPINE W/O PLUS RECONS   Final Result      No CT evidence of acute traumatic injury.      12 mm area of right S2 and amorphous S3 vertebral body fatty marrow replacement. These are new from 2014 and differential includes patchy red marrow reconversion as can be seen with anemia versus a marrow malignant replacement processes such as early    myeloma or metastasis. Consider evaluating laboratory studies and/or lumbar spine MRI with contrast to further evaluate      MR-LUMBAR SPINE-WITH & W/O    (Results Pending)         COURSE & MEDICAL DECISION MAKING  Pertinent  Labs & Imaging studies reviewed. (See chart for details)  Differential diagnosis: Pneumonia, bronchitis, sinusitis, fall with back contusion versus fracture      Results for orders placed or performed during the hospital encounter of 02/20/20   CBC WITH DIFFERENTIAL   Result Value Ref Range    WBC 14.1 (H) 4.8 - 10.8 K/uL    RBC 4.25 4.20 - 5.40 M/uL    Hemoglobin 13.1 12.0 - 16.0 g/dL    Hematocrit 40.9 37.0 - 47.0 %    MCV 96.2 81.4 - 97.8 fL    MCH 30.8 27.0 - 33.0 pg    MCHC 32.0 (L) 33.6 - 35.0 g/dL    RDW 41.9 35.9 - 50.0 fL    Platelet Count 281 164 - 446 K/uL    MPV 9.7 9.0 - 12.9 fL    Neutrophils-Polys 84.30 (H) 44.00 - 72.00 %    Lymphocytes 9.10 (L) 22.00 - 41.00 %    Monocytes 5.30 0.00 - 13.40 %    Eosinophils 0.40 0.00 - 6.90 %    Basophils 0.40 0.00 - 1.80 %    Immature Granulocytes 0.50 0.00 - 0.90 %    Nucleated RBC 0.00 /100 WBC    Neutrophils (Absolute) 11.92 (H) 2.00 - 7.15 K/uL    Lymphs (Absolute) 1.28 1.00 - 4.80 K/uL    Monos (Absolute) 0.75 0.00 - 0.85 K/uL    Eos (Absolute) 0.05 0.00 - 0.51 K/uL    Baso (Absolute) 0.05 0.00 - 0.12 K/uL    Immature Granulocytes (abs) 0.07 0.00 - 0.11 K/uL    NRBC (Absolute) 0.00 K/uL   COMP METABOLIC PANEL   Result Value Ref Range    Sodium 137 135 - 145 mmol/L    Potassium 4.6 3.6 - 5.5 mmol/L    Chloride 98 96 - 112 mmol/L    Co2 26 20 - 33 mmol/L    Anion Gap 13.0 7.0 - 16.0    Glucose 119 (H) 65 - 99 mg/dL    Bun 25 (H) 8 - 22 mg/dL    Creatinine 1.34 0.50 - 1.40 mg/dL    Calcium 9.5 8.4 - 10.2 mg/dL    AST(SGOT) 19 12 - 45 U/L    ALT(SGPT) 14 2 - 50 U/L    Alkaline Phosphatase 94 30 - 99 U/L    Total Bilirubin 0.6 0.1 - 1.5 mg/dL    Albumin 3.8 3.2 - 4.9 g/dL    Total Protein 8.0 6.0 - 8.2 g/dL    Globulin 4.2 (H) 1.9 - 3.5 g/dL    A-G Ratio 0.9 g/dL   PROTHROMBIN TIME   Result Value Ref Range    PT 33.7 (H) 12.0 - 14.6 sec    INR 3.20 (H) 0.87 - 1.13   APTT   Result Value Ref Range    APTT 29.0 24.7 - 36.0 sec   ESTIMATED GFR   Result Value Ref Range     GFR If African American 45 (A) >60 mL/min/1.73 m 2    GFR If Non  38 (A) >60 mL/min/1.73 m 2   EKG (NOW)   Result Value Ref Range    Report       [facility] Emergency Dept[facility]    Test Date:  2020  Pt Name:    ZAINAB RYDER                Department: EDS  MRN:        [id number]                      [facility]  Gender:     Female                       Technician: [id number]  :        [id number]                   Requested By:JAIME MARTINEZ  Order #:    [id number]                    Reading MD: JAIME MARTINEZ MD    Measurements  Intervals                                Axis  Rate:       74                           P:          99  MN:         304                          QRS:        27  QRSD:       87                           T:          33  QT:         396  QTc:        440    Interpretive Statements  Sinus rhythm  Prolonged MN interval  Borderline low voltage, extremity leads  Compared to ECG 2019 04:57:45  First degree AV block now present  Atrial flutter no longer present  Electronically Signed On 2020 12:49:52 PST by JAIME MARTINEZ MD            I reassured the patient that she has no fractures on her CT or x-rays and no pneumonia on her chest x-ray.  Because she has been coughing for over a week and it is productive I will place her on antibiotics for a lower respiratory infection that is not showing up on x-ray.  I also explained to the patient that she needed an outpatient MRI because she had some fatty bone marrow replacement that needed further work-up as an outpatient.  Patient understands this.  I advised her to use her walker at all times and use shoes that have some traction on the floor.  She should return for any worsening weakness numbness or worsening symptoms.    FINAL IMPRESSION  1. Fall, initial encounter    2. Fatty infiltration of bone marrow    3. Lower respiratory infection            PLAN/DISPOSITION  Discharged          Electronically signed by: Althea Mtz M.D., 2/20/2020 10:39 AM

## 2020-03-09 ENCOUNTER — APPOINTMENT (OUTPATIENT)
Dept: VASCULAR LAB | Facility: MEDICAL CENTER | Age: 85
End: 2020-03-09
Payer: MEDICARE

## 2020-03-12 ENCOUNTER — APPOINTMENT (OUTPATIENT)
Dept: RADIOLOGY | Facility: MEDICAL CENTER | Age: 85
End: 2020-03-12
Attending: EMERGENCY MEDICINE
Payer: MEDICARE

## 2020-03-18 ENCOUNTER — ANTICOAGULATION VISIT (OUTPATIENT)
Dept: VASCULAR LAB | Facility: MEDICAL CENTER | Age: 85
End: 2020-03-18
Attending: INTERNAL MEDICINE
Payer: MEDICARE

## 2020-03-18 DIAGNOSIS — I63.89 CEREBROVASCULAR ACCIDENT (CVA) DUE TO OTHER MECHANISM (HCC): ICD-10-CM

## 2020-03-18 DIAGNOSIS — I48.92 ATRIAL FLUTTER, UNSPECIFIED TYPE (HCC): ICD-10-CM

## 2020-03-18 LAB
INR BLD: 2.7 (ref 0.9–1.2)
INR PPP: 2.7 (ref 2–3.5)

## 2020-03-18 PROCEDURE — 85610 PROTHROMBIN TIME: CPT

## 2020-03-18 PROCEDURE — 99211 OFF/OP EST MAY X REQ PHY/QHP: CPT | Performed by: PHARMACIST

## 2020-03-18 NOTE — PROGRESS NOTES
Anticoagulation Summary  As of 3/18/2020    INR goal:   2.0-3.0   TTR:   60.6 % (1.3 y)   INR used for dosin.70 (3/18/2020)   Warfarin maintenance plan:   1 mg (2 mg x 0.5) every Fri; 2 mg (2 mg x 1) all other days   Weekly warfarin total:   13 mg   Plan last modified:   JEFF Zepeda (10/7/2019)   Next INR check:   2020   Target end date:   Indefinite    Indications    Stroke (HCC) [I63.9]  Atrial flutter (HCC) [I48.92]             Anticoagulation Episode Summary     INR check location:       Preferred lab:       Send INR reminders to:       Comments:         Anticoagulation Care Providers     Provider Role Specialty Phone number    Hussain Nation M.D. Referring Interventional Cardiology [phone]    [facility] Anticoagulation Services Responsible  [phone]                Anticoagulation Patient Findings      HPI:  Az Tripp seen in clinic today, on anticoagulation therapy with warfarin for Afib  Changes to current medical/health status since last appt: denies  Denies signs/symptoms of bleeding and/or thrombosis since the last appt.    Denies any interval changes to diet  Denies any interval changes to medications since last appt.   Denies any complications or cost restrictions with current therapy.   Verified current warfarin dosing schedule.       A/P   INR  is-therapeutic.   Will continue with the same warfarin dosing.     Follow up appointment in 8 week(s) per pt preference.    Cindy Sanchez, PharmD

## 2020-03-22 ENCOUNTER — HOSPITAL ENCOUNTER (OUTPATIENT)
Dept: RADIOLOGY | Facility: MEDICAL CENTER | Age: 85
End: 2020-03-22
Attending: EMERGENCY MEDICINE
Payer: MEDICARE

## 2020-03-22 DIAGNOSIS — D75.89: ICD-10-CM

## 2020-03-22 PROCEDURE — 72158 MRI LUMBAR SPINE W/O & W/DYE: CPT

## 2020-03-22 PROCEDURE — A9576 INJ PROHANCE MULTIPACK: HCPCS | Performed by: EMERGENCY MEDICINE

## 2020-03-22 PROCEDURE — 700117 HCHG RX CONTRAST REV CODE 255: Performed by: EMERGENCY MEDICINE

## 2020-03-22 RX ADMIN — GADOTERIDOL 10 ML: 279.3 INJECTION, SOLUTION INTRAVENOUS at 13:20

## 2020-05-13 ENCOUNTER — ANTICOAGULATION VISIT (OUTPATIENT)
Dept: VASCULAR LAB | Facility: MEDICAL CENTER | Age: 85
End: 2020-05-13
Attending: INTERNAL MEDICINE
Payer: MEDICARE

## 2020-05-13 DIAGNOSIS — I48.92 ATRIAL FLUTTER, UNSPECIFIED TYPE (HCC): ICD-10-CM

## 2020-05-13 DIAGNOSIS — I63.89 CEREBROVASCULAR ACCIDENT (CVA) DUE TO OTHER MECHANISM (HCC): ICD-10-CM

## 2020-05-13 LAB — INR PPP: 2.1 (ref 2–3.5)

## 2020-05-13 PROCEDURE — 99211 OFF/OP EST MAY X REQ PHY/QHP: CPT | Performed by: PHARMACIST

## 2020-05-13 PROCEDURE — 85610 PROTHROMBIN TIME: CPT

## 2020-05-13 NOTE — PROGRESS NOTES
Anticoagulation Summary  As of 2020    INR goal:   2.0-3.0   TTR:   64.7 % (1.5 y)   INR used for dosin.10 (2020)   Warfarin maintenance plan:   1 mg (2 mg x 0.5) every Fri; 2 mg (2 mg x 1) all other days   Weekly warfarin total:   13 mg   Plan last modified:   JEFF Zepeda (10/7/2019)   Next INR check:   7/15/2020   Target end date:   Indefinite    Indications    Stroke (HCC) [I63.9]  Atrial flutter (HCC) [I48.92]             Anticoagulation Episode Summary     INR check location:       Preferred lab:       Send INR reminders to:       Comments:         Anticoagulation Care Providers     Provider Role Specialty Phone number    Hussain Nation M.D. Referring Interventional Cardiology [phone]    [facility] Anticoagulation Services Responsible  [phone]                Anticoagulation Patient Findings  Patient Findings     Negatives:   Signs/symptoms of thrombosis, Signs/symptoms of bleeding, Laboratory test error suspected, Change in health, Change in alcohol use, Change in activity, Upcoming invasive procedure, Emergency department visit, Upcoming dental procedure, Missed doses, Extra doses, Change in medications, Change in diet/appetite, Hospital admission, Bruising, Other complaints          HPI:  Az Tripp seen in clinic today, on anticoagulation therapy with warfarin due to hx of atrial flutter and stroke.  Changes to current medical/health status since last appt: NONE  Denies signs/symptoms of bleeding and/or thrombosis since the last appt.    Denies any interval changes to diet  Denies any interval changes to medications since last appt.   Denies any complications or cost restrictions with current therapy.   Verified current warfarin dosing schedule.       A/P   INR  remains -therapeutic at 2.1.   Pt is to continue with current warfarin dosing regimen.    Follow up appointment in 9 week(s).    Shaun Ruiz, JuanD

## 2020-05-16 DIAGNOSIS — I48.92 ATRIAL FLUTTER, UNSPECIFIED TYPE (HCC): ICD-10-CM

## 2020-05-18 RX ORDER — WARFARIN SODIUM 2 MG/1
TABLET ORAL
Qty: 90 TAB | Refills: 1 | Status: SHIPPED | OUTPATIENT
Start: 2020-05-18 | End: 2020-07-21

## 2020-07-15 ENCOUNTER — ANTICOAGULATION VISIT (OUTPATIENT)
Dept: VASCULAR LAB | Facility: MEDICAL CENTER | Age: 85
End: 2020-07-15
Attending: INTERNAL MEDICINE
Payer: MEDICARE

## 2020-07-15 VITALS — HEART RATE: 65 BPM | SYSTOLIC BLOOD PRESSURE: 131 MMHG | DIASTOLIC BLOOD PRESSURE: 77 MMHG

## 2020-07-15 DIAGNOSIS — I63.89 CEREBROVASCULAR ACCIDENT (CVA) DUE TO OTHER MECHANISM (HCC): ICD-10-CM

## 2020-07-15 DIAGNOSIS — I48.92 ATRIAL FLUTTER, UNSPECIFIED TYPE (HCC): ICD-10-CM

## 2020-07-15 LAB
INR BLD: 2.4 (ref 0.9–1.2)
INR PPP: 2.4 (ref 2–3.5)

## 2020-07-15 PROCEDURE — 99211 OFF/OP EST MAY X REQ PHY/QHP: CPT

## 2020-07-15 PROCEDURE — 85610 PROTHROMBIN TIME: CPT

## 2020-07-15 NOTE — PROGRESS NOTES
Anticoagulation Summary  As of 7/15/2020    INR goal:   2.0-3.0   TTR:   68.3 % (1.7 y)   INR used for dosin.40 (7/15/2020)   Warfarin maintenance plan:   1 mg (2 mg x 0.5) every Fri; 2 mg (2 mg x 1) all other days   Weekly warfarin total:   13 mg   Plan last modified:   JEFF Zepeda (10/7/2019)   Next INR check:   2020   Target end date:   Indefinite    Indications    Stroke (HCC) [I63.9]  Atrial flutter (HCC) [I48.92]             Anticoagulation Episode Summary     INR check location:       Preferred lab:       Send INR reminders to:       Comments:         Anticoagulation Care Providers     Provider Role Specialty Phone number    Hussain Nation M.D. Referring Interventional Cardiology [phone]    [facility] Anticoagulation Services Responsible  [phone]           Anticoagulation Patient Findings  Patient Findings     Negatives:   Signs/symptoms of thrombosis, Signs/symptoms of bleeding, Laboratory test error suspected, Change in health, Change in alcohol use, Change in activity, Upcoming invasive procedure, Emergency department visit, Upcoming dental procedure, Missed doses, Extra doses, Change in medications, Change in diet/appetite, Hospital admission, Bruising, Other complaints          HPI:  Az Tripp seen in clinic today, on anticoagulation therapy with warfarin for stroke, atrial flutter  Changes to current medical/health status since last appt: none  Denies signs/symptoms of bleeding and/or thrombosis since the last appt.    Denies any interval changes to diet  Denies any interval changes to medications since last appt.   Denies any complications or cost restrictions with current therapy.   Verified current warfarin dosing schedule.   BP recorded in vitals.       A/P   INR  therapeutic.   Pt is to continue with current warfarin dosing regimen.    Follow up appointment in 4 week(s) per pt preference.    Whit Ann, JuanD

## 2020-07-21 ENCOUNTER — APPOINTMENT (OUTPATIENT)
Dept: RADIOLOGY | Facility: MEDICAL CENTER | Age: 85
End: 2020-07-21
Attending: EMERGENCY MEDICINE
Payer: MEDICARE

## 2020-07-21 ENCOUNTER — APPOINTMENT (OUTPATIENT)
Dept: CARDIOLOGY | Facility: MEDICAL CENTER | Age: 85
End: 2020-07-21
Attending: INTERNAL MEDICINE
Payer: MEDICARE

## 2020-07-21 ENCOUNTER — HOSPITAL ENCOUNTER (OUTPATIENT)
Facility: MEDICAL CENTER | Age: 85
End: 2020-07-23
Attending: EMERGENCY MEDICINE | Admitting: INTERNAL MEDICINE
Payer: MEDICARE

## 2020-07-21 DIAGNOSIS — J90 PLEURAL EFFUSION: ICD-10-CM

## 2020-07-21 DIAGNOSIS — R10.9 FLANK PAIN: ICD-10-CM

## 2020-07-21 DIAGNOSIS — R79.89 ELEVATED TROPONIN: ICD-10-CM

## 2020-07-21 DIAGNOSIS — J18.9 PNEUMONIA OF LEFT LUNG DUE TO INFECTIOUS ORGANISM, UNSPECIFIED PART OF LUNG: ICD-10-CM

## 2020-07-21 DIAGNOSIS — R07.9 CHEST PAIN, UNSPECIFIED TYPE: ICD-10-CM

## 2020-07-21 PROBLEM — D72.829 LEUKOCYTOSIS: Status: ACTIVE | Noted: 2020-07-21

## 2020-07-21 PROBLEM — I48.0 PAROXYSMAL ATRIAL FIBRILLATION (HCC): Status: ACTIVE | Noted: 2020-07-21

## 2020-07-21 LAB
ALBUMIN SERPL BCP-MCNC: 3.8 G/DL (ref 3.2–4.9)
ALBUMIN/GLOB SERPL: 0.8 G/DL
ALP SERPL-CCNC: 85 U/L (ref 30–99)
ALT SERPL-CCNC: 8 U/L (ref 2–50)
ANION GAP SERPL CALC-SCNC: 12 MMOL/L (ref 7–16)
APPEARANCE UR: CLEAR
AST SERPL-CCNC: 19 U/L (ref 12–45)
BACTERIA #/AREA URNS HPF: ABNORMAL /HPF
BASOPHILS # BLD AUTO: 0.3 % (ref 0–1.8)
BASOPHILS # BLD: 0.04 K/UL (ref 0–0.12)
BILIRUB SERPL-MCNC: 0.8 MG/DL (ref 0.1–1.5)
BILIRUB UR QL STRIP.AUTO: NEGATIVE
BUN SERPL-MCNC: 26 MG/DL (ref 8–22)
CALCIUM SERPL-MCNC: 9.6 MG/DL (ref 8.4–10.2)
CHLORIDE SERPL-SCNC: 94 MMOL/L (ref 96–112)
CO2 SERPL-SCNC: 27 MMOL/L (ref 20–33)
COLOR UR: YELLOW
COVID ORDER STATUS COVID19: NORMAL
CREAT SERPL-MCNC: 1.63 MG/DL (ref 0.5–1.4)
D DIMER PPP IA.FEU-MCNC: 1.64 UG/ML (FEU) (ref 0–0.5)
EKG IMPRESSION: NORMAL
EOSINOPHIL # BLD AUTO: 0 K/UL (ref 0–0.51)
EOSINOPHIL NFR BLD: 0 % (ref 0–6.9)
ERYTHROCYTE [DISTWIDTH] IN BLOOD BY AUTOMATED COUNT: 43.2 FL (ref 35.9–50)
GLOBULIN SER CALC-MCNC: 4.8 G/DL (ref 1.9–3.5)
GLUCOSE SERPL-MCNC: 157 MG/DL (ref 65–99)
GLUCOSE UR STRIP.AUTO-MCNC: NEGATIVE MG/DL
HCT VFR BLD AUTO: 46.2 % (ref 37–47)
HGB BLD-MCNC: 14.7 G/DL (ref 12–16)
IMM GRANULOCYTES # BLD AUTO: 0.07 K/UL (ref 0–0.11)
IMM GRANULOCYTES NFR BLD AUTO: 0.5 % (ref 0–0.9)
INR PPP: 2.39 (ref 0.87–1.13)
KETONES UR STRIP.AUTO-MCNC: NEGATIVE MG/DL
LACTATE BLD-SCNC: 1.8 MMOL/L (ref 0.5–2)
LEUKOCYTE ESTERASE UR QL STRIP.AUTO: NEGATIVE
LIPASE SERPL-CCNC: 15 U/L (ref 7–58)
LV EJECT FRACT  99904: 60
LV EJECT FRACT MOD 2C 99903: 77.29
LV EJECT FRACT MOD 4C 99902: 61.55
LV EJECT FRACT MOD BP 99901: 69.14
LYMPHOCYTES # BLD AUTO: 1.79 K/UL (ref 1–4.8)
LYMPHOCYTES NFR BLD: 13.9 % (ref 22–41)
MCH RBC QN AUTO: 29.8 PG (ref 27–33)
MCHC RBC AUTO-ENTMCNC: 31.8 G/DL (ref 33.6–35)
MCV RBC AUTO: 93.7 FL (ref 81.4–97.8)
MICRO URNS: ABNORMAL
MONOCYTES # BLD AUTO: 1.09 K/UL (ref 0–0.85)
MONOCYTES NFR BLD AUTO: 8.5 % (ref 0–13.4)
NEUTROPHILS # BLD AUTO: 9.86 K/UL (ref 2–7.15)
NEUTROPHILS NFR BLD: 76.8 % (ref 44–72)
NITRITE UR QL STRIP.AUTO: NEGATIVE
NRBC # BLD AUTO: 0 K/UL
NRBC BLD-RTO: 0 /100 WBC
PH UR STRIP.AUTO: 6.5 [PH] (ref 5–8)
PLATELET # BLD AUTO: 211 K/UL (ref 164–446)
PMV BLD AUTO: 10 FL (ref 9–12.9)
POTASSIUM SERPL-SCNC: 5 MMOL/L (ref 3.6–5.5)
PROCALCITONIN SERPL-MCNC: 0.21 NG/ML
PROT SERPL-MCNC: 8.6 G/DL (ref 6–8.2)
PROT UR QL STRIP: 100 MG/DL
PROTHROMBIN TIME: 26.7 SEC (ref 12–14.6)
RBC # BLD AUTO: 4.93 M/UL (ref 4.2–5.4)
RBC # URNS HPF: >150 /HPF
RBC UR QL AUTO: ABNORMAL
SARS-COV-2 RNA RESP QL NAA+PROBE: NOTDETECTED
SODIUM SERPL-SCNC: 133 MMOL/L (ref 135–145)
SP GR UR STRIP.AUTO: 1.02
SPECIMEN SOURCE: NORMAL
TRANS CELLS URNS QL MICRO: ABNORMAL /HPF
TROPONIN T SERPL-MCNC: 23 NG/L (ref 6–19)
TROPONIN T SERPL-MCNC: 26 NG/L (ref 6–19)
TROPONIN T SERPL-MCNC: 27 NG/L (ref 6–19)
WBC # BLD AUTO: 12.9 K/UL (ref 4.8–10.8)
WBC #/AREA URNS HPF: ABNORMAL /HPF

## 2020-07-21 PROCEDURE — 700105 HCHG RX REV CODE 258: Performed by: EMERGENCY MEDICINE

## 2020-07-21 PROCEDURE — 84145 PROCALCITONIN (PCT): CPT

## 2020-07-21 PROCEDURE — 84484 ASSAY OF TROPONIN QUANT: CPT

## 2020-07-21 PROCEDURE — G0378 HOSPITAL OBSERVATION PER HR: HCPCS

## 2020-07-21 PROCEDURE — C9803 HOPD COVID-19 SPEC COLLECT: HCPCS | Performed by: INTERNAL MEDICINE

## 2020-07-21 PROCEDURE — 85610 PROTHROMBIN TIME: CPT

## 2020-07-21 PROCEDURE — 96365 THER/PROPH/DIAG IV INF INIT: CPT | Mod: XU

## 2020-07-21 PROCEDURE — 99285 EMERGENCY DEPT VISIT HI MDM: CPT

## 2020-07-21 PROCEDURE — 87040 BLOOD CULTURE FOR BACTERIA: CPT

## 2020-07-21 PROCEDURE — 93005 ELECTROCARDIOGRAM TRACING: CPT

## 2020-07-21 PROCEDURE — 99220 PR INITIAL OBSERVATION CARE,LEVL III: CPT | Performed by: INTERNAL MEDICINE

## 2020-07-21 PROCEDURE — 83690 ASSAY OF LIPASE: CPT

## 2020-07-21 PROCEDURE — 93306 TTE W/DOPPLER COMPLETE: CPT

## 2020-07-21 PROCEDURE — U0003 INFECTIOUS AGENT DETECTION BY NUCLEIC ACID (DNA OR RNA); SEVERE ACUTE RESPIRATORY SYNDROME CORONAVIRUS 2 (SARS-COV-2) (CORONAVIRUS DISEASE [COVID-19]), AMPLIFIED PROBE TECHNIQUE, MAKING USE OF HIGH THROUGHPUT TECHNOLOGIES AS DESCRIBED BY CMS-2020-01-R: HCPCS

## 2020-07-21 PROCEDURE — 85379 FIBRIN DEGRADATION QUANT: CPT

## 2020-07-21 PROCEDURE — 83605 ASSAY OF LACTIC ACID: CPT

## 2020-07-21 PROCEDURE — A9270 NON-COVERED ITEM OR SERVICE: HCPCS | Performed by: INTERNAL MEDICINE

## 2020-07-21 PROCEDURE — 700102 HCHG RX REV CODE 250 W/ 637 OVERRIDE(OP): Performed by: INTERNAL MEDICINE

## 2020-07-21 PROCEDURE — 71045 X-RAY EXAM CHEST 1 VIEW: CPT

## 2020-07-21 PROCEDURE — 93306 TTE W/DOPPLER COMPLETE: CPT | Mod: 26 | Performed by: INTERNAL MEDICINE

## 2020-07-21 PROCEDURE — 74176 CT ABD & PELVIS W/O CONTRAST: CPT

## 2020-07-21 PROCEDURE — 36415 COLL VENOUS BLD VENIPUNCTURE: CPT

## 2020-07-21 PROCEDURE — 85025 COMPLETE CBC W/AUTO DIFF WBC: CPT

## 2020-07-21 PROCEDURE — 81001 URINALYSIS AUTO W/SCOPE: CPT

## 2020-07-21 PROCEDURE — 80053 COMPREHEN METABOLIC PANEL: CPT

## 2020-07-21 PROCEDURE — 700111 HCHG RX REV CODE 636 W/ 250 OVERRIDE (IP): Performed by: EMERGENCY MEDICINE

## 2020-07-21 RX ORDER — BISACODYL 10 MG
10 SUPPOSITORY, RECTAL RECTAL
Status: DISCONTINUED | OUTPATIENT
Start: 2020-07-21 | End: 2020-07-23 | Stop reason: HOSPADM

## 2020-07-21 RX ORDER — ONDANSETRON 4 MG/1
4 TABLET, ORALLY DISINTEGRATING ORAL EVERY 4 HOURS PRN
Status: DISCONTINUED | OUTPATIENT
Start: 2020-07-21 | End: 2020-07-23 | Stop reason: HOSPADM

## 2020-07-21 RX ORDER — LEVOFLOXACIN 5 MG/ML
500 INJECTION, SOLUTION INTRAVENOUS ONCE
Status: COMPLETED | OUTPATIENT
Start: 2020-07-21 | End: 2020-07-21

## 2020-07-21 RX ORDER — METOPROLOL TARTRATE 50 MG/1
50 TABLET, FILM COATED ORAL 2 TIMES DAILY
Status: DISCONTINUED | OUTPATIENT
Start: 2020-07-21 | End: 2020-07-23 | Stop reason: HOSPADM

## 2020-07-21 RX ORDER — LEVOFLOXACIN 5 MG/ML
750 INJECTION, SOLUTION INTRAVENOUS ONCE
Status: DISCONTINUED | OUTPATIENT
Start: 2020-07-21 | End: 2020-07-21

## 2020-07-21 RX ORDER — ACETAMINOPHEN 325 MG/1
650 TABLET ORAL EVERY 6 HOURS PRN
Status: DISCONTINUED | OUTPATIENT
Start: 2020-07-21 | End: 2020-07-23 | Stop reason: HOSPADM

## 2020-07-21 RX ORDER — POLYETHYLENE GLYCOL 3350 17 G/17G
1 POWDER, FOR SOLUTION ORAL
Status: DISCONTINUED | OUTPATIENT
Start: 2020-07-21 | End: 2020-07-23 | Stop reason: HOSPADM

## 2020-07-21 RX ORDER — WARFARIN SODIUM 2 MG/1
1-2 TABLET ORAL EVERY EVENING
COMMUNITY
End: 2020-09-21 | Stop reason: SDUPTHER

## 2020-07-21 RX ORDER — WARFARIN SODIUM 2 MG/1
2 TABLET ORAL
Status: COMPLETED | OUTPATIENT
Start: 2020-07-21 | End: 2020-07-21

## 2020-07-21 RX ORDER — SODIUM CHLORIDE 9 MG/ML
1000 INJECTION, SOLUTION INTRAVENOUS ONCE
Status: COMPLETED | OUTPATIENT
Start: 2020-07-21 | End: 2020-07-21

## 2020-07-21 RX ORDER — AMOXICILLIN 250 MG
2 CAPSULE ORAL 2 TIMES DAILY
Status: DISCONTINUED | OUTPATIENT
Start: 2020-07-21 | End: 2020-07-23 | Stop reason: HOSPADM

## 2020-07-21 RX ORDER — LEVOTHYROXINE SODIUM 0.07 MG/1
75 TABLET ORAL
Status: DISCONTINUED | OUTPATIENT
Start: 2020-07-22 | End: 2020-07-23 | Stop reason: HOSPADM

## 2020-07-21 RX ORDER — ONDANSETRON 2 MG/ML
4 INJECTION INTRAMUSCULAR; INTRAVENOUS EVERY 4 HOURS PRN
Status: DISCONTINUED | OUTPATIENT
Start: 2020-07-21 | End: 2020-07-23 | Stop reason: HOSPADM

## 2020-07-21 RX ADMIN — SODIUM CHLORIDE 1000 ML: 9 INJECTION, SOLUTION INTRAVENOUS at 10:46

## 2020-07-21 RX ADMIN — WARFARIN SODIUM 2 MG: 2 TABLET ORAL at 17:22

## 2020-07-21 RX ADMIN — ACETAMINOPHEN 650 MG: 325 TABLET, FILM COATED ORAL at 21:30

## 2020-07-21 RX ADMIN — METOPROLOL TARTRATE 50 MG: 50 TABLET, FILM COATED ORAL at 17:21

## 2020-07-21 RX ADMIN — ACETAMINOPHEN 650 MG: 325 TABLET, FILM COATED ORAL at 14:52

## 2020-07-21 RX ADMIN — LEVOFLOXACIN 500 MG: 5 INJECTION, SOLUTION INTRAVENOUS at 14:00

## 2020-07-21 RX ADMIN — SENNOSIDES AND DOCUSATE SODIUM 2 TABLET: 8.6; 5 TABLET ORAL at 17:21

## 2020-07-21 ASSESSMENT — COGNITIVE AND FUNCTIONAL STATUS - GENERAL
SUGGESTED CMS G CODE MODIFIER MOBILITY: CK
WALKING IN HOSPITAL ROOM: A LOT
MOVING FROM LYING ON BACK TO SITTING ON SIDE OF FLAT BED: A LITTLE
DRESSING REGULAR LOWER BODY CLOTHING: A LITTLE
SUGGESTED CMS G CODE MODIFIER DAILY ACTIVITY: CK
TOILETING: A LOT
HELP NEEDED FOR BATHING: A LOT
CLIMB 3 TO 5 STEPS WITH RAILING: A LOT
DAILY ACTIVITIY SCORE: 17
MOBILITY SCORE: 15
DRESSING REGULAR UPPER BODY CLOTHING: A LITTLE
MOVING TO AND FROM BED TO CHAIR: A LITTLE
STANDING UP FROM CHAIR USING ARMS: A LOT
TURNING FROM BACK TO SIDE WHILE IN FLAT BAD: A LITTLE
PERSONAL GROOMING: A LITTLE

## 2020-07-21 ASSESSMENT — ENCOUNTER SYMPTOMS
DIZZINESS: 0
NECK PAIN: 0
EYE REDNESS: 0
BLURRED VISION: 0
ORTHOPNEA: 0
NAUSEA: 0
FEVER: 0
STRIDOR: 0
PALPITATIONS: 0
INSOMNIA: 0
BACK PAIN: 0
MYALGIAS: 0
SHORTNESS OF BREATH: 0
FOCAL WEAKNESS: 0
NERVOUS/ANXIOUS: 0
CHILLS: 0
SEIZURES: 0
EYE DISCHARGE: 0
HEARTBURN: 0
ABDOMINAL PAIN: 1
HEADACHES: 0
VOMITING: 0
EYE PAIN: 0
SPUTUM PRODUCTION: 0
DIARRHEA: 0
DEPRESSION: 0
COUGH: 0
WEIGHT LOSS: 0

## 2020-07-21 ASSESSMENT — LIFESTYLE VARIABLES
ALCOHOL_USE: NO
HAVE PEOPLE ANNOYED YOU BY CRITICIZING YOUR DRINKING: NO
CONSUMPTION TOTAL: NEGATIVE
EVER FELT BAD OR GUILTY ABOUT YOUR DRINKING: NO
TOTAL SCORE: 0
HOW MANY TIMES IN THE PAST YEAR HAVE YOU HAD 5 OR MORE DRINKS IN A DAY: 0
EVER HAD A DRINK FIRST THING IN THE MORNING TO STEADY YOUR NERVES TO GET RID OF A HANGOVER: NO
TOTAL SCORE: 0
TOTAL SCORE: 0
HAVE YOU EVER FELT YOU SHOULD CUT DOWN ON YOUR DRINKING: NO
DO YOU DRINK ALCOHOL: NO
AVERAGE NUMBER OF DAYS PER WEEK YOU HAVE A DRINK CONTAINING ALCOHOL: 0
ON A TYPICAL DAY WHEN YOU DRINK ALCOHOL HOW MANY DRINKS DO YOU HAVE: 0
EVER_SMOKED: NEVER

## 2020-07-21 ASSESSMENT — CHA2DS2 SCORE
HYPERTENSION: NO
DIABETES: NO
VASCULAR DISEASE: NO
CHF OR LEFT VENTRICULAR DYSFUNCTION: NO
PRIOR STROKE OR TIA OR THROMBOEMBOLISM: YES
AGE 65 TO 74: YES
AGE 75 OR GREATER: YES
SEX: FEMALE
CHA2DS2 VASC SCORE: 6

## 2020-07-21 ASSESSMENT — FIBROSIS 4 INDEX
FIB4 SCORE: 1.55
FIB4 SCORE: 2.74

## 2020-07-21 NOTE — ED NOTES
Pt to br via steven per edt [place]; pt unable to void; pbladder scan performed: 252ml noted. Pt st cath'd for ua; ua sent to lab

## 2020-07-21 NOTE — ASSESSMENT & PLAN NOTE
Mild trop elevation  Monitor on telemetry  Await stress test results     I reviewed echo from June 2019 show LVEF 65% with severe tricuspid regurgitation  The patient denies any respiratory symptoms  Chest x-ray showed small left pleural effusion    Now patient denies chest pain and complaints of abdominal pain and constipation

## 2020-07-21 NOTE — ED PROVIDER NOTES
ED Provider Note    CHIEF COMPLAINT  Chief Complaint   Patient presents with   • Flank Pain     left   • Chest Pain     left       HPI  Az Tripp is a 86 y.o. female who presents to the emergency department the chief complaint of left-sided chest pain and left flank pain.  She has the pain started in her left lower quadrant and radiates all the way up into her chest.  Started couple days ago but seems to be getting worse.  She describes as a sharp stabbing pain.  Really no shortness of breath.  No fevers.  No cough.  No vomiting.  No dysuria.  No hematuria.    REVIEW OF SYSTEMS  See HPI for further details. All other systems are negative.     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   • DVT 6/1996    left leg, vein ligation performed on saphenous vein   • First degree atrioventricular block 9/21/2012   • H/O echocardiogram 9/21/2012   • Hypothyroid 10/2002   • Kidney disorder 1998    left kidney biopsy--glomerulonephritis and wegners dx   • Kidney failure     stage II   • Mitral valve prolapse    • Palpitations 9/21/2012   • Rheumatic fever 9/21/2012   • Stroke (HCC) 9/21/2012   • Wegener's granulomatosis (HCC)        FAMILY HISTORY  Family History   Problem Relation Age of Onset   • Other Father         Aortic aneurysm   • Cancer Brother         Lung   • Non-contributory Other        SOCIAL HISTORY  Social History     Socioeconomic History   • Marital status:      Spouse name: Not on file   • Number of children: Not on file   • Years of education: Not on file   • Highest education level: Not on file   Occupational History   • Not on file   Social Needs   • Financial resource strain: Not on file   • Food insecurity     Worry: Not on file     Inability: Not on file   • Transportation needs     Medical: Not on file     Non-medical: Not on file    Tobacco Use   • Smoking status: Former Smoker     Packs/day: 1.00     Types: Cigarettes     Last attempt to quit: 1960     Years since quittin.5   • Smokeless tobacco: Never Used   • Tobacco comment: very little years and years ago   Substance and Sexual Activity   • Alcohol use: Yes     Alcohol/week: 0.0 oz     Comment: occasional   • Drug use: No   • Sexual activity: Not on file   Lifestyle   • Physical activity     Days per week: Not on file     Minutes per session: Not on file   • Stress: Not on file   Relationships   • Social connections     Talks on phone: Not on file     Gets together: Not on file     Attends Baptism service: Not on file     Active member of club or organization: Not on file     Attends meetings of clubs or organizations: Not on file     Relationship status: Not on file   • Intimate partner violence     Fear of current or ex partner: Not on file     Emotionally abused: Not on file     Physically abused: Not on file     Forced sexual activity: Not on file   Other Topics Concern   • Not on file   Social History Narrative   • Not on file       SURGICAL HISTORY  Past Surgical History:   Procedure Laterality Date   • PB PARTIAL HIP REPLACEMENT Right 2019    Procedure: HEMIARTHROPLASTY, HIP;  Surgeon: Raul Saldivar M.D.;  Location: SURGERY [facility];  Service: Orthopedics   • CATARACT EXTRACTION      left eye   • CHOLECYSTECTOMY     • TEMPORAL ARTERY BIOPSY      normal   • LUNG NEEDLE BIOPSY      right lung, nodules found   • ARTHROSCOPE      left knee   • ARTHROSCOPY, KNEE      right   • BUNIONECTOMY      bilat   • HYSTERECTOMY RADICAL     • VEIN LIGATION         CURRENT MEDICATIONS  Home Medications     Reviewed by Hitesh Shin (Pharmacy Tech) on 20 at 1123  Med List Status: Complete   Medication Last Dose Status   CALCIUM PO 2020 Active   Cholecalciferol (VITAMIN D3) 25 MCG (1000 UT) Cap 2020 Active   folic  acid (FOLVITE) 1 MG TABS 7/21/2020 Active   levothyroxine (SYNTHROID) 75 MCG TABS 7/21/2020 Active   magnesium hydroxide (MILK OF MAGNESIA) 400 MG/5ML Suspension 7/20/2020 Active   metoprolol (LOPRESSOR) 50 MG Tab 7/21/2020 Active   Multiple Vitamins-Minerals (ADULT ONE DAILY GUMMIES PO) 7/20/2020 Active   Non Formulary Request 7/20/2020 Active   Psyllium (METAMUCIL PO) > 1 week Active   warfarin (COUMADIN) 2 MG Tab 7/20/2020 Active                ALLERGIES  Allergies   Allergen Reactions   • Cephalexin [Keflex] Swelling   • Hydroxyzine Swelling     Eyes get itchy and swollen    • Naprelan [Naproxen] Swelling     Eyes get itchy become red and swollen   • Oxaprozin Swelling     Swelling eyes, and itchy   • Ampicillin Rash     Red Rash   • Baclofen Unspecified     drowsiness   • Citalopram Vomiting and Nausea   • Clarithromycin Unspecified     Pt reports that this medication plugs her ears.    • Erythromycin Diarrhea     GI upset   • Promethazine Unspecified     Drowsiness and sleeps   • Vi-Q-Tuss [Hydrocodone-Guaifenesin] Vomiting and Nausea   • Voltaren [Diclofenac Sodium] Rash and Swelling     Red rash and swelling       PHYSICAL EXAM  VITAL SIGNS: /71   Pulse 88   Temp 36.9 °C (98.4 °F) (Temporal)   Resp 18   Wt 49 kg (108 lb 0.4 oz)   SpO2 98%   BMI 16.92 kg/m²    Constitutional: Well developed, Well nourished, No acute distress, Non-toxic appearance.   HENT: Normocephalic, Atraumatic, Bilateral external ears normal, Oropharynx moist, No oral exudates, Nose normal.   Eyes: PERRLA, EOMI, Conjunctiva normal, No discharge.   Neck: Normal range of motion, No tenderness, Supple, No stridor.   Cardiovascular: Regular rate and rhythm, no audible murmur  Thorax & Lungs: Clear to auscultation bilaterally.  No rales, rhonchi, or wheezing.  Abdomen: Bowel sounds normal, Soft, No tenderness, No masses, No pulsatile masses.  Some tenderness to palpation in the left lower quadrant.  Skin: Warm, Dry, No erythema, No  rash.   Back: No tenderness, No CVA tenderness.   Extremities: Intact distal pulses, No tenderness, No cyanosis, No clubbing.  Neurologic: Alert & oriented x 3, Normal motor function, Normal sensory function, No focal deficits noted.     EKG  Results for orders placed or performed during the hospital encounter of 20   EKG   Result Value Ref Range    Report       [facility]    Test Date:  2020  Pt Name:    ZAINAB RYDER                Department: [facility]  MRN:        [id number]                      [facility]  Gender:     Female                       Technician: IRA  :        [id number]                   Requested By:ER TRIAGE PROTOCOL  Order #:    [id number]                    Reading MD: RAINER RANGEL MD    Measurements  Intervals                                Axis  Rate:       85                           P:  OK:                                      QRS:        26  QRSD:       82                           T:          52  QT:         364  QTc:        433    Interpretive Statements  ATRIAL FLUTTER, A-RATE 241  LOW VOLTAGE IN FRONTAL LEADS  BASELINE WANDER IN LEAD(S) V5  Compared to ECG 2020 11:27:55  Sinus rhythm no longer present  First degree AV block no longer present  Electronically Signed On 2020 12:57:53 PDT by RAINER RANGEL MD           RADIOLOGY/PROCEDURES    CT-RENAL COLIC EVALUATION(A/P W/O)   Final Result      1.  Small left pleural effusion. Adjacent airspace disease may be secondary to atelectasis or pneumonia.      2.  Left nephrolithiasis.      3.  Atherosclerosis.      DX-CHEST-PORTABLE (1 VIEW)   Final Result      1.  Linear atelectasis within the left lung base.      2.  Likely minimal left pleural effusion.      3.  Borderline cardiomegaly.        Results for orders placed or performed during the hospital encounter of 20   CBC WITH DIFFERENTIAL   Result Value Ref Range    WBC 12.9 (H) 4.8 - 10.8 K/uL    RBC 4.93  4.20 - 5.40 M/uL    Hemoglobin 14.7 12.0 - 16.0 g/dL    Hematocrit 46.2 37.0 - 47.0 %    MCV 93.7 81.4 - 97.8 fL    MCH 29.8 27.0 - 33.0 pg    MCHC 31.8 (L) 33.6 - 35.0 g/dL    RDW 43.2 35.9 - 50.0 fL    Platelet Count 211 164 - 446 K/uL    MPV 10.0 9.0 - 12.9 fL    Neutrophils-Polys 76.80 (H) 44.00 - 72.00 %    Lymphocytes 13.90 (L) 22.00 - 41.00 %    Monocytes 8.50 0.00 - 13.40 %    Eosinophils 0.00 0.00 - 6.90 %    Basophils 0.30 0.00 - 1.80 %    Immature Granulocytes 0.50 0.00 - 0.90 %    Nucleated RBC 0.00 /100 WBC    Neutrophils (Absolute) 9.86 (H) 2.00 - 7.15 K/uL    Lymphs (Absolute) 1.79 1.00 - 4.80 K/uL    Monos (Absolute) 1.09 (H) 0.00 - 0.85 K/uL    Eos (Absolute) 0.00 0.00 - 0.51 K/uL    Baso (Absolute) 0.04 0.00 - 0.12 K/uL    Immature Granulocytes (abs) 0.07 0.00 - 0.11 K/uL    NRBC (Absolute) 0.00 K/uL   COMP METABOLIC PANEL   Result Value Ref Range    Sodium 133 (L) 135 - 145 mmol/L    Potassium 5.0 3.6 - 5.5 mmol/L    Chloride 94 (L) 96 - 112 mmol/L    Co2 27 20 - 33 mmol/L    Anion Gap 12.0 7.0 - 16.0    Glucose 157 (H) 65 - 99 mg/dL    Bun 26 (H) 8 - 22 mg/dL    Creatinine 1.63 (H) 0.50 - 1.40 mg/dL    Calcium 9.6 8.4 - 10.2 mg/dL    AST(SGOT) 19 12 - 45 U/L    ALT(SGPT) 8 2 - 50 U/L    Alkaline Phosphatase 85 30 - 99 U/L    Total Bilirubin 0.8 0.1 - 1.5 mg/dL    Albumin 3.8 3.2 - 4.9 g/dL    Total Protein 8.6 (H) 6.0 - 8.2 g/dL    Globulin 4.8 (H) 1.9 - 3.5 g/dL    A-G Ratio 0.8 g/dL   LIPASE   Result Value Ref Range    Lipase 15 7 - 58 U/L   TROPONIN   Result Value Ref Range    Troponin T 27 (H) 6 - 19 ng/L   URINALYSIS CULTURE, IF INDICATED    Specimen: Urine   Result Value Ref Range    Color Yellow     Character Clear     Specific Gravity 1.020 <1.035    Ph 6.5 5.0 - 8.0    Glucose Negative Negative mg/dL    Ketones Negative Negative mg/dL    Protein 100 (A) Negative mg/dL    Bilirubin Negative Negative    Nitrite Negative Negative    Leukocyte Esterase Negative Negative    Occult Blood Large  (A) Negative    Micro Urine Req Microscopic    URINE MICROSCOPIC (W/UA)   Result Value Ref Range    WBC 2-5 /hpf    RBC >150 (A) /hpf    Bacteria Rare (A) None /hpf    Trans Epithelial Cells Rare /hpf   ESTIMATED GFR   Result Value Ref Range    GFR If  36 (A) >60 mL/min/1.73 m 2    GFR If Non African American 30 (A) >60 mL/min/1.73 m 2   EKG   Result Value Ref Range    Report       [facility] Emergency Dept.    Test Date:  2020  Pt Name:    ZAINAB RYDER                Department: [facility]  MRN:        [id number]                      [facility]  Gender:     Female                       Technician: IRA  :        [id number]                   Requested By:ER TRIAGE PROTOCOL  Order #:    [id number]                    Reading MD: RAINER RANGEL MD    Measurements  Intervals                                Axis  Rate:       85                           P:  ME:                                      QRS:        26  QRSD:       82                           T:          52  QT:         364  QTc:        433    Interpretive Statements  ATRIAL FLUTTER, A-RATE 241  LOW VOLTAGE IN FRONTAL LEADS  BASELINE WANDER IN LEAD(S) V5  Compared to ECG 2020 11:27:55  Sinus rhythm no longer present  First degree AV block no longer present  Electronically Signed On 2020 12:57:53 PDT by RAINER RANGEL MD           COURSE & MEDICAL DECISION MAKING  Pertinent Labs & Imaging studies reviewed. (See chart for details)    Patient presents today with left lower quadrant abdominal pain.  Seems to be radiating on her left flank and up into her left side of her chest.  Fairly diffuse distribution of pain.  No other associated symptoms.  IV is established.  Laboratory studies are obtained.  Urinalysis is obtained.    Urinalysis is remarkable for hematuria.  Given the distribution of pain ureterolithiasis possible.  CT scan is obtained.    CT scan findings as above.  No evidence of  ureterolithiasis.  She does have nephrolithiasis.  She also has findings in the left lower lobe possibly consistent with pneumonia.  Therefore the patient will be treated with antibiotics.  Given a dose of Levaquin based on her allergy profile.    Troponin is mildly elevated.    Given the patient's advanced age, comorbidities, elevated troponin, and possible pneumonia on her CT scan the patient will be admitted to the hospital for further evaluation and treatment.  I spoke with the on-call hospitalist Dr. Christy for admission.      FINAL IMPRESSION  1. Chest pain, unspecified type    2. Flank pain    3. Elevated troponin    4. Pneumonia of left lung due to infectious organism, unspecified part of lung    5. Pleural effusion              Electronically signed by: Pacheco Vaughn M.D., 7/21/2020 1:15 PM

## 2020-07-21 NOTE — PROGRESS NOTES
Inpatient Anticoagulation Service Note    Date: 7/21/2020    Reason for Anticoagulation: Atrial Fibrillation, Deep Vein Thrombosis, Stroke   Target INR: 2.0 to 3.0  [id number] VASc Score: 6  HAS-BLED Score: 3   Hemoglobin Value: 14.7  Hematocrit Value: 46.2  Lab Platelet Value: 211    INR from last 7 days     Date/Time INR Value    07/21/20 1047  (!) 2.39        Dose from last 7 days     Date/Time Dose (mg)    07/21/20 1047  2        Significant Interactions: Antibiotics, Thyroid Medications (If less than 5 days and overlap therapy discontinued -- document reason (i.e. Bleed Risk))    (If still on overlap therapy, if No -- document reason (i.e. Bleed Risk))    Comments: (Home regimen: warfarin 1 mg FRI, 2 mg AODs)    Plan:  Warfarin 2 mg PO today  Education Material Provided?: No  Pharmacist suggested discharge dosing: resume home regimen     Quincy Remy, PharmD

## 2020-07-21 NOTE — PROGRESS NOTES
Telemetry Strip     Strip printed: 1418  Measurements from am strip were as follows:  Rhythm: Aflutter  HR: 110  Measurements:- / 0.08/ -  Ectopy: none             Normal Values  Rhythm SR  HR Range    Measurements 0.12-0.20 / 0.06-0.10  / 0.30-0.52

## 2020-07-21 NOTE — ED NOTES
Med rec updated and complete  Allergies reviewed  Pt had a list of medications at bedside, went over list of medications and returned list of medications back to pt at bedside  Pt reports no antibiotics in the last 2 weeks

## 2020-07-21 NOTE — CARE PLAN
Problem: Knowledge Deficit  Goal: Knowledge of disease process/condition, treatment plan, diagnostic tests, and medications will improve  Outcome: PROGRESSING AS EXPECTED  Note: Updated pt on plan of care including scheduled stress test for AM, no further questions at this time.      Problem: Skin Integrity  Goal: Risk for impaired skin integrity will decrease  Outcome: PROGRESSING AS EXPECTED  Note: Pt has redness on sacrum blanchable.

## 2020-07-21 NOTE — ASSESSMENT & PLAN NOTE
Noticed to have intermittent leukocytosis in the past from previous labs reviewed  Follow CBC in the morning

## 2020-07-21 NOTE — H&P
Hospital Medicine History & Physical Note    Date of Service  7/21/2020    Primary Care Physician  Pcp Pt States None    Code Status  DNAR/DNI    Chief Complaint  Chief Complaint   Patient presents with   • Flank Pain     left   • Chest Pain     left       History of Presenting Illness  86 y.o. female who presented 7/21/2020 with intermittent chest pain for the past 2 days.  She described the pain as sharp pain over the left side of the chest, no radiation, 6 out of 10 intensity, intermittent in nature.  She also complained about intermittent pain in the left lower quadrant abdominal area.  The patient denies any fever, cough, nausea, vomiting, shortness of breath associated with it.  She never have any history of coronary artery disease.  Her troponin was mildly elevated in ER could be related to CKD stage III.  She will be admitted for observation.    Review of Systems  Review of Systems   Constitutional: Negative for chills, fever and weight loss.   HENT: Negative for congestion and nosebleeds.    Eyes: Negative for blurred vision, pain, discharge and redness.   Respiratory: Negative for cough, sputum production, shortness of breath and stridor.    Cardiovascular: Positive for chest pain. Negative for palpitations and orthopnea.   Gastrointestinal: Positive for abdominal pain. Negative for diarrhea, heartburn, nausea and vomiting.   Genitourinary: Negative for dysuria, frequency and urgency.   Musculoskeletal: Negative for back pain, myalgias and neck pain.   Skin: Negative for itching and rash.   Neurological: Negative for dizziness, focal weakness, seizures and headaches.   Psychiatric/Behavioral: Negative for depression. The patient is not nervous/anxious and does not have insomnia.        Past Medical History   has a past medical history of A fib (9/22/06), Abdominal bruit (9/25/2012), Allergic rhinitis (9/21/2012), Aseptic necrosis of bone, site unspecified (9/21/2012), DVT (6/1996), First degree  atrioventricular block (9/21/2012), H/O echocardiogram (9/21/2012), Hypothyroid (10/2002), Kidney disorder (1998), Kidney failure, Mitral valve prolapse, Palpitations (9/21/2012), Rheumatic fever (9/21/2012), Stroke (HCC) (9/21/2012), and Wegener's granulomatosis (HCC).    Surgical History   has a past surgical history that includes bunionectomy (1980); arthroscopy, knee (1986); arthroscope (1989); temporal artery biopsy (1996); lung needle biopsy (1996); cataract extraction (2006); cholecystectomy (1997); hysterectomy radical (1975); vein ligation; and pr partial hip replacement (Right, 6/19/2019).     Family History  family history includes Cancer in her brother; Non-contributory in an other family member; Other in her father.     Social History   reports that she quit smoking about 60 years ago. Her smoking use included cigarettes. She smoked 1.00 pack per day. She has never used smokeless tobacco. She reports current alcohol use. She reports that she does not use drugs.    Allergies  Allergies   Allergen Reactions   • Cephalexin [Keflex] Swelling   • Hydroxyzine Swelling     Eyes get itchy and swollen    • Naprelan [Naproxen] Swelling     Eyes get itchy become red and swollen   • Oxaprozin Swelling     Swelling eyes, and itchy   • Ampicillin Rash     Red Rash   • Baclofen Unspecified     drowsiness   • Citalopram Vomiting and Nausea   • Clarithromycin Unspecified     Pt reports that this medication plugs her ears.    • Erythromycin Diarrhea     GI upset   • Promethazine Unspecified     Drowsiness and sleeps   • Vi-Q-Tuss [Hydrocodone-Guaifenesin] Vomiting and Nausea   • Voltaren [Diclofenac Sodium] Rash and Swelling     Red rash and swelling       Medications  Prior to Admission Medications   Prescriptions Last Dose Informant Patient Reported? Taking?   CALCIUM PO 7/20/2020 at 0800 Patient Yes Yes   Sig: Take 1 Tab by mouth 2 Times a Day.   Cholecalciferol (VITAMIN D3) 25 MCG (1000 UT) Cap 7/21/2020 at 0800  Patient Yes Yes   Sig: Take 1,000 Units by mouth 2 Times a Day.   Multiple Vitamins-Minerals (ADULT ONE DAILY GUMMIES PO) 7/20/2020 at 0800 Patient Yes Yes   Sig: Take 2 Tabs by mouth every day.   Non Formulary Request 7/20/2020 at 2100 Patient Yes Yes   Sig: Place 1 Drop in both eyes as needed (For dry eye). Thera Tears (OTC)   Psyllium (METAMUCIL PO) > 1 week at Unknown Patient Yes Yes   Sig: Take 1 Package by mouth as needed (For constipation). For constipation   folic acid (FOLVITE) 1 MG TABS 7/21/2020 at 0800 Patient Yes No   Sig: Take 1 mg by mouth every day.   levothyroxine (SYNTHROID) 75 MCG TABS 7/21/2020 at 0630 Patient Yes No   Sig: Take 75 mcg by mouth every morning before breakfast.   magnesium hydroxide (MILK OF MAGNESIA) 400 MG/5ML Suspension 7/20/2020 at 1900 Patient Yes Yes   Sig: Take 30 mL by mouth 1 time daily as needed (For constipation).   metoprolol (LOPRESSOR) 50 MG Tab 7/21/2020 at 0800 Patient No No   Sig: Take 1 Tab by mouth 2 times a day.   warfarin (COUMADIN) 2 MG Tab 7/20/2020 at 1830 Patient Yes Yes   Sig: Take 1-2 mg by mouth every evening. Pt takes 1MG on Friday  2MG all other days      Facility-Administered Medications: None       Physical Exam  Temp:  [36.9 °C (98.4 °F)] 36.9 °C (98.4 °F)  Pulse:  [86-88] 88  Resp:  [18] 18  BP: (128)/(71) 128/71  SpO2:  [98 %] 98 %    Physical Exam  Vitals signs reviewed.   Constitutional:       General: She is not in acute distress.     Appearance: Normal appearance.   HENT:      Head: Normocephalic and atraumatic.      Nose: No congestion or rhinorrhea.   Eyes:      Extraocular Movements: Extraocular movements intact.      Pupils: Pupils are equal, round, and reactive to light.   Neck:      Musculoskeletal: Normal range of motion and neck supple.   Cardiovascular:      Rate and Rhythm: Normal rate and regular rhythm.      Pulses: Normal pulses.   Pulmonary:      Effort: Pulmonary effort is normal. No respiratory distress.      Breath sounds:  Normal breath sounds.   Abdominal:      General: Bowel sounds are normal. There is no distension.      Palpations: Abdomen is soft.      Tenderness: There is no abdominal tenderness.   Musculoskeletal:         General: No swelling or tenderness.   Skin:     General: Skin is warm.      Findings: No erythema.   Neurological:      General: No focal deficit present.      Mental Status: She is alert and oriented to person, place, and time.         Laboratory:  Recent Labs     07/21/20  1047   WBC 12.9*   RBC 4.93   HEMOGLOBIN 14.7   HEMATOCRIT 46.2   MCV 93.7   MCH 29.8   MCHC 31.8*   RDW 43.2   PLATELETCT 211   MPV 10.0     Recent Labs     07/21/20  1047   SODIUM 133*   POTASSIUM 5.0   CHLORIDE 94*   CO2 27   GLUCOSE 157*   BUN 26*   CREATININE 1.63*   CALCIUM 9.6     Recent Labs     07/21/20  1047   ALTSGPT 8   ASTSGOT 19   ALKPHOSPHAT 85   TBILIRUBIN 0.8   LIPASE 15   GLUCOSE 157*         No results for input(s): NTPROBNP in the last 72 hours.      Recent Labs     07/21/20  1047   TROPONINT 27*       Imaging:  CT-RENAL COLIC EVALUATION(A/P W/O)   Final Result      1.  Small left pleural effusion. Adjacent airspace disease may be secondary to atelectasis or pneumonia.      2.  Left nephrolithiasis.      3.  Atherosclerosis.      DX-CHEST-PORTABLE (1 VIEW)   Final Result      1.  Linear atelectasis within the left lung base.      2.  Likely minimal left pleural effusion.      3.  Borderline cardiomegaly.            Assessment/Plan:      Paroxysmal atrial fibrillation (HCC)  Assessment & Plan  Continue metoprolol and warfarin    Leukocytosis  Assessment & Plan  Noticed to have intermittent leukocytosis in the past from previous labs reviewed  Follow CBC in the morning    Chest pain  Assessment & Plan  Trend troponin and EKG  Monitor on telemetry  I reviewed echo from June 2019 show LVEF 65% with severe tricuspid regurgitation  I will order another echocardiogram  Order d-dimer  N.p.o.  Stress test in the morning  The  patient denies any respiratory symptoms  Chest x-ray showed small left pleural effusion  I will order procalcitonin  We will hold antibiotic at this point    CKD (chronic kidney disease) stage 3, GFR 30-59 ml/min ([facility])- (present on admission)  Assessment & Plan  Creatinine stable at baseline

## 2020-07-21 NOTE — PROGRESS NOTES
2 RN Skin Check    2 RN skin check complete.   Devices in place: tele monitor, glasses.  Skin assessed under devices: yes.  Confirmed pressure ulcers found on: NA.  New potential pressure ulcers noted on NA. Wound consult placed N/A.  The following interventions in place NA.      Skin intact, no wounds noted. Blanchable redness on sacrum

## 2020-07-22 ENCOUNTER — APPOINTMENT (OUTPATIENT)
Dept: RADIOLOGY | Facility: MEDICAL CENTER | Age: 85
End: 2020-07-22
Attending: INTERNAL MEDICINE
Payer: MEDICARE

## 2020-07-22 LAB
ALBUMIN SERPL BCP-MCNC: 2.8 G/DL (ref 3.2–4.9)
ALBUMIN/GLOB SERPL: 0.6 G/DL
ALP SERPL-CCNC: 78 U/L (ref 30–99)
ALT SERPL-CCNC: 7 U/L (ref 2–50)
ANION GAP SERPL CALC-SCNC: 9 MMOL/L (ref 7–16)
AST SERPL-CCNC: 20 U/L (ref 12–45)
BILIRUB SERPL-MCNC: 0.7 MG/DL (ref 0.1–1.5)
BUN SERPL-MCNC: 25 MG/DL (ref 8–22)
CALCIUM SERPL-MCNC: 8.9 MG/DL (ref 8.4–10.2)
CHLORIDE SERPL-SCNC: 98 MMOL/L (ref 96–112)
CO2 SERPL-SCNC: 26 MMOL/L (ref 20–33)
CREAT SERPL-MCNC: 1.46 MG/DL (ref 0.5–1.4)
ERYTHROCYTE [DISTWIDTH] IN BLOOD BY AUTOMATED COUNT: 44.1 FL (ref 35.9–50)
GLOBULIN SER CALC-MCNC: 4.4 G/DL (ref 1.9–3.5)
GLUCOSE SERPL-MCNC: 110 MG/DL (ref 65–99)
HCT VFR BLD AUTO: 44.5 % (ref 37–47)
HGB BLD-MCNC: 13.9 G/DL (ref 12–16)
INR PPP: 3.51 (ref 0.87–1.13)
MCH RBC QN AUTO: 29.4 PG (ref 27–33)
MCHC RBC AUTO-ENTMCNC: 31.2 G/DL (ref 33.6–35)
MCV RBC AUTO: 94.3 FL (ref 81.4–97.8)
PLATELET # BLD AUTO: 181 K/UL (ref 164–446)
PMV BLD AUTO: 10.1 FL (ref 9–12.9)
POTASSIUM SERPL-SCNC: 4.1 MMOL/L (ref 3.6–5.5)
PROT SERPL-MCNC: 7.2 G/DL (ref 6–8.2)
PROTHROMBIN TIME: 36.3 SEC (ref 12–14.6)
RBC # BLD AUTO: 4.72 M/UL (ref 4.2–5.4)
SODIUM SERPL-SCNC: 133 MMOL/L (ref 135–145)
WBC # BLD AUTO: 7.7 K/UL (ref 4.8–10.8)

## 2020-07-22 PROCEDURE — G0378 HOSPITAL OBSERVATION PER HR: HCPCS

## 2020-07-22 PROCEDURE — 93018 CV STRESS TEST I&R ONLY: CPT | Performed by: INTERNAL MEDICINE

## 2020-07-22 PROCEDURE — 85610 PROTHROMBIN TIME: CPT

## 2020-07-22 PROCEDURE — 85027 COMPLETE CBC AUTOMATED: CPT

## 2020-07-22 PROCEDURE — 700102 HCHG RX REV CODE 250 W/ 637 OVERRIDE(OP): Performed by: INTERNAL MEDICINE

## 2020-07-22 PROCEDURE — A9270 NON-COVERED ITEM OR SERVICE: HCPCS | Performed by: INTERNAL MEDICINE

## 2020-07-22 PROCEDURE — 78452 HT MUSCLE IMAGE SPECT MULT: CPT | Mod: 26 | Performed by: INTERNAL MEDICINE

## 2020-07-22 PROCEDURE — 80053 COMPREHEN METABOLIC PANEL: CPT

## 2020-07-22 PROCEDURE — 99225 PR SUBSEQUENT OBSERVATION CARE,LEVEL II: CPT | Performed by: HOSPITALIST

## 2020-07-22 PROCEDURE — A9502 TC99M TETROFOSMIN: HCPCS

## 2020-07-22 RX ORDER — REGADENOSON 0.08 MG/ML
INJECTION, SOLUTION INTRAVENOUS
Status: ACTIVE
Start: 2020-07-22 | End: 2020-07-22

## 2020-07-22 RX ADMIN — METOPROLOL TARTRATE 50 MG: 50 TABLET, FILM COATED ORAL at 17:05

## 2020-07-22 RX ADMIN — ACETAMINOPHEN 650 MG: 325 TABLET, FILM COATED ORAL at 21:30

## 2020-07-22 RX ADMIN — LEVOTHYROXINE SODIUM 75 MCG: 75 TABLET ORAL at 05:07

## 2020-07-22 RX ADMIN — ACETAMINOPHEN 650 MG: 325 TABLET, FILM COATED ORAL at 05:07

## 2020-07-22 RX ADMIN — SENNOSIDES AND DOCUSATE SODIUM 2 TABLET: 8.6; 5 TABLET ORAL at 08:29

## 2020-07-22 RX ADMIN — SENNOSIDES AND DOCUSATE SODIUM 2 TABLET: 8.6; 5 TABLET ORAL at 17:41

## 2020-07-22 ASSESSMENT — ENCOUNTER SYMPTOMS
DIARRHEA: 0
TREMORS: 0
HEARTBURN: 0
CONSTIPATION: 1
BLURRED VISION: 0
NAUSEA: 0
VOMITING: 0
BLOOD IN STOOL: 0
SENSORY CHANGE: 0
FEVER: 0
TINGLING: 0
CHILLS: 0
ABDOMINAL PAIN: 1

## 2020-07-22 ASSESSMENT — FIBROSIS 4 INDEX: FIB4 SCORE: 2.74

## 2020-07-22 NOTE — PROGRESS NOTES
Bedside report given to Felicity FRASER. POC discussed. Pt resting comfortably in bed. Safety precautions in place.

## 2020-07-22 NOTE — PROGRESS NOTES
Inpatient Anticoagulation Service Note    Date: 7/22/2020    Reason for Anticoagulation: Atrial Fibrillation, Deep Vein Thrombosis, Stroke   Target INR: 2.0 to 3.0  ANO4CD0 VASc Score: 6  HAS-BLED Score: 3   Hemoglobin Value: 13.9  Hematocrit Value: 44.5  Lab Platelet Value: 181    INR from last 7 days     Date/Time INR Value    07/22/20 0416  (!) 3.51    07/21/20 1047  (!) 2.39        Dose from last 7 days     Date/Time Dose (mg)    07/22/20 1500  0    07/21/20 1047  2        Significant Interactions: Antibiotics, Thyroid Medications (If less than 5 days and overlap therapy discontinued -- document reason (i.e. Bleed Risk))    (If still on overlap therapy, if No -- document reason (i.e. Bleed Risk))    Comments: (Home regimen: warfarin 1 mg FRI, 2 mg AODs)    Plan:  Holding warfarin due to supratherapeutic INR  Education Material Provided?: No  Pharmacist suggested discharge dosing: resume home regimen     Quincy Remy, PharmD

## 2020-07-22 NOTE — PROGRESS NOTES
[facility] Medicine Daily Progress Note    Date of Service  7/22/2020    Chief Complaint  86 y.o. female admitted 7/21/2020 with chest pain    Hospital Course    Stress testing 7/22      Interval Problem Update  Seen and examined. Chart reviewed, including labs and any pertinent imaging.  No chest pain now, back from ST  Having sharp abdominal pain, has had on and off for months    Reviewed CT, small non obstructing stone and chronic avascular necrosis left hip, unclear if contributing to GI complaints    Consultants/Specialty  None    Code Status  DNR DNI    Disposition  OBS    Review of Systems  Review of Systems   Constitutional: Negative for chills and fever.   Eyes: Negative for blurred vision.   Cardiovascular: Negative for chest pain.   Gastrointestinal: Positive for abdominal pain and constipation. Negative for blood in stool, diarrhea, heartburn, nausea and vomiting.   Skin: Negative for itching and rash.   Neurological: Negative for tingling, tremors and sensory change.   All other systems reviewed and are negative.       Physical Exam  Temp:  [36.3 °C (97.4 °F)-36.4 °C (97.6 °F)] 36.4 °C (97.5 °F)  Pulse:  [] 115  Resp:  [16-18] 18  BP: ()/(42-75) 118/72  SpO2:  [93 %-97 %] 97 %    Physical Exam  Vitals signs and nursing note reviewed.   Constitutional:       General: She is not in acute distress.     Appearance: She is ill-appearing.   HENT:      Head: Normocephalic and atraumatic.      Nose: No congestion.      Mouth/Throat:      Mouth: Mucous membranes are moist.      Pharynx: Oropharynx is clear.   Eyes:      General:         Right eye: No discharge.         Left eye: No discharge.      Extraocular Movements: Extraocular movements intact.      Conjunctiva/sclera: Conjunctivae normal.   Neck:      Musculoskeletal: Normal range of motion and neck supple.   Cardiovascular:      Rate and Rhythm: Normal rate.      Pulses: Normal pulses.      Heart sounds: Normal heart sounds.   Pulmonary:       Effort: Pulmonary effort is normal. No respiratory distress.      Breath sounds: Normal breath sounds. No wheezing.   Abdominal:      General: Bowel sounds are normal. There is no distension.      Palpations: Abdomen is soft.      Tenderness: There is abdominal tenderness. There is no guarding.   Skin:     General: Skin is warm and dry.      Coloration: Skin is not jaundiced.      Findings: No bruising.   Neurological:      General: No focal deficit present.      Mental Status: She is alert and oriented to person, place, and time.      Cranial Nerves: No cranial nerve deficit.      Motor: Weakness present.   Psychiatric:         Mood and Affect: Mood normal.         Thought Content: Thought content normal.         Fluids  No intake or output data in the 24 hours ending 07/22/20 1437    Laboratory  Recent Labs     07/21/20  1047 07/22/20  0416   WBC 12.9* 7.7   RBC 4.93 4.72   HEMOGLOBIN 14.7 13.9   HEMATOCRIT 46.2 44.5   MCV 93.7 94.3   MCH 29.8 29.4   MCHC 31.8* 31.2*   RDW 43.2 44.1   PLATELETCT 211 181   MPV 10.0 10.1     Recent Labs     07/21/20  1047 07/22/20  0416   SODIUM 133* 133*   POTASSIUM 5.0 4.1   CHLORIDE 94* 98   CO2 27 26   GLUCOSE 157* 110*   BUN 26* 25*   CREATININE 1.63* 1.46*   CALCIUM 9.6 8.9     Recent Labs     07/21/20  1047 07/22/20  0416   INR 2.39* 3.51*               Imaging  NM-CARDIAC STRESS TEST   Final Result      EC-ECHOCARDIOGRAM COMPLETE W/O CONT   Final Result      CT-RENAL COLIC EVALUATION(A/P W/O)   Final Result      1.  Small left pleural effusion. Adjacent airspace disease may be secondary to atelectasis or pneumonia.      2.  Left nephrolithiasis.      3.  Atherosclerosis.      DX-CHEST-PORTABLE (1 VIEW)   Final Result      1.  Linear atelectasis within the left lung base.      2.  Likely minimal left pleural effusion.      3.  Borderline cardiomegaly.           Assessment/Plan  * Chest pain  Assessment & Plan  Mild trop elevation  Monitor on telemetry  Await stress test  results     I reviewed echo from June 2019 show LVEF 65% with severe tricuspid regurgitation  The patient denies any respiratory symptoms  Chest x-ray showed small left pleural effusion    Now patient denies chest pain and complaints of abdominal pain and constipation     Paroxysmal atrial fibrillation (HCC)  Assessment & Plan  Continue metoprolol and warfarin    Leukocytosis  Assessment & Plan  Noticed to have intermittent leukocytosis in the past from previous labs reviewed  Follow CBC in the morning    CKD (chronic kidney disease) stage 3, GFR 30-59 ml/min (HCC)- (present on admission)  Assessment & Plan  Creatinine stable at baseline    Supratherapeutic INR- (present on admission)  Assessment & Plan  Warfarin per pharmacy    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    Wegener's granulomatosis (HCC)- (present on admission)  Assessment & Plan  History of       VTE prophylaxis: Warfarin

## 2020-07-22 NOTE — PROCEDURES
Patient presents to NM suite for cardiac stress test with MPI. Nursing goals identified: knowledge deficit, potential for anxiety r/t stress test, potential for compromised cardiac output. Care plan includes educating patient, reassurance, access to Aminophylline and access to ACLS cart/team. Labs and ECG reviewed. No caffeine and NPO confirmed. Resting images attained and patient prepped for pharmacological stress study. Lexiscan given while patient ambulated on TM x 2 min. Reported &/or observed symptoms include:flushing, nausea, headache, diarrhea. Caffeinated beverage provided. Symptoms resolved.

## 2020-07-22 NOTE — PROGRESS NOTES
Telemetry Shift Summary    Rhythm Afib/Aflutter  HR Range 82-93  Ectopy oPVC  Measurements -/0.08/-        Normal Values  Rhythm SR  HR Range    Measurements 0.12-0.20 / 0.06-0.10  / 0.30-0.52

## 2020-07-22 NOTE — PROGRESS NOTES
Assessment completed, patient A&Ox4, no c/o pain. Patient left to rest. No other needs at this time.

## 2020-07-22 NOTE — PROGRESS NOTES
"Report received from Nikky FRASER. POC discussed. Pt resting comfortably in bed ready for stress test. Safety precautions in place. No pain reported at this time. Pt says there is a \"jabbing\" pain in the left side of her abdomen, upper and lower.   "

## 2020-07-22 NOTE — CARE PLAN
Problem: Safety  Goal: Will remain free from injury  Outcome: PROGRESSING AS EXPECTED  Note: Pt call light and belongings with in reach, bed in locked and low position, treaded socks on, bed rails up x2       Problem: Skin Integrity  Goal: Risk for impaired skin integrity will decrease  Outcome: PROGRESSING AS EXPECTED  Note: Pt makes frequent adjustments in positioning, pt encouraged to turn, pt kept dry through out shift.

## 2020-07-23 VITALS
HEART RATE: 90 BPM | RESPIRATION RATE: 18 BRPM | SYSTOLIC BLOOD PRESSURE: 121 MMHG | DIASTOLIC BLOOD PRESSURE: 69 MMHG | BODY MASS INDEX: 16.99 KG/M2 | OXYGEN SATURATION: 98 % | HEIGHT: 67 IN | WEIGHT: 108.25 LBS | TEMPERATURE: 97.9 F

## 2020-07-23 LAB
ANION GAP SERPL CALC-SCNC: 12 MMOL/L (ref 7–16)
BASOPHILS # BLD AUTO: 0.6 % (ref 0–1.8)
BASOPHILS # BLD: 0.05 K/UL (ref 0–0.12)
BUN SERPL-MCNC: 29 MG/DL (ref 8–22)
CALCIUM SERPL-MCNC: 9.3 MG/DL (ref 8.4–10.2)
CHLORIDE SERPL-SCNC: 100 MMOL/L (ref 96–112)
CO2 SERPL-SCNC: 26 MMOL/L (ref 20–33)
CREAT SERPL-MCNC: 1.54 MG/DL (ref 0.5–1.4)
EOSINOPHIL # BLD AUTO: 0.08 K/UL (ref 0–0.51)
EOSINOPHIL NFR BLD: 0.9 % (ref 0–6.9)
ERYTHROCYTE [DISTWIDTH] IN BLOOD BY AUTOMATED COUNT: 43.5 FL (ref 35.9–50)
GLUCOSE SERPL-MCNC: 104 MG/DL (ref 65–99)
HCT VFR BLD AUTO: 42.7 % (ref 37–47)
HGB BLD-MCNC: 13.5 G/DL (ref 12–16)
IMM GRANULOCYTES # BLD AUTO: 0.04 K/UL (ref 0–0.11)
IMM GRANULOCYTES NFR BLD AUTO: 0.5 % (ref 0–0.9)
INR PPP: 3.27 (ref 0.87–1.13)
LYMPHOCYTES # BLD AUTO: 2.1 K/UL (ref 1–4.8)
LYMPHOCYTES NFR BLD: 23.9 % (ref 22–41)
MCH RBC QN AUTO: 29.5 PG (ref 27–33)
MCHC RBC AUTO-ENTMCNC: 31.6 G/DL (ref 33.6–35)
MCV RBC AUTO: 93.2 FL (ref 81.4–97.8)
MONOCYTES # BLD AUTO: 0.8 K/UL (ref 0–0.85)
MONOCYTES NFR BLD AUTO: 9.1 % (ref 0–13.4)
NEUTROPHILS # BLD AUTO: 5.72 K/UL (ref 2–7.15)
NEUTROPHILS NFR BLD: 65 % (ref 44–72)
NRBC # BLD AUTO: 0 K/UL
NRBC BLD-RTO: 0 /100 WBC
PLATELET # BLD AUTO: 226 K/UL (ref 164–446)
PMV BLD AUTO: 10.2 FL (ref 9–12.9)
POTASSIUM SERPL-SCNC: 3.6 MMOL/L (ref 3.6–5.5)
PROTHROMBIN TIME: 34.3 SEC (ref 12–14.6)
RBC # BLD AUTO: 4.58 M/UL (ref 4.2–5.4)
SODIUM SERPL-SCNC: 138 MMOL/L (ref 135–145)
WBC # BLD AUTO: 8.8 K/UL (ref 4.8–10.8)

## 2020-07-23 PROCEDURE — 80048 BASIC METABOLIC PNL TOTAL CA: CPT

## 2020-07-23 PROCEDURE — 85610 PROTHROMBIN TIME: CPT

## 2020-07-23 PROCEDURE — G0378 HOSPITAL OBSERVATION PER HR: HCPCS

## 2020-07-23 PROCEDURE — A9270 NON-COVERED ITEM OR SERVICE: HCPCS | Performed by: INTERNAL MEDICINE

## 2020-07-23 PROCEDURE — 85025 COMPLETE CBC W/AUTO DIFF WBC: CPT

## 2020-07-23 PROCEDURE — 700102 HCHG RX REV CODE 250 W/ 637 OVERRIDE(OP): Performed by: INTERNAL MEDICINE

## 2020-07-23 PROCEDURE — 99217 PR OBSERVATION CARE DISCHARGE: CPT | Performed by: HOSPITALIST

## 2020-07-23 RX ADMIN — METOPROLOL TARTRATE 50 MG: 50 TABLET, FILM COATED ORAL at 05:43

## 2020-07-23 RX ADMIN — LEVOTHYROXINE SODIUM 75 MCG: 75 TABLET ORAL at 05:43

## 2020-07-23 RX ADMIN — ACETAMINOPHEN 650 MG: 325 TABLET, FILM COATED ORAL at 11:52

## 2020-07-23 NOTE — CARE PLAN
Problem: Safety  Goal: Will remain free from injury  Outcome: PROGRESSING AS EXPECTED  Note: Pt call light and belongings with in reach, bed in locked and low position, treaded socks on, bed rails up x2     Problem: Bowel/Gastric:  Goal: Normal bowel function is maintained or improved  Outcome: PROGRESSING AS EXPECTED  Flowsheets (Taken 7/22/2020 2011)  Last BM: 07/22/20

## 2020-07-23 NOTE — PROGRESS NOTES
Pt discharged to home ([facility]). Discharge instructions provided to pt. Pt verbalizes understanding. Pt states all questions have been answered. Signed copy in chart. Prescriptions sent to n/a. Pt states that all personal belongings are in possession. Pt off unit via wheelchair, escorted by RN.

## 2020-07-23 NOTE — DISCHARGE SUMMARY
Discharge Summary    CHIEF COMPLAINT ON ADMISSION  Chief Complaint   Patient presents with   • Flank Pain     left   • Chest Pain     left       Reason for Admission  EMS     Admission Date  7/21/2020    CODE STATUS  DNAR/DNI    HPI & HOSPITAL COURSE  This is a 86 y.o. female here with initial complaints of chest pain, on further discussion with patient she has more left-sided abdominal flank pain under ribs, admitted for further evaluation.  Patient had stress testing performed which did not produce great images but overall reassuring, stress testing negative for ischemia.    CT abdomen pelvis on admission which showed very tiny 2 mm left kidney stone which may be related to her symptoms.  Advised patient to continue Tylenol and drink plenty fluids at home.    Her INR was somewhat elevated during admission, recommend she follow-up closely for adjustments as outpatient.  Last INR on 7/23/2020 was 3.27.    Therefore, she is discharged in fair and stable condition to home with close outpatient follow-up.      Discharge Date  7/23/2020    FOLLOW UP ITEMS POST DISCHARGE  Follow-up outpatient INR  Follow-up PCP        DISCHARGE DIAGNOSES  Principal Problem:    Chest pain POA: Unknown  Active Problems:    Wegener's granulomatosis (HCC) POA: Yes    Aseptic necrosis of bone (HCC) (Chronic) POA: Yes      Overview: ICD-10 transition    Supratherapeutic INR POA: Yes    CKD (chronic kidney disease) stage 3, GFR 30-59 ml/min (HCC) POA: Yes    Leukocytosis POA: Unknown    Paroxysmal atrial fibrillation (HCC) POA: Unknown  Resolved Problems:    * No resolved hospital problems. *      FOLLOW UP  Future Appointments   Date Time Provider Department Center   7/24/2020 12:45 PM Hussain Nation M.D. [facility] None   8/12/2020 11:00 AM [facility] EXAM 5 [facility] None     No follow-up provider specified.    MEDICATIONS ON DISCHARGE     Medication List      CONTINUE taking these medications      Instructions   ADULT ONE DAILY GUMMIES PO   Take 2 Tabs  by mouth every day.  Dose:  2 Tab     CALCIUM PO   Take 1 Tab by mouth 2 Times a Day.  Dose:  1 Tab     folic acid 1 MG Tabs  Commonly known as:  FOLVITE   Take 1 mg by mouth every day.  Dose:  1 mg     levothyroxine 75 MCG Tabs  Commonly known as:  SYNTHROID   Take 75 mcg by mouth every morning before breakfast.  Dose:  75 mcg     magnesium hydroxide 400 MG/5ML Susp  Commonly known as:  MILK OF MAGNESIA   Take 30 mL by mouth 1 time daily as needed (For constipation).  Dose:  30 mL     METAMUCIL PO   Take 1 Package by mouth as needed (For constipation). For constipation  Dose:  1 Package     metoprolol 50 MG Tabs  Commonly known as:  LOPRESSOR   Take 1 Tab by mouth 2 times a day.  Dose:  50 mg     Non Formulary Request   Place 1 Drop in both eyes as needed (For dry eye). Thera Tears (OTC)  Dose:  1 Drop     Vitamin D3 25 MCG (1000 UT) Caps   Take 1,000 Units by mouth 2 Times a Day.  Dose:  1,000 Units     warfarin 2 MG Tabs  Commonly known as:  COUMADIN   Take 1-2 mg by mouth every evening. Pt takes 1MG on Friday  2MG all other days  Dose:  1-2 mg            Allergies  Allergies   Allergen Reactions   • Cephalexin [Keflex] Swelling   • Hydroxyzine Swelling     Eyes get itchy and swollen    • Naprelan [Naproxen] Swelling     Eyes get itchy become red and swollen   • Oxaprozin Swelling     Swelling eyes, and itchy   • Ampicillin Rash     Red Rash   • Baclofen Unspecified     drowsiness   • Citalopram Vomiting and Nausea   • Clarithromycin Unspecified     Pt reports that this medication plugs her ears.    • Erythromycin Diarrhea     GI upset   • Promethazine Unspecified     Drowsiness and sleeps   • Vi-Q-Tuss [Hydrocodone-Guaifenesin] Vomiting and Nausea   • Voltaren [Diclofenac Sodium] Rash and Swelling     Red rash and swelling       DIET  Orders Placed This Encounter   Procedures   • Diet Order Regular     Standing Status:   Standing     Number of Occurrences:   1     Order Specific Question:   Diet:     Answer:    Regular [1]       ACTIVITY  As tolerated.  Weight bearing as tolerated    CONSULTATIONS  None    PROCEDURES  Stress testing, CT    LABORATORY  Lab Results   Component Value Date    SODIUM 138 07/23/2020    POTASSIUM 3.6 07/23/2020    CHLORIDE 100 07/23/2020    CO2 26 07/23/2020    GLUCOSE 104 (H) 07/23/2020    BUN 29 (H) 07/23/2020    CREATININE 1.54 (H) 07/23/2020    CREATININE 1.5 (H) 02/19/2009        Lab Results   Component Value Date    WBC 8.8 07/23/2020    HEMOGLOBIN 13.5 07/23/2020    HEMATOCRIT 42.7 07/23/2020    PLATELETCT 226 07/23/2020        Total time of the discharge process exceeds 36 minutes.

## 2020-07-23 NOTE — DIETARY
"Nutrition services: Day 0 of admit.  Az Tripp is a 86 y.o. female with admitting DX of Pain in the chest.  Consult received for MST score of 2 for unsure re: weight loss in >1 year.  BMI <19.    Assessment:  Height: 170.2 cm (5' 7\")  Weight: 49.1 kg (108 lb 3.9 oz)  Body mass index is 16.95 kg/m²., BMI classification: Underweight  Diet/Intake: Regular. PO intake 50-75% lunch 7/22, % breakfast 7/23.    Evaluation:   1. Pt is underweight, but per chart review, weight has increased over the past year. 919/19 = 44.5 kg. 2/20/20 = 47 kg.   2. No decreased appetite noted per nutrition screen and recorded PO intake appears good. Pt ate % of breakfast today.  3. No skin breakdown noted.    Malnutrition Risk: Criteria not met.    Recommendations/Plan:  1. Regular diet as ordered.   2. Encourage continued good intake of meals.  3. Document intake of all meals as % taken in ADL's to provide interdisciplinary communication across all shifts.   4. Monitor weight.  5. Nutrition rep will continue to see patient for ongoing meal and snack preferences.             "

## 2020-07-23 NOTE — PROGRESS NOTES
Telemetry Shift Summary    Rhythm Afib  HR Range   Ectopy oPVC  Measurements -/0.08/-        Normal Values  Rhythm SR  HR Range    Measurements 0.12-0.20 / 0.06-0.10  / 0.30-0.52

## 2020-07-23 NOTE — PROGRESS NOTES
Bedside report received from Felicity FRASER. Assumed care. POC discussed. Pt resting comfortably in bed. Safety precautions in place.

## 2020-07-23 NOTE — PROGRESS NOTES
Spoke with medical director Vickie at [address] [place] [address] [place] regarding patient's stay and course of care while at [facility].      Telemetry Shift Summary    Rhythm A-Fib  HR Range 90s-120s  Ectopy rare to occasional PVC  Measurements --/0.08/--        Normal Values  Rhythm SR  HR Range    Measurements 0.12-0.20 / 0.06-0.10  / 0.30-0.52

## 2020-07-23 NOTE — CARE PLAN
Problem: Infection  Goal: Will remain free from infection  Outcome: PROGRESSING AS EXPECTED  Intervention: Implement standard precautions and perform hand washing before and after patient contact  Note: Standard precautions and hand hygiene practices implemented before and after patient room entry. Gloves worn during times of patient contact.       Problem: Discharge Barriers/Planning  Goal: Patient's continuum of care needs will be met  Outcome: PROGRESSING AS EXPECTED  Intervention: Assess potential discharge barriers on admission and throughout hospital stay  Flowsheets (Taken 7/23/2020 0906)  Does Admitting Nurse Feel This Could be a Complex Discharge?: No  Note: Anticipate no needs home to [facility], potentially today.

## 2020-07-23 NOTE — DISCHARGE INSTRUCTIONS
You were admitted with chest pain and abdominal pain, underwent stress testing and your heart function looks good.  He also had a CT scan of your abdomen on 7/21/2020 which overall is reassuring, you had a small 2 mm stone in your left kidney which may be related to the pains that area experiencing.  This stone should pass on its own or may be painful, continue Tylenol at home and drink plenty of water.  We also discussed avoiding constipation, please continue to use Metamucil every morning for the next month.  Be sure to drink with plenty of water.    Discharge Instructions    Discharged to home by car with relative. Discharged via wheelchair, hospital escort: Yes.  Special equipment needed: Not Applicable    Be sure to schedule a follow-up appointment with your primary care doctor or any specialists as instructed.     Discharge Plan:   Diet Plan: Discussed  Activity Level: Discussed  Confirmed Follow up Appointment: Patient to Call and Schedule Appointment  Confirmed Symptoms Management: Discussed  Medication Reconciliation Updated: Yes    I understand that a diet low in cholesterol, fat, and sodium is recommended for good health. Unless I have been given specific instructions below for another diet, I accept this instruction as my diet prescription.   Other diet: Regular    Special Instructions: None    · Is patient discharged on Warfarin / Coumadin?   No     Chest Pain Observation  It is often hard to give a specific diagnosis for the cause of chest pain. Among other possibilities your symptoms might be caused by inadequate oxygen delivery to your heart (angina). Angina that is not treated or evaluated can lead to a heart attack (myocardial infarction) or death.  Blood tests, electrocardiograms, and X-rays may have been done to help determine a possible cause of your chest pain. After evaluation and observation, your health care provider has determined that it is unlikely your pain was caused by an unstable  condition that requires hospitalization. However, a full evaluation of your pain may need to be completed, with additional diagnostic testing as directed. It is very important to keep your follow-up appointments. Not keeping your follow-up appointments could result in permanent heart damage, disability, or death. If there is any problem keeping your follow-up appointments, you must call your health care provider.  HOME CARE INSTRUCTIONS   Due to the slight chance that your pain could be angina, it is important to follow your health care provider's treatment plan and also maintain a healthy lifestyle:  · Maintain or work toward achieving a healthy weight.  · Stay physically active and exercise regularly.  · Decrease your salt intake.  · Eat a balanced, healthy diet. Talk to a dietitian to learn about heart-healthy foods.  · Increase your fiber intake by including whole grains, vegetables, fruits, and nuts in your diet.  · Avoid situations that cause stress, anger, or depression.  · Take medicines as advised by your health care provider. Report any side effects to your health care provider. Do not stop medicines or adjust the dosages on your own.  · Quit smoking. Do not use nicotine patches or gum until you check with your health care provider.  · Keep your blood pressure, blood sugar, and cholesterol levels within normal limits.  · Limit alcohol intake to no more than 1 drink per day for women who are not pregnant and 2 drinks per day for men.  · Do not abuse drugs.  SEEK IMMEDIATE MEDICAL CARE IF:  You have severe chest pain or pressure which may include symptoms such as:  · You feel pain or pressure in your arms, neck, jaw, or back.  · You have severe back or abdominal pain, feel sick to your stomach (nauseous), or throw up (vomit).  · You are sweating profusely.  · You are having a fast or irregular heartbeat.  · You feel short of breath while at rest.  · You notice increasing shortness of breath during rest,  sleep, or with activity.  · You have chest pain that does not get better after rest or after taking your usual medicine.  · You wake from sleep with chest pain.  · You are unable to sleep because you cannot breathe.  · You develop a frequent cough or you are coughing up blood.  · You feel dizzy, faint, or experience extreme fatigue.  · You develop severe weakness, dizziness, fainting, or chills.  Any of these symptoms may represent a serious problem that is an emergency. Do not wait to see if the symptoms will go away. Call your local emergency services (911 in the [place] Do not drive yourself to the hospital.  MAKE SURE YOU:  · Understand these instructions.  · Will watch your condition.  · Will get help right away if you are not doing well or get worse.     This information is not intended to replace advice given to you by your health care provider. Make sure you discuss any questions you have with your health care provider.     Document Released: 01/20/2012 Document Revised: 12/23/2014 Document Reviewed: 06/19/2014  [employer] Interactive Patient Education ©2016 [employer]      Kidney Stones    Kidney stones (urolithiasis) are rock-like masses that form inside of the kidneys. Kidneys are organs that make pee (urine). A kidney stone can cause very bad pain and can block the flow of pee. The stone usually leaves your body (passes) through your pee. You may need to have a doctor take out the stone.  Follow these instructions at home:  Eating and drinking  · Drink enough fluid to keep your pee clear or pale yellow. This will help you pass the stone.  · If told by your doctor, change the foods you eat (your diet). This may include:  ? Limiting how much salt (sodium) you eat.  ? Eating more fruits and vegetables.  ? Limiting how much meat, poultry, fish, and eggs you eat.  · Follow instructions from your doctor about eating or drinking restrictions.  General instructions  · Collect pee samples as told by your doctor.  You may need to collect a pee sample:  ? 24 hours after a stone comes out.  ? 8-12 weeks after a stone comes out, and every 6-12 months after that.  · Strain your pee every time you pee (urinate), for as long as told. Use the strainer that your doctor recommends.  · Do not throw out the stone. Keep it so that it can be tested by your doctor.  · Take over-the-counter and prescription medicines only as told by your doctor.  · Keep all follow-up visits as told by your doctor. This is important. You may need follow-up tests.  Preventing kidney stones  To prevent another kidney stone:  · Drink enough fluid to keep your pee clear or pale yellow. This is the best way to prevent kidney stones.  · Eat healthy foods.  · Avoid certain foods as told by your doctor. You may be told to eat less protein.  · Stay at a healthy weight.  Contact a doctor if:  · You have pain that gets worse or does not get better with medicine.  Get help right away if:  · You have a fever or chills.  · You get very bad pain.  · You get new pain in your belly (abdomen).  · You pass out (faint).  · You cannot pee.  This information is not intended to replace advice given to you by your health care provider. Make sure you discuss any questions you have with your health care provider.  Document Released: 06/05/2009 Document Revised: 07/30/2019 Document Reviewed: 09/05/2017  [employer] Patient Education © 2020 [employer]        Depression / Suicide Risk    As you are discharged from this [facility] facility, it is important to learn how to keep safe from harming yourself.    Recognize the warning signs:  · Abrupt changes in personality, positive or negative- including increase in energy   · Giving away possessions  · Change in eating patterns- significant weight changes-  positive or negative  · Change in sleeping patterns- unable to sleep or sleeping all the time   · Unwillingness or inability to communicate  · Depression  · Unusual sadness,  discouragement and loneliness  · Talk of wanting to die  · Neglect of personal appearance   · Rebelliousness- reckless behavior  · Withdrawal from people/activities they love  · Confusion- inability to concentrate     If you or a loved one observes any of these behaviors or has concerns about self-harm, here's what you can do:  · Talk about it- your feelings and reasons for harming yourself  · Remove any means that you might use to hurt yourself (examples: pills, rope, extension cords, firearm)  · Get professional help from the community (Mental Health, Substance Abuse, psychological counseling)  · Do not be alone:Call your Safe Contact- someone whom you trust who will be there for you.  · Call your local CRISIS HOTLINE 976-0881 or 853-249-7816  · Call your local Children's Mobile Crisis Response Team [place] (565) 025-1265 or www.[employer]  · Call the toll free National Suicide Prevention Hotlines   · National Suicide Prevention Lifeline 168-279-CWTP (3467)  · National Hope Line Network 800-SUICIDE (176-0828)

## 2020-07-23 NOTE — PROGRESS NOTES
Bedside report given to Monie FRASER. POC discussed. Pt resting comfortably in bed. Safety precautions in place.

## 2020-07-23 NOTE — PROGRESS NOTES
Assessment completed, patient A&Ox4, no c/o pain at this time. Patient helped up to the restroom and back to bed. No other needs at this time.

## 2020-07-23 NOTE — PROGRESS NOTES
Telemetry Shift Summary     Rhythm Afib/Flutter  HR Range 100-120s  Ectopy FPVCs  Measurements -/0.08/-           Normal Values  Rhythm SR  HR Range    Measurements 0.12-0.20 / 0.06-0.10  / 0.30-0.52

## 2020-07-24 ENCOUNTER — OFFICE VISIT (OUTPATIENT)
Dept: CARDIOLOGY | Facility: MEDICAL CENTER | Age: 85
End: 2020-07-24
Payer: MEDICARE

## 2020-07-24 VITALS
DIASTOLIC BLOOD PRESSURE: 64 MMHG | WEIGHT: 104.4 LBS | BODY MASS INDEX: 16.39 KG/M2 | HEIGHT: 67 IN | SYSTOLIC BLOOD PRESSURE: 120 MMHG | OXYGEN SATURATION: 95 % | HEART RATE: 88 BPM

## 2020-07-24 DIAGNOSIS — N18.30 CKD (CHRONIC KIDNEY DISEASE) STAGE 3, GFR 30-59 ML/MIN: ICD-10-CM

## 2020-07-24 DIAGNOSIS — E43 SEVERE PROTEIN-CALORIE MALNUTRITION (GOMEZ: LESS THAN 60% OF STANDARD WEIGHT) (HCC): ICD-10-CM

## 2020-07-24 DIAGNOSIS — I10 HYPERTENSION, UNSPECIFIED TYPE: ICD-10-CM

## 2020-07-24 DIAGNOSIS — I48.0 PAROXYSMAL ATRIAL FIBRILLATION (HCC): ICD-10-CM

## 2020-07-24 DIAGNOSIS — Z79.01 ON CONTINUOUS ORAL ANTICOAGULATION: ICD-10-CM

## 2020-07-24 DIAGNOSIS — I63.89 CEREBROVASCULAR ACCIDENT (CVA) DUE TO OTHER MECHANISM (HCC): Chronic | ICD-10-CM

## 2020-07-24 PROCEDURE — 99214 OFFICE O/P EST MOD 30 MIN: CPT | Performed by: INTERNAL MEDICINE

## 2020-07-24 RX ORDER — LEVOTHYROXINE SODIUM 88 UG/1
88 TABLET ORAL
COMMUNITY
Start: 2020-05-13 | End: 2021-07-22 | Stop reason: SDUPTHER

## 2020-07-24 ASSESSMENT — FIBROSIS 4 INDEX: FIB4 SCORE: 2.88

## 2020-07-24 NOTE — PROGRESS NOTES
Chief Complaint   Patient presents with   • Supraventricular Tachycardia (SVT)   • Chest Pain       Subjective:   Az Jolly is a 86 y.o. female who presents today for follow-up of atrial flutter/fibrillation PSVT stroke and anticoagulation.  She is asymptomatic and walks with a walker and does not fall.  In the interim she has been tolerating her Coumadin well with variable INR levels.     In the interim since last visit she was hospitalized for left flank pain and noted to have a small left nephrolithiasis.  Nonetheless she also underwent a stress test and echocardiogram.  Needless to say these were unrelated to her diagnosis and unrevealing for any new pathology.  She is feeling better.    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   • DVT 6/1996    left leg, vein ligation performed on saphenous vein   • First degree atrioventricular block 9/21/2012   • H/O echocardiogram 9/21/2012   • Hypothyroid 10/2002   • Kidney disorder 1998    left kidney biopsy--glomerulonephritis and wegners dx   • Kidney failure     stage II   • Mitral valve prolapse    • Palpitations 9/21/2012   • Rheumatic fever 9/21/2012   • Stroke (HCC) 9/21/2012   • Wegener's granulomatosis (HCC)      Past Surgical History:   Procedure Laterality Date   • PB PARTIAL HIP REPLACEMENT Right 6/19/2019    Procedure: HEMIARTHROPLASTY, HIP;  Surgeon: Raul Saldivar M.D.;  [facility]: SURGERY [facility];  Service: Orthopedics   • CATARACT EXTRACTION  2006    left eye   • CHOLECYSTECTOMY  1997   • TEMPORAL ARTERY BIOPSY  1996    normal   • LUNG NEEDLE BIOPSY  1996    right lung, nodules found   • ARTHROSCOPE  1989    left knee   • ARTHROSCOPY, KNEE  1986    right   • BUNIONECTOMY  1980    bilat   • HYSTERECTOMY RADICAL  1975   • VEIN LIGATION       Family History   Problem Relation Age of Onset   • Other Father          Aortic aneurysm   • Cancer Brother         Lung   • Non-contributory Other      Social History     Socioeconomic History   • Marital status:      Spouse name: Not on file   • Number of children: Not on file   • Years of education: Not on file   • Highest education level: Not on file   Occupational History   • Not on file   Social Needs   • Financial resource strain: Not on file   • Food insecurity     Worry: Not on file     Inability: Not on file   • Transportation needs     Medical: Not on file     Non-medical: Not on file   Tobacco Use   • Smoking status: Former Smoker     Packs/day: 1.00     Types: Cigarettes     Last attempt to quit: 1960     Years since quittin.6   • Smokeless tobacco: Never Used   • Tobacco comment: very little years and years ago   Substance and Sexual Activity   • Alcohol use: Yes     Alcohol/week: 0.0 oz     Comment: occasional   • Drug use: No   • Sexual activity: Not on file   Lifestyle   • Physical activity     Days per week: Not on file     Minutes per session: Not on file   • Stress: Not on file   Relationships   • Social connections     Talks on phone: Not on file     Gets together: Not on file     Attends [employer] service: Not on file     Active member of club or organization: Not on file     Attends meetings of clubs or organizations: Not on file     Relationship status: Not on file   • Intimate partner violence     Fear of current or ex partner: Not on file     Emotionally abused: Not on file     Physically abused: Not on file     Forced sexual activity: Not on file   Other Topics Concern   • Not on file   Social History Narrative   • Not on file     Allergies   Allergen Reactions   • Cephalexin [Keflex] Swelling   • Hydroxyzine Swelling     Eyes get itchy and swollen    • Naprelan [Naproxen] Swelling     Eyes get itchy become red and swollen   • Oxaprozin Swelling     Swelling eyes, and itchy   • Ampicillin Rash     Red Rash   • Baclofen Unspecified      "drowsiness   • Citalopram Vomiting and Nausea   • Clarithromycin Unspecified     Pt reports that this medication plugs her ears.    • Erythromycin Diarrhea     GI upset   • Promethazine Unspecified     Drowsiness and sleeps   • Vi-Q-Tuss [Hydrocodone-Guaifenesin] Vomiting and Nausea   • Voltaren [Diclofenac Sodium] Rash and Swelling     Red rash and swelling     Outpatient Encounter Medications as of 7/24/2020   Medication Sig Dispense Refill   • levothyroxine (SYNTHROID) 88 MCG Tab Take 88 mcg by mouth every day.     • warfarin (COUMADIN) 2 MG Tab Take 1-2 mg by mouth every evening. Pt takes 1MG on Friday  2MG all other days     • Multiple Vitamins-Minerals (ADULT ONE DAILY GUMMIES PO) Take 2 Tabs by mouth every day.     • CALCIUM PO Take 1 Tab by mouth 2 Times a Day.     • Non Formulary Request Place 1 Drop in both eyes as needed (For dry eye). Thera Tears (OTC)     • Cholecalciferol (VITAMIN D3) 25 MCG (1000 UT) Cap Take 1,000 Units by mouth 2 Times a Day.     • Psyllium (METAMUCIL PO) Take 1 Package by mouth as needed (For constipation). For constipation     • magnesium hydroxide (MILK OF MAGNESIA) 400 MG/5ML Suspension Take 30 mL by mouth 1 time daily as needed (For constipation).     • metoprolol (LOPRESSOR) 50 MG Tab Take 1 Tab by mouth 2 times a day. 180 Tab 3   • folic acid (FOLVITE) 1 MG TABS Take 1 mg by mouth every day.     • levothyroxine (SYNTHROID) 75 MCG TABS Take 75 mcg by mouth every morning before breakfast.       No facility-administered encounter medications on file as of 7/24/2020.      Review of Systems   All other systems reviewed and are negative.       Objective:   /64 (BP Location: Left arm, Patient Position: Sitting, BP Cuff Size: Adult)   Pulse 88   Ht 1.702 m (5' 7\")   Wt 47.4 kg (104 lb 6.4 oz)   SpO2 95%   BMI 16.35 kg/m²     Physical Exam   Constitutional: She is oriented to person, place, and time. She appears well-developed and well-nourished. No distress.   Elderly and " cachectic  Walker     HENT:   Head: Normocephalic and atraumatic.   Right Ear: External ear normal.   Left Ear: External ear normal.   Mouth/Throat: No oropharyngeal exudate.   Eyes: Pupils are equal, round, and reactive to light. Conjunctivae and EOM are normal. Right eye exhibits no discharge. Left eye exhibits no discharge. No scleral icterus.   Neck: Normal range of motion. Neck supple. No JVD present. No tracheal deviation present. No thyromegaly present.   Cardiovascular: Normal rate, S1 normal, S2 normal and intact distal pulses. An irregularly irregular rhythm present. PMI is not displaced. Exam reveals no gallop, no S3, no S4 and no friction rub.   Murmur ( 2/6 systolic apical murmur) heard.  Pulses:       Carotid pulses are 2+ on the right side and 2+ on the left side.       Radial pulses are 2+ on the right side and 2+ on the left side.        Popliteal pulses are 2+ on the right side and 2+ on the left side.        Dorsalis pedis pulses are 2+ on the right side and 2+ on the left side.        Posterior tibial pulses are 2+ on the right side and 2+ on the left side.   Pulmonary/Chest: Effort normal and breath sounds normal. No respiratory distress. She has no wheezes. She has no rales. She exhibits no tenderness.   Abdominal: Soft. Bowel sounds are normal. She exhibits no distension. There is no abdominal tenderness.   Musculoskeletal: Normal range of motion.         General: No tenderness or edema.   Neurological: She is alert and oriented to person, place, and time. No cranial nerve deficit (Cranial nerves II through XII grossly intact).   Skin: Skin is warm and dry. No rash noted. She is not diaphoretic. No erythema.   Psychiatric: She has a normal mood and affect. Her behavior is normal. Judgment and thought content normal.   Vitals reviewed.    LABS:  Lab Results   Component Value Date/Time    CHOLSTRLTOT 95 (L) 08/02/2011 03:10 AM    LDL 44 08/02/2011 03:10 AM    HDL 39 (L) 08/02/2011 03:10 AM     TRIGLYCERIDE 60 08/02/2011 03:10 AM       Lab Results   Component Value Date/Time    WBC 8.8 07/23/2020 05:00 AM    RBC 4.58 07/23/2020 05:00 AM    HEMOGLOBIN 13.5 07/23/2020 05:00 AM    HEMATOCRIT 42.7 07/23/2020 05:00 AM    MCV 93.2 07/23/2020 05:00 AM    NEUTSPOLYS 65.00 07/23/2020 05:00 AM    LYMPHOCYTES 23.90 07/23/2020 05:00 AM    MONOCYTES 9.10 07/23/2020 05:00 AM    EOSINOPHILS 0.90 07/23/2020 05:00 AM    BASOPHILS 0.60 07/23/2020 05:00 AM    HYPOCHROMIA 1+ 06/22/2019 03:44 AM     Lab Results   Component Value Date/Time    SODIUM 138 07/23/2020 05:00 AM    POTASSIUM 3.6 07/23/2020 05:00 AM    CHLORIDE 100 07/23/2020 05:00 AM    CO2 26 07/23/2020 05:00 AM    GLUCOSE 104 (H) 07/23/2020 05:00 AM    BUN 29 (H) 07/23/2020 05:00 AM    CREATININE 1.54 (H) 07/23/2020 05:00 AM    CREATININE 1.5 (H) 02/19/2009 09:22 AM         Lab Results   Component Value Date/Time    ALKPHOSPHAT 78 07/22/2020 04:16 AM    ASTSGOT 20 07/22/2020 04:16 AM    ALTSGPT 7 07/22/2020 04:16 AM    TBILIRUBIN 0.7 07/22/2020 04:16 AM      Lab Results   Component Value Date/Time    BNPBTYPENAT 135 (H) 08/10/2011 03:40 AM      No results found for: TSH  Lab Results   Component Value Date/Time    PROTHROMBTM 34.3 (H) 07/23/2020 05:00 AM    INR 3.27 (H) 07/23/2020 05:00 AM      ECHO CONCLUSIONS (6/22/2019):  Left ventricular ejection fraction is visually estimated to be 65%.  Dilated inferior vena cava without inspiratory collapse.  Biatrial dilation.  Severe tricuspid regurgitation.  Estimated right ventricular systolic   pressure is 50 mmHg.  Right ventricle not well visualized.  Mild-moderate aortic insufficiency.  No prior study is available for comparison.     EKG (9/26/2018):  I have personally reviewed the EKG this visit and discussed with the patient.  Atrial flutter with variable block.     Echocardiogram 1/22/2018 located in [place] under [place]'s records: Preserved left ventricular systolic function, grade 1 diastolic dysfunction, mild  left atrial enlargement normal RVSP and mild to moderate mitral regurgitation.    Assessment:     1. Paroxysmal atrial fibrillation ([facility])     2. On continuous oral anticoagulation     3. Severe protein-calorie malnutrition (Mahmood: less than 60% of standard weight) ([facility])     4. CKD (chronic kidney disease) stage 3, GFR 30-59 ml/min ([facility])     5. Hypertension, unspecified type     6. Cerebrovascular accident (CVA) due to other mechanism ([facility])         Medical Decision Making:  Today's Assessment / Status / Plan:     Overall she is doing well however she remains cachectic.  Her left flank pain related to nephrolithiasis has improved.  She had multiple cardiac tests while in the hospital for her left flank pain for unclear reasons all of which showed nothing new.  Recommend she continue her current medical regimen and follow-up in 6 months.  She is tolerating her anticoagulation well and is not bothered by any atrial fibrillation.

## 2020-08-12 ENCOUNTER — ANTICOAGULATION VISIT (OUTPATIENT)
Dept: VASCULAR LAB | Facility: MEDICAL CENTER | Age: 85
End: 2020-08-12
Attending: INTERNAL MEDICINE
Payer: MEDICARE

## 2020-08-12 VITALS — HEART RATE: 82 BPM | DIASTOLIC BLOOD PRESSURE: 68 MMHG | SYSTOLIC BLOOD PRESSURE: 134 MMHG

## 2020-08-12 DIAGNOSIS — I48.92 ATRIAL FLUTTER, UNSPECIFIED TYPE (HCC): ICD-10-CM

## 2020-08-12 DIAGNOSIS — I63.89 CEREBROVASCULAR ACCIDENT (CVA) DUE TO OTHER MECHANISM (HCC): ICD-10-CM

## 2020-08-12 LAB — INR PPP: 3.2 (ref 2–3.5)

## 2020-08-12 PROCEDURE — 85610 PROTHROMBIN TIME: CPT

## 2020-08-12 PROCEDURE — 99212 OFFICE O/P EST SF 10 MIN: CPT

## 2020-08-12 NOTE — PROGRESS NOTES
Anticoagulation Summary  As of 8/12/2020    INR goal:  2.0-3.0   TTR:  66.4 % (1.7 y)   INR used for dosing:  3.20 (8/12/2020)   Warfarin maintenance plan:  1 mg (2 mg x 0.5) every Fri; 2 mg (2 mg x 1) all other days   Weekly warfarin total:  13 mg   Plan last modified:  JEFF Zepeda (10/7/2019)   Next INR check:  9/9/2020   Target end date:  Indefinite    Indications    Stroke (HCC) [I63.9]  Atrial flutter (HCC) [I48.92]             Anticoagulation Episode Summary     INR check location:      Preferred lab:      Send INR reminders to:      Comments:        Anticoagulation Care Providers     Provider Role Specialty Phone number    Hussain Nation M.D. Referring Interventional Cardiology [phone]    [facility] Anticoagulation Services Responsible  [phone]           Anticoagulation Patient Findings  Patient Findings     Positives:  Change in diet/appetite (decreased vit K intake), Hospital admission (+kidney stone)    Negatives:  Signs/symptoms of thrombosis, Signs/symptoms of bleeding, Laboratory test error suspected, Change in health, Change in alcohol use, Change in activity, Upcoming invasive procedure, Emergency department visit, Upcoming dental procedure, Missed doses, Extra doses, Change in medications, Bruising, Other complaints          HPI:  Az Tripp seen in clinic today, on anticoagulation therapy with warfarin for stroke/atrial flutter  Changes to current medical/health status since last appt: none  Denies signs/symptoms of bleeding and/or thrombosis since the last appt.    Denies any interval changes to diet  Denies any interval changes to medications since last appt.   Denies any complications or cost restrictions with current therapy.   Verified current warfarin dosing schedule.   BP recorded in vitals.       A/P   INR  supra-therapeutic.   Reduce dose x 1 day, then continue regimen    Follow up appointment in 4 week(s).    Whit Ann, PharmD

## 2020-08-21 LAB — INR BLD: 3.2 (ref 0.9–1.2)

## 2020-09-09 ENCOUNTER — ANTICOAGULATION VISIT (OUTPATIENT)
Dept: VASCULAR LAB | Facility: MEDICAL CENTER | Age: 85
End: 2020-09-09
Attending: INTERNAL MEDICINE
Payer: MEDICARE

## 2020-09-09 VITALS — HEART RATE: 65 BPM | DIASTOLIC BLOOD PRESSURE: 76 MMHG | SYSTOLIC BLOOD PRESSURE: 137 MMHG

## 2020-09-09 DIAGNOSIS — I63.89 CEREBROVASCULAR ACCIDENT (CVA) DUE TO OTHER MECHANISM (HCC): ICD-10-CM

## 2020-09-09 DIAGNOSIS — I48.92 ATRIAL FLUTTER, UNSPECIFIED TYPE (HCC): ICD-10-CM

## 2020-09-09 LAB
INR BLD: 2.3 (ref 0.9–1.2)
INR PPP: 2.3 (ref 2–3.5)

## 2020-09-09 PROCEDURE — 99211 OFF/OP EST MAY X REQ PHY/QHP: CPT | Performed by: NURSE PRACTITIONER

## 2020-09-09 PROCEDURE — 85610 PROTHROMBIN TIME: CPT

## 2020-09-09 NOTE — PROGRESS NOTES
Anticoagulation Summary  As of 2020    INR goal:  2.0-3.0   TTR:  66.9 % (1.8 y)   INR used for dosin.30 (2020)   Warfarin maintenance plan:  1 mg (2 mg x 0.5) every Fri; 2 mg (2 mg x 1) all other days   Weekly warfarin total:  13 mg   Plan last modified:  Callie Murillo ADayanaraPDayanaraNDayanara (10/7/2019)   Next INR check:  10/14/2020   Target end date:  Indefinite    Indications    Stroke (HCC) [I63.9]  Atrial flutter (HCC) [I48.92]             Anticoagulation Episode Summary     INR check location:      Preferred lab:      Send INR reminders to:      Comments:        Anticoagulation Care Providers     Provider Role Specialty Phone number    Hussain Nation M.D. Referring Interventional Cardiology [phone]    [facility] Anticoagulation Services Responsible  [phone]        Anticoagulation Patient Findings      HPI:  Az Tripp seen in clinic today for follow up on anticoagulation therapy in the presence of A flutter, CVA.   Denies any changes to current medical/health status since last appointment.   Denies any medication or diet changes.   No current symptoms of bleeding or thrombosis reported.    A/P:   INR therapeutic.   Continue current regimen.     Follow up appointment in 5 week(s).    Next Appointment: Wednesday, Oct 14, 2020 at 11:45 am.    Callie CARCAMO

## 2020-09-21 DIAGNOSIS — Z79.01 CHRONIC ANTICOAGULATION: ICD-10-CM

## 2020-09-21 RX ORDER — WARFARIN SODIUM 2 MG/1
1-2 TABLET ORAL EVERY EVENING
Qty: 90 TAB | Refills: 1 | Status: SHIPPED | OUTPATIENT
Start: 2020-09-21 | End: 2021-03-24

## 2020-10-14 ENCOUNTER — ANTICOAGULATION VISIT (OUTPATIENT)
Dept: VASCULAR LAB | Facility: MEDICAL CENTER | Age: 85
End: 2020-10-14
Attending: INTERNAL MEDICINE
Payer: MEDICARE

## 2020-10-14 DIAGNOSIS — I48.92 ATRIAL FLUTTER, UNSPECIFIED TYPE (HCC): ICD-10-CM

## 2020-10-14 LAB
INR BLD: 3.3 (ref 0.9–1.2)
INR PPP: 3.3 (ref 2–3.5)

## 2020-10-14 PROCEDURE — 99212 OFFICE O/P EST SF 10 MIN: CPT

## 2020-10-14 PROCEDURE — 85610 PROTHROMBIN TIME: CPT

## 2020-10-14 NOTE — PROGRESS NOTES
Anticoagulation Summary  As of 10/14/2020    INR goal:  2.0-3.0   TTR:  67.0 % (1.9 y)   INR used for dosing:  3.30 (10/14/2020)   Warfarin maintenance plan:  1 mg (2 mg x 0.5) every Mon, Fri; 2 mg (2 mg x 1) all other days   Weekly warfarin total:  12 mg   Plan last modified:  Whit Ann PharmD (10/14/2020)   Next INR check:  10/28/2020   Target end date:  Indefinite    Indications    Stroke (HCC) [I63.9]  Atrial flutter (HCC) [I48.92]             Anticoagulation Episode Summary     INR check location:      Preferred lab:      Send INR reminders to:      Comments:        Anticoagulation Care Providers     Provider Role Specialty Phone number    Hussain Nation M.D. Referring Interventional Cardiology [phone]    [facility] Anticoagulation Services Responsible  [phone]             Anticoagulation Patient Findings  Patient Findings     Negatives:  Signs/symptoms of thrombosis, Signs/symptoms of bleeding, Laboratory test error suspected, Change in health, Change in alcohol use, Change in activity, Upcoming invasive procedure, Emergency department visit, Upcoming dental procedure, Missed doses, Extra doses, Change in medications, Change in diet/appetite, Hospital admission, Bruising, Other complaints          HPI:  Az Tripp seen in clinic today, on anticoagulation therapy with warfarin for atrial flutter/stroke  Changes to current medical/health status since last appt: none  Denies signs/symptoms of bleeding and/or thrombosis since the last appt.    Denies any interval changes to diet  Denies any interval changes to medications since last appt.   Denies any complications or cost restrictions with current therapy.   Verified current warfarin dosing schedule.   BP declined  Medications reconciled       A/P   INR  supra-therapeutic.   Reduce weekly regimen    Follow up appointment in 2 week(s).    Whit Ann, PharmD

## 2020-10-28 ENCOUNTER — ANTICOAGULATION VISIT (OUTPATIENT)
Dept: VASCULAR LAB | Facility: MEDICAL CENTER | Age: 85
End: 2020-10-28
Attending: INTERNAL MEDICINE
Payer: MEDICARE

## 2020-10-28 DIAGNOSIS — I63.019 CEREBROVASCULAR ACCIDENT (CVA) DUE TO THROMBOSIS OF VERTEBRAL ARTERY, UNSPECIFIED BLOOD VESSEL LATERALITY (HCC): ICD-10-CM

## 2020-10-28 DIAGNOSIS — I48.92 ATRIAL FLUTTER, UNSPECIFIED TYPE (HCC): ICD-10-CM

## 2020-10-28 LAB
INR BLD: 2.2 (ref 0.9–1.2)
INR PPP: 2.2 (ref 2–3.5)

## 2020-10-28 PROCEDURE — 85610 PROTHROMBIN TIME: CPT

## 2020-10-28 PROCEDURE — 99211 OFF/OP EST MAY X REQ PHY/QHP: CPT | Performed by: NURSE PRACTITIONER

## 2020-10-28 NOTE — PROGRESS NOTES
Anticoagulation Summary  As of 10/28/2020    INR goal:  2.0-3.0   TTR:  67.2 % (1.9 y)   INR used for dosin.20 (10/28/2020)   Warfarin maintenance plan:  1 mg (2 mg x 0.5) every Mon, Fri; 2 mg (2 mg x 1) all other days   Weekly warfarin total:  12 mg   Plan last modified:  Whit Ann PharmD (10/14/2020)   Next INR check:  2020   Target end date:  Indefinite    Indications    Stroke (HCC) [I63.9]  Atrial flutter (HCC) [I48.92]             Anticoagulation Episode Summary     INR check location:      Preferred lab:      Send INR reminders to:      Comments:        Anticoagulation Care Providers     Provider Role Specialty Phone number    Hussain Nation M.D. Referring Interventional Cardiology [phone]    [facility] Anticoagulation Services Responsible  [phone]        Anticoagulation Patient Findings      HPI:  Az Tripp seen in clinic today for follow up on anticoagulation therapy in the presence of A flutter, CVA.   Denies any changes to current medical/health status since last appointment.   Denies any medication or diet changes.   No current symptoms of bleeding or thrombosis reported.    A/P:   INR therapeutic.   Continue current regimen.   BP declined.    Follow up appointment in 4 week(s) per pt request.    Next Appointment: Wednesday, 2020 at 11:45 am.    Callie CARCAMO

## 2020-11-25 ENCOUNTER — ANTICOAGULATION VISIT (OUTPATIENT)
Dept: VASCULAR LAB | Facility: MEDICAL CENTER | Age: 85
End: 2020-11-25
Attending: INTERNAL MEDICINE
Payer: MEDICARE

## 2020-11-25 DIAGNOSIS — Z79.01 CHRONIC ANTICOAGULATION: ICD-10-CM

## 2020-11-25 LAB — INR PPP: 1.5 (ref 2–3.5)

## 2020-11-25 PROCEDURE — 99212 OFFICE O/P EST SF 10 MIN: CPT

## 2020-11-25 PROCEDURE — 85610 PROTHROMBIN TIME: CPT

## 2020-11-25 NOTE — PROGRESS NOTES
Anticoagulation Summary  As of 2020    INR goal:  2.0-3.0   TTR:  65.7 % (2 y)   INR used for dosin.50 (2020)   Warfarin maintenance plan:  1 mg (2 mg x 0.5) every Mon, Fri; 2 mg (2 mg x 1) all other days   Weekly warfarin total:  12 mg   Plan last modified:  Whit Ann PharmD (10/14/2020)   Next INR check:  2020   Target end date:  Indefinite    Indications    Stroke (HCC) [I63.9]  Atrial flutter (HCC) [I48.92]             Anticoagulation Episode Summary     INR check location:      Preferred lab:      Send INR reminders to:      Comments:        Anticoagulation Care Providers     Provider Role Specialty Phone number    Hussain Nation M.D. Referring Interventional Cardiology [phone]    [facility] Anticoagulation Services Responsible  [phone]        Anticoagulation Patient Findings      HPI:  Az Bustostico seen in clinic today, on anticoagulation therapy with warfarin for AFib  Changes to current medical/health status since last appt: none  Denies signs/symptoms of bleeding and/or thrombosis since the last appt.    Denies any interval changes to diet  Denies any interval changes to medications since last appt.   Denies any complications or cost restrictions with current therapy.   Pt declines vitals due to Covid transmission concerns.   Confirmed dosing regimen.     A/P   INR  SUB-therapeutic.   Bolus 4 mg x2 days then Pt is to continue with current warfarin dosing regimen.   Pt has hx of stroke,  very frail, and weighs only 47 kg.  Pt also has difficulty with right hand making it very difficult to administer an injection.  Forgo enoxaparin given the bleeding risk and risk of medication error.     Follow up appointment in 1 week(s).    Graham Jackson, PharmD

## 2020-12-01 LAB — INR BLD: 1.5 (ref 0.9–1.2)

## 2020-12-02 ENCOUNTER — ANTICOAGULATION VISIT (OUTPATIENT)
Dept: VASCULAR LAB | Facility: MEDICAL CENTER | Age: 85
End: 2020-12-02
Attending: INTERNAL MEDICINE
Payer: MEDICARE

## 2020-12-02 DIAGNOSIS — I48.92 ATRIAL FLUTTER, UNSPECIFIED TYPE (HCC): ICD-10-CM

## 2020-12-02 LAB — INR PPP: 2.2 (ref 2–3.5)

## 2020-12-02 PROCEDURE — 99211 OFF/OP EST MAY X REQ PHY/QHP: CPT | Performed by: PHARMACIST

## 2020-12-02 PROCEDURE — 85610 PROTHROMBIN TIME: CPT

## 2020-12-02 NOTE — PROGRESS NOTES
Anticoagulation Summary  As of 2020    INR goal:  2.0-3.0   TTR:  65.3 % (2 y)   INR used for dosin.20 (2020)   Warfarin maintenance plan:  1 mg (2 mg x 0.5) every Fri; 2 mg (2 mg x 1) all other days   Weekly warfarin total:  13 mg   Plan last modified:  Shaun Ruiz, PharmD (2020)   Next INR check:  2020   Target end date:  Indefinite    Indications    Stroke (HCC) [I63.9]  Atrial flutter (HCC) [I48.92]             Anticoagulation Episode Summary     INR check location:      Preferred lab:      Send INR reminders to:      Comments:        Anticoagulation Care Providers     Provider Role Specialty Phone number    Hussain Nation M.D. Referring Interventional Cardiology [phone]    [facility] Anticoagulation Services Responsible  [phone]                Anticoagulation Patient Findings  Patient Findings     Negatives:  Signs/symptoms of thrombosis, Signs/symptoms of bleeding, Laboratory test error suspected, Change in health, Change in alcohol use, Change in activity, Upcoming invasive procedure, Emergency department visit, Upcoming dental procedure, Missed doses, Extra doses, Change in medications, Change in diet/appetite, Hospital admission, Bruising, Other complaints          HPI:  Az Tripp seen in clinic today, on anticoagulation therapy with warfarin for Atrial Flutter, Stroke  Changes to current medical/health status since last appt: None  Denies signs/symptoms of bleeding and/or thrombosis since the last appt.    Denies any interval changes to diet  Denies any interval changes to medications since last appt.   Denies any complications or cost restrictions with current therapy.   Verified current warfarin dosing schedule.   BP declined  Medications reconciled       A/P   INR  therapeutic.   Will begin 8.3% dose increase.     Follow up appointment in 2 weeks.    Keith Carey, Pharmacy inter  Shaun Ruiz, PharmD

## 2020-12-08 DIAGNOSIS — Z79.01 CHRONIC ANTICOAGULATION: ICD-10-CM

## 2020-12-09 LAB — INR BLD: 2.2 (ref 0.9–1.2)

## 2020-12-16 ENCOUNTER — ANTICOAGULATION VISIT (OUTPATIENT)
Dept: VASCULAR LAB | Facility: MEDICAL CENTER | Age: 85
End: 2020-12-16
Attending: INTERNAL MEDICINE
Payer: MEDICARE

## 2020-12-16 DIAGNOSIS — I48.92 ATRIAL FLUTTER, UNSPECIFIED TYPE (HCC): ICD-10-CM

## 2020-12-16 LAB — INR PPP: 2.1 (ref 2–3.5)

## 2020-12-16 PROCEDURE — 99211 OFF/OP EST MAY X REQ PHY/QHP: CPT | Performed by: PHARMACIST

## 2020-12-16 PROCEDURE — 85610 PROTHROMBIN TIME: CPT

## 2020-12-16 NOTE — PROGRESS NOTES
Anticoagulation Summary  As of 2020    INR goal:  2.0-3.0   TTR:  66.0 % (2.1 y)   INR used for dosin.10 (2020)   Warfarin maintenance plan:  1 mg (2 mg x 0.5) every Fri; 2 mg (2 mg x 1) all other days   Weekly warfarin total:  13 mg   Plan last modified:  Shaun Ruiz, PharmD (2020)   Next INR check:  2021   Target end date:  Indefinite    Indications    Stroke (HCC) [I63.9]  Atrial flutter (HCC) [I48.92]             Anticoagulation Episode Summary     INR check location:      Preferred lab:      Send INR reminders to:      Comments:        Anticoagulation Care Providers     Provider Role Specialty Phone number    Hussain Nation M.D. Referring Interventional Cardiology [phone]    [facility] Anticoagulation Services Responsible  [phone]                Anticoagulation Patient Findings  Patient Findings     Negatives:  Signs/symptoms of thrombosis, Signs/symptoms of bleeding, Laboratory test error suspected, Change in health, Change in alcohol use, Change in activity, Upcoming invasive procedure, Emergency department visit, Upcoming dental procedure, Missed doses, Extra doses, Change in medications, Change in diet/appetite, Hospital admission, Bruising, Other complaints          HPI:  Az Tripp seen in clinic today, on anticoagulation therapy with warfarin due to hx of stroke and atrial flutter.  Changes to current medical/health status since last appt: NONE  Denies signs/symptoms of bleeding and/or thrombosis since the last appt.    Denies any interval changes to diet  Denies any interval changes to medications since last appt.   Denies any complications or cost restrictions with current therapy.   Verified current warfarin dosing schedule.   BP declined due to covid transmission concerns.  Medications reconciled.  Pt NOT on antiplatelet therapy.      A/P   INR  remains -therapeutic at 2.1.   Pt is to continue with current warfarin dosing regimen.    Follow up  appointment in 4 week(s).    Shaun Ruiz, PharmD, BCACP

## 2020-12-17 LAB — INR BLD: 2.1 (ref 0.9–1.2)

## 2021-01-13 ENCOUNTER — ANTICOAGULATION VISIT (OUTPATIENT)
Dept: VASCULAR LAB | Facility: MEDICAL CENTER | Age: 86
End: 2021-01-13
Attending: INTERNAL MEDICINE
Payer: MEDICARE

## 2021-01-13 DIAGNOSIS — Z79.01 CHRONIC ANTICOAGULATION: ICD-10-CM

## 2021-01-13 LAB
INR BLD: 2.1 (ref 0.9–1.2)
INR PPP: 2.1 (ref 2–3.5)

## 2021-01-13 PROCEDURE — 99211 OFF/OP EST MAY X REQ PHY/QHP: CPT

## 2021-01-13 PROCEDURE — 85610 PROTHROMBIN TIME: CPT

## 2021-01-13 NOTE — PROGRESS NOTES
Anticoagulation Summary  As of 2021    INR goal:  2.0-3.0   TTR:  67.2 % (2.2 y)   INR used for dosin.10 (2021)   Warfarin maintenance plan:  1 mg (2 mg x 0.5) every Fri; 2 mg (2 mg x 1) all other days   Weekly warfarin total:  13 mg   Plan last modified:  Shaun Ruiz PharmD (2020)   Next INR check:  2021   Target end date:  Indefinite    Indications    Stroke (HCC) [I63.9]  Atrial flutter (HCC) [I48.92]             Anticoagulation Episode Summary     INR check location:      Preferred lab:      Send INR reminders to:      Comments:        Anticoagulation Care Providers     Provider Role Specialty Phone number    Hussain Nation M.D. Referring Interventional Cardiology [phone]    [facility] Anticoagulation Services Responsible  [phone]            Anticoagulation Patient Findings  Patient Findings     Negatives:  Signs/symptoms of thrombosis, Signs/symptoms of bleeding, Laboratory test error suspected, Change in health, Change in alcohol use, Change in activity, Upcoming invasive procedure, Emergency department visit, Upcoming dental procedure, Missed doses, Extra doses, Change in medications, Change in diet/appetite, Hospital admission, Bruising, Other complaints          HPI:  Az Tripp seen in clinic today, on anticoagulation therapy with warfarin for stroke, atrial flutter  Changes to current medical/health status since last appt: none  Denies signs/symptoms of bleeding and/or thrombosis since the last appt.    Denies any interval changes to diet  Denies any interval changes to medications since last appt.   Denies any complications or cost restrictions with current therapy.   Verified current warfarin dosing schedule.   BP declined d/t COVID-19 concerns.  Medications reconciled     A/P   INR  therapeutic.   Pt is to continue with current warfarin dosing regimen.    Follow up appointment in 5 week(s).    Whit Ann, PharmD

## 2021-01-14 DIAGNOSIS — Z23 NEED FOR VACCINATION: ICD-10-CM

## 2021-01-20 ENCOUNTER — OFFICE VISIT (OUTPATIENT)
Dept: CARDIOLOGY | Facility: MEDICAL CENTER | Age: 86
End: 2021-01-20
Payer: MEDICARE

## 2021-01-20 VITALS
DIASTOLIC BLOOD PRESSURE: 78 MMHG | OXYGEN SATURATION: 96 % | HEART RATE: 64 BPM | SYSTOLIC BLOOD PRESSURE: 128 MMHG | RESPIRATION RATE: 14 BRPM | WEIGHT: 104 LBS | HEIGHT: 67 IN | BODY MASS INDEX: 16.32 KG/M2

## 2021-01-20 DIAGNOSIS — Z79.01 ON CONTINUOUS ORAL ANTICOAGULATION: ICD-10-CM

## 2021-01-20 DIAGNOSIS — I10 ESSENTIAL HYPERTENSION: ICD-10-CM

## 2021-01-20 DIAGNOSIS — I48.0 PAROXYSMAL ATRIAL FIBRILLATION (HCC): ICD-10-CM

## 2021-01-20 PROCEDURE — 99214 OFFICE O/P EST MOD 30 MIN: CPT | Performed by: INTERNAL MEDICINE

## 2021-01-20 ASSESSMENT — FIBROSIS 4 INDEX: FIB4 SCORE: 2.88

## 2021-01-20 NOTE — PROGRESS NOTES
Chief Complaint   Patient presents with   • Palpitations     F/V Dx: First degree atrioventricular block   • Supraventricular Tachycardia (SVT)     F/V Dx: Abnormal chest x-ray   • Atrial Flutter   • Chest Pain       Subjective:   Az Jolly is a 86 y.o. female who presents today for follow-up of atrial flutter/fibrillation PSVT stroke and anticoagulation.  She is asymptomatic and walks with a walker and does not fall.      Since last visit she continues to do well using her walker and has had no falls.  Tolerating her anticoagulation well.  She is planning to initiate gabapentin with another provider.  We discussed its interactions with warfarin today and she should let the Coumadin clinic she is going to begin this.    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   • DVT 6/1996    left leg, vein ligation performed on saphenous vein   • First degree atrioventricular block 9/21/2012   • H/O echocardiogram 9/21/2012   • Hypothyroid 10/2002   • Kidney disorder 1998    left kidney biopsy--glomerulonephritis and wegners dx   • Kidney failure     stage II   • Mitral valve prolapse    • Palpitations 9/21/2012   • Rheumatic fever 9/21/2012   • Stroke (HCC) 9/21/2012   • Wegener's granulomatosis (HCC)      Past Surgical History:   Procedure Laterality Date   • PB PARTIAL HIP REPLACEMENT Right 6/19/2019    Procedure: HEMIARTHROPLASTY, HIP;  Surgeon: Raul Saldivar M.D.;  Location: SURGERY [facility];  Service: Orthopedics   • CATARACT EXTRACTION  2006    left eye   • CHOLECYSTECTOMY  1997   • TEMPORAL ARTERY BIOPSY  1996    normal   • LUNG NEEDLE BIOPSY  1996    right lung, nodules found   • ARTHROSCOPE  1989    left knee   • ARTHROSCOPY, KNEE  1986    right   • BUNIONECTOMY  1980    bilat   • HYSTERECTOMY RADICAL  1975   • VEIN LIGATION       Family History   Problem Relation  Age of Onset   • Other Father         Aortic aneurysm   • Cancer Brother         Lung   • Non-contributory Other      Social History     Socioeconomic History   • Marital status:      Spouse name: Not on file   • Number of children: Not on file   • Years of education: Not on file   • Highest education level: Not on file   Occupational History   • Not on file   Social Needs   • Financial resource strain: Not on file   • Food insecurity     Worry: Not on file     Inability: Not on file   • Transportation needs     Medical: Not on file     Non-medical: Not on file   Tobacco Use   • Smoking status: Former Smoker     Packs/day: 1.00     Types: Cigarettes     Quit date: 1960     Years since quittin.0   • Smokeless tobacco: Never Used   • Tobacco comment: very little years and years ago   Substance and Sexual Activity   • Alcohol use: Yes     Alcohol/week: 0.0 oz     Comment: occasional   • Drug use: No   • Sexual activity: Not on file   Lifestyle   • Physical activity     Days per week: Not on file     Minutes per session: Not on file   • Stress: Not on file   Relationships   • Social connections     Talks on phone: Not on file     Gets together: Not on file     Attends [employer] service: Not on file     Active member of club or organization: Not on file     Attends meetings of clubs or organizations: Not on file     Relationship status: Not on file   • Intimate partner violence     Fear of current or ex partner: Not on file     Emotionally abused: Not on file     Physically abused: Not on file     Forced sexual activity: Not on file   Other Topics Concern   • Not on file   Social History Narrative   • Not on file     Allergies   Allergen Reactions   • Cephalexin [Keflex] Swelling   • Hydroxyzine Swelling     Eyes get itchy and swollen    • Naprelan [Naproxen] Swelling     Eyes get itchy become red and swollen   • Oxaprozin Swelling     Swelling eyes, and itchy   • Ampicillin Rash     Red Rash   • Baclofen  "Unspecified     drowsiness   • Citalopram Vomiting and Nausea   • Clarithromycin Unspecified     Pt reports that this medication plugs her ears.    • Erythromycin Diarrhea     GI upset   • Promethazine Unspecified     Drowsiness and sleeps   • Vi-Q-Tuss [Hydrocodone-Guaifenesin] Vomiting and Nausea   • Voltaren [Diclofenac Sodium] Rash and Swelling     Red rash and swelling     Outpatient Encounter Medications as of 1/20/2021   Medication Sig Dispense Refill   • GABAPENTIN PO Take  by mouth.     • warfarin (COUMADIN) 2 MG Tab Take 0.5-1 Tabs by mouth every evening. 90 Tab 1   • metoprolol (LOPRESSOR) 50 MG Tab TAKE 1 TABLET BY MOUTH TWICE A  Tab 1   • levothyroxine (SYNTHROID) 88 MCG Tab Take 88 mcg by mouth every day.     • Multiple Vitamins-Minerals (ADULT ONE DAILY GUMMIES PO) Take 2 Tabs by mouth every day.     • CALCIUM PO Take 1 Tab by mouth 2 Times a Day.     • Non Formulary Request Place 1 Drop in both eyes as needed (For dry eye). Thera Tears (OTC)     • Cholecalciferol (VITAMIN D3) 25 MCG (1000 UT) Cap Take 1,000 Units by mouth 2 Times a Day.     • folic acid (FOLVITE) 1 MG TABS Take 1 mg by mouth every day.     • Psyllium (METAMUCIL PO) Take 1 Package by mouth as needed (For constipation). For constipation     • magnesium hydroxide (MILK OF MAGNESIA) 400 MG/5ML Suspension Take 30 mL by mouth 1 time daily as needed (For constipation).       No facility-administered encounter medications on file as of 1/20/2021.      Review of Systems   All other systems reviewed and are negative.       Objective:   /78 (BP Location: Left arm, Patient Position: Sitting, BP Cuff Size: Adult)   Pulse 64   Resp 14   Ht 1.702 m (5' 7\")   Wt 47.2 kg (104 lb)   SpO2 96%   BMI 16.29 kg/m²     Physical Exam   Constitutional: She is oriented to person, place, and time. She appears well-developed and well-nourished. No distress.   Elderly and cachectic  Walker     HENT:   Head: Normocephalic and atraumatic.   Right " Ear: External ear normal.   Left Ear: External ear normal.   Mouth/Throat: No oropharyngeal exudate.   Eyes: Pupils are equal, round, and reactive to light. Conjunctivae and EOM are normal. Right eye exhibits no discharge. Left eye exhibits no discharge. No scleral icterus.   Neck: Normal range of motion. Neck supple. No JVD present. No tracheal deviation present. No thyromegaly present.   Cardiovascular: Normal rate, S1 normal, S2 normal and intact distal pulses. An irregularly irregular rhythm present. PMI is not displaced. Exam reveals no gallop, no S3, no S4 and no friction rub.   Murmur ( 2/6 systolic apical murmur) heard.  Pulses:       Carotid pulses are 2+ on the right side and 2+ on the left side.       Radial pulses are 2+ on the right side and 2+ on the left side.        Popliteal pulses are 2+ on the right side and 2+ on the left side.        Dorsalis pedis pulses are 2+ on the right side and 2+ on the left side.        Posterior tibial pulses are 2+ on the right side and 2+ on the left side.   Pulmonary/Chest: Effort normal and breath sounds normal. No respiratory distress. She has no wheezes. She has no rales. She exhibits no tenderness.   Abdominal: Soft. Bowel sounds are normal. She exhibits no distension. There is no abdominal tenderness.   Musculoskeletal: Normal range of motion.         General: No tenderness or edema.   Neurological: She is alert and oriented to person, place, and time. No cranial nerve deficit (Cranial nerves II through XII grossly intact).   Skin: Skin is warm and dry. No rash noted. She is not diaphoretic. No erythema.   Psychiatric: She has a normal mood and affect. Her behavior is normal. Judgment and thought content normal.   Vitals reviewed.    LABS:  Lab Results   Component Value Date/Time    CHOLSTRLTOT 95 (L) 08/02/2011 03:10 AM    LDL 44 08/02/2011 03:10 AM    HDL 39 (L) 08/02/2011 03:10 AM    TRIGLYCERIDE 60 08/02/2011 03:10 AM       Lab Results   Component Value  Date/Time    WBC 8.8 07/23/2020 05:00 AM    RBC 4.58 07/23/2020 05:00 AM    HEMOGLOBIN 13.5 07/23/2020 05:00 AM    HEMATOCRIT 42.7 07/23/2020 05:00 AM    MCV 93.2 07/23/2020 05:00 AM    NEUTSPOLYS 65.00 07/23/2020 05:00 AM    LYMPHOCYTES 23.90 07/23/2020 05:00 AM    MONOCYTES 9.10 07/23/2020 05:00 AM    EOSINOPHILS 0.90 07/23/2020 05:00 AM    BASOPHILS 0.60 07/23/2020 05:00 AM    HYPOCHROMIA 1+ 06/22/2019 03:44 AM     Lab Results   Component Value Date/Time    SODIUM 138 07/23/2020 05:00 AM    POTASSIUM 3.6 07/23/2020 05:00 AM    CHLORIDE 100 07/23/2020 05:00 AM    CO2 26 07/23/2020 05:00 AM    GLUCOSE 104 (H) 07/23/2020 05:00 AM    BUN 29 (H) 07/23/2020 05:00 AM    CREATININE 1.54 (H) 07/23/2020 05:00 AM    CREATININE 1.5 (H) 02/19/2009 09:22 AM         Lab Results   Component Value Date/Time    ALKPHOSPHAT 78 07/22/2020 04:16 AM    ASTSGOT 20 07/22/2020 04:16 AM    ALTSGPT 7 07/22/2020 04:16 AM    TBILIRUBIN 0.7 07/22/2020 04:16 AM      Lab Results   Component Value Date/Time    BNPBTYPENAT 135 (H) 08/10/2011 03:40 AM      No results found for: TSH  Lab Results   Component Value Date/Time    PROTHROMBTM 34.3 (H) 07/23/2020 05:00 AM    INR 2.10 01/13/2021      ECHO CONCLUSIONS (6/22/2019):  Left ventricular ejection fraction is visually estimated to be 65%.  Dilated inferior vena cava without inspiratory collapse.  Biatrial dilation.  Severe tricuspid regurgitation.  Estimated right ventricular systolic   pressure is 50 mmHg.  Right ventricle not well visualized.  Mild-moderate aortic insufficiency.  No prior study is available for comparison.     EKG (9/26/2018):  I have personally reviewed the EKG this visit and discussed with the patient.  Atrial flutter with variable block.     Echocardiogram 1/22/2018 located in [place] under [place]'s records: Preserved left ventricular systolic function, grade 1 diastolic dysfunction, mild left atrial enlargement normal RVSP and mild to moderate mitral  regurgitation.    Assessment:     1. Paroxysmal atrial fibrillation (HCC)     2. On continuous oral anticoagulation     3. Essential hypertension         Medical Decision Making:  Today's Assessment / Status / Plan:     Doing well.  She is making efforts towards maintaining her weight.  She has no cardiac symptoms.  No issues with her atrial fibrillation or anticoagulation.  Recommend continuing her current medical therapy and following up with cardiology yearly.

## 2021-01-21 DIAGNOSIS — Z23 NEED FOR VACCINATION: ICD-10-CM

## 2021-02-17 ENCOUNTER — ANTICOAGULATION VISIT (OUTPATIENT)
Dept: VASCULAR LAB | Facility: MEDICAL CENTER | Age: 86
End: 2021-02-17
Attending: INTERNAL MEDICINE
Payer: MEDICARE

## 2021-02-17 DIAGNOSIS — I48.92 ATRIAL FLUTTER, UNSPECIFIED TYPE (HCC): ICD-10-CM

## 2021-02-17 LAB
INR BLD: 2.1 (ref 0.9–1.2)
INR PPP: 2.1 (ref 2–3.5)

## 2021-02-17 PROCEDURE — 99211 OFF/OP EST MAY X REQ PHY/QHP: CPT | Performed by: PHARMACIST

## 2021-02-17 PROCEDURE — 85610 PROTHROMBIN TIME: CPT

## 2021-02-17 NOTE — PROGRESS NOTES
Anticoagulation Summary  As of 2021    INR goal:  2.0-3.0   TTR:  68.6 % (2.3 y)   INR used for dosin.10 (2021)   Warfarin maintenance plan:  1 mg (2 mg x 0.5) every Fri; 2 mg (2 mg x 1) all other days   Weekly warfarin total:  13 mg   Plan last modified:  Shaun Ruiz, PharmD (2020)   Next INR check:  3/31/2021   Target end date:  Indefinite    Indications    Stroke (HCC) [I63.9]  Atrial flutter (HCC) [I48.92]             Anticoagulation Episode Summary     INR check location:      Preferred lab:      Send INR reminders to:      Comments:        Anticoagulation Care Providers     Provider Role Specialty Phone number    Hussain Nation M.D. Referring Interventional Cardiology [phone]    [facility] Anticoagulation Services Responsible  [phone]                Anticoagulation Patient Findings  Patient Findings     Negatives:  Signs/symptoms of thrombosis, Signs/symptoms of bleeding, Laboratory test error suspected, Change in health, Change in alcohol use, Change in activity, Upcoming invasive procedure, Emergency department visit, Upcoming dental procedure, Missed doses, Extra doses, Change in medications, Change in diet/appetite, Hospital admission, Bruising, Other complaints          HPI:  Az Tripp seen in clinic today, on anticoagulation therapy with warfarin for hx of atrial flutter and stroke.  Changes to current medical/health status since last appt: NONE  Denies signs/symptoms of bleeding and/or thrombosis since the last appt.    Denies any interval changes to diet  Denies any interval changes to medications since last appt.   Denies any complications or cost restrictions with current therapy.   Verified current warfarin dosing schedule.   Declined by patient today due to Covid-19 transmission concerns.  Medications reconciled.  Pt NOT on antiplatelet therapy.      A/P   INR  remains -therapeutic at 2.1.   Pt is to continue with current warfarin dosing  regimen.      Follow up appointment in 6 week(s).    Shaun Ruiz, PharmD, BCACP

## 2021-02-20 ENCOUNTER — HOSPITAL ENCOUNTER (EMERGENCY)
Facility: MEDICAL CENTER | Age: 86
End: 2021-02-20
Attending: EMERGENCY MEDICINE
Payer: MEDICARE

## 2021-02-20 VITALS
RESPIRATION RATE: 16 BRPM | HEIGHT: 67 IN | WEIGHT: 105.82 LBS | HEART RATE: 78 BPM | TEMPERATURE: 97.6 F | SYSTOLIC BLOOD PRESSURE: 124 MMHG | OXYGEN SATURATION: 96 % | BODY MASS INDEX: 16.61 KG/M2 | DIASTOLIC BLOOD PRESSURE: 61 MMHG

## 2021-02-20 DIAGNOSIS — R33.9 URINARY RETENTION: ICD-10-CM

## 2021-02-20 LAB
APPEARANCE UR: CLEAR
BACTERIA #/AREA URNS HPF: NEGATIVE /HPF
BILIRUB UR QL STRIP.AUTO: NEGATIVE
COLOR UR: YELLOW
EPI CELLS #/AREA URNS HPF: NEGATIVE /HPF
GLUCOSE UR STRIP.AUTO-MCNC: NEGATIVE MG/DL
HYALINE CASTS #/AREA URNS LPF: ABNORMAL /LPF
KETONES UR STRIP.AUTO-MCNC: NEGATIVE MG/DL
LEUKOCYTE ESTERASE UR QL STRIP.AUTO: ABNORMAL
MICRO URNS: ABNORMAL
NITRITE UR QL STRIP.AUTO: NEGATIVE
PH UR STRIP.AUTO: 6 [PH] (ref 5–8)
PROT UR QL STRIP: NEGATIVE MG/DL
RBC # URNS HPF: ABNORMAL /HPF
RBC UR QL AUTO: ABNORMAL
SP GR UR STRIP.AUTO: 1.01
WBC #/AREA URNS HPF: ABNORMAL /HPF

## 2021-02-20 PROCEDURE — 99284 EMERGENCY DEPT VISIT MOD MDM: CPT

## 2021-02-20 PROCEDURE — 81001 URINALYSIS AUTO W/SCOPE: CPT

## 2021-02-20 ASSESSMENT — LIFESTYLE VARIABLES
HAVE PEOPLE ANNOYED YOU BY CRITICIZING YOUR DRINKING: NO
HOW MANY TIMES IN THE PAST YEAR HAVE YOU HAD 5 OR MORE DRINKS IN A DAY: 0
CONSUMPTION TOTAL: NEGATIVE
AVERAGE NUMBER OF DAYS PER WEEK YOU HAVE A DRINK CONTAINING ALCOHOL: 0
HAVE YOU EVER FELT YOU SHOULD CUT DOWN ON YOUR DRINKING: NO
ON A TYPICAL DAY WHEN YOU DRINK ALCOHOL HOW MANY DRINKS DO YOU HAVE: 0
TOTAL SCORE: 0
DO YOU DRINK ALCOHOL: NO
EVER HAD A DRINK FIRST THING IN THE MORNING TO STEADY YOUR NERVES TO GET RID OF A HANGOVER: NO
EVER FELT BAD OR GUILTY ABOUT YOUR DRINKING: NO
TOTAL SCORE: 0
TOTAL SCORE: 0

## 2021-02-20 ASSESSMENT — FIBROSIS 4 INDEX: FIB4 SCORE: 2.88

## 2021-02-20 NOTE — ED PROVIDER NOTES
ED Provider Note    CHIEF COMPLAINT  Chief Complaint   Patient presents with   • Constipation   • Unable to Urinate       [facility]  Az Tripp is a 86 y.o. female who presents by ambulance saying that she is unable to urinate and has been constipated the last 3 days.  She denies any fever, chills, sweats and when she does urinate she does not have any dysuria.  Denies any frequent UTIs or other complaints.  Does have an extensive medical history and denies any new medications    REVIEW OF SYSTEMS  See HPI for further details. All other systems are negative.     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   • DVT 6/1996    left leg, vein ligation performed on saphenous vein   • First degree atrioventricular block 9/21/2012   • H/O echocardiogram 9/21/2012   • Hypothyroid 10/2002   • Kidney disorder 1998    left kidney biopsy--glomerulonephritis and wegners dx   • Kidney failure     stage II   • Mitral valve prolapse    • Palpitations 9/21/2012   • Rheumatic fever 9/21/2012   • Stroke (HCC) 9/21/2012   • Wegener's granulomatosis (HCC)        FAMILY HISTORY  Family History   Problem Relation Age of Onset   • Other Father         Aortic aneurysm   • Cancer Brother         Lung   • Non-contributory Other        SOCIAL HISTORY   reports that she quit smoking about 61 years ago. Her smoking use included cigarettes. She smoked 1.00 pack per day. She has never used smokeless tobacco. She reports current alcohol use. She reports that she does not use drugs.    SURGICAL HISTORY  Past Surgical History:   Procedure Laterality Date   • PB PARTIAL HIP REPLACEMENT Right 6/19/2019    Procedure: HEMIARTHROPLASTY, HIP;  Surgeon: Raul Saldivar M.D.;  Location: SURGERY [facility];  Service: Orthopedics   • CATARACT EXTRACTION  2006    left eye   • CHOLECYSTECTOMY  1997  "  • TEMPORAL ARTERY BIOPSY  1996    normal   • LUNG NEEDLE BIOPSY  1996    right lung, nodules found   • ARTHROSCOPE  1989    left knee   • ARTHROSCOPY, KNEE  1986    right   • BUNIONECTOMY  1980    bilat   • HYSTERECTOMY RADICAL  1975   • VEIN LIGATION         CURRENT MEDICATIONS  Home Medications    **Home medications have not yet been reviewed for this encounter**         ALLERGIES  Allergies   Allergen Reactions   • Cephalexin [Keflex] Swelling   • Hydroxyzine Swelling     Eyes get itchy and swollen    • Naprelan [Naproxen] Swelling     Eyes get itchy become red and swollen   • Oxaprozin Swelling     Swelling eyes, and itchy   • Ampicillin Rash     Red Rash   • Baclofen Unspecified     drowsiness   • Citalopram Vomiting and Nausea   • Clarithromycin Unspecified     Pt reports that this medication plugs her ears.    • Erythromycin Diarrhea     GI upset   • Promethazine Unspecified     Drowsiness and sleeps   • Vi-Q-Tuss [Hydrocodone-Guaifenesin] Vomiting and Nausea   • Voltaren [Diclofenac Sodium] Rash and Swelling     Red rash and swelling       PHYSICAL EXAM  VITAL SIGNS: /59   Pulse 67   Temp 36.1 °C (96.9 °F) (Temporal)   Resp 16   Ht 1.702 m (5' 7\")   Wt 48 kg (105 lb 13.1 oz)   SpO2 98%   BMI 16.57 kg/m²    Constitutional: Well developed, Well nourished, No acute distress, Non-toxic appearance.   HENT: Normocephalic, Atraumatic, Bilateral external ears normal, Oropharynx is clear mucous membranes are moist. No oral exudates or nasal discharge.   Eyes: Pupils are equal round and reactive, EOMI, Conjunctiva normal, No discharge.   Neck: Normal range of motion, No tenderness, Supple, No stridor. No meningismus.  Lymphatic: No lymphadenopathy noted.   Cardiovascular: Regular rate and rhythm without murmur rub or gallop.  Thorax & Lungs: Clear breath sounds bilaterally without wheezes, rhonchi or rales. There is no chest wall tenderness.   Abdomen: Soft non-tender non-distended. There is no rebound " or guarding. No organomegaly is appreciated. Bowel sounds are normal.  Skin: Normal without rash.   Back: No CVA or spinal tenderness.   Extremities: Intact distal pulses, No edema, No tenderness, No cyanosis, No clubbing. Capillary refill is less than 2 seconds.  Musculoskeletal: Good range of motion in all major joints. No tenderness to palpation or major deformities noted.   Neurologic: Alert & oriented x 3, Normal motor function, Normal sensory function, No focal deficits noted. Reflexes are normal.  Psychiatric: Affect normal, Judgment normal, Mood normal. There is no suicidal ideation or patient reported hallucinations.       RADIOLOGY/PROCEDURES  Bedside bladder scan shows that she has 340 cc in the bladder    COURSE & MEDICAL DECISION MAKING  Pertinent Labs & Imaging studies reviewed. (See chart for details)  Patient presents with urinary retention with 340 cc in the bladder.  We were able to do a mini cath and drain her bladder and her urinalysis does not show any evidence of infection and subsequently she was able to urinate on her own.  She does not have any new medications that would suggest a reason for urinary retention nor any recent surgeries.    I do not think she needs an indwelling catheter at this time.  Going to discharge her with follow-up to urology as she may need some urodynamic testing.  Discharged in stable condition will return if any significant change in symptoms    FINAL IMPRESSION  1. Urinary retention             Electronically signed by: Ramon Sharpe M.D., 2/20/2021 1:36 PM

## 2021-02-20 NOTE — ED NOTES
Pt present to ED via EMS C/O unable to urinate and constipation. States her last bowel movement was 2 days ago, states stool was normal. States she feels bloated and distended, states her last urination was this morning. Pt dressed in gown, call light within reach and bed in lowest position.

## 2021-02-20 NOTE — ED NOTES
Jose Jolly (pt's step son) was contacted for pickup. He states he will be able to complete the task within the next 1.5 hours. He is driving up from [employer].    Alert and oriented to person, place, time/situation. normal mood and affect.

## 2021-02-20 NOTE — ED NOTES
Initially, a urine sample was procured via mini cath, as prescribed.  Eventually pt was able to urinate on her own.  She was assisted to the bathroom.

## 2021-03-12 ENCOUNTER — APPOINTMENT (RX ONLY)
Dept: URBAN - METROPOLITAN AREA CLINIC 4 | Facility: CLINIC | Age: 86
Setting detail: DERMATOLOGY
End: 2021-03-12

## 2021-03-12 DIAGNOSIS — K13.0 DISEASES OF LIPS: ICD-10-CM

## 2021-03-12 PROCEDURE — ? COUNSELING

## 2021-03-12 PROCEDURE — 99212 OFFICE O/P EST SF 10 MIN: CPT

## 2021-03-12 ASSESSMENT — LOCATION DETAILED DESCRIPTION DERM: LOCATION DETAILED: LEFT SUPERIOR VERMILION LIP

## 2021-03-12 ASSESSMENT — LOCATION ZONE DERM: LOCATION ZONE: LIP

## 2021-03-12 ASSESSMENT — LOCATION SIMPLE DESCRIPTION DERM: LOCATION SIMPLE: LEFT SUPERIOR LIP

## 2021-03-12 NOTE — PROCEDURE: COUNSELING
Detail Level: Detailed
Patient Specific Counseling (Will Not Stick From Patient To Patient): Gave patient samples of aquafor and cortibalm.

## 2021-03-24 DIAGNOSIS — Z79.01 CHRONIC ANTICOAGULATION: ICD-10-CM

## 2021-03-24 RX ORDER — WARFARIN SODIUM 2 MG/1
TABLET ORAL
Qty: 90 TABLET | Refills: 1 | Status: SHIPPED | OUTPATIENT
Start: 2021-03-24 | End: 2021-07-12

## 2021-03-31 ENCOUNTER — ANTICOAGULATION VISIT (OUTPATIENT)
Dept: VASCULAR LAB | Facility: MEDICAL CENTER | Age: 86
End: 2021-03-31
Attending: INTERNAL MEDICINE
Payer: MEDICARE

## 2021-03-31 DIAGNOSIS — I48.92 ATRIAL FLUTTER, UNSPECIFIED TYPE (HCC): ICD-10-CM

## 2021-03-31 LAB — INR PPP: 3.6 (ref 2–3.5)

## 2021-03-31 PROCEDURE — 85610 PROTHROMBIN TIME: CPT

## 2021-03-31 PROCEDURE — 99212 OFFICE O/P EST SF 10 MIN: CPT | Performed by: PHARMACIST

## 2021-03-31 NOTE — PROGRESS NOTES
Anticoagulation Summary  As of 3/31/2021    INR goal:  2.0-3.0   TTR:  68.2 % (2.4 y)   INR used for dosing:  3.60 (3/31/2021)   Warfarin maintenance plan:  1 mg (2 mg x 0.5) every Fri; 2 mg (2 mg x 1) all other days   Weekly warfarin total:  13 mg   Plan last modified:  Shaun Ruiz, PharmD (12/2/2020)   Next INR check:  4/14/2021   Target end date:  Indefinite    Indications    Stroke (HCC) [I63.9]  Atrial flutter (HCC) [I48.92]             Anticoagulation Episode Summary     INR check location:      Preferred lab:      Send INR reminders to:      Comments:        Anticoagulation Care Providers     Provider Role Specialty Phone number    Hussain Nation M.D. Referring Interventional Cardiology [phone]    [facility] Anticoagulation Services Responsible  [phone]          Anticoagulation Patient Findings  Patient Findings     Negatives:  Signs/symptoms of thrombosis, Signs/symptoms of bleeding, Laboratory test error suspected, Change in health, Change in alcohol use, Change in activity, Upcoming invasive procedure, Emergency department visit, Upcoming dental procedure, Missed doses, Extra doses, Change in medications, Change in diet/appetite, Hospital admission, Bruising, Other complaints          HPI:  Az Tripp seen in clinic today, on anticoagulation therapy with warfarin due to hx of atrial flutter and stroke.  Changes to current medical/health status since last appt: NONE  Denies signs/symptoms of bleeding and/or thrombosis since the last appt.    Denies any interval changes to diet  Denies any interval changes to medications since last appt.   Denies any complications or cost restrictions with current therapy.   Verified current warfarin dosing schedule.   Declined by patient today due to Covid-19 transmission concerns.  Medications reconciled.  Pt NOT on antiplatelet therapy.      A/P   INR  supra-therapeutic at 3.6.   Instructed patient to HOLD x 1, then resume current warfarin  regimen.      Follow up appointment in 2 week(s).    Shaun Ruiz, PharmD, BCACP

## 2021-04-14 ENCOUNTER — ANTICOAGULATION VISIT (OUTPATIENT)
Dept: VASCULAR LAB | Facility: MEDICAL CENTER | Age: 86
End: 2021-04-14
Attending: INTERNAL MEDICINE
Payer: MEDICARE

## 2021-04-14 DIAGNOSIS — I48.92 ATRIAL FLUTTER, UNSPECIFIED TYPE (HCC): ICD-10-CM

## 2021-04-14 DIAGNOSIS — I63.119 CEREBROVASCULAR ACCIDENT (CVA) DUE TO EMBOLISM OF VERTEBRAL ARTERY, UNSPECIFIED BLOOD VESSEL LATERALITY (HCC): ICD-10-CM

## 2021-04-14 LAB — INR PPP: 2.3 (ref 2–3.5)

## 2021-04-14 PROCEDURE — 85610 PROTHROMBIN TIME: CPT

## 2021-04-14 PROCEDURE — 99211 OFF/OP EST MAY X REQ PHY/QHP: CPT | Performed by: NURSE PRACTITIONER

## 2021-04-14 NOTE — PROGRESS NOTES
Anticoagulation Summary  As of 2021    INR goal:  2.0-3.0   TTR:  67.9 % (2.4 y)   INR used for dosin.30 (2021)   Warfarin maintenance plan:  1 mg (2 mg x 0.5) every Fri; 2 mg (2 mg x 1) all other days   Weekly warfarin total:  13 mg   Plan last modified:  Shaun Ruiz, PharmD (2020)   Next INR check:  2021   Target end date:  Indefinite    Indications    Stroke (HCC) [I63.9]  Atrial flutter (HCC) [I48.92]             Anticoagulation Episode Summary     INR check location:      Preferred lab:      Send INR reminders to:      Comments:        Anticoagulation Care Providers     Provider Role Specialty Phone number    Hussain Nation M.D. Referring Interventional Cardiology [phone]    [facility] Anticoagulation Services Responsible  [phone]        Anticoagulation Patient Findings      HPI:  Az Tripp seen in clinic today for follow up on anticoagulation therapy in the presence of A flutter, CVA.   Denies any changes to current medical/health status since last appointment.   Denies any medication or diet changes.   No current symptoms of bleeding or thrombosis reported.    Pt is NOT on antiplatelet therapy.    A/P:   INR therapeutic.   Continue current regimen.     Pt educated to contact our clinic with any changes in medications or s/s of bleeding or thrombosis. Pt is aware to seek immediate medical attention for falls, head injury or deep cuts    Follow up appointment in 4 week(s) per pt's request.    Next Appointment: Wednesday, May 12, 2021 at 11:30 am.    Callie CARCAMO

## 2021-04-16 LAB — INR BLD: 2.3 (ref 0.9–1.2)

## 2021-05-12 ENCOUNTER — ANTICOAGULATION VISIT (OUTPATIENT)
Dept: VASCULAR LAB | Facility: MEDICAL CENTER | Age: 86
End: 2021-05-12
Attending: INTERNAL MEDICINE
Payer: MEDICARE

## 2021-05-12 DIAGNOSIS — I48.3 TYPICAL ATRIAL FLUTTER (HCC): ICD-10-CM

## 2021-05-12 DIAGNOSIS — I63.449 CEREBROVASCULAR ACCIDENT (CVA) DUE TO EMBOLISM OF CEREBELLAR ARTERY, UNSPECIFIED BLOOD VESSEL LATERALITY (HCC): ICD-10-CM

## 2021-05-12 LAB
INR BLD: 2.6 (ref 0.9–1.2)
INR PPP: 2.6 (ref 2–3.5)

## 2021-05-12 PROCEDURE — 85610 PROTHROMBIN TIME: CPT

## 2021-05-12 PROCEDURE — 99211 OFF/OP EST MAY X REQ PHY/QHP: CPT | Performed by: NURSE PRACTITIONER

## 2021-05-12 NOTE — PROGRESS NOTES
Anticoagulation Summary  As of 2021    INR goal:  2.0-3.0   TTR:  68.9 % (2.5 y)   INR used for dosin.60 (2021)   Warfarin maintenance plan:  1 mg (2 mg x 0.5) every Fri; 2 mg (2 mg x 1) all other days   Weekly warfarin total:  13 mg   Plan last modified:  Shaun Ruiz, PharmD (2020)   Next INR check:     Target end date:  Indefinite    Indications    Stroke (HCC) [I63.9]  Atrial flutter (HCC) [I48.92]             Anticoagulation Episode Summary     INR check location:      Preferred lab:      Send INR reminders to:      Comments:        Anticoagulation Care Providers     Provider Role Specialty Phone number    Hussain Nation M.D. Referring Interventional Cardiology [phone]    [facility] Anticoagulation Services Responsible  [phone]        Anticoagulation Patient Findings      HPI:  Az Shantell Josee seen in clinic today for follow up on anticoagulation therapy in the presence of A flutter, CVA.   Denies any changes to current medical/health status since last appointment.   Denies any medication or diet changes.   No current symptoms of bleeding or thrombosis reported.    Pt is not on antiplatelet therapy.    A/P:   INR therapeutic.   Continue current regimen.     Pt educated to contact our clinic with any changes in medications or s/s of bleeding or thrombosis. Pt is aware to seek immediate medical attention for falls, head injury or deep cuts    Follow up appointment in 5 week(s).    Next Appointment: 2021 at 11:00 am.    Callie CARCAMO

## 2021-06-16 ENCOUNTER — ANTICOAGULATION VISIT (OUTPATIENT)
Dept: VASCULAR LAB | Facility: MEDICAL CENTER | Age: 86
End: 2021-06-16
Attending: INTERNAL MEDICINE
Payer: MEDICARE

## 2021-06-16 DIAGNOSIS — Z79.01 CHRONIC ANTICOAGULATION: ICD-10-CM

## 2021-06-16 LAB
INR BLD: 2.4 (ref 0.9–1.2)
INR PPP: 2.4 (ref 2–3.5)

## 2021-06-16 PROCEDURE — 85610 PROTHROMBIN TIME: CPT

## 2021-06-16 PROCEDURE — 99211 OFF/OP EST MAY X REQ PHY/QHP: CPT

## 2021-06-16 NOTE — PROGRESS NOTES
Anticoagulation Summary  As of 2021    INR goal:  2.0-3.0   TTR:  70.1 % (2.6 y)   INR used for dosin.40 (2021)   Warfarin maintenance plan:  1 mg (2 mg x 0.5) every Fri; 2 mg (2 mg x 1) all other days   Weekly warfarin total:  13 mg   Plan last modified:  Juan De JesusD (2020)   Next INR check:  2021   Target end date:  Indefinite    Indications    Stroke (HCC) [I63.9]  Atrial flutter (HCC) [I48.92]             Anticoagulation Episode Summary     INR check location:      Preferred lab:      Send INR reminders to:      Comments:        Anticoagulation Care Providers     Provider Role Specialty Phone number    Hussain Nation M.D. Referring Interventional Cardiology [phone]    [facility] Anticoagulation Services Responsible  [phone]                Anticoagulation Patient Findings      HPI:  Az Shantelldivya Tripp seen in clinic today, on anticoagulation therapy with warfarin for atrial flutter, hx of stroke  Changes to current medical/health status since last appt: none  Denies signs/symptoms of bleeding and/or thrombosis since the last appt.    Denies any interval changes to diet  Denies any interval changes to medications since last appt.   Denies any complications or cost restrictions with current therapy.   Verified current warfarin dosing schedule.   BP declined  Medications reconciled       A/P   INR  therapeutic.   Pt is to continue with current warfarin dosing regimen.    Follow up appointment in 6 week(s).    Whit Ann, PharmD

## 2021-06-30 PROBLEM — K59.09 OTHER CONSTIPATION: Status: ACTIVE | Noted: 2021-06-30

## 2021-07-28 ENCOUNTER — ANTICOAGULATION VISIT (OUTPATIENT)
Dept: VASCULAR LAB | Facility: MEDICAL CENTER | Age: 86
End: 2021-07-28
Attending: INTERNAL MEDICINE
Payer: MEDICARE

## 2021-07-28 DIAGNOSIS — Z79.01 ON CONTINUOUS ORAL ANTICOAGULATION: ICD-10-CM

## 2021-07-28 LAB — INR PPP: 2.6 (ref 2–3.5)

## 2021-07-28 PROCEDURE — 99211 OFF/OP EST MAY X REQ PHY/QHP: CPT

## 2021-07-28 PROCEDURE — 85610 PROTHROMBIN TIME: CPT

## 2021-07-28 NOTE — PROGRESS NOTES
Anticoagulation Summary  As of 2021    INR goal:  2.0-3.0   TTR:  71.4 % (2.7 y)   INR used for dosin.60 (2021)   Warfarin maintenance plan:  1 mg (2 mg x 0.5) every Fri; 2 mg (2 mg x 1) all other days   Weekly warfarin total:  13 mg   Plan last modified:  Shaun Ruiz PharmD (2020)   Next INR check:  2021   Target end date:  Indefinite    Indications    Stroke (HCC) [I63.9]  Atrial flutter (HCC) [I48.92]             Anticoagulation Episode Summary     INR check location:      Preferred lab:      Send INR reminders to:      Comments:        Anticoagulation Care Providers     Provider Role Specialty Phone number    Hussain Nation M.D. Referring Interventional Cardiology [phone]    [facility] Anticoagulation Services Responsible  [phone]                Refer to Patient Findings for HPI:  Patient Findings     Negatives:  Signs/symptoms of thrombosis, Signs/symptoms of bleeding, Laboratory test error suspected, Change in health, Change in alcohol use, Change in activity, Upcoming invasive procedure, Emergency department visit, Upcoming dental procedure, Missed doses, Extra doses, Change in medications, Change in diet/appetite, Hospital admission, Bruising, Other complaints            There were no vitals filed for this visit.  Pt declined vitals    Verified current warfarin dosing schedule.    Medications reconciled.      A/P   INR  therapeutic. Pt is to continue with current warfarin dosing regimen.    Follow up appointment in 4 week(s).    Luli Smart PharmD   Reviewed with Whit Ann PharmD

## 2021-07-29 LAB — INR BLD: 2.6 (ref 0.9–1.2)

## 2021-08-15 PROBLEM — E43 SEVERE PROTEIN-CALORIE MALNUTRITION (GOMEZ: LESS THAN 60% OF STANDARD WEIGHT) (HCC): Status: RESOLVED | Noted: 2018-12-20 | Resolved: 2021-08-15

## 2021-08-15 PROBLEM — R33.9 RETENTION OF URINE: Status: ACTIVE | Noted: 2021-03-03

## 2021-08-15 PROBLEM — D68.318 ACQUIRED CIRCULATING ANTICOAGULANTS (HCC): Status: ACTIVE | Noted: 2021-08-11

## 2021-08-15 PROBLEM — S72.001A CLOSED RIGHT HIP FRACTURE (HCC): Status: RESOLVED | Noted: 2019-06-19 | Resolved: 2021-08-15

## 2021-08-15 PROBLEM — A41.9 SEPSIS (HCC): Status: RESOLVED | Noted: 2018-12-20 | Resolved: 2021-08-15

## 2021-08-15 PROBLEM — R32 URINARY INCONTINENCE: Status: ACTIVE | Noted: 2021-03-25

## 2021-08-15 PROBLEM — N17.9 AKI (ACUTE KIDNEY INJURY) (HCC): Status: RESOLVED | Noted: 2018-12-20 | Resolved: 2021-08-15

## 2021-08-15 PROBLEM — I69.359 HEMIPLEGIA AS LATE EFFECT OF CEREBROVASCULAR ACCIDENT (CVA) (HCC): Status: ACTIVE | Noted: 2021-08-11

## 2021-08-15 PROBLEM — R35.1 NOCTURIA: Status: ACTIVE | Noted: 2021-03-25

## 2021-08-15 PROBLEM — R93.89 ABNORMAL CHEST X-RAY: Status: RESOLVED | Noted: 2017-09-19 | Resolved: 2021-08-15

## 2021-08-15 PROBLEM — Z79.01 ON CONTINUOUS ORAL ANTICOAGULATION: Status: RESOLVED | Noted: 2019-03-13 | Resolved: 2021-08-15

## 2021-08-15 PROBLEM — R79.1 SUPRATHERAPEUTIC INR: Status: RESOLVED | Noted: 2018-12-20 | Resolved: 2021-08-15

## 2021-08-15 PROBLEM — R07.9 CHEST PAIN: Status: RESOLVED | Noted: 2020-07-21 | Resolved: 2021-08-15

## 2021-08-15 PROBLEM — R10.9 ABDOMINAL PAIN: Status: RESOLVED | Noted: 2019-03-20 | Resolved: 2021-08-15

## 2021-08-15 PROBLEM — D72.829 LEUKOCYTOSIS: Status: RESOLVED | Noted: 2020-07-21 | Resolved: 2021-08-15

## 2021-08-15 PROBLEM — R31.9 HEMATURIA: Status: RESOLVED | Noted: 2019-03-20 | Resolved: 2021-08-15

## 2021-08-15 PROBLEM — R33.9 INCOMPLETE EMPTYING OF BLADDER: Status: ACTIVE | Noted: 2021-03-25

## 2021-08-15 PROBLEM — R53.82 CHRONIC FATIGUE: Status: RESOLVED | Noted: 2017-09-19 | Resolved: 2021-08-15

## 2021-08-25 ENCOUNTER — ANTICOAGULATION VISIT (OUTPATIENT)
Dept: VASCULAR LAB | Facility: MEDICAL CENTER | Age: 86
End: 2021-08-25
Attending: INTERNAL MEDICINE
Payer: MEDICARE

## 2021-08-25 DIAGNOSIS — I48.3 TYPICAL ATRIAL FLUTTER (HCC): ICD-10-CM

## 2021-08-25 LAB
INR BLD: 2 (ref 0.9–1.2)
INR PPP: 2 (ref 2–3.5)

## 2021-08-25 PROCEDURE — 99211 OFF/OP EST MAY X REQ PHY/QHP: CPT | Performed by: NURSE PRACTITIONER

## 2021-08-25 PROCEDURE — 85610 PROTHROMBIN TIME: CPT

## 2021-08-25 NOTE — PROGRESS NOTES
Anticoagulation Summary  As of 2021    INR goal:  2.0-3.0   TTR:  72.2 % (2.8 y)   INR used for dosin.00 (2021)   Warfarin maintenance plan:  1 mg (2 mg x 0.5) every Fri; 2 mg (2 mg x 1) all other days   Weekly warfarin total:  13 mg   Plan last modified:  Shaun Ruiz, PharmD (2020)   Next INR check:  2021   Target end date:  Indefinite    Indications    Stroke (HCC) (Resolved) [I63.9]  Atrial flutter (HCC) [I48.92]             Anticoagulation Episode Summary     INR check location:      Preferred lab:      Send INR reminders to:      Comments:        Anticoagulation Care Providers     Provider Role Specialty Phone number    Hussain Nation M.D. Referring Interventional Cardiology [phone]    [facility] Anticoagulation Services Responsible  [phone]                    Refer to Patient Findings for HPI:  Patient Findings     Negatives:  Signs/symptoms of thrombosis, Signs/symptoms of bleeding, Laboratory test error suspected, Change in health, Change in alcohol use, Change in activity, Upcoming invasive procedure, Emergency department visit, Upcoming dental procedure, Missed doses, Extra doses, Change in medications, Change in diet/appetite, Hospital admission, Bruising, Other complaints            Verified current warfarin dosing schedule.      Medications reconciled   Pt is not on antiplatelet therapy      A/P   INR  -therapeutic.     Warfarin dosing recommendation: Continue current regimen.    Pt educated to contact our clinic with any changes in medications or s/s of bleeding or thrombosis. Pt is aware to seek immediate medical attention for falls, head injury or deep cuts.    Follow up appointment in 5 week(s).    JEFF Zepeda

## 2021-09-08 PROBLEM — B37.2 CANDIDAL DERMATITIS: Status: ACTIVE | Noted: 2021-09-08

## 2021-09-29 ENCOUNTER — ANTICOAGULATION VISIT (OUTPATIENT)
Dept: VASCULAR LAB | Facility: MEDICAL CENTER | Age: 86
End: 2021-09-29
Attending: INTERNAL MEDICINE
Payer: MEDICARE

## 2021-09-29 DIAGNOSIS — I48.3 TYPICAL ATRIAL FLUTTER (HCC): ICD-10-CM

## 2021-09-29 LAB — INR PPP: 2.2 (ref 2–3.5)

## 2021-09-29 PROCEDURE — 99211 OFF/OP EST MAY X REQ PHY/QHP: CPT | Performed by: NURSE PRACTITIONER

## 2021-09-29 PROCEDURE — 85610 PROTHROMBIN TIME: CPT

## 2021-09-29 RX ORDER — FOLIC ACID 1 MG/1
1 TABLET ORAL EVERY MORNING
COMMUNITY
End: 2022-04-15 | Stop reason: SDUPTHER

## 2021-09-29 RX ORDER — LEVOTHYROXINE SODIUM 88 UG/1
88 TABLET ORAL
COMMUNITY
End: 2022-04-15 | Stop reason: SDUPTHER

## 2021-09-29 NOTE — PROGRESS NOTES
Anticoagulation Summary  As of 2021    INR goal:  2.0-3.0   TTR:  73.1 % (2.9 y)   INR used for dosin.20 (2021)   Warfarin maintenance plan:  1 mg (2 mg x 0.5) every Fri; 2 mg (2 mg x 1) all other days   Weekly warfarin total:  13 mg   Plan last modified:  Shaun Ruiz, PharmD (2020)   Next INR check:  11/10/2021   Target end date:  Indefinite    Indications    Stroke (HCC) (Resolved) [I63.9]  Atrial flutter (HCC) [I48.92]             Anticoagulation Episode Summary     INR check location:      Preferred lab:      Send INR reminders to:      Comments:        Anticoagulation Care Providers     Provider Role Specialty Phone number    Hussain Nation M.D. Referring Interventional Cardiology [phone]    [facility] Anticoagulation [facility] Responsible  [phone]                Refer to Patient Findings for HPI:  Patient Findings     Negatives:  Signs/symptoms of thrombosis, Signs/symptoms of bleeding, Laboratory test error suspected, Change in health, Change in alcohol use, Change in activity, Upcoming invasive procedure, Emergency department visit, Upcoming dental procedure, Missed doses, Extra doses, Change in medications, Change in diet/appetite, Hospital admission, Bruising, Other complaints          There were no vitals filed for this visit.   pt declined vitals    Verified current warfarin dosing schedule.    Medications reconciled   Pt is not on antiplatelet therapy      A/P   INR  -therapeutic.     Warfarin dosing recommendation: Continue current regimen.    Pt educated to contact our clinic with any changes in medications or s/s of bleeding or thrombosis. Pt is aware to seek immediate medical attention for falls, head injury or deep cuts.    Follow up appointment in 6 week(s).    JEFF Zepeda

## 2021-10-01 LAB — INR BLD: 2.2 (ref 0.9–1.2)

## 2021-10-03 ENCOUNTER — APPOINTMENT (OUTPATIENT)
Dept: RADIOLOGY | Facility: MEDICAL CENTER | Age: 86
End: 2021-10-03
Attending: EMERGENCY MEDICINE
Payer: MEDICARE

## 2021-10-03 ENCOUNTER — HOSPITAL ENCOUNTER (EMERGENCY)
Facility: MEDICAL CENTER | Age: 86
End: 2021-10-03
Attending: EMERGENCY MEDICINE
Payer: MEDICARE

## 2021-10-03 VITALS
SYSTOLIC BLOOD PRESSURE: 133 MMHG | HEIGHT: 67 IN | DIASTOLIC BLOOD PRESSURE: 62 MMHG | RESPIRATION RATE: 14 BRPM | BODY MASS INDEX: 16.82 KG/M2 | OXYGEN SATURATION: 94 % | WEIGHT: 107.14 LBS | HEART RATE: 80 BPM | TEMPERATURE: 96.8 F

## 2021-10-03 DIAGNOSIS — R30.0 DYSURIA: ICD-10-CM

## 2021-10-03 LAB
ALBUMIN SERPL BCP-MCNC: 4 G/DL (ref 3.2–4.9)
ALBUMIN/GLOB SERPL: 1.1 G/DL
ALP SERPL-CCNC: 88 U/L (ref 30–99)
ALT SERPL-CCNC: 9 U/L (ref 2–50)
ANION GAP SERPL CALC-SCNC: 9 MMOL/L (ref 7–16)
APPEARANCE UR: ABNORMAL
AST SERPL-CCNC: 17 U/L (ref 12–45)
BACTERIA #/AREA URNS HPF: ABNORMAL /HPF
BASOPHILS # BLD AUTO: 0.3 % (ref 0–1.8)
BASOPHILS # BLD: 0.02 K/UL (ref 0–0.12)
BILIRUB SERPL-MCNC: 0.4 MG/DL (ref 0.1–1.5)
BILIRUB UR QL STRIP.AUTO: NEGATIVE
BUN SERPL-MCNC: 22 MG/DL (ref 8–22)
CALCIUM SERPL-MCNC: 9.1 MG/DL (ref 8.4–10.2)
CHLORIDE SERPL-SCNC: 99 MMOL/L (ref 96–112)
CO2 SERPL-SCNC: 28 MMOL/L (ref 20–33)
COLOR UR: YELLOW
CREAT SERPL-MCNC: 1.45 MG/DL (ref 0.5–1.4)
EOSINOPHIL # BLD AUTO: 0.11 K/UL (ref 0–0.51)
EOSINOPHIL NFR BLD: 1.6 % (ref 0–6.9)
EPI CELLS #/AREA URNS HPF: ABNORMAL /HPF
ERYTHROCYTE [DISTWIDTH] IN BLOOD BY AUTOMATED COUNT: 42.6 FL (ref 35.9–50)
GLOBULIN SER CALC-MCNC: 3.7 G/DL (ref 1.9–3.5)
GLUCOSE SERPL-MCNC: 123 MG/DL (ref 65–99)
GLUCOSE UR STRIP.AUTO-MCNC: NEGATIVE MG/DL
HCT VFR BLD AUTO: 39 % (ref 37–47)
HGB BLD-MCNC: 12.4 G/DL (ref 12–16)
IMM GRANULOCYTES # BLD AUTO: 0.02 K/UL (ref 0–0.11)
IMM GRANULOCYTES NFR BLD AUTO: 0.3 % (ref 0–0.9)
INR PPP: 2.36 (ref 0.87–1.13)
KETONES UR STRIP.AUTO-MCNC: NEGATIVE MG/DL
LEUKOCYTE ESTERASE UR QL STRIP.AUTO: ABNORMAL
LYMPHOCYTES # BLD AUTO: 0.85 K/UL (ref 1–4.8)
LYMPHOCYTES NFR BLD: 12.2 % (ref 22–41)
MCH RBC QN AUTO: 30.1 PG (ref 27–33)
MCHC RBC AUTO-ENTMCNC: 31.8 G/DL (ref 33.6–35)
MCV RBC AUTO: 94.7 FL (ref 81.4–97.8)
MICRO URNS: ABNORMAL
MONOCYTES # BLD AUTO: 0.42 K/UL (ref 0–0.85)
MONOCYTES NFR BLD AUTO: 6.1 % (ref 0–13.4)
NEUTROPHILS # BLD AUTO: 5.52 K/UL (ref 2–7.15)
NEUTROPHILS NFR BLD: 79.5 % (ref 44–72)
NITRITE UR QL STRIP.AUTO: NEGATIVE
NRBC # BLD AUTO: 0 K/UL
NRBC BLD-RTO: 0 /100 WBC
PH UR STRIP.AUTO: 6 [PH] (ref 5–8)
PLATELET # BLD AUTO: 163 K/UL (ref 164–446)
PMV BLD AUTO: 9.7 FL (ref 9–12.9)
POTASSIUM SERPL-SCNC: 4 MMOL/L (ref 3.6–5.5)
PROT SERPL-MCNC: 7.7 G/DL (ref 6–8.2)
PROT UR QL STRIP: NEGATIVE MG/DL
PROTHROMBIN TIME: 24.1 SEC (ref 12–14.6)
RBC # BLD AUTO: 4.12 M/UL (ref 4.2–5.4)
RBC # URNS HPF: ABNORMAL /HPF
RBC UR QL AUTO: ABNORMAL
SODIUM SERPL-SCNC: 136 MMOL/L (ref 135–145)
SP GR UR STRIP.AUTO: 1.01
TRANS CELLS URNS QL MICRO: ABNORMAL /HPF
WBC # BLD AUTO: 6.9 K/UL (ref 4.8–10.8)
WBC #/AREA URNS HPF: ABNORMAL /HPF

## 2021-10-03 PROCEDURE — 81001 URINALYSIS AUTO W/SCOPE: CPT

## 2021-10-03 PROCEDURE — 51798 US URINE CAPACITY MEASURE: CPT

## 2021-10-03 PROCEDURE — 99284 EMERGENCY DEPT VISIT MOD MDM: CPT

## 2021-10-03 PROCEDURE — 85610 PROTHROMBIN TIME: CPT

## 2021-10-03 PROCEDURE — 700102 HCHG RX REV CODE 250 W/ 637 OVERRIDE(OP): Performed by: EMERGENCY MEDICINE

## 2021-10-03 PROCEDURE — 74176 CT ABD & PELVIS W/O CONTRAST: CPT | Mod: ME

## 2021-10-03 PROCEDURE — A9270 NON-COVERED ITEM OR SERVICE: HCPCS | Performed by: EMERGENCY MEDICINE

## 2021-10-03 PROCEDURE — 80053 COMPREHEN METABOLIC PANEL: CPT

## 2021-10-03 PROCEDURE — 85025 COMPLETE CBC W/AUTO DIFF WBC: CPT

## 2021-10-03 RX ORDER — PHENAZOPYRIDINE HYDROCHLORIDE 200 MG/1
100 TABLET, FILM COATED ORAL ONCE
Status: COMPLETED | OUTPATIENT
Start: 2021-10-03 | End: 2021-10-03

## 2021-10-03 RX ADMIN — PHENAZOPYRIDINE HYDROCHLORIDE 100 MG: 200 TABLET ORAL at 03:33

## 2021-10-03 ASSESSMENT — FIBROSIS 4 INDEX: FIB4 SCORE: 2.91

## 2021-10-03 NOTE — ED PROVIDER NOTES
"ED Provider Note    CHIEF COMPLAINT  Chief Complaint   Patient presents with   • Urinary Retention     Pt states that she wears a brief due to \"leaking\" pt states that tonight she has remained dry and feels the need to void but is unable. Pt last voided at 6pm         [facility]    Primary care provider: TOM Lopez   History obtained from: Patient  History limited by: None     Az Tripp is a 87 y.o. female who presents to the ED by EMS complaining of difficulty with urination for about 4 hours.  She reports feeling full and urge to urinate but has only had a few dribbles of urine.  No gross blood noted.  She denies fever but has had some chills.  She denies history of kidney stones but reports history of \"kidney problems\" that she is unable to elaborate.  She has had some nausea without vomiting.  She reports normal bowel movements and stools today.  She reports that her mouth is dry.  She denies shortness of breath/difficulty breathing/cough/rash.  She has been vaccinated for COVID-19.  She has not noticed any increased swelling.  She reports suprapubic discomfort but otherwise denies pain anywhere else except for chronic right hip pain.  Patient declines nausea and pain medicine at this time.    REVIEW OF SYSTEMS  Please see HPI for pertinent positives/negatives.  All other systems reviewed and are negative.     PAST MEDICAL HISTORY  Past Medical History:   Diagnosis Date   • Leukocytosis 7/21/2020   • Closed right hip fracture ([facility]) 6/19/2019   • Abdominal pain 3/20/2019   • Hematuria 3/20/2019   • Supratherapeutic INR 12/20/2018   • Severe protein-calorie malnutrition (Mahmood: less than 60% of standard weight) ([facility]) 12/20/2018   • Sepsis ([facility]) 12/20/2018   • DIAZ (acute kidney injury) ([facility]) 12/20/2018   • Abnormal chest x-ray 9/19/2017    Improved but persistent bilateral airspace opacities 9/11/17   • Chronic fatigue 9/19/2017   • Abdominal bruit 9/25/2012   • H/O echocardiogram 9/21/2012   • " First degree atrioventricular block 2012   • Palpitations 2012   • Stroke (HCC) 2012   • Allergic rhinitis 2012   • Aseptic necrosis of bone, site unspecified 2012   • Rheumatic fever 2012   • SVT (SUPRAVENTRICULAR TACHYCARDIA), h/o paroxysmal 8/3/2011   • Arterial ischemic stroke, MCA (middle cerebral artery), left, acute (HCC) 8/3/2011   • A fib 06   • Hypothyroid 10/2002   • Kidney disorder     left kidney biopsy--glomerulonephritis and wegners dx   • DVT 1996    left leg, vein ligation performed on saphenous vein   • Kidney failure     stage II   • Mitral valve prolapse    • Wegener's granulomatosis         SURGICAL HISTORY  Past Surgical History:   Procedure Laterality Date   • PB PARTIAL HIP REPLACEMENT Right 2019    Procedure: HEMIARTHROPLASTY, HIP;  Surgeon: Raul Saldivar M.D.;  Location: SURGERY [facility];  Service: Orthopedics   • CATARACT EXTRACTION      left eye   • CHOLECYSTECTOMY     • TEMPORAL ARTERY BIOPSY      normal   • LUNG NEEDLE BIOPSY      right lung, nodules found   • ARTHROSCOPE      left knee   • ARTHROSCOPY, KNEE      right   • BUNIONECTOMY      bilat   • HYSTERECTOMY RADICAL     • VEIN LIGATION          SOCIAL HISTORY  Social History     Tobacco Use   • Smoking status: Former Smoker     Packs/day: 1.00     Types: Cigarettes     Quit date: 1960     Years since quittin.7   • Smokeless tobacco: Never Used   • Tobacco comment: very little years and years ago   Vaping Use   • Vaping Use: Never used   Substance and Sexual Activity   • Alcohol use: Yes     Alcohol/week: 0.0 oz     Comment: occasional   • Drug use: No   • Sexual activity: Not on file        FAMILY HISTORY  Family History   Problem Relation Age of Onset   • Other Father         Aortic aneurysm   • Cancer Brother         Lung   • Non-contributory Other         CURRENT MEDICATIONS  Home Medications    **Home medications have not yet been  "reviewed for this encounter**          ALLERGIES  Allergies   Allergen Reactions   • Cephalexin [Keflex] Swelling   • Hydroxyzine Swelling     Eyes get itchy and swollen    • Naprelan [Naproxen] Swelling     Eyes get itchy become red and swollen   • Oxaprozin Swelling     Swelling eyes, and itchy   • Ampicillin Rash     Red Rash   • Baclofen Unspecified     drowsiness   • Citalopram Vomiting and Nausea   • Clarithromycin Unspecified     Pt reports that this medication plugs her ears.    • Erythromycin Diarrhea     GI upset   • Promethazine Unspecified     Drowsiness and sleeps   • Vi-Q-Tuss [Hydrocodone-Guaifenesin] Vomiting and Nausea   • Voltaren [Diclofenac Sodium] Rash and Swelling     Red rash and swelling   • Penicillins         PHYSICAL EXAM  VITAL SIGNS: /62   Pulse 80   Temp (P) 36 °C (96.8 °F)   Resp 14   Ht 1.702 m (5' 7\")   Wt 48.6 kg (107 lb 2.3 oz)   SpO2 94%   BMI 16.78 kg/m²  @PAPO[[id number]::@     Pulse ox interpretation: 96% I interpret this pulse ox as normal     Constitutional: Well developed, well nourished, alert in no apparent distress, nontoxic appearance    HENT: No external signs of trauma, normocephalic, mask on due to COVID-19 pandemic  Eyes: PERRL, conjunctiva without erythema, no discharge, no icterus    Neck: Soft and supple, trachea midline, no stridor, no tenderness, no LAD, no JVD, good ROM    Cardiovascular: Regular rate and rhythm, no murmurs/rubs/gallops, strong distal pulses and good perfusion    Thorax & Lungs: No respiratory distress, CTAB   Abdomen: Soft, mild suprapubic tenderness to palpation, nondistended, no guarding, no rebound, normal BS    Back: No CVAT    Extremities: No cyanosis, no edema, no gross deformity, good ROM, no tenderness, intact distal pulses with brisk cap refill    Skin: Warm, dry, no pallor/cyanosis, no rash noted    Lymphatic: No lymphadenopathy noted    Neuro: A/O times 3  Psychiatric: Cooperative      DIAGNOSTIC STUDIES / " PROCEDURES        LABS  All labs reviewed by me.     Results for orders placed or performed during the hospital encounter of 10/03/21   CBC WITH DIFFERENTIAL   Result Value Ref Range    WBC 6.9 4.8 - 10.8 K/uL    RBC 4.12 (L) 4.20 - 5.40 M/uL    Hemoglobin 12.4 12.0 - 16.0 g/dL    Hematocrit 39.0 37.0 - 47.0 %    MCV 94.7 81.4 - 97.8 fL    MCH 30.1 27.0 - 33.0 pg    MCHC 31.8 (L) 33.6 - 35.0 g/dL    RDW 42.6 35.9 - 50.0 fL    Platelet Count 163 (L) 164 - 446 K/uL    MPV 9.7 9.0 - 12.9 fL    Neutrophils-Polys 79.50 (H) 44.00 - 72.00 %    Lymphocytes 12.20 (L) 22.00 - 41.00 %    Monocytes 6.10 0.00 - 13.40 %    Eosinophils 1.60 0.00 - 6.90 %    Basophils 0.30 0.00 - 1.80 %    Immature Granulocytes 0.30 0.00 - 0.90 %    Nucleated RBC 0.00 /100 WBC    Neutrophils (Absolute) 5.52 2.00 - 7.15 K/uL    Lymphs (Absolute) 0.85 (L) 1.00 - 4.80 K/uL    Monos (Absolute) 0.42 0.00 - 0.85 K/uL    Eos (Absolute) 0.11 0.00 - 0.51 K/uL    Baso (Absolute) 0.02 0.00 - 0.12 K/uL    Immature Granulocytes (abs) 0.02 0.00 - 0.11 K/uL    NRBC (Absolute) 0.00 K/uL   COMP METABOLIC PANEL   Result Value Ref Range    Sodium 136 135 - 145 mmol/L    Potassium 4.0 3.6 - 5.5 mmol/L    Chloride 99 96 - 112 mmol/L    Co2 28 20 - 33 mmol/L    Anion Gap 9.0 7.0 - 16.0    Glucose 123 (H) 65 - 99 mg/dL    Bun 22 8 - 22 mg/dL    Creatinine 1.45 (H) 0.50 - 1.40 mg/dL    Calcium 9.1 8.4 - 10.2 mg/dL    AST(SGOT) 17 12 - 45 U/L    ALT(SGPT) 9 2 - 50 U/L    Alkaline Phosphatase 88 30 - 99 U/L    Total Bilirubin 0.4 0.1 - 1.5 mg/dL    Albumin 4.0 3.2 - 4.9 g/dL    Total Protein 7.7 6.0 - 8.2 g/dL    Globulin 3.7 (H) 1.9 - 3.5 g/dL    A-G Ratio 1.1 g/dL   URINALYSIS (UA)    Specimen: Urine, Clean Catch   Result Value Ref Range    Color Yellow     Character Hazy (A)     Specific Gravity 1.015 <1.035    Ph 6.0 5.0 - 8.0    Glucose Negative Negative mg/dL    Ketones Negative Negative mg/dL    Protein Negative Negative mg/dL    Bilirubin Negative Negative     Nitrite Negative Negative    Leukocyte Esterase Small (A) Negative    Occult Blood Small (A) Negative    Micro Urine Req Microscopic    Prothrombin Time   Result Value Ref Range    PT 24.1 (H) 12.0 - 14.6 sec    INR 2.36 (H) 0.87 - 1.13   URINE MICROSCOPIC (W/UA)   Result Value Ref Range    WBC 10-20 (A) /hpf    RBC 10-20 (A) /hpf    Bacteria Few (A) None /hpf    Epithelial Cells Few Few /hpf    Trans Epithelial Cells Few /hpf   ESTIMATED GFR   Result Value Ref Range    GFR If  41 (A) >60 mL/min/1.73 m 2    GFR If Non  34 (A) >60 mL/min/1.73 m 2        RADIOLOGY  The radiologist's interpretation of all radiological studies have been reviewed by me.     CT-ABDOMEN-PELVIS W/O   Final Result      1.  There is no acute inflammatory process within the abdomen or pelvis. There is moderate colonic stool.   2.  There is extensive atherosclerosis.             COURSE & MEDICAL DECISION MAKING  Nursing notes, VS, PMSFHx reviewed in chart.     Review of past medical records shows the patient was last seen in the office September 8, 2021 for candidal dermatitis.      Differential diagnoses considered include but are not limited to: Vaginitis, UTI/cystitis/pyelo, KS/renal colic      History and physical exam as above.  Bladder scan was performed by ED nurse without evidence for significant urinary retention.  Labs are unrevealing.  UA without clear indications for infection.  CT abdomen/pelvis with findings as above.  I discussed the findings with the patient.  She is noted to be in no acute distress and nontoxic in appearance.  She has been clinically stable during her ED stay.  No evidence for acute surgical abdomen on recheck.  Discussed with patient possibilities including bladder spasms.  I do not find evidence for obstruction, infection or kidney stone.  She has been seen by urologist and can follow-up for reevaluation.  She was given a dose of Pyridium in the ED in the hopes of symptomatic  relief.  Return to ED precautions were given.  Patient also with slightly elevated blood pressure reading in the ED likely circumstantial for which she can follow-up on outpatient basis for further management.  Patient verbalized understanding and agreed with plan of care with no further questions or concerns.      The patient is referred to a primary physician for blood pressure management, diabetic screening, and for all other preventative health concerns.       FINAL IMPRESSION  1. Dysuria Acute          DISPOSITION  Patient will be discharged home in stable condition.       FOLLOW UP  TOM Lopez  [address] [facility] [id number] [phone]    Call in 1 day      Please follow-up with your urologist    Call in 1 day      [facility], Emergency Dept  [address] [facility] [id number] [phone]    If symptoms worsen         OUTPATIENT MEDICATIONS  New Prescriptions    No medications on file          Electronically signed by: Akshat John D.O., 10/3/2021 1:58 AM      Portions of this record were made with voice recognition software.  Despite my review, spelling/grammar/context errors may still remain.  Interpretation of this chart should be taken in this context.

## 2021-10-03 NOTE — ED NOTES
"Chief Complaint   Patient presents with   • Urinary Retention     Pt states that she wears a brief due to \"leaking\" pt states that tonight she has remained dry and feels the need to void but is unable. Pt last voided at 6pm      /81   Pulse 85   Temp 36 °C (96.8 °F) (Temporal)   Resp 14   Ht 1.702 m (5' 7\")   Wt 48.6 kg (107 lb 2.3 oz)   SpO2 96%   BMI 16.78 kg/m²       Bladder scan preformed, 137 was reading.   "

## 2021-10-03 NOTE — ED NOTES
Pt provided with discharge paper work and follow up care. Pt declines questions. Pt is to take cab home and her  is to meet her when cab arrives home to assist her into her home. Pt to use wheelchair out of ER once cab arrives.

## 2021-10-15 ENCOUNTER — HOSPITAL ENCOUNTER (EMERGENCY)
Facility: MEDICAL CENTER | Age: 86
End: 2021-10-15
Attending: EMERGENCY MEDICINE
Payer: MEDICARE

## 2021-10-15 VITALS
WEIGHT: 105 LBS | OXYGEN SATURATION: 100 % | BODY MASS INDEX: 16.48 KG/M2 | HEART RATE: 80 BPM | RESPIRATION RATE: 20 BRPM | DIASTOLIC BLOOD PRESSURE: 66 MMHG | HEIGHT: 67 IN | SYSTOLIC BLOOD PRESSURE: 117 MMHG | TEMPERATURE: 98.1 F

## 2021-10-15 DIAGNOSIS — R11.0 NAUSEA: ICD-10-CM

## 2021-10-15 LAB
ALBUMIN SERPL BCP-MCNC: 3.5 G/DL (ref 3.2–4.9)
ALBUMIN/GLOB SERPL: 0.9 G/DL
ALP SERPL-CCNC: 83 U/L (ref 30–99)
ALT SERPL-CCNC: 13 U/L (ref 2–50)
ANION GAP SERPL CALC-SCNC: 12 MMOL/L (ref 7–16)
APPEARANCE UR: CLEAR
AST SERPL-CCNC: 19 U/L (ref 12–45)
BACTERIA #/AREA URNS HPF: ABNORMAL /HPF
BASOPHILS # BLD AUTO: 0.4 % (ref 0–1.8)
BASOPHILS # BLD: 0.02 K/UL (ref 0–0.12)
BILIRUB SERPL-MCNC: 0.4 MG/DL (ref 0.1–1.5)
BILIRUB UR QL STRIP.AUTO: NEGATIVE
BUN SERPL-MCNC: 22 MG/DL (ref 8–22)
CALCIUM SERPL-MCNC: 9.2 MG/DL (ref 8.4–10.2)
CHLORIDE SERPL-SCNC: 99 MMOL/L (ref 96–112)
CO2 SERPL-SCNC: 25 MMOL/L (ref 20–33)
COLOR UR: ABNORMAL
CREAT SERPL-MCNC: 1.48 MG/DL (ref 0.5–1.4)
EOSINOPHIL # BLD AUTO: 0.06 K/UL (ref 0–0.51)
EOSINOPHIL NFR BLD: 1.1 % (ref 0–6.9)
EPI CELLS #/AREA URNS HPF: ABNORMAL /HPF
ERYTHROCYTE [DISTWIDTH] IN BLOOD BY AUTOMATED COUNT: 42.3 FL (ref 35.9–50)
GLOBULIN SER CALC-MCNC: 3.8 G/DL (ref 1.9–3.5)
GLUCOSE SERPL-MCNC: 109 MG/DL (ref 65–99)
GLUCOSE UR STRIP.AUTO-MCNC: NEGATIVE MG/DL
HCT VFR BLD AUTO: 40.5 % (ref 37–47)
HGB BLD-MCNC: 12.8 G/DL (ref 12–16)
IMM GRANULOCYTES # BLD AUTO: 0.02 K/UL (ref 0–0.11)
IMM GRANULOCYTES NFR BLD AUTO: 0.4 % (ref 0–0.9)
KETONES UR STRIP.AUTO-MCNC: NEGATIVE MG/DL
LEUKOCYTE ESTERASE UR QL STRIP.AUTO: ABNORMAL
LIPASE SERPL-CCNC: 25 U/L (ref 7–58)
LYMPHOCYTES # BLD AUTO: 1.63 K/UL (ref 1–4.8)
LYMPHOCYTES NFR BLD: 30.3 % (ref 22–41)
MCH RBC QN AUTO: 29.7 PG (ref 27–33)
MCHC RBC AUTO-ENTMCNC: 31.6 G/DL (ref 33.6–35)
MCV RBC AUTO: 94 FL (ref 81.4–97.8)
MICRO URNS: ABNORMAL
MONOCYTES # BLD AUTO: 0.38 K/UL (ref 0–0.85)
MONOCYTES NFR BLD AUTO: 7.1 % (ref 0–13.4)
NEUTROPHILS # BLD AUTO: 3.27 K/UL (ref 2–7.15)
NEUTROPHILS NFR BLD: 60.7 % (ref 44–72)
NITRITE UR QL STRIP.AUTO: NEGATIVE
NRBC # BLD AUTO: 0 K/UL
NRBC BLD-RTO: 0 /100 WBC
PH UR STRIP.AUTO: 5.5 [PH] (ref 5–8)
PLATELET # BLD AUTO: 225 K/UL (ref 164–446)
PMV BLD AUTO: 9.5 FL (ref 9–12.9)
POTASSIUM SERPL-SCNC: 5 MMOL/L (ref 3.6–5.5)
PROT SERPL-MCNC: 7.3 G/DL (ref 6–8.2)
PROT UR QL STRIP: NEGATIVE MG/DL
RBC # BLD AUTO: 4.31 M/UL (ref 4.2–5.4)
RBC # URNS HPF: ABNORMAL /HPF
RBC UR QL AUTO: ABNORMAL
SODIUM SERPL-SCNC: 136 MMOL/L (ref 135–145)
SP GR UR STRIP.AUTO: <=1.005
WBC # BLD AUTO: 5.4 K/UL (ref 4.8–10.8)
WBC #/AREA URNS HPF: ABNORMAL /HPF

## 2021-10-15 PROCEDURE — 80053 COMPREHEN METABOLIC PANEL: CPT

## 2021-10-15 PROCEDURE — 81001 URINALYSIS AUTO W/SCOPE: CPT

## 2021-10-15 PROCEDURE — 83690 ASSAY OF LIPASE: CPT

## 2021-10-15 PROCEDURE — 85025 COMPLETE CBC W/AUTO DIFF WBC: CPT

## 2021-10-15 PROCEDURE — 36415 COLL VENOUS BLD VENIPUNCTURE: CPT

## 2021-10-15 PROCEDURE — 99285 EMERGENCY DEPT VISIT HI MDM: CPT

## 2021-10-15 RX ORDER — CARBOXYMETHYLCELLULOSE SODIUM 2.5 MG/ML
2 SOLUTION/ DROPS OPHTHALMIC 4 TIMES DAILY PRN
Status: SHIPPED | COMMUNITY
End: 2022-04-15 | Stop reason: SDUPTHER

## 2021-10-15 RX ORDER — ONDANSETRON 4 MG/1
4 TABLET, ORALLY DISINTEGRATING ORAL EVERY 4 HOURS PRN
Status: SHIPPED | COMMUNITY
End: 2022-05-15

## 2021-10-15 RX ORDER — WARFARIN SODIUM 2 MG/1
1-2 TABLET ORAL EVERY EVENING
Status: SHIPPED | COMMUNITY
End: 2022-04-15 | Stop reason: SDUPTHER

## 2021-10-15 ASSESSMENT — FIBROSIS 4 INDEX: FIB4 SCORE: 3.02

## 2021-10-15 NOTE — ED NOTES
Pt BIB EMS  Chief Complaint   Patient presents with   • Nausea     uneasiness in stomach   • Diarrhea     for 6 days / now no BM / pt states usually take a suppository but has not taken   pt states abd pain earlier. Pt states her MD told her to come here if she was not feeling better.    Pt resting on gurney, pt in no acute distress, pt provided call light, instructed to call if needing any assistance, instructed not to get up by self, gurney in lowest position.

## 2021-10-15 NOTE — ED NOTES
PIV dc'ed, cab called, pt states she can pay for cab    Pt discharged, reviewed all discharge instructions including follow up, pt verbalizes understanding, and denies questions.   Escorted to lobby. No belongings left in room.

## 2021-10-15 NOTE — ED NOTES
Pharmacy Medication Reconciliation      ~Medication reconciliation updated and complete per pt at bedside with medication list. Reviewed list with pt and returned at bedside  ~Allergies have been verified and updated   ~No oral ABX within the last 30 days  ~Patient home pharmacy:[facility]

## 2021-10-15 NOTE — ED NOTES
Pt assisted to and from BSC, gait unsteady    Pt resting on gurney, pt in no acute distress, pt provided call light, instructed to call if needing any assistance, instructed not to get up by self, gurney in lowest position.

## 2021-10-15 NOTE — ED PROVIDER NOTES
ED Provider Note    Scribed for Dr. Renan Olivarez M.D. by Kenia Whitaker. 10/15/2021  2:36 PM    Primary care provider: TOM Lopez  Means of arrival: EMS  History obtained from: Patient  History limited by: None    CHIEF COMPLAINT  Chief Complaint   Patient presents with   • Nausea     uneasiness in stomach   • Diarrhea     for 6 days / now no BM / pt states usually take a suppository but has not taken       HPI  Az Tripp is a 87 y.o. female who presents to the Emergency Department for diarrhea with an onset of 6 days ago. Patient describes her stool was initially mushy in quality, but now she is unable to produce a bowel movement for the past few days. She adds she usually takes a suppository, but she has not taken one for some time. She has taken Imodium and Metamucil with some moderate improvement for her diarrhea. She notes she takes a teaspoon of Metamucil nightly. She has also taken a dose of Zofran at 10:00 AM this morning with some improvement to her nausea. Patient was seen here on 10/3/21 for urinary retention. She denies being prescribed antibiotics for her symptoms at that time. She reports associated constipation, nausea, and mild intermittent diffuse headache worse with walking now resolved at this time. She denies any associated abdominal pain, bloating, or vomiting. No alleviating or exacerbating factors were identified.     REVIEW OF SYSTEMS  Pertinent positives include diarrhea, constipation, nausea (resolved), and dizziness (resolved). Pertinent negatives include no abdominal pain, bloating or vomiting. As above, all other systems reviewed and are negative.   See HPI for further details.     PAST MEDICAL HISTORY   has a past medical history of A fib (9/22/06), Abdominal bruit (9/25/2012), Abdominal pain (3/20/2019), Abnormal chest x-ray (9/19/2017), DIAZ (acute kidney injury) (HCC) (12/20/2018), Allergic rhinitis (9/21/2012), Arterial ischemic stroke, MCA (middle  cerebral artery), left, acute (HCC) (8/3/2011), Aseptic necrosis of bone, site unspecified (2012), Chronic fatigue (2017), Closed right hip fracture (HCC) (2019), DVT (1996), First degree atrioventricular block (2012), H/O echocardiogram (2012), Hematuria (3/20/2019), Hypothyroid (10/2002), Kidney disorder (), Kidney failure, Leukocytosis (2020), Mitral valve prolapse, Palpitations (2012), Rheumatic fever (2012), Sepsis (HCC) (2018), Severe protein-calorie malnutrition (Mahmood: less than 60% of standard weight) ([facility]) (2018), Stroke ([facility]) (2012), Supratherapeutic INR (2018), SVT (SUPRAVENTRICULAR TACHYCARDIA), h/o paroxysmal (8/3/2011), and Wegener's granulomatosis.    SURGICAL HISTORY   has a past surgical history that includes bunionectomy (); arthroscopy, knee (); arthroscope (); temporal artery biopsy (); lung needle biopsy (); cataract extraction (); cholecystectomy (); hysterectomy radical (); vein ligation; and partial hip replacement (Right, 2019).    SOCIAL HISTORY  Social History     Tobacco Use   • Smoking status: Former Smoker     Packs/day: 1.00     Types: Cigarettes     Quit date: 1960     Years since quittin.8   • Smokeless tobacco: Never Used   • Tobacco comment: very little years and years ago   Vaping Use   • Vaping Use: Never used   Substance Use Topics   • Alcohol use: Yes     Alcohol/week: 0.0 oz     Comment: occasional   • Drug use: No      Social History     Substance and Sexual Activity   Drug Use No       FAMILY HISTORY  Family History   Problem Relation Age of Onset   • Other Father         Aortic aneurysm   • Cancer Brother         Lung   • Non-contributory Other        CURRENT MEDICATIONS  Home Medications     Reviewed by Hitesh Reynolds (Pharmacy Tech) on 10/15/21 at 1528  Med List Status: Complete   Medication Last Dose Status   Carboxymethylcellulose Sodium (THERATEARS)  "0.25 % Solution 10/14/2021 Active   Cholecalciferol (VITAMIN D3) 25 MCG (1000 UT) Cap 10/15/2021 Active   D-Mannose 500 MG Cap 10/15/2021 Active   folic acid (FOLVITE) 1 MG Tab 10/15/2021 Active   levothyroxine (SYNTHROID) 88 MCG Tab 10/15/2021 Active   metoprolol tartrate (LOPRESSOR) 50 MG Tab 10/15/2021 Active   Multiple Minerals-Vitamins ([facility] CALCIUM 600 PLUS) Chew Tab 10/15/2021 Active   Multiple Vitamins-Minerals (ADULT ONE DAILY GUMMIES PO) 10/15/2021 Active   ondansetron (ZOFRAN ODT) 4 MG TABLET DISPERSIBLE 10/14/2021 Active   Psyllium (METAMUCIL PO) 10/14/2021 Active   tamsulosin (FLOMAX) 0.4 MG capsule 10/15/2021 Active   warfarin (COUMADIN) 2 MG Tab 10/14/2021 Active              Current medications can be seen on the nurse's note.     ALLERGIES  Allergies   Allergen Reactions   • Cephalexin [Keflex] Swelling   • Hydroxyzine Swelling     Eyes get itchy and swollen    • Naprelan [Naproxen] Swelling     Eyes get itchy become red and swollen   • Oxaprozin Swelling     Swelling eyes, and itchy   • Ampicillin Rash     Red Rash   • Baclofen Unspecified     drowsiness   • Citalopram Vomiting and Nausea   • Clarithromycin Unspecified     Pt reports that this medication plugs her ears.    • Diclofenac Rash and Swelling     Red rash & swelling     • Erythromycin Diarrhea     GI upset   • Penicillins Rash     Red rash     • Promethazine Unspecified     Drowsiness and sleeps   • Vi-Q-Tuss [Hydrocodone-Guaifenesin] Vomiting and Nausea       PHYSICAL EXAM  VITAL SIGNS: /73   Pulse 89   Temp 36.5 °C (97.7 °F) (Temporal)   Resp 16   Ht 1.702 m (5' 7\")   Wt 47.6 kg (105 lb)   SpO2 97%   BMI 16.45 kg/m²     Constitutional: No distress, thin and frail in appearance.   HENT: Normocephalic, Atraumatic, Bilateral external ears normal, Oropharynx dry, No oral exudates.   Eyes: PERRLA, EOMI, Conjunctiva normal, No discharge.   Neck: No tenderness, Supple, No stridor.   Lymphatic: No lymphadenopathy noted. "   Cardiovascular: Normal heart rate, Normal rhythm.   Thorax & Lungs: Clear to auscultation bilaterally, No respiratory distress, No wheezing, No crackles.   Abdomen: Soft, Cannot elicit any abdominal tenderness, No masses, No pulsatile masses.  Rectal: Female nurse was present as a chaperone during exam. Stool was hemoccult negative.   Skin: Warm, Dry, No erythema, No rash.   Extremities:, No edema No cyanosis.   Musculoskeletal: No tenderness to palpation or major deformities noted.  Intact distal pulses  Neurologic: Awake, alert. Mildly confused. Moves all extremities spontaneously.  Psychiatric: Affect normal, Judgment normal, Mood normal.     LABS  Results for orders placed or performed during the hospital encounter of 10/15/21   CBC WITH DIFFERENTIAL   Result Value Ref Range    WBC 5.4 4.8 - 10.8 K/uL    RBC 4.31 4.20 - 5.40 M/uL    Hemoglobin 12.8 12.0 - 16.0 g/dL    Hematocrit 40.5 37.0 - 47.0 %    MCV 94.0 81.4 - 97.8 fL    MCH 29.7 27.0 - 33.0 pg    MCHC 31.6 (L) 33.6 - 35.0 g/dL    RDW 42.3 35.9 - 50.0 fL    Platelet Count 225 164 - 446 K/uL    MPV 9.5 9.0 - 12.9 fL    Neutrophils-Polys 60.70 44.00 - 72.00 %    Lymphocytes 30.30 22.00 - 41.00 %    Monocytes 7.10 0.00 - 13.40 %    Eosinophils 1.10 0.00 - 6.90 %    Basophils 0.40 0.00 - 1.80 %    Immature Granulocytes 0.40 0.00 - 0.90 %    Nucleated RBC 0.00 /100 WBC    Neutrophils (Absolute) 3.27 2.00 - 7.15 K/uL    Lymphs (Absolute) 1.63 1.00 - 4.80 K/uL    Monos (Absolute) 0.38 0.00 - 0.85 K/uL    Eos (Absolute) 0.06 0.00 - 0.51 K/uL    Baso (Absolute) 0.02 0.00 - 0.12 K/uL    Immature Granulocytes (abs) 0.02 0.00 - 0.11 K/uL    NRBC (Absolute) 0.00 K/uL   COMP METABOLIC PANEL   Result Value Ref Range    Sodium 136 135 - 145 mmol/L    Potassium 5.0 3.6 - 5.5 mmol/L    Chloride 99 96 - 112 mmol/L    Co2 25 20 - 33 mmol/L    Anion Gap 12.0 7.0 - 16.0    Glucose 109 (H) 65 - 99 mg/dL    Bun 22 8 - 22 mg/dL    Creatinine 1.48 (H) 0.50 - 1.40 mg/dL    Calcium  9.2 8.4 - 10.2 mg/dL    AST(SGOT) 19 12 - 45 U/L    ALT(SGPT) 13 2 - 50 U/L    Alkaline Phosphatase 83 30 - 99 U/L    Total Bilirubin 0.4 0.1 - 1.5 mg/dL    Albumin 3.5 3.2 - 4.9 g/dL    Total Protein 7.3 6.0 - 8.2 g/dL    Globulin 3.8 (H) 1.9 - 3.5 g/dL    A-G Ratio 0.9 g/dL   LIPASE   Result Value Ref Range    Lipase 25 7 - 58 U/L   ESTIMATED GFR   Result Value Ref Range    GFR If  40 (A) >60 mL/min/1.73 m 2    GFR If Non  33 (A) >60 mL/min/1.73 m 2     All labs reviewed by me.    COURSE & MEDICAL DECISION MAKING  Pertinent Labs & Imaging studies reviewed. (See chart for details)    2:36 PM - Patient seen and examined at bedside. Discussed plan of care with the patient. I informed her we will order lab studies to evaluate. She is understanding and agreeable to plan. Ordered Estimated GFR, UA, Lipase, CMP, CBC with diff to evaluate her symptoms. The differential diagnoses include but are not limited to: constipation, dehydration, electrolyte abnormality.     4:08 PM - Patient was reevaluated at bedside. She is resting in bed feeling unchanged. Discussed lab results with the patient and informed them that they were reassuring. There was no blood in her stool. The patient will return for new or worsening symptoms and is stable at the time of discharge. Patient verbalizes understanding and agreement to this plan of care.      Decision Making:  Patient presenting with complaint really of nausea just a general feeling of illness she has difficulty describing she had had some diarrhea but that seems to be improved in fact she is now feeling like she might need to have a bowel movement but has not. Iron work-up here and exam is essentially negative the patient not appear to have any acute can illness occurring today. This is discussed with her daughter particular treatment indicated at this time she has some Zofran if the nausea worsens prescribed at the previous  visit    DISPOSITION:  Patient will be discharged home in stable condition.    FOLLOW UP:  Ruba Greene A.P.R.N.  [address] [facility] [id number] [phone]            OUTPATIENT MEDICATIONS:  New Prescriptions    No medications on file     FINAL IMPRESSION  1. Nausea          Kenia SHEPARD (Scribe), am scribing for, and in the presence of, Renan Olivarez M.D..    Electronically signed by: Kenia Whitaker (Scribe), 10/15/2021    Renan SHEPARD M.D. personally performed the services described in this documentation, as scribed by Kenia Whitaker in my presence, and it is both accurate and complete.    C    The note accurately reflects work and decisions made by me.  Renan Olivarez M.D.  10/15/2021  4:18 PM

## 2021-10-15 NOTE — ED NOTES
Pt on call light, Pt resting on gurney, pt in no acute distress, instructed to call if needing any assistance, instructed not to get up by self, milagros in lowest position.

## 2021-10-18 PROBLEM — R11.0 NAUSEA: Status: ACTIVE | Noted: 2021-10-13

## 2021-10-27 PROBLEM — K58.9 IRRITABLE BOWEL SYNDROME (IBS): Status: ACTIVE | Noted: 2021-10-27

## 2021-11-02 PROBLEM — I73.9 PVD (PERIPHERAL VASCULAR DISEASE) (HCC): Status: ACTIVE | Noted: 2021-11-02

## 2021-11-02 PROBLEM — E44.0 PROTEIN-CALORIE MALNUTRITION, MODERATE (HCC): Status: ACTIVE | Noted: 2021-11-02

## 2021-11-02 PROBLEM — G63 POLYNEUROPATHY IN OTHER DISEASES CLASSIFIED ELSEWHERE (HCC): Status: ACTIVE | Noted: 2021-11-02

## 2021-11-10 ENCOUNTER — ANTICOAGULATION VISIT (OUTPATIENT)
Dept: VASCULAR LAB | Facility: MEDICAL CENTER | Age: 86
End: 2021-11-10
Attending: INTERNAL MEDICINE
Payer: MEDICARE

## 2021-11-10 ENCOUNTER — DOCUMENTATION (OUTPATIENT)
Dept: VASCULAR LAB | Facility: MEDICAL CENTER | Age: 86
End: 2021-11-10

## 2021-11-10 DIAGNOSIS — Z79.01 CHRONIC ANTICOAGULATION: ICD-10-CM

## 2021-11-10 LAB
INR BLD: 2.4 (ref 0.9–1.2)
INR PPP: 2.4 (ref 2–3.5)

## 2021-11-10 PROCEDURE — 99211 OFF/OP EST MAY X REQ PHY/QHP: CPT

## 2021-11-10 PROCEDURE — 85610 PROTHROMBIN TIME: CPT

## 2021-11-10 NOTE — PROGRESS NOTES
Anticoagulation Summary  As of 11/10/2021    INR goal:  2.0-3.0   TTR:  74.1 % (3 y)   INR used for dosin.40 (11/10/2021)   Warfarin maintenance plan:  1 mg (2 mg x 0.5) every Fri; 2 mg (2 mg x 1) all other days   Weekly warfarin total:  13 mg   Plan last modified:  Shaun Ruiz PharmD (2020)   Next INR check:  2022   Target end date:  Indefinite    Indications    Stroke (HCC) (Resolved) [I63.9]  Atrial flutter (HCC) [I48.92]             Anticoagulation Episode Summary     INR check location:      Preferred lab:      Send INR reminders to:      Comments:        Anticoagulation Care Providers     Provider Role Specialty Phone number    Hussain Nation M.D. Referring Interventional Cardiology [phone]    [facility] Responsible  [phone]          Refer to Patient Findings for HPI:  Patient Findings     Positives:  Emergency department visit (for GI issues)    Negatives:  Signs/symptoms of thrombosis, Signs/symptoms of bleeding, Laboratory test error suspected, Change in health, Change in alcohol use, Change in activity, Upcoming invasive procedure, Upcoming dental procedure, Missed doses, Extra doses, Change in medications, Change in diet/appetite, Hospital admission, Bruising, Other complaints          There were no vitals filed for this visit.  pt declined vitals    Verified current warfarin dosing schedule.    Medications reconciled   Pt is not on antiplatelet therapy      A/P   INR  therapeutic.     Warfarin dosing recommendation: Pt is to continue with current warfarin dosing regimen.    Pt educated to contact our clinic with any changes in medications or s/s of bleeding or thrombosis. Pt is aware to seek immediate medical attention for falls, head injury or deep cuts.    Follow up appointment in 8 week(s).    Whit Ann, PharmD

## 2021-12-02 DIAGNOSIS — Z79.01 CHRONIC ANTICOAGULATION: ICD-10-CM

## 2021-12-29 PROBLEM — N30.00 ACUTE CYSTITIS WITHOUT HEMATURIA: Status: ACTIVE | Noted: 2021-12-06

## 2022-01-05 ENCOUNTER — ANTICOAGULATION VISIT (OUTPATIENT)
Dept: VASCULAR LAB | Facility: MEDICAL CENTER | Age: 87
End: 2022-01-05
Attending: INTERNAL MEDICINE
Payer: MEDICARE

## 2022-01-05 DIAGNOSIS — I48.92 ATRIAL FLUTTER, UNSPECIFIED TYPE (HCC): ICD-10-CM

## 2022-01-05 LAB
INR BLD: 4.4 (ref 0.9–1.2)
INR PPP: 4.4 (ref 2–3.5)

## 2022-01-05 PROCEDURE — 99212 OFFICE O/P EST SF 10 MIN: CPT | Performed by: PHARMACIST

## 2022-01-05 PROCEDURE — 85610 PROTHROMBIN TIME: CPT

## 2022-01-05 NOTE — PROGRESS NOTES
Anticoagulation Summary  As of 2022    INR goal:  2.0-3.0   TTR:  72.0 % (3.1 y)   INR used for dosin.40 (2022)   Warfarin maintenance plan:  1 mg (2 mg x 0.5) every Fri; 2 mg (2 mg x 1) all other days   Weekly warfarin total:  13 mg   Plan last modified:  Juan De JesusD (2020)   Next INR check:  2022   Target end date:  Indefinite    Indications    Stroke (HCC) (Resolved) [I63.9]  Atrial flutter (HCC) [I48.92]             Anticoagulation Episode Summary     INR check location:      Preferred lab:      Send INR reminders to:      Comments:        Anticoagulation Care Providers     Provider Role Specialty Phone number    Hussain Nation M.D. Referring Interventional Cardiology [phone]    [facility] Responsible  [phone]                Refer to Patient Findings for HPI:  Patient Findings     Negatives:  Signs/symptoms of thrombosis, Signs/symptoms of bleeding, Laboratory test error suspected, Change in health, Change in alcohol use, Change in activity, Upcoming invasive procedure, Emergency department visit, Upcoming dental procedure, Missed doses, Extra doses, Change in medications, Change in diet/appetite, Hospital admission, Bruising, Other complaints          There were no vitals filed for this visit.  pt declined vitals    Verified current warfarin dosing schedule.    Medications reconciled   Pt is not on antiplatelet therapy      A/P   INR  supra-therapeutic at 4.4.     Warfarin dosing recommendation: Instructed patient to HOLD X 1, then resume current warfarin regimen.    Pt educated to contact our clinic with any changes in medications or s/s of bleeding or thrombosis. Pt is aware to seek immediate medical attention for falls, head injury or deep cuts.    Follow up appointment in 1 week(s).    Shaun Ruiz, PharmD, BCACP

## 2022-01-12 ENCOUNTER — ANTICOAGULATION VISIT (OUTPATIENT)
Dept: VASCULAR LAB | Facility: MEDICAL CENTER | Age: 87
End: 2022-01-12
Attending: INTERNAL MEDICINE
Payer: MEDICARE

## 2022-01-12 DIAGNOSIS — Z79.01 CHRONIC ANTICOAGULATION: ICD-10-CM

## 2022-01-12 LAB
INR BLD: 2.9 (ref 0.9–1.2)
INR PPP: 2.9 (ref 2–3.5)

## 2022-01-12 PROCEDURE — 99211 OFF/OP EST MAY X REQ PHY/QHP: CPT

## 2022-01-12 PROCEDURE — 85610 PROTHROMBIN TIME: CPT

## 2022-01-12 NOTE — PROGRESS NOTES
Anticoagulation Summary  As of 2022    INR goal:  2.0-3.0   TTR:  71.6 % (3.2 y)   INR used for dosin.90 (2022)   Warfarin maintenance plan:  1 mg (2 mg x 0.5) every Fri; 2 mg (2 mg x 1) all other days   Weekly warfarin total:  13 mg   Plan last modified:  Shaun Ruiz, PharmD (2020)   Next INR check:  2022   Target end date:  Indefinite    Indications    Stroke (HCC) (Resolved) [I63.9]  Atrial flutter (HCC) [I48.92]             Anticoagulation Episode Summary     INR check location:      Preferred lab:      Send INR reminders to:      Comments:        Anticoagulation Care Providers     Provider Role Specialty Phone number    Hussain Nation M.D. Referring Interventional Cardiology [phone]    [facility] Responsible  [phone]              Refer to Patient Findings for HPI:  Patient Findings     Negatives:  Signs/symptoms of thrombosis, Signs/symptoms of bleeding, Laboratory test error suspected, Change in health, Change in alcohol use, Change in activity, Upcoming invasive procedure, Emergency department visit, Upcoming dental procedure, Missed doses, Extra doses, Change in medications, Change in diet/appetite, Hospital admission, Bruising, Other complaints          There were no vitals filed for this visit.  Pt declined vitals    Verified current warfarin dosing schedule.    Medications reconciled   Pt is not on antiplatelet therapy      A/P   INR therapeutic.     Warfarin dosing recommendation: Pt is to continue with current warfarin dosing regimen.    Pt educated to contact our clinic with any changes in medications or s/s of bleeding or thrombosis. Pt is aware to seek immediate medical attention for falls, head injury or deep cuts.    Follow up appointment in 2 week(s).    Sandee Cohen, Pharmacy Intern  Steve Cortes, PharmD

## 2022-01-23 PROBLEM — Z91.81 RISK FOR FALLS: Status: ACTIVE | Noted: 2022-01-19

## 2022-01-23 PROBLEM — R29.898 WEAKNESS OF BOTH LOWER EXTREMITIES: Status: ACTIVE | Noted: 2022-01-19

## 2022-01-23 PROBLEM — R07.89 RIGHT-SIDED CHEST WALL PAIN: Status: ACTIVE | Noted: 2020-07-21

## 2022-01-23 PROBLEM — I69.391 DYSPHAGIA AS LATE EFFECT OF CEREBROVASCULAR ACCIDENT (CVA): Status: ACTIVE | Noted: 2022-01-19

## 2022-01-25 ENCOUNTER — OFFICE VISIT (OUTPATIENT)
Dept: CARDIOLOGY | Facility: MEDICAL CENTER | Age: 87
End: 2022-01-25
Payer: MEDICARE

## 2022-01-25 VITALS
WEIGHT: 105.6 LBS | RESPIRATION RATE: 14 BRPM | BODY MASS INDEX: 16.57 KG/M2 | HEART RATE: 90 BPM | OXYGEN SATURATION: 99 % | SYSTOLIC BLOOD PRESSURE: 130 MMHG | HEIGHT: 67 IN | DIASTOLIC BLOOD PRESSURE: 68 MMHG

## 2022-01-25 DIAGNOSIS — I69.359 HEMIPLEGIA AS LATE EFFECT OF CEREBROVASCULAR ACCIDENT (CVA) (HCC): ICD-10-CM

## 2022-01-25 DIAGNOSIS — N18.32 STAGE 3B CHRONIC KIDNEY DISEASE: ICD-10-CM

## 2022-01-25 DIAGNOSIS — R07.89 RIGHT-SIDED CHEST WALL PAIN: ICD-10-CM

## 2022-01-25 DIAGNOSIS — I48.0 PAROXYSMAL ATRIAL FIBRILLATION (HCC): ICD-10-CM

## 2022-01-25 PROBLEM — I07.1 SEVERE TRICUSPID REGURGITATION: Status: RESOLVED | Noted: 2019-06-22 | Resolved: 2022-01-25

## 2022-01-25 PROCEDURE — 99214 OFFICE O/P EST MOD 30 MIN: CPT | Performed by: INTERNAL MEDICINE

## 2022-01-25 ASSESSMENT — FIBROSIS 4 INDEX: FIB4 SCORE: 2.04

## 2022-01-25 NOTE — PROGRESS NOTES
Chief Complaint   Patient presents with   • Atrial Flutter   • Chest Pain     F/V Dx:       Subjective:   Az Jolly is an 87y.o. female who presents today for follow-up of atrial flutter/fibrillation PSVT stroke and anticoagulation.  She walks with a walker and does not fall.      Since last visit she has noted intermittent right-sided parasternal chest discomfort that is reproducible with palpation and worse with deep inspiration and last for up to 4 days at a time.  This is the same side as her impairment post CVA.  No cardiac symptoms.    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   • Chronic fatigue 9/19/2017   • Closed right hip fracture ([facility]) 6/19/2019   • DVT 6/1996    left leg, vein ligation performed on saphenous vein   • First degree atrioventricular block 9/21/2012   • Glomerulonephritis, chronic in other disease 8/3/2011   • H/O echocardiogram 9/21/2012   • Hematuria 3/20/2019   • Hypothyroid 10/2002   • Kidney disorder 1998    left kidney biopsy--glomerulonephritis and wegners dx   • Kidney failure     stage II   • Leukocytosis 7/21/2020   • Mitral valve prolapse    • Palpitations 9/21/2012   • Rheumatic fever 9/21/2012   • Rheumatic fever 9/21/2012    Childhood    • Sepsis ([facility]) 12/20/2018   • Severe protein-calorie malnutrition (Mahmood: less than 60% of standard weight) ([facility]) 12/20/2018   • Stroke ([facility]) 9/21/2012   • Supratherapeutic INR 12/20/2018   • SVT (SUPRAVENTRICULAR TACHYCARDIA), h/o paroxysmal 8/3/2011   • Wegener's granulomatosis    • Wegener's granulomatosis 8/3/2011      IMO Spring 2021     Past Surgical History:   Procedure Laterality Date   • PB PARTIAL HIP REPLACEMENT Right 2019    Procedure: HEMIARTHROPLASTY, HIP;  Surgeon: Raul Saldivar M.D.;  Location: SURGERY [facility];  Service: Orthopedics   • CATARACT EXTRACTION      left eye   • CHOLECYSTECTOMY     • TEMPORAL ARTERY BIOPSY      normal   • LUNG NEEDLE BIOPSY      right lung, nodules found   • ARTHROSCOPE      left knee   • ARTHROSCOPY, KNEE      right   • BUNIONECTOMY      bilat   • HYSTERECTOMY RADICAL     • VEIN LIGATION       Family History   Problem Relation Age of Onset   • Other Father         Aortic aneurysm   • Cancer Brother         Lung   • Non-contributory Other      Social History     Socioeconomic History   • Marital status:      Spouse name: Not on file   • Number of children: Not on file   • Years of education: Not on file   • Highest education level: Not on file   Occupational History   • Not on file   Tobacco Use   • Smoking status: Former Smoker     Packs/day: 1.00     Types: Cigarettes     Quit date: 1960     Years since quittin.1   • Smokeless tobacco: Never Used   • Tobacco comment: very little years and years ago   Vaping Use   • Vaping Use: Never used   Substance and Sexual Activity   • Alcohol use: Yes     Alcohol/week: 0.0 oz     Comment: occasional   • Drug use: No   • Sexual activity: Not on file   Other Topics Concern   • Not on file   Social History Narrative   • Not on file     Social Determinants of Health     Financial Resource Strain:    • Difficulty of Paying Living Expenses: Not on file   Food Insecurity:    • Worried About Running Out of Food in the Last Year: Not on file   • Ran Out of Food in the Last Year: Not on file   Transportation Needs:    • Lack of Transportation (Medical): Not on file   • Lack of Transportation (Non-Medical): Not on file   Physical Activity:    • Days of Exercise per Week: Not on file   • Minutes of  Exercise per Session: Not on file   Stress:    • Feeling of Stress : Not on file   Social Connections:    • Frequency of Communication with Friends and Family: Not on file   • Frequency of Social Gatherings with Friends and Family: Not on file   • Attends [employer] Services: Not on file   • Active Member of Clubs or Organizations: Not on file   • Attends Club or Organization Meetings: Not on file   • Marital Status: Not on file   Intimate Partner Violence:    • Fear of Current or Ex-Partner: Not on file   • Emotionally Abused: Not on file   • Physically Abused: Not on file   • Sexually Abused: Not on file   Housing Stability:    • Unable to Pay for Housing in the Last Year: Not on file   • Number of Places Lived in the Last Year: Not on file   • Unstable Housing in the Last Year: Not on file     Allergies   Allergen Reactions   • Cephalexin [Keflex] Swelling   • Hydroxyzine Swelling     Eyes get itchy and swollen    • Naprelan [Naproxen] Swelling     Eyes get itchy become red and swollen   • Oxaprozin Swelling     Swelling eyes, and itchy   • Ampicillin Rash     Red Rash   • Baclofen Unspecified     drowsiness   • Citalopram Vomiting and Nausea   • Clarithromycin Unspecified     Pt reports that this medication plugs her ears.    • Diclofenac Rash and Swelling     Red rash & swelling     • Erythromycin Diarrhea     GI upset   • Penicillins Rash     Red rash     • Promethazine Unspecified     Drowsiness and sleeps   • Vi-Q-Tuss [Hydrocodone-Guaifenesin] Vomiting and Nausea     Outpatient Encounter Medications as of 1/25/2022   Medication Sig Dispense Refill   • Multiple Minerals-Vitamins ([employer] CALCIUM 600 PLUS) Chew Tab Chew 1,200 mg every morning.     • ondansetron (ZOFRAN ODT) 4 MG TABLET DISPERSIBLE Take 4 mg by mouth every four hours as needed for Nausea.     • warfarin (COUMADIN) 2 MG Tab Take 1-2 mg by mouth every evening. 2mg (1 Tablet) Saturday, Sunday, Monday, Tuesday, Wednesday, Thursday  1mg (1/2 Tablet)  "Friday ONLY     • Carboxymethylcellulose Sodium (THERATEARS) 0.25 % Solution Administer 2 Drops into both eyes 4 times a day as needed (Dry Eyes).     • folic acid (FOLVITE) 1 MG Tab Take 1 mg by mouth every morning.     • D-Mannose 500 MG Cap Take 500 mg by mouth every morning.     • levothyroxine (SYNTHROID) 88 MCG Tab Take 88 mcg by mouth every morning on an empty stomach.     • tamsulosin (FLOMAX) 0.4 MG capsule Take 0.4 mg by mouth every morning.     • metoprolol tartrate (LOPRESSOR) 50 MG Tab Take 1 tablet by mouth 2 times a day. 180 tablet 2   • Multiple Vitamins-Minerals (ADULT ONE DAILY GUMMIES PO) Take 2 Tablets by mouth every morning.     • Cholecalciferol (VITAMIN D3) 25 MCG (1000 UT) Cap Take 1,000 Units by mouth every morning.     • Psyllium (METAMUCIL PO) Take 1 Package by mouth 1 time a day as needed (For constipation). For constipation        No facility-administered encounter medications on file as of 1/25/2022.     Review of Systems   All other systems reviewed and are negative.       Objective:   /68 (BP Location: Left arm, Patient Position: Sitting, BP Cuff Size: Adult)   Pulse 90   Resp 14   Ht 1.702 m (5' 7\")   Wt 47.9 kg (105 lb 9.6 oz)   SpO2 99%   BMI 16.54 kg/m²     Physical Exam  Vitals reviewed.   Constitutional:       General: She is not in acute distress.     Appearance: She is well-developed. She is not diaphoretic.      Comments: Elderly and cachectic  Walker     HENT:      Head: Normocephalic and atraumatic.      Right Ear: External ear normal.      Left Ear: External ear normal.      Mouth/Throat:      Pharynx: No oropharyngeal exudate.   Eyes:      General: No scleral icterus.        Right eye: No discharge.         Left eye: No discharge.      Conjunctiva/sclera: Conjunctivae normal.      Pupils: Pupils are equal, round, and reactive to light.   Neck:      Thyroid: No thyromegaly.      Vascular: No JVD.      Trachea: No tracheal deviation.   Cardiovascular:      Rate " and Rhythm: Normal rate. Rhythm irregularly irregular.      Chest Wall: PMI is not displaced.      Pulses:           Carotid pulses are 2+ on the right side and 2+ on the left side.       Radial pulses are 2+ on the right side and 2+ on the left side.        Popliteal pulses are 2+ on the right side and 2+ on the left side.        Dorsalis pedis pulses are 2+ on the right side and 2+ on the left side.        Posterior tibial pulses are 2+ on the right side and 2+ on the left side.      Heart sounds: S1 normal and S2 normal. Murmur ( 2/6 systolic apical murmur) heard.   No friction rub. No gallop. No S3 or S4 sounds.    Pulmonary:      Effort: Pulmonary effort is normal. No respiratory distress.      Breath sounds: Normal breath sounds. No wheezing or rales.   Chest:      Chest wall: No tenderness.   Abdominal:      General: Bowel sounds are normal. There is no distension.      Palpations: Abdomen is soft.      Tenderness: There is no abdominal tenderness.   Musculoskeletal:         General: No tenderness. Normal range of motion.      Cervical back: Normal range of motion and neck supple.   Skin:     General: Skin is warm and dry.      Findings: No erythema or rash.   Neurological:      Mental Status: She is alert and oriented to person, place, and time.      Cranial Nerves: No cranial nerve deficit (Cranial nerves II through XII grossly intact).   Psychiatric:         Behavior: Behavior normal.         Thought Content: Thought content normal.         Judgment: Judgment normal.       LABS:  Lab Results   Component Value Date/Time    CHOLSTRLTOT 95 (L) 08/02/2011 03:10 AM    LDL 44 08/02/2011 03:10 AM    HDL 39 (L) 08/02/2011 03:10 AM    TRIGLYCERIDE 60 08/02/2011 03:10 AM       Lab Results   Component Value Date/Time    WBC 5.4 10/15/2021 02:14 PM    RBC 4.31 10/15/2021 02:14 PM    HEMOGLOBIN 12.8 10/15/2021 02:14 PM    HEMATOCRIT 40.5 10/15/2021 02:14 PM    MCV 94.0 10/15/2021 02:14 PM    NEUTSPOLYS 60.70 10/15/2021  02:14 PM    LYMPHOCYTES 30.30 10/15/2021 02:14 PM    MONOCYTES 7.10 10/15/2021 02:14 PM    EOSINOPHILS 1.10 10/15/2021 02:14 PM    BASOPHILS 0.40 10/15/2021 02:14 PM    HYPOCHROMIA 1+ 06/22/2019 03:44 AM     Lab Results   Component Value Date/Time    SODIUM 136 10/15/2021 02:14 PM    POTASSIUM 5.0 10/15/2021 02:14 PM    CHLORIDE 99 10/15/2021 02:14 PM    CO2 25 10/15/2021 02:14 PM    GLUCOSE 109 (H) 10/15/2021 02:14 PM    BUN 22 10/15/2021 02:14 PM    CREATININE 1.48 (H) 10/15/2021 02:14 PM    CREATININE 1.5 (H) 02/19/2009 09:22 AM         Lab Results   Component Value Date/Time    ALKPHOSPHAT 83 10/15/2021 02:14 PM    ASTSGOT 19 10/15/2021 02:14 PM    ALTSGPT 13 10/15/2021 02:14 PM    TBILIRUBIN 0.4 10/15/2021 02:14 PM      Lab Results   Component Value Date/Time    BNPBTYPENAT 135 (H) 08/10/2011 03:40 AM      No results found for: TSH  Lab Results   Component Value Date/Time    PROTHROMBTM 24.1 (H) 10/03/2021 02:39 AM    INR 2.90 01/12/2022 12:00 AM      ECHO CONCLUSIONS (6/22/2019):  Left ventricular ejection fraction is visually estimated to be 65%.  Dilated inferior vena cava without inspiratory collapse.  Biatrial dilation.  Severe tricuspid regurgitation.  Estimated right ventricular systolic   pressure is 50 mmHg.  Right ventricle not well visualized.  Mild-moderate aortic insufficiency.  No prior study is available for comparison.     EKG (9/26/2018):  I have personally reviewed the EKG this visit and discussed with the patient.  Atrial flutter with variable block.     Echocardiogram 1/22/2018 located in [place] [place]'s records: Preserved left ventricular systolic function, grade 1 diastolic dysfunction, mild left atrial enlargement normal RVSP and mild to moderate mitral regurgitation.    Assessment:     1. Paroxysmal atrial fibrillation (HCC)     2. Right-sided chest wall pain     3. Hemiplegia as late effect of cerebrovascular accident (CVA) (HCC)     4. Stage 3b chronic kidney disease (HCC)          Medical Decision Making:  Today's Assessment / Status / Plan:     Doing well.  Musculoskeletal right-sided chest pain could represent costochondritis or another musculoskeletal variant of pain related to her prior stroke.  It is noncardiac in nature.  We did discuss that stress test is reasonable if would provide reassurance for her given her risk factors for ischemic heart disease but she indicates a preference for a more conservative course of care and this will be deferred.  I am confident this is noncardiac in etiology however and she does find it somewhat reassuring.  Medical therapy is appropriate.  Atrial fibrillation is well controlled and anticoagulation is being well-tolerated.  She uses a gait appliance.    Follow-up 6 months

## 2022-01-26 ENCOUNTER — ANTICOAGULATION VISIT (OUTPATIENT)
Dept: VASCULAR LAB | Facility: MEDICAL CENTER | Age: 87
End: 2022-01-26
Attending: INTERNAL MEDICINE
Payer: MEDICARE

## 2022-01-26 DIAGNOSIS — I48.3 TYPICAL ATRIAL FLUTTER (HCC): ICD-10-CM

## 2022-01-26 LAB — INR PPP: 3.5 (ref 2–3.5)

## 2022-01-26 PROCEDURE — 85610 PROTHROMBIN TIME: CPT

## 2022-01-26 PROCEDURE — 99212 OFFICE O/P EST SF 10 MIN: CPT

## 2022-01-26 NOTE — PROGRESS NOTES
Anticoagulation Summary  As of 1/26/2022    INR goal:  2.0-3.0   TTR:  70.9 % (3.2 y)   INR used for dosing:  3.50 (1/26/2022)   Warfarin maintenance plan:  1 mg (2 mg x 0.5) every Fri; 2 mg (2 mg x 1) all other days   Weekly warfarin total:  13 mg   Plan last modified:  Shaun Ruiz PharmD (12/2/2020)   Next INR check:  2/9/2022   Target end date:  Indefinite    Indications    Stroke (HCC) (Resolved) [I63.9]  Atrial flutter (HCC) [I48.92]             Anticoagulation Episode Summary     INR check location:      Preferred lab:      Send INR reminders to:      Comments:        Anticoagulation Care Providers     Provider Role Specialty Phone number    Hussain Nation M.D. Referring Interventional Cardiology [phone]    [facility] Responsible  [phone]                Refer to Patient Findings for HPI:  Patient Findings     Positives:  Change in diet/appetite (Pt lives at assisted living facility where her food options vary)    Negatives:  Signs/symptoms of thrombosis, Signs/symptoms of bleeding, Laboratory test error suspected, Change in health, Change in alcohol use, Change in activity, Upcoming invasive procedure, Emergency department visit, Upcoming dental procedure, Missed doses, Extra doses, Change in medications, Hospital admission, Bruising, Other complaints          There were no vitals filed for this visit.  pt declined vitals    Verified current warfarin dosing schedule.    Medications reconciled   Pt is not on antiplatelet therapy      A/P   INR  SUPRA-therapeutic.     Warfarin dosing recommendation: Instructed pt to HOLD x 1 dose today and to then continue on w/ her current regimen.    Pt educated to contact our clinic with any changes in medications or s/s of bleeding or thrombosis. Pt is aware to seek immediate medical attention for falls, head injury or deep cuts.    Follow up appointment in 2 week(s).    Juan AngelesD, BCACP

## 2022-01-27 LAB — INR BLD: 3.5 (ref 0.9–1.2)

## 2022-02-09 ENCOUNTER — ANTICOAGULATION VISIT (OUTPATIENT)
Dept: VASCULAR LAB | Facility: MEDICAL CENTER | Age: 87
End: 2022-02-09
Attending: INTERNAL MEDICINE
Payer: MEDICARE

## 2022-02-09 DIAGNOSIS — I48.92 ATRIAL FLUTTER, UNSPECIFIED TYPE (HCC): ICD-10-CM

## 2022-02-09 LAB — INR PPP: 4.2 (ref 2–3.5)

## 2022-02-09 PROCEDURE — 85610 PROTHROMBIN TIME: CPT

## 2022-02-09 PROCEDURE — 99212 OFFICE O/P EST SF 10 MIN: CPT

## 2022-02-09 NOTE — PROGRESS NOTES
Anticoagulation Summary  As of 2022    INR goal:  2.0-3.0   TTR:  70.1 % (3.2 y)   INR used for dosin.20 (2022)   Warfarin maintenance plan:  1 mg (2 mg x 0.5) every Mon, Wed, Fri; 2 mg (2 mg x 1) all other days   Weekly warfarin total:  11 mg   Plan last modified:  Steve Cortes, PharmD (2022)   Next INR check:  2022   Target end date:  Indefinite    Indications    Stroke (HCC) (Resolved) [I63.9]  Atrial flutter (HCC) [I48.92]             Anticoagulation Episode Summary     INR check location:      Preferred lab:      Send INR reminders to:      Comments:        Anticoagulation Care Providers     Provider Role Specialty Phone number    Hussain Nation M.D. Referring Interventional Cardiology [phone]    [facility] Responsible  [phone]                Refer to Patient Findings for HPI:  Patient Findings     Negatives:  Signs/symptoms of thrombosis, Signs/symptoms of bleeding, Laboratory test error suspected, Change in health, Change in alcohol use, Change in activity, Upcoming invasive procedure, Emergency department visit, Upcoming dental procedure, Missed doses, Extra doses, Change in medications, Change in diet/appetite, Hospital admission, Bruising, Other complaints          There were no vitals filed for this visit.  pt declined vitals    Verified current warfarin dosing schedule.    Medications reconciled   Pt is not on antiplatelet therapy      A/P   INR remains supra-therapeutic due to unknown etiology.     Warfarin dosing recommendation: Recommended holding warfarin until therapeutic and scheduling patient on Friday however patient declined. Will have patient hold warfarin tonight and start a decreased regimen.     Pt educated to contact our clinic with any changes in medications or s/s of bleeding or thrombosis. Pt is aware to seek immediate medical attention for falls, head injury or deep cuts.    Follow up appointment in 2 week(s) due to patient  preference.     Steve Cortes, PharmD

## 2022-02-21 ENCOUNTER — HOSPITAL ENCOUNTER (EMERGENCY)
Facility: MEDICAL CENTER | Age: 87
End: 2022-02-21
Attending: EMERGENCY MEDICINE
Payer: MEDICARE

## 2022-02-21 ENCOUNTER — APPOINTMENT (OUTPATIENT)
Dept: RADIOLOGY | Facility: MEDICAL CENTER | Age: 87
End: 2022-02-21
Attending: EMERGENCY MEDICINE
Payer: MEDICARE

## 2022-02-21 VITALS
DIASTOLIC BLOOD PRESSURE: 73 MMHG | BODY MASS INDEX: 16.83 KG/M2 | TEMPERATURE: 97.3 F | SYSTOLIC BLOOD PRESSURE: 135 MMHG | HEIGHT: 66 IN | HEART RATE: 80 BPM | WEIGHT: 104.72 LBS | OXYGEN SATURATION: 96 % | RESPIRATION RATE: 18 BRPM

## 2022-02-21 DIAGNOSIS — S39.012A BACK STRAIN, INITIAL ENCOUNTER: ICD-10-CM

## 2022-02-21 LAB
APPEARANCE UR: CLEAR
BILIRUB UR QL STRIP.AUTO: NEGATIVE
COLOR UR: YELLOW
EPI CELLS #/AREA URNS HPF: ABNORMAL /HPF
GLUCOSE UR STRIP.AUTO-MCNC: NEGATIVE MG/DL
HYALINE CASTS #/AREA URNS LPF: ABNORMAL /LPF
KETONES UR STRIP.AUTO-MCNC: NEGATIVE MG/DL
LEUKOCYTE ESTERASE UR QL STRIP.AUTO: NEGATIVE
MICRO URNS: ABNORMAL
MUCOUS THREADS #/AREA URNS HPF: ABNORMAL /HPF
NITRITE UR QL STRIP.AUTO: NEGATIVE
PH UR STRIP.AUTO: 6 [PH] (ref 5–8)
PROT UR QL STRIP: 30 MG/DL
RBC # URNS HPF: ABNORMAL /HPF
RBC UR QL AUTO: ABNORMAL
SP GR UR STRIP.AUTO: 1.01

## 2022-02-21 PROCEDURE — 700101 HCHG RX REV CODE 250: Performed by: EMERGENCY MEDICINE

## 2022-02-21 PROCEDURE — 72100 X-RAY EXAM L-S SPINE 2/3 VWS: CPT

## 2022-02-21 PROCEDURE — A9270 NON-COVERED ITEM OR SERVICE: HCPCS | Performed by: EMERGENCY MEDICINE

## 2022-02-21 PROCEDURE — 99284 EMERGENCY DEPT VISIT MOD MDM: CPT

## 2022-02-21 PROCEDURE — 81001 URINALYSIS AUTO W/SCOPE: CPT

## 2022-02-21 PROCEDURE — 700102 HCHG RX REV CODE 250 W/ 637 OVERRIDE(OP): Performed by: EMERGENCY MEDICINE

## 2022-02-21 RX ORDER — LIDOCAINE 50 MG/G
1 PATCH TOPICAL ONCE
Status: DISCONTINUED | OUTPATIENT
Start: 2022-02-21 | End: 2022-02-21 | Stop reason: HOSPADM

## 2022-02-21 RX ORDER — LIDOCAINE 50 MG/G
1 PATCH TOPICAL EVERY 24 HOURS
Qty: 10 PATCH | Refills: 0 | Status: SHIPPED | OUTPATIENT
Start: 2022-02-21 | End: 2022-03-02 | Stop reason: SDUPTHER

## 2022-02-21 RX ORDER — OXYCODONE HYDROCHLORIDE 5 MG/1
5 TABLET ORAL ONCE
Status: COMPLETED | OUTPATIENT
Start: 2022-02-21 | End: 2022-02-21

## 2022-02-21 RX ORDER — OXYCODONE HYDROCHLORIDE 5 MG/1
5 TABLET ORAL EVERY 4 HOURS PRN
Qty: 9 TABLET | Refills: 0 | Status: SHIPPED | OUTPATIENT
Start: 2022-02-21 | End: 2022-02-23

## 2022-02-21 RX ADMIN — OXYCODONE HYDROCHLORIDE 5 MG: 5 TABLET ORAL at 10:17

## 2022-02-21 RX ADMIN — LIDOCAINE 1 PATCH: 50 PATCH TOPICAL at 10:18

## 2022-02-21 ASSESSMENT — FIBROSIS 4 INDEX: FIB4 SCORE: 2.04

## 2022-02-21 NOTE — ED NOTES
Dc instructions and prescriptions reviewed with pt. Aware of need to  meds  At [facility] on [address], f/u with pcp, return for worsening s/s. Wants to wait for son in waiting room-escorted via wheel chair with triage tech aware of pt need for assisitance. Pt amb to br with walker prior to same

## 2022-02-21 NOTE — DISCHARGE INSTRUCTIONS
Your x-rays were normal, I believe you have a back strain.  It is very important that you talk to your primary care physician, it may be time to consider transitioning to a more intensive assisted living facility.

## 2022-02-21 NOTE — ED TRIAGE NOTES
"Pt c/o low back pain on \"both sides of spine.\" However, denies midline pain on \"spine.\" Pt denies fall/trauma. Pt denies urinary symptoms. Notes pain started on Saturday, progressively getting worse. Pt states that it is now making it hard to ambulate/complete ADLs.  "

## 2022-02-21 NOTE — ED PROVIDER NOTES
ED Provider Note    CHIEF COMPLAINT  No chief complaint on file.      HPI  Az Tripp is a 87 y.o. female who presents for acute on chronic back pain.  Patient reports that she had a stroke in 2011 and ever since that time she has had chronic low back pain.  Low back pain is bilateral.  She denies any associated radiations into her legs.  She denies any associated weakness or numbness.  Patient denies any associated abdominal pain.  She denies any dysuria urgency or frequency.  Patient reports that her  is on hospice and occasionally she needs to help him get up and around the house.  The Saturday she was helping her  get up when she felt a strain in her low back.  denies recent trauma  denies bowel or bladder incontinence  denies ivdu. denies fevers/constitutional sxs  denies saddle paresthesias  denies focal weakness/numbness  Able to ambulate        REVIEW OF SYSTEMS  ROS    See HPI for further details. All other systems are negative.     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), Chronic fatigue (9/19/2017), Closed right hip fracture ([facility]) (6/19/2019), DVT (6/1996), First degree atrioventricular block (9/21/2012), Glomerulonephritis, chronic in other disease (8/3/2011), H/O echocardiogram (9/21/2012), Hematuria (3/20/2019), Hypothyroid (10/2002), Kidney disorder (1998), Kidney failure, Leukocytosis (7/21/2020), Mitral valve prolapse, Palpitations (9/21/2012), Rheumatic fever (9/21/2012), Rheumatic fever (9/21/2012), Sepsis ([facility]) (12/20/2018), Severe protein-calorie malnutrition (Mahmood: less than 60% of standard weight) ([facility])  (2018), Stroke (HCC) (2012), Supratherapeutic INR (2018), SVT (SUPRAVENTRICULAR TACHYCARDIA), h/o paroxysmal (8/3/2011), Wegener's granulomatosis, and Wegener's granulomatosis (8/3/2011).    SOCIAL HISTORY  Social History     Tobacco Use   • Smoking status: Former Smoker     Packs/day: 1.00     Types: Cigarettes     Quit date: 1960     Years since quittin.1   • Smokeless tobacco: Never Used   • Tobacco comment: very little years and years ago   Vaping Use   • Vaping Use: Never used   Substance and Sexual Activity   • Alcohol use: Yes     Alcohol/week: 0.0 oz     Comment: occasional   • Drug use: No   • Sexual activity: Not on file       SURGICAL HISTORY   has a past surgical history that includes bunionectomy (); arthroscopy, knee (); arthroscope (); temporal artery biopsy (); lung needle biopsy (); cataract extraction (); cholecystectomy (); hysterectomy radical (); vein ligation; and partial hip replacement (Right, 2019).    CURRENT MEDICATIONS  Home Medications    **Home medications have not yet been reviewed for this encounter**         ALLERGIES  Allergies   Allergen Reactions   • Cephalexin [Keflex] Swelling   • Hydroxyzine Swelling     Eyes get itchy and swollen    • Naprelan [Naproxen] Swelling     Eyes get itchy become red and swollen   • Oxaprozin Swelling     Swelling eyes, and itchy   • Ampicillin Rash     Red Rash   • Baclofen Unspecified     drowsiness   • Citalopram Vomiting and Nausea   • Clarithromycin Unspecified     Pt reports that this medication plugs her ears.    • Diclofenac Rash and Swelling     Red rash & swelling     • Erythromycin Diarrhea     GI upset   • Penicillins Rash     Red rash     • Promethazine Unspecified     Drowsiness and sleeps   • Vi-Q-Tuss [Hydrocodone-Guaifenesin] Vomiting and Nausea       PHYSICAL EXAM  There were no vitals filed for this visit.    Physical Exam  Constitutional:       Appearance: She is  well-developed.   HENT:      Head: Normocephalic and atraumatic.   Eyes:      Conjunctiva/sclera: Conjunctivae normal.   Pulmonary:      Effort: Pulmonary effort is normal.   Abdominal:      General: Abdomen is flat. There is no distension.      Palpations: Abdomen is soft. There is no mass.      Tenderness: There is no abdominal tenderness. There is no guarding or rebound.      Hernia: No hernia is present.   Musculoskeletal:      Cervical back: Normal range of motion and neck supple.      Comments: Cervical, thoracic, lumbar spine clear of any midline tenderness to palpation bony abnormality or crepitus.  There are some mild paraspinal tenderness of the lumbar paraspinal musculature.  Bilateral lower extremity strength is 5 out of 5 in distal and proximal musculature including EHL.  Sensation is intact throughout.  Reflexes normal.   Skin:     General: Skin is warm.   Neurological:      Mental Status: She is alert and oriented to person, place, and time.   Psychiatric:         Behavior: Behavior normal.           DIAGNOSTIC STUDIES / PROCEDURES    LABS  Results for orders placed or performed during the hospital encounter of 02/21/22   URINALYSIS    Specimen: Urine   Result Value Ref Range    Color Yellow     Character Clear     Specific Gravity 1.010 <1.035    Ph 6.0 5.0 - 8.0    Glucose Negative Negative mg/dL    Ketones Negative Negative mg/dL    Protein 30 (A) Negative mg/dL    Bilirubin Negative Negative    Nitrite Negative Negative    Leukocyte Esterase Negative Negative    Occult Blood Small (A) Negative    Micro Urine Req Microscopic    URINE MICROSCOPIC (W/UA)   Result Value Ref Range    RBC 2-5 (A) /hpf    Epithelial Cells Rare Few /hpf    Mucous Threads Rare /hpf    Hyaline Cast 0-2 /lpf         RADIOLOGY  DX-LUMBAR SPINE-2 OR 3 VIEWS   Final Result         1.  Mild curvature of the lumbar spine, convex left.      2.  Mild anterolisthesis at L4-5 is not significantly changed.      3.  Mild multilevel  degenerative disc disease      4.  Moderate facet arthropathy in the lower lumbar spine.              COURSE & MEDICAL DECISION MAKING  Pertinent Labs & Imaging studies reviewed. (See chart for details)    Patient here with presentation that appears most consistent with likely lumbar strain.  She denies any red flags for cauda equina or critical cord compression otherwise.  Patient exam is reassuring.  Patient will have x-ray for further evaluation,.  Patient's x-ray shows findings consistent with significant degenerative disc disease.  Her neurologic exam is unremarkable.  I did check a urinalysis, which showed some minimal RBCs.  Following Lidoderm, and Norco patient is feeling improved.  Patient discharged home.  She is ambulatory at her baseline with a walker.  She is feeling improved.  I have asked her to follow-up closely with her primary care physician.  She apparently has more help with her  as hospice is increasing their time with him.    The patient will return for worsening symptoms and is stable at the time of discharge. The patient verbalizes understanding and will comply.    FINAL IMPRESSION    1. Back strain, initial encounter            Electronically signed by: Paco Hernandez M.D., 2/21/2022 9:15 AM

## 2022-02-21 NOTE — ED NOTES
Pt concerned regarding d/c- has not sp[oek to erp regarding results. Update to same, further orders recvd

## 2022-02-21 NOTE — ED NOTES
Pt for d/c. states son will be able to  after discussion with patieet regarding cost for transport

## 2022-02-23 ENCOUNTER — APPOINTMENT (OUTPATIENT)
Dept: VASCULAR LAB | Facility: MEDICAL CENTER | Age: 87
End: 2022-02-23
Payer: MEDICARE

## 2022-02-23 PROBLEM — K59.04 CHRONIC IDIOPATHIC CONSTIPATION: Status: ACTIVE | Noted: 2022-02-23

## 2022-02-27 PROBLEM — R41.3 MEMORY LOSS: Status: ACTIVE | Noted: 2022-02-23

## 2022-02-27 PROBLEM — M54.50 ACUTE LOW BACK PAIN: Status: ACTIVE | Noted: 2022-02-23

## 2022-03-02 ENCOUNTER — TELEPHONE (OUTPATIENT)
Dept: VASCULAR LAB | Facility: MEDICAL CENTER | Age: 87
End: 2022-03-02
Payer: MEDICARE

## 2022-03-02 NOTE — TELEPHONE ENCOUNTER
[facility]    Pt missed their last appointment. Left VM for pt to reschedule.    Graham Jackson, PharmD

## 2022-03-07 ENCOUNTER — ANTICOAGULATION VISIT (OUTPATIENT)
Dept: VASCULAR LAB | Facility: MEDICAL CENTER | Age: 87
End: 2022-03-07
Attending: INTERNAL MEDICINE
Payer: MEDICARE

## 2022-03-07 DIAGNOSIS — I48.92 ATRIAL FLUTTER, UNSPECIFIED TYPE (HCC): ICD-10-CM

## 2022-03-07 LAB
INR BLD: 2.8 (ref 0.9–1.2)
INR PPP: 2.8 (ref 2–3.5)

## 2022-03-07 PROCEDURE — 85610 PROTHROMBIN TIME: CPT

## 2022-03-07 PROCEDURE — 99211 OFF/OP EST MAY X REQ PHY/QHP: CPT

## 2022-03-07 NOTE — PROGRESS NOTES
Anticoagulation Summary  As of 3/7/2022    INR goal:  2.0-3.0   TTR:  70.1 % (3.2 y)   INR used for dosing:     Warfarin maintenance plan:  1 mg (2 mg x 0.5) every Mon, Wed, Fri; 2 mg (2 mg x 1) all other days   Weekly warfarin total:  11 mg   Plan last modified:  Steve Cortes PharmD (2/9/2022)   Next INR check:     Target end date:  Indefinite    Indications    Stroke (HCC) (Resolved) [I63.9]  Atrial flutter (HCC) [I48.92]             Anticoagulation Episode Summary     INR check location:      Preferred lab:      Send INR reminders to:      Comments:        Anticoagulation Care Providers     Provider Role Specialty Phone number    Hussain Nation M.D. Referring Interventional Cardiology [phone]    [facility] Responsible  [phone]        Refer to Patient Findings for HPI:  Patient Findings     Positives:  Emergency department visit (back pain 2/21)    Negatives:  Signs/symptoms of thrombosis, Signs/symptoms of bleeding, Laboratory test error suspected, Change in health, Change in alcohol use, Change in activity, Upcoming invasive procedure, Upcoming dental procedure, Missed doses, Extra doses, Change in medications, Change in diet/appetite, Hospital admission, Bruising, Other complaints          There were no vitals filed for this visit.    Verified current warfarin dosing schedule. Currently taking 1mg every Monday, Wednesday, and Friday, and 2mg all other days.     Medications reconciled.  Pt is not on antiplatelet therapy      A/P   INR therapeutic at 2.8.     Warfarin dosing recommendation: continue current regimen of 1mg every Monday, Wednesday, and Friday, and 2mg all other days.    Pt educated to contact our clinic with any changes in medications or s/s of bleeding or thrombosis. Pt is aware to seek immediate medical attention for falls, head injury or deep cuts.    Follow up appointment in 4 week(s).    Juan FierroD  PGY1 Pharmacy Practice Resident

## 2022-04-03 PROBLEM — G89.29 CHRONIC MIDLINE LOW BACK PAIN WITHOUT SCIATICA: Status: ACTIVE | Noted: 2022-02-23

## 2022-04-03 PROBLEM — F32.4 MAJOR DEPRESSION SINGLE EPISODE, IN PARTIAL REMISSION (HCC): Status: ACTIVE | Noted: 2022-03-30

## 2022-04-03 PROBLEM — J47.9 BRONCHIECTASIS WITHOUT COMPLICATION (HCC): Status: RESOLVED | Noted: 2017-09-19 | Resolved: 2022-04-03

## 2022-04-06 ENCOUNTER — ANTICOAGULATION VISIT (OUTPATIENT)
Dept: VASCULAR LAB | Facility: MEDICAL CENTER | Age: 87
End: 2022-04-06
Attending: INTERNAL MEDICINE
Payer: MEDICARE

## 2022-04-06 DIAGNOSIS — Z79.01 CHRONIC ANTICOAGULATION: ICD-10-CM

## 2022-04-06 LAB
INR BLD: 2.8 (ref 0.9–1.2)
INR PPP: 2.8 (ref 2–3.5)

## 2022-04-06 PROCEDURE — 99211 OFF/OP EST MAY X REQ PHY/QHP: CPT

## 2022-04-06 PROCEDURE — 85610 PROTHROMBIN TIME: CPT

## 2022-04-06 NOTE — PROGRESS NOTES
Anticoagulation Summary  As of 2022    INR goal:  2.0-3.0   TTR:  69.6 % (3.4 y)   INR used for dosin.80 (2022)   Warfarin maintenance plan:  1 mg (2 mg x 0.5) every Mon, Wed, Fri; 2 mg (2 mg x 1) all other days   Weekly warfarin total:  11 mg   Plan last modified:  Steve Cortse PharmD (2022)   Next INR check:  2022   Target end date:  Indefinite    Indications    Stroke (HCC) (Resolved) [I63.9]  Atrial flutter (HCC) [I48.92]             Anticoagulation Episode Summary     INR check location:      Preferred lab:      Send INR reminders to:      Comments:        Anticoagulation Care Providers     Provider Role Specialty Phone number    Hussain Nation M.D. Referring Interventional Cardiology [phone]    [facility] Responsible  [phone]                Refer to Patient Findings for HPI:  Patient Findings     Negatives:  Signs/symptoms of thrombosis, Signs/symptoms of bleeding, Laboratory test error suspected, Change in health, Change in alcohol use, Change in activity, Upcoming invasive procedure, Emergency department visit, Upcoming dental procedure, Missed doses, Extra doses, Change in medications, Change in diet/appetite, Hospital admission, Bruising, Other complaints          There were no vitals filed for this visit.  pt declined vitals    Verified current warfarin dosing schedule.    Medications reconciled   Pt is not on antiplatelet therapy      A/P   INR  is-therapeutic.     Warfarin dosing recommendation: Pt is to continue with current warfarin dosing regimen.    Pt educated to contact our clinic with any changes in medications or s/s of bleeding or thrombosis. Pt is aware to seek immediate medical attention for falls, head injury or deep cuts.    Follow up appointment in 4 week(s).    Steve Cortes, JuanD

## 2022-04-28 PROBLEM — M46.1 SACROILIITIS (HCC): Status: ACTIVE | Noted: 2022-04-20

## 2022-05-04 ENCOUNTER — ANTICOAGULATION VISIT (OUTPATIENT)
Dept: VASCULAR LAB | Facility: MEDICAL CENTER | Age: 87
End: 2022-05-04
Attending: INTERNAL MEDICINE
Payer: MEDICARE

## 2022-05-04 DIAGNOSIS — I48.92 ATRIAL FLUTTER, UNSPECIFIED TYPE (HCC): ICD-10-CM

## 2022-05-04 DIAGNOSIS — D68.69 SECONDARY HYPERCOAGULABLE STATE (HCC): ICD-10-CM

## 2022-05-04 LAB — INR PPP: 3.3 (ref 2–3.5)

## 2022-05-04 PROCEDURE — 99212 OFFICE O/P EST SF 10 MIN: CPT | Performed by: NURSE PRACTITIONER

## 2022-05-04 PROCEDURE — 85610 PROTHROMBIN TIME: CPT

## 2022-05-04 NOTE — PROGRESS NOTES
Anticoagulation Summary  As of 5/4/2022    INR goal:  2.0-3.0   TTR:  69.0 % (3.5 y)   INR used for dosing:  3.30 (5/4/2022)   Warfarin maintenance plan:  2 mg (2 mg x 1) every Sun, Tue, Thu; 1 mg (2 mg x 0.5) all other days   Weekly warfarin total:  10 mg   Plan last modified:  LUZ MARIA ZepedaPDayanaraNDayanara (5/4/2022)   Next INR check:  6/1/2022   Priority:  Maintenance   Target end date:  Indefinite    Indications    Stroke (HCC) (Resolved) [I63.9]  Atrial flutter (HCC) [I48.92]  Secondary hypercoagulable state (HCC) [D68.69]             Anticoagulation Episode Summary     INR check location:      Preferred lab:      Send INR reminders to:      Comments:        Anticoagulation Care Providers     Provider Role Specialty Phone number    Hussain Nation M.D. Referring Interventional Cardiology [phone]    [facility] Responsible  [phone]                Refer to Patient Findings for HPI:  Patient Findings     Positives:  Change in health, Change in medications, Change in diet/appetite    Negatives:  Signs/symptoms of thrombosis, Signs/symptoms of bleeding, Laboratory test error suspected, Change in alcohol use, Change in activity, Upcoming invasive procedure, Emergency department visit, Upcoming dental procedure, Missed doses, Extra doses, Hospital admission, Bruising, Other complaints    Comments:  Her  of 45 years just recently passed away. Her appetite is decreased. Doesn't anticipate it returning to normal any time soon. Finished a medrol dose daniel last week.          There were no vitals filed for this visit.   pt declined vitals    Verified current warfarin dosing schedule.    Medications reconciled   Pt is not on antiplatelet therapy  Discussed DOAC. She wishes to stay on warfarin.    A/P   INR  supra-therapeutic.     Warfarin dosing recommendation: HOLD tonight's dose then began slightly reduced regimen as outlined on calendar.    Pt educated to contact our clinic with any changes  in medications or s/s of bleeding or thrombosis. Pt is aware to seek immediate medical attention for falls, head injury or deep cuts.    Follow up appointment in 4 week(s) per pt's preference although I suggested 2 week f/u.    LUZ MARIA ZepedaPDayanaraN.

## 2022-05-05 LAB — INR BLD: 3.3 (ref 0.9–1.2)

## 2022-05-15 ENCOUNTER — HOSPITAL ENCOUNTER (EMERGENCY)
Facility: MEDICAL CENTER | Age: 87
End: 2022-05-15
Attending: EMERGENCY MEDICINE
Payer: MEDICARE

## 2022-05-15 VITALS
HEIGHT: 67 IN | RESPIRATION RATE: 16 BRPM | BODY MASS INDEX: 15.57 KG/M2 | OXYGEN SATURATION: 98 % | DIASTOLIC BLOOD PRESSURE: 70 MMHG | WEIGHT: 99.21 LBS | TEMPERATURE: 98 F | HEART RATE: 81 BPM | SYSTOLIC BLOOD PRESSURE: 110 MMHG

## 2022-05-15 DIAGNOSIS — N39.0 ACUTE UTI: ICD-10-CM

## 2022-05-15 DIAGNOSIS — R33.9 URINARY RETENTION: ICD-10-CM

## 2022-05-15 PROBLEM — Z71.89 GRIEF COUNSELING: Status: ACTIVE | Noted: 2022-05-04

## 2022-05-15 LAB
ALBUMIN SERPL BCP-MCNC: 3.7 G/DL (ref 3.2–4.9)
ALBUMIN/GLOB SERPL: 0.9 G/DL
ALP SERPL-CCNC: 103 U/L (ref 30–99)
ALT SERPL-CCNC: 8 U/L (ref 2–50)
ANION GAP SERPL CALC-SCNC: 13 MMOL/L (ref 7–16)
APPEARANCE UR: ABNORMAL
AST SERPL-CCNC: 17 U/L (ref 12–45)
BACTERIA #/AREA URNS HPF: ABNORMAL /HPF
BASOPHILS # BLD AUTO: 0.5 % (ref 0–1.8)
BASOPHILS # BLD: 0.03 K/UL (ref 0–0.12)
BILIRUB SERPL-MCNC: 0.3 MG/DL (ref 0.1–1.5)
BILIRUB UR QL STRIP.AUTO: NEGATIVE
BUN SERPL-MCNC: 26 MG/DL (ref 8–22)
CALCIUM SERPL-MCNC: 9.6 MG/DL (ref 8.4–10.2)
CHLORIDE SERPL-SCNC: 97 MMOL/L (ref 96–112)
CO2 SERPL-SCNC: 22 MMOL/L (ref 20–33)
COLOR UR: YELLOW
CREAT SERPL-MCNC: 1.24 MG/DL (ref 0.5–1.4)
EOSINOPHIL # BLD AUTO: 0.12 K/UL (ref 0–0.51)
EOSINOPHIL NFR BLD: 2.2 % (ref 0–6.9)
EPI CELLS #/AREA URNS HPF: ABNORMAL /HPF
ERYTHROCYTE [DISTWIDTH] IN BLOOD BY AUTOMATED COUNT: 54.2 FL (ref 35.9–50)
GFR SERPLBLD CREATININE-BSD FMLA CKD-EPI: 42 ML/MIN/1.73 M 2
GLOBULIN SER CALC-MCNC: 4.1 G/DL (ref 1.9–3.5)
GLUCOSE SERPL-MCNC: 96 MG/DL (ref 65–99)
GLUCOSE UR STRIP.AUTO-MCNC: NEGATIVE MG/DL
HCT VFR BLD AUTO: 41.1 % (ref 37–47)
HGB BLD-MCNC: 13.1 G/DL (ref 12–16)
IMM GRANULOCYTES # BLD AUTO: 0.02 K/UL (ref 0–0.11)
IMM GRANULOCYTES NFR BLD AUTO: 0.4 % (ref 0–0.9)
KETONES UR STRIP.AUTO-MCNC: NEGATIVE MG/DL
LACTATE BLD-SCNC: 1.7 MMOL/L (ref 0.5–2)
LEUKOCYTE ESTERASE UR QL STRIP.AUTO: ABNORMAL
LYMPHOCYTES # BLD AUTO: 1.37 K/UL (ref 1–4.8)
LYMPHOCYTES NFR BLD: 25 % (ref 22–41)
MCH RBC QN AUTO: 29.9 PG (ref 27–33)
MCHC RBC AUTO-ENTMCNC: 31.9 G/DL (ref 33.6–35)
MCV RBC AUTO: 93.8 FL (ref 81.4–97.8)
MICRO URNS: ABNORMAL
MONOCYTES # BLD AUTO: 0.55 K/UL (ref 0–0.85)
MONOCYTES NFR BLD AUTO: 10 % (ref 0–13.4)
NEUTROPHILS # BLD AUTO: 3.39 K/UL (ref 2–7.15)
NEUTROPHILS NFR BLD: 61.9 % (ref 44–72)
NITRITE UR QL STRIP.AUTO: NEGATIVE
NRBC # BLD AUTO: 0 K/UL
NRBC BLD-RTO: 0 /100 WBC
PH UR STRIP.AUTO: 6 [PH] (ref 5–8)
PLATELET # BLD AUTO: 195 K/UL (ref 164–446)
PMV BLD AUTO: 9.4 FL (ref 9–12.9)
POTASSIUM SERPL-SCNC: 4.2 MMOL/L (ref 3.6–5.5)
PROT SERPL-MCNC: 7.8 G/DL (ref 6–8.2)
PROT UR QL STRIP: 30 MG/DL
RBC # BLD AUTO: 4.38 M/UL (ref 4.2–5.4)
RBC # URNS HPF: ABNORMAL /HPF
RBC UR QL AUTO: ABNORMAL
SODIUM SERPL-SCNC: 132 MMOL/L (ref 135–145)
SP GR UR STRIP.AUTO: <=1.005
WBC # BLD AUTO: 5.5 K/UL (ref 4.8–10.8)
WBC #/AREA URNS HPF: ABNORMAL /HPF

## 2022-05-15 PROCEDURE — 700105 HCHG RX REV CODE 258: Performed by: EMERGENCY MEDICINE

## 2022-05-15 PROCEDURE — 51702 INSERT TEMP BLADDER CATH: CPT

## 2022-05-15 PROCEDURE — 81001 URINALYSIS AUTO W/SCOPE: CPT

## 2022-05-15 PROCEDURE — 80053 COMPREHEN METABOLIC PANEL: CPT

## 2022-05-15 PROCEDURE — 36415 COLL VENOUS BLD VENIPUNCTURE: CPT

## 2022-05-15 PROCEDURE — 303105 HCHG CATHETER EXTRA

## 2022-05-15 PROCEDURE — 85025 COMPLETE CBC W/AUTO DIFF WBC: CPT

## 2022-05-15 PROCEDURE — A9270 NON-COVERED ITEM OR SERVICE: HCPCS | Performed by: EMERGENCY MEDICINE

## 2022-05-15 PROCEDURE — 700102 HCHG RX REV CODE 250 W/ 637 OVERRIDE(OP): Performed by: EMERGENCY MEDICINE

## 2022-05-15 PROCEDURE — 87086 URINE CULTURE/COLONY COUNT: CPT

## 2022-05-15 PROCEDURE — 83605 ASSAY OF LACTIC ACID: CPT

## 2022-05-15 PROCEDURE — 99284 EMERGENCY DEPT VISIT MOD MDM: CPT

## 2022-05-15 RX ORDER — SODIUM CHLORIDE, SODIUM LACTATE, POTASSIUM CHLORIDE, AND CALCIUM CHLORIDE .6; .31; .03; .02 G/100ML; G/100ML; G/100ML; G/100ML
30 INJECTION, SOLUTION INTRAVENOUS ONCE
Status: COMPLETED | OUTPATIENT
Start: 2022-05-15 | End: 2022-05-15

## 2022-05-15 RX ORDER — DOCUSATE SODIUM 100 MG/1
100 CAPSULE, LIQUID FILLED ORAL 2 TIMES DAILY PRN
Status: SHIPPED | COMMUNITY
End: 2023-01-13

## 2022-05-15 RX ORDER — SULFAMETHOXAZOLE AND TRIMETHOPRIM 800; 160 MG/1; MG/1
1 TABLET ORAL 2 TIMES DAILY
Qty: 10 TABLET | Refills: 0 | Status: SHIPPED | OUTPATIENT
Start: 2022-05-15 | End: 2022-05-20

## 2022-05-15 RX ORDER — SULFAMETHOXAZOLE AND TRIMETHOPRIM 800; 160 MG/1; MG/1
1 TABLET ORAL ONCE
Status: COMPLETED | OUTPATIENT
Start: 2022-05-15 | End: 2022-05-15

## 2022-05-15 RX ADMIN — SODIUM CHLORIDE, POTASSIUM CHLORIDE, SODIUM LACTATE AND CALCIUM CHLORIDE 1350 ML: 600; 310; 30; 20 INJECTION, SOLUTION INTRAVENOUS at 16:16

## 2022-05-15 RX ADMIN — SULFAMETHOXAZOLE AND TRIMETHOPRIM 1 TABLET: 800; 160 TABLET ORAL at 17:13

## 2022-05-15 ASSESSMENT — FIBROSIS 4 INDEX: FIB4 SCORE: 2.06

## 2022-05-15 ASSESSMENT — PAIN DESCRIPTION - PAIN TYPE: TYPE: ACUTE PAIN

## 2022-05-15 NOTE — ED TRIAGE NOTES
"Pt to triage via w/c w family; c/o difficulty urinating since last noc. Pt states she last urinated this am \"a little bit' but did not feel as if her bladder completely emptied.  "

## 2022-05-15 NOTE — ED PROVIDER NOTES
ED Provider Note    ED Provider Note    Primary care provider: TOM Lopez  Means of arrival: POV  History obtained from: Patient    CHIEF COMPLAINT  Chief Complaint   Patient presents with   • Difficulty Urinating     Seen at 3:06 PM.   HPI  Az Tripp is a 88 y.o. female who presents to the Emergency Department stating that she cannot urinate.  She states that the same thing happened to her in January when she is seen here in the emergency department.  (Actual visit was October 2021).  She noted last amount of urination was likely early this morning or last night.  She had copious episodes of diarrhea earlier this morning, last stooling was somewhere around 830 or 930.  The patient denies any fevers, chills, nausea, vomiting, chest pain, shortness breath, abdominal pain.  She feels that she has the urge to urinate but cannot get the urine out.    She states on the last time she was here they placed a Estes catheter but it was removed and she was told to follow-up with urology.    She appears to be a poor historian.    REVIEW OF SYSTEMS  See HPI,   Remainder of ROS negative.     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), Bronchiectasis without complication ([facility]) (9/19/2017), Chronic fatigue (9/19/2017), Closed right hip fracture ([facility]) (6/19/2019), DVT (6/1996), First degree atrioventricular block (9/21/2012), Glomerulonephritis, chronic in other disease (8/3/2011), H/O echocardiogram (9/21/2012), Hematuria (3/20/2019), Hypothyroid (10/2002), Kidney disorder (1998), Kidney failure, Leukocytosis (7/21/2020), Mitral valve prolapse,  Palpitations (2012), Rheumatic fever (2012), Rheumatic fever (2012), Sepsis (HCC) (2018), Severe protein-calorie malnutrition (Mahmood: less than 60% of standard weight) (HCC) (2018), Stroke (HCC) (2012), Supratherapeutic INR (2018), SVT (SUPRAVENTRICULAR TACHYCARDIA), h/o paroxysmal (8/3/2011), Wegener's granulomatosis, and Wegener's granulomatosis (8/3/2011).    SURGICAL HISTORY   has a past surgical history that includes bunionectomy (); arthroscopy, knee (); arthroscope (); temporal artery biopsy (); lung needle biopsy (); cataract extraction (); cholecystectomy (); hysterectomy radical (); vein ligation; and partial hip replacement (Right, 2019).    SOCIAL HISTORY  Social History     Tobacco Use   • Smoking status: Former Smoker     Packs/day: 1.00     Types: Cigarettes     Quit date: 1960     Years since quittin.4   • Smokeless tobacco: Never Used   • Tobacco comment: very little years and years ago   Vaping Use   • Vaping Use: Never used   Substance Use Topics   • Alcohol use: Yes     Alcohol/week: 0.0 oz     Comment: occasional   • Drug use: No      Social History     Substance and Sexual Activity   Drug Use No       FAMILY HISTORY  Family History   Problem Relation Age of Onset   • Other Father         Aortic aneurysm   • Cancer Brother         Lung   • Non-contributory Other        CURRENT MEDICATIONS  Reviewed.  See Encounter Summary.     ALLERGIES  Allergies   Allergen Reactions   • Cephalexin [Keflex] Swelling   • Hydroxyzine Swelling     Eyes get itchy and swollen    • Naprelan [Naproxen] Swelling     Eyes get itchy become red and swollen   • Oxaprozin Swelling     Swelling eyes, and itchy   • Ampicillin Rash     Red Rash   • Baclofen Unspecified     drowsiness   • Citalopram Vomiting and Nausea   • Clarithromycin Unspecified     Pt reports that this medication plugs her ears.    • Diclofenac Rash and Swelling     Red  "rash & swelling     • Erythromycin Diarrhea     GI upset   • Penicillins Rash     Red rash     • Promethazine Unspecified     Drowsiness and sleeps   • Vi-Q-Tuss [Hydrocodone-Guaifenesin] Vomiting and Nausea       PHYSICAL EXAM  VITAL SIGNS: /70   Pulse 81   Temp 36.7 °C (98 °F)   Resp 16   Ht 1.702 m (5' 7\")   Wt 45 kg (99 lb 3.3 oz)   SpO2 98%   BMI 15.54 kg/m²   Constitutional: Awake, alert in no apparent distress.  HENT: Normocephalic, Bilateral external ears normal. Nose normal.  Dry mucosal membranes.  Eyes: Conjunctiva normal, non-icteric, EOMI.    Thorax & Lungs: Easy unlabored respirations, Clear to ascultation bilaterally.  Cardiovascular: Borderline tachycardic, No murmurs, rubs or gallops. Bilateral pulses symmetrical.   Abdomen:  Soft, nontender, nondistended, normal active bowel sounds.  Mild suprapubic tenderness with deep palpation.  Otherwise nontender.   :    Skin: Visualized skin is  Dry, No erythema, No rash.   Musculoskeletal:   No cyanosis, clubbing or edema. No leg asymmetry.   Neurologic: Alert, Grossly non-focal.   Psychiatric: Normal affect, Normal mood  Lymphatic:  No cervical LAD      RADIOLOGY  No orders to display         COURSE & MEDICAL DECISION MAKING  Pertinent Labs & Imaging studies reviewed. (See chart for details)    Differential diagnoses include but are not limited to: Dehydration, urinary retention, UTI  3:06 PM - Medical record reviewed, patient seen and examined at bedside.    3:23 PM: Bladder scan shows 400 cc of retained urine, Estes catheter will be placed at this time.    5:10 PM patient is comfortable, she does not have any pain at this time.  We discussed the test results and treatment options.  She would prefer to have the Estes discontinued which I think is reasonable.    Decision Making:  This is a pleasant 88 y.o. year old female who presents with acute urinary retention.  The patient had 400 cc of urine on the bladder scan, cath urinalysis was " obtained that shows significant pyuria, few bacteriuria.  She does not have any signs of sepsis.  Renal function is normal today.  Patient is neurologically intact, symptoms today not concerning for acute cauda equina.    The patient has had some urinary retention in the past.  She has never required a chronic indwelling Estes catheter.  Suspect this may be due to recent UTI.  She is on Flomax.  She does not wish to have a catheter.  Estes was discontinued prior to discharge.  Think this is the appropriate treatment for her as at 88 years old she would be at increased risk for falls and ambulating with a Estes tube would put her at high risk for catching this and following.  I did explain that there is a possibility that she will have further urinary retention and require a repeat visit to the ER for cath urine.    Discharge Medications:  Discharge Medication List as of 5/15/2022  5:23 PM      START taking these medications    Details   sulfamethoxazole-trimethoprim (BACTRIM DS) 800-160 MG tablet Take 1 Tablet by mouth in the morning and 1 Tablet in the evening. Do all this for 5 days., Disp-10 Tablet, R-0, Normal             The patient was discharged home (see d/c instructions) was told to return immediately for any signs or symptoms listed, or any worsening at all.  The patient verbally agreed to the discharge precautions and follow-up plan which is documented in EPIC.        FINAL IMPRESSION  1. Urinary retention    2. Acute UTI

## 2022-05-15 NOTE — ED NOTES
Med rec updated and complete, per son that had a list of medications at bedside, that were returned back to pts chart  Allergies reviewed, per son  Pt is from [facility]

## 2022-05-16 NOTE — ED NOTES
Patient verbalized understanding to plan of care and discharge information. Patient reports pain 0/10. Patient in stable condition. Patient wheeled out of ED to person vehicle via personal wheelchair with friend.

## 2022-05-17 LAB
BACTERIA UR CULT: NORMAL
SIGNIFICANT IND 70042: NORMAL
SITE SITE: NORMAL
SOURCE SOURCE: NORMAL

## 2022-06-01 ENCOUNTER — ANTICOAGULATION VISIT (OUTPATIENT)
Dept: VASCULAR LAB | Facility: MEDICAL CENTER | Age: 87
End: 2022-06-01
Attending: INTERNAL MEDICINE
Payer: MEDICARE

## 2022-06-01 DIAGNOSIS — I48.3 TYPICAL ATRIAL FLUTTER (HCC): ICD-10-CM

## 2022-06-01 DIAGNOSIS — D68.69 SECONDARY HYPERCOAGULABLE STATE (HCC): ICD-10-CM

## 2022-06-01 LAB
INR BLD: 2.6 (ref 0.9–1.2)
INR PPP: 2.6 (ref 2–3.5)

## 2022-06-01 PROCEDURE — 85610 PROTHROMBIN TIME: CPT

## 2022-06-01 PROCEDURE — 99211 OFF/OP EST MAY X REQ PHY/QHP: CPT | Performed by: NURSE PRACTITIONER

## 2022-06-01 NOTE — PROGRESS NOTES
Anticoagulation Summary  As of 2022    INR goal:  2.0-3.0   TTR:  68.8 % (3.5 y)   INR used for dosin.60 (2022)   Warfarin maintenance plan:  2 mg (2 mg x 1) every Sun, e, Thu; 1 mg (2 mg x 0.5) all other days   Weekly warfarin total:  10 mg   Plan last modified:  Callie Murillo, A.P.NDayanara (2022)   Next INR check:  2022   Priority:  Maintenance   Target end date:  Indefinite    Indications    Stroke (HCC) (Resolved) [I63.9]  Atrial flutter (HCC) [I48.92]  Secondary hypercoagulable state (HCC) [D68.69]             Anticoagulation Episode Summary     INR check location:      Preferred lab:      Send INR reminders to:      Comments:        Anticoagulation Care Providers     Provider Role Specialty Phone number    Hussain Ntaion M.D. Referring Interventional Cardiology [phone]    [facility] Responsible  [phone]                Refer to Patient Findings for HPI:  Patient Findings     Positives:  Change in health, Change in activity    Negatives:  Signs/symptoms of thrombosis, Signs/symptoms of bleeding, Laboratory test error suspected, Change in alcohol use, Upcoming invasive procedure, Emergency department visit, Upcoming dental procedure, Missed doses, Extra doses, Change in medications, Change in diet/appetite, Hospital admission, Bruising, Other complaints    Comments:  Having some hip and back pain. Had a steroid shot to her hip since her last visit.          There were no vitals filed for this visit.   pt declined vitals    Verified current warfarin dosing schedule.    Medications reconciled   Pt is not on antiplatelet therapy      A/P   INR  -therapeutic.     Warfarin dosing recommendation: Continue current regimen.    Pt educated to contact our clinic with any changes in medications or s/s of bleeding or thrombosis. Pt is aware to seek immediate medical attention for falls, head injury or deep cuts.    Follow up appointment in 5 week(s).    Callie Murillo,  JEFF

## 2022-06-14 ENCOUNTER — DOCUMENTATION (OUTPATIENT)
Dept: VASCULAR LAB | Facility: MEDICAL CENTER | Age: 87
End: 2022-06-14
Payer: MEDICARE

## 2022-06-14 NOTE — PROGRESS NOTES
"[facility]    Received anticoagulation clearance from [place] regarding upcoming NATALYA.    Procedure date TBD    Pt is on warfarin for atrial flutter and hx of stroke. In the past we have forgone bridging - \"Pt has hx of stroke,  very frail, and weighs only 47 kg.  Pt also has difficulty with right hand making it very difficult to administer an injection.  Forgo enoxaparin given the bleeding risk and risk of medication error.\"       Asked office to update our office re: procedure date    Faxed clearance to [phone]; (will send to [facility] to scan in media)    Whit Angeles, PharmD      "

## 2022-06-30 PROBLEM — M54.16 LUMBAR RADICULOPATHY: Status: ACTIVE | Noted: 2022-06-29

## 2022-07-06 ENCOUNTER — ANTICOAGULATION VISIT (OUTPATIENT)
Dept: VASCULAR LAB | Facility: MEDICAL CENTER | Age: 87
End: 2022-07-06
Attending: INTERNAL MEDICINE
Payer: MEDICARE

## 2022-07-06 DIAGNOSIS — D68.69 SECONDARY HYPERCOAGULABLE STATE (HCC): ICD-10-CM

## 2022-07-06 DIAGNOSIS — I48.92 ATRIAL FLUTTER, UNSPECIFIED TYPE (HCC): ICD-10-CM

## 2022-07-06 LAB — INR PPP: 1.5 (ref 2–3.5)

## 2022-07-06 PROCEDURE — 85610 PROTHROMBIN TIME: CPT

## 2022-07-06 PROCEDURE — 99212 OFFICE O/P EST SF 10 MIN: CPT | Performed by: PHARMACIST

## 2022-07-06 NOTE — PROGRESS NOTES
"Anticoagulation Summary  As of 2022    INR goal:  2.0-3.0   TTR:  68.4 % (3.6 y)   INR used for dosin.50 (2022)   Warfarin maintenance plan:  2 mg (2 mg x 1) every Sun, Tue, u; 1 mg (2 mg x 0.5) all other days   Weekly warfarin total:  10 mg   Plan last modified:  JEFF Zepeda (2022)   Next INR check:  2022   Priority:  Maintenance   Target end date:  Indefinite    Indications    Stroke (HCC) (Resolved) [I63.9]  Atrial flutter (HCC) [I48.92]  Secondary hypercoagulable state (HCC) [D68.69]             Anticoagulation Episode Summary     INR check location:      Preferred lab:      Send INR reminders to:      Comments:        Anticoagulation Care Providers     Provider Role Specialty Phone number    Hussain Nation M.D. Referring Interventional Cardiology [phone]    [facility] Responsible  [phone]                Refer to Patient Findings for HPI:  Patient Findings     Positives:  Upcoming invasive procedure    Negatives:  Signs/symptoms of thrombosis, Signs/symptoms of bleeding, Laboratory test error suspected, Change in health, Change in alcohol use, Change in activity, Emergency department visit, Upcoming dental procedure, Missed doses, Extra doses, Change in medications, Change in diet/appetite, Hospital admission, Bruising, Other complaints          There were no vitals filed for this visit.   pt declined vitals    Verified current warfarin dosing schedule.    Medications reconciled   Pt is not on antiplatelet therapy      A/P   INR  SUB-therapeutic.     Warfarin dosing recommendation: Pt is to bolus 2 mg x 1 then, continue with current warfarin dosing regimen. Pt is to hold warfarin dose from 7-14 to 7-18 until after L5-S1 NATALYA procedure on .    Pt does have history of stroke which puts her at high risk, but has forgone bridging in the past. \"Pt has hx of stroke,  very frail, and weighs only 47 kg.  Pt also has difficulty with right hand making " "it very difficult to administer an injection.  Forgo enoxaparin given the bleeding risk and risk of medication error.\"       Pt educated to contact our clinic with any changes in medications or s/s of bleeding or thrombosis. Pt is aware to seek immediate medical attention for falls, head injury or deep cuts.    Follow up appointment in 3 week(s).    Stacey Rouse, Pharmacy Intern    Shaun Ruiz, PharmD    "

## 2022-07-07 LAB — INR BLD: 1.5 (ref 0.9–1.2)

## 2022-07-27 ENCOUNTER — ANTICOAGULATION VISIT (OUTPATIENT)
Dept: VASCULAR LAB | Facility: MEDICAL CENTER | Age: 87
End: 2022-07-27
Attending: INTERNAL MEDICINE
Payer: MEDICARE

## 2022-07-27 DIAGNOSIS — D68.69 SECONDARY HYPERCOAGULABLE STATE (HCC): ICD-10-CM

## 2022-07-27 LAB
INR BLD: 1.4 (ref 0.9–1.2)
INR PPP: 1.4 (ref 2–3.5)

## 2022-07-27 PROCEDURE — 99212 OFFICE O/P EST SF 10 MIN: CPT

## 2022-07-27 PROCEDURE — 85610 PROTHROMBIN TIME: CPT

## 2022-07-27 NOTE — PROGRESS NOTES
Anticoagulation Summary  As of 2022    INR goal:  2.0-3.0   TTR:  67.3 % (3.7 y)   INR used for dosin.40 (2022)   Warfarin maintenance plan:  1 mg (2 mg x 0.5) every Mon, Wed, Fri; 2 mg (2 mg x 1) all other days   Weekly warfarin total:  11 mg   Plan last modified:  Mark Desai PharmD (2022)   Next INR check:  8/10/2022   Priority:  Maintenance   Target end date:  Indefinite    Indications    Stroke (HCC) (Resolved) [I63.9]  Atrial flutter (HCC) [I48.92]  Secondary hypercoagulable state (HCC) [D68.69]             Anticoagulation Episode Summary     INR check location:      Preferred lab:      Send INR reminders to:      Comments:        Anticoagulation Care Providers     Provider Role Specialty Phone number    Hussain Nation M.D. Referring Interventional Cardiology [phone]    [facility] Responsible  [phone]                Refer to Patient Findings for HPI:  Patient Findings     Positives:  Change in diet/appetite (Patient lives in assisted living. Diet is sporadic)    Negatives:  Signs/symptoms of thrombosis, Signs/symptoms of bleeding, Laboratory test error suspected, Change in health, Change in alcohol use, Change in activity, Upcoming invasive procedure, Emergency department visit, Upcoming dental procedure, Missed doses, Extra doses, Change in medications, Hospital admission, Bruising, Other complaints          There were no vitals filed for this visit.  Pt declined vitals    Verified current warfarin dosing schedule.    Medications reconciled   Pt is not on antiplatelet therapy      A/P   INR  1.4 sub-therapeutic.     Warfarin dosing recommendation: Bolus x  2 mg TONIGHT, then start increased regimen with warfarin 1 mg Mon, Weds, Fri and 2 mg all other days.     Pt educated to contact our clinic with any changes in medications or s/s of bleeding or thrombosis. Pt is aware to seek immediate medical attention for falls, head injury or deep cuts.    Follow  up appointment in 2 week(s) on August 10th.    Mark Desai, PharmD, BCACP

## 2022-08-09 ENCOUNTER — OFFICE VISIT (OUTPATIENT)
Dept: CARDIOLOGY | Facility: MEDICAL CENTER | Age: 87
End: 2022-08-09
Payer: MEDICARE

## 2022-08-09 VITALS
OXYGEN SATURATION: 96 % | DIASTOLIC BLOOD PRESSURE: 52 MMHG | SYSTOLIC BLOOD PRESSURE: 112 MMHG | HEIGHT: 67 IN | HEART RATE: 72 BPM | RESPIRATION RATE: 14 BRPM | WEIGHT: 100 LBS | BODY MASS INDEX: 15.7 KG/M2

## 2022-08-09 DIAGNOSIS — Z79.01 ON CONTINUOUS ORAL ANTICOAGULATION: ICD-10-CM

## 2022-08-09 DIAGNOSIS — I48.0 PAROXYSMAL ATRIAL FIBRILLATION (HCC): ICD-10-CM

## 2022-08-09 DIAGNOSIS — I69.359 HEMIPLEGIA AS LATE EFFECT OF CEREBROVASCULAR ACCIDENT (CVA) (HCC): ICD-10-CM

## 2022-08-09 DIAGNOSIS — I10 ESSENTIAL HYPERTENSION: ICD-10-CM

## 2022-08-09 PROCEDURE — 99215 OFFICE O/P EST HI 40 MIN: CPT | Performed by: INTERNAL MEDICINE

## 2022-08-09 ASSESSMENT — FIBROSIS 4 INDEX: FIB4 SCORE: 2.71

## 2022-08-09 NOTE — PROGRESS NOTES
Chief Complaint   Patient presents with   • Palpitations     Follow up       Subjective:   Az Jolly is an 88 y.o. female who presents today for follow-up of atrial flutter/fibrillation PSVT stroke and anticoagulation.  She walks with a walker and does not fall.      Since last visit she has had resolution of musculoskeletal chest wall pain and now mostly endorses right parasacral discomfort.  She has had injections and is managing this as she can.  She has not tried CBD cream but has tried lidocaine patches.    Past Medical History:   Diagnosis Date   • A fib 9/22/06   • Abdominal bruit 9/25/2012   • Abdominal pain 3/20/2019   • Abnormal chest x-ray 9/19/2017    Improved but persistent bilateral airspace opacities 9/11/17   • Achalasia 8/3/2011   • DIAZ (acute kidney injury) ([facility]) 12/20/2018   • Allergic rhinitis 9/21/2012   • Arterial ischemic stroke, MCA (middle cerebral artery), left, acute ([facility]) 8/3/2011   • Aseptic necrosis of bone, 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   • Bronchiectasis without complication ([facility]) 9/19/2017   • Chronic fatigue 9/19/2017   • Closed right hip fracture ([facility]) 6/19/2019   • DVT 6/1996    left leg, vein ligation performed on saphenous vein   • First degree atrioventricular block 9/21/2012   • Glomerulonephritis, chronic in other disease 8/3/2011   • H/O echocardiogram 9/21/2012   • Hematuria 3/20/2019   • Hypothyroid 10/2002   • Kidney disorder 1998    left kidney biopsy--glomerulonephritis and wegners dx   • Kidney failure     stage II   • Leukocytosis 7/21/2020   • Mitral valve prolapse    • Palpitations 9/21/2012   • Rheumatic fever 9/21/2012   • Rheumatic fever 9/21/2012    Childhood    • Sepsis ([facility]) 12/20/2018   • Severe protein-calorie malnutrition (Mahmood: less than 60% of standard weight) ([facility]) 12/20/2018   • Stroke ([facility]) 9/21/2012   • Supratherapeutic INR 12/20/2018   • SVT (SUPRAVENTRICULAR TACHYCARDIA), h/o paroxysmal 8/3/2011   • Wegener's granulomatosis    • Wegener's  granulomatosis 8/3/2011     O Spring 2021     Past Surgical History:   Procedure Laterality Date   • PB PARTIAL HIP REPLACEMENT Right 2019    Procedure: HEMIARTHROPLASTY, HIP;  Surgeon: Raul Saldivar M.D.;  Location: SURGERY [facility];  Service: Orthopedics   • CATARACT EXTRACTION      left eye   • CHOLECYSTECTOMY     • TEMPORAL ARTERY BIOPSY      normal   • LUNG NEEDLE BIOPSY      right lung, nodules found   • ARTHROSCOPE      left knee   • ARTHROSCOPY, KNEE      right   • BUNIONECTOMY      bilat   • HYSTERECTOMY RADICAL     • VEIN LIGATION       Family History   Problem Relation Age of Onset   • Other Father         Aortic aneurysm   • Cancer Brother         Lung   • Non-contributory Other      Social History     Socioeconomic History   • Marital status:      Spouse name: Not on file   • Number of children: Not on file   • Years of education: Not on file   • Highest education level: Not on file   Occupational History   • Not on file   Tobacco Use   • Smoking status: Former Smoker     Packs/day: 1.00     Types: Cigarettes     Quit date: 1960     Years since quittin.6   • Smokeless tobacco: Never Used   • Tobacco comment: very little years and years ago   Vaping Use   • Vaping Use: Never used   Substance and Sexual Activity   • Alcohol use: Yes     Alcohol/week: 0.0 oz     Comment: occasional   • Drug use: No   • Sexual activity: Not on file   Other Topics Concern   • Not on file   Social History Narrative   • Not on file     Social Determinants of Health     Financial Resource Strain: Not on file   Food Insecurity: Not on file   Transportation Needs: Not on file   Physical Activity: Not on file   Stress: Not on file   Social Connections: Not on file   Intimate Partner Violence: Not on file   Housing Stability: Not on file     Allergies   Allergen Reactions   • Cephalexin [Keflex] Swelling   • Hydroxyzine Swelling     Eyes get itchy and swollen    •  Naprelan [Naproxen] Swelling     Eyes get itchy become red and swollen   • Oxaprozin Swelling     Swelling eyes, and itchy   • Ampicillin Rash     Red Rash   • Baclofen Unspecified     drowsiness   • Citalopram Vomiting and Nausea   • Clarithromycin Unspecified     Pt reports that this medication plugs her ears.    • Diclofenac Rash and Swelling     Red rash & swelling     • Erythromycin Diarrhea     GI upset   • Penicillins Rash     Red rash     • Promethazine Unspecified     Drowsiness and sleeps   • Vi-Q-Tuss [Hydrocodone-Guaifenesin] Vomiting and Nausea     Outpatient Encounter Medications as of 8/9/2022   Medication Sig Dispense Refill   • warfarin (COUMADIN) 2 MG Tab Take 0.5-1 Tablets by mouth every evening. Pt takes 2MG on Tues, Thursday, Saturday and Sunday.  (1/2 tab) 1mg Monday, Wednesday, Friday.  Per coumadin clinic. 30 Tablet 0   • gabapentin (NEURONTIN) 100 MG Cap Take 1 Capsule by mouth every evening. For neuropathy 90 Capsule 2   • acetaminophen (TYLENOL) 500 MG Tab Take 2 Tablets by mouth 2 times a day. May also take 2 Tablets 1 time a day as needed for Mild Pain, Moderate Pain or Fever (NTE 3000mg in 24 hours). At 9am and 7pm 120 Tablet 0   • Calcium-Phosphorus-Vitamin D (CALCIUM GUMMIES PO) Take 2 Tablets by mouth every day. Please DO NOT send.  Family provides.     • docusate sodium (COLACE) 100 MG Cap Take 100 mg by mouth 2 times a day as needed for Constipation. Do not send family provides.     • sennosides-docusate sodium (SENOKOT-S) 8.6-50 MG tablet Take 2 Tablets by mouth at bedtime. Discontinue all previous orders of senna plus. Start 2 tablets by mouth at bedtime for constipation.  Indications: Constipation (Patient taking differently: Take 2 Tablets by mouth every evening. Please do not send family provides.  Indications: Constipation) 60 Tablet 11   • Biotin 1000 MCG Tab Take 1 Tablet by mouth every day. (Patient taking differently: Take 1,000 mcg by mouth every day. Please DO NOT send.   Family Provides.) 30 Tablet 11   • Cholecalciferol (VITAMIN D3) 25 MCG (1000 UT) Cap Take 1,000 Units by mouth every morning. For vitamin D (Patient taking differently: Take 1,000 Units by mouth every morning. For vitamin D.  Please do not send family provides.) 30 Capsule 11   • D-Mannose 500 MG Cap Take 1 Capsule by mouth 2 times a day. One one capsule and place into liquid for UTI prevention. (Patient taking differently: Take 500 mg by mouth 2 times a day. One one capsule and place into liquid for UTI prevention.) 60 Capsule 11   • folic acid (FOLVITE) 1 MG Tab Take 1 Tablet by mouth every morning. For supplement 30 Tablet 11   • levothyroxine (SYNTHROID) 88 MCG Tab Take 1 Tablet by mouth every morning on an empty stomach. For thyroid 30 Tablet 11   • methocarbamol (ROBAXIN) 500 MG Tab Take 1 Tablet by mouth 2 times a day. For back pain.  Discontinue all previous orders. (Patient taking differently: Take 500 mg by mouth 2 times a day. For back pain.) 60 Tablet 11   • metoprolol tartrate (LOPRESSOR) 50 MG Tab Take 1 Tablet by mouth 2 times a day. 180 Tablet 2   • Multiple Vitamins-Minerals (ADULT ONE DAILY GUMMIES) Chew Tab Chew 2 Each every morning. For multivitamin (Patient taking differently: Chew 2 Each every morning. For multivitamin.  Please do not send family provides.) 60 Tablet 11   • tamsulosin (FLOMAX) 0.4 MG capsule Take 1 Capsule by mouth every morning. For kindey 30 Capsule 11   • Lidocaine 4 % Patch Apply 1 Patch topically 1 time a day as needed (Apply's on most painful areasas need for pain). Please do not send Family Provides     • [DISCONTINUED] naproxen (ANAPROX) 220 MG tablet Take 220 mg by mouth 2 times a day with meals. For pain     • [DISCONTINUED] warfarin (COUMADIN) 2 MG Tab Take 0.5-1 Tablets by mouth every evening. 2mg (1 Tablet) Saturday, Sunday, Monday, Tuesday, Wednesday, Thursday 1mg (1/2 Tablet) Friday ONLY (Patient taking differently: Take 1-2 mg by mouth every evening. Pt takes  "2MG on Tue, Thur, and Sunday   1MG all other days) 30 Tablet 3   • [DISCONTINUED] acetaminophen (TYLENOL) 500 MG Tab Take 2 Tablets by mouth every 8 hours as needed for Mild Pain or Moderate Pain. Not to exceed 3000 mg in 24 hours. (Patient taking differently: Take 1,000 mg by mouth every 8 hours as needed for Mild Pain or Moderate Pain. Take 2 tablets every night for pain.  Thin may take 2 tablets once a day as needed for pain.  Not to exceed 3000 mg in 1 day.  Do not sent provided by family!) 180 Tablet 5     No facility-administered encounter medications on file as of 8/9/2022.     Review of Systems   All other systems reviewed and are negative.       Objective:   /52 (BP Location: Left arm, Patient Position: Sitting)   Pulse 72   Resp 14   Ht 1.702 m (5' 7\")   Wt 45.4 kg (100 lb)   SpO2 96%   BMI 15.66 kg/m²     Physical Exam  Vitals reviewed.   Constitutional:       General: She is not in acute distress.     Appearance: She is well-developed. She is not diaphoretic.      Comments: Elderly and cachectic  Walker     HENT:      Head: Normocephalic and atraumatic.      Right Ear: External ear normal.      Left Ear: External ear normal.      Mouth/Throat:      Pharynx: No oropharyngeal exudate.   Eyes:      General: No scleral icterus.        Right eye: No discharge.         Left eye: No discharge.      Conjunctiva/sclera: Conjunctivae normal.      Pupils: Pupils are equal, round, and reactive to light.   Neck:      Thyroid: No thyromegaly.      Vascular: No JVD.      Trachea: No tracheal deviation.   Cardiovascular:      Rate and Rhythm: Normal rate. Rhythm irregularly irregular.      Chest Wall: PMI is not displaced.      Pulses:           Carotid pulses are 2+ on the right side and 2+ on the left side.       Radial pulses are 2+ on the right side and 2+ on the left side.        Popliteal pulses are 2+ on the right side and 2+ on the left side.        Dorsalis pedis pulses are 2+ on the right side and " 2+ on the left side.        Posterior tibial pulses are 2+ on the right side and 2+ on the left side.      Heart sounds: S1 normal and S2 normal. Murmur ( 2/6 systolic apical murmur) heard.     No friction rub. No gallop. No S3 or S4 sounds.   Pulmonary:      Effort: Pulmonary effort is normal. No respiratory distress.      Breath sounds: Normal breath sounds. No wheezing or rales.   Chest:      Chest wall: No tenderness.   Abdominal:      General: Bowel sounds are normal. There is no distension.      Palpations: Abdomen is soft.      Tenderness: There is no abdominal tenderness.   Musculoskeletal:         General: No tenderness. Normal range of motion.      Cervical back: Normal range of motion and neck supple.   Skin:     General: Skin is warm and dry.      Findings: No erythema or rash.   Neurological:      Mental Status: She is alert and oriented to person, place, and time.      Cranial Nerves: No cranial nerve deficit (Cranial nerves II through XII grossly intact).   Psychiatric:         Behavior: Behavior normal.         Thought Content: Thought content normal.         Judgment: Judgment normal.       LABS:  Lab Results   Component Value Date/Time    CHOLSTRLTOT 95 (L) 08/02/2011 03:10 AM    LDL 44 08/02/2011 03:10 AM    HDL 39 (L) 08/02/2011 03:10 AM    TRIGLYCERIDE 60 08/02/2011 03:10 AM       Lab Results   Component Value Date/Time    WBC 5.5 05/15/2022 03:25 PM    RBC 4.38 05/15/2022 03:25 PM    HEMOGLOBIN 13.1 05/15/2022 03:25 PM    HEMATOCRIT 41.1 05/15/2022 03:25 PM    MCV 93.8 05/15/2022 03:25 PM    NEUTSPOLYS 61.90 05/15/2022 03:25 PM    LYMPHOCYTES 25.00 05/15/2022 03:25 PM    MONOCYTES 10.00 05/15/2022 03:25 PM    EOSINOPHILS 2.20 05/15/2022 03:25 PM    BASOPHILS 0.50 05/15/2022 03:25 PM    HYPOCHROMIA 1+ 06/22/2019 03:44 AM     Lab Results   Component Value Date/Time    SODIUM 132 (L) 05/15/2022 03:25 PM    POTASSIUM 4.2 05/15/2022 03:25 PM    CHLORIDE 97 05/15/2022 03:25 PM    CO2 22 05/15/2022  03:25 PM    GLUCOSE 96 05/15/2022 03:25 PM    BUN 26 (H) 05/15/2022 03:25 PM    CREATININE 1.24 05/15/2022 03:25 PM    CREATININE 1.5 (H) 02/19/2009 09:22 AM         Lab Results   Component Value Date/Time    ALKPHOSPHAT 103 (H) 05/15/2022 03:25 PM    ASTSGOT 17 05/15/2022 03:25 PM    ALTSGPT 8 05/15/2022 03:25 PM    TBILIRUBIN 0.3 05/15/2022 03:25 PM      Lab Results   Component Value Date/Time    BNPBTYPENAT 135 (H) 08/10/2011 03:40 AM      No results found for: TSH  Lab Results   Component Value Date/Time    PROTHROMBTM 24.1 (H) 10/03/2021 02:39 AM    INR 1.40 (A) 07/27/2022 12:00 AM      ECHO CONCLUSIONS (6/22/2019):  Left ventricular ejection fraction is visually estimated to be 65%.  Dilated inferior vena cava without inspiratory collapse.  Biatrial dilation.  Severe tricuspid regurgitation.  Estimated right ventricular systolic   pressure is 50 mmHg.  Right ventricle not well visualized.  Mild-moderate aortic insufficiency.  No prior study is available for comparison.     EKG (9/26/2018):  I have personally reviewed the EKG this visit and discussed with the patient.  Atrial flutter with variable block.     Echocardiogram 1/22/2018 located in [place] under [facility]'s records: Preserved left ventricular systolic function, grade 1 diastolic dysfunction, mild left atrial enlargement normal RVSP and mild to moderate mitral regurgitation.    Assessment:     1. Paroxysmal atrial fibrillation (HCC)  Basic Metabolic Panel   2. Hemiplegia as late effect of cerebrovascular accident (CVA) (HCC)     3. On continuous oral anticoagulation  Basic Metabolic Panel   4. Essential hypertension         Medical Decision Making:  Today's Assessment / Status / Plan:     Doing well.  Musculoskeletal right posterior hip pain.  Suggest CBD cream.  She has follow-up with other providers.  Suggest mobility exercises from physical therapy may be helpful as it is on the side of her hemiplegia.  No issues with her anticoagulation.  No falls.   No issues with her heart rhythm.  Monitoring for her oral anticoagulation which is high risk medication ordered today.  Follow-up 6 months

## 2022-08-10 ENCOUNTER — ANTICOAGULATION VISIT (OUTPATIENT)
Dept: VASCULAR LAB | Facility: MEDICAL CENTER | Age: 87
End: 2022-08-10
Attending: INTERNAL MEDICINE
Payer: MEDICARE

## 2022-08-10 VITALS — DIASTOLIC BLOOD PRESSURE: 54 MMHG | SYSTOLIC BLOOD PRESSURE: 111 MMHG | HEART RATE: 71 BPM

## 2022-08-10 DIAGNOSIS — D68.69 SECONDARY HYPERCOAGULABLE STATE (HCC): ICD-10-CM

## 2022-08-10 LAB — INR PPP: 2.1 (ref 2–3.5)

## 2022-08-10 PROCEDURE — 85610 PROTHROMBIN TIME: CPT

## 2022-08-10 PROCEDURE — 99211 OFF/OP EST MAY X REQ PHY/QHP: CPT

## 2022-08-10 NOTE — PROGRESS NOTES
Anticoagulation Summary  As of 8/10/2022      INR goal:  2.0-3.0   TTR:  66.8 % (3.7 y)   INR used for dosin.10 (8/10/2022)   Warfarin maintenance plan:  1 mg (2 mg x 0.5) every Mon, Wed, Fri; 2 mg (2 mg x 1) all other days   Weekly warfarin total:  11 mg   No change documented:  Whit Angeles, JuanD   Plan last modified:  Mark Desai PharmD (2022)   Next INR check:  2022   Priority:  Maintenance   Target end date:  Indefinite    Indications    Stroke (HCC) (Resolved) [I63.9]  Atrial flutter (HCC) [I48.92]  Secondary hypercoagulable state (HCC) [D68.69]                 Anticoagulation Episode Summary       INR check location:      Preferred lab:      Send INR reminders to:      Comments:            Anticoagulation Care Providers       Provider Role Specialty Phone number    Hussain Nation M.D. Referring Interventional Cardiology [phone]    [facility] Responsible  [phone]                  Refer to Patient Findings for HPI:  Patient Findings       Positives:  Bruising    Negatives:  Signs/symptoms of thrombosis, Signs/symptoms of bleeding, Laboratory test error suspected, Change in health, Change in alcohol use, Change in activity, Upcoming invasive procedure, Emergency department visit, Upcoming dental procedure, Missed doses, Extra doses, Change in medications, Change in diet/appetite, Hospital admission, Other complaints    Comments:  Patient requests that [facility] is called anytime a change is made to her medication regimen. Ana nurse [phone] ext [phone].             Vitals:    08/10/22 1131   BP: 111/54   Pulse: 71         Verified current warfarin dosing schedule.    Medications reconciled   Pt is not on antiplatelet therapy      A/P   INR  2.1 -therapeutic.     Warfarin dosing recommendation:     Pt educated to contact our clinic with any changes in medications or s/s of bleeding or thrombosis. Pt is aware to seek immediate medical attention for falls, head  injury or deep cuts.    Follow up appointment in 4 week(s).  Juan Reddy, Pharmacy Student  Whit Angeles, PharmD

## 2022-08-11 LAB — INR BLD: 2.1 (ref 0.9–1.2)

## 2022-09-07 ENCOUNTER — ANTICOAGULATION VISIT (OUTPATIENT)
Dept: VASCULAR LAB | Facility: MEDICAL CENTER | Age: 87
End: 2022-09-07
Attending: INTERNAL MEDICINE
Payer: MEDICARE

## 2022-09-07 DIAGNOSIS — D68.69 SECONDARY HYPERCOAGULABLE STATE (HCC): ICD-10-CM

## 2022-09-07 DIAGNOSIS — I48.92 ATRIAL FLUTTER, UNSPECIFIED TYPE (HCC): ICD-10-CM

## 2022-09-07 LAB — INR PPP: 1.4 (ref 2–3.5)

## 2022-09-07 PROCEDURE — 85610 PROTHROMBIN TIME: CPT

## 2022-09-07 PROCEDURE — 99212 OFFICE O/P EST SF 10 MIN: CPT | Performed by: NURSE PRACTITIONER

## 2022-09-07 NOTE — PROGRESS NOTES
Anticoagulation Summary  As of 2022      INR goal:  2.0-3.0   TTR:  65.8 % (3.8 y)   INR used for dosin.40 (2022)   Warfarin maintenance plan:  1 mg (2 mg x 0.5) every Mon, Wed, Fri; 2 mg (2 mg x 1) all other days   Weekly warfarin total:  11 mg   Plan last modified:  Mark Desai PharmD (2022)   Next INR check:  2022   Priority:  Maintenance   Target end date:  Indefinite    Indications    Stroke (HCC) (Resolved) [I63.9]  Atrial flutter (HCC) [I48.92]  Secondary hypercoagulable state (HCC) [D68.69]                 Anticoagulation Episode Summary       INR check location:      Preferred lab:      Send INR reminders to:      Comments:            Anticoagulation Care Providers       Provider Role Specialty Phone number    Hussain Nation M.D. Referring Interventional Cardiology [phone]    [facility] Responsible  [phone]                  Refer to Patient Findings for HPI:  Patient Findings       Positives:  Change in medications    Negatives:  Signs/symptoms of thrombosis, Signs/symptoms of bleeding, Laboratory test error suspected, Change in health, Change in alcohol use, Change in activity, Upcoming invasive procedure, Emergency department visit, Upcoming dental procedure, Missed doses, Extra doses, Change in diet/appetite, Hospital admission, Bruising, Other complaints    Comments:  Now taking Neurontin.            There were no vitals filed for this visit.   pt declined vitals    Verified current warfarin dosing schedule.    Medications reconciled   Pt is not on antiplatelet therapy      A/P   INR  sub-therapeutic. Unsure why INR low. CVA in 2012. Will increase her dose tonight then have her resume her previous dose.    Warfarin dosing recommendation: Tonight take 3 mg then resume previous regimen.    Left vm for pt's nurse, [facility] ([phone]), regarding the dosing change.    Pt educated to contact our clinic with  any changes in medications or s/s of bleeding or thrombosis. Pt is aware to seek immediate medical attention for falls, head injury or deep cuts.    Follow up appointment in 2 week(s) per patient. Asked her to return no later than 1 week given hx of stroke but she declines and is aware of the risks.    JANNY Zepeda.

## 2022-09-08 LAB — INR BLD: 1.4 (ref 0.9–1.2)

## 2022-09-21 ENCOUNTER — ANTICOAGULATION VISIT (OUTPATIENT)
Dept: VASCULAR LAB | Facility: MEDICAL CENTER | Age: 87
End: 2022-09-21
Attending: INTERNAL MEDICINE
Payer: MEDICARE

## 2022-09-21 DIAGNOSIS — D68.69 SECONDARY HYPERCOAGULABLE STATE (HCC): ICD-10-CM

## 2022-09-21 DIAGNOSIS — I48.0 PAROXYSMAL ATRIAL FIBRILLATION (HCC): ICD-10-CM

## 2022-09-21 LAB
INR BLD: 1.4 (ref 0.9–1.2)
INR PPP: 1.4 (ref 2–3.5)

## 2022-09-21 PROCEDURE — 85610 PROTHROMBIN TIME: CPT

## 2022-09-21 PROCEDURE — 99212 OFFICE O/P EST SF 10 MIN: CPT

## 2022-09-21 RX ORDER — WARFARIN SODIUM 2 MG/1
1-2 TABLET ORAL EVERY EVENING
Qty: 30 TABLET | Refills: 0 | Status: SHIPPED | OUTPATIENT
Start: 2022-09-21 | End: 2022-10-24

## 2022-09-21 NOTE — PROGRESS NOTES
Anticoagulation Summary  As of 2022      INR goal:  2.0-3.0   TTR:  65.1 % (3.8 y)   INR used for dosin.40 (2022)   Warfarin maintenance plan:  1 mg (2 mg x 0.5) every Mon; 2 mg (2 mg x 1) all other days   Weekly warfarin total:  13 mg   Plan last modified:  Mark Desai PharmD (2022)   Next INR check:  2022   Priority:  Maintenance   Target end date:  Indefinite    Indications    Stroke (HCC) (Resolved) [I63.9]  Atrial flutter (HCC) [I48.92]  Secondary hypercoagulable state (HCC) [D68.69]                 Anticoagulation Episode Summary       INR check location:      Preferred lab:      Send INR reminders to:      Comments:            Anticoagulation Care Providers       Provider Role Specialty Phone number    Hussain Nation M.D. Referring Interventional Cardiology [phone]    [facility] Responsible  [phone]                  Refer to Patient Findings for HPI:  Patient Findings       Positives:  Missed doses (Pt unsure.)    Negatives:  Signs/symptoms of thrombosis, Signs/symptoms of bleeding, Laboratory test error suspected, Change in health, Change in alcohol use, Change in activity, Upcoming invasive procedure, Emergency department visit, Upcoming dental procedure, Extra doses, Change in medications, Change in diet/appetite, Hospital admission, Bruising, Other complaints            Verified current warfarin dosing schedule.    Medications reconciled   Pt is not on antiplatelet therapy      A/P   INR  SUB-therapeutic.     Warfarin dosing recommendation: Instructed pt to BOLUS x 1 dose today w/ 3 mg and then begin newly increased regimen of 1 mg Mon and 2 mg ROW.    Called and relayed dosing to pt's nurse, Ana.    Pt educated to contact our clinic with any changes in medications or s/s of bleeding or thrombosis. Pt is aware to seek immediate medical attention for falls, head injury or deep cuts.    Follow up appointment in 1 week(s).    Mark Desai  PharmD, BCACP

## 2022-09-28 ENCOUNTER — ANTICOAGULATION VISIT (OUTPATIENT)
Dept: VASCULAR LAB | Facility: MEDICAL CENTER | Age: 87
End: 2022-09-28
Attending: INTERNAL MEDICINE
Payer: MEDICARE

## 2022-09-28 VITALS — HEART RATE: 76 BPM | SYSTOLIC BLOOD PRESSURE: 133 MMHG | DIASTOLIC BLOOD PRESSURE: 78 MMHG

## 2022-09-28 DIAGNOSIS — D68.69 SECONDARY HYPERCOAGULABLE STATE (HCC): ICD-10-CM

## 2022-09-28 DIAGNOSIS — I48.92 ATRIAL FLUTTER, UNSPECIFIED TYPE (HCC): ICD-10-CM

## 2022-09-28 LAB
INR BLD: 1.6 (ref 0.9–1.2)
INR PPP: 1.6 (ref 2–3.5)

## 2022-09-28 PROCEDURE — 99212 OFFICE O/P EST SF 10 MIN: CPT | Performed by: PHARMACIST

## 2022-09-28 PROCEDURE — 85610 PROTHROMBIN TIME: CPT

## 2022-09-28 NOTE — PROGRESS NOTES
Anticoagulation Summary  As of 2022      INR goal:  2.0-3.0   TTR:  64.8 % (3.9 y)   INR used for dosin.60 (2022)   Warfarin maintenance plan:  3 mg (2 mg x 1.5) every Wed, Sat; 2 mg (2 mg x 1) all other days   Weekly warfarin total:  16 mg   Plan last modified:  Shaun Ruiz, PharmD (2022)   Next INR check:  10/6/2022   Priority:  Maintenance   Target end date:  Indefinite    Indications    Stroke (HCC) (Resolved) [I63.9]  Atrial flutter (HCC) [I48.92]  Secondary hypercoagulable state (HCC) [D68.69]                 Anticoagulation Episode Summary       INR check location:      Preferred lab:      Send INR reminders to:      Comments:            Anticoagulation Care Providers       Provider Role Specialty Phone number    Hussain Nation M.D. Referring Interventional Cardiology [phone]    [facility] Responsible  [phone]                  Refer to Patient Findings for HPI:  Patient Findings       Negatives:  Signs/symptoms of thrombosis, Signs/symptoms of bleeding, Laboratory test error suspected, Change in health, Change in alcohol use, Change in activity, Upcoming invasive procedure, Emergency department visit, Upcoming dental procedure, Missed doses, Extra doses, Change in medications, Change in diet/appetite, Hospital admission, Bruising, Other complaints            Vitals:    22 1155   BP: 133/78   Pulse: 76           Verified current warfarin dosing schedule.    Medications reconciled   Pt is not on antiplatelet therapy      A/P   INR  SUB-therapeutic.     Warfarin dosing recommendation: Have patient increase weekly warfarin dose as above.     Pt educated to contact our clinic with any changes in medications or s/s of bleeding or thrombosis. Pt is aware to seek immediate medical attention for falls, head injury or deep cuts.    LM with Ana @ [facility] with dosing instructions.    Follow up appointment in 1 week.    Alex Thornton Pharmacy Intern     Shaun  KLAUDIA Ruiz, PharmD

## 2022-10-06 ENCOUNTER — ANTICOAGULATION VISIT (OUTPATIENT)
Dept: VASCULAR LAB | Facility: MEDICAL CENTER | Age: 87
End: 2022-10-06
Attending: INTERNAL MEDICINE
Payer: MEDICARE

## 2022-10-06 DIAGNOSIS — D68.69 SECONDARY HYPERCOAGULABLE STATE (HCC): ICD-10-CM

## 2022-10-06 DIAGNOSIS — I48.92 ATRIAL FLUTTER, UNSPECIFIED TYPE (HCC): ICD-10-CM

## 2022-10-06 LAB — INR PPP: 2.9 (ref 2–3.5)

## 2022-10-06 PROCEDURE — 99211 OFF/OP EST MAY X REQ PHY/QHP: CPT

## 2022-10-06 PROCEDURE — 85610 PROTHROMBIN TIME: CPT

## 2022-10-06 NOTE — PROGRESS NOTES
Anticoagulation Summary  As of 10/6/2022      INR goal:  2.0-3.0   TTR:  64.8 % (3.9 y)   INR used for dosin.90 (10/6/2022)   Warfarin maintenance plan:  3 mg (2 mg x 1.5) every Wed, Sat; 2 mg (2 mg x 1) all other days   Weekly warfarin total:  16 mg   Plan last modified:  Shaun Ruiz PharmD (2022)   Next INR check:  10/12/2022   Priority:  Maintenance   Target end date:  Indefinite    Indications    Stroke (HCC) (Resolved) [I63.9]  Atrial flutter (HCC) [I48.92]  Secondary hypercoagulable state (HCC) [D68.69]                 Anticoagulation Episode Summary       INR check location:      Preferred lab:      Send INR reminders to:      Comments:            Anticoagulation Care Providers       Provider Role Specialty Phone number    Hussain Nation M.D. Referring Interventional Cardiology [phone]    [facility] Responsible  [phone]                  Refer to Patient Findings for HPI:       pt declined vitals    Verified current warfarin dosing schedule.      Pt is not on antiplatelet therapy      A/P   INR  therapeutic.     Warfarin dosing recommendation: Pt is to continue with current warfarin dosing regimen.     Pt educated to contact our clinic with any changes in medications or s/s of bleeding or thrombosis. Pt is aware to seek immediate medical attention for falls, head injury or deep cuts.    Follow up appointment in 1 week(s).    Graham Jackson, JuanD

## 2022-10-07 LAB — INR BLD: 2.9 (ref 0.9–1.2)

## 2022-10-12 ENCOUNTER — ANTICOAGULATION VISIT (OUTPATIENT)
Dept: VASCULAR LAB | Facility: MEDICAL CENTER | Age: 87
End: 2022-10-12
Attending: INTERNAL MEDICINE
Payer: MEDICARE

## 2022-10-12 DIAGNOSIS — D68.69 SECONDARY HYPERCOAGULABLE STATE (HCC): ICD-10-CM

## 2022-10-12 DIAGNOSIS — I48.92 ATRIAL FLUTTER, UNSPECIFIED TYPE (HCC): ICD-10-CM

## 2022-10-12 LAB
INR BLD: 3.1 (ref 0.9–1.2)
INR PPP: 3.1 (ref 2–3.5)

## 2022-10-12 PROCEDURE — 85610 PROTHROMBIN TIME: CPT

## 2022-10-12 PROCEDURE — 99212 OFFICE O/P EST SF 10 MIN: CPT | Performed by: NURSE PRACTITIONER

## 2022-10-12 NOTE — PROGRESS NOTES
Anticoagulation Summary  As of 10/12/2022      INR goal:  2.0-3.0   TTR:  64.7 % (3.9 y)   INR used for dosing:  3.10 (10/12/2022)   Warfarin maintenance plan:  3 mg (2 mg x 1.5) every Wed, Sat; 2 mg (2 mg x 1) all other days   Weekly warfarin total:  16 mg   Plan last modified:  Shaun Ruiz, PharmD (9/28/2022)   Next INR check:  10/26/2022   Priority:  Maintenance   Target end date:  Indefinite    Indications    Stroke (HCC) (Resolved) [I63.9]  Atrial flutter (HCC) [I48.92]  Secondary hypercoagulable state (HCC) [D68.69]                 Anticoagulation Episode Summary       INR check location:      Preferred lab:      Send INR reminders to:      Comments:            Anticoagulation Care Providers       Provider Role Specialty Phone number    Hussain Nation M.D. Referring Interventional Cardiology [phone]    [facility] Responsible  [phone]                  Refer to Patient Findings for HPI:      There were no vitals filed for this visit.   pt declined vitals    Verified current warfarin dosing schedule.    Medications reconciled   Pt is not on antiplatelet therapy      A/P   INR  supra-therapeutic.     Warfarin dosing recommendation: Tonight take 2 mg then resume your previous regimen.    Spoke with Ana @ [facility] to give verbal dosing instructions.    Pt educated to contact our clinic with any changes in medications or s/s of bleeding or thrombosis. Pt is aware to seek immediate medical attention for falls, head injury or deep cuts.    Follow up appointment in 2 week(s) per pt's request.    JANNY Zepeda.

## 2022-10-24 DIAGNOSIS — D68.69 SECONDARY HYPERCOAGULABLE STATE (HCC): ICD-10-CM

## 2022-10-24 DIAGNOSIS — I48.0 PAROXYSMAL ATRIAL FIBRILLATION (HCC): ICD-10-CM

## 2022-10-24 RX ORDER — WARFARIN SODIUM 2 MG/1
TABLET ORAL
Qty: 30 TABLET | Refills: 0 | Status: SHIPPED | OUTPATIENT
Start: 2022-10-24 | End: 2022-11-09

## 2022-10-26 ENCOUNTER — ANTICOAGULATION VISIT (OUTPATIENT)
Dept: VASCULAR LAB | Facility: MEDICAL CENTER | Age: 87
End: 2022-10-26
Attending: INTERNAL MEDICINE
Payer: MEDICARE

## 2022-10-26 DIAGNOSIS — I48.92 ATRIAL FLUTTER, UNSPECIFIED TYPE (HCC): ICD-10-CM

## 2022-10-26 DIAGNOSIS — D68.69 SECONDARY HYPERCOAGULABLE STATE (HCC): ICD-10-CM

## 2022-10-26 LAB
INR BLD: 3.1 (ref 0.9–1.2)
INR PPP: 3.1 (ref 2–3.5)

## 2022-10-26 PROCEDURE — 85610 PROTHROMBIN TIME: CPT

## 2022-10-26 PROCEDURE — 99212 OFFICE O/P EST SF 10 MIN: CPT

## 2022-10-26 NOTE — PROGRESS NOTES
Anticoagulation Summary  As of 10/26/2022      INR goal:  2.0-3.0   TTR:  64.1 % (3.9 y)   INR used for dosing:  3.10 (10/26/2022)   Warfarin maintenance plan:  3 mg (2 mg x 1.5) every Sat; 2 mg (2 mg x 1) all other days   Weekly warfarin total:  15 mg   Plan last modified:  Mark Desai PharmD (10/26/2022)   Next INR check:  11/9/2022   Priority:  Maintenance   Target end date:  Indefinite    Indications    Stroke (HCC) (Resolved) [I63.9]  Atrial flutter (HCC) [I48.92]  Secondary hypercoagulable state (HCC) [D68.69]                 Anticoagulation Episode Summary       INR check location:      Preferred lab:      Send INR reminders to:      Comments:            Anticoagulation Care Providers       Provider Role Specialty Phone number    Hussain Nation M.D. Referring Interventional Cardiology [phone]    [facility] Responsible  [phone]                  Refer to Patient Findings for HPI:  Patient Findings       Negatives:  Signs/symptoms of thrombosis, Signs/symptoms of bleeding, Laboratory test error suspected, Change in health, Change in alcohol use, Change in activity, Upcoming invasive procedure, Emergency department visit, Upcoming dental procedure, Missed doses, Extra doses, Change in medications, Change in diet/appetite, Hospital admission, Bruising, Other complaints            There were no vitals filed for this visit.    Verified current warfarin dosing schedule.    Medications reconciled   Pt is not on antiplatelet therapy      A/P   INR  SUPRA-therapeutic.     Warfarin dosing recommendation: Instructed pt to begin newly decreased regimen of 3 mg Sat and 2 mg ROW.    Called and notified Ana corona/ SHARATH of the above.    Pt educated to contact our clinic with any changes in medications or s/s of bleeding or thrombosis. Pt is aware to seek immediate medical attention for falls, head injury or deep cuts.    Follow up appointment in 2 week(s).    Juan AngelesD,  BCACP

## 2022-11-06 PROBLEM — R25.2 MUSCLE CRAMPS: Status: ACTIVE | Noted: 2022-10-26

## 2022-11-09 ENCOUNTER — ANTICOAGULATION VISIT (OUTPATIENT)
Dept: VASCULAR LAB | Facility: MEDICAL CENTER | Age: 87
End: 2022-11-09
Attending: INTERNAL MEDICINE
Payer: MEDICARE

## 2022-11-09 DIAGNOSIS — D68.69 SECONDARY HYPERCOAGULABLE STATE (HCC): ICD-10-CM

## 2022-11-09 LAB
INR BLD: 3.2 (ref 0.9–1.2)
INR PPP: 3.2 (ref 2–3.5)

## 2022-11-09 PROCEDURE — 99212 OFFICE O/P EST SF 10 MIN: CPT

## 2022-11-09 PROCEDURE — 85610 PROTHROMBIN TIME: CPT

## 2022-11-09 NOTE — PROGRESS NOTES
Anticoagulation Summary  As of 11/9/2022      INR goal:  2.0-3.0   TTR:  63.5 % (4 y)   INR used for dosing:  3.20 (11/9/2022)   Warfarin maintenance plan:  3 mg (2 mg x 1.5) every Sat; 2 mg (2 mg x 1) all other days; Starting 11/9/2022   Weekly warfarin total:  15 mg   Plan last modified:  Mark Desai PharmD (10/26/2022)   Next INR check:  11/23/2022   Priority:  Maintenance   Target end date:  Indefinite    Indications    Stroke (HCC) (Resolved) [I63.9]  Atrial flutter (HCC) [I48.92]  Secondary hypercoagulable state (HCC) [D68.69]                 Anticoagulation Episode Summary       INR check location:      Preferred lab:      Send INR reminders to:      Comments:            Anticoagulation Care Providers       Provider Role Specialty Phone number    Hussain Nation M.D. Referring Interventional Cardiology [phone]    [facility] Responsible  [phone]                  Refer to Patient Findings for HPI:  Patient Findings       Negatives:  Signs/symptoms of thrombosis, Signs/symptoms of bleeding, Laboratory test error suspected, Change in health, Change in alcohol use, Change in activity, Upcoming invasive procedure, Emergency department visit, Upcoming dental procedure, Missed doses, Extra doses, Change in medications, Change in diet/appetite, Hospital admission, Bruising, Other complaints            There were no vitals filed for this visit.   pt declined vitals    Verified current warfarin dosing schedule.    Medications reconciled   Pt is not on antiplatelet therapy      A/P   INR  SUPRA-therapeutic. Patient states some miscommunication with her living facility, will clarify.     Warfarin dosing recommendation: Reduce today's dose to 1 mg and continue weekly regimen as above.     Pt educated to contact our clinic with any changes in medications or s/s of bleeding or thrombosis. Pt is aware to seek immediate medical attention for falls, head injury or deep cuts.    Follow up  appointment in 2 week(s).    Criselda Sheffield, PharmD

## 2022-11-23 ENCOUNTER — ANTICOAGULATION VISIT (OUTPATIENT)
Dept: VASCULAR LAB | Facility: MEDICAL CENTER | Age: 87
End: 2022-11-23
Attending: INTERNAL MEDICINE
Payer: MEDICARE

## 2022-11-23 VITALS — SYSTOLIC BLOOD PRESSURE: 150 MMHG | DIASTOLIC BLOOD PRESSURE: 84 MMHG | HEART RATE: 86 BPM

## 2022-11-23 DIAGNOSIS — D68.69 SECONDARY HYPERCOAGULABLE STATE (HCC): ICD-10-CM

## 2022-11-23 DIAGNOSIS — Z79.01 CHRONIC ANTICOAGULATION: ICD-10-CM

## 2022-11-23 LAB
INR BLD: 3.9 (ref 0.9–1.2)
INR PPP: 3.9 (ref 2–3.5)

## 2022-11-23 PROCEDURE — 85610 PROTHROMBIN TIME: CPT

## 2022-11-23 PROCEDURE — 99212 OFFICE O/P EST SF 10 MIN: CPT

## 2022-11-23 NOTE — PROGRESS NOTES
Anticoagulation Summary  As of 11/23/2022      INR goal:  2.0-3.0   TTR:  62.9 % (4 y)   INR used for dosing:  3.90 (11/23/2022)   Warfarin maintenance plan:  1 mg (2 mg x 0.5) every Sat; 2 mg (2 mg x 1) all other days; Starting 11/23/2022   Weekly warfarin total:  13 mg   Plan last modified:  Graham Jackson PharmD (11/23/2022)   Next INR check:  12/7/2022   Priority:  Maintenance   Target end date:  Indefinite    Indications    Stroke (HCC) (Resolved) [I63.9]  Atrial flutter (HCC) [I48.92]  Secondary hypercoagulable state (HCC) [D68.69]                 Anticoagulation Episode Summary       INR check location:      Preferred lab:      Send INR reminders to:      Comments:            Anticoagulation Care Providers       Provider Role Specialty Phone number    Hussain Nation M.D. Referring Interventional Cardiology [phone]    [facility] Responsible  [phone]                  Refer to Patient Findings for HPI:      Vitals:    11/23/22 1055 11/23/22 1056   BP: (!) 155/88 (!) 150/84   Pulse: 88 86       Verified current warfarin dosing schedule.    Pt is not on antiplatelet therapy      A/P   INR  SUPRA-therapeutic.     Warfarin dosing recommendation: Hold today then begin reduced regimen.     Pt educated to contact our clinic with any changes in medications or s/s of bleeding or thrombosis. Pt is aware to seek immediate medical attention for falls, head injury or deep cuts.    Follow up appointment in 2 weeks (pt declines to be seen sooner)    Graham Jackson, JuanD

## 2022-12-07 ENCOUNTER — ANTICOAGULATION VISIT (OUTPATIENT)
Dept: VASCULAR LAB | Facility: MEDICAL CENTER | Age: 87
End: 2022-12-07
Attending: INTERNAL MEDICINE
Payer: MEDICARE

## 2022-12-07 VITALS — HEART RATE: 80 BPM | SYSTOLIC BLOOD PRESSURE: 137 MMHG | DIASTOLIC BLOOD PRESSURE: 90 MMHG

## 2022-12-07 DIAGNOSIS — D68.69 SECONDARY HYPERCOAGULABLE STATE (HCC): ICD-10-CM

## 2022-12-07 DIAGNOSIS — I48.92 ATRIAL FLUTTER, UNSPECIFIED TYPE (HCC): ICD-10-CM

## 2022-12-07 LAB — INR PPP: 1.9 (ref 2–3.5)

## 2022-12-07 PROCEDURE — 85610 PROTHROMBIN TIME: CPT

## 2022-12-07 PROCEDURE — 99211 OFF/OP EST MAY X REQ PHY/QHP: CPT | Performed by: NURSE PRACTITIONER

## 2022-12-07 NOTE — Clinical Note
Maicol Alcala, Could you let me know how much Eliquis and Xarelto would be for this pt? Thank you so much! Callie

## 2022-12-07 NOTE — PROGRESS NOTES
Anticoagulation Summary  As of 2022      INR goal:  2.0-3.0   TTR:  62.7 % (4.1 y)   INR used for dosin.90 (2022)   Warfarin maintenance plan:  1 mg (2 mg x 0.5) every Sat; 2 mg (2 mg x 1) all other days; Starting 2022   Weekly warfarin total:  13 mg   Plan last modified:  Grahma Jackson, PharmD (2022)   Next INR check:  2022   Priority:  Maintenance   Target end date:  Indefinite    Indications    Stroke (HCC) (Resolved) [I63.9]  Atrial flutter (HCC) [I48.92]  Secondary hypercoagulable state (HCC) [D68.69]                 Anticoagulation Episode Summary       INR check location:      Preferred lab:      Send INR reminders to:      Comments:            Anticoagulation Care Providers       Provider Role Specialty Phone number    Hussain Nation M.D. Referring Interventional Cardiology [phone]    [facility] Responsible  [phone]                  Refer to Patient Findings for HPI:  Patient Findings       Positives:  Missed doses    Negatives:  Signs/symptoms of thrombosis, Signs/symptoms of bleeding, Laboratory test error suspected, Change in health, Change in alcohol use, Change in activity, Upcoming invasive procedure, Emergency department visit, Upcoming dental procedure, Extra doses, Change in medications, Change in diet/appetite, Hospital admission, Bruising, Other complaints    Comments:  She missed her dose of warfarin on  because her living facility ran out of warfarin. She resumed taking the following evening.            Vitals:    22 1052   BP: (!) 137/90   Pulse: 80       Verified current warfarin dosing schedule.    Medications reconciled   Pt is not on antiplatelet therapy      A/P   INR  slightly sub-therapeutic, likely from missed dose on . Will have pt continue her current dose.  Discussed DOAC option. She is hesitant. Will ask [employer] to check hurst and will let pt know.    Warfarin dosing recommendation: Continue 1 mg on  Saturday, 2 mg AODs.    Pt educated to contact our clinic with any changes in medications or s/s of bleeding or thrombosis. Pt is aware to seek immediate medical attention for falls, head injury or deep cuts.    Follow up appointment in 2 week(s).    JEFF Zepeda    Cc: Cari Alejandro    Addendum ([id number]) - received message from [employer] that Eliquis & Xarelto cost is $200.00 for 30 days. Will let pt know at next INR appt.

## 2022-12-08 LAB — INR BLD: 1.9 (ref 0.9–1.2)

## 2022-12-21 ENCOUNTER — ANTICOAGULATION VISIT (OUTPATIENT)
Dept: VASCULAR LAB | Facility: MEDICAL CENTER | Age: 87
End: 2022-12-21
Attending: INTERNAL MEDICINE
Payer: MEDICARE

## 2022-12-21 DIAGNOSIS — D68.69 SECONDARY HYPERCOAGULABLE STATE (HCC): ICD-10-CM

## 2022-12-21 DIAGNOSIS — I48.92 ATRIAL FLUTTER, UNSPECIFIED TYPE (HCC): ICD-10-CM

## 2022-12-21 LAB — INR PPP: 3 (ref 2–3.5)

## 2022-12-21 PROCEDURE — 85610 PROTHROMBIN TIME: CPT

## 2022-12-21 PROCEDURE — 99211 OFF/OP EST MAY X REQ PHY/QHP: CPT | Performed by: NURSE PRACTITIONER

## 2022-12-21 NOTE — PROGRESS NOTES
Anticoagulation Summary  As of 12/21/2022      INR goal:  2.0-3.0   TTR:  63.0 % (4.1 y)   INR used for dosing:  3.00 (12/21/2022)   Warfarin maintenance plan:  1 mg (2 mg x 0.5) every Sat; 2 mg (2 mg x 1) all other days   Weekly warfarin total:  13 mg   Plan last modified:  Graham Jackson, PharmD (11/23/2022)   Next INR check:  1/3/2023   Priority:  Maintenance   Target end date:  Indefinite    Indications    Stroke (HCC) (Resolved) [I63.9]  Atrial flutter (HCC) [I48.92]  Secondary hypercoagulable state (HCC) [D68.69]                 Anticoagulation Episode Summary       INR check location:      Preferred lab:      Send INR reminders to:      Comments:  12/7/22 - per [employer] Eliquis & Xarelto is $200.00 for 30 days.          Anticoagulation Care Providers       Provider Role Specialty Phone number    Hussain Nation M.D. Referring Interventional Cardiology [phone]    [facility] Responsible  [phone]                  Refer to Patient Findings for HPI:  Patient Findings       Positives:  Change in diet/appetite    Negatives:  Signs/symptoms of thrombosis, Signs/symptoms of bleeding, Laboratory test error suspected, Change in health, Change in alcohol use, Change in activity, Upcoming invasive procedure, Emergency department visit, Upcoming dental procedure, Missed doses, Extra doses, Change in medications, Hospital admission, Bruising, Other complaints    Comments:  She's been drinking Ensure but is inconsistent with the amount.            There were no vitals filed for this visit.   pt declined vitals    Verified current warfarin dosing schedule.    Medications reconciled   Pt is not on antiplatelet therapy      A/P   INR  -therapeutic. She will commit to drinking 3 cans of Ensure per week.    Warfarin dosing recommendation: Continue current regimen.    Pt educated to contact our clinic with any changes in medications or s/s of bleeding or thrombosis. Pt is aware to seek immediate  medical attention for falls, head injury or deep cuts.    Follow up appointment in 2 week(s).    JANNY Zepeda.

## 2022-12-22 LAB — INR BLD: 3 (ref 0.9–1.2)

## 2023-01-03 ENCOUNTER — ANTICOAGULATION VISIT (OUTPATIENT)
Dept: VASCULAR LAB | Facility: MEDICAL CENTER | Age: 88
End: 2023-01-03
Attending: INTERNAL MEDICINE
Payer: MEDICARE

## 2023-01-03 DIAGNOSIS — D68.69 SECONDARY HYPERCOAGULABLE STATE (HCC): ICD-10-CM

## 2023-01-03 DIAGNOSIS — I48.92 ATRIAL FLUTTER, UNSPECIFIED TYPE (HCC): ICD-10-CM

## 2023-01-03 LAB
INR BLD: 3.1 (ref 0.9–1.2)
INR PPP: 3.1 (ref 2–3.5)

## 2023-01-03 PROCEDURE — 85610 PROTHROMBIN TIME: CPT

## 2023-01-03 PROCEDURE — 99212 OFFICE O/P EST SF 10 MIN: CPT

## 2023-01-03 NOTE — PROGRESS NOTES
Anticoagulation Summary  As of 1/3/2023      INR goal:  2.0-3.0   TTR:  62.4 % (4.1 y)   INR used for dosing:  3.10 (1/3/2023)   Warfarin maintenance plan:  1 mg (2 mg x 0.5) every Tue, Sat; 2 mg (2 mg x 1) all other days   Weekly warfarin total:  12 mg   Plan last modified:  Jenna Pang (1/3/2023)   Next INR check:  1/24/2023   Priority:  Maintenance   Target end date:  Indefinite    Indications    Stroke (HCC) (Resolved) [I63.9]  Atrial flutter (HCC) [I48.92]  Secondary hypercoagulable state (HCC) [D68.69]                 Anticoagulation Episode Summary       INR check location:      Preferred lab:      Send INR reminders to:      Comments:  12/7/22 - per [employer] Eliquis & Xarelto is $200.00 for 30 days.          Anticoagulation Care Providers       Provider Role Specialty Phone number    Hussain Nation M.D. Referring Interventional Cardiology [phone]    [facility] Responsible  [phone]          Refer to Patient Findings for HPI:  Patient Findings       Negatives:  Signs/symptoms of thrombosis, Signs/symptoms of bleeding, Laboratory test error suspected, Change in health, Change in alcohol use, Change in activity, Upcoming invasive procedure, Emergency department visit, Upcoming dental procedure, Missed doses, Extra doses, Change in medications, Change in diet/appetite, Hospital admission, Bruising, Other complaints          There were no vitals filed for this visit.  The pt declined vitals today     Patient seen in clinic today for follow up on anticoagulation therapy with warfarin (a high risk medication) for hx of AF, stroke  Verified current warfarin dosing schedule.  Patient denies any missed doses of warfarin.  Patient drinks 3 boosts a weeek.    Medications reconciled   Pt is not on antiplatelet therapy      A/P   INR is slightly SUPRA-therapeutic today at 3.1.     Warfarin dosing recommendation: Patient will begin slightly lower weekly regimen of 1mg on Tues and  Sat and 2mg ROW.   Patient will follow up again in 3 weeks (per pt preference).     Pt educated to contact our clinic with any changes in medications or s/s of bleeding or thrombosis. Pt is aware to seek immediate medical attention for falls, head injury or deep cuts.    Follow up appointment in 3 week(s).    Next appt: Tues, Jan 24 @ 11am     Pilar Pang PharmD

## 2023-01-13 ENCOUNTER — HOSPITAL ENCOUNTER (EMERGENCY)
Facility: MEDICAL CENTER | Age: 88
End: 2023-01-13
Attending: EMERGENCY MEDICINE
Payer: MEDICARE

## 2023-01-13 ENCOUNTER — APPOINTMENT (OUTPATIENT)
Dept: RADIOLOGY | Facility: MEDICAL CENTER | Age: 88
End: 2023-01-13
Attending: EMERGENCY MEDICINE
Payer: MEDICARE

## 2023-01-13 VITALS
WEIGHT: 101 LBS | SYSTOLIC BLOOD PRESSURE: 149 MMHG | TEMPERATURE: 98.1 F | OXYGEN SATURATION: 97 % | DIASTOLIC BLOOD PRESSURE: 97 MMHG | HEART RATE: 68 BPM | BODY MASS INDEX: 15.82 KG/M2 | RESPIRATION RATE: 18 BRPM

## 2023-01-13 DIAGNOSIS — N39.0 ACUTE UTI: ICD-10-CM

## 2023-01-13 LAB
ALBUMIN SERPL BCP-MCNC: 3.8 G/DL (ref 3.2–4.9)
ALBUMIN/GLOB SERPL: 1.2 G/DL
ALP SERPL-CCNC: 70 U/L (ref 30–99)
ALT SERPL-CCNC: 8 U/L (ref 2–50)
ANION GAP SERPL CALC-SCNC: 10 MMOL/L (ref 7–16)
APPEARANCE UR: ABNORMAL
AST SERPL-CCNC: 23 U/L (ref 12–45)
BACTERIA #/AREA URNS HPF: ABNORMAL /HPF
BASOPHILS # BLD AUTO: 0.5 % (ref 0–1.8)
BASOPHILS # BLD: 0.03 K/UL (ref 0–0.12)
BILIRUB SERPL-MCNC: 0.4 MG/DL (ref 0.1–1.5)
BILIRUB UR QL STRIP.AUTO: NEGATIVE
BUN SERPL-MCNC: 26 MG/DL (ref 8–22)
CALCIUM ALBUM COR SERPL-MCNC: 9.2 MG/DL (ref 8.5–10.5)
CALCIUM SERPL-MCNC: 9 MG/DL (ref 8.4–10.2)
CHLORIDE SERPL-SCNC: 108 MMOL/L (ref 96–112)
CO2 SERPL-SCNC: 21 MMOL/L (ref 20–33)
COLOR UR: YELLOW
CREAT SERPL-MCNC: 1.37 MG/DL (ref 0.5–1.4)
EOSINOPHIL # BLD AUTO: 0.12 K/UL (ref 0–0.51)
EOSINOPHIL NFR BLD: 2.1 % (ref 0–6.9)
EPI CELLS #/AREA URNS HPF: ABNORMAL /HPF
ERYTHROCYTE [DISTWIDTH] IN BLOOD BY AUTOMATED COUNT: 55.5 FL (ref 35.9–50)
GFR SERPLBLD CREATININE-BSD FMLA CKD-EPI: 37 ML/MIN/1.73 M 2
GLOBULIN SER CALC-MCNC: 3.3 G/DL (ref 1.9–3.5)
GLUCOSE SERPL-MCNC: 95 MG/DL (ref 65–99)
GLUCOSE UR STRIP.AUTO-MCNC: NEGATIVE MG/DL
HCT VFR BLD AUTO: 46 % (ref 37–47)
HGB BLD-MCNC: 14.3 G/DL (ref 12–16)
IMM GRANULOCYTES # BLD AUTO: 0.02 K/UL (ref 0–0.11)
IMM GRANULOCYTES NFR BLD AUTO: 0.4 % (ref 0–0.9)
KETONES UR STRIP.AUTO-MCNC: NEGATIVE MG/DL
LEUKOCYTE ESTERASE UR QL STRIP.AUTO: ABNORMAL
LIPASE SERPL-CCNC: 27 U/L (ref 7–58)
LYMPHOCYTES # BLD AUTO: 1.79 K/UL (ref 1–4.8)
LYMPHOCYTES NFR BLD: 31.5 % (ref 22–41)
MCH RBC QN AUTO: 32.1 PG (ref 27–33)
MCHC RBC AUTO-ENTMCNC: 31.1 G/DL (ref 33.6–35)
MCV RBC AUTO: 103.4 FL (ref 81.4–97.8)
MICRO URNS: ABNORMAL
MONOCYTES # BLD AUTO: 0.5 K/UL (ref 0–0.85)
MONOCYTES NFR BLD AUTO: 8.8 % (ref 0–13.4)
NEUTROPHILS # BLD AUTO: 3.22 K/UL (ref 2–7.15)
NEUTROPHILS NFR BLD: 56.7 % (ref 44–72)
NITRITE UR QL STRIP.AUTO: NEGATIVE
NRBC # BLD AUTO: 0 K/UL
NRBC BLD-RTO: 0 /100 WBC
PH UR STRIP.AUTO: 5.5 [PH] (ref 5–8)
PLATELET # BLD AUTO: 114 K/UL (ref 164–446)
PMV BLD AUTO: 10.2 FL (ref 9–12.9)
POTASSIUM SERPL-SCNC: 4.8 MMOL/L (ref 3.6–5.5)
PROT SERPL-MCNC: 7.1 G/DL (ref 6–8.2)
PROT UR QL STRIP: 100 MG/DL
RBC # BLD AUTO: 4.45 M/UL (ref 4.2–5.4)
RBC # URNS HPF: ABNORMAL /HPF
RBC UR QL AUTO: ABNORMAL
SODIUM SERPL-SCNC: 139 MMOL/L (ref 135–145)
SP GR UR STRIP.AUTO: 1.02
WBC # BLD AUTO: 5.7 K/UL (ref 4.8–10.8)
WBC #/AREA URNS HPF: ABNORMAL /HPF

## 2023-01-13 PROCEDURE — 700117 HCHG RX CONTRAST REV CODE 255: Performed by: EMERGENCY MEDICINE

## 2023-01-13 PROCEDURE — 87086 URINE CULTURE/COLONY COUNT: CPT

## 2023-01-13 PROCEDURE — 81001 URINALYSIS AUTO W/SCOPE: CPT

## 2023-01-13 PROCEDURE — 83690 ASSAY OF LIPASE: CPT

## 2023-01-13 PROCEDURE — 85025 COMPLETE CBC W/AUTO DIFF WBC: CPT

## 2023-01-13 PROCEDURE — 96374 THER/PROPH/DIAG INJ IV PUSH: CPT

## 2023-01-13 PROCEDURE — 80053 COMPREHEN METABOLIC PANEL: CPT

## 2023-01-13 PROCEDURE — 36415 COLL VENOUS BLD VENIPUNCTURE: CPT

## 2023-01-13 PROCEDURE — 99285 EMERGENCY DEPT VISIT HI MDM: CPT

## 2023-01-13 PROCEDURE — 87077 CULTURE AEROBIC IDENTIFY: CPT

## 2023-01-13 PROCEDURE — 74177 CT ABD & PELVIS W/CONTRAST: CPT

## 2023-01-13 PROCEDURE — 700111 HCHG RX REV CODE 636 W/ 250 OVERRIDE (IP): Performed by: EMERGENCY MEDICINE

## 2023-01-13 PROCEDURE — 87186 SC STD MICRODIL/AGAR DIL: CPT

## 2023-01-13 RX ORDER — NICOTINE POLACRILEX 2 MG
1 GUM BUCCAL EVERY MORNING
Status: ON HOLD | COMMUNITY
End: 2023-02-05

## 2023-01-13 RX ORDER — POLYETHYLENE GLYCOL 3350 17 G/17G
17 POWDER, FOR SOLUTION ORAL DAILY
Status: SHIPPED | COMMUNITY
End: 2022-12-01

## 2023-01-13 RX ORDER — KETOTIFEN FUMARATE 0.25 MG/ML
2 SOLUTION/ DROPS OPHTHALMIC 4 TIMES DAILY PRN
Status: ON HOLD | COMMUNITY
End: 2023-02-05

## 2023-01-13 RX ORDER — MAGNESIUM OXIDE 400 MG/1
400 TABLET ORAL
Status: ON HOLD | COMMUNITY
End: 2023-02-10

## 2023-01-13 RX ORDER — WARFARIN SODIUM 2 MG/1
1-2 TABLET ORAL EVERY EVENING
Status: SHIPPED | COMMUNITY
End: 2023-03-06 | Stop reason: SDUPTHER

## 2023-01-13 RX ORDER — CEFDINIR 300 MG/1
300 CAPSULE ORAL 2 TIMES DAILY
Qty: 20 CAPSULE | Refills: 0 | Status: SHIPPED | OUTPATIENT
Start: 2023-01-13 | End: 2023-01-20

## 2023-01-13 RX ORDER — CEFTRIAXONE 1 G/1
1000 INJECTION, POWDER, FOR SOLUTION INTRAMUSCULAR; INTRAVENOUS ONCE
Status: COMPLETED | OUTPATIENT
Start: 2023-01-13 | End: 2023-01-13

## 2023-01-13 RX ORDER — ACETAMINOPHEN 500 MG
1000 TABLET ORAL 3 TIMES DAILY
Status: SHIPPED | COMMUNITY
End: 2023-04-05 | Stop reason: SDUPTHER

## 2023-01-13 RX ADMIN — CEFTRIAXONE SODIUM 1000 MG: 1 INJECTION, POWDER, FOR SOLUTION INTRAMUSCULAR; INTRAVENOUS at 18:27

## 2023-01-13 RX ADMIN — IOHEXOL 100 ML: 350 INJECTION, SOLUTION INTRAVENOUS at 16:10

## 2023-01-13 ASSESSMENT — FIBROSIS 4 INDEX: FIB4 SCORE: 6.28

## 2023-01-13 NOTE — ED TRIAGE NOTES
Brought [facility] from [facility] for about complaints    Chief Complaint   Patient presents with    Bloody Stools     Possible. Per staff at facility pt has small uncertain blood in underwear.      BP (!) 157/85   Pulse 80   SpO2 96%

## 2023-01-13 NOTE — ED NOTES
Pharmacy Medication Reconciliation    ~Med rec updated and complete per MAR- [facility]  ~Allergies have been verified and updated per MAR  ~No oral ABX within the last 30 days

## 2023-01-13 NOTE — LETTER
1/16/2023               Az Tripp  [address]  [address]  [facility] [address]        Dear Az (MR#[id number])    As we have been unable to contact you by phone, this letter is sent in regards to your recent visit to the [facility] Emergency Department on 1/13/2023. During the visit, tests were performed to assist the physician in a medical diagnosis. A review of those tests requires that we notify you of the following:    Your urine culture and sensitivity was POSITIVE for a bacteria called Enterococcus faecalis, and further treatment may be necessary. The currently prescribed antibiotic (cefdinir) may not be effective in treating your infection. IF YOU ARE NOT FEELING BETTER PLEASE CONTACT ME AS SOON AS POSSIBLE AT THE NUMBER BELOW.       Thank you for your cooperation in the matter.    Sincerely,  ED Culture Follow-Up Staff  Ruba Ambrocio V, PharmD    Pending sale to [facility], Emergency Department  [address] [place] [id number] [phone] (ED Culture Line)

## 2023-01-14 NOTE — ED PROVIDER NOTES
"ED Provider Note    CHIEF COMPLAINT  Chief Complaint   Patient presents with    Bloody Stools     Possible. Per staff at facility pt has small uncertain blood in underwear.       EXTERNAL RECORDS REVIEWED  Inpatient Notes none    HPI/ROS  LIMITATION TO HISTORY   Select: : None  OUTSIDE HISTORIAN(S):  none    Az Tripp is a 88 y.o. female who presents here for some possible hematuria.   Patient was brought in by family for evaluation of noted scant blood in underwear and pad.  One episode earlier today, none since. Patient states that she has had some intermittent dysuria, over the last few days.  She has not been on any recent antibiotics.  She states that she has a fullness feeling to the lower abdomen, but no fever chills or vomiting.  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), Bronchiectasis without complication ([facility]) (9/19/2017), Chronic fatigue (9/19/2017), Closed right hip fracture ([facility]) (6/19/2019), DVT (6/1996), First degree atrioventricular block (9/21/2012), Glomerulonephritis, chronic in other disease (8/3/2011), H/O echocardiogram (9/21/2012), Hematuria (3/20/2019), Hypothyroid (10/2002), Kidney disorder (1998), Kidney failure, Leukocytosis (7/21/2020), Mitral valve prolapse, Palpitations (9/21/2012), Rheumatic fever (9/21/2012), Rheumatic fever (9/21/2012), Sepsis ([facility]) (12/20/2018), Severe protein-calorie malnutrition (Mahmood: less than 60% of standard weight) ([facility]) (12/20/2018), Stroke (HCC) (9/21/2012), Supratherapeutic INR (12/20/2018), SVT (SUPRAVENTRICULAR TACHYCARDIA), h/o " paroxysmal (8/3/2011), Wegener's granulomatosis, and Wegener's granulomatosis (8/3/2011).    SURGICAL HISTORY   has a past surgical history that includes bunionectomy (); arthroscopy, knee (); arthroscope (); temporal artery biopsy (); lung needle biopsy (); cataract extraction (); cholecystectomy (); hysterectomy radical (); vein ligation; and partial hip replacement (Right, 2019).    FAMILY HISTORY  Family History   Problem Relation Age of Onset    Other Father         Aortic aneurysm    Cancer Brother         Lung    Non-contributory Other        SOCIAL HISTORY  Social History     Tobacco Use    Smoking status: Former     Packs/day: 1.00     Types: Cigarettes     Quit date: 1960     Years since quittin.0    Smokeless tobacco: Never    Tobacco comments:     very little years and years ago   Vaping Use    Vaping Use: Never used   Substance and Sexual Activity    Alcohol use: Yes     Alcohol/week: 0.0 oz     Comment: occasional    Drug use: No    Sexual activity: Not on file       CURRENT MEDICATIONS  Home Medications       Reviewed by Hitesh Reynolds (Pharmacy Tech) on 23 at 1534  Med List Status: Complete     Medication Last Dose Status   acetaminophen (TYLENOL) 500 MG Tab 2023 Active   Biotin 1 MG Cap 2023 Active   Calcium Carbonate (CALCIUM 500 PO) SELF ADMINISTER Active   D-Mannose 500 MG Cap 2023 Active   folic acid (FOLVITE) 1 MG Tab 2023 Active   gabapentin (NEURONTIN) 100 MG Cap 2023 Active   ketotifen (ZADITOR) 0.025 % ophthalmic solution PRN Active   levothyroxine (SYNTHROID) 88 MCG Tab 2023 Active   Lidocaine 4 % Patch PRN Active   magnesium oxide (MAG-OX) 400 MG Tab tablet 2023 Active   methocarbamol (ROBAXIN) 500 MG Tab 2023 Active   metoprolol tartrate (LOPRESSOR) 50 MG Tab 2023 Active   ondansetron (ZOFRAN ODT) 4 MG TABLET DISPERSIBLE PRN Active   polyethylene glycol/lytes (MIRALAX) 17 g Pack SELF  ADMINISTER Active   sennosides-docusate sodium (SENOKOT-S) 8.6-50 MG tablet 1/8/2023 Active   tamsulosin (FLOMAX) 0.4 MG capsule 1/13/2023 Active   warfarin (COUMADIN) 2 MG Tab 1/12/2023 Active                    ALLERGIES  Allergies   Allergen Reactions    Cephalexin [Keflex] Swelling    Hydroxyzine Swelling     Eyes get itchy and swollen     Naprelan [Naproxen] Swelling     Eyes get itchy become red and swollen    Oxaprozin Swelling     Swelling eyes, and itchy    Ampicillin Rash     Red Rash    Baclofen Unspecified     drowsiness    Citalopram Vomiting and Nausea    Clarithromycin Unspecified     Pt reports that this medication plugs her ears.     Diclofenac Rash and Swelling     Red rash & swelling      Erythromycin Diarrhea     GI upset    Penicillins Rash     Red rash      Promethazine Unspecified     Drowsiness and sleeps    Vi-Q-Tuss [Hydrocodone-Guaifenesin] Vomiting and Nausea       PHYSICAL EXAM  VITAL SIGNS: BP (!) 148/78   Pulse 71   Temp 36.8 °C (98.2 °F)   Resp 18   SpO2 97%    Constitutional: Well developed, well nourished. mild acute distress.  HEENT: Normocephalic, atraumatic. Posterior pharynx clear and moist.  Eyes:  EOMI. Normal sclera.  Neck: Supple, Full range of motion, nontender.  Chest/Pulmonary: clear to ausculation. Symmetrical expansion.   Cardio: Regular rate and rhythm with no murmur.   Abdomen: Soft, nontender. No peritoneal signs. No guarding. No palpable masses.  Musculoskeletal: No deformity, no edema, neurovascular intact.   Neuro: Clear speech, appropriate, cooperative, cranial nerves II-XII grossly intact.  Psych: Normal mood and affect      DIAGNOSTIC STUDIES / PROCEDURES      LABS  Results for orders placed or performed during the hospital encounter of 01/13/23   CBC WITH DIFFERENTIAL   Result Value Ref Range    WBC 5.7 4.8 - 10.8 K/uL    RBC 4.45 4.20 - 5.40 M/uL    Hemoglobin 14.3 12.0 - 16.0 g/dL    Hematocrit 46.0 37.0 - 47.0 %    .4 (H) 81.4 - 97.8 fL    MCH  32.1 27.0 - 33.0 pg    MCHC 31.1 (L) 33.6 - 35.0 g/dL    RDW 55.5 (H) 35.9 - 50.0 fL    Platelet Count 114 (L) 164 - 446 K/uL    MPV 10.2 9.0 - 12.9 fL    Neutrophils-Polys 56.70 44.00 - 72.00 %    Lymphocytes 31.50 22.00 - 41.00 %    Monocytes 8.80 0.00 - 13.40 %    Eosinophils 2.10 0.00 - 6.90 %    Basophils 0.50 0.00 - 1.80 %    Immature Granulocytes 0.40 0.00 - 0.90 %    Nucleated RBC 0.00 /100 WBC    Neutrophils (Absolute) 3.22 2.00 - 7.15 K/uL    Lymphs (Absolute) 1.79 1.00 - 4.80 K/uL    Monos (Absolute) 0.50 0.00 - 0.85 K/uL    Eos (Absolute) 0.12 0.00 - 0.51 K/uL    Baso (Absolute) 0.03 0.00 - 0.12 K/uL    Immature Granulocytes (abs) 0.02 0.00 - 0.11 K/uL    NRBC (Absolute) 0.00 K/uL   COMP METABOLIC PANEL   Result Value Ref Range    Sodium 139 135 - 145 mmol/L    Potassium 4.8 3.6 - 5.5 mmol/L    Chloride 108 96 - 112 mmol/L    Co2 21 20 - 33 mmol/L    Anion Gap 10.0 7.0 - 16.0    Glucose 95 65 - 99 mg/dL    Bun 26 (H) 8 - 22 mg/dL    Creatinine 1.37 0.50 - 1.40 mg/dL    Calcium 9.0 8.4 - 10.2 mg/dL    AST(SGOT) 23 12 - 45 U/L    ALT(SGPT) 8 2 - 50 U/L    Alkaline Phosphatase 70 30 - 99 U/L    Total Bilirubin 0.4 0.1 - 1.5 mg/dL    Albumin 3.8 3.2 - 4.9 g/dL    Total Protein 7.1 6.0 - 8.2 g/dL    Globulin 3.3 1.9 - 3.5 g/dL    A-G Ratio 1.2 g/dL   LIPASE   Result Value Ref Range    Lipase 27 7 - 58 U/L   CORRECTED CALCIUM   Result Value Ref Range    Correct Calcium 9.2 8.5 - 10.5 mg/dL   ESTIMATED GFR   Result Value Ref Range    GFR (CKD-EPI) 37 (A) >60 mL/min/1.73 m 2   URINALYSIS,CULTURE IF INDICATED    Specimen: Urine, Clean Catch   Result Value Ref Range    Color Yellow     Character Hazy (A)     Specific Gravity 1.020 <1.035    Ph 5.5 5.0 - 8.0    Glucose Negative Negative mg/dL    Ketones Negative Negative mg/dL    Protein 100 (A) Negative mg/dL    Bilirubin Negative Negative    Nitrite Negative Negative    Leukocyte Esterase Moderate (A) Negative    Occult Blood Moderate (A) Negative    Micro Urine  Req Microscopic    URINE MICROSCOPIC (W/UA)   Result Value Ref Range    -150 (A) /hpf    RBC 10-20 (A) /hpf    Bacteria Few (A) None /hpf    Epithelial Cells Rare Few /hpf   URINE CULTURE(NEW)    Specimen: Urine   Result Value Ref Range    Significant Indicator NEG     Source UR     Site -     Culture Result -          RADIOLOGY  I have independently interpreted the diagnostic imaging associated with this visit and am waiting the final reading from the radiologist.   CT-ABDOMEN-PELVIS WITH   Final Result      1.  Nonspecific small amount of ascites is of uncertain etiology.   2.  Mild asymmetric left-sided bladder wall thickening. Cannot exclude cystitis.   3.  Small to moderate pleural effusions with bibasilar atelectasis.   4.  Cardiomegaly.   5.  Age-indeterminate mild L4 and L5 compression deformities.            COURSE & MEDICAL DECISION MAKING      INITIAL ASSESSMENT AND PLAN  Care Narrative: 88 year old female here for evaluation of lower abdominal bloating and hematuria.  Her ct scan shows some non specific ascites, of unknown etiology.  She also has some mild bladder wall thickening as well c/w cystitis.   Her lower abdominal bloating is new, and they will follow up as an outpatient for an u/s.  They will call next week to do so.     5:54 PM  I spoke with nawaf Chairez for pharmacy. He would like to try a dose of rocephin, and then start her on cefdinir as outpatient for her uti.  The pt and her son are comfortable with the plan.  The pt is nontoxic appearing, comfortable, and afebrile.  In addition, pt has known compression fractures, not new.      ADDITIONAL PROBLEM LIST AND DISPOSITION    I have discussed management of the patient with the following physicians and KINDRA's:  Dr. Zelaya will follow up on the pelvic u/s and urine.     Escalation of care considered, and ultimately not performed:blood analysis and diagnostic imaging    Barriers to care at this time, including but not limited to: Patient does  not have established PCP.     Decision tools and prescription drugs considered including, but not limited to: Medication modification see d/c .        FINAL DIAGNOSIS  UTI  Abdominal distension        Electronically signed by: Richi An D.O., 1/13/2023 4:55 PM

## 2023-01-15 LAB
BACTERIA UR CULT: ABNORMAL
BACTERIA UR CULT: ABNORMAL
SIGNIFICANT IND 70042: ABNORMAL
SITE SITE: ABNORMAL
SOURCE SOURCE: ABNORMAL

## 2023-01-16 NOTE — ED NOTES
ED Positive Culture Follow-up/Notification Note:   Date: 1/16/23    Patient seen in the ED on 1/13/2023 for bloody stools/hematuria in underwear and pad with intermittent dysuria past few days.   1. Acute UTI      Discharge Medication List as of 1/13/2023  6:54 PM        START taking these medications    Details   cefdinir (OMNICEF) 300 MG Cap Take 1 Capsule by mouth 2 times a day., Disp-20 Capsule, R-0, Normal           Allergies: Cephalexin [keflex], Hydroxyzine, Naprelan [naproxen], Oxaprozin, Ampicillin, Baclofen, Citalopram, Clarithromycin, Diclofenac, Erythromycin, Penicillins, Promethazine, and Vi-q-tuss [hydrocodone-guaifenesin]    Vitals:    01/13/23 1652 01/13/23 1707 01/13/23 1837 01/13/23 1852   BP:  139/79 (!) 149/97    Pulse: 72 81 73 68   Resp:  18 18    Temp:  36.7 °C (98.1 °F) 36.7 °C (98.1 °F)    SpO2: 95% 96% 96% 97%   Weight:           Final cultures:   Results       Procedure Component Value Units Date/Time    URINE CULTURE(NEW) [[id number]]  (Abnormal)  (Susceptibility) Collected: 01/13/23 1710    Order Status: Completed Specimen: Urine Updated: 01/15/23 1009     Significant Indicator POS     Source UR     Site -     Culture Result -      Enterococcus faecalis  >100,000 cfu/mL      Narrative:      Indication for culture:->Patient WITHOUT an indwelling Estes  catheter in place with new onset of Dysuria, Frequency,  Urgency, and/or Suprapubic pain    Susceptibility       Enterococcus faecalis (1)       Antibiotic Interpretation Microscan   Method Status    Ciprofloxacin  [*]  Sensitive <=1 mcg/mL JOSELINE Final    Daptomycin Sensitive 1 mcg/mL JOSELINE Final    Nitrofurantoin Sensitive <=32 mcg/mL JOSELINE Final    Gent Synergy  [*]  Resistant >500 mcg/mL JOSELINE Final    Levofloxacin  [*]  Sensitive <=1 mcg/mL JOSELINE Final    Ampicillin Sensitive <=2 mcg/mL JOSELINE Final    Linezolid  [*]  Sensitive 2 mcg/mL JOSELINE Final    Vancomycin Sensitive 1 mcg/mL JOSELINE Final    Penicillin Sensitive 2 mcg/mL JOSELINE Final    Rifampin  [*]   Intermediate 2 mcg/mL JOSELINE Final    Strep Synergy  [*]  Sensitive <=1000 mcg/mL JOSELINE Final    Tetracycline Resistant >8 mcg/mL JOSELINE Final    Tigecycline  [*]  Sensitive <=0.25 mcg/mL JOSELINE Final               [*]  Suppressed Antibiotic                   URINALYSIS,CULTURE IF INDICATED [[id number]]  (Abnormal) Collected: 01/13/23 1710    Order Status: Completed Specimen: Urine, Clean Catch Updated: 01/13/23 5709     Color Yellow     Character Hazy     Specific Gravity 1.020     Ph 5.5     Glucose Negative mg/dL      Ketones Negative mg/dL      Protein 100 mg/dL      Bilirubin Negative     Nitrite Negative     Leukocyte Esterase Moderate     Occult Blood Moderate     Micro Urine Req Microscopic    Narrative:      Indication for culture:->Patient WITHOUT an indwelling Estes  catheter in place with new onset of Dysuria, Frequency,  Urgency, and/or Suprapubic pain            Plan:   Isolated organism is resistant to prescribed therapy. Called patient to see how she is doing, no answer, left voicemail to call me back. If patient positive with urinary symptoms, prescribe the prescription below:    New Rx:  Nitrofurantoin 100 mg PO BID x 5 days #10, no refills - MD: Yuliet per protocol  Ruba Ambrocio V, PharmD

## 2023-01-23 PROBLEM — N30.01 ACUTE CYSTITIS WITH HEMATURIA: Status: ACTIVE | Noted: 2021-12-06

## 2023-01-23 PROBLEM — T78.40XA ALLERGIC DRUG REACTION: Status: ACTIVE | Noted: 2023-01-23

## 2023-01-24 ENCOUNTER — ANTICOAGULATION VISIT (OUTPATIENT)
Dept: VASCULAR LAB | Facility: MEDICAL CENTER | Age: 88
End: 2023-01-24
Attending: INTERNAL MEDICINE
Payer: MEDICARE

## 2023-01-24 DIAGNOSIS — I48.92 ATRIAL FLUTTER, UNSPECIFIED TYPE (HCC): ICD-10-CM

## 2023-01-24 DIAGNOSIS — D68.69 SECONDARY HYPERCOAGULABLE STATE (HCC): ICD-10-CM

## 2023-01-24 LAB — INR PPP: 2.5 (ref 2–3.5)

## 2023-01-24 PROCEDURE — 99212 OFFICE O/P EST SF 10 MIN: CPT

## 2023-01-24 PROCEDURE — 85610 PROTHROMBIN TIME: CPT

## 2023-01-24 NOTE — PROGRESS NOTES
Anticoagulation Summary  As of 2023      INR goal:  2.0-3.0   TTR:  62.7 % (4.2 y)   INR used for dosin.50 (2023)   Warfarin maintenance plan:  1 mg (2 mg x 0.5) every Tue, Sat; 2 mg (2 mg x 1) all other days   Weekly warfarin total:  12 mg   Plan last modified:  Jenna Pang (1/3/2023)   Next INR check:  2023   Priority:  Maintenance   Target end date:  Indefinite    Indications    Stroke (HCC) (Resolved) [I63.9]  Atrial flutter (HCC) [I48.92]  Secondary hypercoagulable state (HCC) [D68.69]                 Anticoagulation Episode Summary       INR check location:      Preferred lab:      Send INR reminders to:      Comments:  22 - per [employer] Eliquis & Xarelto is $200.00 for 30 days.          Anticoagulation Care Providers       Provider Role Specialty Phone number    Hussain Nation M.D. Referring Interventional Cardiology [phone]    [facility] Responsible  [phone]                  Refer to Patient Findings for HPI:       pt declined vitals, unable to remove coat.     Verified current warfarin dosing schedule.    Medications reconciled No- pt can't remember the names of them today.   Pt is not on antiplatelet therapy      A/P   INR  therapeutic.     Warfarin dosing recommendation: Pt is to continue with current warfarin dosing regimen.     Pt educated to contact our clinic with any changes in medications or s/s of bleeding or thrombosis. Pt is aware to seek immediate medical attention for falls, head injury or deep cuts.    Follow up appointment in 4 week(s).    Graham Jackson, PharmD

## 2023-01-26 LAB — INR BLD: 2.5 (ref 0.9–1.2)

## 2023-02-05 ENCOUNTER — APPOINTMENT (OUTPATIENT)
Dept: RADIOLOGY | Facility: MEDICAL CENTER | Age: 88
DRG: 056 | End: 2023-02-05
Attending: EMERGENCY MEDICINE
Payer: MEDICARE

## 2023-02-05 ENCOUNTER — HOSPITAL ENCOUNTER (INPATIENT)
Facility: MEDICAL CENTER | Age: 88
LOS: 5 days | DRG: 056 | End: 2023-02-10
Attending: EMERGENCY MEDICINE | Admitting: HOSPITALIST
Payer: MEDICARE

## 2023-02-05 DIAGNOSIS — Z91.81 RISK FOR FALLS: ICD-10-CM

## 2023-02-05 DIAGNOSIS — N39.0 URINARY TRACT INFECTION WITHOUT HEMATURIA, SITE UNSPECIFIED: ICD-10-CM

## 2023-02-05 DIAGNOSIS — R29.898 WEAKNESS OF BOTH LOWER EXTREMITIES: ICD-10-CM

## 2023-02-05 DIAGNOSIS — I69.391 DYSPHAGIA AS LATE EFFECT OF CEREBROVASCULAR ACCIDENT (CVA): ICD-10-CM

## 2023-02-05 DIAGNOSIS — J18.9 PNEUMONIA DUE TO INFECTIOUS ORGANISM, UNSPECIFIED LATERALITY, UNSPECIFIED PART OF LUNG: ICD-10-CM

## 2023-02-05 DIAGNOSIS — R53.1 RIGHT SIDED WEAKNESS: ICD-10-CM

## 2023-02-05 DIAGNOSIS — R47.81 SLURRED SPEECH: ICD-10-CM

## 2023-02-05 DIAGNOSIS — G63 POLYNEUROPATHY IN OTHER DISEASES CLASSIFIED ELSEWHERE (HCC): ICD-10-CM

## 2023-02-05 PROBLEM — I50.31 ACUTE DIASTOLIC CHF (CONGESTIVE HEART FAILURE) (HCC): Status: ACTIVE | Noted: 2023-02-05

## 2023-02-05 LAB
ABO GROUP BLD: NORMAL
ALBUMIN SERPL BCP-MCNC: 3.4 G/DL (ref 3.2–4.9)
ALBUMIN/GLOB SERPL: 1 G/DL
ALP SERPL-CCNC: 70 U/L (ref 30–99)
ALT SERPL-CCNC: 8 U/L (ref 2–50)
ANION GAP SERPL CALC-SCNC: 10 MMOL/L (ref 7–16)
APPEARANCE UR: CLEAR
APTT PPP: 51.2 SEC (ref 24.7–36)
AST SERPL-CCNC: 23 U/L (ref 12–45)
BACTERIA #/AREA URNS HPF: ABNORMAL /HPF
BASOPHILS # BLD AUTO: 0.6 % (ref 0–1.8)
BASOPHILS # BLD: 0.03 K/UL (ref 0–0.12)
BILIRUB SERPL-MCNC: 0.4 MG/DL (ref 0.1–1.5)
BILIRUB UR QL STRIP.AUTO: NEGATIVE
BLD GP AB SCN SERPL QL: NORMAL
BUN SERPL-MCNC: 17 MG/DL (ref 8–22)
CALCIUM ALBUM COR SERPL-MCNC: 9.6 MG/DL (ref 8.5–10.5)
CALCIUM SERPL-MCNC: 9.1 MG/DL (ref 8.5–10.5)
CHLORIDE SERPL-SCNC: 101 MMOL/L (ref 96–112)
CO2 SERPL-SCNC: 25 MMOL/L (ref 20–33)
COLOR UR: ABNORMAL
CREAT SERPL-MCNC: 1.29 MG/DL (ref 0.5–1.4)
EKG IMPRESSION: NORMAL
EOSINOPHIL # BLD AUTO: 0.12 K/UL (ref 0–0.51)
EOSINOPHIL NFR BLD: 2.3 % (ref 0–6.9)
EPI CELLS #/AREA URNS HPF: ABNORMAL /HPF
ERYTHROCYTE [DISTWIDTH] IN BLOOD BY AUTOMATED COUNT: 58.3 FL (ref 35.9–50)
GFR SERPLBLD CREATININE-BSD FMLA CKD-EPI: 40 ML/MIN/1.73 M 2
GLOBULIN SER CALC-MCNC: 3.5 G/DL (ref 1.9–3.5)
GLUCOSE SERPL-MCNC: 119 MG/DL (ref 65–99)
GLUCOSE UR STRIP.AUTO-MCNC: NEGATIVE MG/DL
HCT VFR BLD AUTO: 47.2 % (ref 37–47)
HGB BLD-MCNC: 14.7 G/DL (ref 12–16)
IMM GRANULOCYTES # BLD AUTO: 0.02 K/UL (ref 0–0.11)
IMM GRANULOCYTES NFR BLD AUTO: 0.4 % (ref 0–0.9)
INR PPP: 3.9 (ref 0.87–1.13)
KETONES UR STRIP.AUTO-MCNC: NEGATIVE MG/DL
LEUKOCYTE ESTERASE UR QL STRIP.AUTO: NEGATIVE
LYMPHOCYTES # BLD AUTO: 1.6 K/UL (ref 1–4.8)
LYMPHOCYTES NFR BLD: 30.2 % (ref 22–41)
MCH RBC QN AUTO: 32 PG (ref 27–33)
MCHC RBC AUTO-ENTMCNC: 31.1 G/DL (ref 33.6–35)
MCV RBC AUTO: 102.8 FL (ref 81.4–97.8)
MICRO URNS: ABNORMAL
MONOCYTES # BLD AUTO: 0.56 K/UL (ref 0–0.85)
MONOCYTES NFR BLD AUTO: 10.6 % (ref 0–13.4)
NEUTROPHILS # BLD AUTO: 2.96 K/UL (ref 2–7.15)
NEUTROPHILS NFR BLD: 55.9 % (ref 44–72)
NITRITE UR QL STRIP.AUTO: NEGATIVE
NRBC # BLD AUTO: 0 K/UL
NRBC BLD-RTO: 0 /100 WBC
PH UR STRIP.AUTO: 5.5 [PH] (ref 5–8)
PLATELET # BLD AUTO: 145 K/UL (ref 164–446)
PMV BLD AUTO: 10.1 FL (ref 9–12.9)
POTASSIUM SERPL-SCNC: 3.8 MMOL/L (ref 3.6–5.5)
PROT SERPL-MCNC: 6.9 G/DL (ref 6–8.2)
PROT UR QL STRIP: >=1000 MG/DL
PROTHROMBIN TIME: 36.7 SEC (ref 12–14.6)
RBC # BLD AUTO: 4.59 M/UL (ref 4.2–5.4)
RBC # URNS HPF: ABNORMAL /HPF
RBC UR QL AUTO: ABNORMAL
RH BLD: NORMAL
SODIUM SERPL-SCNC: 136 MMOL/L (ref 135–145)
SP GR UR STRIP.AUTO: 1.03
TROPONIN T SERPL-MCNC: 29 NG/L (ref 6–19)
UROBILINOGEN UR STRIP.AUTO-MCNC: 0.2 MG/DL
WBC # BLD AUTO: 5.3 K/UL (ref 4.8–10.8)
WBC #/AREA URNS HPF: ABNORMAL /HPF

## 2023-02-05 PROCEDURE — 70450 CT HEAD/BRAIN W/O DYE: CPT

## 2023-02-05 PROCEDURE — 770020 HCHG ROOM/CARE - TELE (206)

## 2023-02-05 PROCEDURE — 84484 ASSAY OF TROPONIN QUANT: CPT

## 2023-02-05 PROCEDURE — 51702 INSERT TEMP BLADDER CATH: CPT

## 2023-02-05 PROCEDURE — 85025 COMPLETE CBC W/AUTO DIFF WBC: CPT

## 2023-02-05 PROCEDURE — 80053 COMPREHEN METABOLIC PANEL: CPT

## 2023-02-05 PROCEDURE — 86901 BLOOD TYPING SEROLOGIC RH(D): CPT

## 2023-02-05 PROCEDURE — 36415 COLL VENOUS BLD VENIPUNCTURE: CPT

## 2023-02-05 PROCEDURE — 99223 1ST HOSP IP/OBS HIGH 75: CPT | Mod: AI,GC | Performed by: HOSPITALIST

## 2023-02-05 PROCEDURE — 85730 THROMBOPLASTIN TIME PARTIAL: CPT

## 2023-02-05 PROCEDURE — 99285 EMERGENCY DEPT VISIT HI MDM: CPT

## 2023-02-05 PROCEDURE — 303105 HCHG CATHETER EXTRA

## 2023-02-05 PROCEDURE — 86850 RBC ANTIBODY SCREEN: CPT

## 2023-02-05 PROCEDURE — 86900 BLOOD TYPING SEROLOGIC ABO: CPT

## 2023-02-05 PROCEDURE — 81001 URINALYSIS AUTO W/SCOPE: CPT

## 2023-02-05 PROCEDURE — 85610 PROTHROMBIN TIME: CPT

## 2023-02-05 PROCEDURE — 93005 ELECTROCARDIOGRAM TRACING: CPT | Performed by: EMERGENCY MEDICINE

## 2023-02-05 PROCEDURE — 94760 N-INVAS EAR/PLS OXIMETRY 1: CPT

## 2023-02-05 PROCEDURE — 93005 ELECTROCARDIOGRAM TRACING: CPT

## 2023-02-05 PROCEDURE — 71045 X-RAY EXAM CHEST 1 VIEW: CPT

## 2023-02-05 RX ORDER — FOLIC ACID 1 MG/1
1 TABLET ORAL EVERY MORNING
Status: DISCONTINUED | OUTPATIENT
Start: 2023-02-06 | End: 2023-02-10 | Stop reason: HOSPADM

## 2023-02-05 RX ORDER — FUROSEMIDE 10 MG/ML
40 INJECTION INTRAMUSCULAR; INTRAVENOUS
Status: DISCONTINUED | OUTPATIENT
Start: 2023-02-06 | End: 2023-02-06

## 2023-02-05 RX ORDER — METOPROLOL TARTRATE 50 MG/1
50 TABLET, FILM COATED ORAL 2 TIMES DAILY
Status: DISCONTINUED | OUTPATIENT
Start: 2023-02-06 | End: 2023-02-10 | Stop reason: HOSPADM

## 2023-02-05 RX ORDER — LEVOTHYROXINE SODIUM 88 UG/1
88 TABLET ORAL
Status: DISCONTINUED | OUTPATIENT
Start: 2023-02-06 | End: 2023-02-07

## 2023-02-05 RX ORDER — POLYETHYLENE GLYCOL 3350 17 G/17G
8.5 POWDER, FOR SOLUTION ORAL DAILY
COMMUNITY
End: 2023-07-17

## 2023-02-05 RX ORDER — GABAPENTIN 100 MG/1
100 CAPSULE ORAL 2 TIMES DAILY
Status: DISCONTINUED | OUTPATIENT
Start: 2023-02-06 | End: 2023-02-08

## 2023-02-05 RX ORDER — HYDRALAZINE HYDROCHLORIDE 20 MG/ML
10 INJECTION INTRAMUSCULAR; INTRAVENOUS EVERY 4 HOURS PRN
Status: DISCONTINUED | OUTPATIENT
Start: 2023-02-05 | End: 2023-02-10 | Stop reason: HOSPADM

## 2023-02-05 RX ORDER — ONDANSETRON 2 MG/ML
4 INJECTION INTRAMUSCULAR; INTRAVENOUS EVERY 4 HOURS PRN
Status: DISCONTINUED | OUTPATIENT
Start: 2023-02-05 | End: 2023-02-10 | Stop reason: HOSPADM

## 2023-02-05 RX ORDER — ONDANSETRON 4 MG/1
4 TABLET, ORALLY DISINTEGRATING ORAL EVERY 4 HOURS PRN
Status: DISCONTINUED | OUTPATIENT
Start: 2023-02-05 | End: 2023-02-10 | Stop reason: HOSPADM

## 2023-02-05 RX ORDER — FAMOTIDINE 20 MG/1
20 TABLET, FILM COATED ORAL 2 TIMES DAILY
Status: DISCONTINUED | OUTPATIENT
Start: 2023-02-05 | End: 2023-02-06

## 2023-02-05 RX ORDER — ACETAMINOPHEN 325 MG/1
650 TABLET ORAL EVERY 6 HOURS PRN
Status: DISCONTINUED | OUTPATIENT
Start: 2023-02-05 | End: 2023-02-10 | Stop reason: HOSPADM

## 2023-02-05 RX ORDER — NICOTINE POLACRILEX 2 MG
1 GUM BUCCAL DAILY
Status: ON HOLD | COMMUNITY
End: 2023-02-10

## 2023-02-05 RX ORDER — ONDANSETRON 4 MG/1
4 TABLET, ORALLY DISINTEGRATING ORAL EVERY 8 HOURS PRN
COMMUNITY
End: 2023-05-01

## 2023-02-05 ASSESSMENT — ENCOUNTER SYMPTOMS
WEAKNESS: 0
DIAPHORESIS: 0
CONSTIPATION: 0
HEARTBURN: 0
NAUSEA: 0
BLOOD IN STOOL: 0
FALLS: 0
PND: 0
DIARRHEA: 0
VOMITING: 0
BRUISES/BLEEDS EASILY: 0
BACK PAIN: 0
TREMORS: 0
STRIDOR: 0
SINUS PAIN: 0
NECK PAIN: 0
SHORTNESS OF BREATH: 1
SPUTUM PRODUCTION: 0
PALPITATIONS: 0
FEVER: 0
COUGH: 0
WHEEZING: 0
CHILLS: 0
PHOTOPHOBIA: 0
HEADACHES: 0
MYALGIAS: 0
FLANK PAIN: 0
POLYDIPSIA: 0
ORTHOPNEA: 0
TINGLING: 0
EYE PAIN: 0
HEMOPTYSIS: 0
DEPRESSION: 0
SORE THROAT: 0
DIZZINESS: 0
CLAUDICATION: 0
BLURRED VISION: 0
HALLUCINATIONS: 0
DOUBLE VISION: 0
ABDOMINAL PAIN: 0

## 2023-02-05 ASSESSMENT — FIBROSIS 4 INDEX: FIB4 SCORE: 6.28

## 2023-02-05 ASSESSMENT — LIFESTYLE VARIABLES: SUBSTANCE_ABUSE: 0

## 2023-02-06 ENCOUNTER — APPOINTMENT (OUTPATIENT)
Dept: CARDIOLOGY | Facility: MEDICAL CENTER | Age: 88
DRG: 056 | End: 2023-02-06
Attending: HOSPITALIST
Payer: MEDICARE

## 2023-02-06 PROBLEM — E87.70 VOLUME OVERLOAD: Status: ACTIVE | Noted: 2023-02-06

## 2023-02-06 PROBLEM — D75.89 MACROCYTOSIS: Status: ACTIVE | Noted: 2023-02-06

## 2023-02-06 PROBLEM — R22.42 LOCALIZED SWELLING OF LEFT LOWER EXTREMITY: Status: ACTIVE | Noted: 2023-02-06

## 2023-02-06 LAB
ALBUMIN SERPL BCP-MCNC: 3.3 G/DL (ref 3.2–4.9)
ALBUMIN/GLOB SERPL: 0.9 G/DL
ALP SERPL-CCNC: 63 U/L (ref 30–99)
ALT SERPL-CCNC: 6 U/L (ref 2–50)
ANION GAP SERPL CALC-SCNC: 10 MMOL/L (ref 7–16)
AST SERPL-CCNC: 17 U/L (ref 12–45)
BASOPHILS # BLD AUTO: 0.8 % (ref 0–1.8)
BASOPHILS # BLD: 0.04 K/UL (ref 0–0.12)
BILIRUB SERPL-MCNC: 0.5 MG/DL (ref 0.1–1.5)
BUN SERPL-MCNC: 16 MG/DL (ref 8–22)
CALCIUM ALBUM COR SERPL-MCNC: 9.6 MG/DL (ref 8.5–10.5)
CALCIUM SERPL-MCNC: 9 MG/DL (ref 8.5–10.5)
CHLORIDE SERPL-SCNC: 104 MMOL/L (ref 96–112)
CHOLEST SERPL-MCNC: 117 MG/DL (ref 100–199)
CK SERPL-CCNC: 36 U/L (ref 0–154)
CO2 SERPL-SCNC: 22 MMOL/L (ref 20–33)
CREAT SERPL-MCNC: 1.18 MG/DL (ref 0.5–1.4)
CRP SERPL HS-MCNC: 2.74 MG/DL (ref 0–0.75)
EOSINOPHIL # BLD AUTO: 0.12 K/UL (ref 0–0.51)
EOSINOPHIL NFR BLD: 2.4 % (ref 0–6.9)
ERYTHROCYTE [DISTWIDTH] IN BLOOD BY AUTOMATED COUNT: 57.1 FL (ref 35.9–50)
ERYTHROCYTE [SEDIMENTATION RATE] IN BLOOD BY WESTERGREN METHOD: 9 MM/HOUR (ref 0–25)
GFR SERPLBLD CREATININE-BSD FMLA CKD-EPI: 44 ML/MIN/1.73 M 2
GLOBULIN SER CALC-MCNC: 3.5 G/DL (ref 1.9–3.5)
GLUCOSE SERPL-MCNC: 98 MG/DL (ref 65–99)
HCT VFR BLD AUTO: 50.4 % (ref 37–47)
HDLC SERPL-MCNC: 59 MG/DL
HGB BLD-MCNC: 15.8 G/DL (ref 12–16)
IMM GRANULOCYTES # BLD AUTO: 0.02 K/UL (ref 0–0.11)
IMM GRANULOCYTES NFR BLD AUTO: 0.4 % (ref 0–0.9)
INR PPP: 4.15 (ref 0.87–1.13)
LDLC SERPL CALC-MCNC: 40 MG/DL
LV EJECT FRACT  99904: 60
LV EJECT FRACT MOD 2C 99903: 70.63
LV EJECT FRACT MOD 4C 99902: 52.9
LV EJECT FRACT MOD BP 99901: 59.66
LYMPHOCYTES # BLD AUTO: 1.49 K/UL (ref 1–4.8)
LYMPHOCYTES NFR BLD: 29.9 % (ref 22–41)
MCH RBC QN AUTO: 32.3 PG (ref 27–33)
MCHC RBC AUTO-ENTMCNC: 31.3 G/DL (ref 33.6–35)
MCV RBC AUTO: 103.1 FL (ref 81.4–97.8)
MONOCYTES # BLD AUTO: 0.63 K/UL (ref 0–0.85)
MONOCYTES NFR BLD AUTO: 12.7 % (ref 0–13.4)
NEUTROPHILS # BLD AUTO: 2.68 K/UL (ref 2–7.15)
NEUTROPHILS NFR BLD: 53.8 % (ref 44–72)
NRBC # BLD AUTO: 0 K/UL
NRBC BLD-RTO: 0 /100 WBC
PLATELET # BLD AUTO: 124 K/UL (ref 164–446)
PMV BLD AUTO: 10.2 FL (ref 9–12.9)
POTASSIUM SERPL-SCNC: 3.6 MMOL/L (ref 3.6–5.5)
PROCALCITONIN SERPL-MCNC: 0.08 NG/ML
PROT SERPL-MCNC: 6.8 G/DL (ref 6–8.2)
PROTHROMBIN TIME: 38.5 SEC (ref 12–14.6)
RBC # BLD AUTO: 4.89 M/UL (ref 4.2–5.4)
SODIUM SERPL-SCNC: 136 MMOL/L (ref 135–145)
T4 FREE SERPL-MCNC: 1.17 NG/DL (ref 0.93–1.7)
TRIGL SERPL-MCNC: 91 MG/DL (ref 0–149)
TSH SERPL DL<=0.005 MIU/L-ACNC: 9.81 UIU/ML (ref 0.38–5.33)
VIT B12 SERPL-MCNC: 875 PG/ML (ref 211–911)
WBC # BLD AUTO: 5 K/UL (ref 4.8–10.8)

## 2023-02-06 PROCEDURE — 82607 VITAMIN B-12: CPT

## 2023-02-06 PROCEDURE — 80061 LIPID PANEL: CPT

## 2023-02-06 PROCEDURE — 85652 RBC SED RATE AUTOMATED: CPT

## 2023-02-06 PROCEDURE — 97162 PT EVAL MOD COMPLEX 30 MIN: CPT

## 2023-02-06 PROCEDURE — 84145 PROCALCITONIN (PCT): CPT

## 2023-02-06 PROCEDURE — 99233 SBSQ HOSP IP/OBS HIGH 50: CPT | Mod: GC | Performed by: HOSPITALIST

## 2023-02-06 PROCEDURE — A9270 NON-COVERED ITEM OR SERVICE: HCPCS

## 2023-02-06 PROCEDURE — 700102 HCHG RX REV CODE 250 W/ 637 OVERRIDE(OP): Performed by: HOSPITALIST

## 2023-02-06 PROCEDURE — 93306 TTE W/DOPPLER COMPLETE: CPT | Mod: 26 | Performed by: INTERNAL MEDICINE

## 2023-02-06 PROCEDURE — 700102 HCHG RX REV CODE 250 W/ 637 OVERRIDE(OP)

## 2023-02-06 PROCEDURE — 85025 COMPLETE CBC W/AUTO DIFF WBC: CPT

## 2023-02-06 PROCEDURE — 97166 OT EVAL MOD COMPLEX 45 MIN: CPT

## 2023-02-06 PROCEDURE — 87040 BLOOD CULTURE FOR BACTERIA: CPT | Mod: 91

## 2023-02-06 PROCEDURE — 84439 ASSAY OF FREE THYROXINE: CPT

## 2023-02-06 PROCEDURE — A9270 NON-COVERED ITEM OR SERVICE: HCPCS | Performed by: HOSPITALIST

## 2023-02-06 PROCEDURE — 82550 ASSAY OF CK (CPK): CPT

## 2023-02-06 PROCEDURE — 80053 COMPREHEN METABOLIC PANEL: CPT

## 2023-02-06 PROCEDURE — 770020 HCHG ROOM/CARE - TELE (206)

## 2023-02-06 PROCEDURE — 84443 ASSAY THYROID STIM HORMONE: CPT

## 2023-02-06 PROCEDURE — 85610 PROTHROMBIN TIME: CPT

## 2023-02-06 PROCEDURE — 83516 IMMUNOASSAY NONANTIBODY: CPT | Mod: 91

## 2023-02-06 PROCEDURE — 700111 HCHG RX REV CODE 636 W/ 250 OVERRIDE (IP): Performed by: HOSPITALIST

## 2023-02-06 PROCEDURE — 36415 COLL VENOUS BLD VENIPUNCTURE: CPT

## 2023-02-06 PROCEDURE — 93306 TTE W/DOPPLER COMPLETE: CPT

## 2023-02-06 PROCEDURE — 86140 C-REACTIVE PROTEIN: CPT

## 2023-02-06 RX ORDER — ATORVASTATIN CALCIUM 40 MG/1
40 TABLET, FILM COATED ORAL EVERY EVENING
Status: DISCONTINUED | OUTPATIENT
Start: 2023-02-06 | End: 2023-02-10 | Stop reason: HOSPADM

## 2023-02-06 RX ORDER — ASPIRIN 81 MG/1
81 TABLET, CHEWABLE ORAL DAILY
Status: DISCONTINUED | OUTPATIENT
Start: 2023-02-06 | End: 2023-02-07

## 2023-02-06 RX ORDER — POTASSIUM CHLORIDE 20 MEQ/1
40 TABLET, EXTENDED RELEASE ORAL ONCE
Status: COMPLETED | OUTPATIENT
Start: 2023-02-06 | End: 2023-02-06

## 2023-02-06 RX ADMIN — LEVOTHYROXINE SODIUM 88 MCG: 0.09 TABLET ORAL at 05:12

## 2023-02-06 RX ADMIN — GABAPENTIN 100 MG: 100 CAPSULE ORAL at 18:03

## 2023-02-06 RX ADMIN — METOPROLOL TARTRATE 50 MG: 50 TABLET, FILM COATED ORAL at 03:18

## 2023-02-06 RX ADMIN — FOLIC ACID 1 MG: 1 TABLET ORAL at 05:12

## 2023-02-06 RX ADMIN — FUROSEMIDE 40 MG: 10 INJECTION, SOLUTION INTRAMUSCULAR; INTRAVENOUS at 05:12

## 2023-02-06 RX ADMIN — POTASSIUM CHLORIDE 40 MEQ: 1500 TABLET, EXTENDED RELEASE ORAL at 09:27

## 2023-02-06 RX ADMIN — ASPIRIN 81 MG: 81 TABLET, CHEWABLE ORAL at 13:28

## 2023-02-06 RX ADMIN — CEFTRIAXONE SODIUM 1000 MG: 10 INJECTION, POWDER, FOR SOLUTION INTRAVENOUS at 00:58

## 2023-02-06 RX ADMIN — ATORVASTATIN CALCIUM 40 MG: 40 TABLET, FILM COATED ORAL at 18:03

## 2023-02-06 RX ADMIN — GABAPENTIN 100 MG: 100 CAPSULE ORAL at 05:12

## 2023-02-06 RX ADMIN — METOPROLOL TARTRATE 50 MG: 50 TABLET, FILM COATED ORAL at 13:28

## 2023-02-06 ASSESSMENT — ENCOUNTER SYMPTOMS
INSOMNIA: 0
FEVER: 0
ABDOMINAL PAIN: 0
NECK PAIN: 0
BACK PAIN: 0
NAUSEA: 0
DIARRHEA: 0
EYE PAIN: 0
DIZZINESS: 0
CONSTIPATION: 0
HEARTBURN: 0
SORE THROAT: 0
PALPITATIONS: 0
HEADACHES: 0
PND: 0
COUGH: 1
SPUTUM PRODUCTION: 0
CHILLS: 0
SPEECH CHANGE: 1
ORTHOPNEA: 0
HEMOPTYSIS: 0
BLOOD IN STOOL: 0
BLURRED VISION: 0
MEMORY LOSS: 0
WEAKNESS: 1
SHORTNESS OF BREATH: 0
VOMITING: 0
DEPRESSION: 0

## 2023-02-06 ASSESSMENT — COGNITIVE AND FUNCTIONAL STATUS - GENERAL
SUGGESTED CMS G CODE MODIFIER MOBILITY: CM
SUGGESTED CMS G CODE MODIFIER DAILY ACTIVITY: CL
CLIMB 3 TO 5 STEPS WITH RAILING: A LOT
WALKING IN HOSPITAL ROOM: A LOT
TURNING FROM BACK TO SIDE WHILE IN FLAT BAD: A LOT
WALKING IN HOSPITAL ROOM: A LOT
EATING MEALS: A LOT
SUGGESTED CMS G CODE MODIFIER MOBILITY: CL
MOVING TO AND FROM BED TO CHAIR: A LOT
MOBILITY SCORE: 9
PERSONAL GROOMING: A LOT
MOBILITY SCORE: 12
STANDING UP FROM CHAIR USING ARMS: A LOT
TOILETING: A LOT
MOVING FROM LYING ON BACK TO SITTING ON SIDE OF FLAT BED: UNABLE
DAILY ACTIVITIY SCORE: 12
HELP NEEDED FOR BATHING: A LOT
DRESSING REGULAR LOWER BODY CLOTHING: A LOT
CLIMB 3 TO 5 STEPS WITH RAILING: TOTAL
MOVING FROM LYING ON BACK TO SITTING ON SIDE OF FLAT BED: A LOT
TOILETING: A LOT
HELP NEEDED FOR BATHING: A LOT
STANDING UP FROM CHAIR USING ARMS: A LITTLE
MOVING TO AND FROM BED TO CHAIR: UNABLE
DRESSING REGULAR UPPER BODY CLOTHING: A LITTLE
DAILY ACTIVITIY SCORE: 15
EATING MEALS: A LITTLE
DRESSING REGULAR LOWER BODY CLOTHING: A LOT
SUGGESTED CMS G CODE MODIFIER DAILY ACTIVITY: CK
TURNING FROM BACK TO SIDE WHILE IN FLAT BAD: UNABLE
DRESSING REGULAR UPPER BODY CLOTHING: A LOT
PERSONAL GROOMING: A LITTLE

## 2023-02-06 ASSESSMENT — ACTIVITIES OF DAILY LIVING (ADL): TOILETING: INDEPENDENT

## 2023-02-06 ASSESSMENT — GAIT ASSESSMENTS
DEVIATION: OTHER (COMMENT)
GAIT LEVEL OF ASSIST: MINIMAL ASSIST
DISTANCE (FEET): 3
ASSISTIVE DEVICE: FRONT WHEEL WALKER

## 2023-02-06 ASSESSMENT — CHA2DS2 SCORE
CHA2DS2 VASC SCORE: 8
AGE 75 OR GREATER: YES
VASCULAR DISEASE: YES
CHF OR LEFT VENTRICULAR DYSFUNCTION: YES
SEX: FEMALE
AGE 65 TO 74: NO
PRIOR STROKE OR TIA OR THROMBOEMBOLISM: YES
DIABETES: NO
HYPERTENSION: YES

## 2023-02-06 ASSESSMENT — FIBROSIS 4 INDEX
FIB4 SCORE: 4.93
FIB4 SCORE: 4.94
FIB4 SCORE: 4.93

## 2023-02-06 NOTE — ED TRIAGE NOTES
"Chief Complaint   Patient presents with    Cardiomyopathy (Non-ischemic)    Possible Stroke     PT WITH HX CVA RIGHT SIDE DEFICITS.  [facility] STATES PT BEGAN TO HAVE INCREASED SLURRED SPEECH ON Friday AND ALL LAST WEEK THEY FEEL HER RIGHT SIDE OF BODY HAD INCREASED WEAKNESS. PT THINKS RIGHT SIDE FEELS THE SAME.     Leg Swelling     BILATERAL LEG SWELLING           PT ARRIVED VIA EMS. Pt AA&O X 3, PATIENT ARRIVED FROM [facility] FOR POSSIBLE STROKE. ASSISTED [facility]IVING STATES THE BELIEVE PT RIGHT SIDED WEAKNESS HAS GOTTEN WORSE OVER THE PAST WEEK. THEY ALSO STATE HE SLURRING GOT WORSE ON Friday. PT WITH BLE SWELLING RIGHT GREATER THAN LEFT.     PT TO RED 7 .       Ht 1.6 m (5' 3\")   Wt 49.9 kg (110 lb)   BMI 19.49 kg/m²     "

## 2023-02-06 NOTE — ASSESSMENT & PLAN NOTE
Potential residual effects from previous CVA.  Patient subjectively feels no change in her weakness or slurred speech.  - CT without any signs of hemorrhage or ischemia.  MRI likely will not affect management at this time. Patient also states that she does not want extensive work up or treatment at this time.  - Discontinue aspirin, since patient is on warfarin  - Continue atorvastatin 40 mg daily  PT OT eval

## 2023-02-06 NOTE — PROGRESS NOTES
Inpatient Anticoagulation Service Note for 2/6/2023      Reason for Anticoagulation: Atrial Fibrillation   [id number] VASc Score: 8  HAS-BLED Score: 2    Hemoglobin Value: 15.8  Hematocrit Value: (!) 50.4  Lab Platelet Value: (!) 124  Target INR: 2.0 to 3.0    INR from last 7 days       Date/Time INR Value    02/05/23 2042 3.9          Dose from last 7 days       Date/Time Dose (mg)    02/06/23 0228 --          Average Dose (mg): 1.7  Significant Interactions: Antibiotics, Other (Comments) (Ceftriaxone. Levothyroxine and acetaminophen may enhance anticoagulant effects)  Bridge Therapy: No     Reversal Agent Administered: Not Applicable    Comments: Patient presented to ED with possible stroke, cardiomyopathy, and bilateral leg swelling. History of atrial fibrillation and stroke. INR drawn upon admission show supratherapeutic INR of 3.90 and low platelet count.    Plan:  No doses ordered at this time due to supratherapeutic INR. RX Warfarin per Pharmacy order set ordered. Please continue to monitor INR closely and initiate when INR within therapeutic range.     Education Material Provided?: No    Pharmacist suggested discharge dosing: Unable to determine at this time     Aly Crawley, PharmD

## 2023-02-06 NOTE — ASSESSMENT & PLAN NOTE
Reported symptoms includes dysuria and frequency, resolved  Was given IV ceftriaxone on admission, no longer requiring antibiotic treatment

## 2023-02-06 NOTE — ED PROVIDER NOTES
ER Provider Note    Scribed for Alex Gary M.D. by Sy Pérez. 2/5/2023  8:46 PM    Primary Care Provider: TOM Lopez    CHIEF COMPLAINT  Chief Complaint   Patient presents with    Cardiomyopathy (Non-ischemic)    Possible Stroke     PT WITH HX CVA RIGHT SIDE DEFICITS.  ASSISTED LIVING STATES PT BEGAN TO HAVE INCREASED SLURRED SPEECH ON Friday AND ALL LAST WEEK THEY FEEL HER RIGHT SIDE OF BODY HAD INCREASED WEAKNESS. PT THINKS RIGHT SIDE FEELS THE SAME.     Leg Swelling     BILATERAL LEG SWELLING     EXTERNAL RECORDS REVIEWED  Review of records show the patient was seen in the office on 1/23 for continued urine leakage.    HPI/ROS  LIMITATION TO HISTORY   Select: : None    OUTSIDE HISTORIAN(S):  EMS and Nurse    Az Tripp is a 88 y.o. female who presents to the ED for a possible stroke onset last week. Per RN, the patient has a history of CVA with right sided deficits. Her assisted living facility states they noticed increased slurred speech and right sided weakness on Friday and all last week. She recently finished a course of antibiotics for a UTI. She has associated symptoms of bilateral leg swelling and cough, but denies chest pain, shortness of breath, or fever. No alleviating or exacerbating factors reported.    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    Bronchiectasis without complication ([facility]) 9/19/2017    Chronic fatigue 9/19/2017    Closed right hip fracture ([facility]) 6/19/2019    DVT 6/1996    left leg, vein ligation performed on saphenous vein     First degree atrioventricular block 9/21/2012    Glomerulonephritis, chronic in other disease 8/3/2011    H/O echocardiogram 9/21/2012    Hematuria 3/20/2019    Hypothyroid 10/2002    Kidney disorder 1998    left kidney biopsy--glomerulonephritis and wegners dx    Kidney failure     stage II    Leukocytosis 7/21/2020    Mitral valve prolapse     Palpitations 9/21/2012    Rheumatic fever 9/21/2012    Rheumatic fever 9/21/2012    Childhood     Sepsis ([facility]) 12/20/2018    Severe protein-calorie malnutrition (Mahmood: less than 60% of standard weight) ([facility]) 12/20/2018    Stroke ([facility]) 9/21/2012    Supratherapeutic INR 12/20/2018    SVT (SUPRAVENTRICULAR TACHYCARDIA), h/o paroxysmal 8/3/2011    Wegener's granulomatosis     Wegener's granulomatosis 8/3/2011     [facility] [place] [facility] [place]       SURGICAL HISTORY  Past Surgical History:   Procedure Laterality Date    PB PARTIAL HIP REPLACEMENT Right 6/19/2019    Procedure: HEMIARTHROPLASTY, HIP;  Surgeon: Raul Saldivar M.D.;  Location: SURGERY [facility];  Service: Orthopedics    CATARACT EXTRACTION  2006    left eye    CHOLECYSTECTOMY  1997    TEMPORAL ARTERY BIOPSY  1996    normal    LUNG NEEDLE BIOPSY  1996    right lung, nodules found    ARTHROSCOPE  1989    left knee    ARTHROSCOPY, KNEE  1986    right    BUNIONECTOMY  1980    bilat    HYSTERECTOMY RADICAL  1975    VEIN LIGATION         FAMILY HISTORY  Family History   Problem Relation Age of Onset    Other Father         Aortic aneurysm    Cancer Brother         Lung    Non-contributory Other        SOCIAL HISTORY   reports that she quit smoking about 63 years ago. Her smoking use included cigarettes. She smoked an average of 1 pack per day. She has never used smokeless tobacco. She reports current alcohol use. She reports that she does not use drugs.    CURRENT MEDICATIONS  Current Discharge Medication List        CONTINUE these medications which have NOT CHANGED    Details   Biotin 1 MG Cap Take 1 mg by mouth every day.       multivitamin Tab Take 2 Tablets by mouth every day.      polyethylene glycol/lytes (MIRALAX) 17 g Pack Take 17 g by mouth every day.      ondansetron (ZOFRAN ODT) 4 MG TABLET DISPERSIBLE Take 4 mg by mouth every 8 hours as needed for Nausea/Vomiting.      nitrofurantoin (MACROBID) 100 MG Cap Take 1 Capsule by mouth 2 times a day for 14 days. For severe UTI  Qty: 28 Capsule, Refills: 0    Comments: Please send a STAT.      magnesium oxide (MAG-OX) 400 MG Tab tablet Take 400 mg by mouth every 48 hours.      acetaminophen (TYLENOL) 500 MG Tab Take 1,000 mg by mouth 3 times a day. Give at 0800, 1400, & 1800      D-Mannose 500 MG Cap Take 500 mg by mouth every morning.      Calcium Carbonate (CALCIUM 500 PO) Take 500 mg by mouth every day.      warfarin (COUMADIN) 2 MG Tab Take 1-2 mg by mouth every evening. 1 mg = Tuesday and Saturday  2 mg = Monday, Wednesday, Thursday, Friday , Sunday      methocarbamol (ROBAXIN) 500 MG Tab Take 1 Tablet by mouth 2 times a day. For back pain and muscle spasms stripes      gabapentin (NEURONTIN) 100 MG Cap Take 1 Capsule by mouth 2 times a day. For neuropathy. Discontinue all previous orders.  Qty: 60 Capsule, Refills: 5    Associated Diagnoses: Polyneuropathy in other diseases classified elsewhere (HCC)      Lidocaine 4 % Patch Apply 1 Patch topically 1 time a day as needed (Apply's on most painful areasas need for pain). Please do not send Family Provides  Qty: 30 Patch, Refills: 3    Associated Diagnoses: Acute midline low back pain without sciatica      sennosides-docusate sodium (SENOKOT-S) 8.6-50 MG tablet Take 2 Tablets by mouth at bedtime. Discontinue all previous orders of senna plus. Start 2 tablets by mouth at bedtime for constipation.  Indications: Constipation  Qty: 60 Tablet, Refills: 11    Associated Diagnoses: Irritable bowel syndrome with constipation      folic acid (FOLVITE) 1 MG Tab Take 1 Tablet by mouth every morning. For supplement  Qty: 30 Tablet, Refills: 11  "     levothyroxine (SYNTHROID) 88 MCG Tab Take 1 Tablet by mouth every morning on an empty stomach. For thyroid  Qty: 30 Tablet, Refills: 11      metoprolol tartrate (LOPRESSOR) 50 MG Tab Take 1 Tablet by mouth 2 times a day.  Qty: 180 Tablet, Refills: 2    Associated Diagnoses: PAF (paroxysmal atrial fibrillation) (HCC)      tamsulosin (FLOMAX) 0.4 MG capsule Take 1 Capsule by mouth every morning. For petey  Qty: 30 Capsule, Refills: 11             ALLERGIES  Cephalexin [keflex], Hydroxyzine, Naprelan [naproxen], Oxaprozin, Ampicillin, Baclofen, Citalopram, Clarithromycin, Diclofenac, Erythromycin, Penicillins, Promethazine, and Vi-q-tuss [hydrocodone-guaifenesin]    PHYSICAL EXAM  BP (!) 147/82   Pulse 67   Temp 36.3 °C (97.3 °F) (Temporal)   Resp 19   Ht 1.6 m (5' 3\")   Wt 49.9 kg (110 lb)   SpO2 92%   BMI 19.49 kg/m²   Constitutional: Alert in no apparent distress.  HENT: No signs of trauma, Bilateral external ears normal, Nose normal.   Eyes: Pupils are equal and reactive, Conjunctiva normal, Non-icteric.   Neck: Normal range of motion, No tenderness, Supple, No stridor.   Lymphatic: No lymphadenopathy noted.   Cardiovascular: Regular rate and rhythm, no murmurs.   Thorax & Lungs: Normal breath sounds, No respiratory distress, No wheezing, No chest tenderness.   Abdomen: Bowel sounds normal, Soft, No tenderness, No masses, No pulsatile masses. No peritoneal signs.  Skin: Warm, Dry, No erythema, No rash.   Back: No bony tenderness, No CVA tenderness.   Extremities: Intact distal pulses, No edema, No tenderness, No cyanosis, 1+ edema to left lower extremity, 2+ edema to right lower extremity, See neurologic.  Musculoskeletal: Good range of motion in all major joints. No tenderness to palpation or major deformities noted.   Neurologic: Alert , Diminished use of right upper and lower extremity which seems to be at baseline, Mild slurring of words but able to understand, Normal sensory function.. "   Psychiatric: Affect normal, Judgment normal, Mood normal.     DIAGNOSTIC STUDIES & PROCEDURES    EKG Interpretation:  Interpreted by me  12 Lead EKG interpreted by me to show:  Atrial fibrillation  Rate 63  Axis: Normal  Intervals: Normal  Normal T waves  Normal ST segments  My impression of this EKG: Does not indicate ischemia or arrhythmia at this time.     Labs:   Labs Reviewed   CBC WITH DIFFERENTIAL - Abnormal; Notable for the following components:       Result Value    Hematocrit 47.2 (*)     .8 (*)     MCHC 31.1 (*)     RDW 58.3 (*)     Platelet Count 145 (*)     All other components within normal limits    Narrative:     Indicate which anticoagulants the patient is on:->UNKNOWN  Biotin intake of greater than 5 mg per day may interfere with  troponin levels, causing false low values.   COMP METABOLIC PANEL - Abnormal; Notable for the following components:    Glucose 119 (*)     All other components within normal limits    Narrative:     Indicate which anticoagulants the patient is on:->UNKNOWN  Biotin intake of greater than 5 mg per day may interfere with  troponin levels, causing false low values.   PROTHROMBIN TIME - Abnormal; Notable for the following components:    PT 36.7 (*)     INR 3.90 (*)     All other components within normal limits    Narrative:     Indicate which anticoagulants the patient is on:->UNKNOWN  Biotin intake of greater than 5 mg per day may interfere with  troponin levels, causing false low values.   APTT - Abnormal; Notable for the following components:    APTT 51.2 (*)     All other components within normal limits    Narrative:     Indicate which anticoagulants the patient is on:->UNKNOWN  Biotin intake of greater than 5 mg per day may interfere with  troponin levels, causing false low values.   TROPONIN - Abnormal; Notable for the following components:    Troponin T 29 (*)     All other components within normal limits    Narrative:     Indicate which anticoagulants the  patient is on:->UNKNOWN  Biotin intake of greater than 5 mg per day may interfere with  troponin levels, causing false low values.   URINALYSIS,CULTURE IF INDICATED - Abnormal; Notable for the following components:    Protein >=1000 (*)     Occult Blood Moderate (*)     All other components within normal limits    Narrative:     Indication for culture:->Patient WITHOUT an indwelling Estes  catheter in place with new onset of Dysuria, Frequency,  Urgency, and/or Suprapubic pain   ESTIMATED GFR - Abnormal; Notable for the following components:    GFR (CKD-EPI) 40 (*)     All other components within normal limits    Narrative:     Indicate which anticoagulants the patient is on:->UNKNOWN  Biotin intake of greater than 5 mg per day may interfere with  troponin levels, causing false low values.   URINE MICROSCOPIC (W/UA) - Abnormal; Notable for the following components:    RBC 10-20 (*)     Bacteria Few (*)     All other components within normal limits    Narrative:     Indication for culture:->Patient WITHOUT an indwelling Estes  catheter in place with new onset of Dysuria, Frequency,  Urgency, and/or Suprapubic pain   CBC WITH DIFFERENTIAL - Abnormal; Notable for the following components:    Hematocrit 50.4 (*)     .1 (*)     MCHC 31.3 (*)     RDW 57.1 (*)     Platelet Count 124 (*)     All other components within normal limits    Narrative:     Fasting   COD (ADULT)   CORRECTED CALCIUM    Narrative:     Indicate which anticoagulants the patient is on:->UNKNOWN  Biotin intake of greater than 5 mg per day may interfere with  troponin levels, causing false low values.   COMP METABOLIC PANEL    Narrative:     Fasting   LIPID PROFILE    Narrative:     Fasting   URINALYSIS   CORRECTED CALCIUM   ESTIMATED GFR     All labs reviewed by me.    Radiology:   The attending Emergency Physician has independently interpreted the diagnostic imaging associated with this visit and is awaiting the final reading from the  radiologist, which will be displayed below.    My interpretation of the patient's CT head shows no bleeding.  The x-ray is concerning for potential pneumonia.    CT-HEAD W/O   Final Result         1.  No acute intracranial abnormality is identified, there are nonspecific white matter changes, commonly associated with small vessel ischemic disease.  Associated mild cerebral atrophy is noted.   2.  Atherosclerosis.         DX-CHEST-PORTABLE (1 VIEW)   Final Result         1.  Bilateral lower lobe infiltrates.   2.  Small layering bilateral pleural effusions      EC-ECHOCARDIOGRAM COMPLETE W/O CONT    (Results Pending)      COURSE & MEDICAL DECISION MAKING    ED Observation Status? Yes; I am placing the patient in to an observation status due to a diagnostic uncertainty as well as therapeutic intensity. Patient placed in observation status at 9:01 PM, 2/5/2023.     Observation plan is as follows: Serial exams and reassessment after labs return.    Upon Reevaluation, the patient's condition has: not improved; and will be escalated to hospitalization.    Patient discharged from ED Observation status at 9:41 PM, 2/5/2023.     INITIAL ASSESSMENT AND PLAN  Care Narrative:       8:46 PM - Patient seen and evaluated at bedside. Discussed plan of care, including labs and imaging. Patient agrees to plan of care. Ordered CT-Head w/o, DX-Chest, UA, Culture if Indicated, CBC w/ Diff, CMP, Prothrombin Time, APTT, COD, Troponin, and EKG to evaluate.    9:40 PM - Paged Hospitalist    9:55 PM - I discussed the patient's case and the above findings with Dr. Hawkins (Hospitalist) who will assess the patient for hospitalization.     ADDITIONAL PROBLEM LIST AND DISPOSITION  Patient presents with right-sided weakness that does appear to be potentially worse from prior.  On exam the patient is not confused but in a slightly baseline.  Patient does have a mildly elevated troponin however this appears to be close to baseline.  INR is  significantly elevated.  Patient does have few bacteria as well as potential pneumonia.  The patient will be getting antibiotics.  Patient to be admitted to the hospitalist.  Patient has no significant anemia or leukocytosis.               DISPOSITION AND DISCUSSIONS  I have discussed management of the patient with the following physicians and KINDRA's: Dr. Hawkins (Hospitalist)    Discussion of management with other [facility] or appropriate source(s): None         Barriers to care at this time, including but not limited to:  none .     Decision tools and prescription drugs considered including, but not limited to: Antibiotics for pneumonia and bacteria in the urine. .    DISPOSITION:  Patient will be hospitalized by Dr. Hawkins in guarded condition.    FINAL IMPRESSION   1. Right sided weakness    2. Slurred speech    3. Urinary tract infection without hematuria, site unspecified    4. Pneumonia due to infectious organism, unspecified laterality, unspecified part of lung       Sy SHEPARD (Scribe), am scribing for, and in the presence of, Alex Gary M.D..    Electronically signed by: Sy Pérez (Scribe), 2/5/2023    IAlex M.D. personally performed the services described in this documentation, as scribed by Sy Pérez in my presence, and it is both accurate and complete.    The note accurately reflects work and decisions made by me.  Alex Gary M.D.  2/6/2023  1:28 AM

## 2023-02-06 NOTE — DISCHARGE PLANNING
[facility] Transitional Care Coordination    Referral from: Dr. Arguello    Insurance Provider on Facesheet: MCR/AET[employer]    Potential Rehab Diagnosis: CVA?    Chart review indicates patient may have on going medical management and may have therapy needs to possibly meet inpatient rehab facility criteria with the goal of returning to community.    D/C support will need to be verified: Son    Physiatry consultation pended per protocol.  W/U & OT pending.     Thank you for the referral.     [facility] per Lianet ACE

## 2023-02-06 NOTE — ED NOTES
CALLED FLOOR RN TO NOTIFY PT IS READY FOR TRANSFER.   
[facility]ed [facility], waiting for [facility] to be faxed.  Unable to complete med rec at this time.  
ERP AT BEDSIDE.  
Med rec updated and complete. Per [facility].  Pt finished a course of Macrobid on 02/05/23      Home pharmacy [facility] [phone]  
PT REQUESTED THAT I LET THE DOCTOR KNOW THAT WHEN SHE HAS A BOWEL MOVEMENT SHE STATES IT IS VERY PAINFUL. PT STATES SHE ASKED HER PCP FOR A COLONOSCOPY TO RULE OUT CANCER. HOSPITALIST NOTIFIED VIA TEXT.   
PT SON ARAM NOTIFIED THAT MOTHER WILL BE TRANSFERRED TO T [id number]. SON VERBALIZED UNDERSTANDING AND WILL BE HER TOMORROW.   
PT STEP-SON CALLED AND WAS UPDATED (OK VERBAL FROM PT). ARAM STATES HE WILL BE UP TOMORROW TO VISIT.   
Patient returned from imaging  
Patient transported to imaging  
REPORT GIVEN TO EVELIO MEDEL TELE  
X-RAY AT BEDSIDE  
No significant past surgical history

## 2023-02-06 NOTE — DISCHARGE PLANNING
Received [facility] form at [id number]  Agency/Facility Name: [facility]   Referral sent per [facility] form @ 0700

## 2023-02-06 NOTE — CARE PLAN
The patient is Stable - Low risk of patient condition declining or worsening    Shift Goals  Clinical Goals: monitor vitals    Progress made toward(s) clinical / shift goals:    Problem: Knowledge Deficit - Standard  Goal: Patient and family/care givers will demonstrate understanding of plan of care, disease process/condition, diagnostic tests and medications  Outcome: Progressing     Problem: Skin Integrity  Goal: Skin integrity is maintained or improved  Outcome: Progressing     Problem: Fall Risk  Goal: Patient will remain free from falls  Outcome: Progressing       Patient is not progressing towards the following goals:

## 2023-02-06 NOTE — DISCHARGE PLANNING
RNCM spoke to pt and family bedside to inquire choice: pt and family stated concern for Acute Rehab 3hr/day; they have collectively chose [facility] for her rehab short stay.

## 2023-02-06 NOTE — NON-PROVIDER
Hospital Medicine History and Physical    Date of Service  2/6/2023    Chief Complaint  Chief Complaint   Patient presents with    Cardiomyopathy (Non-ischemic)    Possible Stroke     PT WITH HX CVA RIGHT SIDE DEFICITS.  ASSISTED LIVING STATES PT BEGAN TO HAVE INCREASED SLURRED SPEECH ON Friday AND ALL LAST WEEK THEY FEEL HER RIGHT SIDE OF BODY HAD INCREASED WEAKNESS. PT THINKS RIGHT SIDE FEELS THE SAME.     Leg Swelling     BILATERAL LEG SWELLING       History of Presenting Illness  Ms.Lavon Tripp is a 88 y.o. female with a PMHx of atrial flutter, pulmonary hypertension, stroke, hypothyroidism, and wegener granulomatosis, who presented 2/5/2023 with worsening right-sided weakness. Patient states that for the last two weeks, assisted living staff at the patient's residence have noticed increasing weakness in her right arm and leg as well as slurred speech. The patient has also had associated SOB, cough, and bilateral lower extremity swelling. The patient has also had a recent UTI for which she completed an oral antibiotic regimen. She states that she has not felt significantly different from her baseline, and has come to the ED more so at the direction of her caregivers. The patient has a history of stroke 10 years ago that has left her with residual right sided weakness in her proximal arm and leg, but she was still able to ambulate prior coming to the hospital via walker. Patient denies fever, chills, fatigue, angina, palpitations, visual changes, dizziness, numbness, significant changes in weakness from baseline, or tingling. The patient states her UTI symptoms consisted of dysuria and urgency, however no hematuria.      In the ED, the patient had a CT Head completed which did not demonstrate any acute intracranial abnormality besides chronic small vessel ischemic disease. Chest XR demonstrated bilateral lower lobe infiltrates and bilateral pleural effusions. Patient was hypertensive on presentation at 147/82.      Primary Care Physician  TOM Lopez    Code Status  DNR/DNI    Review of Systems  ROS    Gen: positive for fatigue; denies fever, chills,   HEENT: denies headache, vertigo  Pulm: positive for cough, SOB, dyspnea   CV: denies diaphoresis, angina, syncope, palpitations   GI: denies changes in appetite, vomiting, diarrhea   Neuro: denies dizziness, visual changes, numbness, tingling, weakness   Urinary: positive for dysuria and urgency; denies hematuria    Past Medical History  Past Medical History:   Diagnosis Date    Leukocytosis 7/21/2020    Closed right hip fracture ([facility]) 6/19/2019    Abdominal pain 3/20/2019    Hematuria 3/20/2019    Supratherapeutic INR 12/20/2018    Severe protein-calorie malnutrition (Mahmood: less than 60% of standard weight) ([facility]) 12/20/2018    Sepsis ([facility]) 12/20/2018    DIAZ (acute kidney injury) ([facility]) 12/20/2018    Abnormal chest x-ray 9/19/2017    Improved but persistent bilateral airspace opacities 9/11/17    Chronic fatigue 9/19/2017    Bronchiectasis without complication ([facility]) 9/19/2017    Abdominal bruit 9/25/2012    H/O echocardiogram 9/21/2012    First degree atrioventricular block 9/21/2012    Palpitations 9/21/2012    Stroke ([facility]) 9/21/2012    Allergic rhinitis 9/21/2012    Aseptic necrosis of bone, 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    Rheumatic fever 9/21/2012    Rheumatic fever 9/21/2012    Childhood     SVT (SUPRAVENTRICULAR TACHYCARDIA), h/o paroxysmal 8/3/2011    Arterial ischemic stroke, MCA (middle cerebral artery), left, acute ([facility]) 8/3/2011    Glomerulonephritis, chronic in other disease 8/3/2011    Achalasia 8/3/2011    Wegener's granulomatosis 8/3/2011     IMO Spring 2021    A fib 9/22/06    Hypothyroid 10/2002    Kidney disorder 1998    left kidney biopsy--glomerulonephritis and wegners dx    DVT 6/1996    left leg, vein ligation performed on saphenous vein    Kidney failure     stage II    Mitral valve prolapse     Wegener's granulomatosis        Surgical  History  Past Surgical History:   Procedure Laterality Date    PB PARTIAL HIP REPLACEMENT Right 6/19/2019    Procedure: HEMIARTHROPLASTY, HIP;  Surgeon: Raul Saldivar M.D.;  Location: SURGERY [facility];  Service: Orthopedics    CATARACT EXTRACTION  2006    left eye    CHOLECYSTECTOMY  1997    TEMPORAL ARTERY BIOPSY  1996    normal    LUNG NEEDLE BIOPSY  1996    right lung, nodules found    ARTHROSCOPE  1989    left knee    ARTHROSCOPY, KNEE  1986    right    BUNIONECTOMY  1980    bilat    HYSTERECTOMY RADICAL  1975    VEIN LIGATION         Medications  No current facility-administered medications on file prior to encounter.     Current Outpatient Medications on File Prior to Encounter   Medication Sig Dispense Refill    Biotin 1 MG Cap Take 1 mg by mouth every day.      multivitamin Tab Take 2 Tablets by mouth every day.      polyethylene glycol/lytes (MIRALAX) 17 g Pack Take 17 g by mouth every day.      ondansetron (ZOFRAN ODT) 4 MG TABLET DISPERSIBLE Take 4 mg by mouth every 8 hours as needed for Nausea/Vomiting.      nitrofurantoin (MACROBID) 100 MG Cap Take 1 Capsule by mouth 2 times a day for 14 days. For severe UTI 28 Capsule 0    magnesium oxide (MAG-OX) 400 MG Tab tablet Take 400 mg by mouth every 48 hours.      acetaminophen (TYLENOL) 500 MG Tab Take 1,000 mg by mouth 3 times a day. Give at 0800, 1400, & 1800      D-Mannose 500 MG Cap Take 500 mg by mouth every morning.      Calcium Carbonate (CALCIUM 500 PO) Take 500 mg by mouth every day.      warfarin (COUMADIN) 2 MG Tab Take 1-2 mg by mouth every evening. 1 mg = Tuesday and Saturday  2 mg = Monday, Wednesday, Thursday, Friday , Sunday      methocarbamol (ROBAXIN) 500 MG Tab Take 1 Tablet by mouth 2 times a day. For back pain and muscle spasms stripes      gabapentin (NEURONTIN) 100 MG Cap Take 1 Capsule by mouth 2 times a day. For neuropathy. Discontinue all previous orders. 60 Capsule 5    Lidocaine 4 % Patch Apply 1 Patch topically 1 time a  day as needed (Apply's on most painful areasas need for pain). Please do not send Family Provides 30 Patch 3    sennosides-docusate sodium (SENOKOT-S) 8.6-50 MG tablet Take 2 Tablets by mouth at bedtime. Discontinue all previous orders of senna plus. Start 2 tablets by mouth at bedtime for constipation.  Indications: Constipation 60 Tablet 11    folic acid (FOLVITE) 1 MG Tab Take 1 Tablet by mouth every morning. For supplement 30 Tablet 11    levothyroxine (SYNTHROID) 88 MCG Tab Take 1 Tablet by mouth every morning on an empty stomach. For thyroid 30 Tablet 11    metoprolol tartrate (LOPRESSOR) 50 MG Tab Take 1 Tablet by mouth 2 times a day. 180 Tablet 2    tamsulosin (FLOMAX) 0.4 MG capsule Take 1 Capsule by mouth every morning. For kindey 30 Capsule 11       Family History  Family History   Problem Relation Age of Onset    Other Father         Aortic aneurysm    Cancer Brother         Lung    Non-contributory Other        Social History  Social History     Tobacco Use    Smoking status: Former     Packs/day: 1.00     Types: Cigarettes     Quit date: 1960     Years since quittin.1    Smokeless tobacco: Never    Tobacco comments:     very little years and years ago   Vaping Use    Vaping Use: Never used   Substance Use Topics    Alcohol use: Yes     Alcohol/week: 0.0 oz     Comment: occasional    Drug use: No       Allergies  Allergies   Allergen Reactions    Cephalexin [Keflex] Swelling    Hydroxyzine Swelling     Eyes get itchy and swollen     Naprelan [Naproxen] Swelling     Eyes get itchy become red and swollen    Oxaprozin Swelling     Swelling eyes, and itchy    Ampicillin Rash     Red Rash    Baclofen Unspecified     drowsiness    Citalopram Vomiting and Nausea    Clarithromycin Unspecified     Pt reports that this medication plugs her ears.     Diclofenac Rash and Swelling     Red rash & swelling      Erythromycin Diarrhea     GI upset    Penicillins Rash     Red rash      Promethazine Unspecified      Drowsiness and sleeps    Vi-Q-Tuss [Hydrocodone-Guaifenesin] Vomiting and Nausea        Physical Exam  Laboratory   Hemodynamics  Temp (24hrs), Av.4 °C (97.6 °F), Min:36.3 °C (97.3 °F), Max:36.6 °C (97.9 °F)   Temperature: 36.6 °C (97.9 °F), Monitored Temp: 36.2 °C (97.2 °F)  Pulse  Av.6  Min: 61  Max: 76    Blood Pressure : 136/74      Respiratory      Respiration: 16, Pulse Oximetry: 90 %             Physical Exam  Gen: NAD; alert and interactive; resting in bed  HEENT: NC/AT; PERRL  Pulm: normal WOB; no wheezing, rhonchi, or rales; decreased breath sounds in bilateral lower lung fields   CV: normal rate and rhythm; S1/S2 auscultated; no murmurs, rubs, or gallops  Abd: soft; non-distended; normoactive bowel sounds   MSK: pain and swelling in lower extremities; left calf tender to palpation   Neuro: Aox4; CN II-XII grossly intact; sensation normal throughout; strength 2/5 in proximal R arm; 4/5 in distal R arm; 4/5 in proximal and distal L arm; 3/5 strength in proximal R leg, 4/5 in distal R leg; 4/5 in proximal and distal L leg; relfexes symmetric and 2+ throughout    Recent Labs     23   WBC 5.3 5.0   RBC 4.59 4.89   HEMOGLOBIN 14.7 15.8   HEMATOCRIT 47.2* 50.4*   .8* 103.1*   MCH 32.0 32.3   MCHC 31.1* 31.3*   RDW 58.3* 57.1*   PLATELETCT 145* 124*   MPV 10.1 10.2     Recent Labs     23  005   SODIUM 136 136   POTASSIUM 3.8 3.6   CHLORIDE 101 104   CO2 25 22   GLUCOSE 119* 98   BUN 17 16   CREATININE 1.29 1.18   CALCIUM 9.1 9.0     Recent Labs     23  005   ALTSGPT 8 6   ASTSGOT 23 17   ALKPHOSPHAT 70 63   TBILIRUBIN 0.4 0.5   GLUCOSE 119* 98     Recent Labs     232 23  0246   APTT 51.2*  --    INR 3.90* 4.15*         Recent Labs     23  0054   TRIGLYCERIDE 91   HDL 59   LDL 40     Lab Results   Component Value Date    TROPONINI <0.01 2013     Urinalysis:  Lab Results   Component Value  Date/Time    SPECGRAVITY 1.032 02/05/2023 2149    GLUCOSEUR Negative 02/05/2023 2149    KETONES Negative 02/05/2023 2149    NITRITE Negative 02/05/2023 2149    WBCURINE 2-5 02/05/2023 2149    RBCURINE 10-20 (A) 02/05/2023 2149    BACTERIA Few (A) 02/05/2023 2149    EPITHELCELL Few 02/05/2023 2149        Imaging    CT Head 2/5/23  - no acute intracranial abnormality is identified   - atherosclerosis present     - Dx- Chest 2/5/23  - bilateral lower lobe infiltrates   - small layering bilateral pleural effusions      Assessment/Plan     Mr. Az Tripp is a 89 YO Female who presents with worsening right sided deficits in her arm and leg    Right Sided Weakness   - This patient has a hx of stroke 10 years ago that has left her with residual right sided weakness. Patients acute worsening of these symptoms may be due to a new stroke, however CT- Head did not demonstrate any new intracranial abnormality consistent with a stroke or hemorrhage. Given that patient has recently had a UTI treated with antibiotics, and that the patient does not feel severely worse from her baseline, this may just be due to fatigue and inadequate ambulated at assisted living  - PT/OT evaluation is pending     Edema   - CXR demonstrates bilateral pleural effusions and infiltrates raising concern for HF; the patient also has a lower extremity edema; the patient's echo demonstrates increase in PASP as well as severe tricuspid regurgitation and elevated right ventricular pressure at 50 mmHg; more likely the cause, the patient has low oncotic pressure due to significant urine loss as noted in her UA; this may be a result of an acute exacerbation of her jules granulomatosis causing nephrotic syndrome  - Sed rate and CRP are pending to assess for possible acute exacerbation of jules granulomatosis     Hypothyroidism  - patient has a long standing hx of hypothyroidism treated with levothyroxine; patient is compliant with medication regimen   -  Continue home thyroid medications     Assessment & plan notes cannot be loaded without a specified hospital service.      VTE prophylaxis:

## 2023-02-06 NOTE — THERAPY
Occupational Therapy   Initial Evaluation     Patient Name: Az Tripp  Age:  88 y.o., Sex:  female  Medical Record #: [id number]  Today's Date: 2/6/2023     Precautions  Precautions: Fall Risk  Comments: chronic R sided deficits    Assessment  Patient is 88 y.o. female with a diagnosis of possible CVA.   Pt currently limited by decreased functional mobility, activity tolerance, strength, balance, and pain which are affecting pt's ability to complete ADLs/IADLs at baseline. Pt would benefit from OT services in the acute care setting to maximize functional recovery.    Plan    Occupational Therapy Initial Treatment Plan   Treatment Interventions: (P) Self Care / Activities of Daily Living, Therapeutic Activity  Treatment Frequency: (P) 3 Times per Week  Duration: (P) Until Therapy Goals Met       Discharge Recommendations: (P) Recommend post-acute placement for additional occupational therapy services prior to discharge home (can go back to [facility] if they are able to provide physical assistance.)        02/06/23 0857   Prior Living Situation   Prior Services Housekeeping / Homemaker Services   Housing / Facility Assisted Living Residence  ([facility])   Equipment Owned 4-Wheel Walker   Lives with - Patient's Self Care Capacity Alone and Able to Care For Self   Comments per pt- she is independent, gets house cleanning, and meals   Prior Level of ADL Function   Self Feeding Independent   Grooming / Hygiene Independent   Bathing Independent   Dressing Independent   Toileting Independent   ADL Assessment   Grooming Supervision   Upper Body Dressing Minimal Assist   Lower Body Dressing Minimal Assist   Functional Mobility   Sit to Stand Minimal Assist   Bed, Chair, Wheelchair Transfer Minimal Assist   Short Term Goals   Short Term Goal # 1 supervised with UB dressing   Short Term Goal # 2 supervised with LB dressing   Short Term Goal # 3 supervised with ADL txfs   Occupational Therapy Initial Treatment Plan     Treatment Interventions Self Care / Activities of Daily Living;Therapeutic Activity   Treatment Frequency 3 Times per Week   Duration Until Therapy Goals Met   Anticipated Discharge Equipment and Recommendations   Discharge Recommendations Recommend post-acute placement for additional occupational therapy services prior to discharge home  (can go back to [facility] if they are able to provide physical assistance.)

## 2023-02-06 NOTE — DISCHARGE PLANNING
Case Management Discharge Planning    Admission Date: 2/5/2023  GMLOS: 5.7  ALOS: 1    6-Clicks ADL Score: 12  6-Clicks Mobility Score: 12  PT and/or OT Eval ordered: Yes  Post-acute Referrals Ordered: No  Post-acute Choice Obtained: No  Has referral(s) been sent to post-acute provider:  No      Anticipated Discharge Dispo: Discharge Disposition: D/T to SNF with Medicare cert in anticipation of skilled care (03)  Per chart review, pt has [facility]      Medically Clear: No    Next Steps:  f/u with pt and medical team to discuss dc needs and barriers.    Barriers to Discharge: Medical clearance, Pending Placement, and Pending PT Evaluation

## 2023-02-06 NOTE — PROGRESS NOTES
4 Eyes Skin Assessment Completed by EVLEIO Jett and EVELIO Valladares.    Head WDL  Ears WDL  Nose WDL  Mouth WDL  Neck WDL  Breast/Chest WDL  Shoulder Blades WDL  Spine WDL  (R) Arm/Elbow/Hand Edema  (L) Arm/Elbow/Hand WDL  Abdomen WDL  Groin WDL  Scrotum/Coccyx/Buttocks Redness and Non-Blanching  (R) Leg Edema  (L) Leg WDL  (R) Heel/Foot/Toe Redness, blanching  (L) Heel/Foot/Toe Blanching, redness          Devices In Places Tele Box, Blood Pressure Cuff, Pulse Ox, and Estes      Interventions In Place Heel Mepilex, Pillows, and Q2 Turns    Possible Skin Injury No    Pictures Uploaded Into Epic Yes  Wound Consult Placed Yes  RN Wound Prevention Protocol Ordered Yes

## 2023-02-06 NOTE — H&P
Hospital Medicine History & Physical Note    Date of Service  2/5/2023    Primary Care Physician  TOM Lopez    Consultants      Specialist Names:     Code Status  DNAR/DNI    Chief Complaint  Chief Complaint   Patient presents with    Cardiomyopathy (Non-ischemic)    Possible Stroke     PT WITH HX CVA RIGHT SIDE DEFICITS.  ASSISTED LIVING STATES PT BEGAN TO HAVE INCREASED SLURRED SPEECH ON Friday AND ALL LAST WEEK THEY FEEL HER RIGHT SIDE OF BODY HAD INCREASED WEAKNESS. PT THINKS RIGHT SIDE FEELS THE SAME.     Leg Swelling     BILATERAL LEG SWELLING       History of Presenting Illness  Az Tripp is a 88 y.o. female who presented 2/5/2023 with past medical history of atrial flutter on Coumadin, pulmonary hypertension, history of stroke, history of lumbar radiculopathy, hypothyroidism, Wegener granulomatosis who was noticed by her assisted living facility that she has worsening right-sided weakness and slurred speech.  Patient states that these are similar symptoms to the stroke that she had 10 years ago.  She notices that she has right-sided weakness that worsens during a urine infection.  She is also complaining of shortness of breath, cough and lower extremity swelling.  Patient also has dysuria and frequency  She denies any abdominal pain, fever, nausea, vomiting, diarrhea.  Patient normally uses a walker to get around.    Chest x-ray found acute pulmonary edema and bilateral pleural effusions  EKG found atrial fibrillation is rate controlled    I discussed the plan of care with patient.    Review of Systems  Review of Systems   Constitutional:  Negative for chills, diaphoresis, fever and malaise/fatigue.   HENT:  Negative for congestion, ear discharge, ear pain, hearing loss, nosebleeds, sinus pain, sore throat and tinnitus.    Eyes:  Negative for blurred vision, double vision, photophobia and pain.   Respiratory:  Positive for shortness of breath. Negative for cough, hemoptysis,  sputum production, wheezing and stridor.    Cardiovascular:  Negative for chest pain, palpitations, orthopnea, claudication, leg swelling and PND.   Gastrointestinal:  Negative for abdominal pain, blood in stool, constipation, diarrhea, heartburn, melena, nausea and vomiting.   Genitourinary:  Positive for dysuria and urgency. Negative for flank pain, frequency and hematuria.   Musculoskeletal:  Negative for back pain, falls, joint pain, myalgias and neck pain.   Skin:  Negative for itching and rash.   Neurological:  Negative for dizziness, tingling, tremors, weakness and headaches.   Endo/Heme/Allergies:  Negative for environmental allergies and polydipsia. Does not bruise/bleed easily.   Psychiatric/Behavioral:  Negative for depression, hallucinations, substance abuse and suicidal ideas.      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), Bronchiectasis without complication ([facility]) (9/19/2017), Chronic fatigue (9/19/2017), Closed right hip fracture ([facility]) (6/19/2019), DVT (6/1996), First degree atrioventricular block (9/21/2012), Glomerulonephritis, chronic in other disease (8/3/2011), H/O echocardiogram (9/21/2012), Hematuria (3/20/2019), Hypothyroid (10/2002), Kidney disorder (1998), Kidney failure, Leukocytosis (7/21/2020), Mitral valve prolapse, Palpitations (9/21/2012), Rheumatic fever (9/21/2012), Rheumatic fever (9/21/2012), Sepsis ([facility]) (12/20/2018), Severe protein-calorie malnutrition (Mahmood: less than 60% of standard weight) ([facility]) (12/20/2018), Stroke ([facility]) (9/21/2012), Supratherapeutic INR (12/20/2018), SVT (SUPRAVENTRICULAR TACHYCARDIA), h/o paroxysmal  (8/3/2011), Wegener's granulomatosis, and Wegener's granulomatosis (8/3/2011).    Surgical History   has a past surgical history that includes bunionectomy (1980); arthroscopy, knee (1986); arthroscope (1989); temporal artery biopsy (1996); lung needle biopsy (1996); cataract extraction (2006); cholecystectomy (1997); hysterectomy radical (1975); vein ligation; and pr partial hip replacement (Right, 6/19/2019).     Family History  family history includes Cancer in her brother; Non-contributory in an other family member; Other in her father.   Family history reviewed with patient. There is no family history that is pertinent to the chief complaint.     Social History   reports that she quit smoking about 63 years ago. Her smoking use included cigarettes. She smoked an average of 1 pack per day. She has never used smokeless tobacco. She reports current alcohol use. She reports that she does not use drugs.    Allergies  Allergies   Allergen Reactions    Cephalexin [Keflex] Swelling    Hydroxyzine Swelling     Eyes get itchy and swollen     Naprelan [Naproxen] Swelling     Eyes get itchy become red and swollen    Oxaprozin Swelling     Swelling eyes, and itchy    Ampicillin Rash     Red Rash    Baclofen Unspecified     drowsiness    Citalopram Vomiting and Nausea    Clarithromycin Unspecified     Pt reports that this medication plugs her ears.     Diclofenac Rash and Swelling     Red rash & swelling      Erythromycin Diarrhea     GI upset    Penicillins Rash     Red rash      Promethazine Unspecified     Drowsiness and sleeps    Vi-Q-Tuss [Hydrocodone-Guaifenesin] Vomiting and Nausea       Medications  Prior to Admission Medications   Prescriptions Last Dose Informant Patient Reported? Taking?   Biotin 1 MG Cap  MAR from Other Facility Yes No   Sig: Take 1 mg by mouth every morning.   Calcium Carbonate (CALCIUM 500 PO)  MAR from Other Facility Yes No   Sig: Take 500 mg by mouth every day.   D-Mannose 500 MG Cap  MAR  from Other Facility Yes No   Sig: Take 500 mg by mouth every morning.   Lidocaine 4 % Patch  MAR from Other Facility No No   Sig: Apply 1 Patch topically 1 time a day as needed (Apply's on most painful areasas need for pain). Please do not send Family Provides   acetaminophen (TYLENOL) 500 MG Tab  MAR from Other Facility Yes No   Sig: Take 1,000 mg by mouth 3 times a day. Give at 0800, 1400, & 1800   diphenhydrAMINE (BENADRYL ALLERGY) 25 MG Tab   No No   Sig: Take 1 Tablet by mouth at bedtime for 14 days. For allergic reaction   famotidine (PEPCID) 20 MG Tab   No No   Sig: Take 1 Tablet by mouth 2 times a day for 14 days.   folic acid (FOLVITE) 1 MG Tab  MAR from Other Facility No No   Sig: Take 1 Tablet by mouth every morning. For supplement   gabapentin (NEURONTIN) 100 MG Cap  MAR from Other Facility No No   Sig: Take 1 Capsule by mouth 2 times a day. For neuropathy. Discontinue all previous orders.   ketotifen (ZADITOR) 0.025 % ophthalmic solution  MAR from Other Facility Yes No   Sig: Administer 2 Drops into both eyes 4 times a day as needed (dry eyes).   levothyroxine (SYNTHROID) 88 MCG Tab  MAR from Other Facility No No   Sig: Take 1 Tablet by mouth every morning on an empty stomach. For thyroid   magnesium oxide (MAG-OX) 400 MG Tab tablet  MAR from Other Facility Yes No   Sig: Take 400 mg by mouth every 48 hours.   methocarbamol (ROBAXIN) 500 MG Tab  MAR from Other Facility Yes No   Sig: Take 1 Tablet by mouth 2 times a day. For back pain and muscle spasms stripes   metoprolol tartrate (LOPRESSOR) 50 MG Tab  MAR from Other Facility No No   Sig: Take 1 Tablet by mouth 2 times a day.   nitrofurantoin (MACROBID) 100 MG Cap   No No   Sig: Take 1 Capsule by mouth 2 times a day for 14 days. For severe UTI   ondansetron (ZOFRAN ODT) 4 MG TABLET DISPERSIBLE  MAR from Other Facility No No   Sig: DISSOLVE 1 TABLET ON THE TONGUE EVERY 8 HOURS AS NEEDED FOR NAUSEA.   sennosides-docusate sodium (SENOKOT-S) 8.6-50 MG  tablet  MAR from Other Facility No No   Sig: Take 2 Tablets by mouth at bedtime. Discontinue all previous orders of senna plus. Start 2 tablets by mouth at bedtime for constipation.  Indications: Constipation   tamsulosin (FLOMAX) 0.4 MG capsule  Family Member, MAR from Other Facility No No   Sig: Take 1 Capsule by mouth every morning. For petey   warfarin (COUMADIN) 2 MG Tab  MAR from Other Facility Yes No   Sig: Take 1-2 mg by mouth every evening. 1 mg (1/2 X 2 mg tablet ) on Saturday   2 mg tablet all other days      Facility-Administered Medications: None       Physical Exam  Temp:  [36.3 °C (97.3 °F)] 36.3 °C (97.3 °F)  Pulse:  [61-67] 64  Resp:  [18-19] 18  BP: (147-150)/(79-82) 150/81  SpO2:  [92 %-94 %] 94 %  Blood Pressure : (!) 147/79   Temperature: 36.3 °C (97.3 °F)   Pulse: 61   Respiration: 18   Pulse Oximetry: 93 %       Physical Exam  Vitals and nursing note reviewed.   Constitutional:       General: She is not in acute distress.     Appearance: Normal appearance. She is not ill-appearing, toxic-appearing or diaphoretic.   HENT:      Head: Normocephalic and atraumatic.      Nose: No congestion or rhinorrhea.      Mouth/Throat:      Pharynx: No oropharyngeal exudate or posterior oropharyngeal erythema.   Eyes:      General: No scleral icterus.  Neck:      Vascular: JVD present. No carotid bruit.   Cardiovascular:      Rate and Rhythm: Normal rate and regular rhythm.      Pulses: Normal pulses.      Heart sounds: Normal heart sounds. No murmur heard.    No friction rub. No gallop.   Pulmonary:      Effort: Pulmonary effort is normal. No respiratory distress.      Breath sounds: No stridor. Rales present. No wheezing or rhonchi.   Abdominal:      General: Abdomen is flat. There is no distension.      Palpations: There is no mass.      Tenderness: There is no abdominal tenderness. There is no left CVA tenderness, guarding or rebound.      Hernia: No hernia is present.   Musculoskeletal:         General:  No swelling. Normal range of motion.      Cervical back: No rigidity. No muscular tenderness.      Right lower leg: Edema present.      Left lower leg: Edema present.   Lymphadenopathy:      Cervical: No cervical adenopathy.   Skin:     General: Skin is warm and dry.      Capillary Refill: Capillary refill takes more than 3 seconds.      Coloration: Skin is not jaundiced or pale.      Findings: No bruising or erythema.   Neurological:      Mental Status: She is alert.       Laboratory:  Recent Labs     02/05/23 2042   WBC 5.3   RBC 4.59   HEMOGLOBIN 14.7   HEMATOCRIT 47.2*   .8*   MCH 32.0   MCHC 31.1*   RDW 58.3*   PLATELETCT 145*   MPV 10.1     Recent Labs     02/05/23 2042   SODIUM 136   POTASSIUM 3.8   CHLORIDE 101   CO2 25   GLUCOSE 119*   BUN 17   CREATININE 1.29   CALCIUM 9.1     Recent Labs     02/05/23 2042   ALTSGPT 8   ASTSGOT 23   ALKPHOSPHAT 70   TBILIRUBIN 0.4   GLUCOSE 119*     Recent Labs     02/05/23 2042   APTT 51.2*   INR 3.90*     No results for input(s): NTPROBNP in the last 72 hours.      Recent Labs     02/05/23 2042   TROPONINT 29*       Imaging:  CT-HEAD W/O   Final Result         1.  No acute intracranial abnormality is identified, there are nonspecific white matter changes, commonly associated with small vessel ischemic disease.  Associated mild cerebral atrophy is noted.   2.  Atherosclerosis.         DX-CHEST-PORTABLE (1 VIEW)   Final Result         1.  Bilateral lower lobe infiltrates.   2.  Small layering bilateral pleural effusions      EC-ECHOCARDIOGRAM COMPLETE W/O CONT    (Results Pending)       X-Ray:  I have personally reviewed the images and compared with prior images.  EKG:  I have personally reviewed the images and compared with prior images.    Assessment/Plan:  Justification for Admission Status  I anticipate this patient will require at least two midnights for appropriate medical management, necessitating inpatient admission because acute CHF    Patient will  need a Telemetry bed on MEDICAL service .  The need is secondary to acute CHF.    * Right sided weakness  Assessment & Plan  This is likely a perfusion issue from her previous stroke  She is either hypoxic and having recurrent stroke symptoms or she has a urine infection  Patient states that she is DNR/DNI does not want aggressive treatment  PT OT eval    Acute diastolic CHF (congestive heart failure) ([facility])- (present on admission)  Assessment & Plan  Patient presented with volume overload including pulmonary edema and bilateral pleural effusion  She does not have chest pain and troponins are baseline  cardiac echo  IV Lasix  Low-salt diet  Strict ins and outs and daily weights  Monitor on telemetry    Acute cystitis with hematuria- (present on admission)  Assessment & Plan  symptoms includes dysuria and frequency  Start IV ceftriaxone  Previous cultures found pansensitive E. coli      Acute respiratory failure with hypoxia ([facility])  Assessment & Plan  On 2 L of O2 above baseline    CKD (chronic kidney disease) stage 3, GFR 30-59 ml/min ([facility])- (present on admission)  Assessment & Plan  Monitor BMP and assess response  Avoid IV contrast/nephrotoxins/NSAIDs  Dose adjust meds for decreased GFR        Hypothyroidism- (present on admission)  Assessment & Plan  Continue home thyroid medications        VTE prophylaxis: SCDs/TEDs

## 2023-02-06 NOTE — PROGRESS NOTES
Inpatient Anticoagulation Service Note for 2023    Reason for Anticoagulation: Atrial Fibrillation  Target INR: 2.0 to 3.0    [id number] VASc Score: 8  HAS-BLED Score: 2    Hemoglobin Value: 15.8  Hematocrit Value: (!) 50.4  Lab Platelet Value: (!) 124    INR from last 7 days       Date/Time INR Value    23 0246 4.15    23 2042 3.9          Dose from last 7 days       Date/Time Dose (mg)    23 1418 0     Home Dosinmg Tues/Sat, 2mg all other days  Significant Interactions: Antiplatelet Medications    Assessment:  - home warfarin for hx of AF & CVA  - INR remains supratherapeutic this morning @ 4.15  - new DDI w/ aspirin noted, will need to monitor closely given mild TCP  - no overt bleeding noted  - no changes to diet, no concerns w/ hepatic fx    Plan:  - continue HOLDING warfarin  - subsequent INRs/CBCs ordered, pharmacy will continue following     Education Material Provided?: No (previously on warfarin)     Pharmacist suggested discharge dosing: TBD pending subsequent INR trends. Recommend INR check w/in 48-72 hours of discharge.       Antonio Jay, PharmD

## 2023-02-06 NOTE — ASSESSMENT & PLAN NOTE
Monitor BMP and assess response  Avoid IV contrast/nephrotoxins/NSAIDs  Dose adjust meds for decreased GFR

## 2023-02-07 ENCOUNTER — APPOINTMENT (OUTPATIENT)
Dept: RADIOLOGY | Facility: MEDICAL CENTER | Age: 88
DRG: 056 | End: 2023-02-07
Attending: INTERNAL MEDICINE
Payer: MEDICARE

## 2023-02-07 ENCOUNTER — APPOINTMENT (OUTPATIENT)
Dept: RADIOLOGY | Facility: MEDICAL CENTER | Age: 88
DRG: 056 | End: 2023-02-07
Payer: MEDICARE

## 2023-02-07 PROBLEM — I27.20 PULMONARY HTN (HCC): Status: ACTIVE | Noted: 2023-02-07

## 2023-02-07 LAB
ALBUMIN SERPL BCP-MCNC: 2.9 G/DL (ref 3.2–4.9)
ALBUMIN/GLOB SERPL: 0.9 G/DL
ALP SERPL-CCNC: 57 U/L (ref 30–99)
ALT SERPL-CCNC: 6 U/L (ref 2–50)
ANION GAP SERPL CALC-SCNC: 6 MMOL/L (ref 7–16)
AST SERPL-CCNC: 16 U/L (ref 12–45)
BASOPHILS # BLD AUTO: 0.8 % (ref 0–1.8)
BASOPHILS # BLD: 0.04 K/UL (ref 0–0.12)
BILIRUB SERPL-MCNC: 0.4 MG/DL (ref 0.1–1.5)
BUN SERPL-MCNC: 19 MG/DL (ref 8–22)
CALCIUM ALBUM COR SERPL-MCNC: 9.4 MG/DL (ref 8.5–10.5)
CALCIUM SERPL-MCNC: 8.5 MG/DL (ref 8.5–10.5)
CHLORIDE SERPL-SCNC: 105 MMOL/L (ref 96–112)
CO2 SERPL-SCNC: 26 MMOL/L (ref 20–33)
CREAT SERPL-MCNC: 1.1 MG/DL (ref 0.5–1.4)
EOSINOPHIL # BLD AUTO: 0.12 K/UL (ref 0–0.51)
EOSINOPHIL NFR BLD: 2.4 % (ref 0–6.9)
ERYTHROCYTE [DISTWIDTH] IN BLOOD BY AUTOMATED COUNT: 57 FL (ref 35.9–50)
GFR SERPLBLD CREATININE-BSD FMLA CKD-EPI: 48 ML/MIN/1.73 M 2
GLOBULIN SER CALC-MCNC: 3.2 G/DL (ref 1.9–3.5)
GLUCOSE SERPL-MCNC: 98 MG/DL (ref 65–99)
HCT VFR BLD AUTO: 44.8 % (ref 37–47)
HGB BLD-MCNC: 14.2 G/DL (ref 12–16)
IMM GRANULOCYTES # BLD AUTO: 0.02 K/UL (ref 0–0.11)
IMM GRANULOCYTES NFR BLD AUTO: 0.4 % (ref 0–0.9)
INR PPP: 3.4 (ref 0.87–1.13)
LYMPHOCYTES # BLD AUTO: 1.62 K/UL (ref 1–4.8)
LYMPHOCYTES NFR BLD: 32 % (ref 22–41)
MAGNESIUM SERPL-MCNC: 1.8 MG/DL (ref 1.5–2.5)
MCH RBC QN AUTO: 32 PG (ref 27–33)
MCHC RBC AUTO-ENTMCNC: 31.7 G/DL (ref 33.6–35)
MCV RBC AUTO: 100.9 FL (ref 81.4–97.8)
MONOCYTES # BLD AUTO: 0.65 K/UL (ref 0–0.85)
MONOCYTES NFR BLD AUTO: 12.8 % (ref 0–13.4)
NEUTROPHILS # BLD AUTO: 2.62 K/UL (ref 2–7.15)
NEUTROPHILS NFR BLD: 51.6 % (ref 44–72)
NRBC # BLD AUTO: 0 K/UL
NRBC BLD-RTO: 0 /100 WBC
PHOSPHATE SERPL-MCNC: 2.8 MG/DL (ref 2.5–4.5)
PLATELET # BLD AUTO: 144 K/UL (ref 164–446)
PMV BLD AUTO: 9.9 FL (ref 9–12.9)
POTASSIUM SERPL-SCNC: 4.2 MMOL/L (ref 3.6–5.5)
PROT SERPL-MCNC: 6.1 G/DL (ref 6–8.2)
PROTHROMBIN TIME: 33.1 SEC (ref 12–14.6)
RBC # BLD AUTO: 4.44 M/UL (ref 4.2–5.4)
SODIUM SERPL-SCNC: 137 MMOL/L (ref 135–145)
WBC # BLD AUTO: 5.1 K/UL (ref 4.8–10.8)

## 2023-02-07 PROCEDURE — 99222 1ST HOSP IP/OBS MODERATE 55: CPT | Mod: GC | Performed by: INTERNAL MEDICINE

## 2023-02-07 PROCEDURE — 36415 COLL VENOUS BLD VENIPUNCTURE: CPT

## 2023-02-07 PROCEDURE — A9270 NON-COVERED ITEM OR SERVICE: HCPCS | Performed by: HOSPITALIST

## 2023-02-07 PROCEDURE — 93970 EXTREMITY STUDY: CPT | Mod: 26 | Performed by: INTERNAL MEDICINE

## 2023-02-07 PROCEDURE — 71250 CT THORAX DX C-: CPT

## 2023-02-07 PROCEDURE — 99232 SBSQ HOSP IP/OBS MODERATE 35: CPT | Mod: GC | Performed by: HOSPITALIST

## 2023-02-07 PROCEDURE — 700102 HCHG RX REV CODE 250 W/ 637 OVERRIDE(OP): Performed by: HOSPITALIST

## 2023-02-07 PROCEDURE — 83735 ASSAY OF MAGNESIUM: CPT

## 2023-02-07 PROCEDURE — 700111 HCHG RX REV CODE 636 W/ 250 OVERRIDE (IP)

## 2023-02-07 PROCEDURE — 700102 HCHG RX REV CODE 250 W/ 637 OVERRIDE(OP)

## 2023-02-07 PROCEDURE — 85610 PROTHROMBIN TIME: CPT

## 2023-02-07 PROCEDURE — A9270 NON-COVERED ITEM OR SERVICE: HCPCS

## 2023-02-07 PROCEDURE — 770020 HCHG ROOM/CARE - TELE (206)

## 2023-02-07 PROCEDURE — 85025 COMPLETE CBC W/AUTO DIFF WBC: CPT

## 2023-02-07 PROCEDURE — 93970 EXTREMITY STUDY: CPT

## 2023-02-07 PROCEDURE — 80053 COMPREHEN METABOLIC PANEL: CPT

## 2023-02-07 PROCEDURE — 84100 ASSAY OF PHOSPHORUS: CPT

## 2023-02-07 RX ORDER — LEVOTHYROXINE SODIUM 0.1 MG/1
100 TABLET ORAL
Status: DISCONTINUED | OUTPATIENT
Start: 2023-02-08 | End: 2023-02-10 | Stop reason: HOSPADM

## 2023-02-07 RX ORDER — POLYETHYLENE GLYCOL 3350 17 G/17G
1 POWDER, FOR SOLUTION ORAL DAILY
Status: DISCONTINUED | OUTPATIENT
Start: 2023-02-07 | End: 2023-02-10 | Stop reason: HOSPADM

## 2023-02-07 RX ORDER — LEVOTHYROXINE SODIUM 112 UG/1
112 TABLET ORAL
Status: DISCONTINUED | OUTPATIENT
Start: 2023-02-08 | End: 2023-02-07

## 2023-02-07 RX ORDER — AMOXICILLIN 250 MG
2 CAPSULE ORAL 2 TIMES DAILY
Status: DISCONTINUED | OUTPATIENT
Start: 2023-02-07 | End: 2023-02-10 | Stop reason: HOSPADM

## 2023-02-07 RX ORDER — BISACODYL 10 MG
10 SUPPOSITORY, RECTAL RECTAL DAILY
Status: DISCONTINUED | OUTPATIENT
Start: 2023-02-07 | End: 2023-02-10 | Stop reason: HOSPADM

## 2023-02-07 RX ORDER — MAGNESIUM SULFATE HEPTAHYDRATE 40 MG/ML
2 INJECTION, SOLUTION INTRAVENOUS ONCE
Status: COMPLETED | OUTPATIENT
Start: 2023-02-07 | End: 2023-02-07

## 2023-02-07 RX ADMIN — ASPIRIN 81 MG: 81 TABLET, CHEWABLE ORAL at 05:03

## 2023-02-07 RX ADMIN — MAGNESIUM HYDROXIDE 30 ML: 400 SUSPENSION ORAL at 15:09

## 2023-02-07 RX ADMIN — FOLIC ACID 1 MG: 1 TABLET ORAL at 05:03

## 2023-02-07 RX ADMIN — GABAPENTIN 100 MG: 100 CAPSULE ORAL at 17:44

## 2023-02-07 RX ADMIN — ATORVASTATIN CALCIUM 40 MG: 40 TABLET, FILM COATED ORAL at 17:44

## 2023-02-07 RX ADMIN — MAGNESIUM SULFATE HEPTAHYDRATE 2 G: 40 INJECTION, SOLUTION INTRAVENOUS at 08:38

## 2023-02-07 RX ADMIN — POLYETHYLENE GLYCOL 3350 1 PACKET: 17 POWDER, FOR SOLUTION ORAL at 15:09

## 2023-02-07 RX ADMIN — METOPROLOL TARTRATE 50 MG: 50 TABLET, FILM COATED ORAL at 17:44

## 2023-02-07 RX ADMIN — LEVOTHYROXINE SODIUM 88 MCG: 0.09 TABLET ORAL at 05:03

## 2023-02-07 RX ADMIN — SENNOSIDES AND DOCUSATE SODIUM 2 TABLET: 50; 8.6 TABLET ORAL at 17:44

## 2023-02-07 RX ADMIN — METOPROLOL TARTRATE 50 MG: 50 TABLET, FILM COATED ORAL at 05:03

## 2023-02-07 RX ADMIN — GABAPENTIN 100 MG: 100 CAPSULE ORAL at 05:03

## 2023-02-07 ASSESSMENT — ENCOUNTER SYMPTOMS
EYE PAIN: 0
BLOOD IN STOOL: 0
NAUSEA: 0
ORTHOPNEA: 0
SORE THROAT: 0
ABDOMINAL PAIN: 0
BLURRED VISION: 0
HEARTBURN: 0
NECK PAIN: 0
HEADACHES: 0
CONSTIPATION: 0
SPUTUM PRODUCTION: 0
SPEECH CHANGE: 1
VOMITING: 0
SHORTNESS OF BREATH: 0
COUGH: 1
DIZZINESS: 0
WEAKNESS: 1
DIARRHEA: 0
BACK PAIN: 0
FEVER: 0
HEMOPTYSIS: 0
MEMORY LOSS: 0
INSOMNIA: 0
PALPITATIONS: 0
PND: 0
CHILLS: 0
DEPRESSION: 0

## 2023-02-07 ASSESSMENT — FIBROSIS 4 INDEX: FIB4 SCORE: 3.99

## 2023-02-07 ASSESSMENT — COPD QUESTIONNAIRES
HAVE YOU SMOKED AT LEAST 100 CIGARETTES IN YOUR ENTIRE LIFE: NO/DON'T KNOW
COPD SCREENING SCORE: 4
DO YOU EVER COUGH UP ANY MUCUS OR PHLEGM?: NO/ONLY WITH OCCASIONAL COLDS OR INFECTIONS
DURING THE PAST 4 WEEKS HOW MUCH DID YOU FEEL SHORT OF BREATH: SOME OF THE TIME

## 2023-02-07 ASSESSMENT — PAIN DESCRIPTION - PAIN TYPE: TYPE: ACUTE PAIN

## 2023-02-07 NOTE — DISCHARGE PLANNING
Met with pt who lives at [facility]. She reports using a walker, has housekeeping, meal and medication management services. She would like to go to SNF at [facility].     Son lives in [facility].   PCP Is Mario CARCAMO.   Pharmacy is [facility].     Care Transition Team Assessment    Information Source  Orientation Level: Oriented X4  Information Given By: Patient  Who is responsible for making decisions for patient? : Patient    Readmission Evaluation  Is this a readmission?: No    Elopement Risk  Legal Hold: No  Ambulatory or Self Mobile in Wheelchair: No-Not an Elopement Risk  Elopement Risk: Not at Risk for Elopement    Interdisciplinary Discharge Planning  Does Admitting Nurse Feel This Could be a Complex Discharge?: No  Primary Care Physician: Ruba CARCAMO  Lives with - Patient's Self Care Capacity: Alone and Able to Care For Self  Patient or legal guardian wants to designate a caregiver: No  Support Systems: Children  Housing / Facility: Assisted Living Residence  Name of Care Facility: [facility]  Do You Take your Prescribed Medications Regularly: Yes  Able to Return to Previous ADL's: Future Time w/Therapy  Mobility Issues: Yes  Prior Services: Intermittent Physical Support for ADL Per Service, Housekeeping / Homemaker Services, Other (Comments)  Patient Prefers to be Discharged to:: [facility]  Assistance Needed: Yes  Durable Medical Equipment: Walker    Discharge Preparedness  What is your plan after discharge?: Skilled nursing facility  What are your discharge supports?: Child  Prior Functional Level: Ambulatory, Needs Assist with Activities of Daily Living, Needs Assist with Medication Management, Uses Walker  Difficulity with ADLs: Bathing, Dressing, Toileting, Walking  Difficulity with IADLs: Cooking, Driving, Laundry, Shopping    Functional Assesment  Prior Functional Level: Ambulatory, Needs Assist with Activities of Daily Living, Needs Assist  with Medication Management, Uses Walker    Finances  Financial Barriers to Discharge: No  Prescription Coverage: Yes    Vision / Hearing Impairment  Vision Impairment : Yes  Right Eye Vision: Impaired, Wears Glasses  Left Eye Vision: Impaired, Wears Glasses  Hearing Impairment : No    Values / Beliefs / Concerns  Values / Beliefs Concerns : No    Advance Directive  Advance Directive?: POLST    Domestic Abuse  Have you ever been the victim of abuse or violence?: No  Physical Abuse or Sexual Abuse: No  Verbal Abuse or Emotional Abuse: No         Discharge Risks or Barriers  Discharge risks or barriers?: Post-acute placement / services  Patient risk factors: Cognitive / sensory / physical deficit, Vulnerable adult    Anticipated Discharge Information  Discharge Disposition: D/T to SNF with Medicare cert in anticipation of skilled care (03)

## 2023-02-07 NOTE — ASSESSMENT & PLAN NOTE
Patient reports having a history of DVT, currently on Coumadin  - Repeat DVT ultrasound without evidence of DVT

## 2023-02-07 NOTE — ASSESSMENT & PLAN NOTE
Echocardiogram showing RVSP  elevated at 50 mmHg and IVC without respiratory collapse suggesting pulmonary HTN vs volume overload vs granulomatosis with polyangiitis exacerbation  -Elevated RSVP may be normal range for patient's age  -Pulm consult - please see volume overload

## 2023-02-07 NOTE — PROGRESS NOTES
[facility] Daily Progress Note    Date of Service  2/7/2023    [facility] Team: [facility] Green Team   Attending: Samuel Landeros M.d.  Senior Resident: Dr. Browne  Intern:  Dr. Lewis  Contact Number: [phone]    Chief Complaint  Chief Complaint   Patient presents with    Cardiomyopathy (Non-ischemic)    Possible Stroke     PT WITH HX CVA RIGHT SIDE DEFICITS.  ASSISTED LIVING STATES PT BEGAN TO HAVE INCREASED SLURRED SPEECH ON Friday AND ALL LAST WEEK THEY FEEL HER RIGHT SIDE OF BODY HAD INCREASED WEAKNESS. PT THINKS RIGHT SIDE FEELS THE SAME.     Leg Swelling     BILATERAL LEG SWELLING       Hospital Course  Az Tripp is a 88 y.o. female who presented 2/5/2023 with past medical history of atrial flutter/fibrillation on Coumadin, pulmonary hypertension, history of stroke, history of lumbar radiculopathy, hypothyroidism, granulomatosis with polyangiitis, who was noticed by her assisted living facility that she has worsening right-sided weakness and slurred speech.  Patient states that these are similar symptoms to the stroke that she had 10 years ago.  She notices that she has right-sided weakness that worsens during a urine infection.  She is also complaining of shortness of breath, cough and lower extremity swelling.  Patient also has dysuria and frequency  She denies any abdominal pain, fever, nausea, vomiting, diarrhea.  Patient normally uses a walker to get around.     Chest x-ray found acute pulmonary edema and bilateral pleural effusions. EKG found atrial fibrillation is rate controlled.    Echocardiogram on 2/6/2023 significant for elevated right ventricular systolic pressure at 50 mmHg, severe tricuspid regurgitation, enlarged right atrium, and inferior vena cava without inspiratory collapse, suggesting right heart failure secondary to pulmonary hypertension or cor pulmonale.    Interval Problem Update  Today, the patient is alert and oriented x4.  She currently has a Estes catheter in  place.  She reports that her  right-sided upper and lower extremity weakness and slurred speech are at her baseline. She was seen by pulmonology today. Patient denies any changes or new symptoms over the past 24 hours.    I have discussed this patient's plan of care and discharge plan at IDT rounds today with Case Management, Nursing, Nursing leadership, and other members of the IDT team.    Consultants/Specialty  pulmonary    Code Status  DNAR/DNI    Disposition  Patient is not medically cleared for discharge.   Anticipate discharge to  assisted living facility .  I have placed the appropriate orders for post-discharge needs.    Review of Systems  Review of Systems   Constitutional:  Negative for chills and fever.   HENT:  Negative for congestion, ear pain and sore throat.    Eyes:  Negative for blurred vision and pain.   Respiratory:  Positive for cough. Negative for hemoptysis, sputum production and shortness of breath.    Cardiovascular:  Positive for leg swelling. Negative for chest pain, palpitations, orthopnea and PND.   Gastrointestinal:  Negative for abdominal pain, blood in stool, constipation, diarrhea, heartburn, nausea and vomiting.   Genitourinary:  Positive for dysuria. Negative for hematuria.   Musculoskeletal:  Negative for back pain and neck pain.   Neurological:  Positive for speech change (slurred, chronic) and weakness (right side, chronic). Negative for dizziness and headaches.   Psychiatric/Behavioral:  Negative for depression, memory loss and suicidal ideas. The patient does not have insomnia.       Physical Exam  Temp:  [36.3 °C (97.3 °F)-36.8 °C (98.2 °F)] 36.8 °C (98.2 °F)  Pulse:  [68-84] 84  Resp:  [17-18] 17  BP: (108-152)/(55-87) 140/87  SpO2:  [91 %-99 %] 91 %    Physical Exam  Vitals and nursing note reviewed.   Constitutional:       General: She is not in acute distress.     Appearance: Normal appearance. She is not ill-appearing or diaphoretic.   HENT:      Head: Normocephalic and  atraumatic.      Nose: No congestion or rhinorrhea.      Mouth/Throat:      Mouth: Mucous membranes are moist.      Pharynx: Oropharynx is clear. No oropharyngeal exudate or posterior oropharyngeal erythema.   Eyes:      General: No scleral icterus.     Extraocular Movements: Extraocular movements intact.      Conjunctiva/sclera: Conjunctivae normal.      Pupils: Pupils are equal, round, and reactive to light.   Neck:      Vascular: No carotid bruit.   Cardiovascular:      Rate and Rhythm: Normal rate and regular rhythm.      Pulses: Normal pulses.      Heart sounds: No murmur heard.    No friction rub. No gallop.   Pulmonary:      Effort: No respiratory distress.      Breath sounds: Normal breath sounds. No stridor. No wheezing or rales.      Comments: Faint left lower lobe crackles  Chest:      Chest wall: No tenderness.   Abdominal:      General: Abdomen is flat. There is no distension.      Palpations: Abdomen is soft. There is no mass.      Tenderness: There is no abdominal tenderness. There is no right CVA tenderness, left CVA tenderness, guarding or rebound.      Hernia: No hernia is present.   Musculoskeletal:         General: No tenderness.      Cervical back: Normal range of motion. No tenderness.      Right lower leg: Edema (1-2+ pitting edema 2/3 up leg) present.      Left lower leg: Edema (trace pitting edema 1/3 up leg) present.   Skin:     Capillary Refill: Capillary refill takes less than 2 seconds.      Coloration: Skin is not jaundiced or pale.      Findings: No bruising, erythema or rash.   Neurological:      General: No focal deficit present.      Mental Status: She is alert and oriented to person, place, and time.      Motor: Weakness present.      Comments: Decreased temporal right visual fields  Proximal right upper extremity strength 2+/5, distal right upper extremity 3/5, right  strength 4/5  Left upper extremity 5/5 throughout  Proximal right lower extremity 2+/5, distal right lower  extremity 3+/5  Left lower extremity 5/5 throughout   Psychiatric:         Mood and Affect: Mood normal.         Behavior: Behavior normal.         Thought Content: Thought content normal.         Judgment: Judgment normal.       Fluids    Intake/Output Summary (Last 24 hours) at 2/7/2023 1547  Last data filed at 2/7/2023 0832  Gross per 24 hour   Intake 240 ml   Output 300 ml   Net -60 ml       Laboratory  Recent Labs     02/05/23 2042 02/06/23 0054 02/07/23  0103   WBC 5.3 5.0 5.1   RBC 4.59 4.89 4.44   HEMOGLOBIN 14.7 15.8 14.2   HEMATOCRIT 47.2* 50.4* 44.8   .8* 103.1* 100.9*   MCH 32.0 32.3 32.0   MCHC 31.1* 31.3* 31.7*   RDW 58.3* 57.1* 57.0*   PLATELETCT 145* 124* 144*   MPV 10.1 10.2 9.9     Recent Labs     02/05/23 2042 02/06/23 0054 02/07/23 0103   SODIUM 136 136 137   POTASSIUM 3.8 3.6 4.2   CHLORIDE 101 104 105   CO2 25 22 26   GLUCOSE 119* 98 98   BUN 17 16 19   CREATININE 1.29 1.18 1.10   CALCIUM 9.1 9.0 8.5     Recent Labs     02/05/23 2042 02/06/23 0246 02/07/23 0103   APTT 51.2*  --   --    INR 3.90* 4.15* 3.40*         Recent Labs     02/06/23 0054   TRIGLYCERIDE 91   HDL 59   LDL 40       Imaging  US-EXTREMITY VENOUS LOWER BILAT         EC-ECHOCARDIOGRAM COMPLETE W/O CONT   Final Result      CT-HEAD W/O   Final Result         1.  No acute intracranial abnormality is identified, there are nonspecific white matter changes, commonly associated with small vessel ischemic disease.  Associated mild cerebral atrophy is noted.   2.  Atherosclerosis.         DX-CHEST-PORTABLE (1 VIEW)   Final Result         1.  Bilateral lower lobe infiltrates.   2.  Small layering bilateral pleural effusions      CT-CHEST (THORAX) W/O    (Results Pending)        Assessment/Plan: 88 y.o. female who presented 2/5/2023 with past medical history of atrial flutter/fibrillation on Coumadin, pulmonary hypertension, history of stroke, history of lumbar radiculopathy, hypothyroidism, granulomatosis with  polyangiitis, who is admitted for right sided weakness and acute volume overload.    * Right sided weakness  Assessment & Plan  Potential residual effects from previous CVA.  Patient subjectively feels no change in her weakness or slurred speech.  - CT without any signs of hemorrhage or ischemia.  MRI likely will not affect management at this time. Patient also states that she does not want extensive work up or treatment at this time.  - Discontinue aspirin, since patient is on warfarin  - Continue atorvastatin 40 mg daily  PT OT eval    Volume overload  Assessment & Plan  Patient has trace bilateral pleural effusions and lower extremity edema.  Potential causes include kidney damage, given proteinuria, however, Cr at baseline, versus acute heart failure, given echocardiogram showing RVSP  elevated at 50 mmHg and IVC without respiratory collapse versus granulomatosis with polyangiitis exacerbation  - Hold Lasix, since patient appears more euvolemic on exam this morning. Consider restarting small dose lasix if volume status worsens  - Pulmonary consult. Appreciate recs.  -CT chest  -Low-salt diet  -Strict ins and outs and daily weights  -Monitor on telemetry      Acute diastolic CHF (congestive heart failure) (HCC)- (present on admission)  Assessment & Plan        Pulmonary HTN (HCC)  Assessment & Plan  Echocardiogram showing RVSP  elevated at 50 mmHg and IVC without respiratory collapse suggesting pulmonary HTN vs volume overload vs granulomatosis with polyangiitis exacerbation  -Pulm consult for further evaluation and management    Macrocytosis  Assessment & Plan  Potential causes include vitamin B12 deficiency, folate deficiency, hypothyroidism  - Vitamin B12 level  - TSH elevated at 9.810    Localized swelling of left lower extremity  Assessment & Plan  Patient reports having a history of DVT, currently on Coumadin  - Repeat DVT ultrasound without evidence of DVT    Acute cystitis with hematuria- (present on  admission)  Assessment & Plan  symptoms includes dysuria and frequency  Start IV ceftriaxone  Previous cultures found pansensitive E. coli      Acute respiratory failure with hypoxia (HCC)  Assessment & Plan  On 2 L of O2 above baseline    CKD (chronic kidney disease) stage 3, GFR 30-59 ml/min (HCC)- (present on admission)  Assessment & Plan  Monitor BMP and assess response  Avoid IV contrast/nephrotoxins/NSAIDs  Dose adjust meds for decreased GFR        Granulomatosis with polyangiitis (HCC)  Assessment & Plan  - CRP elevated at 2.74  - ESR 9  - Repeat ANCA; results pending  -Consider nephrology consult for possible GPA flare      Hypothyroidism- (present on admission)  Assessment & Plan  Increase levothyroxine to 100 mcg  Repeat TSH elevated at 9.810         VTE prophylaxis: SCDs/TEDs and therapeutic anticoagulation with warfarin    I have performed a physical exam and reviewed and updated ROS and Plan today (2/7/2023). In review of yesterday's note (2/6/2023), there are no changes except as documented above.

## 2023-02-07 NOTE — CARE PLAN
The patient is Stable - Low risk of patient condition declining or worsening    Shift Goals  Clinical Goals: Monitor vitals, reorient pt  Patient Goals: Rest  Family Goals: HAYDE    Progress made toward(s) clinical / shift goals:      Problem: Knowledge Deficit - Standard  Goal: Patient and family/care givers will demonstrate understanding of plan of care, disease process/condition, diagnostic tests and medications  Description: Target End Date:  1-3 days or as soon as patient condition allows    Document in Patient Education    1.  Patient and family/caregiver oriented to unit, equipment, visitation policy and means for communicating concern  2.  Complete/review Learning Assessment  3.  Assess knowledge level of disease process/condition, treatment plan, diagnostic tests and medications  4.  Explain disease process/condition, treatment plan, diagnostic tests and medications  Outcome: Progressing     Problem: Skin Integrity  Goal: Skin integrity is maintained or improved  Description: Target End Date:  Prior to discharge or change in level of care    Document interventions on Skin Risk/Jass flowsheet groups and corresponding LDA    1.  Assess and monitor skin integrity, appearance and/or temperature  2.  Assess risk factors for impaired skin integrity and/or pressures ulcers  3.  Implement precautions to protect skin integrity in collaboration with interdisciplinary team  4.  Implement pressure ulcer prevention protocol if at risk for skin breakdown  5.  Confirm wound care consult if at risk for skin breakdown  6.  Ensure patient use of pressure relieving devices  (Low air loss bed, waffle overlay, heel protectors, ROHO cushion, etc)  Outcome: Progressing     Problem: Fall Risk  Goal: Patient will remain free from falls  Description: Target End Date:  Prior to discharge or change in level of care    Document interventions on the [facility] Fall Risk Assessment    1.  Assess for fall risk factors  2.  Implement  fall precautions  Outcome: Progressing        (1) constantly moist

## 2023-02-07 NOTE — HEART FAILURE PROGRAM
"87 y/o with primary problem of right sided weakness which is felt to be a perfusion issue from a previous stroke or to be a 'urine infection'.    She is also noted to be in acutely decompensated heart failure. Last saw Dr. Nation in August of 2022. HF was not diagnosed at that time.    She does appear to have uncontrolled HTN and BMI of 19.25- 87 y/o.    She is going to SNF. I\"ve asked for an appointment accordingly.]]Nurses: Please document HF education in the education tab and populate the HF Care Plan. These tools will guide day to day care of the HF patient.    Providers: below are Guideline Directed Medical Therapy (GDMT) for HFpEF. If any cannot be prescribed by discharge, would you please note the clinical reason for each in your discharge summary? Thanks! Maliha  Anticoagulation for atrial arrhythmia, if applicable  Glycemic control for DM + HF, if applicable  Lipid lowering medication for DM + HF, if applicable  Pneumococcal vaccine, if not previously received, If refused PLEASE DOCUMENT  Influenza vaccine for the current flu season, if not previously received If refused PLEASE DOCUMENT  Documentation of nicotine cessation counseling, if applicable  Acute Referral to disease management program specializing in heart failure care- asked that schedulers notify me if patient is not able to be seen at the [facility]  7 calendar day f/u appointment scheduled prior to discharge, appearing on signed after visit summary.    "

## 2023-02-07 NOTE — HOSPITAL COURSE
Az Tripp is a 88 y.o. female who presented 2/5/2023 with past medical history of atrial flutter/fibrillation on Coumadin, pulmonary hypertension, history of stroke, history of lumbar radiculopathy, hypothyroidism, granulomatosis with polyangiitis, who was noticed by her assisted living facility that she has worsening right-sided weakness and slurred speech.  Patient states that these are similar symptoms to the stroke that she had 10 years ago.  She notices that she has right-sided weakness that worsens during a urine infection.  She is also complaining of shortness of breath, cough and lower extremity swelling.  Patient also has dysuria and frequency  She denies any abdominal pain, fever, nausea, vomiting, diarrhea.  Patient normally uses a walker to get around.     Chest x-ray found acute pulmonary edema and bilateral pleural effusions. EKG found atrial fibrillation is rate controlled.    Echocardiogram on 2/6/2023 significant for elevated right ventricular systolic pressure at 50 mmHg, severe tricuspid regurgitation, enlarged right atrium, and inferior vena cava without inspiratory collapse, suggesting right heart failure secondary to pulmonary hypertension or cor pulmonale.

## 2023-02-07 NOTE — ASSESSMENT & PLAN NOTE
Potential causes include vitamin B12 deficiency, folate deficiency, hypothyroidism  - Vitamin B12 level  - TSH elevated at 9.810

## 2023-02-07 NOTE — CONSULTS
Pulmonary Consult    Date of Admission: 2/5/2023  Date of Consult: 2/7/2023    Reason for consult: Pulmonary Hypertension and Cor pulmonale      Chief Complaint:  Chief Complaint   Patient presents with    Cardiomyopathy (Non-ischemic)    Possible Stroke     PT WITH HX CVA RIGHT SIDE DEFICITS.  ASSISTED LIVING STATES PT BEGAN TO HAVE INCREASED SLURRED SPEECH ON Friday AND ALL LAST WEEK THEY FEEL HER RIGHT SIDE OF BODY HAD INCREASED WEAKNESS. PT THINKS RIGHT SIDE FEELS THE SAME.     Leg Swelling     BILATERAL LEG SWELLING     HPI:   Az Tripp is a very pleasant 88 y.o. female w/ a.fib/flutter on coumadin, pHTN, HTN, h/o stroke, hypothyroid, GPA who was admitted for stroke like symptoms (since resolved), subsequently found to have bilateral pleural effusions and infiltrate and ECHO with RVSP 50mmHG, dilated RA/RV and IVC for which pulmonology was consulted.     Regarding her diagnosis of GPA, patient is a poor historian however per chart review this was previously managed by nephrology and it was a kidney biopsy that confirmed her diagnosis. She previously followed by pulmonology though last visit was in 2017 which was also her last CT chest which showed bronchiectasis and maybe some subtle ILD changes. Previous PFTs from 2013 which did not reveal any evidence of obstructive or restrictive lung disease.     Today, patient denies any lung complaints, dyspnea or sob. She was on 2 L NC overnight, however currently is satting well on RA.     Past Medical History:   Diagnosis Date    A fib 9/22/06    Abdominal bruit 9/25/2012    Abdominal pain 3/20/2019    Abnormal chest x-ray 9/19/2017    Improved but persistent bilateral airspace opacities 9/11/17    Achalasia 8/3/2011    DIAZ (acute kidney injury) (HCC) 12/20/2018    Allergic rhinitis 9/21/2012    Arterial ischemic stroke, MCA (middle cerebral artery), left, acute (HCC) 8/3/2011    Aseptic necrosis of bone, 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    Bronchiectasis without  complication ([facility]) 2017    Chronic fatigue 2017    Closed right hip fracture ([facility]) 2019    DVT 1996    left leg, vein ligation performed on saphenous vein    First degree atrioventricular block 2012    Glomerulonephritis, chronic in other disease 8/3/2011    H/O echocardiogram 2012    Hematuria 3/20/2019    Hypothyroid 10/2002    Kidney disorder     left kidney biopsy--glomerulonephritis and wegners dx    Kidney failure     stage II    Leukocytosis 2020    Mitral valve prolapse     Palpitations 2012    Rheumatic fever 2012    Rheumatic fever 2012    Childhood     Sepsis ([facility]) 2018    Severe protein-calorie malnutrition (Mahmood: less than 60% of standard weight) ([facility]) 2018    Stroke ([facility]) 2012    Supratherapeutic INR 2018    SVT (SUPRAVENTRICULAR TACHYCARDIA), h/o paroxysmal 8/3/2011    Wegener's granulomatosis     Wegener's granulomatosis 8/3/2011     IMO Spring 2021       Past Surgical History:   Procedure Laterality Date    PB PARTIAL HIP REPLACEMENT Right 2019    Procedure: HEMIARTHROPLASTY, HIP;  Surgeon: Raul Saldivar M.D.;  Location: SURGERY [facility];  Service: Orthopedics    CATARACT EXTRACTION      left eye    CHOLECYSTECTOMY      TEMPORAL ARTERY BIOPSY      normal    LUNG NEEDLE BIOPSY      right lung, nodules found    ARTHROSCOPE      left knee    ARTHROSCOPY, KNEE  1986    right    BUNIONECTOMY      bilat    HYSTERECTOMY RADICAL  1975    VEIN LIGATION         Social History     Socioeconomic History    Marital status:      Spouse name: Not on file    Number of children: Not on file    Years of education: Not on file    Highest education level: Not on file   Occupational History    Not on file   Tobacco Use    Smoking status: Former     Packs/day: 1.00     Types: Cigarettes     Quit date: 1960     Years since quittin.1    Smokeless tobacco: Never    Tobacco comments:     very  little years and years ago   Vaping Use    Vaping Use: Never used   Substance and Sexual Activity    Alcohol use: Yes     Alcohol/week: 0.0 oz     Comment: occasional    Drug use: No    Sexual activity: Not on file   Other Topics Concern    Not on file   Social History Narrative    Not on file     Social Determinants of Health     Financial Resource Strain: Not on file   Food Insecurity: Not on file   Transportation Needs: Not on file   Physical Activity: Not on file   Stress: Not on file   Social Connections: Not on file   Intimate Partner Violence: Not on file   Housing Stability: Not on file          Family History   Problem Relation Age of Onset    Other Father         Aortic aneurysm    Cancer Brother         Lung    Non-contributory Other        No current facility-administered medications on file prior to encounter.     Current Outpatient Medications on File Prior to Encounter   Medication Sig Dispense Refill    Biotin 1 MG Cap Take 1 mg by mouth every day.      multivitamin Tab Take 2 Tablets by mouth every day.      polyethylene glycol/lytes (MIRALAX) 17 g Pack Take 17 g by mouth every day.      ondansetron (ZOFRAN ODT) 4 MG TABLET DISPERSIBLE Take 4 mg by mouth every 8 hours as needed for Nausea/Vomiting.      magnesium oxide (MAG-OX) 400 MG Tab tablet Take 400 mg by mouth every 48 hours.      acetaminophen (TYLENOL) 500 MG Tab Take 1,000 mg by mouth 3 times a day. Give at 0800, 1400, & 1800      D-Mannose 500 MG Cap Take 500 mg by mouth every morning.      Calcium Carbonate (CALCIUM 500 PO) Take 500 mg by mouth every day.      warfarin (COUMADIN) 2 MG Tab Take 1-2 mg by mouth every evening. 1 mg = Tuesday and Saturday  2 mg = Monday, Wednesday, Thursday, Friday , Sunday      methocarbamol (ROBAXIN) 500 MG Tab Take 1 Tablet by mouth 2 times a day. For back pain and muscle spasms stripes      gabapentin (NEURONTIN) 100 MG Cap Take 1 Capsule by mouth 2 times a day. For neuropathy. Discontinue all previous  "orders. 60 Capsule 5    Lidocaine 4 % Patch Apply 1 Patch topically 1 time a day as needed (Apply's on most painful areasas need for pain). Please do not send Family Provides 30 Patch 3    sennosides-docusate sodium (SENOKOT-S) 8.6-50 MG tablet Take 2 Tablets by mouth at bedtime. Discontinue all previous orders of senna plus. Start 2 tablets by mouth at bedtime for constipation.  Indications: Constipation 60 Tablet 11    folic acid (FOLVITE) 1 MG Tab Take 1 Tablet by mouth every morning. For supplement 30 Tablet 11    levothyroxine (SYNTHROID) 88 MCG Tab Take 1 Tablet by mouth every morning on an empty stomach. For thyroid 30 Tablet 11    metoprolol tartrate (LOPRESSOR) 50 MG Tab Take 1 Tablet by mouth 2 times a day. 180 Tablet 2    tamsulosin (FLOMAX) 0.4 MG capsule Take 1 Capsule by mouth every morning. For kindey 30 Capsule 11       Allergies: Cephalexin [keflex], Hydroxyzine, Naprelan [naproxen], Oxaprozin, Ampicillin, Baclofen, Citalopram, Clarithromycin, Diclofenac, Erythromycin, Penicillins, Promethazine, and Vi-q-tuss [hydrocodone-guaifenesin]      ROS: A 12 point ROS was performed on intake and during my interview. ROS negative unless specifically noted in HPI.     Vitals:  /75   Pulse 83   Temp 36.3 °C (97.3 °F) (Temporal)   Resp 18   Ht 1.6 m (5' 3\")   Wt 49.3 kg (108 lb 11 oz)   SpO2 94%     Physical Exam:  Physical Exam  Vitals and nursing note reviewed.   Constitutional:       General: She is not in acute distress.     Appearance: Normal appearance. She is not ill-appearing or toxic-appearing.   HENT:      Head: Normocephalic and atraumatic.   Eyes:      General: No scleral icterus.     Conjunctiva/sclera: Conjunctivae normal.   Cardiovascular:      Rate and Rhythm: Normal rate and regular rhythm.      Pulses: Normal pulses.      Heart sounds: Normal heart sounds. No murmur heard.  Pulmonary:      Effort: Pulmonary effort is normal. No respiratory distress.      Breath sounds: Normal breath " sounds.   Abdominal:      General: Abdomen is flat. There is no distension.      Tenderness: There is no abdominal tenderness.   Musculoskeletal:         General: Normal range of motion.      Cervical back: Normal range of motion.      Right lower leg: Edema present.      Left lower leg: Edema present.   Skin:     General: Skin is warm and dry.   Neurological:      Mental Status: She is alert and oriented to person, place, and time.   Psychiatric:         Mood and Affect: Mood normal.         Behavior: Behavior normal.         Thought Content: Thought content normal.         Judgment: Judgment normal.       Laboratory Data: I personally reviewed labs including historical lab trends.       PFTs from 2013 reviewed which showed no evidence of obstructive or restrictive disease    CXR with bilateral pleural effusions and iniltrate      Assessment/Plan:    Pulmonary problem list:    #GPA  #pHTN  #Bilateral pleural effusions  #Dilated RV/RA/IVC  #Volume overload    Patient does not appear to be in acute hypoxic respiratory failure and based on history and chart review does not seem to have chronic hypoxic respiratory failure with last PFTs unremarkable for obstructive or restrictive lung disease.   The finding of pHTN seems to be incidental and the patient does not appear to be symptomatic. Unclear if this is a Group 1 or Group 3 picture however have low suspicion for the latter based on prior imaging and PFTs.   Patient does appear to have some volume overload however query if this is due to kidney failure rather than acute decompensated heart failure as both the RV and LV systolic function is preserved on ECHO.   Will get a CT chest to assess chronic lung disease. Next step to consider would be RHC to assess if Group 1 and considering treatment. However given this patient's age and otherwise benign presentation would want to address GOC before pursuing an invasive procedure such as a RHC.     Recommendations:  - CT  chest  - Supplemental O2 as needed  - consider nephro consult for proteinuria/ volume overload  - consider gentle diuresis  - OP follow up with pulm  - could consider RHC if within patient's GOC however given her age and relatively asymptomatic would not push pursuing this.     The above plan was discussed with the pulmonary attending, Dr. Ferguson who also saw and evaluated the patient.

## 2023-02-07 NOTE — ASSESSMENT & PLAN NOTE
Patient has trace bilateral pleural effusions and lower extremity edema.  Potential causes include kidney damage, given proteinuria, however, Cr at baseline, versus acute heart failure, given echocardiogram showing RVSP  elevated at 50 mmHg and IVC without respiratory collapse versus granulomatosis with polyangiitis exacerbation  - Pulmonary consult. Appreciate recs.  -CT chest showed significant bilateral pleural effusions (R>L)  -For oral furosemide 40mg, may increase if without adequate output  -Low-salt diet  -Strict ins and outs and daily weights  -Monitor on telemetry

## 2023-02-07 NOTE — DISCHARGE PLANNING
Case Management Discharge Planning    Admission Date: 2/5/2023  GMLOS: 5.7  ALOS: 2    6-Clicks ADL Score: 15  6-Clicks Mobility Score: 9  PT and/or OT Eval ordered: yes  Post-acute Referrals Ordered: yes  Post-acute Choice Obtained: yes  Has referral(s) been sent to post-acute provider:  Yes      Anticipated Discharge Dispo: Discharge Disposition: D/T to SNF with Medicare cert in anticipation of skilled care (03)  [facility] has accepted pt.     DME Needed: none    Action(s) Taken:   Talked to pt about discharge planning. She lives at [address] [facility] but she wants to go to [facility].    PASRR:[id number]    Escalations Completed: none    Medically Clear: no per MD note.     Next Steps: follow up with MD and follow up with [facility].     Barriers to Discharge: medical clearance    Is the patient up for discharge tomorrow: hopefully.

## 2023-02-07 NOTE — PROGRESS NOTES
[facility] Internal Medicine Daily Progress Note    Date of Service  2/6/2023    [facility] Team: [employer] IM Green Team   Attending: NILS Arguello M.d.  Senior Resident: Dr. Browne  Intern:  Dr. Lewis  Contact Number: [phone]    Chief Complaint  Chief Complaint   Patient presents with    Cardiomyopathy (Non-ischemic)    Possible Stroke     PT WITH HX CVA RIGHT SIDE DEFICITS.  ASSISTED LIVING STATES PT BEGAN TO HAVE INCREASED SLURRED SPEECH ON Friday AND ALL LAST WEEK THEY FEEL HER RIGHT SIDE OF BODY HAD INCREASED WEAKNESS. PT THINKS RIGHT SIDE FEELS THE SAME.     Leg Swelling     BILATERAL LEG SWELLING       Hospital Course  Az Tripp is a 88 y.o. female who presented 2/5/2023 with past medical history of atrial flutter/fibrillation on Coumadin, pulmonary hypertension, history of stroke, history of lumbar radiculopathy, hypothyroidism, granulomatosis with polyangiitis, who was noticed by her assisted living facility that she has worsening right-sided weakness and slurred speech.  Patient states that these are similar symptoms to the stroke that she had 10 years ago.  She notices that she has right-sided weakness that worsens during a urine infection.  She is also complaining of shortness of breath, cough and lower extremity swelling.  Patient also has dysuria and frequency  She denies any abdominal pain, fever, nausea, vomiting, diarrhea.  Patient normally uses a walker to get around.     Chest x-ray found acute pulmonary edema and bilateral pleural effusions. EKG found atrial fibrillation is rate controlled.    Echocardiogram on 2/6/2023 significant for elevated right ventricular systolic pressure at 50 mmHg, severe tricuspid regurgitation, enlarged right atrium, and inferior vena cava without inspiratory collapse, suggesting right heart failure secondary to pulmonary hypertension or cor pulmonale.    Interval Problem Update  Today, the patient is alert and oriented x4.  She currently has a Estes  catheter in place.  She reports having dysuria in the past month, but is unsure if she was ever treated with antibiotics.  She reports having baseline right-sided upper and lower extremity weakness.  She also complains of having left-sided calf tenderness that is worse with palpation.  She also reports having a cough for the past month.    I have discussed this patient's plan of care and discharge plan at IDT rounds today with Case Management, Nursing, Nursing leadership, and other members of the IDT team.    Consultants/Specialty  pulmonary    Code Status  DNAR/DNI    Disposition  Patient is not medically cleared for discharge.   Anticipate discharge to  assisted living facility .  I have placed the appropriate orders for post-discharge needs.    Review of Systems  Review of Systems   Constitutional:  Negative for chills and fever.   HENT:  Negative for congestion, ear pain and sore throat.    Eyes:  Negative for blurred vision and pain.   Respiratory:  Positive for cough. Negative for hemoptysis, sputum production and shortness of breath.    Cardiovascular:  Positive for leg swelling. Negative for chest pain, palpitations, orthopnea and PND.   Gastrointestinal:  Negative for abdominal pain, blood in stool, constipation, diarrhea, heartburn, nausea and vomiting.   Genitourinary:  Positive for dysuria. Negative for hematuria.   Musculoskeletal:  Negative for back pain and neck pain.   Neurological:  Positive for speech change (slurred, chronic) and weakness (right side, chronic). Negative for dizziness and headaches.   Psychiatric/Behavioral:  Negative for depression, memory loss and suicidal ideas. The patient does not have insomnia.       Physical Exam  Temp:  [36.3 °C (97.3 °F)-36.6 °C (97.9 °F)] 36.6 °C (97.9 °F)  Pulse:  [61-76] 70  Resp:  [16-20] 18  BP: (131-162)/(74-96) 131/79  SpO2:  [90 %-94 %] 93 %    Physical Exam  Vitals and nursing note reviewed.   Constitutional:       General: She is not in acute  distress.     Appearance: Normal appearance. She is not ill-appearing or diaphoretic.   HENT:      Head: Normocephalic and atraumatic.      Nose: No congestion or rhinorrhea.      Mouth/Throat:      Mouth: Mucous membranes are moist.      Pharynx: Oropharynx is clear. No oropharyngeal exudate or posterior oropharyngeal erythema.   Eyes:      General: No scleral icterus.     Extraocular Movements: Extraocular movements intact.      Conjunctiva/sclera: Conjunctivae normal.      Pupils: Pupils are equal, round, and reactive to light.   Neck:      Vascular: No carotid bruit.   Cardiovascular:      Rate and Rhythm: Normal rate and regular rhythm.      Pulses: Normal pulses.      Heart sounds: No murmur heard.    No friction rub. No gallop.   Pulmonary:      Effort: No respiratory distress.      Breath sounds: Normal breath sounds. No stridor. No wheezing or rales.      Comments: Faint left lower lobe crackles  Chest:      Chest wall: No tenderness.   Abdominal:      General: Abdomen is flat. There is no distension.      Palpations: Abdomen is soft. There is no mass.      Tenderness: There is no abdominal tenderness. There is no right CVA tenderness, left CVA tenderness, guarding or rebound.      Hernia: No hernia is present.   Musculoskeletal:         General: No tenderness.      Cervical back: Normal range of motion. No tenderness.      Right lower leg: Edema (1-2+ pitting edema 2/3 up leg) present.      Left lower leg: Edema (trace pitting edema 1/3 up leg) present.   Skin:     Capillary Refill: Capillary refill takes less than 2 seconds.      Coloration: Skin is not jaundiced or pale.      Findings: No bruising, erythema or rash.   Neurological:      General: No focal deficit present.      Mental Status: She is alert and oriented to person, place, and time.      Motor: Weakness present.      Comments: Decreased temporal right visual fields  Proximal right upper extremity strength 2+/5, distal right upper extremity  3/5, right  strength 4/5  Left upper extremity 5/5 throughout  Proximal right lower extremity 2+/5, distal right lower extremity 3+/5  Left lower extremity 5/5 throughout   Psychiatric:         Mood and Affect: Mood normal.         Behavior: Behavior normal.         Thought Content: Thought content normal.         Judgment: Judgment normal.       Fluids    Intake/Output Summary (Last 24 hours) at 2/6/2023 1731  Last data filed at 2/6/2023 1200  Gross per 24 hour   Intake 300 ml   Output 300 ml   Net 0 ml       Laboratory  Recent Labs     02/05/23 2042 02/06/23  0054   WBC 5.3 5.0   RBC 4.59 4.89   HEMOGLOBIN 14.7 15.8   HEMATOCRIT 47.2* 50.4*   .8* 103.1*   MCH 32.0 32.3   MCHC 31.1* 31.3*   RDW 58.3* 57.1*   PLATELETCT 145* 124*   MPV 10.1 10.2     Recent Labs     02/05/23 2042 02/06/23  0054   SODIUM 136 136   POTASSIUM 3.8 3.6   CHLORIDE 101 104   CO2 25 22   GLUCOSE 119* 98   BUN 17 16   CREATININE 1.29 1.18   CALCIUM 9.1 9.0     Recent Labs     02/05/23 2042 02/06/23  0246   APTT 51.2*  --    INR 3.90* 4.15*         Recent Labs     02/06/23  0054   TRIGLYCERIDE 91   HDL 59   LDL 40       Imaging  EC-ECHOCARDIOGRAM COMPLETE W/O CONT   Final Result      CT-HEAD W/O   Final Result         1.  No acute intracranial abnormality is identified, there are nonspecific white matter changes, commonly associated with small vessel ischemic disease.  Associated mild cerebral atrophy is noted.   2.  Atherosclerosis.         DX-CHEST-PORTABLE (1 VIEW)   Final Result         1.  Bilateral lower lobe infiltrates.   2.  Small layering bilateral pleural effusions      US-EXTREMITY VENOUS LOWER BILAT    (Results Pending)        Assessment/Plan: 88 y.o. female who presented 2/5/2023 with past medical history of atrial flutter/fibrillation on Coumadin, pulmonary hypertension, history of stroke, history of lumbar radiculopathy, hypothyroidism, granulomatosis with polyangiitis, who is admitted for right sided weakness and  acute volume overload.    * Right sided weakness  Assessment & Plan  Potential residual effects from previous CVA.  Patient subjectively feels no change in her weakness or slurred speech.  - CT without any signs of hemorrhage or ischemia.  MRI likely will not affect management at this time  - Start aspirin 81 mg daily  - Start atorvastatin 40 mg daily  PT OT eval    Acute diastolic CHF (congestive heart failure) (HCC)- (present on admission)  Assessment & Plan  Patient presented with volume overload including pulmonary edema and bilateral pleural effusion.  Kidney injury can be because of volume overload, especially given proteinuria on urinalysis.  However, the patient does have echocardiogram showing elevated RVSP at 50 mmHg and IVC without inspiratory collapse, suggesting acute right sided heart failure.  - Pulmonary consult to evaluate for pulmonary hypertension versus cor pulmonale  Low-salt diet  Strict ins and outs and daily weights  Monitor on telemetry    Macrocytosis  Assessment & Plan  Potential causes include vitamin B12 deficiency, folate deficiency, hypothyroidism  - Vitamin B12 level  - TSH elevated at 9.810    Localized swelling of left lower extremity  Assessment & Plan  Patient reports having a history of DVT, currently on Coumadin  - Repeat DVT ultrasound, given left-sided calf tenderness    Volume overload  Assessment & Plan  Patient has trace bilateral pleural effusions and lower extremity edema.  Potential causes include kidney damage, given proteinuria, versus acute heart failure, given echocardiogram showing RVSP  elevated at 50 mmHg and IVC without respiratory collapse versus granulomatosis with polyangiitis exacerbation  - Hold Lasix, since patient appears more euvolemic on exam this morning  - Pulmonary consult to further evaluate pulmonary hypertension or cor pulmonale as potential cause of right heart failure      Acute cystitis with hematuria- (present on admission)  Assessment &  Plan  symptoms includes dysuria and frequency  Start IV ceftriaxone  Previous cultures found pansensitive E. coli      Acute respiratory failure with hypoxia (HCC)  Assessment & Plan  On 2 L of O2 above baseline    CKD (chronic kidney disease) stage 3, GFR 30-59 ml/min (HCC)- (present on admission)  Assessment & Plan  Monitor BMP and assess response  Avoid IV contrast/nephrotoxins/NSAIDs  Dose adjust meds for decreased GFR        Wegener's granulomatosis  Assessment & Plan  - CRP elevated at 2.74  - Repeat ANCA  - ESR      Hypothyroidism- (present on admission)  Assessment & Plan  Continue home thyroid medications  Repeat TSH elevated at 9.810         VTE prophylaxis: SCDs/TEDs and therapeutic anticoagulation with warfarin    I have performed a physical exam and reviewed and updated ROS and Plan today (2/6/2023). In review of yesterday's note (2/5/2023), there are no changes except as documented above.

## 2023-02-08 DIAGNOSIS — M31.30 GRANULOMATOSIS WITH POLYANGIITIS, UNSPECIFIED WHETHER RENAL INVOLVEMENT (HCC): ICD-10-CM

## 2023-02-08 PROBLEM — I50.31 ACUTE DIASTOLIC CHF (CONGESTIVE HEART FAILURE) (HCC): Status: RESOLVED | Noted: 2023-02-05 | Resolved: 2023-02-08

## 2023-02-08 LAB
HIV 1+2 AB+HIV1 P24 AG SERPL QL IA: NORMAL
INR PPP: 2.13 (ref 0.87–1.13)
MYELOPEROXIDASE AB SER-ACNC: 10 AU/ML (ref 0–19)
PROTEINASE3 AB SER-ACNC: 0 AU/ML (ref 0–19)
PROTHROMBIN TIME: 23.3 SEC (ref 12–14.6)

## 2023-02-08 PROCEDURE — A9270 NON-COVERED ITEM OR SERVICE: HCPCS | Performed by: HOSPITALIST

## 2023-02-08 PROCEDURE — 700102 HCHG RX REV CODE 250 W/ 637 OVERRIDE(OP)

## 2023-02-08 PROCEDURE — A9270 NON-COVERED ITEM OR SERVICE: HCPCS

## 2023-02-08 PROCEDURE — 700102 HCHG RX REV CODE 250 W/ 637 OVERRIDE(OP): Performed by: HOSPITALIST

## 2023-02-08 PROCEDURE — 99232 SBSQ HOSP IP/OBS MODERATE 35: CPT | Mod: GC | Performed by: INTERNAL MEDICINE

## 2023-02-08 PROCEDURE — 36415 COLL VENOUS BLD VENIPUNCTURE: CPT

## 2023-02-08 PROCEDURE — 85610 PROTHROMBIN TIME: CPT

## 2023-02-08 PROCEDURE — 99233 SBSQ HOSP IP/OBS HIGH 50: CPT | Mod: GC | Performed by: HOSPITALIST

## 2023-02-08 PROCEDURE — 770020 HCHG ROOM/CARE - TELE (206)

## 2023-02-08 PROCEDURE — 51798 US URINE CAPACITY MEASURE: CPT

## 2023-02-08 PROCEDURE — G0475 HIV COMBINATION ASSAY: HCPCS

## 2023-02-08 RX ORDER — FUROSEMIDE 40 MG/1
40 TABLET ORAL
Status: DISCONTINUED | OUTPATIENT
Start: 2023-02-08 | End: 2023-02-09

## 2023-02-08 RX ORDER — WARFARIN SODIUM 2 MG/1
2 TABLET ORAL
Status: COMPLETED | OUTPATIENT
Start: 2023-02-08 | End: 2023-02-08

## 2023-02-08 RX ORDER — LOSARTAN POTASSIUM 25 MG/1
25 TABLET ORAL
Status: DISCONTINUED | OUTPATIENT
Start: 2023-02-08 | End: 2023-02-09

## 2023-02-08 RX ADMIN — SENNOSIDES AND DOCUSATE SODIUM 2 TABLET: 50; 8.6 TABLET ORAL at 06:00

## 2023-02-08 RX ADMIN — METOPROLOL TARTRATE 50 MG: 50 TABLET, FILM COATED ORAL at 16:49

## 2023-02-08 RX ADMIN — SENNOSIDES AND DOCUSATE SODIUM 2 TABLET: 50; 8.6 TABLET ORAL at 16:50

## 2023-02-08 RX ADMIN — GABAPENTIN 100 MG: 100 CAPSULE ORAL at 06:00

## 2023-02-08 RX ADMIN — LEVOTHYROXINE SODIUM 100 MCG: 0.1 TABLET ORAL at 06:00

## 2023-02-08 RX ADMIN — FOLIC ACID 1 MG: 1 TABLET ORAL at 06:00

## 2023-02-08 RX ADMIN — WARFARIN SODIUM 2 MG: 2 TABLET ORAL at 16:49

## 2023-02-08 RX ADMIN — FUROSEMIDE 40 MG: 40 TABLET ORAL at 16:49

## 2023-02-08 RX ADMIN — MAGNESIUM HYDROXIDE 30 ML: 400 SUSPENSION ORAL at 06:11

## 2023-02-08 RX ADMIN — POLYETHYLENE GLYCOL 3350 1 PACKET: 17 POWDER, FOR SOLUTION ORAL at 06:00

## 2023-02-08 RX ADMIN — METOPROLOL TARTRATE 50 MG: 50 TABLET, FILM COATED ORAL at 06:00

## 2023-02-08 RX ADMIN — ATORVASTATIN CALCIUM 40 MG: 40 TABLET, FILM COATED ORAL at 16:49

## 2023-02-08 RX ADMIN — LOSARTAN POTASSIUM 25 MG: 25 TABLET, FILM COATED ORAL at 16:49

## 2023-02-08 ASSESSMENT — ENCOUNTER SYMPTOMS
NERVOUS/ANXIOUS: 0
FALLS: 0
DIZZINESS: 0
VOMITING: 0
FEVER: 0
CONSTIPATION: 0
CHILLS: 0
SHORTNESS OF BREATH: 0
HEARTBURN: 0
NECK PAIN: 0
HEADACHES: 0
COUGH: 0
ORTHOPNEA: 0
PALPITATIONS: 0
PND: 0
ABDOMINAL PAIN: 0
DEPRESSION: 0
SPEECH CHANGE: 1
DIARRHEA: 0
WEAKNESS: 1
NAUSEA: 0
BLURRED VISION: 0
BACK PAIN: 0

## 2023-02-08 ASSESSMENT — FIBROSIS 4 INDEX: FIB4 SCORE: 3.99

## 2023-02-08 NOTE — PROGRESS NOTES
Inpatient Anticoagulation Service Note for 2023    Reason for Anticoagulation: Atrial Fibrillation  Target INR: 2.0 to 3.0     [id number] VASc Score: 8  HAS-BLED Score: 2    Hemoglobin Value: 14.2  Hematocrit Value: 44.8  Lab Platelet Value: (!) 144    INR from last 7 days       Date/Time INR Value    23 0103 3.4    23 0246 4.15    23 2042 3.9          Dose from last 7 days       Date/Time Dose (mg)    23 1617 0    23 1417 0    23 0228 --          Home Dosinmg Tues/Sat, 2mg all other days  Significant Interactions: None     Assessment:  - home warfarin for hx of AF & CVA  - INR remains supratherapeutic this morning @ 3.4  - aspirin dc'd, mild TCP remains stable w/o overt bleeding noted  - no changes to diet, no concerns w/ hepatic fx     Plan:  - continue HOLDING warfarin  - subsequent INRs/CBCs ordered, pharmacy will continue following     Education Material Provided?: No (previously on warfarin)     Pharmacist suggested discharge dosing: TBD pending subsequent INR trends. Recommend INR check w/in 48-72 hours of discharge.        Antonio Jay, PharmD

## 2023-02-08 NOTE — CARE PLAN
The patient is Stable - Low risk of patient condition declining or worsening    Shift Goals  Clinical Goals: Pt will void freely s/p pratt removal.  Patient Goals: Rest  Family Goals: HAYDE    Progress made toward(s) clinical / shift goals:  pt voided with so fair insignificant PVR.    Problem: Knowledge Deficit - Standard  Goal: Patient and family/care givers will demonstrate understanding of plan of care, disease process/condition, diagnostic tests and medications  Outcome: Progressing     Problem: Skin Integrity  Goal: Skin integrity is maintained or improved  Outcome: Progressing     Problem: Fall Risk  Goal: Patient will remain free from falls  Outcome: Progressing

## 2023-02-08 NOTE — PROGRESS NOTES
[facility] Internal Medicine Daily Progress Note    Date of Service  2/8/2023    [facility] Team: [facility] IM Green Team   Attending: Samuel Landeros M.d.  Senior Resident: Dr. Browne  Intern:  Dr. Lewis  Contact Number: [phone]    Chief Complaint  Chief Complaint   Patient presents with    Cardiomyopathy (Non-ischemic)    Possible Stroke     PT WITH HX CVA RIGHT SIDE DEFICITS.  ASSISTED LIVING STATES PT BEGAN TO HAVE INCREASED SLURRED SPEECH ON Friday AND ALL LAST WEEK THEY FEEL HER RIGHT SIDE OF BODY HAD INCREASED WEAKNESS. PT THINKS RIGHT SIDE FEELS THE SAME.     Leg Swelling     BILATERAL LEG SWELLING     Hospital Course  Az Tripp is a 88 y.o. female who presented 2/5/2023 with past medical history of atrial flutter/fibrillation on Coumadin, pulmonary hypertension, history of stroke, history of lumbar radiculopathy, hypothyroidism, granulomatosis with polyangiitis, who was noticed by her assisted living facility that she has worsening right-sided weakness and slurred speech.  Patient states that these are similar symptoms to the stroke that she had 10 years ago.  She notices that she has right-sided weakness that worsens during a urine infection.  She is also complaining of shortness of breath, cough and lower extremity swelling.  Patient also has dysuria and frequency  She denies any abdominal pain, fever, nausea, vomiting, diarrhea.  Patient normally uses a walker to get around.     Chest x-ray found acute pulmonary edema and bilateral pleural effusions. EKG found atrial fibrillation is rate controlled.    Echocardiogram on 2/6/2023 significant for elevated right ventricular systolic pressure at 50 mmHg, severe tricuspid regurgitation, enlarged right atrium, and inferior vena cava without inspiratory collapse, suggesting right heart failure secondary to pulmonary hypertension or cor pulmonale.    Interval Problem Update  No acute events overnight. Patient afebrile, -150s/70-80s mmHg, saturating well on  ambient air. Spoke with patient and Jose lamb, today - updated on plan. Both patient and zainab in agreement to not pursue further imaging due to concerns of exposing patient to more radiation, and agreed to not attempt to undergo more invasive measures. Patient states that she does follow with nephrology outpatient, uncertain of whom. Agreed with plan moving forward to diurese patient and assess kidney function in the morning.  CT chest showed significant pleural effusion bilaterally (R>L), however, patient asymptomatic. Pulmonology spoke with patient and zainab as well.  -ANCA negative  -Start Losartan 25mg and will diurese with Lasix PO 40mg    I have discussed this patient's plan of care and discharge plan at IDT rounds today with Case Management, Nursing, Nursing leadership, and other members of the IDT team.    Consultants/Specialty  pulmonary    Code Status  DNAR/DNI    Disposition  Patient is not medically cleared for discharge.   Anticipate discharge to to skilled nursing facility.  I have placed the appropriate orders for post-discharge needs.    Review of Systems  Review of Systems   Constitutional:  Negative for chills and fever.   Eyes:  Negative for blurred vision.   Respiratory:  Negative for cough and shortness of breath.    Cardiovascular:  Positive for leg swelling (improved, per zainab). Negative for chest pain, palpitations, orthopnea and PND.   Gastrointestinal:  Negative for abdominal pain, constipation, diarrhea, heartburn, nausea and vomiting.   Genitourinary:  Negative for dysuria.   Musculoskeletal:  Negative for back pain, falls and neck pain.   Neurological:  Positive for speech change (slurred, chronic) and weakness (right side, chronic). Negative for dizziness and headaches.   Psychiatric/Behavioral:  Negative for depression. The patient is not nervous/anxious.       Physical Exam  Temp:  [36.8 °C (98.2 °F)-37 °C (98.6 °F)] 37 °C (98.6 °F)  Pulse:  [66-84] 79  Resp:  [16-17]  17  BP: (140-163)/(71-87) 158/84  SpO2:  [88 %-91 %] 91 %    Physical Exam  Vitals and nursing note reviewed. Exam conducted with a chaperone present.   Constitutional:       General: She is not in acute distress.     Appearance: Normal appearance. She is not ill-appearing or diaphoretic.   HENT:      Head: Normocephalic and atraumatic.      Right Ear: External ear normal.      Left Ear: External ear normal.      Nose: No congestion or rhinorrhea.      Mouth/Throat:      Mouth: Mucous membranes are moist.      Pharynx: Oropharynx is clear. No oropharyngeal exudate or posterior oropharyngeal erythema.   Eyes:      Extraocular Movements: Extraocular movements intact.      Conjunctiva/sclera: Conjunctivae normal.   Neck:      Vascular: No carotid bruit.   Cardiovascular:      Rate and Rhythm: Normal rate and regular rhythm.      Pulses: Normal pulses.      Heart sounds: No murmur heard.  Pulmonary:      Effort: Pulmonary effort is normal. No respiratory distress.      Breath sounds: No stridor. No wheezing.      Comments: Decreased breath sounds on R  Chest:      Chest wall: No tenderness.   Abdominal:      General: Abdomen is flat. There is no distension.      Palpations: Abdomen is soft.      Tenderness: There is no abdominal tenderness. There is no guarding or rebound.   Musculoskeletal:         General: No tenderness.      Cervical back: Normal range of motion. No tenderness.      Right lower leg: Edema (2+ pitting) present.      Left lower leg: No edema.   Skin:     General: Skin is warm and dry.      Capillary Refill: Capillary refill takes less than 2 seconds.   Neurological:      General: No focal deficit present.      Mental Status: She is alert and oriented to person, place, and time. Mental status is at baseline.      Motor: Weakness (R-side) present.      Comments: Decreased temporal right visual fields  Proximal right upper extremity strength 2+/5, distal right upper extremity 3/5, right  strength  4/5  Left upper extremity 5/5 throughout  Proximal right lower extremity 2+/5, distal right lower extremity 3+/5  Left lower extremity 5/5 throughout   Psychiatric:         Mood and Affect: Mood normal.         Behavior: Behavior normal.         Thought Content: Thought content normal.         Judgment: Judgment normal.       Fluids    Intake/Output Summary (Last 24 hours) at 2/8/2023 1439  Last data filed at 2/8/2023 0600  Gross per 24 hour   Intake --   Output 810 ml   Net -810 ml       Laboratory  Recent Labs     02/05/23 2042 02/06/23 0054 02/07/23  0103   WBC 5.3 5.0 5.1   RBC 4.59 4.89 4.44   HEMOGLOBIN 14.7 15.8 14.2   HEMATOCRIT 47.2* 50.4* 44.8   .8* 103.1* 100.9*   MCH 32.0 32.3 32.0   MCHC 31.1* 31.3* 31.7*   RDW 58.3* 57.1* 57.0*   PLATELETCT 145* 124* 144*   MPV 10.1 10.2 9.9     Recent Labs     02/05/23 2042 02/06/23 0054 02/07/23  0103   SODIUM 136 136 137   POTASSIUM 3.8 3.6 4.2   CHLORIDE 101 104 105   CO2 25 22 26   GLUCOSE 119* 98 98   BUN 17 16 19   CREATININE 1.29 1.18 1.10   CALCIUM 9.1 9.0 8.5     Recent Labs     02/05/23 2042 02/06/23 0246 02/07/23 0103 02/08/23  0241   APTT 51.2*  --   --   --    INR 3.90* 4.15* 3.40* 2.13*         Recent Labs     02/06/23 0054   TRIGLYCERIDE 91   HDL 59   LDL 40       Imaging  CT-CHEST (THORAX) W/O   Final Result      1.  Moderate to large bilateral pleural effusions, right greater than left, with associated bilateral lower lobe atelectasis/collapse.      2.  Bronchiectasis with right middle lobe and lingular atelectasis or scarring.      3.  Cardiomegaly with coronary artery calcifications.      Fleischner Society pulmonary nodule recommendations:   Not Applicable         US-EXTREMITY VENOUS LOWER BILAT   Final Result      EC-ECHOCARDIOGRAM COMPLETE W/O CONT   Final Result      CT-HEAD W/O   Final Result         1.  No acute intracranial abnormality is identified, there are nonspecific white matter changes, commonly associated with small  vessel ischemic disease.  Associated mild cerebral atrophy is noted.   2.  Atherosclerosis.         DX-CHEST-PORTABLE (1 VIEW)   Final Result         1.  Bilateral lower lobe infiltrates.   2.  Small layering bilateral pleural effusions           Assessment/Plan  Problem Representation:    * Right sided weakness  Assessment & Plan  Potential residual effects from previous CVA.  Patient subjectively feels no change in her weakness or slurred speech.  - CT without any signs of hemorrhage or ischemia.  MRI likely will not affect management at this time. Patient also states that she does not want extensive work up or treatment at this time.  - Discontinue aspirin, since patient is on warfarin  - Continue atorvastatin 40 mg daily  PT OT eval    Volume overload  Assessment & Plan  Patient has trace bilateral pleural effusions and lower extremity edema.  Potential causes include kidney damage, given proteinuria, however, Cr at baseline, versus acute heart failure, given echocardiogram showing RVSP  elevated at 50 mmHg and IVC without respiratory collapse versus granulomatosis with polyangiitis exacerbation  - Pulmonary consult. Appreciate recs.  -CT chest showed significant bilateral pleural effusions (R>L)  -For oral furosemide 40mg, may increase if without adequate output  -Low-salt diet  -Strict ins and outs and daily weights  -Monitor on telemetry      Pulmonary HTN (HCC)  Assessment & Plan  Echocardiogram showing RVSP  elevated at 50 mmHg and IVC without respiratory collapse suggesting pulmonary HTN vs volume overload vs granulomatosis with polyangiitis exacerbation  -Elevated RSVP may be normal range for patient's age  -Pulm consult - please see volume overload    Macrocytosis  Assessment & Plan  Potential causes include vitamin B12 deficiency, folate deficiency, hypothyroidism  - Vitamin B12 level  - TSH elevated at 9.810    Localized swelling of left lower extremity  Assessment & Plan  Patient reports having a  history of DVT, currently on Coumadin  - Repeat DVT ultrasound without evidence of DVT    Acute cystitis with hematuria- (present on admission)  Assessment & Plan  Reported symptoms includes dysuria and frequency, resolved  Was given IV ceftriaxone on admission, no longer requiring antibiotic treatment    Acute respiratory failure with hypoxia (HCC)  Assessment & Plan  On 1-2 2 L of O2    CKD (chronic kidney disease) stage 3, GFR 30-59 ml/min (HCC)- (present on admission)  Assessment & Plan  Monitor BMP and assess response  Avoid IV contrast/nephrotoxins/NSAIDs  Dose adjust meds for decreased GFR    Hypothyroidism- (present on admission)  Assessment & Plan  Repeat TSH elevated at 9.810  -Continue levothyroxine to 100 mcg    Granulomatosis with polyangiitis (HCC)  Assessment & Plan  - CRP elevated at 2.74  - ESR 9  - ANCA normal           VTE prophylaxis: SCDs/TEDs and therapeutic anticoagulation with warfarin    I have performed a physical exam and reviewed and updated ROS and Plan today (2/8/2023). In review of yesterday's note (2/7/2023), there are no changes except as documented above.

## 2023-02-08 NOTE — PROGRESS NOTES
Inpatient Anticoagulation Service Note for 2023    Reason for Anticoagulation: Atrial Fibrillation  Target INR: 2.0 to 3.0    [id number] VASc Score: 8  HAS-BLED Score: 2    Hemoglobin Value: 14.2  Hematocrit Value: 44.8  Lab Platelet Value: (!) 144    INR from last 7 days       Date/Time INR Value    23 0241 2.13    23 0103 3.4    23 0246 4.15    23 2042 3.9          Dose from last 7 days       Date/Time Dose (mg)    23 1305 2    23 1617 0    23 1417 0    23 0228 --          Home Dosinmg Tues/Sat, 2mg all other days  Significant Interactions: None     Assessment:  - home warfarin for hx of AF & CVA  - INR now therapeutic @ 2.13, no warfarin given on  &   - INR decrease more significant then anticipated, likely will continue downtrending for the next 1-2 days  - mild TCP remains stable w/o overt bleeding noted  - no changes to diet, no concerns w/ hepatic fx     Plan:  - resume home warfarin dosing, 2 mg tonight  - subsequent INRs/CBCs ordered, pharmacy will continue following     Education Material Provided?: No (previously on warfarin)     Pharmacist suggested discharge dosing: TBD pending subsequent INR trends. Recommend INR check w/in 48-72 hours of discharge.        Antonio Jay, PharmD

## 2023-02-08 NOTE — PROGRESS NOTES
Pulmonary Progress Note    Date of Admission: 2/5/2023  Date of Consult: 2/7/2023    Reason for consult: Pulmonary Hypertension and Cor pulmonale      Chief Complaint:  Chief Complaint   Patient presents with    Cardiomyopathy (Non-ischemic)    Possible Stroke     PT WITH HX CVA RIGHT SIDE DEFICITS.  ASSISTED LIVING STATES PT BEGAN TO HAVE INCREASED SLURRED SPEECH ON Friday AND ALL LAST WEEK THEY FEEL HER RIGHT SIDE OF BODY HAD INCREASED WEAKNESS. PT THINKS RIGHT SIDE FEELS THE SAME.     Leg Swelling     BILATERAL LEG SWELLING     Hospital Course:   Az Tripp is a very pleasant 88 y.o. female w/ a.fib/flutter on coumadin, pHTN, HTN, h/o stroke, hypothyroid, GPA who was admitted for stroke like symptoms (since resolved), subsequently found to have bilateral pleural effusions and infiltrate and ECHO with RVSP 50mmHG, dilated RA/RV and IVC for which pulmonology was consulted.     Regarding her diagnosis of GPA, patient is a poor historian however per chart review this was previously managed by nephrology and it was a kidney biopsy that confirmed her diagnosis. She previously followed by pulmonology though last visit was in 2017 which was also her last CT chest which showed bronchiectasis and maybe some subtle ILD changes. Previous PFTs from 2013 which did not reveal any evidence of obstructive or restrictive lung disease.     Today, patient denies any lung complaints, dyspnea or sob. She was on 2 L NC overnight, however currently is satting well on RA.     Interval Events:  2/8: CT chest yesterday results reviewed which showed regarding her chronic lung disease mostly unchanged from prior in 2017 with evidence of chronic bronchiectasis. It did also reveal moderate to large bilateral pleural effusions R>L.   From a lung standpoint patient is asymptomatic and denies any sob, dypnea, or cough. She has persitent 1+ bilateral pittting edema and it is unclear what her urine output has been.   Step son was at  "bedside this morning, provided update from lung standpoint and answered any questions.       Vitals:  BP (!) 141/71   Pulse 66   Temp 37 °C (98.6 °F) (Temporal)   Resp 17   Ht 1.6 m (5' 3\")   Wt 49.3 kg (108 lb 11 oz)   SpO2 91%     Physical Exam:  Physical Exam  Vitals and nursing note reviewed.   Constitutional:       General: She is not in acute distress.     Appearance: Normal appearance. She is not ill-appearing or toxic-appearing.   HENT:      Head: Normocephalic and atraumatic.   Eyes:      General: No scleral icterus.     Conjunctiva/sclera: Conjunctivae normal.   Cardiovascular:      Rate and Rhythm: Normal rate and regular rhythm.      Pulses: Normal pulses.      Heart sounds: Normal heart sounds. No murmur heard.  Pulmonary:      Effort: Pulmonary effort is normal. No respiratory distress.      Breath sounds: Normal breath sounds.   Abdominal:      General: Abdomen is flat. There is no distension.      Tenderness: There is no abdominal tenderness.   Musculoskeletal:         General: Normal range of motion.      Cervical back: Normal range of motion.      Right lower leg: Edema present.      Left lower leg: Edema present.   Skin:     General: Skin is warm and dry.   Neurological:      Mental Status: She is alert and oriented to person, place, and time.   Psychiatric:         Mood and Affect: Mood normal.         Behavior: Behavior normal.         Thought Content: Thought content normal.         Judgment: Judgment normal.       Laboratory Data: I personally reviewed labs including historical lab trends.     Imaging Reviewed      Assessment/Plan:    Pulmonary problem list:    #GPA  #pHTN  #Bilateral pleural effusions  #Dilated RV/RA/IVC  #Volume overload    Patient does not appear to be in acute hypoxic respiratory failure and based on history and chart review does not seem to have chronic hypoxic respiratory failure with last PFTs unremarkable for obstructive or restrictive lung disease.     The " finding of pHTN seems to be incidental and the patient does not appear to be symptomatic. Unclear if this is a Group 1 or Group 3 picture however have low suspicion for the latter based on imaging and prior PFTs as there is little evidence of GPA flare or worsening disease.      Next step to consider would be RHC to assess if Group 1 and considering treatment. However given this patient's age and otherwise benign presentation would want to address GOC before pursuing an invasive procedure such as a RHC. This conversation can be done as an outpatient.    Patient does appear to have volume overload with moderate to large blateral pleural effusions and LE edema- query if this is due to kidney failure rather than acute decompensated heart failure as both the RV and LV systolic function are preserved on ECHO.       Recommendations:  -Recommend diuresing to see if this will help keep patient off oxygen.   -Consider discharging patient on low dose lasix. She will be d/c'ing to a SNF where they should be able to closely monitor her lytes.   - Supplemental O2 as needed  - OP follow up with pulm  - OP follow up with nephro    Pulm will sign off at this time. Please feel free to reach out if any questions.       The above plan was discussed with the pulmonary attending, Dr. Ferguson who also saw and evaluated the patient.

## 2023-02-09 LAB
ANION GAP SERPL CALC-SCNC: 9 MMOL/L (ref 7–16)
BUN SERPL-MCNC: 18 MG/DL (ref 8–22)
CALCIUM SERPL-MCNC: 9.1 MG/DL (ref 8.5–10.5)
CHLORIDE SERPL-SCNC: 99 MMOL/L (ref 96–112)
CO2 SERPL-SCNC: 30 MMOL/L (ref 20–33)
CREAT SERPL-MCNC: 1 MG/DL (ref 0.5–1.4)
GFR SERPLBLD CREATININE-BSD FMLA CKD-EPI: 54 ML/MIN/1.73 M 2
GLUCOSE SERPL-MCNC: 93 MG/DL (ref 65–99)
INR PPP: 1.74 (ref 0.87–1.13)
MAGNESIUM SERPL-MCNC: 2.2 MG/DL (ref 1.5–2.5)
NT-PROBNP SERPL IA-MCNC: 5708 PG/ML (ref 0–125)
POTASSIUM SERPL-SCNC: 4.5 MMOL/L (ref 3.6–5.5)
PROTHROMBIN TIME: 19.9 SEC (ref 12–14.6)
SARS-COV+SARS-COV-2 AG RESP QL IA.RAPID: NOTDETECTED
SODIUM SERPL-SCNC: 138 MMOL/L (ref 135–145)
SPECIMEN SOURCE: NORMAL

## 2023-02-09 PROCEDURE — 700102 HCHG RX REV CODE 250 W/ 637 OVERRIDE(OP)

## 2023-02-09 PROCEDURE — A9270 NON-COVERED ITEM OR SERVICE: HCPCS

## 2023-02-09 PROCEDURE — 700102 HCHG RX REV CODE 250 W/ 637 OVERRIDE(OP): Performed by: HOSPITALIST

## 2023-02-09 PROCEDURE — 83735 ASSAY OF MAGNESIUM: CPT

## 2023-02-09 PROCEDURE — 83880 ASSAY OF NATRIURETIC PEPTIDE: CPT

## 2023-02-09 PROCEDURE — 80048 BASIC METABOLIC PNL TOTAL CA: CPT

## 2023-02-09 PROCEDURE — 87426 SARSCOV CORONAVIRUS AG IA: CPT

## 2023-02-09 PROCEDURE — 99232 SBSQ HOSP IP/OBS MODERATE 35: CPT | Mod: GC | Performed by: HOSPITALIST

## 2023-02-09 PROCEDURE — A9270 NON-COVERED ITEM OR SERVICE: HCPCS | Performed by: HOSPITALIST

## 2023-02-09 PROCEDURE — 770001 HCHG ROOM/CARE - MED/SURG/GYN PRIV*

## 2023-02-09 PROCEDURE — 36415 COLL VENOUS BLD VENIPUNCTURE: CPT

## 2023-02-09 PROCEDURE — 85610 PROTHROMBIN TIME: CPT

## 2023-02-09 PROCEDURE — 97530 THERAPEUTIC ACTIVITIES: CPT

## 2023-02-09 RX ORDER — LOSARTAN POTASSIUM 50 MG/1
50 TABLET ORAL
Status: DISCONTINUED | OUTPATIENT
Start: 2023-02-10 | End: 2023-02-10 | Stop reason: HOSPADM

## 2023-02-09 RX ORDER — FUROSEMIDE 40 MG/1
80 TABLET ORAL ONCE
Status: COMPLETED | OUTPATIENT
Start: 2023-02-09 | End: 2023-02-09

## 2023-02-09 RX ORDER — FUROSEMIDE 40 MG/1
40 TABLET ORAL 2 TIMES DAILY
Qty: 60 TABLET | Refills: 0 | Status: CANCELLED | OUTPATIENT
Start: 2023-02-09 | End: 2023-03-11

## 2023-02-09 RX ORDER — WARFARIN SODIUM 1 MG/1
1 TABLET ORAL
Status: DISCONTINUED | OUTPATIENT
Start: 2023-02-11 | End: 2023-02-10 | Stop reason: HOSPADM

## 2023-02-09 RX ORDER — WARFARIN SODIUM 2 MG/1
2 TABLET ORAL
Status: DISCONTINUED | OUTPATIENT
Start: 2023-02-09 | End: 2023-02-10 | Stop reason: HOSPADM

## 2023-02-09 RX ORDER — LOSARTAN POTASSIUM 25 MG/1
25 TABLET ORAL DAILY
Qty: 30 TABLET | Refills: 0 | Status: CANCELLED | OUTPATIENT
Start: 2023-02-10 | End: 2023-03-12

## 2023-02-09 RX ADMIN — SENNOSIDES AND DOCUSATE SODIUM 2 TABLET: 50; 8.6 TABLET ORAL at 06:18

## 2023-02-09 RX ADMIN — FUROSEMIDE 80 MG: 40 TABLET ORAL at 06:21

## 2023-02-09 RX ADMIN — MAGNESIUM HYDROXIDE 30 ML: 400 SUSPENSION ORAL at 06:21

## 2023-02-09 RX ADMIN — METOPROLOL TARTRATE 50 MG: 50 TABLET, FILM COATED ORAL at 06:18

## 2023-02-09 RX ADMIN — LOSARTAN POTASSIUM 25 MG: 25 TABLET, FILM COATED ORAL at 06:18

## 2023-02-09 RX ADMIN — ATORVASTATIN CALCIUM 40 MG: 40 TABLET, FILM COATED ORAL at 16:42

## 2023-02-09 RX ADMIN — FOLIC ACID 1 MG: 1 TABLET ORAL at 06:18

## 2023-02-09 RX ADMIN — WARFARIN SODIUM 2 MG: 2 TABLET ORAL at 16:42

## 2023-02-09 RX ADMIN — POLYETHYLENE GLYCOL 3350 1 PACKET: 17 POWDER, FOR SOLUTION ORAL at 06:19

## 2023-02-09 RX ADMIN — METOPROLOL TARTRATE 50 MG: 50 TABLET, FILM COATED ORAL at 16:42

## 2023-02-09 RX ADMIN — LEVOTHYROXINE SODIUM 100 MCG: 0.1 TABLET ORAL at 06:18

## 2023-02-09 ASSESSMENT — ENCOUNTER SYMPTOMS
ORTHOPNEA: 0
NAUSEA: 0
VOMITING: 0
DEPRESSION: 0
SPEECH CHANGE: 1
NERVOUS/ANXIOUS: 0
SHORTNESS OF BREATH: 0
CONSTIPATION: 0
ABDOMINAL PAIN: 0
NECK PAIN: 0
PALPITATIONS: 0
DIZZINESS: 0
WEAKNESS: 1
HEARTBURN: 0
BACK PAIN: 0
FEVER: 0
CHILLS: 0
BLURRED VISION: 0
COUGH: 0
FALLS: 0
DIARRHEA: 0
PND: 0
HEADACHES: 0

## 2023-02-09 ASSESSMENT — GAIT ASSESSMENTS
GAIT LEVEL OF ASSIST: MINIMAL ASSIST
DEVIATION: STEP TO;DECREASED BASE OF SUPPORT;OTHER (COMMENT)
ASSISTIVE DEVICE: FRONT WHEEL WALKER
DISTANCE (FEET): 8

## 2023-02-09 ASSESSMENT — COGNITIVE AND FUNCTIONAL STATUS - GENERAL
CLIMB 3 TO 5 STEPS WITH RAILING: A LOT
MOVING FROM LYING ON BACK TO SITTING ON SIDE OF FLAT BED: UNABLE
TURNING FROM BACK TO SIDE WHILE IN FLAT BAD: A LITTLE
MOVING TO AND FROM BED TO CHAIR: UNABLE
SUGGESTED CMS G CODE MODIFIER MOBILITY: CL
MOBILITY SCORE: 13
WALKING IN HOSPITAL ROOM: A LITTLE
STANDING UP FROM CHAIR USING ARMS: A LITTLE

## 2023-02-09 ASSESSMENT — PAIN DESCRIPTION - PAIN TYPE: TYPE: ACUTE PAIN

## 2023-02-09 ASSESSMENT — FIBROSIS 4 INDEX: FIB4 SCORE: 3.99

## 2023-02-09 NOTE — CARE PLAN
Problem: Knowledge Deficit - Standard  Goal: Patient and family/care givers will demonstrate understanding of plan of care, disease process/condition, diagnostic tests and medications  Outcome: Progressing     Problem: Skin Integrity  Goal: Skin integrity is maintained or improved  Outcome: Progressing     Problem: Fall Risk  Goal: Patient will remain free from falls  Outcome: Progressing   The patient is Watcher - Medium risk of patient condition declining or worsening    Shift Goals  Clinical Goals: Pt will sleep for atleast 6 hours overnight  Patient Goals: rest  Family Goals: HAYDE

## 2023-02-09 NOTE — DISCHARGE SUMMARY
[facility] Discharge Summary    Attending: Samuel Landeros M.d.  Senior Resident: Dr. Browne  Intern:  Dr. Lewis  Contact Number: [phone]    CHIEF COMPLAINT ON ADMISSION  Chief Complaint   Patient presents with    Cardiomyopathy (Non-ischemic)    Possible Stroke     PT WITH HX CVA RIGHT SIDE DEFICITS.  ASSISTED LIVING STATES PT BEGAN TO HAVE INCREASED SLURRED SPEECH ON Friday AND ALL LAST WEEK THEY FEEL HER RIGHT SIDE OF BODY HAD INCREASED WEAKNESS. PT THINKS RIGHT SIDE FEELS THE SAME.     Leg Swelling     BILATERAL LEG SWELLING       Reason for Admission  Worsened right sided weakness and slurred speech, history of cerebrovascular accident in 2012    Admission Date  2/5/2023    CODE STATUS  DNAR/DNI    HPI & HOSPITAL COURSE  Az Tripp is a 88 year old female with past medical history of atrial flutter/fibrillation on Coumadin, pulmonary hypertension, history of stroke (2012), history of lumbar radiculopathy, hypothyroidism, granulomatosis with polyangiitis who came to the ED on 2/5/2023 after noted worsening right-sided weakness and slurred speech as noticed by her assisted living facility. However, she stated that her symptoms were largely unchanged from baseline. She also complained of shortness of breath, cough and lower extremity swelling, which was chronic.     #Right sided weakness due to history of stroke in 2012  Patient reports residual effects from previous stroke, without significant change in her baseline. CT head without contrast negative for hemorrhage/ischemia. MRI deferred as patient stated she does not wish to pursue further work up as this will not change her current management. Continue atorvastatin on discharge.    #Volume overload  Chest x-ray suggestive of acute pulmonary edema and bilateral pleural effusions. CT chest showed significant bilateral pleural effusions (right > left) and physical examination revealed lower extremity edema of right leg. Patient states  "this is chronic and does not have any respiratory issues or complaints.  DVT US done given asymmetry of swelling, which was negative. Consider volume overload  due to acute heart failure versus renal dysfunction versus granulomatosis with polyangiitis. Patient diuresed with Lasix with adequate response. After discussing the reason for lasix and improvement in symptoms, the patient reports that she does not want to take lasix outpatient.    #Pulmonary hypertension  Evidenced by echocardiogram (2/6/2023) with RVSP 50mmHg, however, this may be normal range for patient's age. Also showed severe tricuspid regurgitation, enlarged right atrium, and inferior vena cava without inspiratory collapse, suggesting right heart failure secondary to pulmonary hypertension or cor pulmonale. Pulmonary was consulted and patient declined extensive work up for pulmonary hypertension. CT chest was more suggestive of volume overload.    #Granulomatosis with polyangiitis  Patient reports she was diagnosed with this many years ago and not on any therapy for it. She follows regularly with nephrology. For outpatient nephrology follow up.    #Hypothyroidism  TSH elevated at 9.810, levothyroxine adjusted to 100mcg. For repeat TSH in 4-6 weeks. For primary care provider to follow up on.    #Elevated blood pressure without diagnosis of hypertension  Started on losartan 50mg daily. Will discharge patient on medication, for further discussion with primary care provider .    Therefore, she is discharged in good and stable condition to skilled nursing facility.    The patient met 2-midnight criteria for an inpatient stay at the time of discharge.    Discharge Date  2/10/2023    Physical Exam on Day of Discharge  Physical Exam  BP (!) 154/89   Pulse 75   Temp 36.5 °C (97.7 °F) (Temporal)   Resp 16   Ht 1.6 m (5' 3\")   Wt 44.5 kg (98 lb 1.7 oz)   SpO2 90%   BMI 17.38 kg/m²     Constitutional:       General: She is not in acute distress.     " Appearance: Normal appearance. She is not ill-appearing or diaphoretic.   HENT:      Head: Normocephalic and atraumatic.      Right Ear: External ear normal.      Left Ear: External ear normal.      Nose: No congestion or rhinorrhea.      Mouth/Throat:      Mouth: Mucous membranes are moist.      Pharynx: Oropharynx is clear. No oropharyngeal exudate or posterior oropharyngeal erythema.   Eyes:      Extraocular Movements: Extraocular movements intact.      Conjunctiva/sclera: Conjunctivae normal.   Neck:      Vascular: No carotid bruit.   Cardiovascular:      Rate and Rhythm: Normal rate and regular rhythm.      Pulses: Normal pulses.      Heart sounds: No murmur heard.  Pulmonary:      Effort: Pulmonary effort is normal. No respiratory distress.      Breath sounds: No stridor. No wheezing.      Comments: Decreased breath sounds on R  Chest:      Chest wall: No tenderness.   Abdominal:      General: Abdomen is flat. There is no distension.      Palpations: Abdomen is soft.      Tenderness: There is no abdominal tenderness. There is no guarding or rebound.   Musculoskeletal:         General: No tenderness.      Cervical back: Normal range of motion. No tenderness.      Right lower leg: Edema (2+ pitting) present.      Left lower leg: No edema.   Skin:     General: Skin is warm and dry.      Capillary Refill: Capillary refill takes less than 2 seconds.   Neurological:      General: No focal deficit present.      Mental Status: She is alert and oriented to person, place, and time. Mental status is at baseline.      Motor: Weakness (R-side) present.      Comments: Decreased temporal right visual fields  Proximal right upper extremity strength 2+/5, distal right upper extremity 3/5, right  strength 4/5  Left upper extremity 5/5 throughout  Proximal right lower extremity 2+/5, distal right lower extremity 3+/5  Left lower extremity 5/5 throughout   Psychiatric:         Mood and Affect: Mood normal.         Behavior:  Behavior normal.         Thought Content: Thought content normal.         Judgment: Judgment normal.     FOLLOW UP ITEMS POST DISCHARGE  Follow up with primary care provider regarding chronic medical conditions. To follow up TSH due to new levothyroxine dosage and to address losartan for blood pressure   Follow up with nephrology as scheduled    DISCHARGE DIAGNOSES  Principal Problem:    Right sided weakness POA: Unknown  Active Problems:    Volume overload POA: Unknown    Granulomatosis with polyangiitis (HCC) POA: Unknown      [place] [facility] [place]    CKD (chronic kidney disease) stage 3, GFR 30-59 ml/min (HCC) POA: Yes    Acute cystitis with hematuria POA: Yes    Localized swelling of left lower extremity POA: Unknown    Macrocytosis POA: Unknown    Pulmonary HTN (HCC) POA: Unknown    Hypothyroidism POA: Yes  Resolved Problems:    Acute respiratory failure with hypoxia (HCC) POA: Unknown      FOLLOW UP  Future Appointments   Date Time Provider Department Center   2/21/2023 11:15 AM [facility] EXAM 4 [facility] None   3/2/2023  1:15 PM Hussain Nation M.D. [facility] None   3/7/2023  2:00 PM TOM Buckley [facility] None   3/8/2023  1:00 PM Birgit Prather P.A.-C. [facility] None     [facility] [address] [id number] [phone]          MEDICATIONS ON DISCHARGE     Medication List        START taking these medications        Instructions   atorvastatin 40 MG Tabs  Commonly known as: LIPITOR   Take 1 Tablet by mouth every evening for 30 days.  Dose: 40 mg     losartan 50 MG Tabs  Start taking on: February 11, 2023  Commonly known as: COZAAR   Take 1 Tablet by mouth every day for 30 days.  Dose: 50 mg            CHANGE how you take these medications        Instructions   levothyroxine 100 MCG Tabs  What changed:   medication strength  how much to take  additional instructions  Commonly known as: SYNTHROID   Take 1 Tablet by mouth every morning on an empty stomach.  Dose: 100 mcg             CONTINUE taking these medications        Instructions   acetaminophen 500 MG Tabs  Commonly known as: TYLENOL   Take 1,000 mg by mouth 3 times a day. Give at 0800, 1400, & 1800  Dose: 1,000 mg     D-Mannose 500 MG Caps   Take 500 mg by mouth every morning.  Dose: 500 mg     folic acid 1 MG Tabs  Commonly known as: FOLVITE   Take 1 Tablet by mouth every morning. For supplement  Dose: 1 mg     gabapentin 100 MG Caps  Commonly known as: NEURONTIN   Take 1 Capsule by mouth 2 times a day. For neuropathy. Discontinue all previous orders.  Dose: 100 mg     metoprolol tartrate 50 MG Tabs  Commonly known as: LOPRESSOR   Take 1 Tablet by mouth 2 times a day.  Dose: 50 mg     multivitamin Tabs   Take 2 Tablets by mouth every day.  Dose: 2 Tablet     ondansetron 4 MG Tbdp  Commonly known as: ZOFRAN ODT   Take 4 mg by mouth every 8 hours as needed for Nausea/Vomiting.  Dose: 4 mg     polyethylene glycol/lytes 17 g Pack  Commonly known as: MIRALAX   Take 17 g by mouth every day.  Dose: 17 g     sennosides-docusate sodium 8.6-50 MG tablet  Commonly known as: SENOKOT-S   Take 2 Tablets by mouth at bedtime. Discontinue all previous orders of senna plus. Start 2 tablets by mouth at bedtime for constipation.  Indications: Constipation  Dose: 2 Tablet     tamsulosin 0.4 MG capsule  Commonly known as: FLOMAX   Take 1 Capsule by mouth every morning. For kindey  Dose: 0.4 mg     warfarin 2 MG Tabs  Commonly known as: COUMADIN   Take 1-2 mg by mouth every evening. 1 mg = Tuesday and Saturday  2 mg = Monday, Wednesday, Thursday, Friday , Sunday  Dose: 1-2 mg            STOP taking these medications      Biotin 1 MG Caps     CALCIUM 500 PO     Lidocaine 4 % Ptch     magnesium oxide 400 MG Tabs tablet  Commonly known as: MAG-OX     methocarbamol 500 MG Tabs  Commonly known as: ROBAXIN     nitrofurantoin 100 MG Caps  Commonly known as: MACROBID              Allergies  Allergies   Allergen Reactions    Cephalexin [Keflex] Swelling     Hydroxyzine Swelling     Eyes get itchy and swollen     Naprelan [Naproxen] Swelling     Eyes get itchy become red and swollen    Oxaprozin Swelling     Swelling eyes, and itchy    Ampicillin Rash     Red Rash    Baclofen Unspecified     drowsiness    Citalopram Vomiting and Nausea    Clarithromycin Unspecified     Pt reports that this medication plugs her ears.     Diclofenac Rash and Swelling     Red rash & swelling      Erythromycin Diarrhea     GI upset    Penicillins Rash     Red rash      Promethazine Unspecified     Drowsiness and sleeps    Vi-Q-Tuss [Hydrocodone-Guaifenesin] Vomiting and Nausea       DIET  Orders Placed This Encounter   Procedures    Diet Order Diet: 2 Gram Sodium     Standing Status:   Standing     Number of Occurrences:   1     Order Specific Question:   Diet:     Answer:   2 Gram Sodium [7]       ACTIVITY  As tolerated.  Weight bearing as tolerated    LINES, DRAINS, AND WOUNDS  This is an automated list. Peripheral IVs will be removed prior to discharge.  Peripheral IV 02/05/23 20 G Left Antecubital (Active)   Site Assessment Clean;Dry;Intact 02/08/23 2000   Dressing Type Transparent 02/08/23 2000   Line Status Saline locked 02/08/23 2000   Dressing Status Clean;Dry;Intact 02/08/23 2000   Dressing Intervention N/A 02/08/23 2000   Infiltration Grading ([facility]) 0 02/08/23 2000   Phlebitis Scale ([facility] Only) 0 02/08/23 2000       Wound 02/06/23 Buttocks Bilateral (Active)   Wound Image   02/06/23 0345   Site Assessment Red 02/08/23 2000   Periwound Assessment Intact 02/08/23 2000   Treatments Offloading 02/08/23 0800   Dressing Status Open to Air 02/07/23 0815       Peripheral IV 02/05/23 20 G Left Antecubital (Active)   Site Assessment Clean;Dry;Intact 02/08/23 2000   Dressing Type Transparent 02/08/23 2000   Line Status Saline locked 02/08/23 2000   Dressing Status Clean;Dry;Intact 02/08/23 2000   Dressing Intervention N/A 02/08/23 2000   Infiltration Grading ([facility]) 0  02/08/23 2000   Phlebitis Scale ([facility] Only) 0 02/08/23 2000               MENTAL STATUS ON TRANSFER  Stable. No suicidal or homicidal ideation.     CONSULTATIONS  Pulmonology    PROCEDURES  None    LABORATORY  Lab Results   Component Value Date    SODIUM 138 02/09/2023    POTASSIUM 4.5 02/09/2023    CHLORIDE 99 02/09/2023    CO2 30 02/09/2023    GLUCOSE 93 02/09/2023    BUN 18 02/09/2023    CREATININE 1.00 02/09/2023    CREATININE 1.5 (H) 02/19/2009        Lab Results   Component Value Date    WBC 5.1 02/07/2023    HEMOGLOBIN 14.2 02/07/2023    HEMATOCRIT 44.8 02/07/2023    PLATELETCT 144 (L) 02/07/2023        Total time of the discharge process exceeds 34 minutes.

## 2023-02-09 NOTE — CARE PLAN
The patient is Stable - Low risk of patient condition declining or worsening    Shift Goals  Clinical Goals: Pt will sleep for atleast 6 hours overnight  Patient Goals: rest  Family Goals: HAYDE    Progress made toward(s) clinical / shift goals:  pt resting comfortable overnight.    Problem: Knowledge Deficit - Standard  Goal: Patient and family/care givers will demonstrate understanding of plan of care, disease process/condition, diagnostic tests and medications  Outcome: Progressing     Problem: Skin Integrity  Goal: Skin integrity is maintained or improved  Outcome: Progressing     Problem: Fall Risk  Goal: Patient will remain free from falls  Outcome: Progressing       Patient is not progressing towards the following goals:

## 2023-02-09 NOTE — DISCHARGE PLANNING
Agency/Facility Name: [facility]   Spoke To: Olga   Outcome: [facility] called for bed availability. Olga driving at the moment and will call DPA back.

## 2023-02-09 NOTE — HEART FAILURE PROGRAM
Patient is accepted by [facility] but not medically cleared quite yet. The following appointments are appropriate for now.    Your appointments    Feb 21, 2023 11:15 AM   Established Patient with [facility]   [facility]  (--) [address]   [place] [address]   [phone]      Mar 02, 2023  1:15 PM   Established Patient with Hussain Nation M.D.   [facility] (--) [address]   [place] [address]   [phone]      Mar 07, 2023  2:00 PM   Heart Failure New Patient with TOM Buckley   [facility] (--) [address]   [place] [address]   [phone]      Mar 08, 2023  1:00 PM   Hospital Follow Up with Birgit Prather P.A.-C.   [facility] (--) [address], [address]   [place] [address]   [phone]        Restore  Close  Cancel Previous  Next     Follow-Up

## 2023-02-09 NOTE — PROGRESS NOTES
Inpatient Anticoagulation Service Note for 2023      Reason for Anticoagulation: Atrial Fibrillation   [id number] VASc Score: 8  HAS-BLED Score: 2    Hemoglobin Value: 14.2  Hematocrit Value: 44.8  Lab Platelet Value: (!) 144  Target INR: 2.0 to 3.0    INR from last 7 days       Date/Time INR Value    23 0201 1.74    23 0241 2.13    23 0103 3.4    23 0246 4.15    23 2042 3.9          Dose from last 7 days       Date/Time Dose (mg)    23 1122 2    23 1305 2    23 1617 0    23 1417 0    23 0228 --          Average Dose (mg):  (Home Dosinmg Tues/Sat, 2mg all other days)  Significant Interactions: Not Applicable  Bridge Therapy: No   Reversal Agent Administered: Not Applicable  Comments: Continue with home dosing and INR trend  Education Material Provided?: No    Pharmacist suggested discharge dosing: warfarin 1 mg Tue/Sat and warfarin 2 mg ROW.  INR check within 48 hours of discharge.     Monika Farrar, JuanD  [id number]

## 2023-02-10 VITALS
HEART RATE: 75 BPM | WEIGHT: 98.11 LBS | TEMPERATURE: 97.7 F | HEIGHT: 63 IN | DIASTOLIC BLOOD PRESSURE: 89 MMHG | BODY MASS INDEX: 17.38 KG/M2 | SYSTOLIC BLOOD PRESSURE: 154 MMHG | OXYGEN SATURATION: 90 % | RESPIRATION RATE: 16 BRPM

## 2023-02-10 PROBLEM — J96.01 ACUTE RESPIRATORY FAILURE WITH HYPOXIA (HCC): Status: RESOLVED | Noted: 2019-06-20 | Resolved: 2023-02-10

## 2023-02-10 LAB
INR PPP: 1.58 (ref 0.87–1.13)
PROTHROMBIN TIME: 18.5 SEC (ref 12–14.6)

## 2023-02-10 PROCEDURE — 99238 HOSP IP/OBS DSCHRG MGMT 30/<: CPT | Mod: GC | Performed by: HOSPITALIST

## 2023-02-10 PROCEDURE — 36415 COLL VENOUS BLD VENIPUNCTURE: CPT

## 2023-02-10 PROCEDURE — 700102 HCHG RX REV CODE 250 W/ 637 OVERRIDE(OP): Performed by: HOSPITALIST

## 2023-02-10 PROCEDURE — 85610 PROTHROMBIN TIME: CPT

## 2023-02-10 PROCEDURE — A9270 NON-COVERED ITEM OR SERVICE: HCPCS | Performed by: HOSPITALIST

## 2023-02-10 PROCEDURE — A9270 NON-COVERED ITEM OR SERVICE: HCPCS

## 2023-02-10 PROCEDURE — 700102 HCHG RX REV CODE 250 W/ 637 OVERRIDE(OP)

## 2023-02-10 RX ORDER — LOSARTAN POTASSIUM 50 MG/1
50 TABLET ORAL DAILY
Qty: 30 TABLET | Refills: 0 | Status: SHIPPED | OUTPATIENT
Start: 2023-02-11 | End: 2023-03-13 | Stop reason: SDUPTHER

## 2023-02-10 RX ORDER — ATORVASTATIN CALCIUM 40 MG/1
40 TABLET, FILM COATED ORAL EVERY EVENING
Qty: 30 TABLET | Refills: 0 | Status: SHIPPED | OUTPATIENT
Start: 2023-02-10 | End: 2023-03-06

## 2023-02-10 RX ORDER — LEVOTHYROXINE SODIUM 0.1 MG/1
100 TABLET ORAL
Qty: 30 TABLET | Refills: 0 | Status: SHIPPED | OUTPATIENT
Start: 2023-02-10 | End: 2023-04-11 | Stop reason: SDUPTHER

## 2023-02-10 RX ADMIN — METOPROLOL TARTRATE 50 MG: 50 TABLET, FILM COATED ORAL at 05:51

## 2023-02-10 RX ADMIN — FOLIC ACID 1 MG: 1 TABLET ORAL at 05:51

## 2023-02-10 RX ADMIN — LOSARTAN POTASSIUM 50 MG: 50 TABLET, FILM COATED ORAL at 05:51

## 2023-02-10 RX ADMIN — LEVOTHYROXINE SODIUM 100 MCG: 0.1 TABLET ORAL at 05:51

## 2023-02-10 NOTE — THERAPY
Physical Therapy   Daily Treatment     Patient Name: Az Tripp  Age:  88 y.o., Sex:  female  Medical Record #: [id number]  Today's Date: 2/9/2023     Precautions  Precautions: (P) Fall Risk  Comments: (P) R sided deficits    Assessment    Pt is progressing as expected. Pt was primarily limited by frequent bowel movements during therapy session. Pt is able to demonstrate with improved activity tolerance this session and slightly improved distance with ambulation, however, only able to take a few steps for frequent transfers. Pt appears to be able to ambulate short distances, however, this was limited due to frequent bowel movements. Pt was able to stand for longer durations for blayne care. Pt will continue to benefit from skilled PT while in house, with recommendation for post acute therapy prior to d/c home.     Plan    Physical Therapy Treatment Plan  Physical Therapy Treatment Plan: (P) Continue Current Treatment Plan    DC Equipment Recommendations: (P) Unable to determine at this time  Discharge Recommendations: (P) Recommend post-acute placement for additional physical therapy services prior to discharge home    Objective       02/09/23 1420   Precautions   Precautions Fall Risk   Comments R sided deficits   Pain 0 - 10 Group   Therapist Pain Assessment Nurse Notified;0   Cognition    Cognition / Consciousness WDL   Level of Consciousness Alert   Comments pleasant/cooperative   Neuro-Muscular Treatments   Neuro-Muscular Treatments Anterior weight shift;Weight Shift Right;Weight Shift Left;Postural Facilitation;Postural Changes   Comments pt able to participate in weight shifts onto R LE during standing in order to improve upright standing balance and stance   Neurological Concerns   Neurological Concerns Yes   Comments hx of CVA with R sided deficits   Balance   Sitting Balance (Static) Fair   Sitting Balance (Dynamic) Fair   Standing Balance (Static) Fair   Standing Balance (Dynamic) Fair -   Weight  Shift Sitting Fair   Weight Shift Standing Fair   Skilled Intervention Verbal Cuing;Postural Facilitation;Facilitation   Comments w/fww; pt provided with manual faciliation for R sided weight shift in order to improve standing balance and COG over hips   Bed Mobility    Supine to Sit Minimal Assist   Scooting Minimal Assist   Skilled Intervention Verbal Cuing;Sequencing   Comments provided with VC and sequencing for use of L UE to assist during bed mobility   Gait Analysis   Gait Level Of Assist Minimal Assist   Assistive Device Front Wheel Walker   Distance (Feet) 8   # of Times Distance was Traveled 2   Deviation Step To;Decreased Base Of Support;Other (Comment)  (R LE drag)   Weight Bearing Status no restrictions   Skilled Intervention Verbal Cuing;Facilitation   Comments provided with faciliation and VC for use of B UE on FWW in order to improve PATRICIA and foot position prior to ambulation   Functional Mobility   Sit to Stand Minimal Assist   Bed, Chair, Wheelchair Transfer Minimal Assist   Toilet Transfers Minimal Assist  (to BSC)   Transfer Method Stand Step   Mobility EOB, sit<>stand, few steps to BSC, few steps to chair   Skilled Intervention Verbal Cuing;Sequencing   Comments provided with VC and sequencing for appropriate hand placement and set up prior to standing from bed and BSC   How much difficulty does the patient currently have...   Turning over in bed (including adjusting bedclothes, sheets and blankets)? 3   Sitting down on and standing up from a chair with arms (e.g., wheelchair, bedside commode, etc.) 1   Moving from lying on back to sitting on the side of the bed? 1   How much help from another person does the patient currently need...   Moving to and from a bed to a chair (including a wheelchair)? 3   Need to walk in a hospital room? 3   Climbing 3-5 steps with a railing? 2   6 clicks Mobility Score 13   Activity Tolerance   Sitting in Chair left up in chair   Sitting Edge of Bed 8 mins    Standing 8 mins   Comments limited due to frequent bowel movements   Patient / Family Goals    Patient / Family Goal #1 none stated   Short Term Goals    Short Term Goal # 1 pt will be able to complete supine<>sitting with SPV in 6tx in order to dc back to [facility]   Goal Outcome # 1 Progressing as expected   Short Term Goal # 2 pt will be able to complete functional transfers with FWW and SPV in 6tx in order to dc home   Goal Outcome # 2 Progressing as expected   Short Term Goal # 3 pt will be able to ambulate 50ft with FWW and SPV in 6tx in order to dc to [facility]   Goal Outcome # 3 Progressing slower than expected   Education Group   Role of Physical Therapist Patient Response Patient;Acceptance;Demonstration;Action Demonstration   Physical Therapy Treatment Plan   Physical Therapy Treatment Plan Continue Current Treatment Plan   Anticipated Discharge Equipment and Recommendations   DC Equipment Recommendations Unable to determine at this time   Discharge Recommendations Recommend post-acute placement for additional physical therapy services prior to discharge home   Interdisciplinary Plan of Care Collaboration   IDT Collaboration with  Nursing   Patient Position at End of Therapy Call Light within Reach;Tray Table within Reach;Phone within Reach;Seated;Chair Alarm On   Collaboration Comments aware of visit and recs   Session Information   Date / Session Number  2/9-2 (2/3, 2/12)

## 2023-02-10 NOTE — PROGRESS NOTES
[facility] Internal Medicine Daily Progress Note    Date of Service  2/9/2023    [facility] Team: [facility] IM Green Team   Attending: Samuel Landeros M.d.  Senior Resident: Dr. Browne  Intern:  Dr. Lewis  Contact Number: [phone]    Chief Complaint  Chief Complaint   Patient presents with    Cardiomyopathy (Non-ischemic)    Possible Stroke     PT WITH HX CVA RIGHT SIDE DEFICITS.  ASSISTED LIVING STATES PT BEGAN TO HAVE INCREASED SLURRED SPEECH ON Friday AND ALL LAST WEEK THEY FEEL HER RIGHT SIDE OF BODY HAD INCREASED WEAKNESS. PT THINKS RIGHT SIDE FEELS THE SAME.     Leg Swelling     BILATERAL LEG SWELLING     Hospital Course  Az Tripp is a 88 y.o. female who presented 2/5/2023 with past medical history of atrial flutter/fibrillation on Coumadin, pulmonary hypertension, history of stroke, history of lumbar radiculopathy, hypothyroidism, granulomatosis with polyangiitis, who was noticed by her assisted living facility that she has worsening right-sided weakness and slurred speech.  Patient states that these are similar symptoms to the stroke that she had 10 years ago.  She notices that she has right-sided weakness that worsens during a urine infection.  She is also complaining of shortness of breath, cough and lower extremity swelling.  Patient also has dysuria and frequency  She denies any abdominal pain, fever, nausea, vomiting, diarrhea.  Patient normally uses a walker to get around.     Chest x-ray found acute pulmonary edema and bilateral pleural effusions. EKG found atrial fibrillation is rate controlled.    Echocardiogram on 2/6/2023 significant for elevated right ventricular systolic pressure at 50 mmHg, severe tricuspid regurgitation, enlarged right atrium, and inferior vena cava without inspiratory collapse, suggesting right heart failure secondary to pulmonary hypertension or cor pulmonale.    Interval Problem Update  No acute events overnight.  Patient was started on Lasix on 2/3/2023.  She reports  urinating multiple times, but has not had accurate urine output documented.  Per nursing staff, she has been incontinent and urinates in the bed.  She reports that she does not like this medicine does not want to be on it anymore and does not want to take it outpatient.      Her blood pressure has been slightly elevated in the 150s to 171 max systolic blood pressure recorded today.  We will start increase to 50 mg daily.    I have discussed this patient's plan of care and discharge plan at IDT rounds today with Case Management, Nursing, Nursing leadership, and other members of the IDT team.    Consultants/Specialty  pulmonary    Code Status  DNAR/DNI    Disposition  Patient is not medically cleared for discharge.   Anticipate discharge to to skilled nursing facility.  I have placed the appropriate orders for post-discharge needs.    Review of Systems  Review of Systems   Constitutional:  Negative for chills and fever.   Eyes:  Negative for blurred vision.   Respiratory:  Negative for cough and shortness of breath.    Cardiovascular:  Positive for leg swelling (improved, per stepson). Negative for chest pain, palpitations, orthopnea and PND.   Gastrointestinal:  Negative for abdominal pain, constipation, diarrhea, heartburn, nausea and vomiting.   Genitourinary:  Negative for dysuria.   Musculoskeletal:  Negative for back pain, falls and neck pain.   Neurological:  Positive for speech change (slurred, chronic) and weakness (right side, chronic). Negative for dizziness and headaches.   Psychiatric/Behavioral:  Negative for depression. The patient is not nervous/anxious.       Physical Exam  Temp:  [36.5 °C (97.7 °F)-37.1 °C (98.8 °F)] 37.1 °C (98.8 °F)  Pulse:  [68-88] 88  Resp:  [17-19] 17  BP: (130-171)/(70-96) 171/95  SpO2:  [88 %-94 %] 93 %    Physical Exam  Vitals and nursing note reviewed. Exam conducted with a chaperone present.   Constitutional:       General: She is not in acute distress.     Appearance:  Normal appearance. She is not ill-appearing or diaphoretic.   HENT:      Head: Normocephalic and atraumatic.      Right Ear: External ear normal.      Left Ear: External ear normal.      Nose: No congestion or rhinorrhea.      Mouth/Throat:      Mouth: Mucous membranes are moist.      Pharynx: Oropharynx is clear. No oropharyngeal exudate or posterior oropharyngeal erythema.   Eyes:      Extraocular Movements: Extraocular movements intact.      Conjunctiva/sclera: Conjunctivae normal.   Neck:      Vascular: No carotid bruit.   Cardiovascular:      Rate and Rhythm: Normal rate and regular rhythm.      Pulses: Normal pulses.      Heart sounds: No murmur heard.  Pulmonary:      Effort: Pulmonary effort is normal. No respiratory distress.      Breath sounds: No stridor. No wheezing.   Chest:      Chest wall: No tenderness.   Abdominal:      General: Abdomen is flat. There is no distension.      Palpations: Abdomen is soft.      Tenderness: There is no abdominal tenderness. There is no guarding or rebound.   Musculoskeletal:         General: No tenderness.      Cervical back: Normal range of motion. No tenderness.      Right lower leg: Edema (2+ pitting) present.      Left lower leg: No edema.   Skin:     General: Skin is warm and dry.      Capillary Refill: Capillary refill takes less than 2 seconds.   Neurological:      General: No focal deficit present.      Mental Status: She is alert and oriented to person, place, and time. Mental status is at baseline.      Motor: Weakness (R-side) present.      Comments: Decreased temporal right visual fields  Proximal right upper extremity strength 2+/5, distal right upper extremity 3/5, right  strength 4/5  Left upper extremity 5/5 throughout  Proximal right lower extremity 2+/5, distal right lower extremity 3+/5  Left lower extremity 5/5 throughout   Psychiatric:         Mood and Affect: Mood normal.         Behavior: Behavior normal.         Thought Content: Thought  content normal.         Judgment: Judgment normal.       Fluids    Intake/Output Summary (Last 24 hours) at 2/9/2023 1747  Last data filed at 2/9/2023 1319  Gross per 24 hour   Intake 120 ml   Output 400 ml   Net -280 ml       Laboratory  Recent Labs     02/07/23 0103   WBC 5.1   RBC 4.44   HEMOGLOBIN 14.2   HEMATOCRIT 44.8   .9*   MCH 32.0   MCHC 31.7*   RDW 57.0*   PLATELETCT 144*   MPV 9.9     Recent Labs     02/07/23  0103 02/09/23  0201   SODIUM 137 138   POTASSIUM 4.2 4.5   CHLORIDE 105 99   CO2 26 30   GLUCOSE 98 93   BUN 19 18   CREATININE 1.10 1.00   CALCIUM 8.5 9.1     Recent Labs     02/07/23 0103 02/08/23  0241 02/09/23  0201   INR 3.40* 2.13* 1.74*                 Imaging  CT-CHEST (THORAX) W/O   Final Result      1.  Moderate to large bilateral pleural effusions, right greater than left, with associated bilateral lower lobe atelectasis/collapse.      2.  Bronchiectasis with right middle lobe and lingular atelectasis or scarring.      3.  Cardiomegaly with coronary artery calcifications.      Fleischner Society pulmonary nodule recommendations:   Not Applicable         US-EXTREMITY VENOUS LOWER BILAT   Final Result      EC-ECHOCARDIOGRAM COMPLETE W/O CONT   Final Result      CT-HEAD W/O   Final Result         1.  No acute intracranial abnormality is identified, there are nonspecific white matter changes, commonly associated with small vessel ischemic disease.  Associated mild cerebral atrophy is noted.   2.  Atherosclerosis.         DX-CHEST-PORTABLE (1 VIEW)   Final Result         1.  Bilateral lower lobe infiltrates.   2.  Small layering bilateral pleural effusions           Assessment/Plan: 88 y.o. female who presented 2/5/2023 with past medical history of atrial flutter/fibrillation on Coumadin, pulmonary hypertension, history of stroke, history of lumbar radiculopathy, hypothyroidism, granulomatosis with polyangiitis, who is admitted for right sided weakness and acute volume overload.    *  Right sided weakness  Assessment & Plan  Potential residual effects from previous CVA.  Patient subjectively feels no change in her weakness or slurred speech.  - CT without any signs of hemorrhage or ischemia.  MRI likely will not affect management at this time. Patient also states that she does not want extensive work up or treatment at this time.  - Discontinue aspirin, since patient is on warfarin  - Continue atorvastatin 40 mg daily  PT OT eval    Volume overload  Assessment & Plan  Patient has trace bilateral pleural effusions and lower extremity edema.  Potential causes include kidney damage, given proteinuria, however, Cr at baseline, versus acute heart failure, given echocardiogram showing RVSP  elevated at 50 mmHg and IVC without respiratory collapse versus granulomatosis with polyangiitis exacerbation  - Pulmonary consult. Appreciate recs.  -CT chest showed significant bilateral pleural effusions (R>L)  -For oral furosemide 40mg, may increase if without adequate output  -Low-salt diet  -Strict ins and outs and daily weights  -Monitor on telemetry      Pulmonary HTN (HCC)  Assessment & Plan  Echocardiogram showing RVSP  elevated at 50 mmHg and IVC without respiratory collapse suggesting pulmonary HTN vs volume overload vs granulomatosis with polyangiitis exacerbation  -Elevated RSVP may be normal range for patient's age  -Pulm consult - please see volume overload    Macrocytosis  Assessment & Plan  Potential causes include vitamin B12 deficiency, folate deficiency, hypothyroidism  - Vitamin B12 level  - TSH elevated at 9.810    Localized swelling of left lower extremity  Assessment & Plan  Patient reports having a history of DVT, currently on Coumadin  - Repeat DVT ultrasound without evidence of DVT    Acute cystitis with hematuria- (present on admission)  Assessment & Plan  Reported symptoms includes dysuria and frequency, resolved  Was given IV ceftriaxone on admission, no longer requiring antibiotic  treatment    Acute respiratory failure with hypoxia (HCC)  Assessment & Plan  Currently on room air    CKD (chronic kidney disease) stage 3, GFR 30-59 ml/min (HCC)- (present on admission)  Assessment & Plan  Monitor BMP and assess response  Avoid IV contrast/nephrotoxins/NSAIDs  Dose adjust meds for decreased GFR    Granulomatosis with polyangiitis (HCC)  Assessment & Plan  - CRP elevated at 2.74  - ESR 9  - ANCA normal      Hypothyroidism- (present on admission)  Assessment & Plan  Repeat TSH elevated at 9.810  -Continue levothyroxine to 100 mcg         VTE prophylaxis: SCDs/TEDs and therapeutic anticoagulation with warfarin    I have performed a physical exam and reviewed and updated ROS and Plan today (2/9/2023). In review of yesterday's note (2/8/2023), there are no changes except as documented above.

## 2023-02-10 NOTE — HEART FAILURE PROGRAM
HF Education Documented? No, messaged RN, Maddie Acosta    Where we stand right now on HFpEF MEASURES per review of most recent notes and discharge med rec.    Anticoagulation for atrial arrhythmia, if applicable: warfarin  Glycemic control for DM + HF: n/a  Lipid lowering medication for DM + HF: n/a  Pneumococcal vaccine: previous  Influenza vaccine for the current flu season: current  Documentation of nicotine cessation counseling: quit 63 y/a per h/p  Referral to disease management program specializing in heart failure care: yes    Thank you, Maliha, Cardio RN Navigator [id number]

## 2023-02-10 NOTE — CARE PLAN
The patient is Stable - Low risk of patient condition declining or worsening    Shift Goals  Clinical Goals: pt will report adequate rest overnight  Patient Goals: rest  Family Goals: HAYDE    Progress made toward(s) clinical / shift goals:  pt observed resting well overnight.    Problem: Knowledge Deficit - Standard  Goal: Patient and family/care givers will demonstrate understanding of plan of care, disease process/condition, diagnostic tests and medications  Outcome: Progressing     Problem: Skin Integrity  Goal: Skin integrity is maintained or improved  Outcome: Progressing     Problem: Fall Risk  Goal: Patient will remain free from falls  Outcome: Progressing

## 2023-02-10 NOTE — THERAPY
Occupational Therapy Contact Note    Patient Name: Az Tripp  Age:  88 y.o., Sex:  female  Medical Record #: [id number]  Today's Date: 2/10/2023    Discussed missed therapy with RN and CM. OT attempted treatment and was informed pt is in the process of d/cing to [facility] shortly. OT tx held. Recommend continued OT services in SNF. RN/CM informed and agreed.        02/10/23 1018   Treatment Variance   Reason For Missed Therapy Non-Medical - Other (Please Comment)  (OT attempted to see ptfor tx.CM informed OT pt in the process of d/cing to [facility].)   Interdisciplinary Plan of Care Collaboration   IDT Collaboration with  Nursing;   Collaboration Comments RN informed. OT attempted to see pt for OT tx. Pt is d/cing to [facility] shortly. OT tx held per CM request.   Session Information   Date / Session Number  2/6, #1 (1/3, 2/12) Attempted OT tx 2/10 . Pt in the process of d/cing. OT tx held.

## 2023-02-10 NOTE — CARE PLAN
The patient is Stable - Low risk of patient condition declining or worsening    Shift Goals  Clinical Goals: discharge  Patient Goals: rest  Family Goals: HAYDE    Progress made toward(s) clinical / shift goals:    Problem: Knowledge Deficit - Standard  Goal: Patient and family/care givers will demonstrate understanding of plan of care, disease process/condition, diagnostic tests and medications  Outcome: Progressing     Problem: Skin Integrity  Goal: Skin integrity is maintained or improved  Outcome: Progressing     Problem: Fall Risk  Goal: Patient will remain free from falls  Outcome: Progressing       Patient is not progressing towards the following goals:

## 2023-02-10 NOTE — PROGRESS NOTES
Inpatient Anticoagulation Service Note for 2/10/2023      Reason for Anticoagulation: Atrial Fibrillation   [id number] VASc Score: 8  HAS-BLED Score: 2    Hemoglobin Value: 14.2  Hematocrit Value: 44.8  Lab Platelet Value: (!) 144  Target INR: 2.0 to 3.0    INR from last 7 days       Date/Time INR Value    02/10/23 0156 1.58    23 0201 1.74    23 0241 2.13    23 0103 3.4    23 0246 4.15    23 2042 3.9          Dose from last 7 days       Date/Time Dose (mg)    02/10/23 1027 2    23 1122 2    23 1305 2    23 1617 0    23 1417 0    23 0228 --          Average Dose (mg):  (Home Dosinmg Tues/Sat, 2mg all other days)  Significant Interactions: Not Applicable  Bridge Therapy: No   Reversal Agent Administered: Not Applicable  Comments: Continue with home dosing and INR trend.  Likely will take a few days for INR to respond given therapy has been held since admission.  Education Material Provided?: No    Pharmacist suggested discharge dosing: home dosing and INR check within 48 hours of discharge.     Monika Farrar, PharmD  [id number]

## 2023-02-10 NOTE — DISCHARGE INSTRUCTIONS
Please start taking atorvastatin (cholesterol medicine) and losartan (blood pressure medicine)  Your dose of levothyroxine (thyroid medicine) was increased to 100 mcg

## 2023-03-02 ENCOUNTER — ANTICOAGULATION MONITORING (OUTPATIENT)
Dept: VASCULAR LAB | Facility: MEDICAL CENTER | Age: 88
End: 2023-03-02

## 2023-03-02 ENCOUNTER — APPOINTMENT (OUTPATIENT)
Dept: VASCULAR LAB | Facility: MEDICAL CENTER | Age: 88
End: 2023-03-02
Attending: INTERNAL MEDICINE
Payer: MEDICARE

## 2023-03-02 ENCOUNTER — OFFICE VISIT (OUTPATIENT)
Dept: CARDIOLOGY | Facility: MEDICAL CENTER | Age: 88
End: 2023-03-02
Payer: MEDICARE

## 2023-03-02 ENCOUNTER — DOCUMENTATION (OUTPATIENT)
Dept: VASCULAR LAB | Facility: MEDICAL CENTER | Age: 88
End: 2023-03-02

## 2023-03-02 VITALS
WEIGHT: 103 LBS | SYSTOLIC BLOOD PRESSURE: 146 MMHG | OXYGEN SATURATION: 94 % | HEART RATE: 78 BPM | HEIGHT: 63 IN | BODY MASS INDEX: 18.25 KG/M2 | DIASTOLIC BLOOD PRESSURE: 82 MMHG | RESPIRATION RATE: 16 BRPM

## 2023-03-02 DIAGNOSIS — Z79.01 ON CONTINUOUS ORAL ANTICOAGULATION: ICD-10-CM

## 2023-03-02 DIAGNOSIS — I36.1 NONRHEUMATIC TRICUSPID VALVE REGURGITATION: ICD-10-CM

## 2023-03-02 DIAGNOSIS — I48.92 ATRIAL FLUTTER, UNSPECIFIED TYPE (HCC): ICD-10-CM

## 2023-03-02 DIAGNOSIS — D68.69 SECONDARY HYPERCOAGULABLE STATE (HCC): ICD-10-CM

## 2023-03-02 DIAGNOSIS — I27.20 PULMONARY HTN (HCC): ICD-10-CM

## 2023-03-02 DIAGNOSIS — I48.0 PAROXYSMAL ATRIAL FIBRILLATION (HCC): ICD-10-CM

## 2023-03-02 DIAGNOSIS — N18.32 STAGE 3B CHRONIC KIDNEY DISEASE: ICD-10-CM

## 2023-03-02 LAB — INR PPP: 3.1 (ref 2–3.5)

## 2023-03-02 PROCEDURE — 99214 OFFICE O/P EST MOD 30 MIN: CPT | Performed by: INTERNAL MEDICINE

## 2023-03-02 RX ORDER — FUROSEMIDE 20 MG/1
20 TABLET ORAL DAILY
Qty: 90 TABLET | Refills: 3 | Status: SHIPPED | OUTPATIENT
Start: 2023-03-02 | End: 2023-06-13

## 2023-03-02 ASSESSMENT — FIBROSIS 4 INDEX: FIB4 SCORE: 3.99

## 2023-03-02 NOTE — PROGRESS NOTES
Anticoagulation Summary  As of 3/2/2023      INR goal:  2.0-3.0   TTR:  62.4 % (4.2 y)   INR used for dosing:  3.10 (3/2/2023)   Warfarin maintenance plan:  1 mg (2 mg x 0.5) every Tue, Sat; 2 mg (2 mg x 1) all other days   Weekly warfarin total:  12 mg   Plan last modified:  Jenna Pang (1/3/2023)   Next INR check:  3/6/2023   Priority:  Maintenance   Target end date:  Indefinite    Indications    Stroke (HCC) (Resolved) [I63.9]  Atrial flutter (HCC) [I48.92]  Secondary hypercoagulable state (HCC) [D68.69]                 Anticoagulation Episode Summary       INR check location:      Preferred lab:      Send INR reminders to:      Comments:  Adam   [phone]  12/7/22 - per [employer] Eliquis & Xarelto is $200.00 for 30 days.          Anticoagulation Care Providers       Provider Role Specialty Phone number    Hussain Nation M.D. Referring Interventional Cardiology [phone]    [facility] Anticoagulation Services Responsible  [phone]          Anticoagulation Patient Findings          Left voicemail message to report aSUPRA therapeutic INR of 3.1.  Pt to Reduce today's (3/2/23) dose of warfarin to 1 mg, then continue with current warfarin dosing regimen. Requested pt contact the clinic for any s/s of unusual bleeding, bruising, clotting or any changes to diet or medication. FU 4 days.     Graham Jackson, PharmD

## 2023-03-02 NOTE — PROGRESS NOTES
Chief Complaint   Patient presents with    Atrial Fibrillation     F/V Dx: Paroxysmal atrial fibrillation (HCC)    Pulmonary Hypertension     F/V Dx: Pulmonary HTN (HCC)      Peripheral Vascular Disease (PVD)       Subjective:   Az Jolly is an 88 y.o. female who presents today for follow-up of atrial flutter/fibrillation PSVT stroke and anticoagulation.      Since last visit she was hospitalized for a fall preceded by worsening lower extremity edema that was new for her.  In the hospital she was treated with diuresis for bilateral pleural effusions lower extremity edema and new onset severe TR with pulmonary hypertension.  She had her medications adjusted and has had an ARB added to her regimen.  She was not discharged on ongoing diuresis.  She is having a slow recovery after her prolonged hospital stay followed by skilled nursing.  a stay in     Past Medical History:   Diagnosis Date    A fib 9/22/06    Abdominal bruit 9/25/2012    Abdominal pain 3/20/2019    Abnormal chest x-ray 9/19/2017    Improved but persistent bilateral airspace opacities 9/11/17    Achalasia 8/3/2011    DIAZ (acute kidney injury) ([facility]) 12/20/2018    Allergic rhinitis 9/21/2012    Arterial ischemic stroke, MCA (middle cerebral artery), left, acute ([facility]) 8/3/2011    Aseptic necrosis of bone, 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    Bronchiectasis without complication ([facility]) 9/19/2017    Chronic fatigue 9/19/2017    Closed right hip fracture ([facility]) 6/19/2019    DVT 6/1996    left leg, vein ligation performed on saphenous vein    First degree atrioventricular block 9/21/2012    Glomerulonephritis, chronic in other disease 8/3/2011    H/O echocardiogram 9/21/2012    Hematuria 3/20/2019    Hypothyroid 10/2002    Kidney disorder 1998    left kidney biopsy--glomerulonephritis and wegners dx    Kidney failure     stage II    Leukocytosis 7/21/2020    Mitral valve prolapse     Palpitations 9/21/2012    Rheumatic fever 9/21/2012    Rheumatic fever  2012    Childhood     Sepsis (HCC) 2018    Severe protein-calorie malnutrition (Mahmood: less than 60% of standard weight) (HCC) 2018    Stroke (HCC) 2012    Supratherapeutic INR 2018    SVT (SUPRAVENTRICULAR TACHYCARDIA), h/o paroxysmal 8/3/2011    Wegener's granulomatosis     Wegener's granulomatosis 8/3/2011     O Spring 2021     Past Surgical History:   Procedure Laterality Date    PB PARTIAL HIP REPLACEMENT Right 2019    Procedure: HEMIARTHROPLASTY, HIP;  Surgeon: Raul Saldivar M.D.;  Location: SURGERY [facility];  Service: Orthopedics    CATARACT EXTRACTION      left eye    CHOLECYSTECTOMY      TEMPORAL ARTERY BIOPSY      normal    LUNG NEEDLE BIOPSY      right lung, nodules found    ARTHROSCOPE      left knee    ARTHROSCOPY, KNEE  1986    right    BUNIONECTOMY      bilat    HYSTERECTOMY RADICAL  1975    VEIN LIGATION       Family History   Problem Relation Age of Onset    Other Father         Aortic aneurysm    Cancer Brother         Lung    Non-contributory Other      Social History     Socioeconomic History    Marital status:      Spouse name: Not on file    Number of children: Not on file    Years of education: Not on file    Highest education level: Not on file   Occupational History    Not on file   Tobacco Use    Smoking status: Former     Packs/day: 1.00     Types: Cigarettes     Quit date: 1960     Years since quittin.2    Smokeless tobacco: Never    Tobacco comments:     very little years and years ago   Vaping Use    Vaping Use: Never used   Substance and Sexual Activity    Alcohol use: Not Currently    Drug use: No    Sexual activity: Not on file   Other Topics Concern    Not on file   Social History Narrative    Not on file     Social Determinants of Health     Financial Resource Strain: Not on file   Food Insecurity: Not on file   Transportation Needs: Not on file   Physical Activity: Not on file   Stress: Not on  file   Social Connections: Not on file   Intimate Partner Violence: Not on file   Housing Stability: Not on file     Allergies   Allergen Reactions    Cephalexin [Keflex] Swelling    Hydroxyzine Swelling     Eyes get itchy and swollen     Naprelan [Naproxen] Swelling     Eyes get itchy become red and swollen    Oxaprozin Swelling     Swelling eyes, and itchy    Ampicillin Rash     Red Rash    Baclofen Unspecified     drowsiness    Citalopram Vomiting and Nausea    Clarithromycin Unspecified     Pt reports that this medication plugs her ears.     Diclofenac Rash and Swelling     Red rash & swelling      Erythromycin Diarrhea     GI upset    Penicillins Rash     Red rash      Promethazine Unspecified     Drowsiness and sleeps    Vi-Q-Tuss [Hydrocodone-Guaifenesin] Vomiting and Nausea     Outpatient Encounter Medications as of 3/2/2023   Medication Sig Dispense Refill    furosemide (LASIX) 20 MG Tab Take 1 Tablet by mouth every day. 90 Tablet 3    levothyroxine (SYNTHROID) 100 MCG Tab Take 1 Tablet by mouth every morning on an empty stomach. 30 Tablet 0    atorvastatin (LIPITOR) 40 MG Tab Take 1 Tablet by mouth every evening for 30 days. 30 Tablet 0    multivitamin Tab Take 2 Tablets by mouth every day.      polyethylene glycol/lytes (MIRALAX) 17 g Pack Take 17 g by mouth every day.      ondansetron (ZOFRAN ODT) 4 MG TABLET DISPERSIBLE Take 4 mg by mouth every 8 hours as needed for Nausea/Vomiting.      acetaminophen (TYLENOL) 500 MG Tab Take 1,000 mg by mouth 3 times a day. Give at 0800, 1400, & 1800      D-Mannose 500 MG Cap Take 500 mg by mouth every morning.      warfarin (COUMADIN) 2 MG Tab Take 1-2 mg by mouth every evening. 1 mg = Tuesday and Saturday  2 mg = Monday, Wednesday, Thursday, Friday , Sunday      gabapentin (NEURONTIN) 100 MG Cap Take 1 Capsule by mouth 2 times a day. For neuropathy. Discontinue all previous orders. 60 Capsule 5    sennosides-docusate sodium (SENOKOT-S) 8.6-50 MG tablet Take 2  "Tablets by mouth at bedtime. Discontinue all previous orders of senna plus. Start 2 tablets by mouth at bedtime for constipation.  Indications: Constipation 60 Tablet 11    folic acid (FOLVITE) 1 MG Tab Take 1 Tablet by mouth every morning. For supplement 30 Tablet 11    metoprolol tartrate (LOPRESSOR) 50 MG Tab Take 1 Tablet by mouth 2 times a day. 180 Tablet 2    tamsulosin (FLOMAX) 0.4 MG capsule Take 1 Capsule by mouth every morning. For kindey 30 Capsule 11    losartan (COZAAR) 50 MG Tab Take 1 Tablet by mouth every day for 30 days. 30 Tablet 0     No facility-administered encounter medications on file as of 3/2/2023.     Review of Systems   All other systems reviewed and are negative.     Objective:   BP (!) 146/82 (BP Location: Left arm, Patient Position: Sitting, BP Cuff Size: Adult)   Pulse 78   Resp 16   Ht 1.6 m (5' 3\")   Wt 46.7 kg (103 lb)   SpO2 94%   BMI 18.25 kg/m²     Physical Exam  Vitals reviewed.   Constitutional:       General: She is not in acute distress.     Appearance: She is well-developed. She is not diaphoretic.      Comments: Elderly and cachectic  Walker     HENT:      Head: Normocephalic and atraumatic.      Right Ear: External ear normal.      Left Ear: External ear normal.      Mouth/Throat:      Pharynx: No oropharyngeal exudate.   Eyes:      General: No scleral icterus.        Right eye: No discharge.         Left eye: No discharge.      Conjunctiva/sclera: Conjunctivae normal.      Pupils: Pupils are equal, round, and reactive to light.   Neck:      Thyroid: No thyromegaly.      Vascular: No JVD.      Trachea: No tracheal deviation.   Cardiovascular:      Rate and Rhythm: Normal rate. Rhythm irregularly irregular.      Chest Wall: PMI is not displaced.      Pulses:           Carotid pulses are 2+ on the right side and 2+ on the left side.       Radial pulses are 2+ on the right side and 2+ on the left side.        Popliteal pulses are 2+ on the right side and 2+ on the left " side.        Dorsalis pedis pulses are 2+ on the right side and 2+ on the left side.        Posterior tibial pulses are 2+ on the right side and 2+ on the left side.      Heart sounds: S1 normal and S2 normal. Murmur ( 2/6 systolic apical murmur) heard.     No friction rub. No gallop. No S3 or S4 sounds.   Pulmonary:      Effort: Pulmonary effort is normal. No respiratory distress.      Breath sounds: Normal breath sounds. No wheezing or rales.   Chest:      Chest wall: No tenderness.   Abdominal:      General: Bowel sounds are normal. There is no distension.      Palpations: Abdomen is soft.      Tenderness: There is no abdominal tenderness.   Musculoskeletal:         General: No tenderness. Normal range of motion.      Cervical back: Normal range of motion and neck supple.   Skin:     General: Skin is warm and dry.      Findings: No erythema or rash.   Neurological:      Mental Status: She is alert and oriented to person, place, and time.      Cranial Nerves: No cranial nerve deficit (Cranial nerves II through XII grossly intact).   Psychiatric:         Behavior: Behavior normal.         Thought Content: Thought content normal.         Judgment: Judgment normal.     LABS:  Lab Results   Component Value Date/Time    CHOLSTRLTOT 117 02/06/2023 12:54 AM    LDL 40 02/06/2023 12:54 AM    HDL 59 02/06/2023 12:54 AM    TRIGLYCERIDE 91 02/06/2023 12:54 AM       Lab Results   Component Value Date/Time    WBC 5.1 02/07/2023 01:03 AM    RBC 4.44 02/07/2023 01:03 AM    HEMOGLOBIN 14.2 02/07/2023 01:03 AM    HEMATOCRIT 44.8 02/07/2023 01:03 AM    .9 (H) 02/07/2023 01:03 AM    NEUTSPOLYS 51.60 02/07/2023 01:03 AM    LYMPHOCYTES 32.00 02/07/2023 01:03 AM    MONOCYTES 12.80 02/07/2023 01:03 AM    EOSINOPHILS 2.40 02/07/2023 01:03 AM    BASOPHILS 0.80 02/07/2023 01:03 AM    HYPOCHROMIA 1+ 06/22/2019 03:44 AM     Lab Results   Component Value Date/Time    SODIUM 138 02/09/2023 02:01 AM    POTASSIUM 4.5 02/09/2023 02:01 AM     CHLORIDE 99 02/09/2023 02:01 AM    CO2 30 02/09/2023 02:01 AM    GLUCOSE 93 02/09/2023 02:01 AM    BUN 18 02/09/2023 02:01 AM    CREATININE 1.00 02/09/2023 02:01 AM    CREATININE 1.5 (H) 02/19/2009 09:22 AM         Lab Results   Component Value Date/Time    ALKPHOSPHAT 57 02/07/2023 01:03 AM    ASTSGOT 16 02/07/2023 01:03 AM    ALTSGPT 6 02/07/2023 01:03 AM    TBILIRUBIN 0.4 02/07/2023 01:03 AM      Lab Results   Component Value Date/Time    BNPBTYPENAT 135 (H) 08/10/2011 03:40 AM      No results found for: TSH  Lab Results   Component Value Date/Time    PROTHROMBTM 18.5 (H) 02/10/2023 01:56 AM    INR 3.10 03/02/2023 12:00 AM          Assessment:     1. Paroxysmal atrial fibrillation (HCC)  furosemide (LASIX) 20 MG Tab      2. On continuous oral anticoagulation        3. Stage 3b chronic kidney disease (HCC)        4. Nonrheumatic tricuspid valve regurgitation  furosemide (LASIX) 20 MG Tab    EC-ECHOCARDIOGRAM COMPLETE W/O CONT      5. Pulmonary HTN (HCC)  furosemide (LASIX) 20 MG Tab          Medical Decision Making:  Today's Assessment / Status / Plan:     Convalescing well from her hospital stay and doing better than at her admission per review of the chart which showed decompensated congestive heart failure and pulmonary hypertension with severe tricuspid regurgitation.  Intravenous diuresis was successful.  Her heart rates are well controlled with regard to her chronic atrial fibrillation and I would suggest ongoing management with daily Lasix.   recommend follow-up echocardiogram in 3 months and will discuss further recommendations at her visit back.

## 2023-03-03 NOTE — PROGRESS NOTES
Cancelled pt's INR appt for today because she had her INR checked with Adam LIN.    Callie CARCAMO

## 2023-03-06 ENCOUNTER — ANTICOAGULATION MONITORING (OUTPATIENT)
Dept: VASCULAR LAB | Facility: MEDICAL CENTER | Age: 88
End: 2023-03-06
Payer: MEDICARE

## 2023-03-06 DIAGNOSIS — D68.69 SECONDARY HYPERCOAGULABLE STATE (HCC): ICD-10-CM

## 2023-03-06 DIAGNOSIS — I48.92 ATRIAL FLUTTER, UNSPECIFIED TYPE (HCC): ICD-10-CM

## 2023-03-06 LAB — INR PPP: 3.1 (ref 2–3.5)

## 2023-03-06 RX ORDER — WARFARIN SODIUM 2 MG/1
1-2 TABLET ORAL EVERY EVENING
Qty: 30 TABLET | Refills: 1 | Status: SHIPPED | OUTPATIENT
Start: 2023-03-06 | End: 2023-05-01

## 2023-03-06 NOTE — PROGRESS NOTES
Anticoagulation Summary  As of 3/6/2023      INR goal:  2.0-3.0   TTR:  62.2 % (4.2 y)   INR used for dosing:  3.10 (3/6/2023)   Warfarin maintenance plan:  1 mg (2 mg x 0.5) every Mon, Wed, Fri; 2 mg (2 mg x 1) all other days   Weekly warfarin total:  11 mg   Plan last modified:  Juan NairD (3/6/2023)   Next INR check:  3/13/2023   Priority:  Maintenance   Target end date:  Indefinite    Indications    Stroke (HCC) (Resolved) [I63.9]  Atrial flutter (HCC) [I48.92]  Secondary hypercoagulable state (HCC) [D68.69]                 Anticoagulation Episode Summary       INR check location:      Preferred lab:      Send INR reminders to:      Comments:  [facility]  [phone]  12/7/22 - per [employer] Eli[facility] & Xarelto is $200.00 for 30 days.          Anticoagulation Care Providers       Provider Role Specialty Phone number    Hussain Nation M.D. Referring Interventional Cardiology [phone]    [facility] Anticoagulation Services Responsible  [phone]          Anticoagulation Patient Findings          S/W Milagros (replacement for Ana this month)  Pt denies any unusual s/s of bleeding, bruising, clotting or any changes to diet or medications.     INR is above goal, will begin reduced warfarin regimen as shown in table above.     Next INR in 7 days.     Graham Jackson, PharmD  CC   [facility]

## 2023-03-13 ENCOUNTER — ANTICOAGULATION MONITORING (OUTPATIENT)
Dept: VASCULAR LAB | Facility: MEDICAL CENTER | Age: 88
End: 2023-03-13
Payer: MEDICARE

## 2023-03-13 DIAGNOSIS — D68.69 SECONDARY HYPERCOAGULABLE STATE (HCC): ICD-10-CM

## 2023-03-13 DIAGNOSIS — I48.92 ATRIAL FLUTTER, UNSPECIFIED TYPE (HCC): ICD-10-CM

## 2023-03-13 LAB — INR PPP: 2.4 (ref 2–3.5)

## 2023-03-13 NOTE — PROGRESS NOTES
OP Anticoagulation Service Note    Date: 3/13/2023    Anticoagulation Summary  As of 3/13/2023      INR goal:  2.0-3.0   TTR:  62.4 % (4.3 y)   INR used for dosin.40 (3/13/2023)   Warfarin maintenance plan:  1 mg (2 mg x 0.5) every Mon, Wed, Fri; 2 mg (2 mg x 1) all other days   Weekly warfarin total:  11 mg   Plan last modified:  Graham Jackson, PharmD (3/6/2023)   Next INR check:  3/20/2023   Priority:  Maintenance   Target end date:  Indefinite    Indications    Stroke (HCC) (Resolved) [I63.9]  Atrial flutter (HCC) [I48.92]  Secondary hypercoagulable state (HCC) [D68.69]                 Anticoagulation Episode Summary       INR check location:      Preferred lab:      Send INR reminders to:      Comments:  [facility]  [phone]  22 - per [employer] Eli[facility] & Xarelto is $200.00 for 30 days.          Anticoagulation Care Providers       Provider Role Specialty Phone number    Hussain Nation M.D. Referring Interventional Cardiology [phone]    [facility] Anticoagulation Services Responsible  [phone]          Anticoagulation Patient Findings        Patient's preferred phone number:  [phone]        HPI:   The reason for today's call is to prevent morbidity and mortality from a blood clot and/or stroke and to reduce the risk of bleeding while on a anticoagulant.     PCP:  Ruba Greene A.P.R.NDayanara  [address] [place] [phone]    Assessment:     INR  therapeutic.     Lab Results   Component Value Date/Time    BUN 18 2023 02:01 AM    CREATININE 1.00 2023 02:01 AM    CREATININE 1.5 (H) 2009 09:22 AM     Lab Results   Component Value Date/Time    HEMOGLOBIN 14.2 2023 01:03 AM    HEMATOCRIT 44.8 2023 01:03 AM    PLATELETCT 144 (L) 2023 01:03 AM    ALKPHOSPHAT 57 2023 01:03 AM    ASTSGOT 16 2023 01:03 AM    ALTSGPT 6 2023 01:03 AM          Current Outpatient Medications:     losartan, Take one tab po daily    pregabalin, 25 mg, Oral, BID     sennosides-docusate sodium, 1 Tablet, Oral, QHS    warfarin, 1-2 mg, Oral, Q EVENING    folic acid, TAKE 1 TABLET BY MOUTH EVERY MORNING.    furosemide, 20 mg, Oral, DAILY (Patient taking differently: 20 mg, Oral, 1 TIME DAILY PRN, edema as reqeusted, Indications: Edema)    levothyroxine, 100 mcg, Oral, AM ES    multivitamin, 2 Tablet, Oral, DAILY    polyethylene glycol/lytes, 17 g, Oral, DAILY    ondansetron, 4 mg, Oral, Q8HRS PRN    acetaminophen, 1,000 mg, Oral, TID    D-Mannose, 500 mg, Oral, QAM    metoprolol tartrate, 50 mg, Oral, BID    tamsulosin, 0.4 mg, Oral, QAM      Plan:     Continue the same warfarin dose, as noted above.       Follow-up:     As seen above      Additional information discussed with patient:     Asked patient to please call the anticoagulation clinic if they have any signs/symptoms of bleeding and/or thrombosis or any changes to diet or medications.      National recommendations regarding anticoagulation therapy:     The CHEST guidelines recommends frequent INR monitoring at regular intervals (a few days up to a max of 12 weeks) to ensure patients are on the proper dose of warfarin, and patients are not having any complications from therapy.  INRs can dramatically change over a short time period due to diet, medications, and medical conditions.       Onesimo Rothman, PharmD, MS, BCACP, [facility]  Phone: [phone]  Fax: [phone]  On call: [phone]  General scheduling/information [phone]  For emergencies please dial [phone]  Please do not use [facility] for urgent matters, call the phone numbers listed above.    This note was created using voice recognition software (Dragon). The accuracy of the dictation is limited by the abilities of the software. I have reviewed the note prior to signing, however some errors in grammar and context are still possible. If you have any questions related to this note please do not hesitate to contact our  office.

## 2023-03-20 ENCOUNTER — ANTICOAGULATION MONITORING (OUTPATIENT)
Dept: MEDICAL GROUP | Facility: PHYSICIAN GROUP | Age: 88
End: 2023-03-20
Payer: MEDICARE

## 2023-03-20 DIAGNOSIS — D68.69 SECONDARY HYPERCOAGULABLE STATE (HCC): ICD-10-CM

## 2023-03-20 DIAGNOSIS — I48.92 ATRIAL FLUTTER, UNSPECIFIED TYPE (HCC): ICD-10-CM

## 2023-03-20 LAB — INR PPP: 2.5 (ref 2–3.5)

## 2023-03-20 NOTE — PROGRESS NOTES
Anticoagulation Summary  As of 3/20/2023      INR goal:  2.0-3.0   TTR:  62.5 % (4.3 y)   INR used for dosin.50 (3/20/2023)   Warfarin maintenance plan:  1 mg (2 mg x 0.5) every Mon, Wed, Fri; 2 mg (2 mg x 1) all other days   Weekly warfarin total:  11 mg   Plan last modified:  Juan NairD (3/6/2023)   Next INR check:  4/3/2023   Priority:  Maintenance   Target end date:  Indefinite    Indications    Stroke (HCC) (Resolved) [I63.9]  Atrial flutter (HCC) [I48.92]  Secondary hypercoagulable state (HCC) [D68.69]                 Anticoagulation Episode Summary       INR check location:      Preferred lab:      Send INR reminders to:      Comments:  [facility]  [phone]  22 - per [employer] Eli[facility] & Xarelto is $200.00 for 30 days.          Anticoagulation Care Providers       Provider Role Specialty Phone number    Hussain Nation M.D. Referring Interventional Cardiology [phone]    [facility] Anticoagulation Services Responsible  [phone]          Anticoagulation Patient Findings            HPI:  Az Stinson Josee, on anticoagulation therapy with warfarin for AF  Changes to current medical/health status since last appt: none  Denies signs/symptoms of bleeding and/or thrombosis since the last appt.    Denies any interval changes to diet  Denies any interval changes to medications since last appt (per pt)  Denies any complications or cost restrictions with current therapy.     A/P   INR  therapeutic.   Pt is to continue with current warfarin dosing regimen. See above     Next INR in 2 week(s).    Graham Jackson, PharmD     CC  [facility]   [facility] [phone]

## 2023-04-03 ENCOUNTER — ANTICOAGULATION MONITORING (OUTPATIENT)
Dept: VASCULAR LAB | Facility: MEDICAL CENTER | Age: 88
End: 2023-04-03
Payer: MEDICARE

## 2023-04-03 DIAGNOSIS — D68.69 SECONDARY HYPERCOAGULABLE STATE (HCC): ICD-10-CM

## 2023-04-03 DIAGNOSIS — I48.92 ATRIAL FLUTTER, UNSPECIFIED TYPE (HCC): ICD-10-CM

## 2023-04-03 LAB — INR PPP: 2.3 (ref 2–3.5)

## 2023-04-03 NOTE — PROGRESS NOTES
Anticoagulation Summary  As of 4/3/2023      INR goal:  2.0-3.0   TTR:  62.9 % (4.3 y)   INR used for dosin.30 (4/3/2023)   Warfarin maintenance plan:  1 mg (2 mg x 0.5) every Mon, Wed, Fri; 2 mg (2 mg x 1) all other days   Weekly warfarin total:  11 mg   Plan last modified:  Juan NairD (3/6/2023)   Next INR check:  2023   Priority:  Maintenance   Target end date:  Indefinite    Indications    Stroke (HCC) (Resolved) [I63.9]  Atrial flutter (HCC) [I48.92]  Secondary hypercoagulable state (HCC) [D68.69]                 Anticoagulation Episode Summary       INR check location:      Preferred lab:      Send INR reminders to:      Comments:  [facility]  [phone]  22 - per [employer] Eli[facility] & Xarelto is $200.00 for 30 days.          Anticoagulation Care Providers       Provider Role Specialty Phone number    Hussain Nation M.D. Referring Interventional Cardiology [phone]    [facility] Anticoagulation Services Responsible  [phone]          Anticoagulation Patient Findings      INR is therapeutic    Pt to continue with current warfarin dosing regimen. Requested pt contact the clinic for any s/s of unusual bleeding, bruising, clotting or any changes to diet or medication.        FU INR in 2 week(s) via [facility].  Faxed this note to ROWENA Angeles, Earle

## 2023-04-17 ENCOUNTER — ANTICOAGULATION MONITORING (OUTPATIENT)
Dept: VASCULAR LAB | Facility: MEDICAL CENTER | Age: 88
End: 2023-04-17
Payer: MEDICARE

## 2023-04-17 DIAGNOSIS — D68.69 SECONDARY HYPERCOAGULABLE STATE (HCC): ICD-10-CM

## 2023-04-17 DIAGNOSIS — I48.92 ATRIAL FLUTTER, UNSPECIFIED TYPE (HCC): ICD-10-CM

## 2023-04-17 LAB — INR PPP: 2.8 (ref 2–3.5)

## 2023-04-17 NOTE — PROGRESS NOTES
Anticoagulation Summary  As of 2023      INR goal:  2.0-3.0   TTR:  63.2 % (4.4 y)   INR used for dosin.80 (2023)   Warfarin maintenance plan:  1 mg (2 mg x 0.5) every Mon, Wed, Fri; 2 mg (2 mg x 1) all other days   Weekly warfarin total:  11 mg   Plan last modified:  Juan NairD (3/6/2023)   Next INR check:  2023   Priority:  Maintenance   Target end date:  Indefinite    Indications    Stroke (HCC) (Resolved) [I63.9]  Atrial flutter (HCC) [I48.92]  Secondary hypercoagulable state (HCC) [D68.69]                 Anticoagulation Episode Summary       INR check location:      Preferred lab:      Send INR reminders to:      Comments:  22 - per [employer] Eli[facility] & Xarelto is $200.00 for 30 days.          Anticoagulation Care Providers       Provider Role Specialty Phone number    Hussain Nation M.D. Referring Interventional Cardiology [phone]    [facility] Anticoagulation Services Responsible  [phone]            Refer to Anticoagulation Patient Findings for HPI  Patient Findings       Negatives:  Signs/symptoms of thrombosis, Signs/symptoms of bleeding, Laboratory test error suspected, Change in health, Change in alcohol use, Change in activity, Upcoming invasive procedure, Emergency department visit, Upcoming dental procedure, Missed doses, Extra doses, Change in medications, Change in diet/appetite, Hospital admission, Bruising, Other complaints            Spoke with patient  to report a therapeutic INR.      Pt is NOT on antiplatelet therapy    Pt instructed to continue with current warfarin dosing regimen, confirms dosing.       Pt has been discharged from [facility]  POCT APPT in 4 week(s) on .     Whit Angeles, PharmD    Faxed to [address] [place] [address]

## 2023-04-19 PROBLEM — N30.01 ACUTE CYSTITIS WITH HEMATURIA: Status: RESOLVED | Noted: 2021-12-06 | Resolved: 2023-04-19

## 2023-04-19 PROBLEM — Z87.440 HISTORY OF RECURRENT UTIS: Status: ACTIVE | Noted: 2023-04-17

## 2023-05-16 ENCOUNTER — ANTICOAGULATION VISIT (OUTPATIENT)
Dept: VASCULAR LAB | Facility: MEDICAL CENTER | Age: 88
End: 2023-05-16
Attending: INTERNAL MEDICINE
Payer: MEDICARE

## 2023-05-16 DIAGNOSIS — D68.69 SECONDARY HYPERCOAGULABLE STATE (HCC): ICD-10-CM

## 2023-05-16 DIAGNOSIS — I48.92 ATRIAL FLUTTER, UNSPECIFIED TYPE (HCC): ICD-10-CM

## 2023-05-16 LAB — INR PPP: 3.6 (ref 2–3.5)

## 2023-05-16 PROCEDURE — 99212 OFFICE O/P EST SF 10 MIN: CPT

## 2023-05-16 PROCEDURE — 85610 PROTHROMBIN TIME: CPT

## 2023-05-16 NOTE — PROGRESS NOTES
Anticoagulation Summary  As of 5/16/2023      INR goal:  2.0-3.0   TTR:  62.5 % (4.4 y)   INR used for dosing:  3.60 (5/16/2023)   Warfarin maintenance plan:  1 mg (2 mg x 0.5) every Mon, Wed, Fri; 2 mg (2 mg x 1) all other days   Weekly warfarin total:  11 mg   Plan last modified:  Juan NairD (3/6/2023)   Next INR check:  5/30/2023   Priority:  Maintenance   Target end date:  Indefinite    Indications    Stroke (HCC) (Resolved) [I63.9]  Atrial flutter (HCC) [I48.92]  Secondary hypercoagulable state (HCC) [D68.69]                 Anticoagulation Episode Summary       INR check location:      Preferred lab:      Send INR reminders to:      Comments:  12/7/22 - per [facility] Eli[facility] & Xarelto is $200.00 for 30 days.          Anticoagulation Care Providers       Provider Role Specialty Phone number    Hussain Nation M.D. Referring Interventional Cardiology [phone]    [facility] Anticoagulation Services Responsible  [phone]                  Refer to Patient Findings for HPI:  Left VM with [facility] with the results below:    pt declined vitals    Verified current warfarin dosing schedule.        A/P   INR  SUPRA-therapeutic. Pt reports eating less greens than usual, she will resume eating more greens.     Warfarin dosing recommendation: Hold today then Pt is to continue with current warfarin dosing regimen.     Pt educated to contact our clinic with any changes in medications or s/s of bleeding or thrombosis. Pt is aware to seek immediate medical attention for falls, head injury or deep cuts.    Follow up appointment in 2 week(s).    Graham Jackson, PharmD    Faxed to [facility] [phone]

## 2023-05-30 ENCOUNTER — ANTICOAGULATION VISIT (OUTPATIENT)
Dept: VASCULAR LAB | Facility: MEDICAL CENTER | Age: 88
End: 2023-05-30
Attending: INTERNAL MEDICINE
Payer: MEDICARE

## 2023-05-30 ENCOUNTER — TELEPHONE (OUTPATIENT)
Dept: VASCULAR LAB | Facility: MEDICAL CENTER | Age: 88
End: 2023-05-30

## 2023-05-30 VITALS — SYSTOLIC BLOOD PRESSURE: 123 MMHG | DIASTOLIC BLOOD PRESSURE: 75 MMHG | HEART RATE: 64 BPM

## 2023-05-30 DIAGNOSIS — D68.69 SECONDARY HYPERCOAGULABLE STATE (HCC): ICD-10-CM

## 2023-05-30 DIAGNOSIS — I48.92 ATRIAL FLUTTER, UNSPECIFIED TYPE (HCC): ICD-10-CM

## 2023-05-30 LAB — INR PPP: 3.8 (ref 2–3.5)

## 2023-05-30 PROCEDURE — 85610 PROTHROMBIN TIME: CPT

## 2023-05-30 PROCEDURE — 99212 OFFICE O/P EST SF 10 MIN: CPT

## 2023-05-30 NOTE — TELEPHONE ENCOUNTER
Pt was asking if her next appt was on 6/13 or 6/15, as her AVS says 6/15, but the dosing calendar stops at 6/13.    Clarified w/ pt that her next INR is on 6/15.    Whit Angeles, PharmD

## 2023-05-30 NOTE — PROGRESS NOTES
Anticoagulation Summary  As of 5/30/2023      INR goal:  2.0-3.0   TTR:  62.0 % (4.5 y)   INR used for dosing:  3.80 (5/30/2023)   Warfarin maintenance plan:  2 mg (2 mg x 1) every Tue, Thu; 1 mg (2 mg x 0.5) all other days   Weekly warfarin total:  9 mg   Plan last modified:  Jenna Pang (5/30/2023)   Next INR check:  6/15/2023   Priority:  Maintenance   Target end date:  Indefinite    Indications    Stroke (HCC) (Resolved) [I63.9]  Atrial flutter (HCC) [I48.92]  Secondary hypercoagulable state (HCC) [D68.69]                 Anticoagulation Episode Summary       INR check location:      Preferred lab:      Send INR reminders to:      Comments:  12/7/22 - per [facility] Eliquis & Xarelto is $200.00 for 30 days.          Anticoagulation Care Providers       Provider Role Specialty Phone number    Hussain Nation M.D. Referring Interventional Cardiology [phone]    [facility] Anticoagulation Services Responsible  [phone]          Refer to Patient Findings for HPI:    Vitals:    05/30/23 1108   BP: 123/75   Pulse: 64     Patient seen in clinic today for follow up on anticoagulation therapy with warfarin (a high risk medication) for hx of AFL  and stroke  Verified current warfarin dosing schedule.  Patient denies any missed doses of warfarin.    Medications reconciled   Pt is not on antiplatelet therapy      A/P   INR is SUPRA-therapeutic today at 3.8.     Warfarin dosing recommendation: Patient will HOLD warfarin dose TONIGHT, then will begin reduced weekly regimen of 2mg on Tues and Thurs and 1mg ROW.   Patient asked to retest again in 2 weeks.     Pt educated to contact our clinic with any changes in medications or s/s of bleeding or thrombosis. Pt is aware to seek immediate medical attention for falls, head injury or deep cuts.    Follow up appointment in 2 week(s).  [facility]    Next appt: Thurs, Alexia 15 @ 10:45am     Pilar Pang  PharmD

## 2023-05-30 NOTE — TELEPHONE ENCOUNTER
----- Message from Shaun Ruiz PharmD sent at 5/30/2023  1:16 PM PDT -----  Regarding: requesting c/b to discuss next appt

## 2023-06-05 PROBLEM — R60.1 GENERALIZED EDEMA DUE TO FLUID OVERLOAD: Status: ACTIVE | Noted: 2023-02-06

## 2023-06-05 PROBLEM — K30 INDIGESTION: Status: ACTIVE | Noted: 2023-06-05

## 2023-06-05 PROBLEM — K21.9 GASTROESOPHAGEAL REFLUX DISEASE WITHOUT ESOPHAGITIS: Status: ACTIVE | Noted: 2023-06-05

## 2023-06-05 PROBLEM — R68.2 DRY MOUTH: Status: ACTIVE | Noted: 2023-06-05

## 2023-06-15 ENCOUNTER — APPOINTMENT (OUTPATIENT)
Dept: VASCULAR LAB | Facility: MEDICAL CENTER | Age: 88
End: 2023-06-15
Attending: INTERNAL MEDICINE
Payer: MEDICARE

## 2023-06-20 ENCOUNTER — ANTICOAGULATION VISIT (OUTPATIENT)
Dept: VASCULAR LAB | Facility: MEDICAL CENTER | Age: 88
End: 2023-06-20
Attending: INTERNAL MEDICINE
Payer: MEDICARE

## 2023-06-20 VITALS — SYSTOLIC BLOOD PRESSURE: 116 MMHG | HEART RATE: 62 BPM | DIASTOLIC BLOOD PRESSURE: 75 MMHG

## 2023-06-20 DIAGNOSIS — I48.92 ATRIAL FLUTTER, UNSPECIFIED TYPE (HCC): ICD-10-CM

## 2023-06-20 DIAGNOSIS — D68.69 SECONDARY HYPERCOAGULABLE STATE (HCC): ICD-10-CM

## 2023-06-20 LAB — INR PPP: 3.3 (ref 2–3.5)

## 2023-06-20 PROCEDURE — 85610 PROTHROMBIN TIME: CPT

## 2023-06-20 PROCEDURE — 99212 OFFICE O/P EST SF 10 MIN: CPT

## 2023-06-20 NOTE — PROGRESS NOTES
Anticoagulation Summary  As of 6/20/2023      INR goal:  2.0-3.0   TTR:  61.2 % (4.5 y)   INR used for dosing:  3.30 (6/20/2023)   Warfarin maintenance plan:  2 mg (2 mg x 1) every Thu; 1 mg (2 mg x 0.5) all other days   Weekly warfarin total:  8 mg   Plan last modified:  Jenna Pang (6/20/2023)   Next INR check:  7/11/2023   Priority:  Maintenance   Target end date:  Indefinite    Indications    Stroke (HCC) (Resolved) [I63.9]  Atrial flutter (HCC) [I48.92]  Secondary hypercoagulable state (HCC) [D68.69]                 Anticoagulation Episode Summary       INR check location:      Preferred lab:      Send INR reminders to:      Comments:  12/7/22 - per [facility] Eli[facility] Xarelto is $200.00 for 30 days.  [facility] [phone]          Anticoagulation Care Providers       Provider Role Specialty Phone number    Hussain Nation M.D. Referring Interventional Cardiology [phone]    [facility] Anticoagulation Services Responsible  [phone]          Refer to Patient Findings for HPI:  Patient Findings       Negatives:  Signs/symptoms of thrombosis, Signs/symptoms of bleeding, Laboratory test error suspected, Change in health, Change in alcohol use, Change in activity, Upcoming invasive procedure, Emergency department visit, Upcoming dental procedure, Missed doses, Extra doses, Change in medications, Change in diet/appetite, Hospital admission, Bruising, Other complaints          Vitals:    06/20/23 1421   BP: 116/75   Pulse: 62       Patient seen in clinic today for follow up on anticoagulation therapy with warfarin (a high risk medication) for hx of AFlutter and sroke  Verified current warfarin dosing schedule.  Patient denies any missed doses of warfarin.    Medications reconciled   Pt is not on antiplatelet therapy      A/P   INR is SUPRA-therapeutic today at 3.3.     Warfarin dosing recommendation: Patient instructed to begin reduced weekly regimen of 2mg on Thurs and 1mg all other days.  Patient  asked to follow up again in 2 weeks, but due to the holiday and her needing to come on Tues she will return in 3 weeks.     Pt educated to contact our clinic with any changes in medications or s/s of bleeding or thrombosis. Pt is aware to seek immediate medical attention for falls, head injury or deep cuts.    Follow up appointment in 3 week(s).  [facility]    Next appt: Tues, July 11 @ 11:30am     Pilar Pang PharmD

## 2023-06-28 ENCOUNTER — HOSPITAL ENCOUNTER (OUTPATIENT)
Dept: CARDIOLOGY | Facility: MEDICAL CENTER | Age: 88
End: 2023-06-28
Attending: INTERNAL MEDICINE
Payer: MEDICARE

## 2023-06-28 DIAGNOSIS — I36.1 NONRHEUMATIC TRICUSPID VALVE REGURGITATION: ICD-10-CM

## 2023-06-28 PROCEDURE — 93306 TTE W/DOPPLER COMPLETE: CPT

## 2023-06-29 LAB
LV EJECT FRACT  99904: 60
LV EJECT FRACT MOD 2C 99903: 59.33
LV EJECT FRACT MOD 4C 99902: 46.88
LV EJECT FRACT MOD BP 99901: 57.15

## 2023-06-29 PROCEDURE — 93306 TTE W/DOPPLER COMPLETE: CPT | Mod: 26 | Performed by: INTERNAL MEDICINE

## 2023-06-30 ENCOUNTER — OFFICE VISIT (OUTPATIENT)
Dept: CARDIOLOGY | Facility: MEDICAL CENTER | Age: 88
End: 2023-06-30
Attending: INTERNAL MEDICINE
Payer: MEDICARE

## 2023-06-30 VITALS
DIASTOLIC BLOOD PRESSURE: 68 MMHG | BODY MASS INDEX: 17.74 KG/M2 | HEIGHT: 67 IN | SYSTOLIC BLOOD PRESSURE: 109 MMHG | WEIGHT: 113 LBS | OXYGEN SATURATION: 99 % | RESPIRATION RATE: 16 BRPM | HEART RATE: 74 BPM

## 2023-06-30 DIAGNOSIS — I48.0 PAROXYSMAL ATRIAL FIBRILLATION (HCC): ICD-10-CM

## 2023-06-30 DIAGNOSIS — I50.813 ACUTE ON CHRONIC RIGHT-SIDED CONGESTIVE HEART FAILURE (HCC): ICD-10-CM

## 2023-06-30 DIAGNOSIS — I36.1 NONRHEUMATIC TRICUSPID VALVE REGURGITATION: ICD-10-CM

## 2023-06-30 DIAGNOSIS — I27.20 PULMONARY HTN (HCC): ICD-10-CM

## 2023-06-30 PROCEDURE — 99213 OFFICE O/P EST LOW 20 MIN: CPT | Performed by: INTERNAL MEDICINE

## 2023-06-30 PROCEDURE — 3078F DIAST BP <80 MM HG: CPT | Performed by: INTERNAL MEDICINE

## 2023-06-30 PROCEDURE — 99212 OFFICE O/P EST SF 10 MIN: CPT | Performed by: INTERNAL MEDICINE

## 2023-06-30 PROCEDURE — 99214 OFFICE O/P EST MOD 30 MIN: CPT | Performed by: INTERNAL MEDICINE

## 2023-06-30 PROCEDURE — 3074F SYST BP LT 130 MM HG: CPT | Performed by: INTERNAL MEDICINE

## 2023-06-30 RX ORDER — FUROSEMIDE 40 MG/1
TABLET ORAL
Qty: 180 TABLET | Refills: 3 | Status: SHIPPED | OUTPATIENT
Start: 2023-06-30 | End: 2023-07-04 | Stop reason: SDUPTHER

## 2023-06-30 RX ORDER — POTASSIUM CHLORIDE 1500 MG/1
10 TABLET, EXTENDED RELEASE ORAL DAILY
Qty: 90 TABLET | Refills: 3 | Status: SHIPPED | OUTPATIENT
Start: 2023-06-30 | End: 2023-07-04 | Stop reason: SDUPTHER

## 2023-06-30 ASSESSMENT — FIBROSIS 4 INDEX: FIB4 SCORE: 4.04

## 2023-06-30 NOTE — PROGRESS NOTES
Chief Complaint   Patient presents with    Atrial Fibrillation     Follow up       Subjective:   Az Jolly is an 89 y.o. female who presents today for follow-up of atrial flutter/fibrillation PSVT stroke and anticoagulation as well as severe tricuspid regurgitation and right ventricular failure.    Last visit we initiated Lasix 20 mg daily plus as needed 20 mg in addition.  This has not significantly improved her edema.  She remains fatigued but not breathless.  She notes right upper extremity edema which is her stroke involved arm that mostly rest to her right low side and is not very functional.    Past Medical History:   Diagnosis Date    A fib 9/22/06    Abdominal bruit 9/25/2012    Abdominal pain 3/20/2019    Abnormal chest x-ray 9/19/2017    Improved but persistent bilateral airspace opacities 9/11/17    Achalasia 8/3/2011    Acute cystitis with hematuria 12/6/2021    DIAZ (acute kidney injury) ([facility]) 12/20/2018    Allergic rhinitis 9/21/2012    Arterial ischemic stroke, MCA (middle cerebral artery), left, acute ([facility]) 8/3/2011    Aseptic necrosis of bone, 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    Bronchiectasis without complication ([facility]) 9/19/2017    Chronic fatigue 9/19/2017    Closed right hip fracture ([facility]) 6/19/2019    DVT 6/1996    left leg, vein ligation performed on saphenous vein    First degree atrioventricular block 9/21/2012    Glomerulonephritis, chronic in other disease 8/3/2011    H/O echocardiogram 9/21/2012    Hematuria 3/20/2019    Hypothyroid 10/2002    Kidney disorder 1998    left kidney biopsy--glomerulonephritis and wegners dx    Kidney failure     stage II    Leukocytosis 7/21/2020    Mitral valve prolapse     Palpitations 9/21/2012    Rheumatic fever 9/21/2012    Rheumatic fever 9/21/2012    Childhood     Sepsis ([facility]) 12/20/2018    Severe protein-calorie malnutrition (Mahmood: less than 60% of standard weight) ([facility]) 12/20/2018    Stroke ([facility]) 9/21/2012    Supratherapeutic INR 12/20/2018     SVT (SUPRAVENTRICULAR TACHYCARDIA), h/o paroxysmal 8/3/2011    Wegener's granulomatosis     Wegener's granulomatosis 8/3/2011     IMO Spring 2021     Past Surgical History:   Procedure Laterality Date    PB PARTIAL HIP REPLACEMENT Right 2019    Procedure: HEMIARTHROPLASTY, HIP;  Surgeon: Raul Saldivar M.D.;  Location: SURGERY [facility];  Service: Orthopedics    CATARACT EXTRACTION      left eye    CHOLECYSTECTOMY      TEMPORAL ARTERY BIOPSY      normal    LUNG NEEDLE BIOPSY      right lung, nodules found    ARTHROSCOPE      left knee    ARTHROSCOPY, KNEE  1986    right    BUNIONECTOMY      bilat    HYSTERECTOMY RADICAL  1975    VEIN LIGATION       Family History   Problem Relation Age of Onset    Other Father         Aortic aneurysm    Cancer Brother         Lung    Non-contributory Other      Social History     Socioeconomic History    Marital status:      Spouse name: Not on file    Number of children: Not on file    Years of education: Not on file    Highest education level: Not on file   Occupational History    Not on file   Tobacco Use    Smoking status: Former     Packs/day: 1.00     Types: Cigarettes     Quit date: 1960     Years since quittin.5    Smokeless tobacco: Never    Tobacco comments:     very little years and years ago   Vaping Use    Vaping Use: Never used   Substance and Sexual Activity    Alcohol use: Not Currently    Drug use: No    Sexual activity: Not on file   Other Topics Concern    Not on file   Social History Narrative    Not on file     Social Determinants of Health     Financial Resource Strain: Not on file   Food Insecurity: Not on file   Transportation Needs: Not on file   Physical Activity: Not on file   Stress: Not on file   Social Connections: Not on file   Intimate Partner Violence: Not on file   Housing Stability: Not on file     Allergies   Allergen Reactions    Cephalexin [Keflex] Swelling    Hydroxyzine Swelling     Eyes  get itchy and swollen     Naprelan [Naproxen] Swelling     Eyes get itchy become red and swollen    Oxaprozin Swelling     Swelling eyes, and itchy    Ampicillin Rash     Red Rash    Baclofen Unspecified     drowsiness    Citalopram Vomiting and Nausea    Clarithromycin Unspecified     Pt reports that this medication plugs her ears.     Diclofenac Rash and Swelling     Red rash & swelling      Erythromycin Diarrhea     GI upset    Penicillins Rash     Red rash      Promethazine Unspecified     Drowsiness and sleeps    Vi-Q-Tuss [Hydrocodone-Guaifenesin] Vomiting and Nausea     Outpatient Encounter Medications as of 6/30/2023   Medication Sig Dispense Refill    furosemide (LASIX) 40 MG Tab Take 1 Tablet by mouth every day. May also take 1 Tablet 1 time a day as needed (May take additional tablet for severe edema if needed for a total of two tablets.). 180 Tablet 3    potassium chloride ER (K-TAB) 20 MEQ Tab CR tablet Take 0.5 Tablets by mouth every day. 90 Tablet 3    acetaminophen (ACETAMINOPHEN EXTRA STRENGTH) 500 MG Tab Take 2 Tablets by mouth 3 times a day. At  7am, 2pm, and 7pm.  Maximum 3grams daily from all sources 180 Tablet 0    omeprazole (PRILOSEC) 20 MG delayed-release capsule Take 1 Capsule by mouth every day for 56 days. For acid reflux/ night time cough 56 Capsule 0    sennosides-docusate sodium (SENOKOT-S) 8.6-50 MG tablet Take 2 Tablets by mouth 1 time a day as needed (constipation). Discontinue previous routine orders 30 Tablet 11    ondansetron (ZOFRAN ODT) 4 MG TABLET DISPERSIBLE DISSOLVE 1 TABLET ON THE TONGUE EVERY 8 HOURS AS NEEDED FOR NAUSEA. 30 Tablet 0    metoprolol tartrate (LOPRESSOR) 50 MG Tab TAKE 1 TABLET BY MOUTH TWICE DAILY. 60 Tablet 5    warfarin (COUMADIN) 2 MG Tab TAKE 1/2 TABLET (1MG) BY MOUTH ON MON, WED & FRI. TAKE 1 TABLET (2MG) ALL OTHER DAYS. 30 Tablet 2    tamsulosin (FLOMAX) 0.4 MG capsule TAKE 1 CAPSULE BY MOUTH EVERY MORNING. 30 Capsule 5    levothyroxine (SYNTHROID) 100  "MCG Tab TAKE 1 TABLET BY MOUTH EVERY MORNING 30 MINUTES BEFOREBREAKFAST (EMPTY STOMACH) 30 Tablet 5    gabapentin (NEURONTIN) 100 MG Cap Take one cap po BID for neuropathy 60 Capsule 11    losartan (COZAAR) 50 MG Tab Take one tab po daily 30 Tablet 5    folic acid (FOLVITE) 1 MG Tab TAKE 1 TABLET BY MOUTH EVERY MORNING. 30 Tablet 11    multivitamin Tab Take 2 Tablets by mouth every day.      D-Mannose 500 MG Cap Take 500 mg by mouth every 48 hours. Do not ship.  Family provides.      [DISCONTINUED] furosemide (LASIX) 20 MG Tab Take 1 Tablet by mouth every day. May also take 1 Tablet 1 time a day as needed (May take additional tablet for severe edema if needed for a total of two tablets.). 60 Tablet 11    [DISCONTINUED] potassium chloride ER (KLOR-CON) 10 MEQ tablet Take 1 Tablet by mouth every day. 30 Tablet 11    polyethylene glycol/lytes (MIRALAX) 17 g Pack Take 8.5 g by mouth every day. Do not ship.  Family provides. (Patient not taking: Reported on 6/30/2023)       No facility-administered encounter medications on file as of 6/30/2023.     Review of Systems   All other systems reviewed and are negative.       Objective:   /68 (BP Location: Left arm, Patient Position: Sitting)   Pulse 74   Resp 16   Ht 1.702 m (5' 7\")   Wt 51.3 kg (113 lb)   SpO2 99%   BMI 17.70 kg/m²     Physical Exam  Vitals reviewed.   Constitutional:       General: She is not in acute distress.     Appearance: She is well-developed. She is not diaphoretic.      Comments: Elderly and cachectic  Walker     HENT:      Head: Normocephalic and atraumatic.      Right Ear: External ear normal.      Left Ear: External ear normal.      Mouth/Throat:      Pharynx: No oropharyngeal exudate.   Eyes:      General: No scleral icterus.        Right eye: No discharge.         Left eye: No discharge.      Conjunctiva/sclera: Conjunctivae normal.      Pupils: Pupils are equal, round, and reactive to light.   Neck:      Thyroid: No thyromegaly.      " Vascular: No JVD.      Trachea: No tracheal deviation.   Cardiovascular:      Rate and Rhythm: Normal rate. Rhythm irregularly irregular.      Chest Wall: PMI is not displaced.      Pulses:           Carotid pulses are 2+ on the right side and 2+ on the left side.       Radial pulses are 2+ on the right side and 2+ on the left side.        Popliteal pulses are 2+ on the right side and 2+ on the left side.        Dorsalis pedis pulses are 2+ on the right side and 2+ on the left side.        Posterior tibial pulses are 2+ on the right side and 2+ on the left side.      Heart sounds: S1 normal and S2 normal. Murmur ( 2/6 systolic apical murmur) heard.      No friction rub. No gallop. No S3 or S4 sounds.   Pulmonary:      Effort: Pulmonary effort is normal. No respiratory distress.      Breath sounds: Normal breath sounds. No wheezing or rales.   Chest:      Chest wall: No tenderness.   Abdominal:      General: Bowel sounds are normal. There is no distension.      Palpations: Abdomen is soft.      Tenderness: There is no abdominal tenderness.   Musculoskeletal:         General: Swelling present. No tenderness. Normal range of motion.      Cervical back: Normal range of motion and neck supple.      Right lower leg: Edema present.      Left lower leg: Edema present.   Skin:     General: Skin is warm and dry.      Findings: No erythema or rash.   Neurological:      Mental Status: She is alert and oriented to person, place, and time.      Cranial Nerves: No cranial nerve deficit (Cranial nerves II through XII grossly intact).   Psychiatric:         Behavior: Behavior normal.         Thought Content: Thought content normal.         Judgment: Judgment normal.       LABS:  Lab Results   Component Value Date/Time    CHOLSTRLTOT 117 02/06/2023 12:54 AM    LDL 40 02/06/2023 12:54 AM    HDL 59 02/06/2023 12:54 AM    TRIGLYCERIDE 91 02/06/2023 12:54 AM       Lab Results   Component Value Date/Time    WBC 5.1 02/07/2023 01:03 AM     RBC 4.44 02/07/2023 01:03 AM    HEMOGLOBIN 14.2 02/07/2023 01:03 AM    HEMATOCRIT 44.8 02/07/2023 01:03 AM    .9 (H) 02/07/2023 01:03 AM    NEUTSPOLYS 51.60 02/07/2023 01:03 AM    LYMPHOCYTES 32.00 02/07/2023 01:03 AM    MONOCYTES 12.80 02/07/2023 01:03 AM    EOSINOPHILS 2.40 02/07/2023 01:03 AM    BASOPHILS 0.80 02/07/2023 01:03 AM    HYPOCHROMIA 1+ 06/22/2019 03:44 AM     Lab Results   Component Value Date/Time    SODIUM 138 02/09/2023 02:01 AM    POTASSIUM 4.5 02/09/2023 02:01 AM    CHLORIDE 99 02/09/2023 02:01 AM    CO2 30 02/09/2023 02:01 AM    GLUCOSE 93 02/09/2023 02:01 AM    BUN 18 02/09/2023 02:01 AM    CREATININE 1.00 02/09/2023 02:01 AM    CREATININE 1.5 (H) 02/19/2009 09:22 AM         Lab Results   Component Value Date/Time    ALKPHOSPHAT 57 02/07/2023 01:03 AM    ASTSGOT 16 02/07/2023 01:03 AM    ALTSGPT 6 02/07/2023 01:03 AM    TBILIRUBIN 0.4 02/07/2023 01:03 AM      Lab Results   Component Value Date/Time    BNPBTYPENAT 135 (H) 08/10/2011 03:40 AM      No results found for: TSH  Lab Results   Component Value Date/Time    PROTHROMBTM 18.5 (H) 02/10/2023 01:56 AM    INR 3.30 06/20/2023 12:00 AM          Assessment:     1. Paroxysmal atrial fibrillation (HCC)  furosemide (LASIX) 40 MG Tab      2. Nonrheumatic tricuspid valve regurgitation  furosemide (LASIX) 40 MG Tab      3. Pulmonary HTN (HCC)  furosemide (LASIX) 40 MG Tab    potassium chloride ER (K-TAB) 20 MEQ Tab CR tablet      4. Acute on chronic right-sided congestive heart failure (HCC)  Basic Metabolic Panel    furosemide (LASIX) 40 MG Tab    potassium chloride ER (K-TAB) 20 MEQ Tab CR tablet          Medical Decision Making:  Today's Assessment / Status / Plan:     Follow-up echocardiogram reveals persistent severe tricuspid regurgitation with right ventricular dysfunction.  She has acutely on chronically decompensated congestive heart failure due to right ventricular dysfunction.  At her age and with her frailty and comorbidities she is  not a candidate for any invasive therapies at this time for her tricuspid regurgitation and indeed has a POLST specifying conservative therapy.  Recommend increasing her Lasix to 40 mg daily with additional as needed dosing and increase her potassium as well is checking a BMP.  We also discussed thigh-high compression garments and a right arm lymphedema compression sleeve to help with the dependent edema in that arm.    She will follow-up routinely.

## 2023-07-11 ENCOUNTER — ANTICOAGULATION VISIT (OUTPATIENT)
Dept: VASCULAR LAB | Facility: MEDICAL CENTER | Age: 88
End: 2023-07-11
Attending: INTERNAL MEDICINE
Payer: MEDICARE

## 2023-07-11 DIAGNOSIS — I48.92 ATRIAL FLUTTER, UNSPECIFIED TYPE (HCC): ICD-10-CM

## 2023-07-11 DIAGNOSIS — D68.69 SECONDARY HYPERCOAGULABLE STATE (HCC): ICD-10-CM

## 2023-07-11 LAB — INR PPP: 2.2 (ref 2–3.5)

## 2023-07-11 PROCEDURE — 99211 OFF/OP EST MAY X REQ PHY/QHP: CPT

## 2023-07-11 PROCEDURE — 85610 PROTHROMBIN TIME: CPT

## 2023-07-11 NOTE — PROGRESS NOTES
Anticoagulation Summary  As of 2023      INR goal:  2.0-3.0   TTR:  61.4 % (4.6 y)   INR used for dosin.20 (2023)   Warfarin maintenance plan:  2 mg (2 mg x 1) every Thu; 1 mg (2 mg x 0.5) all other days   Weekly warfarin total:  8 mg   Plan last modified:  Jenna Pang (2023)   Next INR check:  2023   Priority:  Maintenance   Target end date:  Indefinite    Indications    Stroke (HCC) (Resolved) [I63.9]  Atrial flutter (HCC) [I48.92]  Secondary hypercoagulable state (HCC) [D68.69]                 Anticoagulation Episode Summary       INR check location:      Preferred lab:      Send INR reminders to:      Comments:  22 - per [facility] Eliquis & Xarelto is $200.00 for 30 days.  [facility] [phone]          Anticoagulation Care Providers       Provider Role Specialty Phone number    Hussain Nation M.D. Referring Interventional Cardiology [phone]    [facility] Anticoagulation Services Responsible  [phone]          Refer to Patient Findings for HPI:  Patient Findings       Negatives:  Signs/symptoms of thrombosis, Signs/symptoms of bleeding, Laboratory test error suspected, Change in health, Change in alcohol use, Change in activity, Upcoming invasive procedure, Emergency department visit, Upcoming dental procedure, Missed doses, Extra doses, Change in medications, Change in diet/appetite, Hospital admission, Bruising, Other complaints          There were no vitals filed for this visit.  The pt declined vitals today     Patient seen in clinic today for follow up on anticoagulation therapy with warfarin (a high risk medication) for hx of AF and stroke  Verified current warfarin dosing schedule.  Patient denies any missed doses of warfarin.    Medications reconciled   Pt is not on antiplatelet therapy      A/P   INR is therapeutic today at 2.2.     Warfarin dosing recommendation: Patient instructed to continue with the current dosing regimen.   Patient will follow up  again in 4 weeks.     Pt educated to contact our clinic with any changes in medications or s/s of bleeding or thrombosis. Pt is aware to seek immediate medical attention for falls, head injury or deep cuts.    Follow up appointment in 4 week(s).    Next appt: Tues, Aug 8 @ 11am     Aliyah Kincaid Pharmacy Intern   Pilar Pang PharmD

## 2023-07-15 NOTE — ED NOTES
ERP at bedside. Pt agrees with plan of care discussed by ERP. AIDET acknowledged with patient. Darío in low position, side rail up for pt safety. Call light within reach. IV established. Blood sent to lab.Will continue to monitor.   Statement Selected

## 2023-07-17 PROBLEM — R05.3 PERSISTENT DRY COUGH: Status: ACTIVE | Noted: 2023-07-17

## 2023-07-17 PROBLEM — I50.812 CHRONIC RIGHT-SIDED HEART FAILURE (HCC): Status: ACTIVE | Noted: 2023-07-17

## 2023-08-01 PROBLEM — N18.4 CKD (CHRONIC KIDNEY DISEASE) STAGE 4, GFR 15-29 ML/MIN (HCC): Status: ACTIVE | Noted: 2018-12-26

## 2023-08-08 ENCOUNTER — ANTICOAGULATION VISIT (OUTPATIENT)
Dept: VASCULAR LAB | Facility: MEDICAL CENTER | Age: 88
End: 2023-08-08
Attending: INTERNAL MEDICINE
Payer: MEDICARE

## 2023-08-08 DIAGNOSIS — I48.92 ATRIAL FLUTTER, UNSPECIFIED TYPE (HCC): ICD-10-CM

## 2023-08-08 DIAGNOSIS — D68.69 SECONDARY HYPERCOAGULABLE STATE (HCC): ICD-10-CM

## 2023-08-08 LAB — INR PPP: 2.6 (ref 2–3.5)

## 2023-08-08 PROCEDURE — 85610 PROTHROMBIN TIME: CPT

## 2023-08-08 PROCEDURE — 99211 OFF/OP EST MAY X REQ PHY/QHP: CPT

## 2023-08-08 NOTE — PROGRESS NOTES
Anticoagulation Summary  As of 2023      INR goal:  2.0-3.0   TTR:  62.0 % (4.7 y)   INR used for dosin.60 (2023)   Warfarin maintenance plan:  2 mg (2 mg x 1) every Thu; 1 mg (2 mg x 0.5) all other days   Weekly warfarin total:  8 mg   Plan last modified:  Jenna Pang (2023)   Next INR check:  2023   Priority:  Maintenance   Target end date:  Indefinite    Indications    Stroke (HCC) (Resolved) [I63.9]  Atrial flutter (HCC) [I48.92]  Secondary hypercoagulable state (HCC) [D68.69]                 Anticoagulation Episode Summary       INR check location:      Preferred lab:      Send INR reminders to:      Comments:  22 - per [facility] Xarelto is $200.00 for 30 days.  [facility] [phone]          Anticoagulation Care Providers       Provider Role Specialty Phone number    Hussain Nation M.D. Referring Interventional Cardiology [phone]    [facility] Anticoagulation Services Responsible  [phone]              Refer to Patient Findings for HPI:  Patient Findings       Negatives:  Signs/symptoms of thrombosis, Signs/symptoms of bleeding, Laboratory test error suspected, Change in health, Change in alcohol use, Change in activity, Upcoming invasive procedure, Emergency department visit, Upcoming dental procedure, Missed doses, Extra doses, Change in medications, Change in diet/appetite, Hospital admission, Bruising, Other complaints            There were no vitals filed for this visit.      Verified current warfarin dosing schedule.    Medications reconciled   Pt is not on antiplatelet therapy      A/P   INR  therapeutic.     Warfarin dosing recommendation: Pt is to continue with current warfarin dosing regimen.      Pt educated to contact our clinic with any changes in medications or s/s of bleeding or thrombosis. Pt is aware to seek immediate medical attention for falls, head injury or deep cuts.    Follow up appointment in 6 week(s).  Faxed this note to  [facility] Angeles, PharmD

## 2023-08-21 PROBLEM — J90 CHRONIC BILATERAL PLEURAL EFFUSIONS: Status: ACTIVE | Noted: 2023-08-21

## 2023-08-21 PROBLEM — N18.9 HISTORY OF ANEMIA DUE TO CHRONIC KIDNEY DISEASE: Status: ACTIVE | Noted: 2023-08-21

## 2023-08-21 PROBLEM — Q64.79 STRUCTURAL ABNORMALITY OF BLADDER: Status: ACTIVE | Noted: 2023-08-21

## 2023-08-21 PROBLEM — R18.8 OTHER ASCITES: Status: ACTIVE | Noted: 2023-08-21

## 2023-08-21 PROBLEM — R64 CACHEXIA (HCC): Status: ACTIVE | Noted: 2023-08-21

## 2023-08-21 PROBLEM — Z86.2 HISTORY OF ANEMIA DUE TO CHRONIC KIDNEY DISEASE: Status: ACTIVE | Noted: 2023-08-21

## 2023-09-11 ENCOUNTER — TELEPHONE (OUTPATIENT)
Dept: CARDIOLOGY | Facility: MEDICAL CENTER | Age: 88
End: 2023-09-11
Payer: MEDICARE

## 2023-09-11 NOTE — TELEPHONE ENCOUNTER
Chart prep:      Attempted to call patient in regards to an upcoming appointment with Dr. Nation on 9/21/23. LVM letting patient know to call us back. Attempting to verify where lab work is done. Patient does have  lab work to do.      Pending call back.

## 2023-09-18 PROBLEM — R49.0 HOARSENESS OF VOICE: Status: ACTIVE | Noted: 2023-09-18

## 2023-09-19 ENCOUNTER — ANTICOAGULATION VISIT (OUTPATIENT)
Dept: VASCULAR LAB | Facility: MEDICAL CENTER | Age: 88
End: 2023-09-19
Attending: INTERNAL MEDICINE
Payer: MEDICARE

## 2023-09-19 DIAGNOSIS — I48.92 ATRIAL FLUTTER, UNSPECIFIED TYPE (HCC): ICD-10-CM

## 2023-09-19 DIAGNOSIS — D68.69 SECONDARY HYPERCOAGULABLE STATE (HCC): ICD-10-CM

## 2023-09-19 LAB — INR PPP: 1.9 (ref 2–3.5)

## 2023-09-19 PROCEDURE — 85610 PROTHROMBIN TIME: CPT

## 2023-09-19 PROCEDURE — 99211 OFF/OP EST MAY X REQ PHY/QHP: CPT
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
## 2023-09-21 ENCOUNTER — OFFICE VISIT (OUTPATIENT)
Dept: CARDIOLOGY | Facility: MEDICAL CENTER | Age: 88
End: 2023-09-21
Attending: INTERNAL MEDICINE
Payer: MEDICARE

## 2023-09-21 VITALS
BODY MASS INDEX: 15.07 KG/M2 | HEIGHT: 67 IN | WEIGHT: 96 LBS | RESPIRATION RATE: 18 BRPM | HEART RATE: 88 BPM | DIASTOLIC BLOOD PRESSURE: 66 MMHG | SYSTOLIC BLOOD PRESSURE: 118 MMHG | OXYGEN SATURATION: 99 %

## 2023-09-21 DIAGNOSIS — I27.20 PULMONARY HTN (HCC): ICD-10-CM

## 2023-09-21 DIAGNOSIS — I69.359 HEMIPLEGIA AS LATE EFFECT OF CEREBROVASCULAR ACCIDENT (CVA) (HCC): ICD-10-CM

## 2023-09-21 DIAGNOSIS — I36.1 NONRHEUMATIC TRICUSPID VALVE REGURGITATION: ICD-10-CM

## 2023-09-21 DIAGNOSIS — N18.4 CKD (CHRONIC KIDNEY DISEASE) STAGE 4, GFR 15-29 ML/MIN (HCC): ICD-10-CM

## 2023-09-21 DIAGNOSIS — I48.3 TYPICAL ATRIAL FLUTTER (HCC): ICD-10-CM

## 2023-09-21 DIAGNOSIS — Z79.01 ON CONTINUOUS ORAL ANTICOAGULATION: ICD-10-CM

## 2023-09-21 PROCEDURE — 3074F SYST BP LT 130 MM HG: CPT | Performed by: INTERNAL MEDICINE

## 2023-09-21 PROCEDURE — 99214 OFFICE O/P EST MOD 30 MIN: CPT | Performed by: INTERNAL MEDICINE

## 2023-09-21 PROCEDURE — 99213 OFFICE O/P EST LOW 20 MIN: CPT | Performed by: INTERNAL MEDICINE

## 2023-09-21 PROCEDURE — 3078F DIAST BP <80 MM HG: CPT | Performed by: INTERNAL MEDICINE

## 2023-09-21 ASSESSMENT — FIBROSIS 4 INDEX: FIB4 SCORE: 4.04

## 2023-09-21 NOTE — PROGRESS NOTES
Chief Complaint   Patient presents with    Atrial Fibrillation     Follow up       Subjective:   Az Jolly is an 89 y.o. female who presents today for follow-up of atrial flutter/fibrillation PSVT stroke and anticoagulation as well as severe tricuspid regurgitation and right ventricular failure.    Since last visit her right-sided heart failure exacerbation resolved with adequate diuretic therapy as prescribed at last visit.  Otherwise feeling well and tolerating her anticoagulation without difficulty.    Past Medical History:   Diagnosis Date    A fib 9/22/06    Abdominal bruit 9/25/2012    Abdominal pain 3/20/2019    Abnormal chest x-ray 9/19/2017    Improved but persistent bilateral airspace opacities 9/11/17    Achalasia 8/3/2011    Acute cystitis with hematuria 12/6/2021    DIAZ (acute kidney injury) ([facility]) 12/20/2018    Allergic rhinitis 9/21/2012    Arterial ischemic stroke, MCA (middle cerebral artery), left, acute ([facility]) 8/3/2011    Aseptic necrosis of bone, 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    Bronchiectasis without complication ([facility]) 9/19/2017    Chronic fatigue 9/19/2017    Closed right hip fracture ([facility]) 6/19/2019    DVT 6/1996    left leg, vein ligation performed on saphenous vein    First degree atrioventricular block 9/21/2012    Glomerulonephritis, chronic in other disease 8/3/2011    H/O echocardiogram 9/21/2012    Hematuria 3/20/2019    Hypothyroid 10/2002    Kidney disorder 1998    left kidney biopsy--glomerulonephritis and wegners dx    Kidney failure     stage II    Leukocytosis 7/21/2020    Mitral valve prolapse     Palpitations 9/21/2012    Rheumatic fever 9/21/2012    Rheumatic fever 9/21/2012    Childhood     Sepsis ([facility]) 12/20/2018    Severe protein-calorie malnutrition (Mahmood: less than 60% of standard weight) ([facility]) 12/20/2018    Stroke ([facility]) 9/21/2012    Supratherapeutic INR 12/20/2018    SVT (SUPRAVENTRICULAR TACHYCARDIA), h/o paroxysmal 8/3/2011    Wegener's granulomatosis      Wegener's granulomatosis 8/3/2011     O Spring 2021     Past Surgical History:   Procedure Laterality Date    PB PARTIAL HIP REPLACEMENT Right 2019    Procedure: HEMIARTHROPLASTY, HIP;  Surgeon: Raul Saldivar M.D.;  Location: SURGERY [facility];  Service: Orthopedics    CATARACT EXTRACTION      left eye    CHOLECYSTECTOMY      TEMPORAL ARTERY BIOPSY      normal    LUNG NEEDLE BIOPSY      right lung, nodules found    ARTHROSCOPE      left knee    ARTHROSCOPY, KNEE  1986    right    BUNIONECTOMY      bilat    HYSTERECTOMY RADICAL  1975    VEIN LIGATION       Family History   Problem Relation Age of Onset    Other Father         Aortic aneurysm    Cancer Brother         Lung    Non-contributory Other      Social History     Socioeconomic History    Marital status:      Spouse name: Not on file    Number of children: Not on file    Years of education: Not on file    Highest education level: Not on file   Occupational History    Not on file   Tobacco Use    Smoking status: Former     Current packs/day: 0.00     Types: Cigarettes     Quit date: 1960     Years since quittin.7    Smokeless tobacco: Never    Tobacco comments:     very little years and years ago   Vaping Use    Vaping Use: Never used   Substance and Sexual Activity    Alcohol use: Not Currently    Drug use: No    Sexual activity: Not on file   Other Topics Concern    Not on file   Social History Narrative    Not on file     Social Determinants of Health     Financial Resource Strain: Not on file   Food Insecurity: Not on file   Transportation Needs: Not on file   Physical Activity: Not on file   Stress: Not on file   Social Connections: Not on file   Intimate Partner Violence: Not on file   Housing Stability: Not on file     Allergies   Allergen Reactions    Cephalexin [Keflex] Swelling    Hydroxyzine Swelling     Eyes get itchy and swollen     Naprelan [Naproxen] Swelling     Eyes get itchy become red  and swollen    Oxaprozin Swelling     Swelling eyes, and itchy    Ampicillin Rash     Red Rash    Baclofen Unspecified     drowsiness    Citalopram Vomiting and Nausea    Clarithromycin Unspecified     Pt reports that this medication plugs her ears.     Diclofenac Rash and Swelling     Red rash & swelling      Erythromycin Diarrhea     GI upset    Penicillins Rash     Red rash      Promethazine Unspecified     Drowsiness and sleeps    Vi-Q-Tuss [Hydrocodone-Guaifenesin] Vomiting and Nausea    Nitrofurantoin Nausea     Outpatient Encounter Medications as of 9/21/2023   Medication Sig Dispense Refill    fluticasone (FLONASE) 50 MCG/ACT nasal spray One spray to each nostril (both) every day for a runny nose and hoarse voice. 15.8 mL 11    hyoscyamine (LEVSIN) 0.125 MG SL Tab Place 1 Tablet under the tongue every four hours as needed for Diarrhea. 120 Tablet 11    losartan (COZAAR) 50 MG Tab TAKE 1 TABLET BY MOUTH ONCE DAILY. 30 Tablet 5    levothyroxine (SYNTHROID) 100 MCG Tab Take 1 Tablet by mouth every morning on an empty stomach. 30 Tablet 11    liothyronine (CYTOMEL) 5 MCG Tab Take 1 Tablet by mouth every day. With levothyroxine for thyroid function. 30 Tablet 11    acetaminophen (ACETAMINOPHEN EXTRA STRENGTH) 500 MG Tab Take 2 Tablets by mouth 2 times a day. May also take 2 Tablets 1 time a day as needed (For generalized pain, backache, or headache.). Maximum 3grams daily from all sources 150 Tablet 11    furosemide (LASIX) 40 MG Tab Take 1 Tablet by mouth every day. May also take 1 Tablet 1 time a day as needed (For patient report of severe edema. May take one PRN dose regardless of scheduled administration.). 60 Tablet 11    warfarin (COUMADIN) 2 MG Tab TAKE ONE-HALF TO ONE TABLET (1-2MG) BY MOUTH EACH DAY AS DIRECTED BY [facility] 30 Tablet 3    sennosides-docusate sodium (SENOKOT-S) 8.6-50 MG tablet Take 2 Tablets by mouth 1 time a day as needed (constipation). Discontinue previous routine orders 30 Tablet 11     "ondansetron (ZOFRAN ODT) 4 MG TABLET DISPERSIBLE Take 1 Tablet by mouth every 8 hours as needed for Nausea/Vomiting. DISSOLVE 1 TABLET ON THE TONGUE EVERY 8 HOURS AS NEEDED FOR NAUSEA 30 Tablet 0    metoprolol tartrate (LOPRESSOR) 50 MG Tab Take 1 Tablet by mouth 2 times a day. 60 Tablet 5    tamsulosin (FLOMAX) 0.4 MG capsule Take 1 Capsule by mouth every morning. 30 Capsule 5    gabapentin (NEURONTIN) 100 MG Cap Take one cap po BID for neuropathy 60 Capsule 11    folic acid (FOLVITE) 1 MG Tab Take 1 Tablet by mouth every morning. 30 Tablet 11    D-Mannose 500 MG Cap Take 500 mg by mouth every 48 hours. Do not ship.  Family provides.      polyethylene glycol 3350 (MIRALAX) 17 GM/SCOOP Powder Take 17 g by mouth 1 time a day as needed (constipation). Patient may keep at bedside and self administer.  Provided by family, do not ship. (Patient not taking: Reported on 9/21/2023)      [DISCONTINUED] potassium Chloride ER (K-TAB) 20 MEQ Tab CR tablet Take 1 Tablet by mouth every day. For potassium supplement 90 Tablet 3     No facility-administered encounter medications on file as of 9/21/2023.     Review of Systems   All other systems reviewed and are negative.       Objective:   /66 (BP Location: Left arm, Patient Position: Sitting)   Pulse 88   Resp 18   Ht 1.702 m (5' 7\")   Wt 43.5 kg (96 lb)   SpO2 99%   BMI 15.04 kg/m²     Physical Exam  Vitals reviewed.   Constitutional:       General: She is not in acute distress.     Appearance: She is well-developed. She is not diaphoretic.      Comments: Elderly and cachectic  Walker     HENT:      Head: Normocephalic and atraumatic.      Right Ear: External ear normal.      Left Ear: External ear normal.      Mouth/Throat:      Pharynx: No oropharyngeal exudate.   Eyes:      General: No scleral icterus.        Right eye: No discharge.         Left eye: No discharge.      Conjunctiva/sclera: Conjunctivae normal.      Pupils: Pupils are equal, round, and reactive to " light.   Neck:      Thyroid: No thyromegaly.      Vascular: No JVD.      Trachea: No tracheal deviation.   Cardiovascular:      Rate and Rhythm: Normal rate. Rhythm irregularly irregular.      Chest Wall: PMI is not displaced.      Pulses:           Carotid pulses are 2+ on the right side and 2+ on the left side.       Radial pulses are 2+ on the right side and 2+ on the left side.        Popliteal pulses are 2+ on the right side and 2+ on the left side.        Dorsalis pedis pulses are 2+ on the right side and 2+ on the left side.        Posterior tibial pulses are 2+ on the right side and 2+ on the left side.      Heart sounds: S1 normal and S2 normal. Murmur ( 2/6 systolic apical murmur) heard.      No friction rub. No gallop. No S3 or S4 sounds.   Pulmonary:      Effort: Pulmonary effort is normal. No respiratory distress.      Breath sounds: Normal breath sounds. No wheezing or rales.   Chest:      Chest wall: No tenderness.   Abdominal:      General: Bowel sounds are normal. There is no distension.      Palpations: Abdomen is soft.      Tenderness: There is no abdominal tenderness.   Musculoskeletal:         General: Swelling present. No tenderness. Normal range of motion.      Cervical back: Normal range of motion and neck supple.      Right lower leg: Edema present.      Left lower leg: Edema present.   Skin:     General: Skin is warm and dry.      Findings: No erythema or rash.   Neurological:      Mental Status: She is alert and oriented to person, place, and time.      Cranial Nerves: No cranial nerve deficit (Cranial nerves II through XII grossly intact).   Psychiatric:         Behavior: Behavior normal.         Thought Content: Thought content normal.         Judgment: Judgment normal.       LABS:  Lab Results   Component Value Date/Time    CHOLSTRLTOT 117 02/06/2023 12:54 AM    LDL 40 02/06/2023 12:54 AM    HDL 59 02/06/2023 12:54 AM    TRIGLYCERIDE 91 02/06/2023 12:54 AM       Lab Results   Component  "Value Date/Time    WBC 5.1 02/07/2023 01:03 AM    RBC 4.44 02/07/2023 01:03 AM    HEMOGLOBIN 14.2 02/07/2023 01:03 AM    HEMATOCRIT 44.8 02/07/2023 01:03 AM    .9 (H) 02/07/2023 01:03 AM    NEUTSPOLYS 51.60 02/07/2023 01:03 AM    LYMPHOCYTES 32.00 02/07/2023 01:03 AM    MONOCYTES 12.80 02/07/2023 01:03 AM    EOSINOPHILS 2.40 02/07/2023 01:03 AM    BASOPHILS 0.80 02/07/2023 01:03 AM    HYPOCHROMIA 1+ 06/22/2019 03:44 AM     Lab Results   Component Value Date/Time    SODIUM 138 02/09/2023 02:01 AM    POTASSIUM 4.5 02/09/2023 02:01 AM    CHLORIDE 99 02/09/2023 02:01 AM    CO2 30 02/09/2023 02:01 AM    GLUCOSE 93 02/09/2023 02:01 AM    BUN 18 02/09/2023 02:01 AM    CREATININE 1.00 02/09/2023 02:01 AM    CREATININE 1.5 (H) 02/19/2009 09:22 AM         Lab Results   Component Value Date/Time    ALKPHOSPHAT 57 02/07/2023 01:03 AM    ASTSGOT 16 02/07/2023 01:03 AM    ALTSGPT 6 02/07/2023 01:03 AM    TBILIRUBIN 0.4 02/07/2023 01:03 AM      Lab Results   Component Value Date/Time    BNPBTYPENAT 135 (H) 08/10/2011 03:40 AM      No results found for: \"TSH\"  Lab Results   Component Value Date/Time    PROTHROMBTM 18.5 (H) 02/10/2023 01:56 AM    INR 1.90 (A) 09/19/2023 12:00 AM          Assessment:     1. Typical atrial flutter (HCC)        2. CKD (chronic kidney disease) stage 4, GFR 15-29 ml/min (HCC)        3. Hemiplegia as late effect of cerebrovascular accident (CVA) (HCC)        4. Nonrheumatic tricuspid valve regurgitation        5. Pulmonary HTN (HCC)        6. On continuous oral anticoagulation            Medical Decision Making:  Today's Assessment / Status / Plan:     Unchanged severe tricuspid regurgitation and right ventricular failure however clinically has improved dramatically and is now euvolemic.  Continue current medical therapy.  Had her potassium stopped for hyperkalemia on recent lab draw.  This has pending repeat by other providers appropriately.  Continue her other medical therapy and follow-up " routinely.

## 2023-10-03 ENCOUNTER — ANTICOAGULATION VISIT (OUTPATIENT)
Dept: VASCULAR LAB | Facility: MEDICAL CENTER | Age: 88
End: 2023-10-03
Attending: INTERNAL MEDICINE
Payer: MEDICARE

## 2023-10-03 VITALS — DIASTOLIC BLOOD PRESSURE: 75 MMHG | SYSTOLIC BLOOD PRESSURE: 116 MMHG | HEART RATE: 76 BPM

## 2023-10-03 DIAGNOSIS — I48.3 TYPICAL ATRIAL FLUTTER (HCC): ICD-10-CM

## 2023-10-03 DIAGNOSIS — D68.69 SECONDARY HYPERCOAGULABLE STATE (HCC): ICD-10-CM

## 2023-10-03 LAB — INR PPP: 1.7 (ref 2–3.5)

## 2023-10-03 PROCEDURE — 99212 OFFICE O/P EST SF 10 MIN: CPT

## 2023-10-03 PROCEDURE — 85610 PROTHROMBIN TIME: CPT

## 2023-10-03 NOTE — PROGRESS NOTES
Anticoagulation Summary  As of 10/3/2023      INR goal:  2.0-3.0   TTR:  62.1 % (4.8 y)   INR used for dosin.70 (10/3/2023)   Warfarin maintenance plan:  2 mg (2 mg x 1) every Mon, Thu; 1 mg (2 mg x 0.5) all other days   Weekly warfarin total:  9 mg   Plan last modified:  Graham Jackosn PharmD (10/3/2023)   Next INR check:  10/10/2023   Priority:  Maintenance   Target end date:  Indefinite    Indications    Stroke (HCC) (Resolved) [I63.9]  Atrial flutter (HCC) [I48.92]  Secondary hypercoagulable state (HCC) [D68.69]                 Anticoagulation Episode Summary       INR check location:      Preferred lab:      Send INR reminders to:      Comments:  22 - per [employer] [facility] Xarel[facility] is $200.00 for 30 days.  [facility] [phone]          Anticoagulation Care Providers       Provider Role Specialty Phone number    Hussain Nation M.D. Referring Interventional Cardiology [phone]    [facility] Anticoagulation Services Responsible  [phone]                  Refer to Patient Findings for HPI:      Vitals:    10/03/23 1306   BP: 116/75   Pulse: 76       Verified current warfarin dosing schedule.        A/P   INR is subtherapeutic    Warfarin dosing recommendation: Increase 12.5%    Pt educated to contact our clinic with any changes in medications or s/s of bleeding or thrombosis. Pt is aware to seek immediate medical attention for falls, head injury or deep cuts.    Follow up appointment in 1 week(s).    Graham Jackson, JuanD

## 2023-10-10 ENCOUNTER — ANCILLARY PROCEDURE (OUTPATIENT)
Dept: VASCULAR LAB | Facility: MEDICAL CENTER | Age: 88
End: 2023-10-10
Attending: INTERNAL MEDICINE
Payer: MEDICARE

## 2023-10-10 DIAGNOSIS — I48.3 TYPICAL ATRIAL FLUTTER (HCC): ICD-10-CM

## 2023-10-10 DIAGNOSIS — D68.69 SECONDARY HYPERCOAGULABLE STATE (HCC): ICD-10-CM

## 2023-10-10 LAB — INR PPP: 1.8 (ref 2–3.5)

## 2023-10-10 PROCEDURE — 99212 OFFICE O/P EST SF 10 MIN: CPT

## 2023-10-10 PROCEDURE — 85610 PROTHROMBIN TIME: CPT

## 2023-10-10 NOTE — PROGRESS NOTES
Anticoagulation Summary  As of 10/10/2023      INR goal:  2.0-3.0   TTR:  61.8 % (4.8 y)   INR used for dosin.80 (10/10/2023)   Warfarin maintenance plan:  2 mg (2 mg x 1) every Mon, Wed, Fri; 1 mg (2 mg x 0.5) all other days   Weekly warfarin total:  10 mg   Plan last modified:  Whit Angeles, PharmD (10/10/2023)   Next INR check:  10/24/2023   Priority:  Maintenance   Target end date:  Indefinite    Indications    Stroke (HCC) (Resolved) [I63.9]  Atrial flutter (HCC) [I48.92]  Secondary hypercoagulable state (HCC) [D68.69]                 Anticoagulation Episode Summary       INR check location:      Preferred lab:      Send INR reminders to:      Comments:  22 - per [employer] [facility] Xarel[facility] is $200.00 for 30 days.  [facility] [phone]          Anticoagulation Care Providers       Provider Role Specialty Phone number    Hussain Nation M.D. Referring Interventional Cardiology [phone]    [facility] Anticoagulation Services Responsible  [phone]                  Refer to Patient Findings for HPI:  Patient Findings       Negatives:  Signs/symptoms of thrombosis, Signs/symptoms of bleeding, Laboratory test error suspected, Change in health, Change in alcohol use, Change in activity, Upcoming invasive procedure, Emergency department visit, Upcoming dental procedure, Missed doses, Extra doses, Change in medications, Change in diet/appetite, Hospital admission, Bruising, Other complaints            There were no vitals filed for this visit.  pt declined vitals    Verified current warfarin dosing schedule.    Medications reconciled: No  Pt is not on antiplatelet therapy.      A/P   INR is subtherapeutic    Warfarin dosing recommendation: Increase to 2mg every Monday, Wed, Fri; 1mg all other days.    Pt educated to contact our clinic with any changes in medications or s/s of bleeding or thrombosis. Pt is aware to seek immediate medical attention for falls, head injury or deep cuts.    Follow up  appointment in 2 week(s).  Faxed this note to ROW[facility] Perez, Pharmacy Student    Whit Angeles, PharmD

## 2023-10-24 ENCOUNTER — ANTICOAGULATION VISIT (OUTPATIENT)
Dept: VASCULAR LAB | Facility: MEDICAL CENTER | Age: 88
End: 2023-10-24
Attending: INTERNAL MEDICINE
Payer: MEDICARE

## 2023-10-24 DIAGNOSIS — I48.3 TYPICAL ATRIAL FLUTTER (HCC): ICD-10-CM

## 2023-10-24 DIAGNOSIS — D68.69 SECONDARY HYPERCOAGULABLE STATE (HCC): ICD-10-CM

## 2023-10-24 LAB — INR PPP: 2.5 (ref 2–3.5)

## 2023-10-24 PROCEDURE — 85610 PROTHROMBIN TIME: CPT

## 2023-10-24 PROCEDURE — 99211 OFF/OP EST MAY X REQ PHY/QHP: CPT

## 2023-10-24 NOTE — PROGRESS NOTES
Anticoagulation Summary  As of 10/24/2023      INR goal:  2.0-3.0   TTR:  61.9 % (4.9 y)   INR used for dosin.50 (10/24/2023)   Warfarin maintenance plan:  2 mg (2 mg x 1) every Mon, Wed, Fri; 1 mg (2 mg x 0.5) all other days   Weekly warfarin total:  10 mg   Plan last modified:  Whit Angeles, PharmD (10/10/2023)   Next INR check:     Priority:  Maintenance   Target end date:  Indefinite    Indications    Stroke (HCC) (Resolved) [I63.9]  Atrial flutter (HCC) [I48.92]  Secondary hypercoagulable state (HCC) [D68.69]                 Anticoagulation Episode Summary       INR check location:      Preferred lab:      Send INR reminders to:      Comments:  22 - per [employer] [facility] Xarel[facility] is $200.00 for 30 days.  [facility] [phone]          Anticoagulation Care Providers       Provider Role Specialty Phone number    Hussain Nation M.D. Referring Interventional Cardiology [phone]    [facility] Anticoagulation Services Responsible  [phone]             Refer to Patient Findings for HPI:  Patient Findings       Positives:  Other complaints (Reports mechanical GLF last week and experiencing some hip pain. Will f/u with PCP if sx worsen or if it does not improve. No head trauma reported)    Negatives:  Signs/symptoms of thrombosis, Signs/symptoms of bleeding, Laboratory test error suspected, Change in health, Change in alcohol use, Change in activity, Upcoming invasive procedure, Emergency department visit, Upcoming dental procedure, Missed doses, Extra doses, Change in medications, Change in diet/appetite, Hospital admission, Bruising            There were no vitals filed for this visit.  Pt declined vitals    Verified current warfarin dosing schedule.    Medications reconciled: Yes  Pt is not on antiplatelet therapy      A/P   INR is therapeutic following dose increase    Warfarin dosing recommendation: Continue regimen as listed above.    Pt educated to contact our clinic with any changes in  medications or s/s of bleeding or thrombosis. Pt is aware to seek immediate medical attention for falls, head injury or deep cuts.    Follow up appointment in 3 week(s).    Note sent to retirement via fax.    Juan GarrettD

## 2023-11-05 PROBLEM — F55.2 DIARRHEA DUE TO LAXATIVE ABUSE: Status: ACTIVE | Noted: 2023-10-30

## 2023-11-05 PROBLEM — F45.20 HYPOCHONDRIACAL DISORDER: Status: ACTIVE | Noted: 2023-10-30

## 2023-11-06 ENCOUNTER — HOSPITAL ENCOUNTER (OUTPATIENT)
Facility: MEDICAL CENTER | Age: 88
End: 2023-11-06
Attending: NURSE PRACTITIONER
Payer: MEDICARE

## 2023-11-06 LAB
C DIFF DNA SPEC QL NAA+PROBE: NEGATIVE
C DIFF TOX GENS STL QL NAA+PROBE: NEGATIVE

## 2023-11-06 PROCEDURE — 87493 C DIFF AMPLIFIED PROBE: CPT

## 2023-11-14 ENCOUNTER — ANTICOAGULATION VISIT (OUTPATIENT)
Dept: VASCULAR LAB | Facility: MEDICAL CENTER | Age: 88
End: 2023-11-14
Attending: INTERNAL MEDICINE
Payer: MEDICARE

## 2023-11-14 VITALS — HEART RATE: 86 BPM | DIASTOLIC BLOOD PRESSURE: 75 MMHG | SYSTOLIC BLOOD PRESSURE: 119 MMHG

## 2023-11-14 DIAGNOSIS — I48.3 TYPICAL ATRIAL FLUTTER (HCC): ICD-10-CM

## 2023-11-14 DIAGNOSIS — D68.69 SECONDARY HYPERCOAGULABLE STATE (HCC): ICD-10-CM

## 2023-11-14 LAB — INR PPP: 2.8 (ref 2–3.5)

## 2023-11-14 PROCEDURE — 99211 OFF/OP EST MAY X REQ PHY/QHP: CPT

## 2023-11-14 PROCEDURE — 85610 PROTHROMBIN TIME: CPT

## 2023-11-14 NOTE — PROGRESS NOTES
Anticoagulation Summary  As of 2023      INR goal:  2.0-3.0   TTR:  62.4 % (4.9 y)   INR used for dosin.80 (2023)   Warfarin maintenance plan:  2 mg (2 mg x 1) every Mon, Wed, Fri; 1 mg (2 mg x 0.5) all other days   Weekly warfarin total:  10 mg   Plan last modified:  Whit Angeles, PharmD (10/10/2023)   Next INR check:  2023   Priority:  Maintenance   Target end date:  Indefinite    Indications    Stroke (HCC) (Resolved) [I63.9]  Atrial flutter (HCC) [I48.92]  Secondary hypercoagulable state (HCC) [D68.69]                 Anticoagulation Episode Summary       INR check location:      Preferred lab:      Send INR reminders to:      Comments:  22 - per [employer] [facility] Xarelto is $200.00 for 30 days.  [facility] [phone]          Anticoagulation Care Providers       Provider Role Specialty Phone number    Hussain Nation M.D. Referring Interventional Cardiology [phone]    [facility] Anticoagulation Services Responsible  [phone]          Refer to Patient Findings for HPI:  Patient Findings       Negatives:  Signs/symptoms of thrombosis, Signs/symptoms of bleeding, Laboratory test error suspected, Change in health, Change in alcohol use, Change in activity, Upcoming invasive procedure, Emergency department visit, Upcoming dental procedure, Missed doses, Extra doses, Change in medications, Change in diet/appetite, Hospital admission, Bruising, Other complaints          Vitals:    23 1143   BP: 119/75   Pulse: 86       Patient seen in clinic today for follow up on anticoagulation therapy with warfarin (a high risk medication) for hx of AFlutter and stroke  Verified current warfarin dosing schedule.  Patient denies any missed doses of warfarin.    Medications reconciled   Pt is not on antiplatelet therapy      A/P   INR is therapeutic today at 2.8.     Warfarin dosing recommendation: Continue with the current dosing regimen.  Patient will follow up again in 4 weeks.     Pt  educated to contact our clinic with any changes in medications or s/s of bleeding or thrombosis. Pt is aware to seek immediate medical attention for falls, head injury or deep cuts.    Follow up appointment in 4 week(s).    Next appt: Tues, Dec 12 @ 11:45am     Pilar Pang PharmD

## 2023-11-22 DIAGNOSIS — I48.3 TYPICAL ATRIAL FLUTTER (HCC): ICD-10-CM

## 2023-12-04 PROBLEM — L89.159 PRESSURE INJURY OF SKIN OF SACRAL REGION: Status: ACTIVE | Noted: 2023-12-04

## 2023-12-07 PROBLEM — H61.23 BILATERAL HEARING LOSS DUE TO CERUMEN IMPACTION: Status: ACTIVE | Noted: 2023-12-04

## 2023-12-12 ENCOUNTER — APPOINTMENT (OUTPATIENT)
Dept: VASCULAR LAB | Facility: MEDICAL CENTER | Age: 88
End: 2023-12-12
Payer: MEDICARE

## 2023-12-19 ENCOUNTER — ANTICOAGULATION VISIT (OUTPATIENT)
Dept: VASCULAR LAB | Facility: MEDICAL CENTER | Age: 88
End: 2023-12-19
Attending: INTERNAL MEDICINE
Payer: MEDICARE

## 2023-12-19 VITALS — SYSTOLIC BLOOD PRESSURE: 129 MMHG | HEART RATE: 77 BPM | DIASTOLIC BLOOD PRESSURE: 73 MMHG

## 2023-12-19 DIAGNOSIS — D68.69 SECONDARY HYPERCOAGULABLE STATE (HCC): ICD-10-CM

## 2023-12-19 DIAGNOSIS — I48.3 TYPICAL ATRIAL FLUTTER (HCC): ICD-10-CM

## 2023-12-19 LAB — INR PPP: 3.2 (ref 2–3.5)

## 2023-12-19 PROCEDURE — 85610 PROTHROMBIN TIME: CPT

## 2023-12-19 PROCEDURE — 99212 OFFICE O/P EST SF 10 MIN: CPT

## 2023-12-19 NOTE — PROGRESS NOTES
Anticoagulation Summary  As of 12/19/2023      INR goal:  2.0-3.0   TTR:  62.1 % (5 y)   INR used for dosing:  3.20 (12/19/2023)   Warfarin maintenance plan:  2 mg (2 mg x 1) every Mon, Wed, Fri; 1 mg (2 mg x 0.5) all other days   Weekly warfarin total:  10 mg   Plan last modified:  Whit Angeles, PharmD (10/10/2023)   Next INR check:  1/9/2024   Priority:  Maintenance   Target end date:  Indefinite    Indications    Stroke (HCC) (Resolved) [I63.9]  Atrial flutter (HCC) [I48.92]  Secondary hypercoagulable state (HCC) [D68.69]                 Anticoagulation Episode Summary       INR check location:      Preferred lab:      Send INR reminders to:      Comments:  12/7/22 - per [employer] [facility] Xarelto is $200.00 for 30 days.  [facility] [phone]          Anticoagulation Care Providers       Provider Role Specialty Phone number    Hussain Nation M.D. Referring Interventional Cardiology [phone]    [facility] Anticoagulation Services Responsible  [phone]          Refer to Patient Findings for HPI:  Patient Findings       Negatives:  Signs/symptoms of thrombosis, Signs/symptoms of bleeding, Laboratory test error suspected, Change in health, Change in alcohol use, Change in activity, Upcoming invasive procedure, Emergency department visit, Upcoming dental procedure, Missed doses, Extra doses, Change in medications, Change in diet/appetite, Hospital admission, Bruising, Other complaints          Vitals:    12/19/23 1313   BP: 129/73   Pulse: 77       Patient seen in clinic today for follow up on anticoagulation therapy with warfarin (a high risk medication) for hx of AF and stroke  Verified current warfarin dosing schedule.  Patient denies any missed doses of warfarin.    Medications reconciled   Pt is not on antiplatelet therapy      A/P   INR is SUPRA-therapeutic today at 3.2.     Warfarin dosing recommendation: Patient instructed to HOLD warfarin dose TONIGHT ONLY, then to resume her current dosing regimen.    Patient asked to follow up again in 2 weeks, but pt prefers to follow up again in 3 weeks.     Pt educated to contact our clinic with any changes in medications or s/s of bleeding or thrombosis. Pt is aware to seek immediate medical attention for falls, head injury or deep cuts.    Follow up appointment in 3 week(s).    Next appt: Tues, Jan 9 @ 1pm     Pilar Pang PharmD

## 2024-01-09 ENCOUNTER — ANTICOAGULATION VISIT (OUTPATIENT)
Dept: VASCULAR LAB | Facility: MEDICAL CENTER | Age: 89
End: 2024-01-09
Attending: INTERNAL MEDICINE
Payer: MEDICARE

## 2024-01-09 DIAGNOSIS — D68.69 SECONDARY HYPERCOAGULABLE STATE (HCC): ICD-10-CM

## 2024-01-09 DIAGNOSIS — I48.3 TYPICAL ATRIAL FLUTTER (HCC): ICD-10-CM

## 2024-01-09 LAB — INR PPP: 3.4 (ref 2–3.5)

## 2024-01-09 PROCEDURE — 99212 OFFICE O/P EST SF 10 MIN: CPT

## 2024-01-09 PROCEDURE — 85610 PROTHROMBIN TIME: CPT

## 2024-01-09 NOTE — PROGRESS NOTES
Anticoagulation Summary  As of 1/9/2024      INR goal:  2.0-3.0   TTR:  61.4 % (5.1 y)   INR used for dosing:  3.40 (1/9/2024)   Warfarin maintenance plan:  2 mg (2 mg x 1) every Wed; 1 mg (2 mg x 0.5) all other days   Weekly warfarin total:  8 mg   Plan last modified:  Jenna Pang (1/9/2024)   Next INR check:  1/30/2024   Priority:  Maintenance   Target end date:  Indefinite    Indications    Stroke (HCC) (Resolved) [I63.9]  Atrial flutter (HCC) [I48.92]  Secondary hypercoagulable state (HCC) [D68.69]                 Anticoagulation Episode Summary       INR check location:      Preferred lab:      Send INR reminders to:      Comments:  12/7/22 - per [employer] [facility] Xarel[facility] is $200.00 for 30 days.  [facility] [phone]          Anticoagulation Care Providers       Provider Role Specialty Phone number    Hussain Nation M.D. Referring Interventional Cardiology [phone]    [facility] Anticoagulation Services Responsible  [phone]          Refer to Patient Findings for HPI:  Patient Findings       Negatives:  Signs/symptoms of thrombosis, Signs/symptoms of bleeding, Laboratory test error suspected, Change in health, Change in alcohol use, Change in activity, Upcoming invasive procedure, Emergency department visit, Upcoming dental procedure, Missed doses, Extra doses, Change in medications, Change in diet/appetite, Hospital admission, Bruising, Other complaints          There were no vitals filed for this visit.  The pt declined vitals today - has a big coat on and too much for her to take off today    Patient seen in clinic today for follow up on anticoagulation therapy with warfarin (a high risk medication) for hx of AFL and stroke  Verified current warfarin dosing schedule.  Patient denies any missed doses of warfarin.    Medications reconciled   Pt is not on antiplatelet therapy      A/P   INR is SUPRA-therapeutic today at 3.4.     Warfarin dosing recommendation: Patient instructed to HOLD  warfarin dose TONIGHT ONLY, then to begin reduced weekly regimen of 2mg on Wed and 1mg ROW.   Patient asked to return in 2 weeks, but she prefers to return in 3 weeks instead.    Pt educated to contact our clinic with any changes in medications or s/s of bleeding or thrombosis. Pt is aware to seek immediate medical attention for falls, head injury or deep cuts.    Follow up appointment in 3 week(s).    Next appt: Tues, Jan 30 @ 1pm     Pilar MartinezD

## 2024-01-16 ENCOUNTER — HOSPITAL ENCOUNTER (OUTPATIENT)
Facility: MEDICAL CENTER | Age: 89
End: 2024-01-16
Attending: NURSE PRACTITIONER
Payer: MEDICARE

## 2024-01-16 DIAGNOSIS — E87.5 HYPERKALEMIA: ICD-10-CM

## 2024-01-16 DIAGNOSIS — E44.0 PROTEIN-CALORIE MALNUTRITION, MODERATE (HCC): ICD-10-CM

## 2024-01-16 DIAGNOSIS — E87.1 HYPONATREMIA: ICD-10-CM

## 2024-01-16 DIAGNOSIS — E03.9 HYPOTHYROIDISM, UNSPECIFIED TYPE: ICD-10-CM

## 2024-01-16 DIAGNOSIS — N18.4 CKD (CHRONIC KIDNEY DISEASE) STAGE 4, GFR 15-29 ML/MIN (HCC): ICD-10-CM

## 2024-01-16 DIAGNOSIS — L29.9 ITCHING: ICD-10-CM

## 2024-01-16 LAB
ALBUMIN SERPL BCP-MCNC: 4.1 G/DL (ref 3.2–4.9)
ALBUMIN/GLOB SERPL: 1 G/DL
ALP SERPL-CCNC: 180 U/L (ref 30–99)
ALT SERPL-CCNC: 20 U/L (ref 2–50)
ANION GAP SERPL CALC-SCNC: 12 MMOL/L (ref 7–16)
AST SERPL-CCNC: 26 U/L (ref 12–45)
BASOPHILS # BLD AUTO: 1 % (ref 0–1.8)
BASOPHILS # BLD: 0.04 K/UL (ref 0–0.12)
BILIRUB SERPL-MCNC: 1.1 MG/DL (ref 0.1–1.5)
BUN SERPL-MCNC: 40 MG/DL (ref 8–22)
CALCIUM ALBUM COR SERPL-MCNC: 9.3 MG/DL (ref 8.5–10.5)
CALCIUM SERPL-MCNC: 9.4 MG/DL (ref 8.5–10.5)
CHLORIDE SERPL-SCNC: 99 MMOL/L (ref 96–112)
CO2 SERPL-SCNC: 25 MMOL/L (ref 20–33)
CREAT SERPL-MCNC: 1.41 MG/DL (ref 0.5–1.4)
EOSINOPHIL # BLD AUTO: 0.1 K/UL (ref 0–0.51)
EOSINOPHIL NFR BLD: 2.5 % (ref 0–6.9)
ERYTHROCYTE [DISTWIDTH] IN BLOOD BY AUTOMATED COUNT: 53.2 FL (ref 35.9–50)
GFR SERPLBLD CREATININE-BSD FMLA CKD-EPI: 35 ML/MIN/1.73 M 2
GLOBULIN SER CALC-MCNC: 4 G/DL (ref 1.9–3.5)
GLUCOSE SERPL-MCNC: 169 MG/DL (ref 65–99)
HCT VFR BLD AUTO: 48.9 % (ref 37–47)
HGB BLD-MCNC: 15.9 G/DL (ref 12–16)
IMM GRANULOCYTES # BLD AUTO: 0 K/UL (ref 0–0.11)
IMM GRANULOCYTES NFR BLD AUTO: 0 % (ref 0–0.9)
LYMPHOCYTES # BLD AUTO: 1.88 K/UL (ref 1–4.8)
LYMPHOCYTES NFR BLD: 46.7 % (ref 22–41)
MCH RBC QN AUTO: 34.4 PG (ref 27–33)
MCHC RBC AUTO-ENTMCNC: 32.5 G/DL (ref 32.2–35.5)
MCV RBC AUTO: 105.8 FL (ref 81.4–97.8)
MONOCYTES # BLD AUTO: 0.34 K/UL (ref 0–0.85)
MONOCYTES NFR BLD AUTO: 8.4 % (ref 0–13.4)
NEUTROPHILS # BLD AUTO: 1.67 K/UL (ref 1.82–7.42)
NEUTROPHILS NFR BLD: 41.4 % (ref 44–72)
NRBC # BLD AUTO: 0 K/UL
NRBC BLD-RTO: 0 /100 WBC (ref 0–0.2)
PLATELET # BLD AUTO: 109 K/UL (ref 164–446)
PMV BLD AUTO: 11.5 FL (ref 9–12.9)
POTASSIUM SERPL-SCNC: 4.4 MMOL/L (ref 3.6–5.5)
PROT SERPL-MCNC: 8.1 G/DL (ref 6–8.2)
RBC # BLD AUTO: 4.62 M/UL (ref 4.2–5.4)
SODIUM SERPL-SCNC: 136 MMOL/L (ref 135–145)
T3FREE SERPL-MCNC: 2.98 PG/ML (ref 2–4.4)
T4 FREE SERPL-MCNC: 1.54 NG/DL (ref 0.93–1.7)
TSH SERPL DL<=0.005 MIU/L-ACNC: 0.38 UIU/ML (ref 0.38–5.33)
WBC # BLD AUTO: 4 K/UL (ref 4.8–10.8)

## 2024-01-16 PROCEDURE — 80053 COMPREHEN METABOLIC PANEL: CPT

## 2024-01-16 PROCEDURE — 85025 COMPLETE CBC W/AUTO DIFF WBC: CPT

## 2024-01-16 PROCEDURE — 84439 ASSAY OF FREE THYROXINE: CPT

## 2024-01-16 PROCEDURE — 84443 ASSAY THYROID STIM HORMONE: CPT

## 2024-01-16 PROCEDURE — 84481 FREE ASSAY (FT-3): CPT

## 2024-01-22 PROBLEM — D70.8 OTHER NEUTROPENIA (HCC): Status: ACTIVE | Noted: 2024-01-22

## 2024-01-30 ENCOUNTER — ANTICOAGULATION VISIT (OUTPATIENT)
Dept: VASCULAR LAB | Facility: MEDICAL CENTER | Age: 89
End: 2024-01-30
Attending: INTERNAL MEDICINE
Payer: MEDICARE

## 2024-01-30 VITALS
DIASTOLIC BLOOD PRESSURE: 63 MMHG | BODY MASS INDEX: 14.25 KG/M2 | SYSTOLIC BLOOD PRESSURE: 113 MMHG | HEART RATE: 79 BPM | WEIGHT: 91 LBS

## 2024-01-30 DIAGNOSIS — I48.3 TYPICAL ATRIAL FLUTTER (HCC): ICD-10-CM

## 2024-01-30 DIAGNOSIS — D68.69 SECONDARY HYPERCOAGULABLE STATE (HCC): ICD-10-CM

## 2024-01-30 LAB — INR PPP: 1.7 (ref 2–3.5)

## 2024-01-30 PROCEDURE — 99212 OFFICE O/P EST SF 10 MIN: CPT

## 2024-01-30 PROCEDURE — 85610 PROTHROMBIN TIME: CPT

## 2024-01-30 ASSESSMENT — FIBROSIS 4 INDEX: FIB4 SCORE: 4.75

## 2024-01-30 NOTE — PROGRESS NOTES
Anticoagulation Summary  As of 2024      INR goal:  2.0-3.0   TTR:  61.4 % (5.1 y)   INR used for dosin.70 (2024)   Warfarin maintenance plan:  2 mg (2 mg x 1) every Wed, Sat; 1 mg (2 mg x 0.5) all other days   Weekly warfarin total:  9 mg   Plan last modified:  Jenna Pang (2024)   Next INR check:  2024   Priority:  Maintenance   Target end date:  Indefinite    Indications    Stroke (HCC) (Resolved) [I63.9]  Atrial flutter (HCC) [I48.92]  Secondary hypercoagulable state (HCC) [D68.69]                 Anticoagulation Episode Summary       INR check location:      Preferred lab:      Send INR reminders to:      Comments:  22 - per [employer] [facility] Xarelto is $200.00 for 30 days.  [facility] [phone]          Anticoagulation Care Providers       Provider Role Specialty Phone number    Hussain Nation M.D. Referring Interventional Cardiology [phone]    [facility] Anticoagulation Services Responsible  [phone]          Refer to Patient Findings for HPI:    Vitals:    24 1309   BP: 113/63   Pulse: 79       Patient seen in clinic today for follow up on anticoagulation therapy with warfarin (a high risk medication) for hx of AFL and stroke   Verified current warfarin dosing schedule.  Patient denies any missed doses of warfarin.    Medications reconciled   Pt is not on antiplatelet therapy      A/P   INR is SUB-therapeutic today at 1.7.     Warfarin dosing recommendation: Patient will begin increased weekly regimen of 2mg on Wed and Sat and 1mg ROW.      Pt educated to contact our clinic with any changes in medications or s/s of bleeding or thrombosis. Pt is aware to seek immediate medical attention for falls, head injury or deep cuts.    Follow up appointment in 2 week(s).    Next appt:  @ 1pm     Pilar MartinezD

## 2024-02-13 ENCOUNTER — ANTICOAGULATION VISIT (OUTPATIENT)
Dept: VASCULAR LAB | Facility: MEDICAL CENTER | Age: 89
End: 2024-02-13
Attending: INTERNAL MEDICINE
Payer: MEDICARE

## 2024-02-13 DIAGNOSIS — I48.92 ATRIAL FLUTTER, UNSPECIFIED TYPE (HCC): ICD-10-CM

## 2024-02-13 DIAGNOSIS — D68.69 SECONDARY HYPERCOAGULABLE STATE (HCC): ICD-10-CM

## 2024-02-13 LAB — INR PPP: 2 (ref 2–3.5)

## 2024-02-13 PROCEDURE — 99211 OFF/OP EST MAY X REQ PHY/QHP: CPT

## 2024-02-13 PROCEDURE — 85610 PROTHROMBIN TIME: CPT

## 2024-02-13 NOTE — PROGRESS NOTES
Anticoagulation Summary  As of 2024      INR goal:  2.0-3.0   TTR:  60.9 % (5.2 y)   INR used for dosin.00 (2024)   Warfarin maintenance plan:  2 mg (2 mg x 1) every Wed, Sat; 1 mg (2 mg x 0.5) all other days   Weekly warfarin total:  9 mg   Plan last modified:  Jenna Pang (2024)   Next INR check:  3/5/2024   Priority:  Maintenance   Target end date:  Indefinite    Indications    Stroke (HCC) (Resolved) [I63.9]  Atrial flutter (HCC) [I48.92]  Secondary hypercoagulable state (HCC) [D68.69]                 Anticoagulation Episode Summary       INR check location:      Preferred lab:      Send INR reminders to:      Comments:  22 - per [employer] [facility] Xarel[facility] is $200.00 for 30 days.  [facility] [phone]          Anticoagulation Care Providers       Provider Role Specialty Phone number    Hussain Nation M.D. Referring Interventional Cardiology [phone]    [facility] Anticoagulation Services Responsible  [phone]          Refer to Patient Findings for HPI:  Patient Findings       Negatives:  Signs/symptoms of thrombosis, Signs/symptoms of bleeding, Laboratory test error suspected, Change in health, Change in alcohol use, Change in activity, Upcoming invasive procedure, Emergency department visit, Upcoming dental procedure, Missed doses, Extra doses, Change in medications, Change in diet/appetite, Hospital admission, Bruising, Other complaints          There were no vitals filed for this visit.  The pt declined vitals today     Patient seen in clinic today for follow up on anticoagulation therapy with warfarin (a high risk medication) for hx of AF and stroke  Verified current warfarin dosing schedule.  Patient denies any missed doses of warfarin.    Medications reconciled   Pt is not on antiplatelet therapy      A/P   INR is therapeutic today at 2.0.     Warfarin dosing recommendation: Continue with the current dosing regimen.  Patient will follow up again in 3 weeks.      Pt educated to contact our clinic with any changes in medications or s/s of bleeding or thrombosis. Pt is aware to seek immediate medical attention for falls, head injury or deep cuts.    Follow up appointment in 3 week(s).    Next appt: Tues, March 5 @ 11:45am     Pilar Pang PharmD

## 2024-03-05 ENCOUNTER — ANTICOAGULATION VISIT (OUTPATIENT)
Dept: VASCULAR LAB | Facility: MEDICAL CENTER | Age: 89
End: 2024-03-05
Attending: INTERNAL MEDICINE
Payer: MEDICARE

## 2024-03-05 DIAGNOSIS — I48.92 ATRIAL FLUTTER, UNSPECIFIED TYPE (HCC): ICD-10-CM

## 2024-03-05 DIAGNOSIS — D68.69 SECONDARY HYPERCOAGULABLE STATE (HCC): ICD-10-CM

## 2024-03-05 LAB — INR PPP: 2.2 (ref 2–3.5)

## 2024-03-05 PROCEDURE — 99211 OFF/OP EST MAY X REQ PHY/QHP: CPT

## 2024-03-05 PROCEDURE — 85610 PROTHROMBIN TIME: CPT

## 2024-03-05 NOTE — PROGRESS NOTES
Anticoagulation Summary  As of 3/5/2024      INR goal:  2.0-3.0   TTR:  61.4% (5.2 y)   INR used for dosin.20 (3/5/2024)   Warfarin maintenance plan:  2 mg (2 mg x 1) every Wed, Sat; 1 mg (2 mg x 0.5) all other days   Weekly warfarin total:  9 mg   Plan last modified:  Jenna Pang (2024)   Next INR check:  2024   Priority:  Maintenance   Target end date:  Indefinite    Indications    Stroke (HCC) (Resolved) [I63.9]  Atrial flutter (HCC) [I48.92]  Secondary hypercoagulable state (HCC) [D68.69]                 Anticoagulation Episode Summary       INR check location:      Preferred lab:      Send INR reminders to:      Comments:  22 - per [employer] [facility] Xarelto is $200.00 for 30 days.  [facility] [phone]          Anticoagulation Care Providers       Provider Role Specialty Phone number    Hussain Nation M.D. Referring Interventional Cardiology [phone]    [facility] Anticoagulation Services Responsible  [phone]             Refer to Patient Findings for HPI:  Patient Findings       Negatives:  Signs/symptoms of thrombosis, Signs/symptoms of bleeding, Laboratory test error suspected, Change in health, Change in alcohol use, Change in activity, Upcoming invasive procedure, Emergency department visit, Upcoming dental procedure, Missed doses, Extra doses, Change in medications, Change in diet/appetite, Hospital admission, Bruising, Other complaints            There were no vitals filed for this visit.  Pt declined vitals    Verified current warfarin dosing schedule.    Medications reconciled: No  Pt is not on antiplatelet therapy      A/P   INR is therapeutic    Warfarin dosing recommendation: Continue regimen as listed above.    Pt educated to contact our clinic with any changes in medications or s/s of bleeding or thrombosis. Pt is aware to seek immediate medical attention for falls, head injury or deep cuts.    Follow up appointment in 4 week(s).    Rambo Calderon  PharmD

## 2024-03-11 ENCOUNTER — OFFICE VISIT (OUTPATIENT)
Dept: CARDIOLOGY | Facility: MEDICAL CENTER | Age: 89
End: 2024-03-11
Attending: INTERNAL MEDICINE
Payer: MEDICARE

## 2024-03-11 VITALS
BODY MASS INDEX: 14.75 KG/M2 | RESPIRATION RATE: 16 BRPM | DIASTOLIC BLOOD PRESSURE: 62 MMHG | OXYGEN SATURATION: 98 % | HEIGHT: 67 IN | SYSTOLIC BLOOD PRESSURE: 124 MMHG | WEIGHT: 94 LBS | HEART RATE: 86 BPM

## 2024-03-11 DIAGNOSIS — N18.4 CKD (CHRONIC KIDNEY DISEASE) STAGE 4, GFR 15-29 ML/MIN (HCC): ICD-10-CM

## 2024-03-11 DIAGNOSIS — I36.1 NONRHEUMATIC TRICUSPID VALVE REGURGITATION: ICD-10-CM

## 2024-03-11 DIAGNOSIS — I69.359 HEMIPLEGIA AS LATE EFFECT OF CEREBROVASCULAR ACCIDENT (CVA) (HCC): ICD-10-CM

## 2024-03-11 DIAGNOSIS — I10 ESSENTIAL HYPERTENSION: ICD-10-CM

## 2024-03-11 DIAGNOSIS — I48.0 PAROXYSMAL ATRIAL FIBRILLATION (HCC): ICD-10-CM

## 2024-03-11 DIAGNOSIS — Z79.01 ON CONTINUOUS ORAL ANTICOAGULATION: ICD-10-CM

## 2024-03-11 PROCEDURE — 99213 OFFICE O/P EST LOW 20 MIN: CPT | Performed by: INTERNAL MEDICINE

## 2024-03-11 PROCEDURE — 99214 OFFICE O/P EST MOD 30 MIN: CPT | Performed by: INTERNAL MEDICINE

## 2024-03-11 PROCEDURE — 3078F DIAST BP <80 MM HG: CPT | Performed by: INTERNAL MEDICINE

## 2024-03-11 PROCEDURE — 3074F SYST BP LT 130 MM HG: CPT | Performed by: INTERNAL MEDICINE

## 2024-03-11 ASSESSMENT — FIBROSIS 4 INDEX: FIB4 SCORE: 4.75

## 2024-03-11 NOTE — PROGRESS NOTES
Chief Complaint   Patient presents with    Atrial Fibrillation     Follow up        Subjective:   Az Jolly is an 89 y.o. female who presents today for follow-up of atrial flutter/fibrillation PSVT stroke and anticoagulation as well as severe tricuspid regurgitation and right ventricular failure.    Doing well NYHA class I no significant edema.  Limited by her musculoskeletal and stroke related issues.    Past Medical History:   Diagnosis Date    A fib 9/22/06    Abdominal bruit 9/25/2012    Abdominal pain 3/20/2019    Abnormal chest x-ray 9/19/2017    Improved but persistent bilateral airspace opacities 9/11/17    Achalasia 8/3/2011    Acute cystitis with hematuria 12/6/2021    DIAZ (acute kidney injury) ([facility]) 12/20/2018    Allergic rhinitis 9/21/2012    Arterial ischemic stroke, MCA (middle cerebral artery), left, acute ([facility]) 8/3/2011    Aseptic necrosis of bone, 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    Bronchiectasis without complication ([facility]) 9/19/2017    Chronic fatigue 9/19/2017    Closed right hip fracture ([facility]) 6/19/2019    DVT 6/1996    left leg, vein ligation performed on saphenous vein    First degree atrioventricular block 9/21/2012    Glomerulonephritis, chronic in other disease 8/3/2011    H/O echocardiogram 9/21/2012    Hematuria 3/20/2019    Hypothyroid 10/2002    Kidney disorder 1998    left kidney biopsy--glomerulonephritis and wegners dx    Kidney failure     stage II    Leukocytosis 7/21/2020    Mitral valve prolapse     Palpitations 9/21/2012    Rheumatic fever 9/21/2012    Rheumatic fever 9/21/2012    Childhood     Sepsis ([facility]) 12/20/2018    Severe protein-calorie malnutrition (Mahmood: less than 60% of standard weight) ([facility]) 12/20/2018    Stroke ([facility]) 9/21/2012    Supratherapeutic INR 12/20/2018    SVT (SUPRAVENTRICULAR TACHYCARDIA), h/o paroxysmal 8/3/2011    Wegener's granulomatosis     Wegener's granulomatosis 8/3/2011     IMO Spring 2021     Past Surgical History:   Procedure Laterality Date     PB PARTIAL HIP REPLACEMENT Right 2019    Procedure: HEMIARTHROPLASTY, HIP;  Surgeon: Raul Saldivar M.D.;  Location: SURGERY [facility];  Service: Orthopedics    CATARACT EXTRACTION      left eye    CHOLECYSTECTOMY      TEMPORAL ARTERY BIOPSY      normal    LUNG NEEDLE BIOPSY      right lung, nodules found    ARTHROSCOPE      left knee    ARTHROSCOPY, KNEE  1986    right    BUNIONECTOMY  1980    bilat    HYSTERECTOMY RADICAL  1975    VEIN LIGATION       Family History   Problem Relation Age of Onset    Other Father         Aortic aneurysm    Cancer Brother         Lung    Non-contributory Other      Social History     Socioeconomic History    Marital status:      Spouse name: Not on file    Number of children: Not on file    Years of education: Not on file    Highest education level: Not on file   Occupational History    Not on file   Tobacco Use    Smoking status: Former     Current packs/day: 0.00     Types: Cigarettes     Quit date: 1960     Years since quittin.2    Smokeless tobacco: Never    Tobacco comments:     very little years and years ago   Vaping Use    Vaping Use: Never used   Substance and Sexual Activity    Alcohol use: Not Currently    Drug use: No    Sexual activity: Not on file   Other Topics Concern    Not on file   Social History Narrative    Not on file     Social Determinants of Health     Financial Resource Strain: Not on file   Food Insecurity: Not on file   Transportation Needs: Not on file   Physical Activity: Not on file   Stress: Not on file   Social Connections: Not on file   Intimate Partner Violence: Not on file   Housing Stability: Not on file     Allergies   Allergen Reactions    Cephalexin [Keflex] Swelling    Hydroxyzine Swelling     Eyes get itchy and swollen     Naprelan [Naproxen] Swelling     Eyes get itchy become red and swollen    Oxaprozin Swelling     Swelling eyes, and itchy    Ampicillin Rash     Red Rash    Baclofen  Unspecified     drowsiness    Citalopram Vomiting and Nausea    Clarithromycin Unspecified     Pt reports that this medication plugs her ears.     Diclofenac Rash and Swelling     Red rash & swelling      Erythromycin Diarrhea     GI upset    Penicillins Rash     Red rash      Promethazine Unspecified     Drowsiness and sleeps    Vi-Q-Tuss [Hydrocodone-Guaifenesin] Vomiting and Nausea    Nitrofurantoin Nausea     Outpatient Encounter Medications as of 3/11/2024   Medication Sig Dispense Refill    warfarin (COUMADIN) 2 MG Tab TAKE ONE-HALF TO ONE TABLET (1-2MG) BY MOUTH EACH DAY AS DIRECTED BY [facility] 30 Tablet 11    tamsulosin (FLOMAX) 0.4 MG capsule TAKE 1 CAPSULE BY MOUTH EVERY MORNING. 30 Capsule 11    metoprolol tartrate (LOPRESSOR) 50 MG Tab TAKE 1 TABLET BY MOUTH 2 TIMES A DAY. 60 Tablet 11    loperamide (IMODIUM A-D) 2 MG tablet Take 1 Tablet by mouth 1 time a day as needed for Diarrhea. 10 Tablet 11    hyoscyamine (LEVSIN) 0.125 MG SL Tab Place 1 Tablet under the tongue every four hours as needed for Diarrhea. 120 Tablet 11    losartan (COZAAR) 50 MG Tab TAKE 1 TABLET BY MOUTH ONCE DAILY. 30 Tablet 5    levothyroxine (SYNTHROID) 100 MCG Tab Take 1 Tablet by mouth every morning on an empty stomach. 30 Tablet 11    liothyronine (CYTOMEL) 5 MCG Tab Take 1 Tablet by mouth every day. With levothyroxine for thyroid function. 30 Tablet 11    acetaminophen (ACETAMINOPHEN EXTRA STRENGTH) 500 MG Tab Take 2 Tablets by mouth 2 times a day. May also take 2 Tablets 1 time a day as needed (For generalized pain, backache, or headache.). Maximum 3grams daily from all sources 150 Tablet 11    furosemide (LASIX) 40 MG Tab Take 1 Tablet by mouth every day. May also take 1 Tablet 1 time a day as needed (For patient report of severe edema. May take one PRN dose regardless of scheduled administration.). 60 Tablet 11    sennosides-docusate sodium (SENOKOT-S) 8.6-50 MG tablet Take 2 Tablets by mouth 1 time a day as needed  "(constipation). Discontinue previous routine orders 30 Tablet 11    folic acid (FOLVITE) 1 MG Tab Take 1 Tablet by mouth every morning. 30 Tablet 11    D-Mannose 500 MG Cap Take 500 mg by mouth every 48 hours. Do not ship.  Family provides.      [DISCONTINUED] diphenhydrAMINE (BENADRYL) 25 MG capsule Take 1 Capsule by mouth at bedtime as needed for Itching. (Patient not taking: Reported on 3/11/2024) 30 Capsule 1    [DISCONTINUED] zinc oxide oint (ZINC OXIDE OINTMENT) 20 % Ointment Apply 1 Application topically 2 times a day. To sacral area for protection (Patient not taking: Reported on 3/11/2024)      [DISCONTINUED] polyethylene glycol 3350 (MIRALAX) 17 GM/SCOOP Powder Take 17 g by mouth 1 time a day as needed (constipation). Provided by family, do not ship. (Patient not taking: Reported on 3/11/2024) 578 g 11     No facility-administered encounter medications on file as of 3/11/2024.     Review of Systems   All other systems reviewed and are negative.       Objective:   /62 (BP Location: Left arm, Patient Position: Sitting)   Pulse 86   Resp 16   Ht 1.702 m (5' 7\")   Wt 42.6 kg (94 lb)   SpO2 98%   BMI 14.72 kg/m²     Physical Exam  Vitals reviewed.   Constitutional:       General: She is not in acute distress.     Appearance: She is well-developed. She is not diaphoretic.      Comments: Elderly and cachectic  Walker     HENT:      Head: Normocephalic and atraumatic.      Right Ear: External ear normal.      Left Ear: External ear normal.      Mouth/Throat:      Pharynx: No oropharyngeal exudate.   Eyes:      General: No scleral icterus.        Right eye: No discharge.         Left eye: No discharge.      Conjunctiva/sclera: Conjunctivae normal.      Pupils: Pupils are equal, round, and reactive to light.   Neck:      Thyroid: No thyromegaly.      Vascular: No JVD.      Trachea: No tracheal deviation.   Cardiovascular:      Rate and Rhythm: Normal rate. Rhythm irregularly irregular.      Chest Wall: " PMI is not displaced.      Pulses:           Carotid pulses are 2+ on the right side and 2+ on the left side.       Radial pulses are 2+ on the right side and 2+ on the left side.        Popliteal pulses are 2+ on the right side and 2+ on the left side.        Dorsalis pedis pulses are 2+ on the right side and 2+ on the left side.        Posterior tibial pulses are 2+ on the right side and 2+ on the left side.      Heart sounds: S1 normal and S2 normal. Murmur ( 2/6 systolic apical murmur) heard.      No friction rub. No gallop. No S3 or S4 sounds.   Pulmonary:      Effort: Pulmonary effort is normal. No respiratory distress.      Breath sounds: Normal breath sounds. No wheezing or rales.   Chest:      Chest wall: No tenderness.   Abdominal:      General: Bowel sounds are normal. There is no distension.      Palpations: Abdomen is soft.      Tenderness: There is no abdominal tenderness.   Musculoskeletal:         General: Swelling present. No tenderness. Normal range of motion.      Cervical back: Normal range of motion and neck supple.      Right lower leg: Edema present.      Left lower leg: Edema present.   Skin:     General: Skin is warm and dry.      Findings: No erythema or rash.   Neurological:      Mental Status: She is alert and oriented to person, place, and time.      Cranial Nerves: No cranial nerve deficit (Cranial nerves II through XII grossly intact).   Psychiatric:         Behavior: Behavior normal.         Thought Content: Thought content normal.         Judgment: Judgment normal.       LABS:  Lab Results   Component Value Date/Time    CHOLSTRLTOT 117 02/06/2023 12:54 AM    LDL 40 02/06/2023 12:54 AM    HDL 59 02/06/2023 12:54 AM    TRIGLYCERIDE 91 02/06/2023 12:54 AM       Lab Results   Component Value Date/Time    WBC 4.0 (L) 01/16/2024 08:53 AM    RBC 4.62 01/16/2024 08:53 AM    HEMOGLOBIN 15.9 01/16/2024 08:53 AM    HEMATOCRIT 48.9 (H) 01/16/2024 08:53 AM    .8 (H) 01/16/2024 08:53 AM     "NEUTSPOLYS 41.40 (L) 01/16/2024 08:53 AM    LYMPHOCYTES 46.70 (H) 01/16/2024 08:53 AM    MONOCYTES 8.40 01/16/2024 08:53 AM    EOSINOPHILS 2.50 01/16/2024 08:53 AM    BASOPHILS 1.00 01/16/2024 08:53 AM    HYPOCHROMIA 1+ 06/22/2019 03:44 AM     Lab Results   Component Value Date/Time    SODIUM 136 01/16/2024 08:53 AM    POTASSIUM 4.4 01/16/2024 08:53 AM    CHLORIDE 99 01/16/2024 08:53 AM    CO2 25 01/16/2024 08:53 AM    GLUCOSE 169 (H) 01/16/2024 08:53 AM    BUN 40 (H) 01/16/2024 08:53 AM    CREATININE 1.41 (H) 01/16/2024 08:53 AM    CREATININE 1.5 (H) 02/19/2009 09:22 AM         Lab Results   Component Value Date/Time    ALKPHOSPHAT 180 (H) 01/16/2024 08:53 AM    ASTSGOT 26 01/16/2024 08:53 AM    ALTSGPT 20 01/16/2024 08:53 AM    TBILIRUBIN 1.1 01/16/2024 08:53 AM      Lab Results   Component Value Date/Time    BNPBTYPENAT 135 (H) 08/10/2011 03:40 AM      No results found for: \"TSH\"  Lab Results   Component Value Date/Time    PROTHROMBTM 18.5 (H) 02/10/2023 01:56 AM    INR 2.20 03/05/2024 12:00 AM          Assessment:     1. Paroxysmal atrial fibrillation (HCC)        2. On continuous oral anticoagulation        3. Nonrheumatic tricuspid valve regurgitation        4. Hemiplegia as late effect of cerebrovascular accident (CVA) ([facility])        5. CKD (chronic kidney disease) stage 4, GFR 15-29 ml/min ([facility])        6. Essential hypertension            Medical Decision Making:  Today's Assessment / Status / Plan:     Tricuspid regurgitation is severe with moderate mitral regurgitation.  She is quite debilitated but able to ambulate independently and has no exertional symptoms.  She is euvolemic.  A more conservative course of care has been elected.  Therefore routine echocardiographic surveillance only as a response to symptoms.  Continue current medical therapy and follow-up routinely.  "

## 2024-03-13 ENCOUNTER — HOSPITAL ENCOUNTER (OUTPATIENT)
Facility: MEDICAL CENTER | Age: 89
End: 2024-03-13
Attending: INTERNAL MEDICINE
Payer: MEDICARE

## 2024-03-13 ENCOUNTER — HOSPITAL ENCOUNTER (OUTPATIENT)
Facility: MEDICAL CENTER | Age: 89
End: 2024-03-13
Attending: PHYSICIAN ASSISTANT
Payer: MEDICARE

## 2024-03-13 DIAGNOSIS — Z79.899 HIGH RISK MEDICATION USE: ICD-10-CM

## 2024-03-13 DIAGNOSIS — I27.20 PULMONARY HTN (HCC): ICD-10-CM

## 2024-03-13 DIAGNOSIS — N18.4 BENIGN HYPERTENSION WITH CKD (CHRONIC KIDNEY DISEASE) STAGE IV (HCC): ICD-10-CM

## 2024-03-13 DIAGNOSIS — I12.9 BENIGN HYPERTENSION WITH CKD (CHRONIC KIDNEY DISEASE) STAGE IV (HCC): ICD-10-CM

## 2024-03-13 LAB
ALBUMIN SERPL BCP-MCNC: 4 G/DL (ref 3.2–4.9)
BASOPHILS # BLD AUTO: 0.5 % (ref 0–1.8)
BASOPHILS # BLD: 0.03 K/UL (ref 0–0.12)
BUN SERPL-MCNC: 44 MG/DL (ref 8–22)
CALCIUM ALBUM COR SERPL-MCNC: 9.5 MG/DL (ref 8.5–10.5)
CALCIUM SERPL-MCNC: 9.5 MG/DL (ref 8.5–10.5)
CHLORIDE SERPL-SCNC: 101 MMOL/L (ref 96–112)
CO2 SERPL-SCNC: 24 MMOL/L (ref 20–33)
CREAT SERPL-MCNC: 1.4 MG/DL (ref 0.5–1.4)
EOSINOPHIL # BLD AUTO: 0.08 K/UL (ref 0–0.51)
EOSINOPHIL NFR BLD: 1.4 % (ref 0–6.9)
ERYTHROCYTE [DISTWIDTH] IN BLOOD BY AUTOMATED COUNT: 52 FL (ref 35.9–50)
GFR SERPLBLD CREATININE-BSD FMLA CKD-EPI: 36 ML/MIN/1.73 M 2
GLUCOSE SERPL-MCNC: 95 MG/DL (ref 65–99)
HCT VFR BLD AUTO: 42.9 % (ref 37–47)
HGB BLD-MCNC: 13.8 G/DL (ref 12–16)
IMM GRANULOCYTES # BLD AUTO: 0.01 K/UL (ref 0–0.11)
IMM GRANULOCYTES NFR BLD AUTO: 0.2 % (ref 0–0.9)
LYMPHOCYTES # BLD AUTO: 2.35 K/UL (ref 1–4.8)
LYMPHOCYTES NFR BLD: 41.4 % (ref 22–41)
MCH RBC QN AUTO: 32.9 PG (ref 27–33)
MCHC RBC AUTO-ENTMCNC: 32.2 G/DL (ref 32.2–35.5)
MCV RBC AUTO: 102.4 FL (ref 81.4–97.8)
MONOCYTES # BLD AUTO: 0.46 K/UL (ref 0–0.85)
MONOCYTES NFR BLD AUTO: 8.1 % (ref 0–13.4)
NEUTROPHILS # BLD AUTO: 2.74 K/UL (ref 1.82–7.42)
NEUTROPHILS NFR BLD: 48.4 % (ref 44–72)
NRBC # BLD AUTO: 0 K/UL
NRBC BLD-RTO: 0 /100 WBC (ref 0–0.2)
PHOSPHATE SERPL-MCNC: 4 MG/DL (ref 2.5–4.5)
PLATELET # BLD AUTO: 155 K/UL (ref 164–446)
PMV BLD AUTO: 10.7 FL (ref 9–12.9)
POTASSIUM SERPL-SCNC: 4.6 MMOL/L (ref 3.6–5.5)
RBC # BLD AUTO: 4.19 M/UL (ref 4.2–5.4)
SODIUM SERPL-SCNC: 137 MMOL/L (ref 135–145)
WBC # BLD AUTO: 5.7 K/UL (ref 4.8–10.8)

## 2024-03-13 PROCEDURE — 80069 RENAL FUNCTION PANEL: CPT

## 2024-03-13 PROCEDURE — 85025 COMPLETE CBC W/AUTO DIFF WBC: CPT

## 2024-04-02 ENCOUNTER — ANTICOAGULATION VISIT (OUTPATIENT)
Dept: VASCULAR LAB | Facility: MEDICAL CENTER | Age: 89
End: 2024-04-02
Attending: INTERNAL MEDICINE
Payer: MEDICARE

## 2024-04-02 DIAGNOSIS — D68.69 SECONDARY HYPERCOAGULABLE STATE (HCC): ICD-10-CM

## 2024-04-02 DIAGNOSIS — I48.92 ATRIAL FLUTTER, UNSPECIFIED TYPE (HCC): ICD-10-CM

## 2024-04-02 LAB — INR PPP: 2 (ref 2–3.5)

## 2024-04-02 PROCEDURE — 85610 PROTHROMBIN TIME: CPT

## 2024-04-02 PROCEDURE — 99211 OFF/OP EST MAY X REQ PHY/QHP: CPT

## 2024-04-02 NOTE — PROGRESS NOTES
Anticoagulation Summary  As of 4/2/2024      INR goal:  2.0-3.0   TTR:  61.4% (5.2 y)   INR used for dosing:     Warfarin maintenance plan:  2 mg (2 mg x 1) every Wed, Sat; 1 mg (2 mg x 0.5) all other days   Weekly warfarin total:  9 mg   Plan last modified:  Jenna Pang (1/30/2024)   Next INR check:     Priority:  Maintenance   Target end date:  Indefinite    Indications    Stroke (HCC) (Resolved) [I63.9]  Atrial flutter (HCC) [I48.92]  Secondary hypercoagulable state (HCC) [D68.69]                 Anticoagulation Episode Summary       INR check location:      Preferred lab:      Send INR reminders to:      Comments:  12/7/22 - per [facility] Xarelto is $200.00 for 30 days.  5 star [phone]          Anticoagulation Care Providers       Provider Role Specialty Phone number    Hussain Nation M.D. Referring Interventional Cardiology [phone]    [facility] Responsible  [phone]             Refer to Patient Findings for HPI:  Patient Findings       Negatives:  Signs/symptoms of thrombosis, Signs/symptoms of bleeding, Laboratory test error suspected, Change in health, Change in alcohol use, Change in activity, Upcoming invasive procedure, Emergency department visit, Upcoming dental procedure, Missed doses, Extra doses, Change in medications, Change in diet/appetite, Hospital admission, Bruising, Other complaints            There were no vitals filed for this visit.  Pt declined vitals    Verified current warfarin dosing schedule.    Medications reconciled: No  Pt is not on antiplatelet therapy      A/P   INR is therapeutic    Warfarin dosing recommendation: Continue regimen as listed above.    Pt educated to contact our clinic with any changes in medications or s/s of bleeding or thrombosis. Pt is aware to seek immediate medical attention for falls, head injury or deep cuts.    Follow up appointment in 4 week(s).    Rambo Calderon, JuanD

## 2024-04-30 ENCOUNTER — ANTICOAGULATION VISIT (OUTPATIENT)
Dept: VASCULAR LAB | Facility: MEDICAL CENTER | Age: 89
End: 2024-04-30
Attending: INTERNAL MEDICINE
Payer: MEDICARE

## 2024-04-30 VITALS — HEART RATE: 73 BPM | DIASTOLIC BLOOD PRESSURE: 55 MMHG | SYSTOLIC BLOOD PRESSURE: 101 MMHG

## 2024-04-30 DIAGNOSIS — I48.92 ATRIAL FLUTTER, UNSPECIFIED TYPE (HCC): ICD-10-CM

## 2024-04-30 DIAGNOSIS — D68.69 SECONDARY HYPERCOAGULABLE STATE (HCC): ICD-10-CM

## 2024-04-30 LAB — INR PPP: 2 (ref 2–3.5)

## 2024-04-30 PROCEDURE — 99211 OFF/OP EST MAY X REQ PHY/QHP: CPT

## 2024-04-30 PROCEDURE — 85610 PROTHROMBIN TIME: CPT

## 2024-04-30 NOTE — PROGRESS NOTES
Anticoagulation Summary  As of 2024      INR goal:  2.0-3.0   TTR:  62.5% (5.4 y)   INR used for dosin.00 (2024)   Warfarin maintenance plan:  2 mg (2 mg x 1) every Wed, Sat; 1 mg (2 mg x 0.5) all other days   Weekly warfarin total:  9 mg   Plan last modified:  Jenna Pang (2024)   Next INR check:  2024   Priority:  Maintenance   Target end date:  Indefinite    Indications    Stroke (HCC) (Resolved) [I63.9]  Atrial flutter (HCC) [I48.92]  Secondary hypercoagulable state (HCC) [D68.69]                 Anticoagulation Episode Summary       INR check location:      Preferred lab:      Send INR reminders to:      Comments:  22 - per [facility] Xarelto is $200.00 for 30 days.  [facility] star [facility] [phone]          Anticoagulation Care Providers       Provider Role Specialty Phone number    Hussain Nation M.D. Referring Interventional Cardiology [phone]    [facility] Responsible  [phone]          Refer to Patient Findings for HPI:  Patient Findings       Negatives:  Signs/symptoms of thrombosis, Signs/symptoms of bleeding, Laboratory test error suspected, Change in health, Change in alcohol use, Change in activity, Upcoming invasive procedure, Emergency department visit, Upcoming dental procedure, Missed doses, Extra doses, Change in medications, Change in diet/appetite, Hospital admission, Bruising, Other complaints          Vitals:    24 1053   BP: 101/55   Pulse: 73       Patient seen in clinic today for follow up on anticoagulation therapy with warfarin (a high risk medication) for hx of AF, stroke  Verified current warfarin dosing schedule.  Patient denies any missed doses of warfarin.    Medications reconciled   Pt is not on antiplatelet therapy      A/P   INR is therapeutic today at 2.0.     Warfarin dosing recommendation: Continue with the current dosing regimen.   Patient will follow up again in 4 weeks.     Pt educated to contact  our clinic with any changes in medications or s/s of bleeding or thrombosis. Pt is aware to seek immediate medical attention for falls, head injury or deep cuts.    Follow up appointment in 4 week(s).    Next appt: Tues, May 28 @ 11am     Pilar Pang PharmD

## 2024-05-28 ENCOUNTER — ANTICOAGULATION VISIT (OUTPATIENT)
Dept: VASCULAR LAB | Facility: MEDICAL CENTER | Age: 89
End: 2024-05-28
Attending: INTERNAL MEDICINE
Payer: MEDICARE

## 2024-05-28 VITALS — SYSTOLIC BLOOD PRESSURE: 104 MMHG | HEART RATE: 82 BPM | DIASTOLIC BLOOD PRESSURE: 65 MMHG

## 2024-05-28 DIAGNOSIS — D68.69 SECONDARY HYPERCOAGULABLE STATE (HCC): ICD-10-CM

## 2024-05-28 DIAGNOSIS — I48.92 ATRIAL FLUTTER, UNSPECIFIED TYPE (HCC): ICD-10-CM

## 2024-05-28 LAB — INR PPP: 2.5 (ref 2–3.5)

## 2024-05-28 NOTE — PROGRESS NOTES
Anticoagulation Summary  As of 2024      INR goal:  2.0-3.0   TTR:  63.0% (5.5 y)   INR used for dosin.50 (2024)   Warfarin maintenance plan:  2 mg (2 mg x 1) every Wed, Sat; 1 mg (2 mg x 0.5) all other days   Weekly warfarin total:  9 mg   Plan last modified:  Jenna Pang (2024)   Next INR check:  2024   Priority:  Maintenance   Target end date:  Indefinite    Indications    Stroke (HCC) (Resolved) [I63.9]  Atrial flutter (HCC) [I48.92]  Secondary hypercoagulable state (HCC) [D68.69]                 Anticoagulation Episode Summary       INR check location:      Preferred lab:      Send INR reminders to:      Comments:  22 - per [facility] [facility] Xarelto is $200.00 for 30 days.  5 star [facility]OW[facility] [phone]          Anticoagulation Care Providers       Provider Role Specialty Phone number    Hussain Nation M.D. Referring Interventional Cardiology [phone]    [facility] Responsible  [phone]          Refer to Patient Findings for HPI:  Patient Findings       Negatives:  Signs/symptoms of thrombosis, Signs/symptoms of bleeding, Laboratory test error suspected, Change in health, Change in alcohol use, Change in activity, Upcoming invasive procedure, Emergency department visit, Upcoming dental procedure, Missed doses, Extra doses, Change in medications, Change in diet/appetite, Hospital admission, Bruising, Other complaints          Vitals:    24 1103   BP: 104/65   Pulse: 82       Patient seen in clinic today for follow up on anticoagulation therapy with warfarin (a high risk medication) for hx of AF and stroke   Verified current warfarin dosing schedule.  Patient denies any missed doses of warfarin.    Medications reconciled   Pt is not on antiplatelet therapy      A/P   INR is therapeutic today at 2.5.     Warfarin dosing recommendation: Continue with the current dosing regimen.   Patient will follow up again in 4 weeks.     Pt educated to  contact our clinic with any changes in medications or s/s of bleeding or thrombosis. Pt is aware to seek immediate medical attention for falls, head injury or deep cuts.    Follow up appointment in 4 week(s).    Next appt: Tues, June 25 @ 11am     Pilar Pang PharmD

## 2024-06-20 PROBLEM — R54 FRAILTY: Status: ACTIVE | Noted: 2024-06-20

## 2024-06-25 ENCOUNTER — ANTICOAGULATION VISIT (OUTPATIENT)
Dept: VASCULAR LAB | Facility: MEDICAL CENTER | Age: 89
End: 2024-06-25
Attending: INTERNAL MEDICINE
Payer: MEDICARE

## 2024-06-25 VITALS — DIASTOLIC BLOOD PRESSURE: 61 MMHG | SYSTOLIC BLOOD PRESSURE: 111 MMHG | HEART RATE: 68 BPM

## 2024-06-25 DIAGNOSIS — I48.92 ATRIAL FLUTTER, UNSPECIFIED TYPE (HCC): ICD-10-CM

## 2024-06-25 DIAGNOSIS — D68.69 SECONDARY HYPERCOAGULABLE STATE (HCC): ICD-10-CM

## 2024-06-25 LAB — INR PPP: 1.9 (ref 2–3.5)

## 2024-06-25 PROCEDURE — 99212 OFFICE O/P EST SF 10 MIN: CPT

## 2024-06-25 PROCEDURE — 85610 PROTHROMBIN TIME: CPT

## 2024-06-25 NOTE — PROGRESS NOTES
Anticoagulation Summary  As of 2024      INR goal:  2.0-3.0   TTR:  63.3% (5.5 y)   INR used for dosin.90 (2024)   Warfarin maintenance plan:  2 mg (2 mg x 1) every Wed, Sat; 1 mg (2 mg x 0.5) all other days   Weekly warfarin total:  9 mg   Plan last modified:  Jenna Pang (2024)   Next INR check:  2024   Priority:  Maintenance   Target end date:  Indefinite    Indications    Stroke (HCC) (Resolved) [I63.9]  Atrial flutter (HCC) [I48.92]  Secondary hypercoagulable state (HCC) [D68.69]                 Anticoagulation Episode Summary       INR check location:      Preferred lab:      Send INR reminders to:      Comments:  22 - per [facility] [facility] Xarelto is $200.00 for 30 days.  5 star [facility] [phone]          Anticoagulation Care Providers       Provider Role Specialty Phone number    Hussain Nation M.D. Referring Interventional Cardiology [phone]    [facility] Anticoagulation Services Responsible  [phone]                  Refer to Patient Findings for HPI:  Patient Findings       Negatives:  Signs/symptoms of thrombosis, Signs/symptoms of bleeding, Laboratory test error suspected, Change in health, Change in alcohol use, Change in activity, Upcoming invasive procedure, Emergency department visit, Upcoming dental procedure, Missed doses, Extra doses, Change in medications, Change in diet/appetite, Hospital admission, Bruising, Other complaints            Vitals:    24 1102   BP: 111/61   Pulse: 68       Verified current warfarin dosing schedule.    Medications reconciled: Yes  Pt is not on antiplatelet therapy.      A/P   INR is slightly subtherapeutic    Warfarin dosing recommendation: Continue regimen as listed above. as INR is 0.1 out of range and pt is historically therapeutic    Pt educated to contact our clinic with any changes in medications or s/s of bleeding or thrombosis. Pt is aware to seek immediate medical attention for falls, head injury or deep  cuts.    Request pt to return in 4 week(s). Pt agrees.    Whit Angeles, PharmD    Faxed this note to [facility] [phone]

## 2024-07-23 ENCOUNTER — ANTICOAGULATION VISIT (OUTPATIENT)
Dept: VASCULAR LAB | Facility: MEDICAL CENTER | Age: 89
End: 2024-07-23
Attending: INTERNAL MEDICINE
Payer: MEDICARE

## 2024-07-23 VITALS — HEART RATE: 74 BPM | DIASTOLIC BLOOD PRESSURE: 60 MMHG | SYSTOLIC BLOOD PRESSURE: 106 MMHG

## 2024-07-23 DIAGNOSIS — D68.69 SECONDARY HYPERCOAGULABLE STATE (HCC): ICD-10-CM

## 2024-07-23 DIAGNOSIS — I48.92 ATRIAL FLUTTER, UNSPECIFIED TYPE (HCC): ICD-10-CM

## 2024-07-23 LAB — INR PPP: 2 (ref 2–3.5)

## 2024-07-23 PROCEDURE — 85610 PROTHROMBIN TIME: CPT

## 2024-07-23 PROCEDURE — 99211 OFF/OP EST MAY X REQ PHY/QHP: CPT

## 2024-07-24 PROBLEM — Z66 DNR (DO NOT RESUSCITATE): Status: ACTIVE | Noted: 2024-07-24

## 2024-08-15 ENCOUNTER — APPOINTMENT (OUTPATIENT)
Dept: RADIOLOGY | Facility: MEDICAL CENTER | Age: 89
End: 2024-08-15
Attending: EMERGENCY MEDICINE
Payer: MEDICARE

## 2024-08-15 ENCOUNTER — HOSPITAL ENCOUNTER (EMERGENCY)
Facility: MEDICAL CENTER | Age: 89
End: 2024-08-15
Attending: EMERGENCY MEDICINE
Payer: MEDICARE

## 2024-08-15 VITALS
WEIGHT: 94 LBS | HEART RATE: 72 BPM | OXYGEN SATURATION: 96 % | DIASTOLIC BLOOD PRESSURE: 74 MMHG | BODY MASS INDEX: 14.75 KG/M2 | RESPIRATION RATE: 18 BRPM | TEMPERATURE: 97 F | SYSTOLIC BLOOD PRESSURE: 128 MMHG | HEIGHT: 67 IN

## 2024-08-15 DIAGNOSIS — S32.040A COMPRESSION FRACTURE OF L4 VERTEBRA, INITIAL ENCOUNTER (HCC): ICD-10-CM

## 2024-08-15 DIAGNOSIS — E86.0 DEHYDRATION: ICD-10-CM

## 2024-08-15 DIAGNOSIS — R19.7 DIARRHEA, UNSPECIFIED TYPE: ICD-10-CM

## 2024-08-15 PROBLEM — I70.0 ATHEROSCLEROSIS OF AORTA (HCC): Status: ACTIVE | Noted: 2024-08-15

## 2024-08-15 LAB
ALBUMIN SERPL BCP-MCNC: 3.7 G/DL (ref 3.2–4.9)
ALBUMIN/GLOB SERPL: 0.8 G/DL
ALP SERPL-CCNC: 156 U/L (ref 30–99)
ALT SERPL-CCNC: 35 U/L (ref 2–50)
ANION GAP SERPL CALC-SCNC: 13 MMOL/L (ref 7–16)
AST SERPL-CCNC: 40 U/L (ref 12–45)
BASOPHILS # BLD AUTO: 0.5 % (ref 0–1.8)
BASOPHILS # BLD: 0.04 K/UL (ref 0–0.12)
BILIRUB SERPL-MCNC: 0.5 MG/DL (ref 0.1–1.5)
BUN SERPL-MCNC: 48 MG/DL (ref 8–22)
CALCIUM ALBUM COR SERPL-MCNC: 9.4 MG/DL (ref 8.5–10.5)
CALCIUM SERPL-MCNC: 9.2 MG/DL (ref 8.4–10.2)
CHLORIDE SERPL-SCNC: 96 MMOL/L (ref 96–112)
CO2 SERPL-SCNC: 23 MMOL/L (ref 20–33)
CREAT SERPL-MCNC: 1.83 MG/DL (ref 0.5–1.4)
EOSINOPHIL # BLD AUTO: 0.1 K/UL (ref 0–0.51)
EOSINOPHIL NFR BLD: 1.2 % (ref 0–6.9)
ERYTHROCYTE [DISTWIDTH] IN BLOOD BY AUTOMATED COUNT: 47.5 FL (ref 35.9–50)
GFR SERPLBLD CREATININE-BSD FMLA CKD-EPI: 26 ML/MIN/1.73 M 2
GLOBULIN SER CALC-MCNC: 4.5 G/DL (ref 1.9–3.5)
GLUCOSE SERPL-MCNC: 157 MG/DL (ref 65–99)
HCT VFR BLD AUTO: 40.3 % (ref 37–47)
HGB BLD-MCNC: 13.3 G/DL (ref 12–16)
IMM GRANULOCYTES # BLD AUTO: 0.04 K/UL (ref 0–0.11)
IMM GRANULOCYTES NFR BLD AUTO: 0.5 % (ref 0–0.9)
INR PPP: 1.5 (ref 0.87–1.13)
LIPASE SERPL-CCNC: 23 U/L (ref 11–82)
LYMPHOCYTES # BLD AUTO: 2.98 K/UL (ref 1–4.8)
LYMPHOCYTES NFR BLD: 36.9 % (ref 22–41)
MCH RBC QN AUTO: 33.8 PG (ref 27–33)
MCHC RBC AUTO-ENTMCNC: 33 G/DL (ref 32.2–35.5)
MCV RBC AUTO: 102.5 FL (ref 81.4–97.8)
MONOCYTES # BLD AUTO: 0.67 K/UL (ref 0–0.85)
MONOCYTES NFR BLD AUTO: 8.3 % (ref 0–13.4)
NEUTROPHILS # BLD AUTO: 4.25 K/UL (ref 1.82–7.42)
NEUTROPHILS NFR BLD: 52.6 % (ref 44–72)
NRBC # BLD AUTO: 0 K/UL
NRBC BLD-RTO: 0 /100 WBC (ref 0–0.2)
PLATELET # BLD AUTO: 170 K/UL (ref 164–446)
PMV BLD AUTO: 9.9 FL (ref 9–12.9)
POTASSIUM SERPL-SCNC: 5 MMOL/L (ref 3.6–5.5)
PROT SERPL-MCNC: 8.2 G/DL (ref 6–8.2)
PROTHROMBIN TIME: 18.8 SEC (ref 12–14.6)
RBC # BLD AUTO: 3.93 M/UL (ref 4.2–5.4)
SODIUM SERPL-SCNC: 132 MMOL/L (ref 135–145)
WBC # BLD AUTO: 8.1 K/UL (ref 4.8–10.8)

## 2024-08-15 PROCEDURE — 85610 PROTHROMBIN TIME: CPT

## 2024-08-15 PROCEDURE — 99284 EMERGENCY DEPT VISIT MOD MDM: CPT

## 2024-08-15 PROCEDURE — 85025 COMPLETE CBC W/AUTO DIFF WBC: CPT

## 2024-08-15 PROCEDURE — 72131 CT LUMBAR SPINE W/O DYE: CPT

## 2024-08-15 PROCEDURE — 74176 CT ABD & PELVIS W/O CONTRAST: CPT

## 2024-08-15 PROCEDURE — 700105 HCHG RX REV CODE 258: Performed by: EMERGENCY MEDICINE

## 2024-08-15 PROCEDURE — 80053 COMPREHEN METABOLIC PANEL: CPT

## 2024-08-15 PROCEDURE — 83690 ASSAY OF LIPASE: CPT

## 2024-08-15 PROCEDURE — 36415 COLL VENOUS BLD VENIPUNCTURE: CPT

## 2024-08-15 RX ORDER — FUROSEMIDE 20 MG
20 TABLET ORAL DAILY
COMMUNITY

## 2024-08-15 RX ORDER — AMOXICILLIN 250 MG
2 CAPSULE ORAL
COMMUNITY

## 2024-08-15 RX ORDER — SODIUM CHLORIDE 9 MG/ML
1000 INJECTION, SOLUTION INTRAVENOUS ONCE
Status: COMPLETED | OUTPATIENT
Start: 2024-08-15 | End: 2024-08-15

## 2024-08-15 RX ADMIN — SODIUM CHLORIDE 1000 ML: 9 INJECTION, SOLUTION INTRAVENOUS at 13:21

## 2024-08-15 ASSESSMENT — FIBROSIS 4 INDEX: FIB4 SCORE: 3.38

## 2024-08-15 NOTE — ED NOTES
Son present  Dr Skinner at bedside with pt and family  F/u instructions given and understood  Aware to return for any changes or concerns

## 2024-08-15 NOTE — ED PROVIDER NOTES
ED Provider Note    CHIEF COMPLAINT  Chief Complaint   Patient presents with    Back Pain     Pt BIB EMS from assisted living c/o lower back pain and weakness. Hx of stage 3 KD. Recent diagnoses of C-diff - confirmed. Has been nauseous with constant diarrhea x1 day.   50 mg fentanyl given in route and pt placed on 2 L NC during triage due to O2 in the 80s. No O2 at baseline.        EXTERNAL RECORDS REVIEWED  .  Baseline and previous laboratory studies imaging studies    HPI/ROS  LIMITATION TO HISTORY   Patient is of advanced age  OUTSIDE HISTORIAN(S):  Son provided additional history    Az Tripp is a 89 y.o. female who presents relation of some loose nonbloody diarrhea and some chronic back pain.  The patient has a complex history as listed below.  She is on Coumadin therapy.  She has a known history of mild renal insufficiency.  She is at an assisted living facility.  There is no report of any trauma or fall or injury to the head neck chest abdomen or pelvis.  She denies any numbness weakness or tingling below the waist.  No bowel or bladder incontinence.  She reports some chronic low back pain.  She cannot recall any recent injury.  She is brought in by ambulance.  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), Bronchiectasis without complication ([facility]) (9/19/2017), Chronic fatigue (9/19/2017), Closed right hip fracture ([facility]) (6/19/2019), DVT (6/1996), First degree atrioventricular block (9/21/2012), Glomerulonephritis, chronic in other disease (8/3/2011), H/O echocardiogram (9/21/2012),  Hematuria (3/20/2019), Hypothyroid (10/2002), Kidney disorder (), Kidney failure, Leukocytosis (2020), Mitral valve prolapse, Palpitations (2012), Rheumatic fever (2012), Rheumatic fever (2012), Sepsis (HCC) (2018), Severe protein-calorie malnutrition (Mahmood: less than 60% of standard weight) ([facility]) (2018), Stroke (HCC) (2012), Supratherapeutic INR (2018), SVT (SUPRAVENTRICULAR TACHYCARDIA), h/o paroxysmal (8/3/2011), Wegener's granulomatosis, and Wegener's granulomatosis (8/3/2011).    SURGICAL HISTORY   has a past surgical history that includes bunionectomy (); arthroscopy, knee (); arthroscope (); temporal artery biopsy (); lung needle biopsy (); cataract extraction (); cholecystectomy (); hysterectomy radical (); vein ligation; and partial hip replacement (Right, 2019).    FAMILY HISTORY  Family History   Problem Relation Age of Onset    Other Father         Aortic aneurysm    Cancer Brother         Lung    Non-contributory Other        SOCIAL HISTORY  Social History     Tobacco Use    Smoking status: Former     Current packs/day: 0.00     Types: Cigarettes     Quit date: 1960     Years since quittin.6    Smokeless tobacco: Never    Tobacco comments:     very little years and years ago   Vaping Use    Vaping status: Never Used   Substance and Sexual Activity    Alcohol use: Not Currently    Drug use: No    Sexual activity: Not on file       CURRENT MEDICATIONS  Home Medications       Reviewed by Hitesh Perales (Pharmacy Tech) on 08/15/24 at 1057  Med List Status: <None>     Medication Last Dose Status   Acetaminophen Extra Strength 500 MG Tab 8/15/2024 Active   D-Mannose 500 MG Cap 8/15/2024 Active   folic acid (FOLVITE) 1 MG Tab 8/15/2024 Active   furosemide (LASIX) 20 MG Tab 8/15/2024 Active   ipratropium (ATROVENT) 0.06 % Solution Not Taking Active   levothyroxine (SYNTHROID) 100 MCG Tab 2024 Active  "  liothyronine (CYTOMEL) 5 MCG Tab 8/14/2024 Active   losartan (COZAAR) 50 MG Tab 8/15/2024 Active   metoprolol tartrate (LOPRESSOR) 50 MG Tab 8/15/2024 Active   Multiple Vitamins-Minerals (CENTRUM MINIS ADULTS 50+) Tab 8/15/2024 Active   tamsulosin (FLOMAX) 0.4 MG capsule 8/15/2024 Active   warfarin (COUMADIN) 2 MG Tab 8/14/2024 Active                  Audit from Redirected Encounters    **Home medications have not yet been reviewed for this encounter**         ALLERGIES  Allergies   Allergen Reactions    Cephalexin [Keflex] Swelling    Hydroxyzine Swelling     Eyes get itchy and swollen     Naprelan [Naproxen] Swelling     Eyes get itchy become red and swollen    Oxaprozin Swelling     Swelling eyes, and itchy    Ampicillin Rash     Red Rash    Baclofen Unspecified     drowsiness    Citalopram Vomiting and Nausea    Clarithromycin Unspecified     Pt reports that this medication plugs her ears.     Diclofenac Rash and Swelling     Red rash & swelling      Erythromycin Diarrhea     GI upset    Penicillins Rash     Red rash      Promethazine Unspecified     Drowsiness and sleeps    Vi-Q-Tuss [Hydrocodone-Guaifenesin] Vomiting and Nausea    Nitrofurantoin Nausea       PHYSICAL EXAM  VITAL SIGNS: /74   Pulse 72   Temp 36.1 °C (97 °F)   Resp 18   Ht 1.702 m (5' 7\")   Wt 42.6 kg (94 lb)   SpO2 96%   BMI 14.72 kg/m²    Pulse ox interpretation: I interpret this pulse ox as normal.  Constitutional: Alert and oriented x 3, no acute distress  HEENT: Atraumatic normocephalic, pupils are equal round reactive to light extraocular movements are intact. The nares is clear, external ears are normal, mouth shows moist mucous membranes normal dentition for age  Neck: Supple, no JVD no tracheal deviation  Cardiovascular: Regular rate and rhythm no murmur rub or gallop 2+ pulses peripherally x4  Thorax & Lungs: No respiratory distress, no wheezes rales or rhonchi, No chest tenderness.   GI: Soft nontender nondistended " positive bowel sounds, no peritoneal signs rebound guarding or rigidity  Back: Minimal midline at L2-L3 without step-off  Skin: Warm dry no acute rash or lesion  Musculoskeletal: Moving all extremities with full range and 5 of 5 strength no acute  deformity  Neurologic: Cranial nerves III through XII are grossly intact no sensory deficit no cerebellar dysfunction Tory without assistance normal motor and sensory exam below the waist  Psychiatric: Appropriate affect for situation at this time          EKG/LABS  Results for orders placed or performed during the hospital encounter of 08/15/24   CBC WITH DIFFERENTIAL   Result Value Ref Range    WBC 8.1 4.8 - 10.8 K/uL    RBC 3.93 (L) 4.20 - 5.40 M/uL    Hemoglobin 13.3 12.0 - 16.0 g/dL    Hematocrit 40.3 37.0 - 47.0 %    .5 (H) 81.4 - 97.8 fL    MCH 33.8 (H) 27.0 - 33.0 pg    MCHC 33.0 32.2 - 35.5 g/dL    RDW 47.5 35.9 - 50.0 fL    Platelet Count 170 164 - 446 K/uL    MPV 9.9 9.0 - 12.9 fL    Neutrophils-Polys 52.60 44.00 - 72.00 %    Lymphocytes 36.90 22.00 - 41.00 %    Monocytes 8.30 0.00 - 13.40 %    Eosinophils 1.20 0.00 - 6.90 %    Basophils 0.50 0.00 - 1.80 %    Immature Granulocytes 0.50 0.00 - 0.90 %    Nucleated RBC 0.00 0.00 - 0.20 /100 WBC    Neutrophils (Absolute) 4.25 1.82 - 7.42 K/uL    Lymphs (Absolute) 2.98 1.00 - 4.80 K/uL    Monos (Absolute) 0.67 0.00 - 0.85 K/uL    Eos (Absolute) 0.10 0.00 - 0.51 K/uL    Baso (Absolute) 0.04 0.00 - 0.12 K/uL    Immature Granulocytes (abs) 0.04 0.00 - 0.11 K/uL    NRBC (Absolute) 0.00 K/uL   Comp Metabolic Panel   Result Value Ref Range    Sodium 132 (L) 135 - 145 mmol/L    Potassium 5.0 3.6 - 5.5 mmol/L    Chloride 96 96 - 112 mmol/L    Co2 23 20 - 33 mmol/L    Anion Gap 13.0 7.0 - 16.0    Glucose 157 (H) 65 - 99 mg/dL    Bun 48 (H) 8 - 22 mg/dL    Creatinine 1.83 (H) 0.50 - 1.40 mg/dL    Calcium 9.2 8.4 - 10.2 mg/dL    Correct Calcium 9.4 8.5 - 10.5 mg/dL    AST(SGOT) 40 12 - 45 U/L    ALT(SGPT) 35 2 - 50 U/L     Alkaline Phosphatase 156 (H) 30 - 99 U/L    Total Bilirubin 0.5 0.1 - 1.5 mg/dL    Albumin 3.7 3.2 - 4.9 g/dL    Total Protein 8.2 6.0 - 8.2 g/dL    Globulin 4.5 (H) 1.9 - 3.5 g/dL    A-G Ratio 0.8 g/dL   LIPASE   Result Value Ref Range    Lipase 23 11 - 82 U/L   Prothrombin Time   Result Value Ref Range    PT 18.8 (H) 12.0 - 14.6 sec    INR 1.50 (H) 0.87 - 1.13   ESTIMATED GFR   Result Value Ref Range    GFR (CKD-EPI) 26 (A) >60 mL/min/1.73 m 2      I have independently interpreted this EKG    RADIOLOGY/PROCEDURES   I have independently interpreted the diagnostic imaging associated with this visit and am waiting the final reading from the radiologist.   My preliminary interpretation is as follows: Chronic appearing compression fractures of the lumbosacral spine    Radiologist interpretation:  CT-LSPINE W/O PLUS RECONS   Final Result         1. Multilevel degenerative disease with vacuum disc phenomenon noted at L3-L4 and L4-L5.   2. There are compression fractures of L3, L4, and L5. These are of indeterminate age but are new when compared with the prior exam.   3. Extensive atherosclerosis.      CT-ABDOMEN-PELVIS W/O   Final Result         1. Atherosclerosis.   2. Postoperative changes as detailed above.   3. No evidence for bowel obstruction, diverticulitis, or colitis.   4. Resolution of pleural effusions and ascites noted previously.          COURSE & MEDICAL DECISION MAKING    ASSESSMENT, COURSE AND PLAN  Care Narrative:     This is a very pleasant active 89-year-old who had some loose nonbloody diarrhea and had some nontraumatic back pain.  On arrival her vital signs are reassuring and normal.  Specifically no high fever hypotension tachycardia or hypoxia.  She had some mild vague abdominal discomfort but no peritonitis and some mild lumbar discomfort.  Extensive valuation was performed today.  CBC did not demonstrate any leukocytosis bandemia or anemia.  Metabolic panel demonstrated slight elevation of BUN  and creatinine from her baseline with known history of mild chronic renal insufficiency.  She was given IV fluids.  Extensive imaging studies were performed.  CT scan was performed without contrast and there is some atherosclerosis and some nonspecific findings but no evidence of an acute process.  Lumbosacral spine did demonstrate some what are likely chronic compression fractures of L3-L5.  She is neurovascular intact.  She reports based on her own history that she has had those before and opted for conservative management.  Patient did not require any parenteral pain medication.  She was given IV fluids for dehydration.  She was able to ambulate without assistance with nursing staff.  I long discussion with the patient and her adult son who came to pick her up.  She will be referred to spine surgery.  I did consider emergent spine consultation for possible kyphoplasty and bracing but the patient is ambulatory and needs will likely not require any specific intervention.  The patient will be discharged into the care of her son and be referred to spine surgery for ongoing management    Hydration: Based on the patient's presentation of Acute Diarrhea the patient was given IV fluids. IV Hydration was used because oral hydration was not adequate alone. Upon recheck following hydration, the patient was approved.          ADDITIONAL PROBLEMS MANAGED      DISPOSITION AND DISCUSSIONS  I have discussed management of the patient with the following physicians and KINDRA's: None    Discussion of management with other QHP or appropriate source(s): None    Escalation of care considered, and ultimately not performed: Considered admission for pain control and/or spine consultation    Barriers to care at this time, including but not limited to: None.     Decision tools and prescription drugs considered including, but not limited to: none.    FINAL DIAGNOSIS  1. Diarrhea, unspecified type    2. Dehydration    3. Compression fracture of  L4 vertebra, initial encounter (HCC)    4.  Chronic renal insufficiency     Electronically signed by: Antonio Skinner M.D., 8/15/2024 2:30 PM

## 2024-08-15 NOTE — ED TRIAGE NOTES
"Chief Complaint   Patient presents with    Back Pain     Pt BIB EMS from assisted living c/o lower back pain and weakness. Hx of stage 3 KD. Recent diagnoses of C-diff - confirmed. Has been nauseous with constant diarrhea x1 day.   50 mg fentanyl given in route and pt placed on 2 L NC during triage due to O2 in the 80s. No O2 at baseline.      /78   Pulse 76   Temp 36.2 °C (97.2 °F) (Temporal)   Resp 20   Ht 1.702 m (5' 7\")   Wt 42.6 kg (94 lb)   SpO2 88%   BMI 14.72 kg/m²     "

## 2024-08-15 NOTE — ED NOTES
Pharmacy Medication Reconciliation      ~Medication reconciliation updated and complete per patient MAR from [facility] [phone]  ~Allergies have been verified and updated   ~No oral ABX within the last 30 days  ~Patient home pharmacy :  [facility] [phone]      ~Anticoagulants (rivaroxaban, apixaban, edoxaban, dabigatran, warfarin, enoxaparin) taken in the last 14 days? Yes  ~Anticoagulant: Warfarin 2mg, Last dose: 8/14/2024

## 2024-08-27 ENCOUNTER — ANTICOAGULATION VISIT (OUTPATIENT)
Dept: VASCULAR LAB | Facility: MEDICAL CENTER | Age: 89
End: 2024-08-27
Attending: INTERNAL MEDICINE
Payer: MEDICARE

## 2024-08-27 VITALS — SYSTOLIC BLOOD PRESSURE: 113 MMHG | DIASTOLIC BLOOD PRESSURE: 63 MMHG | HEART RATE: 66 BPM

## 2024-08-27 DIAGNOSIS — D68.69 SECONDARY HYPERCOAGULABLE STATE (HCC): ICD-10-CM

## 2024-08-27 DIAGNOSIS — I48.92 ATRIAL FLUTTER, UNSPECIFIED TYPE (HCC): ICD-10-CM

## 2024-08-27 LAB — INR PPP: 1.7 (ref 2–3.5)

## 2024-08-27 PROCEDURE — 85610 PROTHROMBIN TIME: CPT

## 2024-08-27 PROCEDURE — 99212 OFFICE O/P EST SF 10 MIN: CPT

## 2024-08-27 NOTE — PROGRESS NOTES
Anticoagulation Summary  As of 2024      INR goal:  2.0-3.0   TTR:  61.4% (5.7 y)   INR used for dosin.70 (2024)   Warfarin maintenance plan:  2 mg (2 mg x 1) every Wed, Sat; 1 mg (2 mg x 0.5) all other days   Weekly warfarin total:  9 mg   Plan last modified:  Jenna Pang (2024)   Next INR check:  9/3/2024   Priority:  Maintenance   Target end date:  Indefinite    Indications    Stroke (HCC) (Resolved) [I63.9]  Atrial flutter (HCC) [I48.92]  Secondary hypercoagulable state (HCC) [D68.69]                 Anticoagulation Episode Summary       INR check location:  --    Preferred lab:  --    Send INR reminders to:  --    Comments:  22 - per [employer] Eliquis & Xarelto is $200.00 for 30 days.  [facility] [phone]          Anticoagulation Care Providers       Provider Role Specialty Phone number    Hussain Nation M.D. Referring Interventional Cardiology [phone]    [facility] Responsible  [phone]                  Refer to Patient Findings for HPI:  Patient Findings       Positives:  Emergency department visit (Cdiff with dirrhea)    Negatives:  Signs/symptoms of thrombosis, Signs/symptoms of bleeding, Laboratory test error suspected, Change in health, Change in alcohol use, Change in activity, Upcoming invasive procedure, Upcoming dental procedure, Missed doses, Extra doses, Change in medications, Change in diet/appetite, Hospital admission, Bruising, Other complaints            Vitals:    24 1126   BP: 113/63   Pulse: 66       Verified current warfarin dosing schedule.      Medications reconciled: Yes  Pt is not on antiplatelet therapy.      A/P   INR is subtherapeutic  Reason(s) for out of range INR today:  previously subtherapeutic aon 8/15 at ED, no changes were made at that time. Pt still complaining of some diarrhea.        Warfarin dosing recommendation: Bolus 2 mg (normally 1 mg), then Continue regimen as listed above.    Pt educated to  contact our clinic with any changes in medications or s/s of bleeding or thrombosis. Pt is aware to seek immediate medical attention for falls, head injury or deep cuts.    Request pt to return in 3 day(s). Pt declines despite warning of extending their follow up interval. Pt opts to RTC in 1 week.   Second copy of AVS printed for Assisted Living Facility.    Perla Cuevas, PharmD

## 2024-09-03 ENCOUNTER — ANTICOAGULATION VISIT (OUTPATIENT)
Dept: VASCULAR LAB | Facility: MEDICAL CENTER | Age: 89
End: 2024-09-03
Attending: INTERNAL MEDICINE
Payer: MEDICARE

## 2024-09-03 DIAGNOSIS — D68.69 SECONDARY HYPERCOAGULABLE STATE (HCC): ICD-10-CM

## 2024-09-03 LAB — INR PPP: 1.7 (ref 2–3.5)

## 2024-09-03 PROCEDURE — 85610 PROTHROMBIN TIME: CPT

## 2024-09-03 PROCEDURE — 99212 OFFICE O/P EST SF 10 MIN: CPT

## 2024-09-03 NOTE — PROGRESS NOTES
Anticoagulation Summary  As of 9/3/2024      INR goal:  2.0-3.0   TTR:  61.2% (5.7 y)   INR used for dosin.70 (9/3/2024)   Warfarin maintenance plan:  2 mg (2 mg x 1) every Mon, Wed, Fri; 1 mg (2 mg x 0.5) all other days   Weekly warfarin total:  10 mg   Plan last modified:  Kelton Brown, PharmD (9/3/2024)   Next INR check:  9/10/2024   Priority:  Maintenance   Target end date:  Indefinite    Indications    Stroke (HCC) (Resolved) [I63.9]  Atrial flutter (HCC) [I48.92]  Secondary hypercoagulable state (HCC) [D68.69]                 Anticoagulation Episode Summary       INR check location:  --    Preferred lab:  --    Send INR reminders to:  --    Comments:  22 - per [employer] Eliquis & Xarelto is $200.00 for 30 days.  [facility] [phone]          Anticoagulation Care Providers       Provider Role Specialty Phone number    Hussain Nation M.D. Referring Interventional Cardiology [phone]    [facility] Anticoagulation Services Responsible  [phone]           Refer to Patient Findings for HPI:  Patient Findings       Negatives:  Signs/symptoms of thrombosis, Signs/symptoms of bleeding, Laboratory test error suspected, Change in health, Change in alcohol use, Change in activity, Upcoming invasive procedure, Emergency department visit, Upcoming dental procedure, Missed doses, Extra doses, Change in medications, Change in diet/appetite, Hospital admission, Bruising, Other complaints            There were no vitals filed for this visit.  Pt declined vitals    Verified current warfarin dosing schedule.    Medications reconciled.  Pt is not on antiplatelet therapy.    A/P   INR is subtherapeutic  Reason(s) for out of range INR today: No obvious causes      Warfarin dosing recommendation: Bolus ONCE with 2mg and then begin increased weekly regimen of 2mg on MWF and 1mg AOD.    Second AVS printed for pt to provide to [facility] for dosing.     Pt educated to contact our clinic with any changes  in medications or s/s of bleeding or thrombosis. Pt is aware to seek immediate medical attention for falls, head injury or deep cuts.    Request pt to return in 1 week Per patient preference as she does not want to come in any sooner. Pt agrees.    Kelton Brown, PharmD    CC:  Graham Jackson, JuanD

## 2024-09-04 PROBLEM — M51.369 LUMBAR DEGENERATIVE DISC DISEASE: Status: ACTIVE | Noted: 2022-06-29

## 2024-09-04 PROBLEM — R09.81 CHRONIC NASAL CONGESTION: Status: ACTIVE | Noted: 2023-09-18

## 2024-09-10 ENCOUNTER — ANTICOAGULATION VISIT (OUTPATIENT)
Dept: VASCULAR LAB | Facility: MEDICAL CENTER | Age: 89
End: 2024-09-10
Attending: INTERNAL MEDICINE
Payer: MEDICARE

## 2024-09-10 DIAGNOSIS — I48.92 ATRIAL FLUTTER, UNSPECIFIED TYPE (HCC): ICD-10-CM

## 2024-09-10 DIAGNOSIS — D68.69 SECONDARY HYPERCOAGULABLE STATE (HCC): ICD-10-CM

## 2024-09-10 LAB — INR PPP: 2.3 (ref 2–3.5)

## 2024-09-10 PROCEDURE — 85610 PROTHROMBIN TIME: CPT

## 2024-09-10 PROCEDURE — 99211 OFF/OP EST MAY X REQ PHY/QHP: CPT

## 2024-09-10 NOTE — PROGRESS NOTES
Anticoagulation Summary  As of 9/10/2024      INR goal:  2.0-3.0   TTR:  61.1% (5.8 y)   INR used for dosin.30 (9/10/2024)   Warfarin maintenance plan:  2 mg (2 mg x 1) every Mon, Wed, Fri; 1 mg (2 mg x 0.5) all other days   Weekly warfarin total:  10 mg   Plan last modified:  Kelton Brown, PharmD (9/3/2024)   Next INR check:  10/1/2024   Priority:  Maintenance   Target end date:  Indefinite    Indications    Stroke (HCC) (Resolved) [I63.9]  Atrial flutter (HCC) [I48.92]  Secondary hypercoagulable state (HCC) [D68.69]                 Anticoagulation Episode Summary       INR check location:  --    Preferred lab:  --    Send INR reminders to:  --    Comments:  22 - per [employer] Eliquis & Xarelto is $200.00 for 30 days.  [facility] [phone]          Anticoagulation Care Providers       Provider Role Specialty Phone number    Hussain Nation M.D. Referring Interventional Cardiology [phone]    [facility] Anticoagulation Services Responsible  [phone]                  Refer to Patient Findings for HPI:  Patient Findings       Negatives:  Signs/symptoms of thrombosis, Signs/symptoms of bleeding, Laboratory test error suspected, Change in health, Change in alcohol use, Change in activity, Upcoming invasive procedure, Emergency department visit, Upcoming dental procedure, Missed doses, Extra doses, Change in medications, Change in diet/appetite, Hospital admission, Bruising, Other complaints            There were no vitals filed for this visit.  Pt declined vitals    Verified current warfarin dosing schedule.    Medications reconciled.  Pt is not on antiplatelet therapy.      A/P   INR is therapeutic      Warfarin dosing recommendation: Continue regimen as listed above.    Pt educated to contact our clinic with any changes in medications or s/s of bleeding or thrombosis. Pt is aware to seek immediate medical attention for falls, head injury or deep cuts.    Request pt to return in 3 week(s). Pt  agrees.    Whit Angeles, PharmD

## 2024-10-01 ENCOUNTER — ANTICOAGULATION VISIT (OUTPATIENT)
Dept: VASCULAR LAB | Facility: MEDICAL CENTER | Age: 89
End: 2024-10-01
Attending: INTERNAL MEDICINE
Payer: MEDICARE

## 2024-10-01 VITALS — DIASTOLIC BLOOD PRESSURE: 71 MMHG | SYSTOLIC BLOOD PRESSURE: 126 MMHG | HEART RATE: 76 BPM

## 2024-10-01 DIAGNOSIS — D68.69 SECONDARY HYPERCOAGULABLE STATE (HCC): ICD-10-CM

## 2024-10-01 LAB — INR PPP: 2.7 (ref 2–3.5)

## 2024-10-01 PROCEDURE — 99211 OFF/OP EST MAY X REQ PHY/QHP: CPT

## 2024-10-01 PROCEDURE — 85610 PROTHROMBIN TIME: CPT

## 2024-10-29 ENCOUNTER — ANTICOAGULATION VISIT (OUTPATIENT)
Dept: VASCULAR LAB | Facility: MEDICAL CENTER | Age: 89
End: 2024-10-29
Attending: INTERNAL MEDICINE
Payer: MEDICARE

## 2024-10-29 VITALS — SYSTOLIC BLOOD PRESSURE: 121 MMHG | HEART RATE: 78 BPM | DIASTOLIC BLOOD PRESSURE: 72 MMHG

## 2024-10-29 DIAGNOSIS — I48.92 ATRIAL FLUTTER, UNSPECIFIED TYPE (HCC): ICD-10-CM

## 2024-10-29 DIAGNOSIS — D68.69 SECONDARY HYPERCOAGULABLE STATE (HCC): ICD-10-CM

## 2024-10-29 LAB — INR PPP: 3 (ref 2–3.5)

## 2024-10-29 PROCEDURE — 99211 OFF/OP EST MAY X REQ PHY/QHP: CPT

## 2024-10-29 PROCEDURE — 85610 PROTHROMBIN TIME: CPT

## 2024-11-05 ENCOUNTER — OFFICE VISIT (OUTPATIENT)
Dept: CARDIOLOGY | Facility: MEDICAL CENTER | Age: 89
End: 2024-11-05
Attending: INTERNAL MEDICINE
Payer: MEDICARE

## 2024-11-05 VITALS
BODY MASS INDEX: 14.44 KG/M2 | OXYGEN SATURATION: 100 % | WEIGHT: 92 LBS | HEIGHT: 67 IN | SYSTOLIC BLOOD PRESSURE: 118 MMHG | RESPIRATION RATE: 16 BRPM | HEART RATE: 78 BPM | DIASTOLIC BLOOD PRESSURE: 54 MMHG

## 2024-11-05 DIAGNOSIS — R68.89 THROAT AND MOUTH SYMPTOM: ICD-10-CM

## 2024-11-05 DIAGNOSIS — N18.32 STAGE 3B CHRONIC KIDNEY DISEASE: ICD-10-CM

## 2024-11-05 DIAGNOSIS — I69.359 HEMIPLEGIA AS LATE EFFECT OF CEREBROVASCULAR ACCIDENT (CVA) (HCC): ICD-10-CM

## 2024-11-05 DIAGNOSIS — I48.0 PAROXYSMAL ATRIAL FIBRILLATION (HCC): ICD-10-CM

## 2024-11-05 DIAGNOSIS — Z79.01 ON CONTINUOUS ORAL ANTICOAGULATION: ICD-10-CM

## 2024-11-05 DIAGNOSIS — I10 ESSENTIAL HYPERTENSION: ICD-10-CM

## 2024-11-05 PROCEDURE — 3078F DIAST BP <80 MM HG: CPT | Performed by: INTERNAL MEDICINE

## 2024-11-05 PROCEDURE — 99214 OFFICE O/P EST MOD 30 MIN: CPT | Performed by: INTERNAL MEDICINE

## 2024-11-05 PROCEDURE — 3074F SYST BP LT 130 MM HG: CPT | Performed by: INTERNAL MEDICINE

## 2024-11-05 PROCEDURE — 99213 OFFICE O/P EST LOW 20 MIN: CPT | Performed by: INTERNAL MEDICINE

## 2024-11-05 ASSESSMENT — FIBROSIS 4 INDEX: FIB4 SCORE: 3.58

## 2024-11-05 NOTE — PROGRESS NOTES
Chief Complaint   Patient presents with    Palpitations     Follow up       Subjective:   Az Jolly is a 89 y.o. female who presents today for follow-up of atrial flutter/fibrillation PSVT stroke and anticoagulation as well as severe tricuspid regurgitation and right ventricular failure.  .  Doing very well from a cardiovascular standpoint with no symptomatic atrial fibrillation tolerating anticoag patient well and NYHA class I.  He has been the majority of the visit today discussing her issues surrounding swallowing and phlegm production as well as a hoarse voice and a feeling of fullness in the back of her throat.  Indicates she has discussed this with her primary provider and geriatrician on several occasions and has been told not to worry about it.    Past Medical History:   Diagnosis Date    A fib 9/22/06    Abdominal bruit 9/25/2012    Abdominal pain 3/20/2019    Abnormal chest x-ray 9/19/2017    Improved but persistent bilateral airspace opacities 9/11/17    Achalasia 8/3/2011    Acute cystitis with hematuria 12/6/2021    DIAZ (acute kidney injury) ([facility]) 12/20/2018    Allergic rhinitis 9/21/2012    Arterial ischemic stroke, MCA (middle cerebral artery), left, acute ([facility]) 8/3/2011    Aseptic necrosis of bone, 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    Bronchiectasis without complication ([facility]) 9/19/2017    Chronic fatigue 9/19/2017    Closed right hip fracture ([facility]) 6/19/2019    DVT 6/1996    left leg, vein ligation performed on saphenous vein    First degree atrioventricular block 9/21/2012    Glomerulonephritis, chronic in other disease 8/3/2011    H/O echocardiogram 9/21/2012    Hematuria 3/20/2019    Hypothyroid 10/2002    Kidney disorder 1998    left kidney biopsy--glomerulonephritis and wegners dx    Kidney failure     stage II    Leukocytosis 7/21/2020    Mitral valve prolapse     Palpitations 9/21/2012    Rheumatic fever 9/21/2012    Rheumatic fever 9/21/2012    Childhood     Sepsis ([facility]) 12/20/2018     Severe protein-calorie malnutrition (Mahmood: less than 60% of standard weight) (HCC) 2018    Stroke (HCC) 2012    Supratherapeutic INR 2018    SVT (SUPRAVENTRICULAR TACHYCARDIA), h/o paroxysmal 8/3/2011    Wegener's granulomatosis     Wegener's granulomatosis 8/3/2011     O Spring 2021     Past Surgical History:   Procedure Laterality Date    PB PARTIAL HIP REPLACEMENT Right 2019    Procedure: HEMIARTHROPLASTY, HIP;  Surgeon: Raul Saldivar M.D.;  Location: SURGERY [facility];  Service: Orthopedics    CATARACT EXTRACTION      left eye    CHOLECYSTECTOMY      TEMPORAL ARTERY BIOPSY      normal    LUNG NEEDLE BIOPSY      right lung, nodules found    ARTHROSCOPE      left knee    ARTHROSCOPY, KNEE  1986    right    BUNIONECTOMY      bilat    HYSTERECTOMY RADICAL  1975    VEIN LIGATION       Family History   Problem Relation Age of Onset    Other Father         Aortic aneurysm    Cancer Brother         Lung    Non-contributory Other      Social History     Socioeconomic History    Marital status:      Spouse name: Not on file    Number of children: Not on file    Years of education: Not on file    Highest education level: Not on file   Occupational History    Not on file   Tobacco Use    Smoking status: Former     Current packs/day: 0.00     Types: Cigarettes     Quit date: 1960     Years since quittin.8    Smokeless tobacco: Never    Tobacco comments:     very little years and years ago   Vaping Use    Vaping status: Never Used   Substance and Sexual Activity    Alcohol use: Not Currently    Drug use: No    Sexual activity: Not on file   Other Topics Concern    Not on file   Social History Narrative    Not on file     Social Drivers of Health     Financial Resource Strain: Not on file   Food Insecurity: Not on file   Transportation Needs: Not on file   Physical Activity: Not on file   Stress: Not on file   Social Connections: Not on file   Intimate  Partner Violence: Not on file   Housing Stability: Not on file     Allergies   Allergen Reactions    Cephalexin [Keflex] Swelling    Hydroxyzine Swelling     Eyes get itchy and swollen     Naprelan [Naproxen] Swelling     Eyes get itchy become red and swollen    Oxaprozin Swelling     Swelling eyes, and itchy    Ampicillin Rash     Red Rash    Baclofen Unspecified     drowsiness    Citalopram Vomiting and Nausea    Clarithromycin Unspecified     Pt reports that this medication plugs her ears.     Diclofenac Rash and Swelling     Red rash & swelling      Erythromycin Diarrhea     GI upset    Penicillins Rash     Red rash      Promethazine Unspecified     Drowsiness and sleeps    Vi-Q-Tuss [Hydrocodone-Guaifenesin] Vomiting and Nausea    Nitrofurantoin Nausea     Outpatient Encounter Medications as of 11/5/2024   Medication Sig Dispense Refill    warfarin (COUMADIN) 2 MG Tab Take 2mg 1 tablet PO every Mon, Wed, Fri; and 2mg 1/2 tablet PO (1mg total dose) all other days. 21 Tablet 0    furosemide (LASIX) 40 MG Tab Take 1 Tablet by mouth every day. 30 Tablet 11    senna-docusate (PERICOLACE OR SENOKOT S) 8.6-50 MG Tab Take 2 Tablets by mouth 1 time a day as needed for Constipation. 30 Tablet 11    calcium carbonate (OS-RICCO 500) 500 MG Tab Take 500 mg by mouth every day. *Self-administer*      guaifenesin dextromethorphan sugar free (DIABETIC TUSSIN DM)  MG/5ML Liquid soln Take 10 mL by mouth every 8 hours as needed for Cough (or congestion).      loperamide (IMODIUM) 2 MG Cap Take 2 mg by mouth 1 time a day as needed for Diarrhea.      levothyroxine (SYNTHROID) 100 MCG Tab TAKE 1 TABLET BY MOUTH EVERY MORNING ON AN EMPTY STOMACH. 30 Tablet 11    folic acid (FOLVITE) 1 MG Tab TAKE 1 TABLET BY MOUTH EVERY MORNING. 30 Tablet 11    Acetaminophen Extra Strength 500 MG Tab TAKE 2 TABS BY MOUTH TWICE DAILY (Patient taking differently: Take 1,000 mg by mouth 2 times a day. May also take 2 tablets by mouth every day as  "needed for generalized pain, backache, or headache - not to exceed 3GM of APAP/24hrs) 120 Tablet 5    Multiple Vitamins-Minerals (CENTRUM MINIS ADULTS 50+) Tab Take 1 Tablet by mouth every day.      losartan (COZAAR) 50 MG Tab TAKE ONE TAB PO DAILY 30 Tablet 11    liothyronine (CYTOMEL) 5 MCG Tab TAKE 1 TABLET BY MOUTH EVERY DAY. WITH LEVOTHYROXINE FOR THYROID FUNCTION. 30 Tablet 11    tamsulosin (FLOMAX) 0.4 MG capsule TAKE 1 CAPSULE BY MOUTH EVERY MORNING. 30 Capsule 11    metoprolol tartrate (LOPRESSOR) 50 MG Tab TAKE 1 TABLET BY MOUTH 2 TIMES A DAY. 60 Tablet 11    D-Mannose 500 MG Cap Take 500 mg by mouth every 48 hours. Do not ship.  Family provides.      [DISCONTINUED] polyethylene glycol/lytes (MIRALAX) Pack Take 17 g by mouth 1 time a day as needed (constipation). Provided by family. (Patient not taking: Reported on 11/5/2024)      [DISCONTINUED] diphenhydrAMINE HCl (BANOPHEN PO) Take 25 mg by mouth at bedtime as needed (itching). (Patient not taking: Reported on 11/5/2024)      [DISCONTINUED] ondansetron (ZOFRAN ODT) 4 MG TABLET DISPERSIBLE Take 4 mg by mouth every 8 hours as needed for Nausea/Vomiting. (Patient not taking: Reported on 11/5/2024)      [DISCONTINUED] warfarin (COUMADIN) 2 MG Tab TAKE ONE-HALF TO ONE TABLET (1-2MG) BY MOUTH EACH DAY AS DIRECTED BY [facility] (Patient taking differently: Take 2 mg by mouth every day. Take 1 tablet (2mg) by mouth every Monday, Wednesday, and Friday. Take 0.5 tablet (1mg) by mouth every Sunday, Tuesday, Thursday, and Saturday. AS DIRECTED BY [facility]) 30 Tablet 11     No facility-administered encounter medications on file as of 11/5/2024.     Review of Systems   All other systems reviewed and are negative.       Objective:   /54 (BP Location: Left arm, Patient Position: Sitting, BP Cuff Size: Small adult)   Pulse 78   Resp 16   Ht 1.702 m (5' 7\")   Wt 41.7 kg (92 lb)   SpO2 100%   BMI 14.41 kg/m²     Physical Exam  Vitals reviewed.   Constitutional:       " General: She is not in acute distress.     Appearance: She is well-developed. She is not diaphoretic.      Comments: Elderly and cachectic  Walker     HENT:      Head: Normocephalic and atraumatic.      Right Ear: External ear normal.      Left Ear: External ear normal.      Mouth/Throat:      Pharynx: No oropharyngeal exudate.   Eyes:      General: No scleral icterus.        Right eye: No discharge.         Left eye: No discharge.      Conjunctiva/sclera: Conjunctivae normal.      Pupils: Pupils are equal, round, and reactive to light.   Neck:      Thyroid: No thyromegaly.      Vascular: No JVD.      Trachea: No tracheal deviation.   Cardiovascular:      Rate and Rhythm: Normal rate. Rhythm irregularly irregular.      Chest Wall: PMI is not displaced.      Pulses:           Carotid pulses are 2+ on the right side and 2+ on the left side.       Radial pulses are 2+ on the right side and 2+ on the left side.        Popliteal pulses are 2+ on the right side and 2+ on the left side.        Dorsalis pedis pulses are 2+ on the right side and 2+ on the left side.        Posterior tibial pulses are 2+ on the right side and 2+ on the left side.      Heart sounds: S1 normal and S2 normal. Murmur ( 2/6 systolic apical murmur) heard.      No friction rub. No gallop. No S3 or S4 sounds.   Pulmonary:      Effort: Pulmonary effort is normal. No respiratory distress.      Breath sounds: Normal breath sounds. No wheezing or rales.   Chest:      Chest wall: No tenderness.   Abdominal:      General: Bowel sounds are normal. There is no distension.      Palpations: Abdomen is soft.      Tenderness: There is no abdominal tenderness.   Musculoskeletal:         General: Swelling present. No tenderness. Normal range of motion.      Cervical back: Normal range of motion and neck supple.      Right lower leg: Edema present.      Left lower leg: Edema present.   Skin:     General: Skin is warm and dry.      Findings: No erythema or rash.  "  Neurological:      Mental Status: She is alert and oriented to person, place, and time.      Cranial Nerves: No cranial nerve deficit (Cranial nerves II through XII grossly intact).   Psychiatric:         Behavior: Behavior normal.         Thought Content: Thought content normal.         Judgment: Judgment normal.       LABS:  Lab Results   Component Value Date/Time    CHOLSTRLTOT 117 02/06/2023 12:54 AM    LDL 40 02/06/2023 12:54 AM    HDL 59 02/06/2023 12:54 AM    TRIGLYCERIDE 91 02/06/2023 12:54 AM       Lab Results   Component Value Date/Time    WBC 8.1 08/15/2024 10:45 AM    RBC 3.93 (L) 08/15/2024 10:45 AM    HEMOGLOBIN 13.3 08/15/2024 10:45 AM    HEMATOCRIT 40.3 08/15/2024 10:45 AM    .5 (H) 08/15/2024 10:45 AM    NEUTSPOLYS 52.60 08/15/2024 10:45 AM    LYMPHOCYTES 36.90 08/15/2024 10:45 AM    MONOCYTES 8.30 08/15/2024 10:45 AM    EOSINOPHILS 1.20 08/15/2024 10:45 AM    BASOPHILS 0.50 08/15/2024 10:45 AM    HYPOCHROMIA 1+ 06/22/2019 03:44 AM     Lab Results   Component Value Date/Time    SODIUM 132 (L) 08/15/2024 10:45 AM    POTASSIUM 5.0 08/15/2024 10:45 AM    CHLORIDE 96 08/15/2024 10:45 AM    CO2 23 08/15/2024 10:45 AM    GLUCOSE 157 (H) 08/15/2024 10:45 AM    BUN 48 (H) 08/15/2024 10:45 AM    CREATININE 1.83 (H) 08/15/2024 10:45 AM    CREATININE 1.5 (H) 02/19/2009 09:22 AM         Lab Results   Component Value Date/Time    ALKPHOSPHAT 156 (H) 08/15/2024 10:45 AM    ASTSGOT 40 08/15/2024 10:45 AM    ALTSGPT 35 08/15/2024 10:45 AM    TBILIRUBIN 0.5 08/15/2024 10:45 AM      Lab Results   Component Value Date/Time    BNPBTYPENAT 135 (H) 08/10/2011 03:40 AM      No results found for: \"TSH\"  Lab Results   Component Value Date/Time    PROTHROMBTM 18.8 (H) 08/15/2024 10:45 AM    INR 3.00 10/29/2024 12:00 AM      Imaging reviewed    Assessment:     1. Paroxysmal atrial fibrillation (HCC)        2. On continuous oral anticoagulation        3. Hemiplegia as late effect of cerebrovascular accident (CVA) " (HCC)        4. Essential hypertension        5. Stage 3b chronic kidney disease        6. Throat and mouth symptom  Referral to ENT          Medical Decision Making:  Today's Assessment / Status / Plan:     Tricuspid regurgitation is severe with moderate mitral regurgitation.  She is quite debilitated but able to ambulate independently and has no exertional symptoms.  She is euvolemic.  A more conservative course of care has been elected.  Therefore routine echocardiographic surveillance only as a response to symptoms.  A-fib and oral anticoagulation are well-managed.  As most of the visit was spent discussing her    Most of visit was spent discussing her throat and swallowing issues.  We discussed the etiology includes progressive microaspiration is residual from her prior stroke, the primary ear nose and throat issue gastroenterology issue or pulmonary issue.  She indicates she is having no success with her evaluation and management and feels as if she is being brushed off by her primary physician.  I suggested that she continue to advocate for evaluation with ENT speech-language pathology for swallow study etc.  I have put a referral into ENT although it is quite difficult to get anyone in to see them in a timely fashion and their offices in general are not responsive which she herself is found having no responsive to multiple calls over the course of weeks.  I did offer/suggest that she seek evaluation and management outside of the [place] area and that her primary care physician should be more than capable of making an outside referral.    From a cardiac standpoint she is stable and follow-up routinely.

## 2024-11-13 PROBLEM — R49.0 HOARSENESS OF VOICE: Status: ACTIVE | Noted: 2024-11-13

## 2024-11-26 ENCOUNTER — ANTICOAGULATION VISIT (OUTPATIENT)
Dept: VASCULAR LAB | Facility: MEDICAL CENTER | Age: 89
End: 2024-11-26
Attending: INTERNAL MEDICINE
Payer: MEDICARE

## 2024-11-26 DIAGNOSIS — I48.92 ATRIAL FLUTTER, UNSPECIFIED TYPE (HCC): ICD-10-CM

## 2024-11-26 DIAGNOSIS — D68.69 SECONDARY HYPERCOAGULABLE STATE (HCC): ICD-10-CM

## 2024-11-26 LAB — INR PPP: 2.6 (ref 2–3.5)

## 2024-11-26 PROCEDURE — 85610 PROTHROMBIN TIME: CPT

## 2024-11-26 PROCEDURE — 99211 OFF/OP EST MAY X REQ PHY/QHP: CPT

## 2024-11-26 NOTE — PROGRESS NOTES
Anticoagulation Summary  As of 2024      INR goal:  2.0-3.0   TTR:  62.5% (6 y)   INR used for dosin.60 (2024)   Warfarin maintenance plan:  2 mg (2 mg x 1) every Mon, Wed, Fri; 1 mg (2 mg x 0.5) all other days   Weekly warfarin total:  10 mg   Plan last modified:  Kelton Brown, PharmD (9/3/2024)   Next INR check:  2024   Priority:  Maintenance   Target end date:  Indefinite    Indications    Stroke (HCC) (Resolved) [I63.9]  Atrial flutter (HCC) [I48.92]  Secondary hypercoagulable state (HCC) [D68.69]                 Anticoagulation Episode Summary       INR check location:  --    Preferred lab:  --    Send INR reminders to:  --    Comments:  22 - per [facility] Eliquis & Xarelto is $200.00 for 30 days.  [facility] [phone]          Anticoagulation Care Providers       Provider Role Specialty Phone number    Hussain Nation M.D. Referring Interventional Cardiology [phone]    [facility] Responsible  [phone]                  Refer to Patient Findings for HPI:  Patient Findings       Negatives:  Signs/symptoms of thrombosis, Signs/symptoms of bleeding, Laboratory test error suspected, Change in health, Change in alcohol use, Change in activity, Upcoming invasive procedure, Emergency department visit, Upcoming dental procedure, Missed doses, Extra doses, Change in medications, Change in diet/appetite, Hospital admission, Bruising, Other complaints            There were no vitals filed for this visit.      Verified current warfarin dosing schedule.    Medications reconciled.  Pt is not on antiplatelet therapy.      A/P   INR is therapeutic  Reason(s) for out of range INR today: N/A      Warfarin dosing recommendation: Continue regimen as listed above.    Pt educated to contact our clinic with any changes in medications or s/s of bleeding or thrombosis. Pt is aware to seek immediate medical attention for falls, head injury or deep cuts.    Request pt to  return in 5 week(s). Pt agrees.    Felicity Chin, Pharmacy Intern   Rambo Calderon, PharmD

## 2024-12-18 PROBLEM — T14.8XXA ABRASION: Status: ACTIVE | Noted: 2024-12-18

## 2024-12-31 ENCOUNTER — ANTICOAGULATION VISIT (OUTPATIENT)
Dept: VASCULAR LAB | Facility: MEDICAL CENTER | Age: 89
End: 2024-12-31
Attending: INTERNAL MEDICINE
Payer: MEDICARE

## 2024-12-31 DIAGNOSIS — I48.92 ATRIAL FLUTTER, UNSPECIFIED TYPE (HCC): ICD-10-CM

## 2024-12-31 DIAGNOSIS — D68.69 SECONDARY HYPERCOAGULABLE STATE (HCC): ICD-10-CM

## 2024-12-31 LAB — INR PPP: 2.2 (ref 2–3.5)

## 2024-12-31 PROCEDURE — 99211 OFF/OP EST MAY X REQ PHY/QHP: CPT

## 2024-12-31 PROCEDURE — 85610 PROTHROMBIN TIME: CPT

## 2024-12-31 RX ORDER — LOSARTAN POTASSIUM 25 MG/1
25 TABLET ORAL DAILY
COMMUNITY
Start: 2024-12-18

## 2024-12-31 NOTE — PROGRESS NOTES
Anticoagulation Summary  As of 2024      INR goal:  2.0-3.0   TTR:  63.1% (6.1 y)   INR used for dosin.20 (2024)   Warfarin maintenance plan:  2 mg (2 mg x 1) every Mon, Wed, Fri; 1 mg (2 mg x 0.5) all other days   Weekly warfarin total:  10 mg   Plan last modified:  Kelton Brown, PharmD (9/3/2024)   Next INR check:  2025   Priority:  Maintenance   Target end date:  Indefinite    Indications    Stroke (HCC) (Resolved) [I63.9]  Atrial flutter (HCC) [I48.92]  Secondary hypercoagulable state (HCC) [D68.69]                 Anticoagulation Episode Summary       INR check location:  --    Preferred lab:  --    Send INR reminders to:  --    Comments:  22 - per [employer] Eli[facility] & Xarelto is $200.00 for 30 days.  [facility] [phone]          Anticoagulation Care Providers       Provider Role Specialty Phone number    Hussain Nation M.D. Referring Interventional Cardiology [phone]    [facility] Anticoagulation Services Responsible  [phone]                  [id number] Stroke Risk Points: 7   Values used to calculate this score:    Points  Metrics       0        Has Congestive Heart Failure: No       1        Has Hypertension: Yes       2        Age: 89       0        Has Diabetes: No       2        Had Stroke: Yes                 Had TIA: No                 Had Thromboembolism: Yes       1        Has Vascular Disease: Yes       1        Clinically Relevant Sex: Female     No data recorded     Lab Results   Component Value Date/Time    CHOLSTRLTOT 117 2023 12:54 AM    LDL 40 2023 12:54 AM    HDL 59 2023 12:54 AM    TRIGLYCERIDE 91 2023 12:54 AM       Lab Results   Component Value Date/Time    SODIUM 132 (L) 08/15/2024 10:45 AM    POTASSIUM 5.0 08/15/2024 10:45 AM    CHLORIDE 96 08/15/2024 10:45 AM    CO2 23 08/15/2024 10:45 AM    GLUCOSE 157 (H) 08/15/2024 10:45 AM    BUN 48 (H) 08/15/2024 10:45 AM    CREATININE 1.83 (H) 08/15/2024 10:45 AM    CREATININE 1.5 (H)  02/19/2009 09:22 AM     Lab Results   Component Value Date/Time    ALKPHOSPHAT 156 (H) 08/15/2024 10:45 AM    ASTSGOT 40 08/15/2024 10:45 AM    ALTSGPT 35 08/15/2024 10:45 AM    TBILIRUBIN 0.5 08/15/2024 10:45 AM          Current Outpatient Medications:     losartan, 25 mg, DAILY, Taking    warfarin, TAKE 1 TABLET (2MG) BY MOUTH EVERY MON, WED, FRI $ 1/2 TABLET BY MOUTH (1MG TOTAL DOSE) ALL OTHER DAYS.    metoprolol tartrate, 50 mg, Oral, BID    tamsulosin, 0.4 mg, Oral, QAM    Acetaminophen Extra Strength, 2 Tablet, Oral, BID    furosemide, 40 mg, Oral, DAILY    senna-docusate, 2 Tablet, Oral, QDAY PRN    calcium carbonate, 500 mg, Oral, DAILY    loperamide, 2 mg, Oral, QDAY PRN    levothyroxine, 100 mcg, Oral, AM ES    folic acid, 1 mg, Oral, QAM    Centrum Minis Adults 50+, 1 Tablet, Oral, DAILY (Patient taking differently: 2 Tablet, Oral, DAILY)    liothyronine, 5 mcg, Oral, DAILY    D-Mannose, 500 mg, Oral, Q48HRS    Refer to Patient Findings for HPI:  Patient Findings       Negatives:  Signs/symptoms of thrombosis, Signs/symptoms of bleeding, Laboratory test error suspected, Change in health, Change in alcohol use, Change in activity, Upcoming invasive procedure, Emergency department visit, Upcoming dental procedure, Missed doses, Extra doses, Change in medications, Change in diet/appetite, Hospital admission, Bruising, Other complaints            There were no vitals filed for this visit.  Pt declined vitals    Verified current warfarin dosing schedule.    Medications reconciled: Yes  Pt is not on antiplatelet therapy.  Is patient on NSAID?: No       A/P   INR is therapeutic  Reason(s) for out of range INR today: N/A      Warfarin dosing recommendation: Continue regimen as listed above.     Pt educated to contact our clinic with any changes in medications or s/s of bleeding or thrombosis. Pt is aware to seek immediate medical attention for falls, head injury or deep cuts.    Request pt to return in 6 week(s).  Pt agrees.  Calendar sent to [facility] C[facility]vas PharmD

## 2025-01-17 ENCOUNTER — TELEPHONE (OUTPATIENT)
Dept: VASCULAR LAB | Facility: MEDICAL CENTER | Age: OVER 89
End: 2025-01-17
Payer: MEDICARE

## 2025-01-17 DIAGNOSIS — I48.92 ATRIAL FLUTTER, UNSPECIFIED TYPE (HCC): ICD-10-CM

## 2025-01-17 RX ORDER — WARFARIN SODIUM 2 MG/1
TABLET ORAL
Qty: 100 TABLET | Refills: 2 | Status: SHIPPED | OUTPATIENT
Start: 2025-01-17

## 2025-01-17 NOTE — TELEPHONE ENCOUNTER
Called and s/w pt to verify preferred pharmacy ([facility]) and inform patient that [facility] will be handling refills for warfarin moving forward. Also informed patient that [facility]    Called and s/w Ashley from [facility] to inform her that a new prescription has been authored and that patient was informed that we will be handling refills moving forward.     Kelton Brown PharmD

## 2025-01-17 NOTE — TELEPHONE ENCOUNTER
Coumadin/Pharmacotherapy/Vascular RX Refill Request    PT Name: Az Tripp    Medication: warfarin (COUMADIN) 2 MG Tab     Best Call Back Number: [phone]     Any special instructions: Ashley from [facility] called and asked for the anticoagulation clinic to take over filling this medication for the patient.     Please advise.     Thank you,     Cary RIVERA

## 2025-02-11 ENCOUNTER — ANTICOAGULATION VISIT (OUTPATIENT)
Dept: VASCULAR LAB | Facility: MEDICAL CENTER | Age: OVER 89
End: 2025-02-11
Attending: INTERNAL MEDICINE
Payer: MEDICARE

## 2025-02-11 DIAGNOSIS — I48.92 ATRIAL FLUTTER, UNSPECIFIED TYPE (HCC): ICD-10-CM

## 2025-02-11 DIAGNOSIS — D68.69 SECONDARY HYPERCOAGULABLE STATE (HCC): ICD-10-CM

## 2025-02-11 LAB — INR PPP: 2.4 (ref 2–3.5)

## 2025-02-11 PROCEDURE — 85610 PROTHROMBIN TIME: CPT

## 2025-02-11 PROCEDURE — 99211 OFF/OP EST MAY X REQ PHY/QHP: CPT | Performed by: PHARMACIST

## 2025-02-11 NOTE — PROGRESS NOTES
Anticoagulation Summary  As of 2025      INR goal:  2.0-3.0   TTR:  63.8% (6.2 y)   INR used for dosin.40 (2025)   Warfarin maintenance plan:  2 mg (2 mg x 1) every Mon, Wed, Fri; 1 mg (2 mg x 0.5) all other days   Weekly warfarin total:  10 mg   Plan last modified:  Kelton Brown, PharmD (9/3/2024)   Next INR check:  3/25/2025   Priority:  Maintenance   Target end date:  Indefinite    Indications    Stroke (HCC) (Resolved) [I63.9]  Atrial flutter (HCC) [I48.92]  Secondary hypercoagulable state (HCC) [D68.69]                 Anticoagulation Episode Summary       INR check location:  --    Preferred lab:  --    Send INR reminders to:  --    Comments:  22 - per [facility] Eli[facility] & Xarelto is $200.00 for 30 days.  [facility] [phone]          Anticoagulation Care Providers       Provider Role Specialty Phone number    Hussain Nation M.D. Referring Interventional Cardiology [phone]    [facility] Anticoagulation Services Responsible  [phone]                  Refer to Patient Findings for HPI:  Patient Findings       Negatives:  Signs/symptoms of thrombosis, Signs/symptoms of bleeding, Laboratory test error suspected, Change in health, Change in alcohol use, Change in activity, Upcoming invasive procedure, Emergency department visit, Upcoming dental procedure, Missed doses, Extra doses, Change in medications, Change in diet/appetite, Hospital admission, Bruising, Other complaints            Date Referral Placed: 2024      There were no vitals filed for this visit.  (Taken if pt amenable)    Verified current warfarin dosing schedule.    Medications reconciled: Yes  Pt is not on antiplatelet therapy      A/P   INR is therapeutic    Warfarin dosing recommendation: Instructed pt to continue on with current regimen.    Pt educated to contact our clinic with any changes in medications or s/s of bleeding or thrombosis. Pt is aware to seek immediate medical attention for falls, head injury or  deep cuts.    Request pt to return in 6 week(s) per pt.     Mark Desai, PharmD, BCACP

## 2025-02-25 PROBLEM — L65.9 HAIR THINNING: Status: ACTIVE | Noted: 2025-02-25

## 2025-03-25 ENCOUNTER — ANTICOAGULATION VISIT (OUTPATIENT)
Dept: VASCULAR LAB | Facility: MEDICAL CENTER | Age: OVER 89
End: 2025-03-25
Attending: INTERNAL MEDICINE
Payer: MEDICARE

## 2025-03-25 VITALS — HEART RATE: 77 BPM | SYSTOLIC BLOOD PRESSURE: 113 MMHG | DIASTOLIC BLOOD PRESSURE: 69 MMHG

## 2025-03-25 DIAGNOSIS — I48.92 ATRIAL FLUTTER, UNSPECIFIED TYPE (HCC): ICD-10-CM

## 2025-03-25 DIAGNOSIS — D68.69 SECONDARY HYPERCOAGULABLE STATE (HCC): ICD-10-CM

## 2025-03-25 LAB — INR PPP: 2.6 (ref 2–3.5)

## 2025-03-25 PROCEDURE — 99211 OFF/OP EST MAY X REQ PHY/QHP: CPT

## 2025-03-25 PROCEDURE — 85610 PROTHROMBIN TIME: CPT

## 2025-03-25 PROCEDURE — 99999 PR NO CHARGE: CPT | Performed by: INTERNAL MEDICINE

## 2025-03-25 NOTE — PROGRESS NOTES
Anticoagulation Summary  As of 3/25/2025      INR goal:  2.0-3.0   TTR:  64.4% (6.3 y)   INR used for dosin.60 (3/25/2025)   Warfarin maintenance plan:  2 mg (2 mg x 1) every Mon, Wed, Fri; 1 mg (2 mg x 0.5) all other days   Weekly warfarin total:  10 mg   Plan last modified:  Kelton Brown, PharmD (9/3/2024)   Next INR check:  2025   Priority:  Maintenance   Target end date:  Indefinite    Indications    Stroke (HCC) (Resolved) [I63.9]  Atrial flutter (HCC) [I48.92]  Secondary hypercoagulable state (HCC) [D68.69]                 Anticoagulation Episode Summary       INR check location:  --    Preferred lab:  --    Send INR reminders to:  --    Comments:  22 - per [employer] Eliquis & Xarelto is $200.00 for 30 days.  [facility] [phone]          Anticoagulation Care Providers       Provider Role Specialty Phone number    Hussain Nation M.D. Referring Interventional Cardiology [phone]    [facility] Anticoagulation Services Responsible  [phone]          Refer to Patient Findings for HPI:  Patient Findings       Negatives:  Signs/symptoms of thrombosis, Signs/symptoms of bleeding, Laboratory test error suspected, Change in health, Change in alcohol use, Change in activity, Upcoming invasive procedure, Emergency department visit, Upcoming dental procedure, Missed doses, Extra doses, Change in medications, Change in diet/appetite, Hospital admission, Bruising, Other complaints          Date Referral Placed: 24    Vitals:  Vitals:    25 1058   BP: 113/69   Pulse: 77       Verified current warfarin dosing schedule.    Medications reconciled.  Pt is not on antiplatelet therapy.      A/P   INR is therapeutic at 2.6  Reason(s) for out of range INR today: N/A      Warfarin dosing recommendation: Continue regimen as listed above.    Pt educated to contact our clinic with any changes in medications or s/s of bleeding or thrombosis. Pt is aware to seek immediate medical attention for falls,  head injury or deep cuts.    Request pt to return in 7 week(s). Pt agrees.    Pilar MartinezD

## 2025-04-11 NOTE — ANESTHESIA TIME REPORT
Anesthesia Start and Stop Event Times     Date Time Event    6/19/2019 1711 Anesthesia Start     1901 Anesthesia Stop        Responsible Staff  06/19/19    Name Role Begin End    Daniel Hawk M.D. Anesth 1711 1901        Preop Diagnosis (Free Text):  Pre-op Diagnosis     displaced femoral neck fracture        Preop Diagnosis (Codes):  Diagnosis Information     Diagnosis Code(s):         Post op Diagnosis  Closed displaced fracture of right femoral neck (HCC)      Premium Reason  A. 3PM - 7AM    Comments:                                                                       
Statement Selected

## 2025-04-12 ENCOUNTER — APPOINTMENT (OUTPATIENT)
Dept: RADIOLOGY | Facility: MEDICAL CENTER | Age: OVER 89
DRG: 871 | End: 2025-04-12
Attending: EMERGENCY MEDICINE
Payer: MEDICARE

## 2025-04-12 ENCOUNTER — HOSPITAL ENCOUNTER (INPATIENT)
Facility: MEDICAL CENTER | Age: OVER 89
LOS: 3 days | DRG: 871 | End: 2025-04-15
Attending: EMERGENCY MEDICINE | Admitting: INTERNAL MEDICINE
Payer: MEDICARE

## 2025-04-12 DIAGNOSIS — H61.23 BILATERAL HEARING LOSS DUE TO CERUMEN IMPACTION: ICD-10-CM

## 2025-04-12 DIAGNOSIS — Z66 DNR (DO NOT RESUSCITATE): ICD-10-CM

## 2025-04-12 DIAGNOSIS — R53.1 RIGHT SIDED WEAKNESS: ICD-10-CM

## 2025-04-12 DIAGNOSIS — Z87.440 HISTORY OF RECURRENT UTIS: ICD-10-CM

## 2025-04-12 DIAGNOSIS — Z78.9 LIVING IN ASSISTED LIVING: Chronic | ICD-10-CM

## 2025-04-12 DIAGNOSIS — J30.2 SEASONAL ALLERGIC RHINITIS, UNSPECIFIED TRIGGER: Chronic | ICD-10-CM

## 2025-04-12 DIAGNOSIS — R09.81 CHRONIC NASAL CONGESTION: ICD-10-CM

## 2025-04-12 DIAGNOSIS — R18.8 OTHER ASCITES: ICD-10-CM

## 2025-04-12 DIAGNOSIS — E87.1 HYPONATREMIA: ICD-10-CM

## 2025-04-12 DIAGNOSIS — F32.4 MAJOR DEPRESSION SINGLE EPISODE, IN PARTIAL REMISSION (HCC): ICD-10-CM

## 2025-04-12 DIAGNOSIS — Z91.81 RISK FOR FALLS: ICD-10-CM

## 2025-04-12 DIAGNOSIS — M46.1 SACROILIITIS (HCC): ICD-10-CM

## 2025-04-12 DIAGNOSIS — R33.9 RETENTION OF URINE: ICD-10-CM

## 2025-04-12 DIAGNOSIS — M31.30 GRANULOMATOSIS WITH POLYANGIITIS, UNSPECIFIED WHETHER RENAL INVOLVEMENT (HCC): ICD-10-CM

## 2025-04-12 DIAGNOSIS — Q64.79 STRUCTURAL ABNORMALITY OF BLADDER: ICD-10-CM

## 2025-04-12 DIAGNOSIS — I48.0 PAROXYSMAL ATRIAL FIBRILLATION (HCC): ICD-10-CM

## 2025-04-12 DIAGNOSIS — G89.29 CHRONIC MIDLINE LOW BACK PAIN WITHOUT SCIATICA: ICD-10-CM

## 2025-04-12 DIAGNOSIS — Z71.89 GRIEF COUNSELING: ICD-10-CM

## 2025-04-12 DIAGNOSIS — N18.9 HISTORY OF ANEMIA DUE TO CHRONIC KIDNEY DISEASE: ICD-10-CM

## 2025-04-12 DIAGNOSIS — N18.4 BENIGN HYPERTENSION WITH CKD (CHRONIC KIDNEY DISEASE) STAGE IV (HCC): ICD-10-CM

## 2025-04-12 DIAGNOSIS — K59.09 OTHER CONSTIPATION: ICD-10-CM

## 2025-04-12 DIAGNOSIS — R49.0 HOARSENESS OF VOICE: ICD-10-CM

## 2025-04-12 DIAGNOSIS — E87.70 GENERALIZED EDEMA DUE TO FLUID OVERLOAD: ICD-10-CM

## 2025-04-12 DIAGNOSIS — R05.1 ACUTE COUGH: ICD-10-CM

## 2025-04-12 DIAGNOSIS — R79.1 SUPRATHERAPEUTIC INR: ICD-10-CM

## 2025-04-12 DIAGNOSIS — R11.0 NAUSEA: ICD-10-CM

## 2025-04-12 DIAGNOSIS — R29.898 WEAKNESS OF BOTH LOWER EXTREMITIES: ICD-10-CM

## 2025-04-12 DIAGNOSIS — I27.20 PULMONARY HTN (HCC): ICD-10-CM

## 2025-04-12 DIAGNOSIS — R33.9 INCOMPLETE EMPTYING OF BLADDER: ICD-10-CM

## 2025-04-12 DIAGNOSIS — G63 POLYNEUROPATHY IN OTHER DISEASES CLASSIFIED ELSEWHERE (HCC): ICD-10-CM

## 2025-04-12 DIAGNOSIS — J90 CHRONIC BILATERAL PLEURAL EFFUSIONS: ICD-10-CM

## 2025-04-12 DIAGNOSIS — E87.5 HYPERKALEMIA: ICD-10-CM

## 2025-04-12 DIAGNOSIS — I48.92 ATRIAL FLUTTER, UNSPECIFIED TYPE (HCC): ICD-10-CM

## 2025-04-12 DIAGNOSIS — I12.9 BENIGN HYPERTENSION WITH CKD (CHRONIC KIDNEY DISEASE) STAGE IV (HCC): ICD-10-CM

## 2025-04-12 DIAGNOSIS — K21.9 GASTROESOPHAGEAL REFLUX DISEASE WITHOUT ESOPHAGITIS: ICD-10-CM

## 2025-04-12 DIAGNOSIS — Z86.2 HISTORY OF ANEMIA DUE TO CHRONIC KIDNEY DISEASE: ICD-10-CM

## 2025-04-12 DIAGNOSIS — L65.9 HAIR THINNING: ICD-10-CM

## 2025-04-12 DIAGNOSIS — I69.359 HEMIPLEGIA AS LATE EFFECT OF CEREBROVASCULAR ACCIDENT (CVA) (HCC): ICD-10-CM

## 2025-04-12 DIAGNOSIS — B37.2 CANDIDAL DERMATITIS: ICD-10-CM

## 2025-04-12 DIAGNOSIS — D68.69 SECONDARY HYPERCOAGULABLE STATE (HCC): ICD-10-CM

## 2025-04-12 DIAGNOSIS — D68.318 ACQUIRED CIRCULATING ANTICOAGULANTS (HCC): ICD-10-CM

## 2025-04-12 DIAGNOSIS — R68.2 DRY MOUTH: ICD-10-CM

## 2025-04-12 DIAGNOSIS — I73.9 PVD (PERIPHERAL VASCULAR DISEASE) (HCC): ICD-10-CM

## 2025-04-12 DIAGNOSIS — D75.89 MACROCYTOSIS: ICD-10-CM

## 2025-04-12 DIAGNOSIS — I50.812 CHRONIC RIGHT-SIDED HEART FAILURE (HCC): ICD-10-CM

## 2025-04-12 DIAGNOSIS — R52 PAIN: ICD-10-CM

## 2025-04-12 DIAGNOSIS — E03.9 HYPOTHYROIDISM, UNSPECIFIED TYPE: ICD-10-CM

## 2025-04-12 DIAGNOSIS — R54 FRAILTY: ICD-10-CM

## 2025-04-12 DIAGNOSIS — R41.3 MEMORY LOSS: ICD-10-CM

## 2025-04-12 DIAGNOSIS — J43.1 PANLOBULAR EMPHYSEMA (HCC): ICD-10-CM

## 2025-04-12 DIAGNOSIS — A41.9 SEPSIS WITH ACUTE HYPOXIC RESPIRATORY FAILURE WITHOUT SEPTIC SHOCK, DUE TO UNSPECIFIED ORGANISM (HCC): ICD-10-CM

## 2025-04-12 DIAGNOSIS — I70.0 ATHEROSCLEROSIS OF AORTA (HCC): ICD-10-CM

## 2025-04-12 DIAGNOSIS — T14.8XXA ABRASION: ICD-10-CM

## 2025-04-12 DIAGNOSIS — K30 INDIGESTION: ICD-10-CM

## 2025-04-12 DIAGNOSIS — R35.1 NOCTURIA: ICD-10-CM

## 2025-04-12 DIAGNOSIS — J18.9 PNEUMONIA OF LEFT LOWER LOBE DUE TO INFECTIOUS ORGANISM: ICD-10-CM

## 2025-04-12 DIAGNOSIS — J96.01 SEPSIS WITH ACUTE HYPOXIC RESPIRATORY FAILURE WITHOUT SEPTIC SHOCK, DUE TO UNSPECIFIED ORGANISM (HCC): ICD-10-CM

## 2025-04-12 DIAGNOSIS — N28.9 RENAL INSUFFICIENCY: ICD-10-CM

## 2025-04-12 DIAGNOSIS — I07.1 TRICUSPID VALVE INSUFFICIENCY, UNSPECIFIED ETIOLOGY: ICD-10-CM

## 2025-04-12 DIAGNOSIS — M54.50 CHRONIC MIDLINE LOW BACK PAIN WITHOUT SCIATICA: ICD-10-CM

## 2025-04-12 DIAGNOSIS — N18.4 CKD (CHRONIC KIDNEY DISEASE) STAGE 4, GFR 15-29 ML/MIN (HCC): ICD-10-CM

## 2025-04-12 DIAGNOSIS — R22.42 LOCALIZED SWELLING OF LEFT LOWER EXTREMITY: ICD-10-CM

## 2025-04-12 DIAGNOSIS — N39.42 URINARY INCONTINENCE WITHOUT SENSORY AWARENESS: ICD-10-CM

## 2025-04-12 DIAGNOSIS — F55.2 DIARRHEA DUE TO LAXATIVE ABUSE: ICD-10-CM

## 2025-04-12 DIAGNOSIS — J15.9 COMMUNITY ACQUIRED BACTERIAL PNEUMONIA: ICD-10-CM

## 2025-04-12 DIAGNOSIS — I69.391 DYSPHAGIA AS LATE EFFECT OF CEREBROVASCULAR ACCIDENT (CVA): ICD-10-CM

## 2025-04-12 DIAGNOSIS — S32.040D COMPRESSION FRACTURE OF L4 VERTEBRA WITH ROUTINE HEALING, SUBSEQUENT ENCOUNTER: ICD-10-CM

## 2025-04-12 DIAGNOSIS — K59.04 CHRONIC IDIOPATHIC CONSTIPATION: ICD-10-CM

## 2025-04-12 DIAGNOSIS — R07.89 RIGHT-SIDED CHEST WALL PAIN: ICD-10-CM

## 2025-04-12 DIAGNOSIS — K58.0 IRRITABLE BOWEL SYNDROME WITH DIARRHEA: ICD-10-CM

## 2025-04-12 DIAGNOSIS — E44.0 PROTEIN-CALORIE MALNUTRITION, MODERATE (HCC): ICD-10-CM

## 2025-04-12 DIAGNOSIS — T78.40XA ALLERGIC REACTION TO DRUG, INITIAL ENCOUNTER: ICD-10-CM

## 2025-04-12 DIAGNOSIS — R64 CACHEXIA (HCC): ICD-10-CM

## 2025-04-12 DIAGNOSIS — R05.3 CHRONIC COUGH: ICD-10-CM

## 2025-04-12 DIAGNOSIS — D70.8 OTHER NEUTROPENIA (HCC): ICD-10-CM

## 2025-04-12 DIAGNOSIS — F45.20 HYPOCHONDRIACAL DISORDER: ICD-10-CM

## 2025-04-12 DIAGNOSIS — R65.20 SEPSIS WITH ACUTE HYPOXIC RESPIRATORY FAILURE WITHOUT SEPTIC SHOCK, DUE TO UNSPECIFIED ORGANISM (HCC): ICD-10-CM

## 2025-04-12 DIAGNOSIS — R25.2 MUSCLE CRAMPS: ICD-10-CM

## 2025-04-12 LAB
ALBUMIN SERPL BCP-MCNC: 3 G/DL (ref 3.2–4.9)
ALBUMIN/GLOB SERPL: 0.6 G/DL
ALP SERPL-CCNC: 144 U/L (ref 30–99)
ALT SERPL-CCNC: 9 U/L (ref 2–50)
ANION GAP SERPL CALC-SCNC: 14 MMOL/L (ref 7–16)
APPEARANCE UR: ABNORMAL
AST SERPL-CCNC: 14 U/L (ref 12–45)
B PARAP IS1001 DNA NPH QL NAA+NON-PROBE: NOT DETECTED
B PERT.PT PRMT NPH QL NAA+NON-PROBE: NOT DETECTED
BACTERIA #/AREA URNS HPF: ABNORMAL /HPF
BASOPHILS # BLD AUTO: 0.1 % (ref 0–1.8)
BASOPHILS # BLD: 0.01 K/UL (ref 0–0.12)
BILIRUB SERPL-MCNC: 0.7 MG/DL (ref 0.1–1.5)
BILIRUB UR QL STRIP.AUTO: NEGATIVE
BUN SERPL-MCNC: 41 MG/DL (ref 8–22)
C PNEUM DNA NPH QL NAA+NON-PROBE: NOT DETECTED
CALCIUM ALBUM COR SERPL-MCNC: 10.4 MG/DL (ref 8.5–10.5)
CALCIUM SERPL-MCNC: 9.6 MG/DL (ref 8.5–10.5)
CASTS URNS QL MICRO: ABNORMAL /LPF (ref 0–2)
CHLORIDE SERPL-SCNC: 97 MMOL/L (ref 96–112)
CO2 SERPL-SCNC: 22 MMOL/L (ref 20–33)
COLOR UR: YELLOW
CREAT SERPL-MCNC: 1.74 MG/DL (ref 0.5–1.4)
EKG IMPRESSION: NORMAL
EOSINOPHIL # BLD AUTO: 0.03 K/UL (ref 0–0.51)
EOSINOPHIL NFR BLD: 0.2 % (ref 0–6.9)
EPITHELIAL CELLS 1715: ABNORMAL /HPF (ref 0–5)
ERYTHROCYTE [DISTWIDTH] IN BLOOD BY AUTOMATED COUNT: 46.3 FL (ref 35.9–50)
FLUAV RNA NPH QL NAA+NON-PROBE: NOT DETECTED
FLUAV RNA SPEC QL NAA+PROBE: NEGATIVE
FLUBV RNA NPH QL NAA+NON-PROBE: NOT DETECTED
FLUBV RNA SPEC QL NAA+PROBE: NEGATIVE
GFR SERPLBLD CREATININE-BSD FMLA CKD-EPI: 27 ML/MIN/1.73 M 2
GLOBULIN SER CALC-MCNC: 5.2 G/DL (ref 1.9–3.5)
GLUCOSE SERPL-MCNC: 127 MG/DL (ref 65–99)
GLUCOSE UR STRIP.AUTO-MCNC: NEGATIVE MG/DL
GRAM STN SPEC: NORMAL
HADV DNA NPH QL NAA+NON-PROBE: NOT DETECTED
HCOV 229E RNA NPH QL NAA+NON-PROBE: NOT DETECTED
HCOV HKU1 RNA NPH QL NAA+NON-PROBE: NOT DETECTED
HCOV NL63 RNA NPH QL NAA+NON-PROBE: NOT DETECTED
HCOV OC43 RNA NPH QL NAA+NON-PROBE: NOT DETECTED
HCT VFR BLD AUTO: 38.1 % (ref 37–47)
HGB BLD-MCNC: 12.3 G/DL (ref 12–16)
HMPV RNA NPH QL NAA+NON-PROBE: NOT DETECTED
HPIV1 RNA NPH QL NAA+NON-PROBE: NOT DETECTED
HPIV2 RNA NPH QL NAA+NON-PROBE: NOT DETECTED
HPIV3 RNA NPH QL NAA+NON-PROBE: NOT DETECTED
HPIV4 RNA NPH QL NAA+NON-PROBE: NOT DETECTED
HYALINE CAST   1831: PRESENT /LPF
IMM GRANULOCYTES # BLD AUTO: 0.09 K/UL (ref 0–0.11)
IMM GRANULOCYTES NFR BLD AUTO: 0.7 % (ref 0–0.9)
INR PPP: 6.36 (ref 0.87–1.13)
KETONES UR STRIP.AUTO-MCNC: ABNORMAL MG/DL
LACTATE SERPL-SCNC: 1.6 MMOL/L (ref 0.5–2)
LEUKOCYTE ESTERASE UR QL STRIP.AUTO: ABNORMAL
LYMPHOCYTES # BLD AUTO: 1.45 K/UL (ref 1–4.8)
LYMPHOCYTES NFR BLD: 10.6 % (ref 22–41)
M PNEUMO DNA NPH QL NAA+NON-PROBE: NOT DETECTED
MAGNESIUM SERPL-MCNC: 2.2 MG/DL (ref 1.5–2.5)
MCH RBC QN AUTO: 33.3 PG (ref 27–33)
MCHC RBC AUTO-ENTMCNC: 32.3 G/DL (ref 32.2–35.5)
MCV RBC AUTO: 103.3 FL (ref 81.4–97.8)
MICRO URNS: ABNORMAL
MONOCYTES # BLD AUTO: 0.95 K/UL (ref 0–0.85)
MONOCYTES NFR BLD AUTO: 6.9 % (ref 0–13.4)
NEUTROPHILS # BLD AUTO: 11.19 K/UL (ref 1.82–7.42)
NEUTROPHILS NFR BLD: 81.5 % (ref 44–72)
NITRITE UR QL STRIP.AUTO: NEGATIVE
NRBC # BLD AUTO: 0 K/UL
NRBC BLD-RTO: 0 /100 WBC (ref 0–0.2)
PH UR STRIP.AUTO: 5 [PH] (ref 5–8)
PLATELET # BLD AUTO: 390 K/UL (ref 164–446)
PMV BLD AUTO: 9.6 FL (ref 9–12.9)
POTASSIUM SERPL-SCNC: 5.3 MMOL/L (ref 3.6–5.5)
PROCALCITONIN SERPL-MCNC: 0.44 NG/ML
PROT SERPL-MCNC: 8.2 G/DL (ref 6–8.2)
PROT UR QL STRIP: 30 MG/DL
PROTHROMBIN TIME: 57.9 SEC (ref 12–14.6)
RBC # BLD AUTO: 3.69 M/UL (ref 4.2–5.4)
RBC # URNS HPF: ABNORMAL /HPF
RBC UR QL AUTO: ABNORMAL
RSV RNA NPH QL NAA+NON-PROBE: NOT DETECTED
RSV RNA SPEC QL NAA+PROBE: NEGATIVE
RV+EV RNA NPH QL NAA+NON-PROBE: NOT DETECTED
SARS-COV-2 RNA NPH QL NAA+NON-PROBE: NOTDETECTED
SARS-COV-2 RNA RESP QL NAA+PROBE: NOTDETECTED
SIGNIFICANT IND 70042: NORMAL
SITE SITE: NORMAL
SODIUM SERPL-SCNC: 133 MMOL/L (ref 135–145)
SOURCE SOURCE: NORMAL
SP GR UR STRIP.AUTO: 1.02
SPECIMEN SOURCE: NORMAL
UROBILINOGEN UR STRIP.AUTO-MCNC: 0.2 EU/DL
WBC # BLD AUTO: 13.7 K/UL (ref 4.8–10.8)
WBC #/AREA URNS HPF: >100 /HPF

## 2025-04-12 PROCEDURE — 85025 COMPLETE CBC W/AUTO DIFF WBC: CPT

## 2025-04-12 PROCEDURE — 99497 ADVNCD CARE PLAN 30 MIN: CPT | Performed by: INTERNAL MEDICINE

## 2025-04-12 PROCEDURE — A9270 NON-COVERED ITEM OR SERVICE: HCPCS | Performed by: HOSPITALIST

## 2025-04-12 PROCEDURE — 87070 CULTURE OTHR SPECIMN AEROBIC: CPT

## 2025-04-12 PROCEDURE — 87040 BLOOD CULTURE FOR BACTERIA: CPT | Mod: 91

## 2025-04-12 PROCEDURE — 700102 HCHG RX REV CODE 250 W/ 637 OVERRIDE(OP): Performed by: INTERNAL MEDICINE

## 2025-04-12 PROCEDURE — 96374 THER/PROPH/DIAG INJ IV PUSH: CPT

## 2025-04-12 PROCEDURE — 36415 COLL VENOUS BLD VENIPUNCTURE: CPT

## 2025-04-12 PROCEDURE — A9270 NON-COVERED ITEM OR SERVICE: HCPCS | Performed by: EMERGENCY MEDICINE

## 2025-04-12 PROCEDURE — 71045 X-RAY EXAM CHEST 1 VIEW: CPT

## 2025-04-12 PROCEDURE — 99285 EMERGENCY DEPT VISIT HI MDM: CPT

## 2025-04-12 PROCEDURE — 700105 HCHG RX REV CODE 258: Performed by: EMERGENCY MEDICINE

## 2025-04-12 PROCEDURE — 99223 1ST HOSP IP/OBS HIGH 75: CPT | Mod: 25,AI | Performed by: INTERNAL MEDICINE

## 2025-04-12 PROCEDURE — A9270 NON-COVERED ITEM OR SERVICE: HCPCS | Performed by: INTERNAL MEDICINE

## 2025-04-12 PROCEDURE — 85610 PROTHROMBIN TIME: CPT

## 2025-04-12 PROCEDURE — 83605 ASSAY OF LACTIC ACID: CPT

## 2025-04-12 PROCEDURE — 0241U HCHG SARS-COV-2 COVID-19 NFCT DS RESP RNA 4 TRGT MIC: CPT

## 2025-04-12 PROCEDURE — 0202U NFCT DS 22 TRGT SARS-COV-2: CPT

## 2025-04-12 PROCEDURE — 700111 HCHG RX REV CODE 636 W/ 250 OVERRIDE (IP): Mod: JZ | Performed by: EMERGENCY MEDICINE

## 2025-04-12 PROCEDURE — 84145 PROCALCITONIN (PCT): CPT

## 2025-04-12 PROCEDURE — 700102 HCHG RX REV CODE 250 W/ 637 OVERRIDE(OP): Performed by: HOSPITALIST

## 2025-04-12 PROCEDURE — 700102 HCHG RX REV CODE 250 W/ 637 OVERRIDE(OP): Performed by: EMERGENCY MEDICINE

## 2025-04-12 PROCEDURE — 94760 N-INVAS EAR/PLS OXIMETRY 1: CPT

## 2025-04-12 PROCEDURE — 81001 URINALYSIS AUTO W/SCOPE: CPT

## 2025-04-12 PROCEDURE — 80053 COMPREHEN METABOLIC PANEL: CPT

## 2025-04-12 PROCEDURE — 83735 ASSAY OF MAGNESIUM: CPT

## 2025-04-12 PROCEDURE — 87205 SMEAR GRAM STAIN: CPT

## 2025-04-12 PROCEDURE — 770020 HCHG ROOM/CARE - TELE (206)

## 2025-04-12 PROCEDURE — 93005 ELECTROCARDIOGRAM TRACING: CPT | Mod: TC | Performed by: INTERNAL MEDICINE

## 2025-04-12 RX ORDER — DOXYCYCLINE 100 MG/1
100 TABLET ORAL EVERY 12 HOURS
Status: DISCONTINUED | OUTPATIENT
Start: 2025-04-12 | End: 2025-04-15 | Stop reason: HOSPADM

## 2025-04-12 RX ORDER — LEVOTHYROXINE SODIUM 100 UG/1
100 TABLET ORAL
Status: DISCONTINUED | OUTPATIENT
Start: 2025-04-13 | End: 2025-04-15 | Stop reason: HOSPADM

## 2025-04-12 RX ORDER — DOXYCYCLINE HYCLATE 100 MG
100 TABLET ORAL 2 TIMES DAILY
COMMUNITY
Start: 2025-04-11

## 2025-04-12 RX ORDER — AZITHROMYCIN 250 MG/1
500 TABLET, FILM COATED ORAL ONCE
Status: COMPLETED | OUTPATIENT
Start: 2025-04-12 | End: 2025-04-12

## 2025-04-12 RX ORDER — CEFTRIAXONE 2 G/1
2000 INJECTION, POWDER, FOR SOLUTION INTRAMUSCULAR; INTRAVENOUS ONCE
Status: COMPLETED | OUTPATIENT
Start: 2025-04-12 | End: 2025-04-12

## 2025-04-12 RX ORDER — SODIUM CHLORIDE, SODIUM LACTATE, POTASSIUM CHLORIDE, AND CALCIUM CHLORIDE .6; .31; .03; .02 G/100ML; G/100ML; G/100ML; G/100ML
30 INJECTION, SOLUTION INTRAVENOUS ONCE
Status: COMPLETED | OUTPATIENT
Start: 2025-04-12 | End: 2025-04-12

## 2025-04-12 RX ORDER — HEPARIN SODIUM 5000 [USP'U]/ML
5000 INJECTION, SOLUTION INTRAVENOUS; SUBCUTANEOUS EVERY 8 HOURS
Status: DISCONTINUED | OUTPATIENT
Start: 2025-04-12 | End: 2025-04-12

## 2025-04-12 RX ORDER — TAMSULOSIN HYDROCHLORIDE 0.4 MG/1
0.4 CAPSULE ORAL EVERY MORNING
Status: DISCONTINUED | OUTPATIENT
Start: 2025-04-12 | End: 2025-04-15 | Stop reason: HOSPADM

## 2025-04-12 RX ORDER — LIOTHYRONINE SODIUM 5 UG/1
5 TABLET ORAL DAILY
Status: DISCONTINUED | OUTPATIENT
Start: 2025-04-13 | End: 2025-04-15 | Stop reason: HOSPADM

## 2025-04-12 RX ORDER — POLYETHYLENE GLYCOL 3350 17 G/17G
1 POWDER, FOR SOLUTION ORAL
Status: DISCONTINUED | OUTPATIENT
Start: 2025-04-12 | End: 2025-04-15 | Stop reason: HOSPADM

## 2025-04-12 RX ORDER — BENZONATATE 100 MG/1
100 CAPSULE ORAL 3 TIMES DAILY PRN
Status: DISCONTINUED | OUTPATIENT
Start: 2025-04-12 | End: 2025-04-15 | Stop reason: HOSPADM

## 2025-04-12 RX ORDER — ONDANSETRON 2 MG/ML
4 INJECTION INTRAMUSCULAR; INTRAVENOUS EVERY 4 HOURS PRN
Status: DISCONTINUED | OUTPATIENT
Start: 2025-04-12 | End: 2025-04-15 | Stop reason: HOSPADM

## 2025-04-12 RX ORDER — DEXTROMETHORPHAN POLISTIREX 30 MG/5ML
30 SUSPENSION ORAL 2 TIMES DAILY PRN
Status: DISCONTINUED | OUTPATIENT
Start: 2025-04-12 | End: 2025-04-15 | Stop reason: HOSPADM

## 2025-04-12 RX ORDER — ONDANSETRON 4 MG/1
4 TABLET, ORALLY DISINTEGRATING ORAL EVERY 4 HOURS PRN
Status: DISCONTINUED | OUTPATIENT
Start: 2025-04-12 | End: 2025-04-15 | Stop reason: HOSPADM

## 2025-04-12 RX ORDER — ACETAMINOPHEN 325 MG/1
650 TABLET ORAL EVERY 6 HOURS PRN
Status: DISCONTINUED | OUTPATIENT
Start: 2025-04-12 | End: 2025-04-15 | Stop reason: HOSPADM

## 2025-04-12 RX ORDER — AMOXICILLIN 250 MG
2 CAPSULE ORAL EVERY EVENING
Status: DISCONTINUED | OUTPATIENT
Start: 2025-04-12 | End: 2025-04-15 | Stop reason: HOSPADM

## 2025-04-12 RX ORDER — ACETAMINOPHEN 325 MG/1
650 TABLET ORAL ONCE
Status: COMPLETED | OUTPATIENT
Start: 2025-04-12 | End: 2025-04-12

## 2025-04-12 RX ORDER — LOSARTAN POTASSIUM 25 MG/1
25 TABLET ORAL DAILY
Status: DISCONTINUED | OUTPATIENT
Start: 2025-04-12 | End: 2025-04-15 | Stop reason: HOSPADM

## 2025-04-12 RX ORDER — FOLIC ACID 1 MG/1
1 TABLET ORAL EVERY MORNING
Status: DISCONTINUED | OUTPATIENT
Start: 2025-04-12 | End: 2025-04-15 | Stop reason: HOSPADM

## 2025-04-12 RX ORDER — CALCIUM CARBONATE 500 MG/1
500 TABLET, CHEWABLE ORAL DAILY
Status: DISCONTINUED | OUTPATIENT
Start: 2025-04-12 | End: 2025-04-15 | Stop reason: HOSPADM

## 2025-04-12 RX ORDER — METOPROLOL TARTRATE 50 MG
50 TABLET ORAL 2 TIMES DAILY
Status: DISCONTINUED | OUTPATIENT
Start: 2025-04-12 | End: 2025-04-15 | Stop reason: HOSPADM

## 2025-04-12 RX ADMIN — TAMSULOSIN HYDROCHLORIDE 0.4 MG: 0.4 CAPSULE ORAL at 12:28

## 2025-04-12 RX ADMIN — CEFTRIAXONE SODIUM 2000 MG: 2 INJECTION, POWDER, FOR SOLUTION INTRAMUSCULAR; INTRAVENOUS at 08:58

## 2025-04-12 RX ADMIN — FOLIC ACID 1 MG: 1 TABLET ORAL at 12:28

## 2025-04-12 RX ADMIN — CALCIUM CARBONATE (ANTACID) CHEW TAB 500 MG 500 MG: 500 CHEW TAB at 12:28

## 2025-04-12 RX ADMIN — DOXYCYCLINE 100 MG: 100 TABLET, FILM COATED ORAL at 17:03

## 2025-04-12 RX ADMIN — BENZONATATE 100 MG: 100 CAPSULE ORAL at 20:22

## 2025-04-12 RX ADMIN — LOSARTAN POTASSIUM 25 MG: 25 TABLET, FILM COATED ORAL at 12:29

## 2025-04-12 RX ADMIN — DOXYCYCLINE 100 MG: 100 TABLET, FILM COATED ORAL at 12:28

## 2025-04-12 RX ADMIN — ACETAMINOPHEN 650 MG: 325 TABLET ORAL at 08:37

## 2025-04-12 RX ADMIN — METOPROLOL TARTRATE 50 MG: 50 TABLET, FILM COATED ORAL at 17:03

## 2025-04-12 RX ADMIN — SODIUM CHLORIDE, POTASSIUM CHLORIDE, SODIUM LACTATE AND CALCIUM CHLORIDE 1293 ML: 600; 310; 30; 20 INJECTION, SOLUTION INTRAVENOUS at 08:16

## 2025-04-12 RX ADMIN — AZITHROMYCIN DIHYDRATE 500 MG: 250 TABLET ORAL at 08:58

## 2025-04-12 SDOH — ECONOMIC STABILITY: TRANSPORTATION INSECURITY
IN THE PAST 12 MONTHS, HAS LACK OF RELIABLE TRANSPORTATION KEPT YOU FROM MEDICAL APPOINTMENTS, MEETINGS, WORK OR FROM GETTING THINGS NEEDED FOR DAILY LIVING?: NO

## 2025-04-12 SDOH — ECONOMIC STABILITY: TRANSPORTATION INSECURITY
IN THE PAST 12 MONTHS, HAS THE LACK OF TRANSPORTATION KEPT YOU FROM MEDICAL APPOINTMENTS OR FROM GETTING MEDICATIONS?: NO

## 2025-04-12 ASSESSMENT — ENCOUNTER SYMPTOMS
COUGH: 1
SHORTNESS OF BREATH: 1
SPUTUM PRODUCTION: 1
ABDOMINAL PAIN: 0
HEADACHES: 0
NAUSEA: 0
DIZZINESS: 0
WEAKNESS: 1
VOMITING: 0
FOCAL WEAKNESS: 1

## 2025-04-12 ASSESSMENT — SOCIAL DETERMINANTS OF HEALTH (SDOH)
WITHIN THE LAST YEAR, HAVE TO BEEN RAPED OR FORCED TO HAVE ANY KIND OF SEXUAL ACTIVITY BY YOUR PARTNER OR EX-PARTNER?: NO
WITHIN THE PAST 12 MONTHS, YOU WORRIED THAT YOUR FOOD WOULD RUN OUT BEFORE YOU GOT THE MONEY TO BUY MORE: NEVER TRUE
IN THE PAST 12 MONTHS, HAS THE ELECTRIC, GAS, OIL, OR WATER COMPANY THREATENED TO SHUT OFF SERVICE IN YOUR HOME?: NO
WITHIN THE LAST YEAR, HAVE YOU BEEN AFRAID OF YOUR PARTNER OR EX-PARTNER?: NO
WITHIN THE PAST 12 MONTHS, THE FOOD YOU BOUGHT JUST DIDN'T LAST AND YOU DIDN'T HAVE MONEY TO GET MORE: NEVER TRUE
WITHIN THE LAST YEAR, HAVE YOU BEEN KICKED, HIT, SLAPPED, OR OTHERWISE PHYSICALLY HURT BY YOUR PARTNER OR EX-PARTNER?: NO
WITHIN THE LAST YEAR, HAVE YOU BEEN HUMILIATED OR EMOTIONALLY ABUSED IN OTHER WAYS BY YOUR PARTNER OR EX-PARTNER?: NO

## 2025-04-12 ASSESSMENT — CHA2DS2 SCORE
AGE 75 OR GREATER: YES
DIABETES: NO
AGE 65 TO 74: NO
SEX: FEMALE
CHA2DS2 VASC SCORE: 7
VASCULAR DISEASE: NO
CHF OR LEFT VENTRICULAR DYSFUNCTION: YES
HYPERTENSION: YES
PRIOR STROKE OR TIA OR THROMBOEMBOLISM: YES

## 2025-04-12 ASSESSMENT — COGNITIVE AND FUNCTIONAL STATUS - GENERAL
HELP NEEDED FOR BATHING: A LOT
MOBILITY SCORE: 9
DRESSING REGULAR UPPER BODY CLOTHING: A LITTLE
DRESSING REGULAR LOWER BODY CLOTHING: A LOT
MOVING TO AND FROM BED TO CHAIR: A LOT
CLIMB 3 TO 5 STEPS WITH RAILING: TOTAL
TURNING FROM BACK TO SIDE WHILE IN FLAT BAD: A LOT
WALKING IN HOSPITAL ROOM: TOTAL
STANDING UP FROM CHAIR USING ARMS: TOTAL
TOILETING: A LITTLE
SUGGESTED CMS G CODE MODIFIER MOBILITY: CM
MOVING FROM LYING ON BACK TO SITTING ON SIDE OF FLAT BED: A LOT
DAILY ACTIVITIY SCORE: 18
SUGGESTED CMS G CODE MODIFIER DAILY ACTIVITY: CK

## 2025-04-12 ASSESSMENT — FIBROSIS 4 INDEX
FIB4 SCORE: 1.076923076923076923
FIB4 SCORE: 1.076923076923076923
FIB4 SCORE: 3.58

## 2025-04-12 ASSESSMENT — LIFESTYLE VARIABLES
HAVE YOU EVER FELT YOU SHOULD CUT DOWN ON YOUR DRINKING: NO
TOTAL SCORE: 0
AVERAGE NUMBER OF DAYS PER WEEK YOU HAVE A DRINK CONTAINING ALCOHOL: 0
HOW MANY TIMES IN THE PAST YEAR HAVE YOU HAD 5 OR MORE DRINKS IN A DAY: 0
TOTAL SCORE: 0
EVER HAD A DRINK FIRST THING IN THE MORNING TO STEADY YOUR NERVES TO GET RID OF A HANGOVER: NO
ALCOHOL_USE: NO
CONSUMPTION TOTAL: NEGATIVE
HAVE PEOPLE ANNOYED YOU BY CRITICIZING YOUR DRINKING: NO
TOTAL SCORE: 0
ON A TYPICAL DAY WHEN YOU DRINK ALCOHOL HOW MANY DRINKS DO YOU HAVE: 0
EVER FELT BAD OR GUILTY ABOUT YOUR DRINKING: NO

## 2025-04-12 ASSESSMENT — PAIN DESCRIPTION - PAIN TYPE
TYPE: ACUTE PAIN
TYPE: ACUTE PAIN

## 2025-04-12 NOTE — ED TRIAGE NOTES
Pt bib [facility] with c/o cough, occ prod for gray sputum. States receiving po antibx for pna dx, in no distress, loose non prod cough audible intermitt. Rm air sat= 94%. Hx of afib-noted on bedside moniter. Denies sob or chest pain

## 2025-04-12 NOTE — PROGRESS NOTES
Pt arrives to unit from ER via gurney. Ambulated from rDrift to bed, accompanied by transport. PT A&Ox4. Tele monitor applied, vitals taken. History, allergies and med rec reviewed and admission profile completed.     Pt oriented to unit and POC discussed, including monitor O2, abx,. Welcome folder provided and discussed. Communication board filled out. Pt instructed to call light usage and encouraged to call for any questions, needs, or concerns and prior to getting out of bed. Fall precautions in place. Pt agrees with the plan and declines any needs at this time. Bed locked and low and bed alarm on.

## 2025-04-12 NOTE — ED NOTES
Pharmacy Medication Reconciliation    ~Med rec updated and complete per MAR- [facility] [place] [facility]  ~Allergies have been verified and updated per MAR      ~Per MAR patient started a 10 day course of Doxycycline on 4/11/2025    ~Anticoagulants (rivaroxaban, apixaban, edoxaban, dabigatran, warfarin, enoxaparin) taken in the last 14 days? Yes   ~Anticoagulant: Coumadin, Last dose: 4/11/2025     

## 2025-04-12 NOTE — ASSESSMENT & PLAN NOTE
This is Sepsis Present on admission  SIRS criteria identified on my evaluation include: Tachycardia, with heart rate greater than 90 BPM, Tachypnea, with respirations greater than 20 per minute, and Leukocytosis, with WBC greater than 12,000  Clinical indicators of end organ dysfunction include Acute Respiratory Failure - (mechanical ventilation or BiPap or PaO2/FiO2 ratio < 300) and Acute On Chronic Renal Failure, with creatinine >0.5 above baseline level hypoxic to 78% on room air.  Source is left lower lobe bacterial community-acquired pneumonia  Sepsis protocol initiated  Crystalloid Fluid Administration: Fluid resuscitation ordered per standard protocol - 30 mL/kg per current or ideal body weight-1.3 L  IV antibiotics as appropriate for source of sepsis-IV ceftriaxone and azithromycin  Reassessment: I have reassessed the patient's hemodynamic status    Lactic acid 1.6  During my examination patient was hypoxic 78% on room air, she again dipped down to 88%.  I placed patient on 1 to 2 L nasal cannula for support.  She does not use oxygen at home.    Continue IV ceftriaxone and oral doxycycline

## 2025-04-12 NOTE — ASSESSMENT & PLAN NOTE
On coumadin  Tachycardic  Continue metoprolol  EKG showing , atrial flutter with heart rate control, QTc 424 ms

## 2025-04-12 NOTE — ASSESSMENT & PLAN NOTE
Patient meets Aspen criteria due to chronic illness for severe protein calorie malnutrition as per dietitian consultation.

## 2025-04-12 NOTE — ASSESSMENT & PLAN NOTE
LLL CAP on CXR, see image below  WBC 13.7  Tachycardia and tachypnea  - multiple abx allergies listed  Continue IV ceftriaxone and p.o. doxycycline.  Sputum culture growing gram-positive cocci  Viral panel PCR negative        I reviewed CXR from today and second image from 2/5/23.

## 2025-04-12 NOTE — H&P
Hospital Medicine History & Physical Note    Date of Service  4/12/2025    Primary Care Physician  Shivani Flores N.P.    Consultants  None    Code Status  DNAR/DNI    Chief Complaint  Chief Complaint   Patient presents with    Cough     Dx with pna 4 days ago and on po antibx for same        History of Presenting Illness  Az Tripp is a 89 y.o. female with past medical history of Wegener's, CKD stage IV, hypertension, hyperlipidemia, stroke resulting in residual right-sided weakness, gait instability, paroxysmal atrial fibrillation on Coumadin, protein calorie chronic moderate malnutrition, pulmonary hypertension, HFpEF, pleural effusion, living in assisted living facility  [address] [place] [address], who presented 4/12/2025 with worsening cough.  Patient was diagnosed outside with pneumonia and was started on doxycycline.  She had had worsening shortness of breath at home and worsening cough and was sent by the assisted living facility.  She was just started on doxycycline only received 1 dose overnight..  Stated she had left lower mid axillary pain, worse with coughing.  She denied any headaches, abdominal pain, did dysuria symptoms.    I discussed case with ER physician Dr. Vaughn, patient started on IV ceftriaxone due to multiple antibiotic allergies.  Chest x-ray showing left lower lobe pneumonia.    I discussed the plan of care with patient, bedside RN, charge RN, , and pharmacy.    Review of Systems  Review of Systems   Constitutional:  Positive for malaise/fatigue.   Respiratory:  Positive for cough, sputum production and shortness of breath.    Cardiovascular:  Negative for chest pain and leg swelling.   Gastrointestinal:  Negative for abdominal pain, nausea and vomiting.   Neurological:  Positive for focal weakness (Chronic right-sided arm and leg weakness) and weakness (Chronic). Negative for dizziness and headaches.   All other systems reviewed and are negative.      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), Bronchiectasis without complication ([facility]) (9/19/2017), Chronic fatigue (9/19/2017), Closed right hip fracture ([facility]) (6/19/2019), DVT (6/1996), First degree atrioventricular block (9/21/2012), Glomerulonephritis, chronic in other disease (8/3/2011), H/O echocardiogram (9/21/2012), Hematuria (3/20/2019), Hypothyroid (10/2002), Kidney disorder (1998), Kidney failure, Leukocytosis (7/21/2020), Mitral valve prolapse, Palpitations (9/21/2012), Rheumatic fever (9/21/2012), Rheumatic fever (9/21/2012), Sepsis ([facility]) (12/20/2018), Severe protein-calorie malnutrition (Mahmood: less than 60% of standard weight) ([facility]) (12/20/2018), Stroke ([facility]) (9/21/2012), Supratherapeutic INR (12/20/2018), SVT (SUPRAVENTRICULAR TACHYCARDIA), h/o paroxysmal (8/3/2011), Wegener's granulomatosis, and Wegener's granulomatosis (8/3/2011).    Surgical History   has a past surgical history that includes bunionectomy (1980); arthroscopy, knee (1986); arthroscope (1989); temporal artery biopsy (1996); lung needle biopsy (1996); cataract extraction (2006); cholecystectomy (1997); hysterectomy radical (1975); vein ligation; and pr partial hip replacement (Right, 6/19/2019).     Family History  family history includes Cancer in her brother; Non-contributory in an other family member; Other in her father.   Family history reviewed with patient. There is no family history that is pertinent to the chief complaint.     Social History   reports that she quit smoking about 65 years ago. Her smoking use included cigarettes. She has never  used smokeless tobacco. She reports that she does not currently use alcohol. She reports that she does not use drugs.    Allergies  Allergies   Allergen Reactions    Ampicillin Rash     Red Rash    Baclofen Unspecified     drowsiness    Cephalexin [Keflex] Swelling    Citalopram Vomiting and Nausea    Clarithromycin Unspecified     Pt reports that this medication plugs her ears.     Diclofenac Rash and Swelling     Red rash & swelling      Erythromycin Diarrhea     GI upset    Hydroxyzine Swelling     Eyes get itchy and swollen     Naprelan [Naproxen] Swelling     Eyes get itchy become red and swollen    Oxaprozin Swelling     Swelling eyes, and itchy    Penicillins Rash     Red rash      Promethazine Unspecified     Drowsiness and sleeps    Vi-Q-Tuss [Hydrocodone-Guaifenesin] Vomiting and Nausea    Nitrofurantoin Nausea       Medications  Prior to Admission Medications   Prescriptions Last Dose Informant Patient Reported? Taking?   Acetaminophen Extra Strength 500 MG Tab 4/11/2025 at  6:00 PM MAR from Other Facility No Yes   Sig: Take 2 Tablets by mouth 2 times a day.   Biotin 13581 MCG Tab 4/11/2025 at  8:00 AM MAR from Other Facility No Yes   Sig: Take 1 Tablet by mouth every day. May substitute capsule form for tablets.   D-Mannose 500 MG Cap 4/10/2025 at  7:00 AM MAR from Other Facility Yes No   Sig: Take 500 mg by mouth every 48 hours. Do not ship.  Family provides.   Multiple Vitamins-Minerals (CENTRUM MINIS ADULTS 50+) Tab 4/11/2025 at  8:00 AM MAR from Other Facility No Yes   Sig: Take 1 Tablet by mouth every day.   Patient taking differently: Take 2 Tablets by mouth every day.   doxycycline (VIBRAMYCIN) 100 MG Tab 4/11/2025 at  6:00 PM MAR from Other Facility Yes Yes   Sig: Take 100 mg by mouth 2 times a day. 10 days   folic acid (FOLVITE) 1 MG Tab 4/11/2025 at  8:00 AM MAR from Other Facility No Yes   Sig: TAKE 1 TABLET BY MOUTH EVERY MORNING.   furosemide (LASIX) 40 MG Tab 4/11/2025 at  8:00 AM MAR from  Other Facility No Yes   Sig: Take 1 Tablet by mouth every day.   levothyroxine (SYNTHROID) 100 MCG Tab 2025 at  6:00 AM MAR from Other Facility No Yes   Sig: TAKE 1 TABLET BY MOUTH EVERY MORNING ON AN EMPTY STOMACH.   liothyronine (CYTOMEL) 5 MCG Tab 2025 at  6:00 AM MAR from Other Facility No Yes   Sig: TAKE 1 TABLET BY MOUTH EVERY DAY. WITH LEVOTHYROXINE FOR THYROID FUNCTION.   losartan (COZAAR) 25 MG Tab 2025 at  8:00 AM MAR from Other Facility Yes Yes   Si mg every day.   metoprolol tartrate (LOPRESSOR) 50 MG Tab 2025 at  6:00 PM MAR from Other Facility No Yes   Sig: TAKE 1 TABLET BY MOUTH 2 TIMES A DAY.   senna-docusate (PERICOLACE OR SENOKOT S) 8.6-50 MG Tab Unknown MAR from Other Facility No No   Sig: Take 2 Tablets by mouth 1 time a day as needed for Constipation.   tamsulosin (FLOMAX) 0.4 MG capsule 2025 at  8:00 AM MAR from Other Facility No Yes   Sig: TAKE 1 CAPSULE BY MOUTH EVERY MORNING.   warfarin (COUMADIN) 2 MG Tab 2025 at  6:00 PM MAR from Other Facility No Yes   Sig: Take 1/2 to 1 tablet daily as directed by anticoagulation clinic.   Patient taking differently: Take 1-2 mg by mouth. 1 mg (1/2 tablet) on Tuesday, Thursday, Saturday &   2 mg (1 tablet) on Monday, Wednesday & Friday      Facility-Administered Medications: None       Physical Exam  Temp:  [37.5 °C (99.5 °F)] 37.5 °C (99.5 °F)  Pulse:  [110-130] 110  Resp:  [28-35] 32  BP: (114-129)/(59-72) 115/62  SpO2:  [94 %-97 %] 97 %  Blood Pressure : 129/68   Temperature: 37.5 °C (99.5 °F)   Pulse: (!) 125   Respiration: (!) 35   Pulse Oximetry: 94 %       Physical Exam  Vitals and nursing note reviewed.   Constitutional:       General: She is not in acute distress.     Appearance: She is ill-appearing.      Comments: Frail appearing elderly female   HENT:      Head: Normocephalic and atraumatic.      Comments: Temporal muscle wasting positive     Mouth/Throat:      Mouth: Mucous membranes are dry.       "Pharynx: No oropharyngeal exudate.   Eyes:      General: No scleral icterus.     Extraocular Movements: Extraocular movements intact.   Cardiovascular:      Rate and Rhythm: Normal rate and regular rhythm.      Pulses: Normal pulses.      Heart sounds: Normal heart sounds. No murmur heard.  Pulmonary:      Effort: Respiratory distress (Hypoxic down to 78%) present.      Breath sounds: Rhonchi and rales (Left mid to lower lung field) present. No wheezing.   Abdominal:      General: Abdomen is flat. Bowel sounds are normal. There is no distension.      Palpations: Abdomen is soft.      Tenderness: There is no abdominal tenderness.   Musculoskeletal:         General: No swelling or tenderness.      Cervical back: Normal range of motion. No rigidity.      Left lower leg: No edema.      Comments: Sarcopenic. Bilateral hand muscle atrophy noted.   Skin:     General: Skin is warm.      Capillary Refill: Capillary refill takes less than 2 seconds.      Coloration: Skin is not jaundiced.      Findings: No erythema.   Neurological:      Mental Status: She is alert and oriented to person, place, and time. Mental status is at baseline.      Motor: Weakness present.   Psychiatric:         Mood and Affect: Mood normal.         Behavior: Behavior normal.         Thought Content: Thought content normal.         Judgment: Judgment normal.         Laboratory:  Recent Labs     04/12/25  0710   WBC 13.7*   RBC 3.69*   HEMOGLOBIN 12.3   HEMATOCRIT 38.1   .3*   MCH 33.3*   MCHC 32.3   RDW 46.3   PLATELETCT 390   MPV 9.6     Recent Labs     04/12/25  0710   SODIUM 133*   POTASSIUM 5.3   CHLORIDE 97   CO2 22   GLUCOSE 127*   BUN 41*   CREATININE 1.74*   CALCIUM 9.6     Recent Labs     04/12/25  0710   ALTSGPT 9   ASTSGOT 14   ALKPHOSPHAT 144*   TBILIRUBIN 0.7   GLUCOSE 127*         No results for input(s): \"NTPROBNP\" in the last 72 hours.      No results for input(s): \"TROPONINT\" in the last 72 hours.    Imaging:  DX-CHEST-PORTABLE " (1 VIEW)   Final Result         1. Dense left lower lobe infiltrate consistent with pneumonia.      2. Small-to-moderate left pleural effusion.      3. These are new since 8/15/2024 CT abdomen.                      X-Ray:  My impression is: Consistent with left pleural effusion and underlying left consolidation.    Assessment/Plan:  Justification for Admission Status  I anticipate this patient will require at least two midnights for appropriate medical management, necessitating inpatient admission because patient has significant commune acquired pneumonia, noted on the left lower lobe.  She will need IV ceftriaxone and oral doxycycline.  Given her multiple antibiotic allergies is difficult to treat in the outpatient setting.  During my examination she was also hypoxic to 78% indicating she is in sepsis.  She will need ongoing telemetry monitoring.    Patient will need a Telemetry bed on MEDICAL service .  The need is secondary to sepsis, pneumonia.    * Community acquired bacterial pneumonia- (present on admission)  Assessment & Plan  LLL CAP on CXR, see image below  WBC 13.7  Tachycardia and tachypnea  - multiple abx allergies listed  Start with IV ceftriaxone, continue po doxycyline  Sputum cx ordered  Admit to telemetry, Sepsis criteria was met with AHRF      I reviewed CXR from today and second image from 2/5/23.    Sepsis with acute hypoxic respiratory failure (HCC)- (present on admission)  Assessment & Plan  This is Sepsis Present on admission  SIRS criteria identified on my evaluation include: Tachycardia, with heart rate greater than 90 BPM, Tachypnea, with respirations greater than 20 per minute, and Leukocytosis, with WBC greater than 12,000  Clinical indicators of end organ dysfunction include Acute Respiratory Failure - (mechanical ventilation or BiPap or PaO2/FiO2 ratio < 300) hypoxic to 78% on room air.  Source is left lower lobe bacterial community-acquired pneumonia  Sepsis protocol  initiated  Crystalloid Fluid Administration: Fluid resuscitation ordered per standard protocol - 30 mL/kg per current or ideal body weight-1.3 L  IV antibiotics as appropriate for source of sepsis-IV ceftriaxone and azithromycin  Reassessment: I have reassessed the patient's hemodynamic status    Lactic acid 1.6  During my examination patient was hypoxic 78% on room air, she again dipped down to 88%.  I placed patient on 1 to 2 L nasal cannula for support.  She does not use oxygen at home.    DNR (do not resuscitate)- (present on admission)  Assessment & Plan  I discussed with patient on her diagnosis of CAP. We discussed intubation and mechanical ventilation. I reviewed patient's POLST 6/2019 with patient. Indicated DNR, Selective treatment, DNI. Signed by patient herself.  Patient stated because she is 89 years old in a very frail state, she does not want any of these extensive procedures done.  She wishes to remain as a DNR/DNI.  Total advance care planning time spent 17 minutes    Frailty- (present on admission)  Assessment & Plan  Lives in [facility], from prior stroke    Chronic bilateral pleural effusions- (present on admission)  Assessment & Plan  Right appears improved on new CXR  Left pleural effusion still present    Chronic right-sided heart failure (HCC)- (present on admission)  Assessment & Plan  chronic    Gastroesophageal reflux disease without esophagitis- (present on admission)  Assessment & Plan  tums    Pulmonary HTN (HCC)- (present on admission)  Assessment & Plan  02/2023 RVSP 50mmHg    Protein-calorie malnutrition, moderate (HCC)- (present on admission)  Assessment & Plan  Chronic  Nutrition consult    Body mass index (BMI) less than 16.5- (present on admission)  Assessment & Plan  Body mass index is 14.88 kg/m².  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) (HCC)- (present on admission)  Assessment & Plan  Chronic  Lives in [facility]  CM for discharge planning  ordered    Paroxysmal atrial fibrillation (HCC)- (present on admission)  Assessment & Plan  On coumadin  Tachycardic  I ordered ECG  Continue home metoprolol    CKD (chronic kidney disease) stage 4, GFR 15-29 ml/min (HCC)- (present on admission)  Assessment & Plan  Chronic  Cr 1.74, similar to 08/2024  Continue home losartan    Supratherapeutic INR- (present on admission)  Assessment & Plan  INR 6.36  Pharmacy to dose  Will hold off Warfarin, hold off Vitamin K as patient is not showing signs of any bleeding.    Hyponatremia- (present on admission)  Assessment & Plan  Na 133  On lasix at [facility]  monitoring    Hypothyroidism- (present on admission)  Assessment & Plan  Continue home levothyroxine and liothyronine        VTE prophylaxis: heparin ppx      Total time spent on admission 76 minutes.    This included my review with ER physician, review of ED events, patient's history, outside records, recent records, face to face interview, physical examination; my review of lab results (CBC, chemistry panel), imaging review (CXR) and ECG. Also includes counseling patient on admission, treatment plan, and documentation of encounter.

## 2025-04-12 NOTE — DISCHARGE PLANNING
Met with pt who said she lives at [facility]. She lives in their Assisted Living Unit. She has food,laundry, personal care assistance like assistance with shower. They have a van and take her to appts. She uses a walker but does not have home O2.     We discussed discharge planning. She is agreeable to go to SNF if needed.     PCP is Shivani Flores NP  Pharmacy is [facility].    Care Transition Team Assessment    Information Source  Orientation Level: Oriented X4  Information Given By: Patient  Who is responsible for making decisions for patient? : Patient    Readmission Evaluation  Is this a readmission?: No    Elopement Risk  Legal Hold: No    Interdisciplinary Discharge Planning  Does Admitting Nurse Feel This Could be a Complex Discharge?: No  Primary Care Physician: Dr. Shivani Flores  Lives with - Patient's Self Care Capacity: Attendant / Paid Care Giver  Support Systems: Children  Housing / Facility: Assisted Living Residence  Name of Care Facility: [facility]  Do You Take your Prescribed Medications Regularly: Yes  Able to Return to Previous ADL's: Yes  Mobility Issues: Yes  Prior Services: Home-Independent, Intermittent Physical Support for ADL Per Service  Patient Prefers to be Discharged to:: Home with HH  Assistance Needed: Yes    Discharge Preparedness  What is your plan after discharge?: Home health care  What are your discharge supports?: Child, Other (comment)  Prior Functional Level: Ambulatory, Needs Assist with Activities of Daily Living, Needs Assist with Medication Management, Uses Walker  Difficulity with ADLs: Bathing, Dressing  Difficulity with IADLs: Cooking, Driving, Laundry, Shopping    Functional Assesment  Prior Functional Level: Ambulatory, Needs Assist with Activities of Daily Living, Needs Assist with Medication Management, Uses Walker    Finances  Financial Barriers to Discharge: No  Prescription Coverage: Yes    Vision / Hearing Impairment  Vision Impairment : Yes  Hearing  Impairment : No    Values / Beliefs / Concerns  Values / Beliefs Concerns : No    Advance Directive  Advance Directive?: POLST    Domestic Abuse  Have you ever been the victim of abuse or violence?: No    Psychological Assessment  History of Substance Abuse: None    Discharge Risks or Barriers  Discharge risks or barriers?: Post-acute placement / services  Patient risk factors: Cognitive / sensory / physical deficit, Complex medical needs    Anticipated Discharge Information  Discharge Disposition: D/T to home under A care in anticipation of covered skilled care (06)

## 2025-04-12 NOTE — ED PROVIDER NOTES
ER Provider Note    Scribed for Pacheco Vaughn M.D. by Alejandro Mckeon. 4/12/2025   8:07 AM    Primary Care Provider: Shivani Flores N.P.    CHIEF COMPLAINT  Chief Complaint   Patient presents with    Cough     Dx with pna 4 days ago and on po antibx for same      EXTERNAL RECORDS REVIEWED  Outpatient Notes Patient was last seen in clinic 3/26 for health maintenance.       HPI/ROS  LIMITATION TO HISTORY   Select: : None  OUTSIDE HISTORIAN(S):  None.     Az Tripp is a 89 y.o. female who presents to the ED from [place] residence for evaluation of a cough onset six days ago. Patient reports that she was diagnosed with pneumonia on chest X-ray four days ago and was started on antibiotics, but it took them a while to actually get her medication. Patient reports having night sweats last night but denies any fevers. She started taking them last night. She reports a history of hear failure and A fib.     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    Bronchiectasis without complication (HCC) 9/19/2017    Chronic fatigue 9/19/2017    Closed right hip fracture (HCC) 6/19/2019    DVT 6/1996    left leg, vein ligation performed on saphenous vein    First degree atrioventricular block 9/21/2012    Glomerulonephritis, chronic in other disease 8/3/2011    H/O echocardiogram 9/21/2012    Hematuria 3/20/2019    Hypothyroid 10/2002    Kidney disorder 1998    left kidney biopsy--glomerulonephritis and wegners dx    Kidney failure     stage II     Leukocytosis 7/21/2020    Mitral valve prolapse     Palpitations 9/21/2012    Rheumatic fever 9/21/2012    Rheumatic fever 9/21/2012    Childhood     Sepsis (HCC) 12/20/2018    Severe protein-calorie malnutrition (Mahmood: less than 60% of standard weight) ([facility]) 12/20/2018    Stroke ([facility]) 9/21/2012    Supratherapeutic INR 12/20/2018    SVT (SUPRAVENTRICULAR TACHYCARDIA), h/o paroxysmal 8/3/2011    Wegener's granulomatosis     Wegener's granulomatosis 8/3/2011     O Spring 2021       SURGICAL HISTORY  Past Surgical History:   Procedure Laterality Date    PB PARTIAL HIP REPLACEMENT Right 6/19/2019    Procedure: HEMIARTHROPLASTY, HIP;  Surgeon: Raul Saldivar M.D.;  Location: [facility];  Service: Orthopedics    CATARACT EXTRACTION  2006    left eye    CHOLECYSTECTOMY  1997    TEMPORAL ARTERY BIOPSY  1996    normal    LUNG NEEDLE BIOPSY  1996    right lung, nodules found    ARTHROSCOPE  1989    left knee    ARTHROSCOPY, KNEE  1986    right    BUNIONECTOMY  1980    bilat    HYSTERECTOMY RADICAL  1975    VEIN LIGATION         FAMILY HISTORY  Family History   Problem Relation Age of Onset    Other Father         Aortic aneurysm    Cancer Brother         Lung    Non-contributory Other        SOCIAL HISTORY   reports that she quit smoking about 65 years ago. Her smoking use included cigarettes. She has never used smokeless tobacco. She reports that she does not currently use alcohol. She reports that she does not use drugs.    CURRENT MEDICATIONS  Previous Medications    ACETAMINOPHEN EXTRA STRENGTH 500 MG TAB    Take 2 Tablets by mouth 2 times a day.    BIOTIN 58478 MCG TAB    Take 1 Tablet by mouth every day. May substitute capsule form for tablets.    D-MANNOSE 500 MG CAP    Take 500 mg by mouth every 48 hours. Do not ship.  Family provides.    FOLIC ACID (FOLVITE) 1 MG TAB    TAKE 1 TABLET BY MOUTH EVERY MORNING.    FUROSEMIDE (LASIX) 40 MG TAB    Take 1 Tablet by mouth every day.    LEVOTHYROXINE  "(SYNTHROID) 100 MCG TAB    TAKE 1 TABLET BY MOUTH EVERY MORNING ON AN EMPTY STOMACH.    LIOTHYRONINE (CYTOMEL) 5 MCG TAB    TAKE 1 TABLET BY MOUTH EVERY DAY. WITH LEVOTHYROXINE FOR THYROID FUNCTION.    LOPERAMIDE (IMODIUM) 2 MG CAP    Take 2 mg by mouth 1 time a day as needed for Diarrhea.    LOSARTAN (COZAAR) 25 MG TAB    25 mg every day.    METOPROLOL TARTRATE (LOPRESSOR) 50 MG TAB    TAKE 1 TABLET BY MOUTH 2 TIMES A DAY.    MULTIPLE VITAMINS-MINERALS (CENTRUM MINIS ADULTS 50+) TAB    Take 1 Tablet by mouth every day.    SENNA-DOCUSATE (PERICOLACE OR SENOKOT S) 8.6-50 MG TAB    Take 2 Tablets by mouth 1 time a day as needed for Constipation.    TAMSULOSIN (FLOMAX) 0.4 MG CAPSULE    TAKE 1 CAPSULE BY MOUTH EVERY MORNING.    WARFARIN (COUMADIN) 2 MG TAB    Take 1/2 to 1 tablet daily as directed by anticoagulation clinic.       ALLERGIES  Allergies   Allergen Reactions    Cephalexin [Keflex] Swelling    Hydroxyzine Swelling     Eyes get itchy and swollen     Naprelan [Naproxen] Swelling     Eyes get itchy become red and swollen    Oxaprozin Swelling     Swelling eyes, and itchy    Ampicillin Rash     Red Rash    Baclofen Unspecified     drowsiness    Citalopram Vomiting and Nausea    Clarithromycin Unspecified     Pt reports that this medication plugs her ears.     Diclofenac Rash and Swelling     Red rash & swelling      Erythromycin Diarrhea     GI upset    Penicillins Rash     Red rash      Promethazine Unspecified     Drowsiness and sleeps    Vi-Q-Tuss [Hydrocodone-Guaifenesin] Vomiting and Nausea    Nitrofurantoin Nausea        PHYSICAL EXAM  /65   Pulse (!) 130   Temp 37.5 °C (99.5 °F) (Oral)   Resp (!) 28   Ht 1.702 m (5' 7\")   Wt 43.1 kg (95 lb)   SpO2 94%   BMI 14.88 kg/m²    Nursing note and vitals reviewed.  Constitutional: Well-developed and well-nourished. No distress.   HENT: Head is normocephalic and atraumatic. Oropharynx is clear and dry  without exudate or erythema.   Eyes: Pupils are " equal, round, and reactive to light. Conjunctiva are normal.   Cardiovascular: Tachycardic rate with regular rhythm. No murmur heard. Normal radial pulses.  Pulmonary/Chest: Productive cough with poor cough effort. Tachypnea. No wheezes or rales.   Abdominal: Soft and non-tender. No distention    Musculoskeletal: Extremities exhibit normal range of motion without edema or tenderness.   Neurological: Awake, alert and oriented to person, place, and time. No focal deficits noted.  Skin: Skin is warm and dry. No rash.   Psychiatric: Normal mood and affect. Appropriate for clinical situation      DIAGNOSTIC STUDIES    Labs:   Results for orders placed or performed during the hospital encounter of 04/12/25   Lactic Acid    Collection Time: 04/12/25  7:10 AM   Result Value Ref Range    Lactic Acid 1.6 0.5 - 2.0 mmol/L   CBC with Differential    Collection Time: 04/12/25  7:10 AM   Result Value Ref Range    WBC 13.7 (H) 4.8 - 10.8 K/uL    RBC 3.69 (L) 4.20 - 5.40 M/uL    Hemoglobin 12.3 12.0 - 16.0 g/dL    Hematocrit 38.1 37.0 - 47.0 %    .3 (H) 81.4 - 97.8 fL    MCH 33.3 (H) 27.0 - 33.0 pg    MCHC 32.3 32.2 - 35.5 g/dL    RDW 46.3 35.9 - 50.0 fL    Platelet Count 390 164 - 446 K/uL    MPV 9.6 9.0 - 12.9 fL    Neutrophils-Polys 81.50 (H) 44.00 - 72.00 %    Lymphocytes 10.60 (L) 22.00 - 41.00 %    Monocytes 6.90 0.00 - 13.40 %    Eosinophils 0.20 0.00 - 6.90 %    Basophils 0.10 0.00 - 1.80 %    Immature Granulocytes 0.70 0.00 - 0.90 %    Nucleated RBC 0.00 0.00 - 0.20 /100 WBC    Neutrophils (Absolute) 11.19 (H) 1.82 - 7.42 K/uL    Lymphs (Absolute) 1.45 1.00 - 4.80 K/uL    Monos (Absolute) 0.95 (H) 0.00 - 0.85 K/uL    Eos (Absolute) 0.03 0.00 - 0.51 K/uL    Baso (Absolute) 0.01 0.00 - 0.12 K/uL    Immature Granulocytes (abs) 0.09 0.00 - 0.11 K/uL    NRBC (Absolute) 0.00 K/uL   Complete Metabolic Panel    Collection Time: 04/12/25  7:10 AM   Result Value Ref Range    Sodium 133 (L) 135 - 145 mmol/L    Potassium 5.3  3.6 - 5.5 mmol/L    Chloride 97 96 - 112 mmol/L    Co2 22 20 - 33 mmol/L    Anion Gap 14.0 7.0 - 16.0    Glucose 127 (H) 65 - 99 mg/dL    Bun 41 (H) 8 - 22 mg/dL    Creatinine 1.74 (H) 0.50 - 1.40 mg/dL    Calcium 9.6 8.5 - 10.5 mg/dL    Correct Calcium 10.4 8.5 - 10.5 mg/dL    AST(SGOT) 14 12 - 45 U/L    ALT(SGPT) 9 2 - 50 U/L    Alkaline Phosphatase 144 (H) 30 - 99 U/L    Total Bilirubin 0.7 0.1 - 1.5 mg/dL    Albumin 3.0 (L) 3.2 - 4.9 g/dL    Total Protein 8.2 6.0 - 8.2 g/dL    Globulin 5.2 (H) 1.9 - 3.5 g/dL    A-G Ratio 0.6 g/dL   ESTIMATED GFR    Collection Time: 04/12/25  7:10 AM   Result Value Ref Range    GFR (CKD-EPI) 27 (A) >60 mL/min/1.73 m 2   CoV-2, FLU A/B, and RSV by PCR (2-4 Hours [employer] [facility]) : Collect NP swab in VTM    Collection Time: 04/12/25  8:18 AM    Specimen: Nasal; Respirate   Result Value Ref Range    SARS-CoV-2 Source Nasal Swab      *Note: Due to a large number of results and/or encounters for the requested time period, some results have not been displayed. A complete set of results can be found in Results Review.       EKG:   I have independently interpreted this EKG as detailed above.     Radiology:   This attending emergency physician has independently interpreted the diagnostic imaging associated with this visit and is awaiting the final reading from the radiologist.   Preliminary interpretation is a follows: Evidence of left lower lobe pneumonia     Radiologist interpretation:   DX-CHEST-PORTABLE (1 VIEW)   Final Result         1. Dense left lower lobe infiltrate consistent with pneumonia.      2. Small-to-moderate left pleural effusion.      3. These are new since 8/15/2024 CT abdomen.                       ASSESSMENT AND PLAN    8:07 AM - Patient was evaluated at bedside. Patient arrives today four days after she was diagnosed with pneumonia, and one day after starting antibiotics. Ordered for Blood culture, CMP, CBC with differential, Lactic acid, and DX-Chest to evaluate. The  patient will be medicated with Tylenol 650 mg, Bolus, Zithromax, and Rocephin for her symptoms. Patient verbalizes understanding and support with my plan of care.  Differential diagnoses include but not limited to: Pneumonia, sepsis, viral syndrome     9:03 AM - Patient's WBC is elevated at 13, Creatinine is elevated at 1.74 which is similar to prior value from August of 2024. Chest X-ray is consistent with left lower lobe pneumonia. I informed the patient of my plan to admit today given the patient's current presentation and diagnostic study results.  Patient is treated with antibiotics in the emergency department.  Patient verbalizes understanding and support with my plan for admission.    9:27 AM - I discussed the patient's case and the above findings with Dr. Pinto (hospitalist) who agrees to admit the patient.          DISPOSITION AND DISCUSSIONS    I have discussed management of the patient with the following physicians and KINDRA's:  Dr. Pinto    Discussion of management with other [facility] or appropriate source(s): None     DISPOSITION:  Patient will be hospitalized by Dr. Pinto in guarded condition.     FINAL DIAGNOSIS  1. Acute cough    2. Pneumonia of left lower lobe due to infectious organism    3. Renal insufficiency         Alejandro SHEPARD (Jonny), am scribing for, and in the presence of, Pacheco Vaughn M.D..    Electronically signed by: Alejandro Mckeon (Jonny), 4/12/2025    Pacheco SHEPARD M.D. personally performed the services described in this documentation, as scribed by Alejandro Mckeon in my presence, and it is both accurate and complete.      The note accurately reflects work and decisions made by me.  Pacheco Vaughn M.D.  4/12/2025  12:58 PM

## 2025-04-12 NOTE — ED NOTES
Hospitalist to bedside. Placed on 1L n/c per  same for reported desat to 78% on rm air while talking.

## 2025-04-12 NOTE — ED NOTES
Metoprolol NOT given at this time-will continue to moniter pt for appropriateness of same due to other am meds administrated at this time. Pt thanked for patience regarding awaiting room upstairs. Pt had previously removed oxygen, sats as recorded. Will continue to moniter

## 2025-04-12 NOTE — ASSESSMENT & PLAN NOTE
I discussed with patient on her diagnosis of CAP. We discussed intubation and mechanical ventilation. I reviewed patient's POLST 6/2019 with patient. Indicated DNR, Selective treatment, DNI. Signed by patient herself.  Patient stated because she is 89 years old in a very frail state, she does not want any of these extensive procedures done.  She wishes to remain as a DNR/DNI.

## 2025-04-12 NOTE — PROGRESS NOTES
4 Eyes Skin Assessment Completed by EVELIO Hughes and EVELIO Middleton.    Head WDL  Ears WDL  Nose WDL  Mouth WDL  Neck WDL  Breast/Chest WDL  Shoulder Blades WDL  Spine WDL  (R) Arm/Elbow/Hand WDL  (L) Arm/Elbow/Hand bruising upper arm from IV  Abdomen WDL  Groin WDL  Scrotum/Coccyx/Buttocks Redness and Blanching  (R) Leg WDL  (L) Leg WDL  (R) Heel/Foot/Toe WDL  (L) Heel/Foot/Toe WDL          Devices In Places Tele Box and Pulse Ox      Interventions In Place TAP System and Low Air Loss Mattress    Possible Skin Injury No    Pictures Uploaded Into Epic N/A  Wound Consult Placed N/A  RN Wound Prevention Protocol Ordered No

## 2025-04-12 NOTE — PROGRESS NOTES
Telemetry Shift Summary     Rhythm: SR  Rate: 98  Measurements: .13/.07/.34  Ectopy (reported by Monitor Tech):      Normal Values  Rhythm: Sinus  HR:   Measurements: 0.12-0.20/0.06-0.10/0.30-0.52

## 2025-04-12 NOTE — ASSESSMENT & PLAN NOTE
INR 6.36    4/13:  Repeat INR 7.69.  Given patient's age, frailty, hemoglobin 11.0 from 12.3, she is at very high risk for sudden hemorrhage.  Holding warfarin  Gave p.o. vitamin K 2.5 mg one-time    4/14:  INR 2.09.  We are starting warfarin.

## 2025-04-12 NOTE — PROGRESS NOTES
Inpatient Anticoagulation Service Note for 4/12/2025      Reason for Anticoagulation: Atrial Fibrillation   [id number] VASc Score: 7  HAS-BLED Score: 1    Hemoglobin Value: 12.3  Hematocrit Value: 38.1  Lab Platelet Value: 390  Target INR: 2.0 to 3.0     Date/Time INR Value    04/12/25 0710 6.36            Date/Time Dose (mg)    04/12/25 1146 0           Average Dose (mg): 2 mg Mon/Wed/Fri, 1 mg all other days  Significant Interactions: Antibiotics  Bridge Therapy: No  Reversal Agent Administered: Not Applicable    Comments: Pt admitted for CAP/abx. She continues on warfarin for afib. Today INR is supratherapeutic, likely due to recent sickness/reduced consumption, as pt's TTR is ~64%. Don't expect major DDI with abx. Will hold warfarin and follow INR    Plan:  Hold warfarin and recheck INR tomorrow AM   Education Material Provided?: No (Chronic warfarin pt)    Pharmacist suggested discharge dosing: Hold warfarin x2d, then 2 mg Mon/Wed/Fri, 1 mg all other days; INR within 1 wk of discharge     Deann Vigil, PharmD, BCPS

## 2025-04-13 PROBLEM — Z78.9 LIVING IN ASSISTED LIVING: Chronic | Status: ACTIVE | Noted: 2025-04-13

## 2025-04-13 LAB
ALBUMIN SERPL BCP-MCNC: 2.4 G/DL (ref 3.2–4.9)
BUN SERPL-MCNC: 36 MG/DL (ref 8–22)
CALCIUM ALBUM COR SERPL-MCNC: 10.1 MG/DL (ref 8.5–10.5)
CALCIUM SERPL-MCNC: 8.8 MG/DL (ref 8.5–10.5)
CHLORIDE SERPL-SCNC: 100 MMOL/L (ref 96–112)
CO2 SERPL-SCNC: 23 MMOL/L (ref 20–33)
CREAT SERPL-MCNC: 1.47 MG/DL (ref 0.5–1.4)
ERYTHROCYTE [DISTWIDTH] IN BLOOD BY AUTOMATED COUNT: 46.2 FL (ref 35.9–50)
GFR SERPLBLD CREATININE-BSD FMLA CKD-EPI: 34 ML/MIN/1.73 M 2
GLUCOSE SERPL-MCNC: 119 MG/DL (ref 65–99)
HCT VFR BLD AUTO: 34.2 % (ref 37–47)
HGB BLD-MCNC: 11 G/DL (ref 12–16)
INR PPP: 7.69 (ref 0.87–1.13)
MAGNESIUM SERPL-MCNC: 2 MG/DL (ref 1.5–2.5)
MCH RBC QN AUTO: 33.2 PG (ref 27–33)
MCHC RBC AUTO-ENTMCNC: 32.2 G/DL (ref 32.2–35.5)
MCV RBC AUTO: 103.3 FL (ref 81.4–97.8)
PHOSPHATE SERPL-MCNC: 3.3 MG/DL (ref 2.5–4.5)
PLATELET # BLD AUTO: 356 K/UL (ref 164–446)
PMV BLD AUTO: 9.5 FL (ref 9–12.9)
POTASSIUM SERPL-SCNC: 4.7 MMOL/L (ref 3.6–5.5)
PROTHROMBIN TIME: 67.3 SEC (ref 12–14.6)
RBC # BLD AUTO: 3.31 M/UL (ref 4.2–5.4)
SODIUM SERPL-SCNC: 133 MMOL/L (ref 135–145)
WBC # BLD AUTO: 12.6 K/UL (ref 4.8–10.8)

## 2025-04-13 PROCEDURE — 85027 COMPLETE CBC AUTOMATED: CPT

## 2025-04-13 PROCEDURE — 700102 HCHG RX REV CODE 250 W/ 637 OVERRIDE(OP): Performed by: HOSPITALIST

## 2025-04-13 PROCEDURE — 94760 N-INVAS EAR/PLS OXIMETRY 1: CPT

## 2025-04-13 PROCEDURE — 700111 HCHG RX REV CODE 636 W/ 250 OVERRIDE (IP): Mod: JZ | Performed by: INTERNAL MEDICINE

## 2025-04-13 PROCEDURE — 36415 COLL VENOUS BLD VENIPUNCTURE: CPT

## 2025-04-13 PROCEDURE — 770020 HCHG ROOM/CARE - TELE (206)

## 2025-04-13 PROCEDURE — 80069 RENAL FUNCTION PANEL: CPT

## 2025-04-13 PROCEDURE — 83735 ASSAY OF MAGNESIUM: CPT

## 2025-04-13 PROCEDURE — 700102 HCHG RX REV CODE 250 W/ 637 OVERRIDE(OP): Performed by: INTERNAL MEDICINE

## 2025-04-13 PROCEDURE — 94640 AIRWAY INHALATION TREATMENT: CPT

## 2025-04-13 PROCEDURE — 700105 HCHG RX REV CODE 258: Performed by: INTERNAL MEDICINE

## 2025-04-13 PROCEDURE — 99233 SBSQ HOSP IP/OBS HIGH 50: CPT | Performed by: INTERNAL MEDICINE

## 2025-04-13 PROCEDURE — A9270 NON-COVERED ITEM OR SERVICE: HCPCS | Performed by: HOSPITALIST

## 2025-04-13 PROCEDURE — 85610 PROTHROMBIN TIME: CPT

## 2025-04-13 PROCEDURE — A9270 NON-COVERED ITEM OR SERVICE: HCPCS | Performed by: INTERNAL MEDICINE

## 2025-04-13 PROCEDURE — 700101 HCHG RX REV CODE 250: Mod: JZ | Performed by: INTERNAL MEDICINE

## 2025-04-13 RX ORDER — PHYTONADIONE 5 MG/1
2.5 TABLET ORAL ONCE
Status: COMPLETED | OUTPATIENT
Start: 2025-04-13 | End: 2025-04-13

## 2025-04-13 RX ORDER — LEVALBUTEROL INHALATION SOLUTION 0.63 MG/3ML
0.63 SOLUTION RESPIRATORY (INHALATION)
Status: DISCONTINUED | OUTPATIENT
Start: 2025-04-13 | End: 2025-04-14

## 2025-04-13 RX ORDER — CARBOXYMETHYLCELLULOSE SODIUM 5 MG/ML
1 SOLUTION/ DROPS OPHTHALMIC 4 TIMES DAILY PRN
Status: DISCONTINUED | OUTPATIENT
Start: 2025-04-13 | End: 2025-04-13

## 2025-04-13 RX ORDER — CARBOXYMETHYLCELLULOSE SODIUM 5 MG/ML
1 SOLUTION/ DROPS OPHTHALMIC 4 TIMES DAILY
Status: DISCONTINUED | OUTPATIENT
Start: 2025-04-13 | End: 2025-04-15 | Stop reason: HOSPADM

## 2025-04-13 RX ADMIN — DOXYCYCLINE 100 MG: 100 TABLET, FILM COATED ORAL at 05:08

## 2025-04-13 RX ADMIN — BENZONATATE 100 MG: 100 CAPSULE ORAL at 20:07

## 2025-04-13 RX ADMIN — CARBOXYMETHYLCELLULOSE SODIUM 1 DROP: 5 SOLUTION/ DROPS OPHTHALMIC at 15:42

## 2025-04-13 RX ADMIN — MOMETASONE FUROATE AND FORMOTEROL FUMARATE DIHYDRATE 2 PUFF: 100; 5 AEROSOL RESPIRATORY (INHALATION) at 20:07

## 2025-04-13 RX ADMIN — FOLIC ACID 1 MG: 1 TABLET ORAL at 05:09

## 2025-04-13 RX ADMIN — CARBOXYMETHYLCELLULOSE SODIUM 1 DROP: 5 SOLUTION/ DROPS OPHTHALMIC at 16:39

## 2025-04-13 RX ADMIN — BENZONATATE 100 MG: 100 CAPSULE ORAL at 08:45

## 2025-04-13 RX ADMIN — METOPROLOL TARTRATE 50 MG: 50 TABLET, FILM COATED ORAL at 05:08

## 2025-04-13 RX ADMIN — LEVALBUTEROL HYDROCHLORIDE 0.63 MG: 0.63 SOLUTION RESPIRATORY (INHALATION) at 19:32

## 2025-04-13 RX ADMIN — CARBOXYMETHYLCELLULOSE SODIUM 1 DROP: 5 SOLUTION/ DROPS OPHTHALMIC at 20:08

## 2025-04-13 RX ADMIN — ACETAMINOPHEN 650 MG: 325 TABLET ORAL at 20:07

## 2025-04-13 RX ADMIN — CALCIUM CARBONATE (ANTACID) CHEW TAB 500 MG 500 MG: 500 CHEW TAB at 05:08

## 2025-04-13 RX ADMIN — LOSARTAN POTASSIUM 25 MG: 25 TABLET, FILM COATED ORAL at 05:08

## 2025-04-13 RX ADMIN — ACETAMINOPHEN 650 MG: 325 TABLET ORAL at 00:29

## 2025-04-13 RX ADMIN — LEVALBUTEROL HYDROCHLORIDE 0.63 MG: 0.63 SOLUTION RESPIRATORY (INHALATION) at 15:23

## 2025-04-13 RX ADMIN — CEFTRIAXONE SODIUM 2000 MG: 2 INJECTION, POWDER, FOR SOLUTION INTRAMUSCULAR; INTRAVENOUS at 05:18

## 2025-04-13 RX ADMIN — LEVOTHYROXINE SODIUM 100 MCG: 0.1 TABLET ORAL at 05:08

## 2025-04-13 RX ADMIN — LIOTHYRONINE SODIUM 5 MCG: 5 TABLET ORAL at 05:07

## 2025-04-13 RX ADMIN — METOPROLOL TARTRATE 50 MG: 50 TABLET, FILM COATED ORAL at 16:39

## 2025-04-13 RX ADMIN — PHYTONADIONE 2.5 MG: 5 TABLET ORAL at 08:45

## 2025-04-13 RX ADMIN — DOXYCYCLINE 100 MG: 100 TABLET, FILM COATED ORAL at 16:39

## 2025-04-13 RX ADMIN — TAMSULOSIN HYDROCHLORIDE 0.4 MG: 0.4 CAPSULE ORAL at 05:08

## 2025-04-13 RX ADMIN — SENNOSIDES AND DOCUSATE SODIUM 2 TABLET: 50; 8.6 TABLET ORAL at 16:39

## 2025-04-13 ASSESSMENT — PAIN DESCRIPTION - PAIN TYPE
TYPE: ACUTE PAIN
TYPE: ACUTE PAIN
TYPE: CHRONIC PAIN
TYPE: ACUTE PAIN
TYPE: ACUTE PAIN

## 2025-04-13 ASSESSMENT — ENCOUNTER SYMPTOMS
COUGH: 1
SPUTUM PRODUCTION: 1
SHORTNESS OF BREATH: 1
WEAKNESS: 1

## 2025-04-13 ASSESSMENT — FIBROSIS 4 INDEX: FIB4 SCORE: 1.18

## 2025-04-13 NOTE — CARE PLAN
The patient is Watcher - Medium risk of patient condition declining or worsening    Shift Goals  Clinical Goals: Wean off O2, IV abx  Patient Goals: Help cough    Progress made toward(s) clinical / shift goals:      Problem: Knowledge Deficit - Standard  Goal: Patient and family/care givers will demonstrate understanding of plan of care, disease process/condition, diagnostic tests and medications  Outcome: Progressing     Problem: Respiratory  Goal: Patient will achieve/maintain optimum respiratory ventilation and gas exchange  Outcome: Progressing     Problem: Skin Integrity  Goal: Skin integrity is maintained or improved  Outcome: Progressing

## 2025-04-13 NOTE — PROGRESS NOTES
4 Eyes Skin Assessment Completed by EVELIO Rothman and EVELIO Proctor.    Head WDL  Ears WDL  Nose WDL  Mouth WDL  Neck WDL  Breast/Chest WDL  Shoulder Blades WDL  Spine WDL  (R) Arm/Elbow/Hand WDL  (L) Arm/Elbow/Hand Bruising left upper arm bruising  Abdomen WDL  Groin WDL  Scrotum/Coccyx/Buttocks /Bony  (R) Leg WDL  (L) Leg Bruising  (R) Heel/Foot/Toe Boggy  (L) Heel/Foot/Toe Boggy          Devices In Places Tele Box, Pulse Ox, and Nasal Cannula      Interventions In Place NC W/Ear Foams, Sacral Mepilex, and Pillows    Possible Skin Injury No    Pictures Uploaded Into Epic No, needs to be completed  Wound Consult Placed N/A  RN Wound Prevention Protocol Ordered Yes

## 2025-04-13 NOTE — PROGRESS NOTES
Received report from EVELIO Hughes. Patient lying on bed, alert and oriented x 4. Placed on 2-3 lpm as her SPO2 dropped down to 85-88 %. Denies any pain. Fall and safety precaution in placed. Bed alarm on and side rails up. Call bell within reached.

## 2025-04-13 NOTE — CARE PLAN
The patient is Stable - Low risk of patient condition declining or worsening    Shift Goals  Clinical Goals: Monitor oxygen needs/safety  Patient Goals: Feel better    Progress made toward(s) clinical / shift goals:      Problem: Knowledge Deficit - Standard  Goal: Patient and family/care givers will demonstrate understanding of plan of care, disease process/condition, diagnostic tests and medications  Outcome: Progressing  Note: Reviewed POC. Discussed and provided education on treatment and medications, monitoring oxygen needs and pain management. Maintained skin clean and dry. Patient verbalized understanding plan of care.      Problem: Respiratory  Goal: Patient will achieve/maintain optimum respiratory ventilation and gas exchange  Outcome: Progressing  Note: Maintained oxygen at 2 lpm via nasal cannula and SPO2 above 90%. Monitor for signs of respiratory distress. Place HOB at 30 degrees.      Problem: Skin Integrity  Goal: Skin integrity is maintained or improved  Outcome: Progressing  Note: Maintained skin clean and dry, applied offloading foam to sacral.      Problem: Fall Risk  Goal: Patient will remain free from falls  Outcome: Progressing  Note: Fall and safety precaution in placed. Bed and strip alarm on, side rails up. Call bell within reach. Instructed patient to call whenever assistance is needed. Assisted patient to use the commode with one assist.        Patient is not progressing towards the following goals:

## 2025-04-13 NOTE — PROGRESS NOTES
Inpatient Anticoagulation Service Note for 2025      Reason for Anticoagulation: Atrial Fibrillation   [id number] VASc Score: 7  HAS-BLED Score: 1    Hemoglobin Value: (!) 11  Hematocrit Value: (!) 34.2  Lab Platelet Value: 356  Target INR: 2.0 to 3.0     Date/Time INR Value    25 0226 7.69     25 0710 6.36            Date/Time Dose (mg)    25 0920 0     25 1146 0           Average Dose (mg):  (2 mg Mon/Wed/Fri, 1 mg all other days)  Significant Interactions: Antibiotics  Bridge Therapy: No (If less than 5 days and overlap therapy discontinued -- document reason (i.e. Bleed Risk))    (If still on overlap therapy, if No -- document reason (i.e. Bleed Risk))    Reversal Agent Administered: Not Applicable  A/P: Pt admitted for CAP/abx. She continues on warfarin for afib. Today INR is supratherapeutic, likely due to recent sickness/reduced consumption, as pt's TTR is ~64%. Will hold warfarin and follow INR.  2.5mg PO vitamin K given     Education Material Provided?: No    Pharmacist suggested discharge dosinmg MWF and 1mg all other days. INR with close monitoring on discharge (within 72h)     Mima Johnson, JuanD

## 2025-04-13 NOTE — PROGRESS NOTES
Telemetry Shift Summary     Per monitor tech:  Rhythm ST/ Aflutter  HR Range 103-118  Ectopy -  Measurements -/0.07/-      Normal Values  Rhythm SR  HR Range:   Measurements: 0.12-0.20/0.06-0.10/0.30-0.52

## 2025-04-14 PROBLEM — E43 SEVERE PROTEIN-CALORIE MALNUTRITION (HCC): Chronic | Status: ACTIVE | Noted: 2021-11-02

## 2025-04-14 LAB
ALBUMIN SERPL BCP-MCNC: 2.5 G/DL (ref 3.2–4.9)
BACTERIA SPEC RESP CULT: NORMAL
BUN SERPL-MCNC: 33 MG/DL (ref 8–22)
CALCIUM ALBUM COR SERPL-MCNC: 10.4 MG/DL (ref 8.5–10.5)
CALCIUM SERPL-MCNC: 9.2 MG/DL (ref 8.5–10.5)
CHLORIDE SERPL-SCNC: 100 MMOL/L (ref 96–112)
CO2 SERPL-SCNC: 24 MMOL/L (ref 20–33)
CREAT SERPL-MCNC: 1.5 MG/DL (ref 0.5–1.4)
ERYTHROCYTE [DISTWIDTH] IN BLOOD BY AUTOMATED COUNT: 48.8 FL (ref 35.9–50)
GFR SERPLBLD CREATININE-BSD FMLA CKD-EPI: 33 ML/MIN/1.73 M 2
GLUCOSE SERPL-MCNC: 112 MG/DL (ref 65–99)
GRAM STN SPEC: NORMAL
HCT VFR BLD AUTO: 36.7 % (ref 37–47)
HGB BLD-MCNC: 11.2 G/DL (ref 12–16)
INR PPP: 2.09 (ref 0.87–1.13)
MAGNESIUM SERPL-MCNC: 2 MG/DL (ref 1.5–2.5)
MCH RBC QN AUTO: 32.7 PG (ref 27–33)
MCHC RBC AUTO-ENTMCNC: 30.5 G/DL (ref 32.2–35.5)
MCV RBC AUTO: 107 FL (ref 81.4–97.8)
PHOSPHATE SERPL-MCNC: 3.9 MG/DL (ref 2.5–4.5)
PLATELET # BLD AUTO: 368 K/UL (ref 164–446)
PMV BLD AUTO: 9.1 FL (ref 9–12.9)
POTASSIUM SERPL-SCNC: 4.9 MMOL/L (ref 3.6–5.5)
PROTHROMBIN TIME: 24.2 SEC (ref 12–14.6)
RBC # BLD AUTO: 3.43 M/UL (ref 4.2–5.4)
SIGNIFICANT IND 70042: NORMAL
SITE SITE: NORMAL
SODIUM SERPL-SCNC: 133 MMOL/L (ref 135–145)
SOURCE SOURCE: NORMAL
WBC # BLD AUTO: 11.8 K/UL (ref 4.8–10.8)

## 2025-04-14 PROCEDURE — 94760 N-INVAS EAR/PLS OXIMETRY 1: CPT

## 2025-04-14 PROCEDURE — 85610 PROTHROMBIN TIME: CPT

## 2025-04-14 PROCEDURE — 83735 ASSAY OF MAGNESIUM: CPT

## 2025-04-14 PROCEDURE — 85027 COMPLETE CBC AUTOMATED: CPT

## 2025-04-14 PROCEDURE — A9270 NON-COVERED ITEM OR SERVICE: HCPCS | Performed by: HOSPITALIST

## 2025-04-14 PROCEDURE — 97166 OT EVAL MOD COMPLEX 45 MIN: CPT

## 2025-04-14 PROCEDURE — A9270 NON-COVERED ITEM OR SERVICE: HCPCS | Performed by: INTERNAL MEDICINE

## 2025-04-14 PROCEDURE — 700105 HCHG RX REV CODE 258: Performed by: INTERNAL MEDICINE

## 2025-04-14 PROCEDURE — 700111 HCHG RX REV CODE 636 W/ 250 OVERRIDE (IP): Mod: JZ | Performed by: INTERNAL MEDICINE

## 2025-04-14 PROCEDURE — 99232 SBSQ HOSP IP/OBS MODERATE 35: CPT | Performed by: INTERNAL MEDICINE

## 2025-04-14 PROCEDURE — 97162 PT EVAL MOD COMPLEX 30 MIN: CPT

## 2025-04-14 PROCEDURE — 700102 HCHG RX REV CODE 250 W/ 637 OVERRIDE(OP): Performed by: INTERNAL MEDICINE

## 2025-04-14 PROCEDURE — 36415 COLL VENOUS BLD VENIPUNCTURE: CPT

## 2025-04-14 PROCEDURE — 80069 RENAL FUNCTION PANEL: CPT

## 2025-04-14 PROCEDURE — 700102 HCHG RX REV CODE 250 W/ 637 OVERRIDE(OP): Performed by: HOSPITALIST

## 2025-04-14 PROCEDURE — 770020 HCHG ROOM/CARE - TELE (206)

## 2025-04-14 RX ORDER — WARFARIN SODIUM 2 MG/1
2 TABLET ORAL
Status: DISCONTINUED | OUTPATIENT
Start: 2025-04-16 | End: 2025-04-15 | Stop reason: HOSPADM

## 2025-04-14 RX ORDER — WARFARIN SODIUM 1 MG/1
1 TABLET ORAL
Status: COMPLETED | OUTPATIENT
Start: 2025-04-14 | End: 2025-04-14

## 2025-04-14 RX ORDER — WARFARIN SODIUM 1 MG/1
1 TABLET ORAL
Status: DISCONTINUED | OUTPATIENT
Start: 2025-04-15 | End: 2025-04-15 | Stop reason: HOSPADM

## 2025-04-14 RX ORDER — LEVALBUTEROL INHALATION SOLUTION 0.63 MG/3ML
0.63 SOLUTION RESPIRATORY (INHALATION)
Status: DISCONTINUED | OUTPATIENT
Start: 2025-04-14 | End: 2025-04-15 | Stop reason: HOSPADM

## 2025-04-14 RX ADMIN — METOPROLOL TARTRATE 50 MG: 50 TABLET, FILM COATED ORAL at 06:04

## 2025-04-14 RX ADMIN — CARBOXYMETHYLCELLULOSE SODIUM 1 DROP: 5 SOLUTION/ DROPS OPHTHALMIC at 13:00

## 2025-04-14 RX ADMIN — LEVOTHYROXINE SODIUM 100 MCG: 0.1 TABLET ORAL at 06:04

## 2025-04-14 RX ADMIN — BENZONATATE 100 MG: 100 CAPSULE ORAL at 06:03

## 2025-04-14 RX ADMIN — CALCIUM CARBONATE (ANTACID) CHEW TAB 500 MG 500 MG: 500 CHEW TAB at 06:04

## 2025-04-14 RX ADMIN — SENNOSIDES AND DOCUSATE SODIUM 2 TABLET: 50; 8.6 TABLET ORAL at 17:13

## 2025-04-14 RX ADMIN — TAMSULOSIN HYDROCHLORIDE 0.4 MG: 0.4 CAPSULE ORAL at 06:03

## 2025-04-14 RX ADMIN — FOLIC ACID 1 MG: 1 TABLET ORAL at 06:04

## 2025-04-14 RX ADMIN — DOXYCYCLINE 100 MG: 100 TABLET, FILM COATED ORAL at 17:14

## 2025-04-14 RX ADMIN — LIOTHYRONINE SODIUM 5 MCG: 5 TABLET ORAL at 06:03

## 2025-04-14 RX ADMIN — CARBOXYMETHYLCELLULOSE SODIUM 1 DROP: 5 SOLUTION/ DROPS OPHTHALMIC at 21:00

## 2025-04-14 RX ADMIN — LOSARTAN POTASSIUM 25 MG: 25 TABLET, FILM COATED ORAL at 06:03

## 2025-04-14 RX ADMIN — CARBOXYMETHYLCELLULOSE SODIUM 1 DROP: 5 SOLUTION/ DROPS OPHTHALMIC at 17:00

## 2025-04-14 RX ADMIN — CARBOXYMETHYLCELLULOSE SODIUM 1 DROP: 5 SOLUTION/ DROPS OPHTHALMIC at 09:00

## 2025-04-14 RX ADMIN — WARFARIN SODIUM 1 MG: 1 TABLET ORAL at 17:14

## 2025-04-14 RX ADMIN — METOPROLOL TARTRATE 50 MG: 50 TABLET, FILM COATED ORAL at 17:14

## 2025-04-14 RX ADMIN — DOXYCYCLINE 100 MG: 100 TABLET, FILM COATED ORAL at 06:04

## 2025-04-14 RX ADMIN — CEFTRIAXONE SODIUM 2000 MG: 2 INJECTION, POWDER, FOR SOLUTION INTRAMUSCULAR; INTRAVENOUS at 06:14

## 2025-04-14 ASSESSMENT — COGNITIVE AND FUNCTIONAL STATUS - GENERAL
WALKING IN HOSPITAL ROOM: A LITTLE
CLIMB 3 TO 5 STEPS WITH RAILING: A LOT
MOVING FROM LYING ON BACK TO SITTING ON SIDE OF FLAT BED: A LITTLE
TOILETING: A LITTLE
DRESSING REGULAR LOWER BODY CLOTHING: A LITTLE
SUGGESTED CMS G CODE MODIFIER MOBILITY: CK
MOBILITY SCORE: 17
DRESSING REGULAR UPPER BODY CLOTHING: A LITTLE
TURNING FROM BACK TO SIDE WHILE IN FLAT BAD: A LITTLE
DAILY ACTIVITIY SCORE: 18
HELP NEEDED FOR BATHING: A LOT
MOVING TO AND FROM BED TO CHAIR: A LITTLE
SUGGESTED CMS G CODE MODIFIER DAILY ACTIVITY: CK
STANDING UP FROM CHAIR USING ARMS: A LITTLE
PERSONAL GROOMING: A LITTLE

## 2025-04-14 ASSESSMENT — ENCOUNTER SYMPTOMS
COUGH: 1
SHORTNESS OF BREATH: 1
WEAKNESS: 1
SPUTUM PRODUCTION: 1

## 2025-04-14 ASSESSMENT — GAIT ASSESSMENTS
ASSISTIVE DEVICE: FRONT WHEEL WALKER
GAIT LEVEL OF ASSIST: MINIMAL ASSIST
DISTANCE (FEET): 40
DISTANCE (FEET): 30
DEVIATION: BRADYKINETIC;SHUFFLED GAIT

## 2025-04-14 ASSESSMENT — FIBROSIS 4 INDEX: FIB4 SCORE: 1.14

## 2025-04-14 ASSESSMENT — ACTIVITIES OF DAILY LIVING (ADL): TOILETING: INDEPENDENT

## 2025-04-14 ASSESSMENT — PAIN DESCRIPTION - PAIN TYPE: TYPE: ACUTE PAIN

## 2025-04-14 NOTE — PROGRESS NOTES
Hospital Medicine Daily Progress Note    Date of Service  4/13/2025    Chief Complaint  Az Tripp is a 89 y.o. female admitted 4/12/2025 with PNA    Hospital Course  Az Tripp is a 89 y.o. female with past medical history of Wegener's, CKD stage IV, hypertension, hyperlipidemia, stroke resulting in residual right-sided weakness, gait instability, paroxysmal atrial fibrillation on Coumadin, protein calorie chronic moderate malnutrition, pulmonary hypertension, HFpEF, pleural effusion, living in assisted living facility  [facility], who presented 4/12/2025 with worsening cough.  Patient was diagnosed outside with pneumonia and was started on doxycycline.  She had had worsening shortness of breath at home and worsening cough and was sent by the assisted living facility.  She was just started on doxycycline only received 1 dose overnight..  Stated she had left lower mid axillary pain, worse with coughing.  She denied any headaches, abdominal pain, did dysuria symptoms.     I discussed case with ER physician Dr. Vaughn, patient started on IV ceftriaxone due to multiple antibiotic allergies.  Chest x-ray showing left lower lobe pneumonia.    Interval Problem Update  Patient did not feel any improvement, still feels congested and coughing.  Hypoxic to 89% while on 2.5L, increased to 3 to 4 L nasal cannula.  Continue IV ceftriaxone and doxycycline  I reviewed her labs, WBC 12.6 from 13.7  Hemoglobin 11.0.  Sodium 133, potassium 4.7, creatinine 1.47 decreased.  Phosphorus 3.3, magnesium 2.2.  INR 7.69, I administered 2.5 mg of vitamin K p.o.  Rechecking in the morning.  Viral panel PCR was negative.  Sputum culture showing GPC    I have discussed this patient's plan of care and discharge plan at IDT rounds today with Case Management, Nursing, Nursing leadership, and other members of the IDT team.    Consultants/Specialty  None    Code Status  DNAR/DNI    Disposition  The patient is not medically  cleared for discharge to home or a post-acute facility.  [place] [facility] [place] have [place] the appropriate orders for post-discharge needs.    Review of Systems  Review of Systems   Constitutional:  Positive for malaise/fatigue.   Respiratory:  Positive for cough, sputum production and shortness of breath.    Neurological:  Positive for weakness.   All other systems reviewed and are negative.       Physical Exam  Temp:  [36.4 °C (97.6 °F)-37.1 °C (98.7 °F)] 37.1 °C (98.7 °F)  Pulse:  [] 113  Resp:  [16-20] 18  BP: (100-122)/(54-70) 121/70  SpO2:  [89 %-98 %] 98 %    Physical Exam  Vitals and nursing note reviewed.   Constitutional:       General: She is not in acute distress.     Appearance: She is ill-appearing.      Comments: Frail appearing elderly female   HENT:      Head: Normocephalic and atraumatic.      Comments: Temporal muscle wasting positive     Mouth/Throat:      Mouth: Mucous membranes are dry.      Pharynx: No oropharyngeal exudate.   Eyes:      General: No scleral icterus.     Extraocular Movements: Extraocular movements intact.   Cardiovascular:      Rate and Rhythm: Normal rate and regular rhythm.      Pulses: Normal pulses.      Heart sounds: Normal heart sounds. No murmur heard.  Pulmonary:      Effort: Respiratory distress present.      Breath sounds: Wheezing, rhonchi and rales present.      Comments: On 2 L nasal cannula  Abdominal:      General: Abdomen is flat. Bowel sounds are normal. There is no distension.      Palpations: Abdomen is soft.      Tenderness: There is no abdominal tenderness.   Musculoskeletal:         General: No swelling or tenderness.      Cervical back: Normal range of motion. No rigidity.      Comments: Sarcopenic. Bilateral hand muscle atrophy noted.   Skin:     General: Skin is warm.      Capillary Refill: Capillary refill takes less than 2 seconds.      Coloration: Skin is not jaundiced.      Findings: No erythema.   Neurological:       Mental Status: She is alert and oriented to person, place, and time. Mental status is at baseline.      Motor: Weakness present.   Psychiatric:         Mood and Affect: Mood normal.         Behavior: Behavior normal.         Thought Content: Thought content normal.         Judgment: Judgment normal.         Fluids    Intake/Output Summary (Last 24 hours) at 4/13/2025 1813  Last data filed at 4/13/2025 1400  Gross per 24 hour   Intake 1440 ml   Output 250 ml   Net 1190 ml        Laboratory  Recent Labs     04/12/25  0710 04/13/25  0226   WBC 13.7* 12.6*   RBC 3.69* 3.31*   HEMOGLOBIN 12.3 11.0*   HEMATOCRIT 38.1 34.2*   .3* 103.3*   MCH 33.3* 33.2*   MCHC 32.3 32.2   RDW 46.3 46.2   PLATELETCT 390 356   MPV 9.6 9.5     Recent Labs     04/12/25  0710 04/13/25 0226   SODIUM 133* 133*   POTASSIUM 5.3 4.7   CHLORIDE 97 100   CO2 22 23   GLUCOSE 127* 119*   BUN 41* 36*   CREATININE 1.74* 1.47*   CALCIUM 9.6 8.8     Recent Labs     04/12/25  0710 04/13/25 0226   INR 6.36* 7.69*               Imaging  DX-CHEST-PORTABLE (1 VIEW)   Final Result         1. Dense left lower lobe infiltrate consistent with pneumonia.      2. Small-to-moderate left pleural effusion.      3. These are new since 8/15/2024 CT abdomen.                       Assessment/Plan  * Community acquired bacterial pneumonia- (present on admission)  Assessment & Plan  LLL CAP on CXR, see image below  WBC 13.7  Tachycardia and tachypnea  - multiple abx allergies listed  Continue IV ceftriaxone.  Continue p.o. doxycycline.  Sputum culture pending  Viral panel PCR negative        I reviewed CXR from today and second image from 2/5/23.    Sepsis with acute hypoxic respiratory failure (HCC)- (present on admission)  Assessment & Plan  This is Sepsis Present on admission  SIRS criteria identified on my evaluation include: Tachycardia, with heart rate greater than 90 BPM, Tachypnea, with respirations greater than 20 per minute, and Leukocytosis, with WBC  greater than 12,000  Clinical indicators of end organ dysfunction include Acute Respiratory Failure - (mechanical ventilation or BiPap or PaO2/FiO2 ratio < 300) and Acute On Chronic Renal Failure, with creatinine >0.5 above baseline level hypoxic to 78% on room air.  Source is left lower lobe bacterial community-acquired pneumonia  Sepsis protocol initiated  Crystalloid Fluid Administration: Fluid resuscitation ordered per standard protocol - 30 mL/kg per current or ideal body weight-1.3 L  IV antibiotics as appropriate for source of sepsis-IV ceftriaxone and azithromycin  Reassessment: I have reassessed the patient's hemodynamic status    Lactic acid 1.6  During my examination patient was hypoxic 78% on room air, she again dipped down to 88%.  I placed patient on 1 to 2 L nasal cannula for support.  She does not use oxygen at home.    4/13:  Continue IV ceftriaxone and p.o. azithromycin  UA was not a clean-catch  Hypoxic to 89% while on 2.5L, increased to 3 to 4 L nasal cannula.  Procalcitonin was 0.44 elevated, but also in relation to DIAZ  Viral panel PCR was negative.  Sputum culture showing GPC    Supratherapeutic INR- (present on admission)  Assessment & Plan  INR 6.36    Repeat INR 7.69.  Given patient's age, frailty, hemoglobin 11.0 from 12.3, she is at very high risk for sudden hemorrhage.  Holding warfarin  I am giving p.o. vitamin K 2.5 mg one-time  Repeat INR in the morning    Living in assisted living- (present on admission)  Assessment & Plan  [facility]    DNR (do not resuscitate)- (present on admission)  Assessment & Plan  I discussed with patient on her diagnosis of CAP. We discussed intubation and mechanical ventilation. I reviewed patient's POLST 6/2019 with patient. Indicated DNR, Selective treatment, DNI. Signed by patient herself.  Patient stated because she is 89 years old in a very frail state, she does not want any of these extensive procedures done.  She wishes to remain as a  DNR/DNI.    Frailty- (present on admission)  Assessment & Plan  Lives in [facility], from prior stroke    Chronic bilateral pleural effusions- (present on admission)  Assessment & Plan  Right appears improved on new CXR  Left pleural effusion still present    Chronic right-sided heart failure (HCC)- (present on admission)  Assessment & Plan  chronic    Gastroesophageal reflux disease without esophagitis- (present on admission)  Assessment & Plan  tums    Pulmonary HTN (HCC)- (present on admission)  Assessment & Plan  02/2023 RVSP 50mmHg    Protein-calorie malnutrition, moderate (HCC)- (present on admission)  Assessment & Plan  Chronic  Nutritionist consult pending    Body mass index (BMI) less than 16.5- (present on admission)  Assessment & Plan  Body mass index is 14.88 kg/m².  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) (HCC)- (present on admission)  Assessment & Plan  Chronic  Lives in [facility] for discharge planning ordered    Paroxysmal atrial fibrillation (HCC)- (present on admission)  Assessment & Plan  On coumadin  Tachycardic  Continue metoprolol  EKG showing , atrial flutter with heart rate control, QTc 424 ms    CKD (chronic kidney disease) stage 4, GFR 15-29 ml/min (HCC)- (present on admission)  Assessment & Plan  Acute on chronic  Cr 1.74, similar to 08/2024  Creatinine improved to 1.47 indicating she had DIAZ.  Continue losartan  Repeat labs in the morning    Hyponatremia- (present on admission)  Assessment & Plan  Sodium 133, potassium 4.7  Continued on Lasix    Hypothyroidism- (present on admission)  Assessment & Plan  Continue levothyroxine and liothyronine    Granulomatosis with polyangiitis (HCC)- (present on admission)  Assessment & Plan  Chronic         VTE prophylaxis:   SCDs/TEDs   pharmacologic prophylaxis contraindicated due to Supratherapeutic INR      I have performed a physical exam and reviewed and updated ROS and Plan today  (4/13/2025). In review of yesterday's note (4/12/2025), there are no changes except as documented above.      Total time spent 51 minutes. I spent greater than 50% of the time for patient care, including unit/floor time, multiple face-to-face encounters with the patient, counseling on treatment plan and discussion with bedside RN.  Further, I spent time on my own review of patient's overnight events, RN notes, imaging and lab analysis, and developing my assessment and plan above.  In addition, working with , social workers, and Charge RN on case coordination on this date.    This note was generated using voice recognition software which has a chance of producing errors of grammar and content.  I have made every reasonable attempt to find and correct any errors, but it should be expected that some may not be found prior to finalization of this note.

## 2025-04-14 NOTE — PROGRESS NOTES
Hospital Medicine Daily Progress Note    Date of Service  4/14/2025    Chief Complaint  Az Tripp is a 89 y.o. female admitted 4/12/2025 with PNA    Hospital Course  Az Tripp is a 89 y.o. female with past medical history of Wegener's, CKD stage IV, hypertension, hyperlipidemia, stroke resulting in residual right-sided weakness, gait instability, paroxysmal atrial fibrillation on Coumadin, protein calorie chronic moderate malnutrition, pulmonary hypertension, HFpEF, pleural effusion, living in assisted living facility  [facility], who presented 4/12/2025 with worsening cough.  Patient was diagnosed outside with pneumonia and was started on doxycycline.  She had had worsening shortness of breath at home and worsening cough and was sent by the assisted living facility.  She was just started on doxycycline only received 1 dose overnight..  Stated she had left lower mid axillary pain, worse with coughing.  She denied any headaches, abdominal pain, did dysuria symptoms.     I discussed case with ER physician Dr. Vaughn, patient started on IV ceftriaxone due to multiple antibiotic allergies.  Chest x-ray showing left lower lobe pneumonia.    Interval Problem Update  Patient remains on 2 L nasal cannula  We will continue IV ceftriaxone and doxycycline  Patient still feels ill, no significant improvement yet  I reviewed labs      I have discussed this patient's plan of care and discharge plan at IDT rounds today with Case Management, Nursing, Nursing leadership, and other members of the IDT team.    Consultants/Specialty  None    Code Status  DNAR/DNI    Disposition  The patient is not medically cleared for discharge to home or a post-acute facility.  Anticipate discharge to: [facility]    I have placed the appropriate orders for post-discharge needs.    Review of Systems  Review of Systems   Constitutional:  Positive for malaise/fatigue.   Respiratory:  Positive for cough, sputum  production and shortness of breath.    Neurological:  Positive for weakness.   All other systems reviewed and are negative.       Physical Exam  Temp:  [36.3 °C (97.4 °F)-37.4 °C (99.3 °F)] 36.3 °C (97.4 °F)  Pulse:  [] 94  Resp:  [16-18] 16  BP: (107-138)/(57-77) 110/69  SpO2:  [92 %-100 %] 96 %    Physical Exam  Vitals and nursing note reviewed.   Constitutional:       General: She is not in acute distress.     Appearance: She is ill-appearing.      Comments: Frail appearing elderly female   HENT:      Head: Normocephalic and atraumatic.      Comments: Temporal muscle wasting positive     Mouth/Throat:      Mouth: Mucous membranes are dry.      Pharynx: No oropharyngeal exudate.   Eyes:      General: No scleral icterus.     Extraocular Movements: Extraocular movements intact.   Cardiovascular:      Rate and Rhythm: Normal rate and regular rhythm.      Pulses: Normal pulses.      Heart sounds: Normal heart sounds. No murmur heard.  Pulmonary:      Effort: Respiratory distress present.      Breath sounds: Wheezing, rhonchi and rales present.      Comments: On 2 L nasal cannula  Abdominal:      General: Abdomen is flat. Bowel sounds are normal. There is no distension.      Palpations: Abdomen is soft.      Tenderness: There is no abdominal tenderness.   Musculoskeletal:         General: No swelling or tenderness.      Cervical back: Normal range of motion. No rigidity.      Comments: Sarcopenic. Bilateral hand muscle atrophy noted.   Skin:     General: Skin is warm.      Capillary Refill: Capillary refill takes less than 2 seconds.      Coloration: Skin is not jaundiced.      Findings: No erythema.   Neurological:      Mental Status: She is alert and oriented to person, place, and time. Mental status is at baseline.      Motor: Weakness present.   Psychiatric:         Mood and Affect: Mood normal.         Behavior: Behavior normal.         Thought Content: Thought content normal.         Judgment: Judgment  normal.         Fluids    Intake/Output Summary (Last 24 hours) at 4/14/2025 1514  Last data filed at 4/13/2025 2007  Gross per 24 hour   Intake 100 ml   Output --   Net 100 ml        Laboratory  Recent Labs     04/12/25  0710 04/13/25 0226 04/14/25  0310   WBC 13.7* 12.6* 11.8*   RBC 3.69* 3.31* 3.43*   HEMOGLOBIN 12.3 11.0* 11.2*   HEMATOCRIT 38.1 34.2* 36.7*   .3* 103.3* 107.0*   MCH 33.3* 33.2* 32.7   MCHC 32.3 32.2 30.5*   RDW 46.3 46.2 48.8   PLATELETCT 390 356 368   MPV 9.6 9.5 9.1     Recent Labs     04/12/25  0710 04/13/25 0226 04/14/25  0310   SODIUM 133* 133* 133*   POTASSIUM 5.3 4.7 4.9   CHLORIDE 97 100 100   CO2 22 23 24   GLUCOSE 127* 119* 112*   BUN 41* 36* 33*   CREATININE 1.74* 1.47* 1.50*   CALCIUM 9.6 8.8 9.2     Recent Labs     04/12/25  0710 04/13/25 0226 04/14/25  0310   INR 6.36* 7.69* 2.09*               Imaging  DX-CHEST-PORTABLE (1 VIEW)   Final Result         1. Dense left lower lobe infiltrate consistent with pneumonia.      2. Small-to-moderate left pleural effusion.      3. These are new since 8/15/2024 CT abdomen.                       Assessment/Plan  * Community acquired bacterial pneumonia- (present on admission)  Assessment & Plan  LLL CAP on CXR, see image below  WBC 13.7  Tachycardia and tachypnea  - multiple abx allergies listed  Continue IV ceftriaxone and p.o. doxycycline.  Sputum culture growing gram-positive cocci  Viral panel PCR negative        I reviewed CXR from today and second image from 2/5/23.    Sepsis with acute hypoxic respiratory failure (HCC)- (present on admission)  Assessment & Plan  This is Sepsis Present on admission  SIRS criteria identified on my evaluation include: Tachycardia, with heart rate greater than 90 BPM, Tachypnea, with respirations greater than 20 per minute, and Leukocytosis, with WBC greater than 12,000  Clinical indicators of end organ dysfunction include Acute Respiratory Failure - (mechanical ventilation or BiPap or PaO2/FiO2  ratio < 300) and Acute On Chronic Renal Failure, with creatinine >0.5 above baseline level hypoxic to 78% on room air.  Source is left lower lobe bacterial community-acquired pneumonia  Sepsis protocol initiated  Crystalloid Fluid Administration: Fluid resuscitation ordered per standard protocol - 30 mL/kg per current or ideal body weight-1.3 L  IV antibiotics as appropriate for source of sepsis-IV ceftriaxone and azithromycin  Reassessment: I have reassessed the patient's hemodynamic status    Lactic acid 1.6  During my examination patient was hypoxic 78% on room air, she again dipped down to 88%.  I placed patient on 1 to 2 L nasal cannula for support.  She does not use oxygen at home.    Continue IV ceftriaxone and oral doxycycline    Supratherapeutic INR- (present on admission)  Assessment & Plan  INR 6.36    4/13:  Repeat INR 7.69.  Given patient's age, frailty, hemoglobin 11.0 from 12.3, she is at very high risk for sudden hemorrhage.  Holding warfarin  Gave p.o. vitamin K 2.5 mg one-time    4/14:  INR 2.09.  We are starting warfarin.    Living in assisted living- (present on admission)  Assessment & Plan  [facility]    DNR (do not resuscitate)- (present on admission)  Assessment & Plan  I discussed with patient on her diagnosis of CAP. We discussed intubation and mechanical ventilation. I reviewed patient's POLST 6/2019 with patient. Indicated DNR, Selective treatment, DNI. Signed by patient herself.  Patient stated because she is 89 years old in a very frail state, she does not want any of these extensive procedures done.  She wishes to remain as a DNR/DNI.    Frailty- (present on admission)  Assessment & Plan  Lives in nursing home, from prior stroke    Chronic bilateral pleural effusions- (present on admission)  Assessment & Plan  Right appears improved on new CXR  Left pleural effusion still present    Chronic right-sided heart failure (HCC)- (present on admission)  Assessment &  Plan  chronic    Gastroesophageal reflux disease without esophagitis- (present on admission)  Assessment & Plan  tums    Pulmonary HTN (HCC)- (present on admission)  Assessment & Plan  02/2023 RVSP 50mmHg    Severe protein-calorie malnutrition (HCC)- (present on admission)  Assessment & Plan  Patient meets Aspen criteria due to chronic illness for severe protein calorie malnutrition as per dietitian consultation.    Body mass index (BMI) less than 16.5- (present on admission)  Assessment & Plan  Body mass index is 14.88 kg/m².  Severe protein calorie malnutrition    Hemiplegia as late effect of cerebrovascular accident (CVA) (HCC)- (present on admission)  Assessment & Plan  Chronic  Lives in [facility] for discharge planning ordered    Paroxysmal atrial fibrillation (HCC)- (present on admission)  Assessment & Plan  On coumadin  Tachycardic  Continue metoprolol  EKG showing , atrial flutter with heart rate control, QTc 424 ms    CKD (chronic kidney disease) stage 4, GFR 15-29 ml/min (HCC)- (present on admission)  Assessment & Plan  Acute on chronic  Cr 1.74, similar to 08/2024  Creatinine 1.5  Continue losartan    Hyponatremia- (present on admission)  Assessment & Plan  Sodium 133, Potassium 4.9    Hypothyroidism- (present on admission)  Assessment & Plan  Continue liothyronine and levothyroxine    Granulomatosis with polyangiitis (HCC)- (present on admission)  Assessment & Plan  Chronic         VTE prophylaxis:    therapeutic anticoagulation with coumadin dosing per pharmacy, adjust PRN      I have performed a physical exam and reviewed and updated ROS and Plan today (4/14/2025). In review of yesterday's note (4/13/2025), there are no changes except as documented above.    Total time spent 37 minutes. I spent greater than 50% of the time for patient care, including unit/floor time, multiple face-to-face encounters with the patient, counseling on treatment plan and discussion with bedside RN.   Further, I spent time on my own review of patient's overnight events, RN notes, imaging and lab analysis, and developing my assessment and plan above.  In addition, working with , social workers, and Charge RN on case coordination on this date.    This note was generated using voice recognition software which has a chance of producing errors of grammar and content.  I have made every reasonable attempt to find and correct any errors, but it should be expected that some may not be found prior to finalization of this note.

## 2025-04-14 NOTE — THERAPY
"Occupational Therapy   Initial Evaluation     Patient Name: Az Tripp  Age:  89 y.o., Sex:  female  Medical Record #: [id number]  Today's Date: 4/14/2025     Precautions  Precautions: Fall Risk    Assessment  Patient is 89 y.o. female with past medical history of Wegener's, CKD stage IV, hypertension, hyperlipidemia, stroke resulting in residual right-sided weakness, gait instability, paroxysmal atrial fibrillation on Coumadin, protein calorie chronic moderate malnutrition, pulmonary hypertension, HFpEF, pleural effusion, who presented 4/12/2025 with worsening cough.  Patient was diagnosed with pneumonia.  Pt reports being indep with simple mobility in room and some self care tasks PTA.  Assist from nursing home staff for other things.  Pt is currently limited by decreased functional mobility, activity tolerance, cognition, sensation, strength, AROM, coordination, balance, adherence to precautions, and pain which are affecting her ability to complete ADLs/IADLs at baseline. See grid for details. Pt will benefit from further inpt post acute therapy vs home with HH.  Biggest limiting factor at this time is her being on O2.  She is a much higher risk of increased falls if she is having to maneuver around O2 tubing at home on her own.  Will continue to follow.      Plan    Occupational Therapy Initial Treatment Plan   Treatment Interventions: Self Care / Activities of Daily Living, Adaptive Equipment, Neuro Re-Education / Balance, Therapeutic Exercises, Therapeutic Activity, Family / Caregiver Training  Treatment Frequency: 3 Times per Week  Duration: Until Therapy Goals Met    DC Equipment Recommendations: Unable to determine at this time  Discharge Recommendations: Recommend post-acute placement for additional occupational therapy services prior to discharge home (with home with HH therapy if pt progresses well and can wean off O2)     Subjective    \"I don't like being on the oxygen.\"     Objective       04/14/25 1131 "   Prior Living Situation   Prior Services Intermittent Physical Support for ADL Per Family;Home-Independent   Housing / Facility Assisted Living Residence   Steps Into Home 0   Steps In Home 0   Elevator Yes   Bathroom Set up Walk In Shower;Shower Chair;Grab Bars   Equipment Owned 4-Wheel Walker;Tub / Shower Seat;Grab Bar(s) In Tub / Shower;Grab Bar(s) By Toilet;Raised Toilet Seat Without Arms   Lives with - Patient's Self Care Capacity Attendant / Paid Care Giver   Comments Pt reports she can normally toilet herself and get small snack on her own,  She calls staff for assist with daily dressing and undressing and has assist for showers.  She has meals delivered to her room.  She can walk downstairs to get her mail with 4ww when she is feeling well   Prior Level of ADL Function   Self Feeding Independent   Grooming / Hygiene Independent   Bathing Requires Assist   Dressing Requires Assist   Toileting Independent   Prior Level of IADL Function   Medication Management Requires Assist   Laundry Requires Assist   Kitchen Mobility Requires Assist   Finances Unable To Determine At This Time   Home Management Requires Assist   Shopping Requires Assist   Prior Level Of Mobility Independent With Device in Home   Driving / Transportation Relatives / Others Provide Transportation   Occupation (Pre-Hospital Vocational) Retired Due To Age   Leisure Interests Unable To Determine At This Time   Vitals   Pulse Oximetry   (unable to get clear reading)   O2 (LPM) 2   O2 Delivery Device Nasal Cannula   Pain 0 - 10 Group   Location Wrist   Location Orientation Right   Description Aching   Therapist Pain Assessment Prior to Activity;During Activity;Post Activity;Nurse Notified  (pain at IV site)   Cognition    Level of Consciousness Alert   Comments some delayed responses at times, able to follow directions and provide info on PLOF   Active ROM Upper Body   Active ROM Upper Body  X   Dominant Hand Right;Using Non-Dominant Hand    Comments GREGOR impaired from previous stroke, not tested in detail,  she uses it to help balance with FWW   Strength Upper Body   Upper Body Strength  X   Comments RUE impaired compared to L   Neurological Concerns   Sitting Posture During ADL's Forward Head   Standing Posture During ADL's Forward Head   Coordination Upper Body   Coordination X   Comments IMP GM and FM RUE   Balance Assessment   Sitting Balance (Static) Good   Sitting Balance (Dynamic) Fair +   Standing Balance (Static) Fair   Standing Balance (Dynamic) Fair -   Weight Shift Sitting Fair   Weight Shift Standing Fair   Comments with FWW   Bed Mobility    Comments already EOB, up in chair after therapy   ADL Assessment   Eating Modified Independent   Grooming Minimal Assist;Seated   Upper Body Dressing Minimal Assist   Lower Body Dressing Minimal Assist   How much help from another person does the patient currently need...   Putting on and taking off regular lower body clothing? 3   Bathing (including washing, rinsing, and drying)? 2   Toileting, which includes using a toilet, bedpan, or urinal? 3   Putting on and taking off regular upper body clothing? 3   Taking care of personal grooming such as brushing teeth? 3   Eating meals? 4   6 Clicks Daily Activity Score 18   Functional Mobility   Sit to Stand Contact Guard Assist   Bed, Chair, Wheelchair Transfer Minimal Assist   Transfer Method Stand Step   Mobility Sally with FWW into gonzales   Distance (Feet) 40   # of Times Distance was Traveled 1   Comments limited by fatigue, SOB   Activity Tolerance   Sitting in Chair > 10 min,   Sitting Edge of Bed 10 min   Standing 3-4  min   Comments limited by SOB, fatigue   Patient / Family Goals   Patient / Family Goal #1 home   Short Term Goals   Short Term Goal # 1 Pt will tolerate standing 8 min at sink for grooming with SBA in 3 visits   Short Term Goal # 2 Pt will toilet in BR with appropriate AE and SBA in 3 visits.   Short Term Goal # 3 Pt will dress FB with  SBA from seated with AE as needed in 4 visits.   Short Term Goal # 4 Pt will tolerate OOB to chair for 3 hours for meals and ADL's in 4 visits.   Education Group   Education Provided Role of Occupational Therapist;Activities of Daily Living   Role of Occupational Therapist Patient Response Patient;Acceptance;Explanation;Verbal Demonstration   ADL Patient Response Patient;Acceptance;Explanation;Verbal Demonstration

## 2025-04-14 NOTE — PROGRESS NOTES
Telemetry Shift Summary     Rhythm: Atrial flutter  Rate:   Measurements: -/.08/-  Ectopy (reported by Monitor Tech): rPVC     Normal Values  Rhythm: Sinus  HR:   Measurements: 0.12-0.20/0.06-0.10/0.30-0.52

## 2025-04-14 NOTE — PROGRESS NOTES
0700 Received patient report from [facility]. Assumed patient care.     0800 Patient presents alert and oriented x 4. Patient in no acute distress. Respirations regular non labored on 2 lpm via NC. Assessment revealed ***. Plan of care discussed with patient. All questions answered to patient satisfaction. Patient left with safety measure in place, bed in low position, and call light within reach.    Yes

## 2025-04-14 NOTE — HOSPITAL COURSE
Az Tripp is a 89 y.o. female with past medical history of Wegener's, CKD stage IV, hypertension, hyperlipidemia, stroke resulting in residual right-sided weakness, gait instability, paroxysmal atrial fibrillation on Coumadin, protein calorie chronic moderate malnutrition, pulmonary hypertension, HFpEF, pleural effusion, living in assisted living facility  [address] [place] [address], who presented 4/12/2025 with worsening cough.  Patient was diagnosed outside with pneumonia and was started on doxycycline.  She had had worsening shortness of breath at home and worsening cough and was sent by the assisted living facility.  She was just started on doxycycline only received 1 dose overnight..  Stated she had left lower mid axillary pain, worse with coughing.  She denied any headaches, abdominal pain, did dysuria symptoms.     I discussed case with ER physician Dr. Vaughn, patient started on IV ceftriaxone due to multiple antibiotic allergies.  Chest x-ray showing left lower lobe pneumonia.

## 2025-04-14 NOTE — PROGRESS NOTES
Telemetry Shift Summary     Per monitor tech:  Rhythm Aflutter  HR Range   Ectopy -  Measurements -/0.08/0.32      Normal Values  Rhythm SR  HR Range:   Measurements: 0.12-0.20/0.06-0.10/0.30-0.52

## 2025-04-14 NOTE — DISCHARGE PLANNING
Case Management Discharge Planning    Admission Date: 4/12/2025  GMLOS: 4.9  ALOS: 2    6-Clicks ADL Score: 18  6-Clicks Mobility Score: 17  PT and/or OT Eval ordered: Yes  Post-acute Referrals Ordered: Yes  Post-acute Choice Obtained: No  Has referral(s) been sent to post-acute provider:  Yes      Anticipated Discharge Dispo: Discharge Disposition: D/T to SNF with Medicare cert in anticipation of skilled care (03)    DME Needed: No, but may require home O2 if unable to wean off.    Action(s) Taken:     RNCM attended IDT rounds and reviewed chart. SNF order placed per protocol. RNCM requested DPA assistance in sending local SNF referrals.    Escalations Completed: None    Medically Clear: No    Next Steps: Follow-up regarding SNF acceptance. Follow-up with medical team to discuss DC needs and barriers.    Barriers to Discharge: Medical clearance and Pending Placement

## 2025-04-14 NOTE — CARE PLAN
The patient is Stable - Low risk of patient condition declining or worsening    Shift Goals  Clinical Goals: Safety/ monitor oxygenation/ IV abx/ mobility  Patient Goals: Comfort    Progress made toward(s) clinical / shift goals:      Problem: Knowledge Deficit - Standard  Goal: Patient and family/care givers will demonstrate understanding of plan of care, disease process/condition, diagnostic tests and medications  Outcome: Progressing  Note: Reviewed POC. Discussed and provided education on medications and treatment especially inhaler. Patient able to verbalized understanding plan of care.      Problem: Respiratory  Goal: Patient will achieve/maintain optimum respiratory ventilation and gas exchange  Outcome: Progressing  Note: Maintained oxygen at 2 lpm via nasal cannula and sating above 90%. Continue to monitor for signs of respiratory distress. Inhaler puff and cough medicine administered.      Problem: Fall Risk  Goal: Patient will remain free from falls  Outcome: Progressing  Note: Fall and safety precaution in placed. Bed, strip alarm on and side rails up. Patient was safely assisted with a transfer from the bed to commode using pivot transfer technique.        Patient is not progressing towards the following goals:

## 2025-04-14 NOTE — THERAPY
Physical Therapy   Initial Evaluation     Patient Name: Az Tripp  Age:  89 y.o., Sex:  female  Medical Record #: [id number]  Today's Date: 4/14/2025     Precautions  Precautions: Fall Risk    Assessment  Patient is 89 y.o. female with a diagnosis of Community acquired bacterial pneumonia, sepsis. Past medical history of Wegener's, CKD stage IV, hypertension, hyperlipidemia, stroke resulting in residual right-sided weakness, gait instability, paroxysmal atrial fibrillation.  Pt is presenting with generalized weakness (R UE/LE is  weaker at baseline due to previous L CVA) and impaired balance. Currently is not at baseline for mobility and therefore recommend SNF for further PT since pt does ambulate Jose with 4WW at baseline. Pt is reluctant to go to SNF, if pt does go home, recommend pt have assist whenever ambulating at home for safety until balance and strength improve- also will need HHPT.     Plan    Physical Therapy Initial Treatment Plan   Treatment Plan : Bed Mobility, Gait Training, Therapeutic Activities, Neuro Re-Education / Balance, Therapeutic Exercise, Self Care / Home Evaluation  Treatment Frequency: 5 Times per Week  Duration: Until Therapy Goals Met    DC Equipment Recommendations: None  Discharge Recommendations: Recommend post-acute placement for additional physical therapy services prior to discharge home (at this time pt is stating she prefers to go home and states she can have more assist if needed at [facility])        04/14/25 1132   Prior Living Situation   Housing / Facility Assisted Living Residence   Bathroom Set up Walk In Shower   Equipment Owned 4-Wheel Walker   Comments Pt reports has assist with some ADLs at home but usually is Indep with mobility with 4WW   Prior Level of Functional Mobility   Bed Mobility Independent   Transfer Status Independent   Ambulation Independent   Assistive Devices Used 4-Wheel Walker   Cognition    Level of Consciousness Alert   Comments Pt is oriented  x4, reports feeling weaker than baseline, states has some R UE/LE weakness at baseline due to previous CVA   Active ROM Lower Body    Active ROM Lower Body  WDL   Strength Lower Body   Lower Body Strength  X   Comments R ankle DF 3-/5, R knee and hip grossly 4-/5, L LE grossly 4/5   Balance Assessment   Sitting Balance (Static) Fair +   Sitting Balance (Dynamic) Fair   Standing Balance (Static) Fair   Standing Balance (Dynamic) Fair -   Weight Shift Sitting Fair   Weight Shift Standing Fair   Comments stdg with FWW   Bed Mobility    Supine to Sit   (NT, pt sitting EOB upon entry)   Gait Analysis   Gait Level Of Assist Minimal Assist   Assistive Device Front Wheel Walker   Distance (Feet) 30   # of Times Distance was Traveled 1   Deviation Bradykinetic;Shuffled Gait  (decreased step length and mild foot drag R LE)   Functional Mobility   Sit to Stand Minimal Assist   Bed, Chair, Wheelchair Transfer Minimal Assist   Transfer Method Stand Step   Activity Tolerance   Standing fatigues quickly on 3L nc while amb, mod SOB with activity   Short Term Goals    Short Term Goal # 1 Pt will be able to perform bed mobility and sup <> sit Jose in 6 visits.   Short Term Goal # 2 Pt will be able to perform sit <> stand and transfer Jose with FWW in 6 visits.   Short Term Goal # 3 Pt will be able to ambulate 100 ft with FWW Jose in 6 visits.

## 2025-04-14 NOTE — PROGRESS NOTES
Received report from EVELIO Sher. Assumed care. Patient is alert and oriented x 4. Complaints of pain on left pelvis rated 4/10 pain scale. Fall and safety precaution in placed, bed alarm on and side rails up. Call light within reach.

## 2025-04-14 NOTE — DIETARY
"Nutrition Services: Initial Assessment     Day 2 of admit. Az Tripp is 89 y.o., female with admitting DX of Community acquired bacterial pneumonia [J15.9].    Consult Received for: FTT/Malnutrition and BMI    Current Hospital Problems List:    Principal Problem:    Community acquired bacterial pneumonia (POA: Yes)  Active Problems:    Granulomatosis with polyangiitis (HCC) (POA: Yes)      Overview: [facility]    Hypothyroidism (POA: Yes)    Hyponatremia (POA: Yes)    Supratherapeutic INR (POA: Yes)    CKD (chronic kidney disease) stage 4, GFR 15-29 ml/min (HCC) (POA: Yes)    Paroxysmal atrial fibrillation (HCC) (POA: Yes)    Hemiplegia as late effect of cerebrovascular accident (CVA) (HCC) (POA: Yes)    Body mass index (BMI) less than 16.5 (POA: Yes)    Protein-calorie malnutrition, moderate (HCC) (POA: Yes)    Dysphagia as late effect of cerebrovascular accident (CVA) (POA: Yes)    Pulmonary HTN (HCC) (POA: Yes)    Gastroesophageal reflux disease without esophagitis (POA: Yes)    Chronic right-sided heart failure (HCC) (POA: Yes)    Chronic bilateral pleural effusions (POA: Yes)    Frailty (POA: Yes)    DNR (do not resuscitate) (POA: Yes)    Sepsis with acute hypoxic respiratory failure (HCC) (POA: Yes)    Living in assisted living (Chronic) (POA: Yes)      Overview: [facility]  Resolved Problems:    * No resolved hospital problems. *    Nutrition Assessment:      Height: 170.2 cm (5' 7\")  Weight: 46.8 kg (103 lb 2.8 oz)  Weight taken via: Bed Scale  BMI Calculated: 16.16  BMI Classification: Underweight       Weight Readings from Last 5 encounters:   Wt Readings from Last 5 Encounters:   04/14/25 46.8 kg (103 lb 2.8 oz)   11/05/24 41.7 kg (92 lb)   10/16/24 41.7 kg (92 lb)   09/04/24 41.3 kg (91 lb)   08/15/24 42.6 kg (94 lb)       Objective:   Pertinent Labs: 4/14: Na 133 (L), Glu 112 (H), BUN 33 (H), Creat 1.5 (H), Alb 2.5 (L)  Pertinent Meds: Rocephin, Tums, Folic acid, Coumadin, " Senna  Skin/Wounds:  No skin injury per RN skin assessment  Food Allergies: None known  Last BM:     04/12/25       Current Diet Order/Intake:   IDDSI Level 0 - Thin liquids and IDDSI Level 6 Soft / Bite sized  Recorded PO intake 25-50%.    Subjective:   Spoke with patient at bedside. She states she did eat some breakfast today and is hungry for meals. She dislikes Ensure but is receptive to addition of Chobani smoothies and magic cups.      Nutrition Focused Physical Exam (NFPE)  Weight Loss: No weight loss per chart review. Low BMI appears chronic.  Muscle Mass: Severe Wasting at temples and clavicle.  Subcutaneous Fat: Severe Loss at orbitals and buccal area.  Fluid Accumulation: none noted  Reduced  Strength: N/A in acute care setting.    Nutrition Diagnosis:      Inadequate Oral Intake related to weakness, frailty as evidenced by recorded intake 25-50%.      Patient meets ASPEN criteria for Severe Malnutrition in context of Chronic Illness related to Frailty as evidenced by severe muscle and fat loss.      RD notified Provider Kayden Pinto MD via Voalte Message.    Nutrition Interventions:      Recommend Magic Cup (provides 290 calories, 9 g protein per 4 fl oz) daily and Chobani Smoothie BID.  Patient aware of active plan of care as appropriate.       Nutrition Monitoring and Evaluation:      Monitor nutrition POC, goal for >25-50% intake from meals and supplements.  Monitor vital signs pertinent to nutrition.    RD following and will provide updated recommendations as indicated.      Zelda Portillo R.D.                                         [facility]/AND CRITERIA FOR MALNUTRITION

## 2025-04-15 VITALS
BODY MASS INDEX: 15.57 KG/M2 | HEIGHT: 67 IN | OXYGEN SATURATION: 94 % | HEART RATE: 97 BPM | TEMPERATURE: 97.9 F | SYSTOLIC BLOOD PRESSURE: 105 MMHG | WEIGHT: 99.21 LBS | DIASTOLIC BLOOD PRESSURE: 57 MMHG | RESPIRATION RATE: 19 BRPM

## 2025-04-15 PROBLEM — J43.1 PANLOBULAR EMPHYSEMA (HCC): Status: ACTIVE | Noted: 2025-04-15

## 2025-04-15 LAB
INR PPP: 1.91 (ref 0.87–1.13)
PROTHROMBIN TIME: 22.5 SEC (ref 12–14.6)
SCCMEC + MECA PNL NOSE NAA+PROBE: NEGATIVE

## 2025-04-15 PROCEDURE — 700105 HCHG RX REV CODE 258: Performed by: INTERNAL MEDICINE

## 2025-04-15 PROCEDURE — 36415 COLL VENOUS BLD VENIPUNCTURE: CPT

## 2025-04-15 PROCEDURE — 87641 MR-STAPH DNA AMP PROBE: CPT

## 2025-04-15 PROCEDURE — 99239 HOSP IP/OBS DSCHRG MGMT >30: CPT | Performed by: INTERNAL MEDICINE

## 2025-04-15 PROCEDURE — 700111 HCHG RX REV CODE 636 W/ 250 OVERRIDE (IP): Mod: JZ | Performed by: INTERNAL MEDICINE

## 2025-04-15 PROCEDURE — 700102 HCHG RX REV CODE 250 W/ 637 OVERRIDE(OP): Performed by: INTERNAL MEDICINE

## 2025-04-15 PROCEDURE — 85610 PROTHROMBIN TIME: CPT

## 2025-04-15 PROCEDURE — A9270 NON-COVERED ITEM OR SERVICE: HCPCS | Performed by: INTERNAL MEDICINE

## 2025-04-15 RX ORDER — ACETAMINOPHEN 325 MG/1
650 TABLET ORAL EVERY 6 HOURS PRN
Qty: 30 TABLET | Refills: 0 | Status: SHIPPED | OUTPATIENT
Start: 2025-04-15

## 2025-04-15 RX ORDER — FLUTICASONE PROPIONATE AND SALMETEROL 250; 50 UG/1; UG/1
1 POWDER RESPIRATORY (INHALATION) EVERY 12 HOURS
Status: SHIPPED
Start: 2025-04-15

## 2025-04-15 RX ORDER — CEFUROXIME AXETIL 500 MG/1
500 TABLET ORAL 2 TIMES DAILY
Status: ACTIVE | DISCHARGE
Start: 2025-04-15

## 2025-04-15 RX ORDER — GUAIFENESIN 600 MG/1
600 TABLET, EXTENDED RELEASE ORAL EVERY 12 HOURS
Status: SHIPPED
Start: 2025-04-15

## 2025-04-15 RX ADMIN — DOXYCYCLINE 100 MG: 100 TABLET, FILM COATED ORAL at 05:43

## 2025-04-15 RX ADMIN — CARBOXYMETHYLCELLULOSE SODIUM 1 DROP: 5 SOLUTION/ DROPS OPHTHALMIC at 09:00

## 2025-04-15 RX ADMIN — FOLIC ACID 1 MG: 1 TABLET ORAL at 05:43

## 2025-04-15 RX ADMIN — CALCIUM CARBONATE (ANTACID) CHEW TAB 500 MG 500 MG: 500 CHEW TAB at 05:43

## 2025-04-15 RX ADMIN — METOPROLOL TARTRATE 50 MG: 50 TABLET, FILM COATED ORAL at 05:43

## 2025-04-15 RX ADMIN — TAMSULOSIN HYDROCHLORIDE 0.4 MG: 0.4 CAPSULE ORAL at 05:43

## 2025-04-15 RX ADMIN — CEFTRIAXONE SODIUM 2000 MG: 2 INJECTION, POWDER, FOR SOLUTION INTRAMUSCULAR; INTRAVENOUS at 05:45

## 2025-04-15 RX ADMIN — LIOTHYRONINE SODIUM 5 MCG: 5 TABLET ORAL at 05:43

## 2025-04-15 RX ADMIN — LOSARTAN POTASSIUM 25 MG: 25 TABLET, FILM COATED ORAL at 05:43

## 2025-04-15 RX ADMIN — LEVOTHYROXINE SODIUM 100 MCG: 0.1 TABLET ORAL at 05:43

## 2025-04-15 ASSESSMENT — FIBROSIS 4 INDEX: FIB4 SCORE: 1.14

## 2025-04-15 ASSESSMENT — PAIN DESCRIPTION - PAIN TYPE: TYPE: ACUTE PAIN

## 2025-04-15 NOTE — DISCHARGE PLANNING
Case Management Discharge Planning    Admission Date: 4/12/2025  GMLOS: 4.9  ALOS: 3    6-Clicks ADL Score: 18  6-Clicks Mobility Score: 17  PT and/or OT Eval ordered: Yes  Post-acute Referrals Ordered: Yes  Post-acute Choice Obtained: Yes  Has referral(s) been sent to post-acute provider:  Yes      Anticipated Discharge Dispo: Discharge Disposition: D/T to SNF with Medicare cert in anticipation of skilled care (03)    DME Needed: No    Action(s) Taken: Choice obtained and faxed to [facility]    Escalations Completed: None    Medically Clear: No    Next Steps: LMSW CM to follow for needs and barriers to discharge     Barriers to Discharge: Medical clearance, placement     Addendum: [facility] can accept pt today if medically cleared voalte message sent to provider

## 2025-04-15 NOTE — PROGRESS NOTES
Inpatient Anticoagulation Service Note for 4/15/2025      Reason for Anticoagulation: Atrial Fibrillation   [id number] VASc Score: 7  High risk for bleed: 0       Hemoglobin Value: (!) 11.2  Hematocrit Value: (!) 36.7  Lab Platelet Value: 368  Target INR: 2.0 to 3.0     Date/Time INR Value    04/15/25 0246 1.91     04/14/25 0310 2.09     04/13/25 0226 7.69     04/12/25 0710 6.36            Date/Time Dose (mg)    04/15/25 0835 1     04/14/25 0816 1     04/13/25 0920 0     04/12/25 1146 0           Average Dose (mg):  (2 mg Mon/Wed/Fri, 1 mg all other days)  Significant Interactions: Antibiotics  Bridge Therapy: No (If less than 5 days and overlap therapy discontinued -- document reason (i.e. Bleed Risk))    (If still on overlap therapy, if No -- document reason (i.e. Bleed Risk))    Reversal Agent Administered: Not Applicable  A/P: Pt admitted for CAP/abx. She continues on warfarin for afib. 4/14 INR was therapeutic post 2.5mg PO vitamin K day prior due to supratherapeutic INR. Today INR is slightly low at 1.9, but expected after holding and PO vitamin K. Resuming home dosing today of 2mg on MWF and 1mg all other days. Daily INR to monitor trend.      Education Material Provided?: No    Pharmacist suggested discharge dosing: Likely able to resume home dosing of 2mg MWF and 1mg all other days. Would suggest INR within 48-72h of discharge while on antibiotics.      Mima Johnson, PharmD

## 2025-04-15 NOTE — CARE PLAN
The patient is Stable - Low risk of patient condition declining or worsening    Shift Goals  Clinical Goals: IV ABx, Wean O2  Patient Goals: Comfort    Progress made toward(s) clinical / shift goals:    Problem: Knowledge Deficit - Standard  Goal: Patient and family/care givers will demonstrate understanding of plan of care, disease process/condition, diagnostic tests and medications  Outcome: Progressing     Problem: Respiratory  Goal: Patient will achieve/maintain optimum respiratory ventilation and gas exchange  Outcome: Progressing     Problem: Skin Integrity  Goal: Skin integrity is maintained or improved  Outcome: Progressing     Problem: Fall Risk  Goal: Patient will remain free from falls  Outcome: Progressing       Patient is not progressing towards the following goals:

## 2025-04-15 NOTE — PROGRESS NOTES
Telemetry Shift Summary     Rhythm: Atrial Flutter  Rate: 90s-100s  Measurements: --/0.06/0.38  Ectopy (reported by Monitor Tech): rare PVC     Normal Values  Rhythm: Sinus  HR:   Measurements: 0.12-0.20/0.06-0.10/0.30-0.52

## 2025-04-15 NOTE — DISCHARGE PLANNING
RICKY received and scanned to media SNF choice as blankets had been sent previously.    DPA re scanned as only 1 page came over.

## 2025-04-15 NOTE — CARE PLAN
The patient is Stable - Low risk of patient condition declining or worsening    Shift Goals  Clinical Goals: wean O2, abx  Patient Goals: comfort    Progress made toward(s) clinical / shift goals:  patient on room air, on PO abx. Will [facility].       Problem: Knowledge Deficit - Standard  Goal: Patient and family/care givers will demonstrate understanding of plan of care, disease process/condition, diagnostic tests and medications  Outcome: Met     Problem: Respiratory  Goal: Patient will achieve/maintain optimum respiratory ventilation and gas exchange  Outcome: Met       Patient is not progressing towards the following goals:

## 2025-04-15 NOTE — CARE PLAN
The patient is Stable - Low risk of patient condition declining or worsening    Shift Goals  Clinical Goals: IV abx, ween O2, safety  Patient Goals: COmfort    Progress made toward(s) clinical / shift goals:    Problem: Knowledge Deficit - Standard  Goal: Patient and family/care givers will demonstrate understanding of plan of care, disease process/condition, diagnostic tests and medications  Outcome: Progressing  Note: POC discussed. All questions answered to patient satisfaction.        Problem: Hemodynamics  Goal: Patient's hemodynamics, fluid balance and neurologic status will be stable or improve  Outcome: Progressing  Note: Hemodynamically stable in A-flutter. Continue to monitor VS and telemetry.      Problem: Respiratory  Goal: Patient will achieve/maintain optimum respiratory ventilation and gas exchange  Outcome: Progressing  Note: Difficult to assess spO2 due to bad read. No dyspnea at rest or with ambulation. Continue to ween O2 and encourage IS.        Patient is not progressing towards the following goals:

## 2025-04-15 NOTE — DISCHARGE SUMMARY
Discharge Summary    CHIEF COMPLAINT ON ADMISSION  Chief Complaint   Patient presents with    Cough     Dx with pna 4 days ago and on po antibx for same        Reason for Admission  EMS    Admission Date  4/12/2025     CODE STATUS  DNAR/DNI    HPI & HOSPITAL COURSE  This is a 89 y.o. female here with worsening shortness of breath and pneumonia.    Az Tripp is a 89 y.o. female with past medical history of Wegener's, CKD stage IV, hypertension, hyperlipidemia, stroke resulting in residual right-sided weakness, gait instability, paroxysmal atrial fibrillation on Coumadin, protein calorie chronic moderate malnutrition, pulmonary hypertension, HFpEF, pleural effusion, living in assisted living facility  [address] [place] [address], who presented 4/12/2025 with worsening cough.  Patient was diagnosed outside with pneumonia and was started on doxycycline.  She had had worsening shortness of breath at home and worsening cough and was sent by the assisted living facility.  She was just started on doxycycline only received 1 dose overnight..  Stated she had left lower mid axillary pain, worse with coughing.  She denied any headaches, abdominal pain, did dysuria symptoms.     I discussed case with ER physician Dr. Vaughn, patient started on IV ceftriaxone due to multiple antibiotic allergies.  Chest x-ray showing left lower lobe pneumonia.    Patient tolerated ceftriaxone in the hospital without incident, she was continued on doxycycline as well.  Review of the chest x-ray shows that she has hyperlucency throughout her lungs with flattened diaphragms, the pneumonia area is actually fairly small compared to her overall lung volume so I suspect some of her hypoxic respiratory failure may be chronic.    Overall she is feeling better however weaker than baseline so would benefit from a stay in rehab to restore her to her baseline before she returns to her assisted living facility.    She will complete her course of antibiotics  while at skilled nursing, I have discussed plan of care with the patient's stepson as well as the patient    Therefore, she is discharged in good and stable condition to skilled nursing facility.    The patient met 2-midnight criteria for an inpatient stay at the time of discharge.      FOLLOW UP ITEMS POST DISCHARGE  Patient was taken off of her Coumadin temporarily while in the hospital and resumed on her Coumadin, she will need her INR checked by the end of the week and then next week as well to make sure she is stabilizing      Primary care  Pulmonary medicine    DISCHARGE DIAGNOSES  Principal Problem:    Community acquired bacterial pneumonia (POA: Yes)  Active Problems:    Granulomatosis with polyangiitis (HCC) (POA: Yes)      [place] [facility] [place]    Hypothyroidism (POA: Yes)    Hyponatremia (POA: Yes)    Supratherapeutic INR (POA: Yes)    CKD (chronic kidney disease) stage 4, GFR 15-29 ml/min (HCC) (POA: Yes)    Paroxysmal atrial fibrillation (HCC) (POA: Yes)    Hemiplegia as late effect of cerebrovascular accident (CVA) (HCC) (POA: Yes)    Body mass index (BMI) less than 16.5 (POA: Yes)    Severe protein-calorie malnutrition (HCC) (Chronic) (POA: Yes)    Dysphagia as late effect of cerebrovascular accident (CVA) (POA: Yes)    Pulmonary HTN (HCC) (POA: Yes)    Gastroesophageal reflux disease without esophagitis (POA: Yes)    Chronic right-sided heart failure (HCC) (POA: Yes)    Chronic bilateral pleural effusions (POA: Yes)    Frailty (POA: Yes)    DNR (do not resuscitate) (POA: Yes)    Sepsis with acute hypoxic respiratory failure (HCC) (POA: Yes)    Living in assisted living (Chronic) (POA: Yes)      Overview: [facility]    Panlobular emphysema (HCC) (POA: Yes)  Resolved Problems:    * No resolved hospital problems. *      FOLLOW UP  Future Appointments   Date Time Provider Department Center   5/8/2025  1:30 PM Hussain Nation M.D. [facility] None   5/15/2025 11:00 AM [facility] None     No  follow-up provider specified.    MEDICATIONS ON DISCHARGE     Medication List        START taking these medications        Instructions   cefUROXime 500 MG Tabs  Commonly known as: Ceftin   Doctor's comments: TOLERATED CEFTRIAXONE IV IN HOSPITAL  Take 1 Tablet by mouth 2 times a day.  Dose: 500 mg     fluticasone-salmeterol 250-50 MCG/ACT Aepb  Commonly known as: Advair   Inhale 1 Puff every 12 hours.  Dose: 1 Puff     guaiFENesin  MG Tb12  Commonly known as: Mucinex   Take 1 Tablet by mouth every 12 hours.  Dose: 600 mg            CHANGE how you take these medications        Instructions   acetaminophen 325 MG Tabs  What changed:   medication strength  how much to take  when to take this  reasons to take this  Commonly known as: Tylenol   Take 2 Tablets by mouth every 6 hours as needed for Mild Pain, Moderate Pain or Fever.  Dose: 650 mg     Centrum Minis Adults 50+ Tabs  What changed: how much to take   Doctor's comments: Patient provided medication.  Take 1 Tablet by mouth every day.  Dose: 1 Tablet     warfarin 2 MG Tabs  What changed:   how much to take  how to take this  additional instructions  Commonly known as: Coumadin   Doctor's comments: This rx was submitted by a pharmacist working under a collaborative practice agreement.  Take 1/2 to 1 tablet daily as directed by anticoagulation clinic.            CONTINUE taking these medications        Instructions   Biotin 47828 MCG Tabs   Doctor's comments: Patient provides medication, profile only.  Take 1 Tablet by mouth every day. May substitute capsule form for tablets.  Dose: 1 Tablet     D-Mannose 500 MG Caps   Take 500 mg by mouth every 48 hours. Do not ship.  Family provides.  Dose: 500 mg     doxycycline 100 MG Tabs  Commonly known as: Vibramycin   Take 100 mg by mouth 2 times a day. 10 days  Dose: 100 mg     folic acid 1 MG Tabs  Commonly known as: Folvite   TAKE 1 TABLET BY MOUTH EVERY MORNING.  Dose: 1 mg     furosemide 40 MG Tabs  Commonly known  as: Lasix   Take 1 Tablet by mouth every day.  Dose: 40 mg     levothyroxine 100 MCG Tabs  Commonly known as: Synthroid   TAKE 1 TABLET BY MOUTH EVERY MORNING ON AN EMPTY STOMACH.  Dose: 100 mcg     liothyronine 5 MCG Tabs  Commonly known as: Cytomel   TAKE 1 TABLET BY MOUTH EVERY DAY. WITH LEVOTHYROXINE FOR THYROID FUNCTION.  Dose: 5 mcg     losartan 25 MG Tabs  Commonly known as: Cozaar   25 mg every day.  Dose: 25 mg     metoprolol tartrate 50 MG Tabs  Commonly known as: Lopressor   TAKE 1 TABLET BY MOUTH 2 TIMES A DAY.  Dose: 50 mg     senna-docusate 8.6-50 MG Tabs  Commonly known as: Pericolace Or Senokot S   Take 2 Tablets by mouth 1 time a day as needed for Constipation.  Dose: 2 Tablet     tamsulosin 0.4 MG capsule  Commonly known as: Flomax   TAKE 1 CAPSULE BY MOUTH EVERY MORNING.  Dose: 0.4 mg              Allergies  Allergies   Allergen Reactions    Ampicillin Rash     Red Rash    Baclofen Unspecified     drowsiness    Cephalexin [Keflex] Swelling    Citalopram Vomiting and Nausea    Clarithromycin Unspecified     Pt reports that this medication plugs her ears.     Diclofenac Rash and Swelling     Red rash & swelling      Erythromycin Diarrhea     GI upset    Hydroxyzine Swelling     Eyes get itchy and swollen     Naprelan [Naproxen] Swelling     Eyes get itchy become red and swollen    Oxaprozin Swelling     Swelling eyes, and itchy    Penicillins Rash     Red rash      Promethazine Unspecified     Drowsiness and sleeps    Vi-Q-Tuss [Hydrocodone-Guaifenesin] Vomiting and Nausea    Nitrofurantoin Nausea       DIET  Orders Placed This Encounter   Procedures    Diet Order Diet: Level 6 - Soft and Bite Sized; Liquid level: Level 0 - Thin     Standing Status:   Standing     Number of Occurrences:   1     Diet::   Level 6 - Soft and Bite Sized [23]     Liquid level:   Level 0 - Thin       ACTIVITY  As tolerated and directed by skilled nursing.  Weight bearing as tolerated    LINES, DRAINS, AND WOUNDS  This is  an automated list. Peripheral IVs will be removed prior to discharge.  Peripheral IV 04/14/25 22 G Anterior;Distal;Right Forearm (Active)   Site Assessment Clean;Dry;Intact 04/14/25 2100   Dressing Type Transparent 04/14/25 2100   Line Status Flushed;Scrubbed the hub prior to access;Saline locked 04/14/25 2100   Dressing Status Clean;Dry;Intact 04/14/25 2100   Dressing Intervention N/A 04/14/25 2100       Wound 02/06/23 Buttocks Bilateral (Active)       Peripheral IV 04/14/25 22 G Anterior;Distal;Right Forearm (Active)   Site Assessment Clean;Dry;Intact 04/14/25 2100   Dressing Type Transparent 04/14/25 2100   Line Status Flushed;Scrubbed the hub prior to access;Saline locked 04/14/25 2100   Dressing Status Clean;Dry;Intact 04/14/25 2100   Dressing Intervention N/A 04/14/25 2100               MENTAL STATUS ON TRANSFER  She is alert, oriented, appropriate.       CONSULTATIONS  None    PROCEDURES  None    LABORATORY  Lab Results   Component Value Date    SODIUM 133 (L) 04/14/2025    POTASSIUM 4.9 04/14/2025    CHLORIDE 100 04/14/2025    CO2 24 04/14/2025    GLUCOSE 112 (H) 04/14/2025    BUN 33 (H) 04/14/2025    CREATININE 1.50 (H) 04/14/2025    CREATININE 1.5 (H) 02/19/2009        Lab Results   Component Value Date    WBC 11.8 (H) 04/14/2025    HEMOGLOBIN 11.2 (L) 04/14/2025    HEMATOCRIT 36.7 (L) 04/14/2025    PLATELETCT 368 04/14/2025        Total time of the discharge process exceeds 45 minutes.

## 2025-04-15 NOTE — ASSESSMENT & PLAN NOTE
Hyperlucency with flattened diaphragms  Suspect she has had some degree of chronic hypoxic respiratory failure  May need oxygen upon discharge from rehab  Continue long-acting bronchodilator/steroid twice daily

## 2025-05-08 ENCOUNTER — APPOINTMENT (OUTPATIENT)
Dept: CARDIOLOGY | Facility: MEDICAL CENTER | Age: OVER 89
End: 2025-05-08
Attending: INTERNAL MEDICINE
Payer: MEDICARE

## 2025-06-17 DIAGNOSIS — Z79.01 CHRONIC ANTICOAGULATION: ICD-10-CM

## (undated) DEVICE — SPONGE GAUZE STER 4X4 8-PL - (2/PK 50PK/BX 12BX/CS)

## (undated) DEVICE — KIT ANESTHESIA W/CIRCUIT & 3/LT BAG W/FILTER (20EA/CA)

## (undated) DEVICE — BRUSH FMCNL TIP IRR STRL - (12/PKG) FOR SURGILAV

## (undated) DEVICE — NEPTUNE 4 PORT MANIFOLD - (20/PK)

## (undated) DEVICE — LACTATED RINGERS INJ 1000 ML - (14EA/CA 60CA/PF)

## (undated) DEVICE — WATER IRRIG. STER. 1500 ML - (9/CA)

## (undated) DEVICE — SUTURE 1 VICRYL PLUS CTX - 8 X 18 INCH (12/BX)

## (undated) DEVICE — SUCTION INSTRUMENT YANKAUER BULBOUS TIP W/O VENT (50EA/CA)

## (undated) DEVICE — SET EXTENSION WITH 2 PORTS (48EA/CA) ***PART #2C8610 IS A SUBSTITUTE*****

## (undated) DEVICE — CONNECTOR Y TBG CRTY 5 IN 1 STERILE (50EA/CA)

## (undated) DEVICE — CHLORAPREP 26 ML APPLICATOR - ORANGE TINT(25/CA)

## (undated) DEVICE — KIT HIP PREP IM ENCHANCE TOTAL (5EA/BX)

## (undated) DEVICE — SODIUM CHL. IRRIGATION 0.9% 3000ML (4EA/CA 65CA/PF)

## (undated) DEVICE — CLOSURE SKIN STRIP 1/4 X 3 IN - (STERI STRIP) (50/BX)

## (undated) DEVICE — KIT EVACUATER 3 SPRING PVC LF 1/8 DRAIN SIZE (10EA/CA)"

## (undated) DEVICE — TUBING CLEARLINK DUO-VENT - C-FLO (48EA/CA)

## (undated) DEVICE — GLOVE BIOGEL INDICATOR SZ 8 SURGICAL PF LTX - (50/BX 4BX/CA)

## (undated) DEVICE — SENSOR SPO2 NEO LNCS ADHESIVE (20/BX) SEE USER NOTES

## (undated) DEVICE — SUTURE 3-0 MONOCRYL PLUS PS-1 - 27 INCH (36/BX)

## (undated) DEVICE — BLADE SAGITTAL SAW DUAL CUT 75.0 X 25.0MM (1/EA)

## (undated) DEVICE — HEAD HOLDER JUNIOR/ADULT

## (undated) DEVICE — GLOVE BIOGEL INDICATOR SZ 8.5 SURGICAL PF LTX - (50/BX 4BX/CA)

## (undated) DEVICE — MASK ANESTHESIA ADULT  - (100/CA)

## (undated) DEVICE — DRESSING TRANSPARENT FILM TEGADERM 4 X 4.75" (50EA/BX)"

## (undated) DEVICE — SET LEADWIRE 5 LEAD BEDSIDE DISPOSABLE ECG (1SET OF 5/EA)

## (undated) DEVICE — GOWN WARMING STANDARD FLEX - (30/CA)

## (undated) DEVICE — TIP INTPLS HFLO ML ORFC BTRY - (12/CS)  FOR SURGILAV

## (undated) DEVICE — MIXER BONE CEMENT REVOLUTION - W/FEMORAL PRESSURIZER (6/CA)

## (undated) DEVICE — CANISTER SUCTION 3000ML MECHANICAL FILTER AUTO SHUTOFF MEDI-VAC NONSTERILE LF DISP  (40EA/CA)

## (undated) DEVICE — ELECTRODE DUAL RETURN W/ CORD - (50/PK)

## (undated) DEVICE — SLEEVE, VASO, THIGH, MED

## (undated) DEVICE — SODIUM CHL IRRIGATION 0.9% 1000ML (12EA/CA)

## (undated) DEVICE — SUTURE 2-0 VICRYL PLUS CT-1 - 8 X 18 INCH(12/BX)

## (undated) DEVICE — ELECTRODE 850 FOAM ADHESIVE - HYDROGEL RADIOTRNSPRNT (50/PK)

## (undated) DEVICE — HANDPIECE 10FT INTPLS SCT PLS IRRIGATION HAND CONTROL SET (6/PK)

## (undated) DEVICE — PROTECTOR ULNA NERVE - (36PR/CA)

## (undated) DEVICE — STERI STRIP COMPOUND BENZOIN - TINCTURE 0.6ML WITH APPLICATOR (40EA/BX)

## (undated) DEVICE — DRESSING XEROFORM 1X8 - (50/BX 4BX/CA)

## (undated) DEVICE — KIT ROOM DECONTAMINATION

## (undated) DEVICE — CLOSURE SKIN STRIP 1/2 X 4 IN - (STERI STRIP) (50/BX 4BX/CA)

## (undated) DEVICE — SUTURE GENERAL

## (undated) DEVICE — BLOCK